# Patient Record
Sex: FEMALE | Employment: OTHER | URBAN - METROPOLITAN AREA
[De-identification: names, ages, dates, MRNs, and addresses within clinical notes are randomized per-mention and may not be internally consistent; named-entity substitution may affect disease eponyms.]

---

## 2017-01-03 ENCOUNTER — TELEPHONE (OUTPATIENT)
Dept: FAMILY MEDICINE CLINIC | Facility: CLINIC | Age: 42
End: 2017-01-03

## 2017-01-03 RX ORDER — VARENICLINE TARTRATE 1 MG/1
1 TABLET, FILM COATED ORAL 2 TIMES DAILY
Qty: 120 TAB | Refills: 0 | Status: SHIPPED | OUTPATIENT
Start: 2017-01-03 | End: 2017-01-17

## 2017-01-17 ENCOUNTER — OFFICE VISIT (OUTPATIENT)
Dept: FAMILY MEDICINE CLINIC | Facility: CLINIC | Age: 42
End: 2017-01-17

## 2017-01-17 VITALS
TEMPERATURE: 97.7 F | OXYGEN SATURATION: 97 % | RESPIRATION RATE: 16 BRPM | WEIGHT: 279.8 LBS | HEIGHT: 68 IN | BODY MASS INDEX: 42.41 KG/M2 | DIASTOLIC BLOOD PRESSURE: 95 MMHG | SYSTOLIC BLOOD PRESSURE: 160 MMHG | HEART RATE: 79 BPM

## 2017-01-17 DIAGNOSIS — F17.211 CIGARETTE NICOTINE DEPENDENCE IN REMISSION: ICD-10-CM

## 2017-01-17 DIAGNOSIS — I10 ESSENTIAL HYPERTENSION: ICD-10-CM

## 2017-01-17 DIAGNOSIS — M79.7 FIBROMYALGIA: Primary | ICD-10-CM

## 2017-01-17 RX ORDER — GABAPENTIN 300 MG/1
CAPSULE ORAL
Refills: 0 | COMMUNITY
Start: 2017-01-10 | End: 2017-01-17

## 2017-01-17 RX ORDER — VARENICLINE TARTRATE 0.5 (11)-1
KIT ORAL
Refills: 0 | COMMUNITY
Start: 2016-11-30 | End: 2017-01-17

## 2017-01-17 RX ORDER — VARENICLINE TARTRATE 1 MG/1
1 TABLET, FILM COATED ORAL 2 TIMES DAILY
Qty: 120 TAB | Refills: 0 | Status: SHIPPED | OUTPATIENT
Start: 2017-01-17 | End: 2017-02-27 | Stop reason: SDUPTHER

## 2017-01-17 RX ORDER — PREGABALIN 75 MG/1
75 CAPSULE ORAL 2 TIMES DAILY
Qty: 180 CAP | Refills: 3 | Status: SHIPPED | OUTPATIENT
Start: 2017-01-17 | End: 2017-02-27

## 2017-01-17 NOTE — PROGRESS NOTES
Jayce Joel is a 35 y.o. female presents today for follow up on her hypertension, cholesterol, and left shoulder pain. Patient reports no change in her shoulder pain. Patient reports that the lidocaine patches for the fibromyalgia is not working for the pain. Patient is not fasting. Pt is in Room # 3        1. Have you been to the ER, urgent care clinic since your last visit? Hospitalized since your last visit? No    2. Have you seen or consulted any other health care providers outside of the [facility] since your last visit? Include any pap smears or colon screening.  No

## 2017-01-17 NOTE — MR AVS SNAPSHOT
Visit Information Date & Time Provider Department Dept. Phone Encounter #  
 1/17/2017 10:30 AM Zeb Humphries MD [facility] [phone] [id number] Upcoming Health Maintenance Date Due  
 PAP AKA CERVICAL CYTOLOGY 1/28/2019 DTaP/Tdap/Td series (2 - Td) 5/31/2026 Allergies as of 1/17/2017  Review Complete On: 1/17/2017 By: Zeb Humphries MD  
 No Known Allergies Current Immunizations  Never Reviewed Name Date Influenza Vaccine (Quad) PF 11/1/2016, 9/1/2015  9:40 AM  
 Pneumococcal Polysaccharide (PPSV-23) 9/1/2015  9:41 AM  
 Td, Adsorbed PF 1/28/2016  8:00 AM  
  
 Not reviewed this visit You Were Diagnosed With   
  
 Codes Comments Fibromyalgia    -  Primary ICD-10-CM: M79.7 ICD-9-CM: 729.1 Cigarette nicotine dependence in remission     ICD-10-CM: F17.211 ICD-9-CM: V15.82 Essential hypertension     ICD-10-CM: I10 
ICD-9-CM: 401.9 Vitals BP Pulse Temp Resp Height(growth percentile) Weight(growth percentile) (!) 160/95 (BP 1 Location: Right arm, BP Patient Position: Sitting) 79 97.7 °F (36.5 °C) (Oral) 16 5' 8\" (1.727 m) 279 lb 12.8 oz (126.9 kg) LMP SpO2 BMI OB Status Smoking Status 12/23/2016 (Exact Date) 97% 42.54 kg/m2 Having regular periods Former Smoker BMI and BSA Data Body Mass Index Body Surface Area 42.54 kg/m 2 2.47 m 2 Preferred Pharmacy Pharmacy Name Phone [address] [phone] Your Updated Medication List  
  
   
This list is accurate as of: 1/17/17 10:54 AM.  Always use your most recent med list.  
  
  
  
  
 buPROPion  mg SR tablet Commonly known as:  WELLBUTRIN, ZYBAN  
  
 busPIRone 10 mg tablet Commonly known as:  BUSPAR  
  
 calcium-cholecalciferol (D3) tablet Commonly known as:  Calcium 600 + D Take 1 Tab by mouth two (2) times a day. DULoxetine 60 mg capsule Commonly known as:  CYMBALTA Take 1 Cap by mouth daily. lamoTRIgine 200 mg tablet Commonly known as: LaMICtal  
  
 losartan 25 mg tablet Commonly known as:  COZAAR Take 1 Tab by mouth daily. MOTRIN  mg tablet Generic drug:  ibuprofen Take  by mouth.  
  
 polyethylene glycol 17 gram/dose powder Commonly known as:  Piper Mt Take 17 g by mouth daily. pregabalin 75 mg capsule Commonly known as:  Carlin Raquel Take 1 Cap by mouth two (2) times a day. Max Daily Amount: 150 mg.  
  
 simethicone 80 mg chewable tablet Commonly known as:  Anabel Lente Take 1 Tab by mouth every six (6) hours as needed for Flatulence. terbinafine HCl 250 mg tablet Commonly known as:  LAMISIL Take 1 Tab by mouth daily. traZODone 150 mg tablet Commonly known as:  DESYREL  
  
 varenicline 1 mg tablet Commonly known as:  Marrianne Prost Take 1 Tab by mouth two (2) times a day. Prescriptions Printed Refills  
 pregabalin (LYRICA) 75 mg capsule 3 Sig: Take 1 Cap by mouth two (2) times a day. Max Daily Amount: 150 mg.  
 Class: Print Route: Oral  
  
Prescriptions Sent to Pharmacy Refills  
 varenicline (CHANTIX) 1 mg tablet 0 Sig: Take 1 Tab by mouth two (2) times a day. Class: Normal  
 Pharmacy: [facility] [address] [place] [address] [address]  #: [phone] Route: Oral  
  
We Performed the Following REFERRAL TO RHEUMATOLOGY [DBC55 Custom] Comments:  
 Please evaluate patient for fibromyalgia. Referral Information Referral ID Referred By Referred To  
  
 [id number] MD Clarissa [address] [id number] [facility] [facility], [address] [place] [address] Phone: [phone] Fax: [phone] Visits Status Start Date End Date 1 New Request 1/17/17 1/17/18 If your referral has a status of pending review or denied, additional information will be sent to support the outcome of this decision. Patient Instructions Check bp at home. Call the office if greater than 140/90 Introducing [facility]! Dear Alex Kulkarni: 
Thank you for requesting a Beauty Booked account. Our records indicate that you already have an active Beauty Booked account. You can access your account anytime at [email or web address] Did you know that you can access your hospital and ER discharge instructions at any time in Beauty Booked? You can also review all of your test results from your hospital stay or ER visit. Additional Information If you have questions, please visit the Frequently Asked Questions section of the Beauty Booked website at [email or web address] Remember, Beauty Booked is NOT to be used for urgent needs. For medical emergencies, dial 911. Now available from your iPhone and Android! Please provide this summary of care documentation to your next provider. Your primary care clinician is listed as Giselle Mitchell. If you have any questions after today's visit, please call [phone].

## 2017-01-17 NOTE — PROGRESS NOTES
Internal Medicine Progress Note    Today's Date:  2017   Patient: Johnson Ribeiro  Patient :  1975    Subjective:     Chief Complaint   Patient presents with    Cholesterol Problem    Hypertension    Shoulder Pain      Shoulder pain  This is a chronic problem. This is not controlled. This started in . Pt takes neurontin and cymbalta. Pain is bilateral.  Pt was referred to ortho. She received a steroid injection but the pain relief only lasted a few days. Pt was prescribed celebrex and referred to physical therapy. Insurance would not cover either. Pt declines a prescription for narcotics. Pt reports fibromyalgia and also has pain in her back, neck, thighs. Hypertension   This is a chronic problem. BP is not at goal. Pt takes losartan. Pt reports compliance with this medication. Pt declines increasing the dose of losartan because she states that her bp elevation is from the pain she has. Obesity Class III  This is a chronic problem. This is not at goal. Pt does not exercise regularly. Pt tries to eat a healthy diet. Anxiety/Depression/Insomnia/Bipolar disorder  This is a chronic problem. This is controlled. Pt takes wellbutrin, trazodone, lamictal and cymbalta.  Pt sees a mental health professional, Dr Kriss James.       Past Medical History   Diagnosis Date    Acute pain of left shoulder 2016    Bipolar 1 disorder ([address]     Bipolar affective disorder, currently manic, moderate ([address] 2016    Chronic midline low back pain with sciatica 2016    Cigarette nicotine dependence in remission 2016    Cigarette nicotine dependence without complication     Depression     Fibromyalgia 2015    HLD (hyperlipidemia) 2016    Hypertension     Impaired fasting glucose 2016    Irritable bowel syndrome without diarrhea 2016    Moderate episode of recurrent major depressive disorder ([address] 2016    Obesity, Class III, BMI 40-49.9 (morbid obesity) ([facility] 9/1/2015    Onychomycosis 4/27/2016    Pain of left thumb 11/1/2016    Prediabetes 7/5/2016    PTSD (post-traumatic stress disorder)     Smoker 9/1/2015    Vitamin D deficiency 5/31/2016     Past Surgical History   Procedure Laterality Date    Hx gyn Bilateral      tubal    Hx orthopaedic        reports that she quit smoking about 3 weeks ago. She has a 8.25 pack-year smoking history. She has never used smokeless tobacco. She reports that she drinks about 5.4 oz of alcohol per week  She reports that she does not use illicit drugs. Family History   Problem Relation Age of Onset    Hypertension Mother     Thyroid Disease Mother     No Known Problems Father     Psychiatric Disorder Sister     Psychiatric Disorder Brother     Psychiatric Disorder Sister     Diabetes Sister     Psychiatric Disorder Brother     Psychiatric Disorder Brother      No Known Allergies  Review of Systems   Positives in bold  CV:      chest pain, palpitations  PULM:  SOB, wheezing, cough, sputum production    Current Outpatient Meds and Allergies     Current Outpatient Prescriptions on File Prior to Visit   Medication Sig Dispense Refill    losartan (COZAAR) 25 mg tablet Take 1 Tab by mouth daily. 90 Tab 3    buPROPion SR (WELLBUTRIN, ZYBAN) 200 mg SR tablet   1    calcium-cholecalciferol, D3, (CALCIUM 600 + D) tablet Take 1 Tab by mouth two (2) times a day. 180 Tab 3    polyethylene glycol (MIRALAX) 17 gram/dose powder Take 17 g by mouth daily. 1530 g 3    busPIRone (BUSPAR) 10 mg tablet   2    lamoTRIgine (LAMICTAL) 200 mg tablet   1    simethicone (MYLICON) 80 mg chewable tablet Take 1 Tab by mouth every six (6) hours as needed for Flatulence. 90 Tab 3    traZODone (DESYREL) 150 mg tablet   1    DULoxetine (CYMBALTA) 60 mg capsule Take 1 Cap by mouth daily. (Patient taking differently: Take 60 mg by mouth two (2) times a day.) 90 Cap 3    ibuprofen (MOTRIN IB) 200 mg tablet Take  by mouth.       terbinafine HCl (LAMISIL) 250 mg tablet Take 1 Tab by mouth daily. 30 Tab 1     No current facility-administered medications on file prior to visit. No Known Allergies  Objective:     VS:    Visit Vitals    BP (!) 160/95 (BP 1 Location: Right arm, BP Patient Position: Sitting)    Pulse 79    Temp 97.7 °F (36.5 °C) (Oral)    Resp 16    Ht 5' 8\" (1.727 m)    Wt 279 lb 12.8 oz (126.9 kg)    LMP 12/23/2016 (Exact Date)    SpO2 97%    BMI 42.54 kg/m2     General:   Well-nourished, well-groomed, pleasant, alert, in no acute distress  Head:  Normocephalic, atraumatic  Ears:  External ears WNL  Nose:  External nares WNL  Psych:  No pressured speech, no abnormal thought content    No visits with results within 3 Month(s) from this visit. Latest known visit with results is:    Hospital Outpatient Visit on 07/05/2016   Component Date Value Ref Range Status    WBC 07/05/2016 8.4  4.6 - 13.2 K/uL Final    RBC 07/05/2016 3.98* 4.20 - 5.30 M/uL Final    HGB 07/05/2016 12.7  12.0 - 16.0 g/dL Final    HCT 07/05/2016 39.4  35.0 - 45.0 % Final    MCV 07/05/2016 99.0* 74.0 - 97.0 FL Final    MCH 07/05/2016 31.9  24.0 - 34.0 PG Final    MCHC 07/05/2016 32.2  31.0 - 37.0 g/dL Final    RDW 07/05/2016 13.5  11.6 - 14.5 % Final    PLATELET 61/32/5194 940  135 - 420 K/uL Final    MPV 07/05/2016 10.5  9.2 - 11.8 FL Final    NEUTROPHILS 07/05/2016 68  40 - 73 % Final    LYMPHOCYTES 07/05/2016 22  21 - 52 % Final    MONOCYTES 07/05/2016 8  3 - 10 % Final    EOSINOPHILS 07/05/2016 2  0 - 5 % Final    BASOPHILS 07/05/2016 0  0 - 2 % Final    ABS. NEUTROPHILS 07/05/2016 5.7  1.8 - 8.0 K/UL Final    ABS. LYMPHOCYTES 07/05/2016 1.9  0.9 - 3.6 K/UL Final    ABS. MONOCYTES 07/05/2016 0.7  0.05 - 1.2 K/UL Final    ABS. EOSINOPHILS 07/05/2016 0.1  0.0 - 0.4 K/UL Final    ABS.  BASOPHILS 07/05/2016 0.0  0.0 - 0.06 K/UL Final    DF 07/05/2016 AUTOMATED    Final    Hemoglobin A1c 07/05/2016 5.7* 4.2 - 5.6 % Final Comment: ** NEW REFERENCE RANGE ESTABLISHED FOR THIS METHOD **  (NOTE)  HbA1C Interpretive Ranges  <5.7              Normal  5.7 - 6.4         Consider Prediabetes  >6.5              Consider Diabetes      Est. average glucose 07/05/2016 117  mg/dL Final    Comment: (NOTE)  The eAG should be interpreted with patient characteristics in mind   since ethnicity, interindividual differences, red cell lifespan,   variation in rates of glycation, etc. may affect the validity of the   calculation.  LIPID PROFILE 07/05/2016        Final    Cholesterol, total 07/05/2016 194  <200 MG/DL Final    Triglyceride 07/05/2016 170* <150 MG/DL Final    HDL Cholesterol 07/05/2016 43  40 - 60 MG/DL Final    LDL, calculated 07/05/2016 117* 0 - 100 MG/DL Final    VLDL, calculated 07/05/2016 34  MG/DL Final    CHOL/HDL Ratio 07/05/2016 4.5  0 - 5.0   Final    Sodium 07/05/2016 140  136 - 145 mmol/L Final    Potassium 07/05/2016 4.1  3.5 - 5.5 mmol/L Final    Chloride 07/05/2016 104  100 - 108 mmol/L Final    CO2 07/05/2016 26  21 - 32 mmol/L Final    Anion gap 07/05/2016 10  3.0 - 18 mmol/L Final    Glucose 07/05/2016 86  74 - 99 mg/dL Final    BUN 07/05/2016 12  7.0 - 18 MG/DL Final    Creatinine 07/05/2016 0.72  0.6 - 1.3 MG/DL Final    BUN/Creatinine ratio 07/05/2016 17  12 - 20   Final    GFR est AA 07/05/2016 >60  >60 ml/min/1.73m2 Final    GFR est non-AA 07/05/2016 >60  >60 ml/min/1.73m2 Final    Comment: (NOTE)  Estimated GFR is calculated using the Modification of Diet in Renal   Disease (MDRD) Study equation, reported for both  Americans   (GFRAA) and non- Americans (GFRNA), and normalized to 1.73m2   body surface area. The physician must decide which value applies to   the patient. The MDRD study equation should only be used in   individuals age 25 or older.  It has not been validated for the   following: pregnant women, patients with serious comorbid conditions,   or on certain medications, or persons with extremes of body size,   muscle mass, or nutritional status.  Calcium 07/05/2016 8.9  8.5 - 10.1 MG/DL Final    Bilirubin, total 07/05/2016 0.3  0.2 - 1.0 MG/DL Final    ALT 07/05/2016 25  13 - 56 U/L Final    AST 07/05/2016 15  15 - 37 U/L Final    Alk. phosphatase 07/05/2016 86  45 - 117 U/L Final    Protein, total 07/05/2016 7.0  6.4 - 8.2 g/dL Final    Albumin 07/05/2016 3.7  3.4 - 5.0 g/dL Final    Globulin 07/05/2016 3.3  2.0 - 4.0 g/dL Final    A-G Ratio 07/05/2016 1.1  0.8 - 1.7   Final     Assessment/Plan & Orders:         ICD-10-CM ICD-9-CM    1. Fibromyalgia M79.7 729.1 REFERRAL TO RHEUMATOLOGY      pregabalin (LYRICA) 75 mg capsule   2. Cigarette nicotine dependence in remission F17.211 V15.82 varenicline (CHANTIX) 1 mg tablet   3. Essential hypertension I10 401.9       Healthy lifestyle has been encouraged including avoidance of tobacco, limiting or avoiding alcohol intake, heart healthy diet which is low in cholesterol and saturated fat and contains fresh fruits, vegetables and whole grains and fiber, regular exercise with goals of 20-30 minutes 3-5 days weekly and maintaining an optimal BMI. Check bp at home and call the office if >140//90    Follow-up Disposition:  Return in about 1 week (around 1/24/2017) for Blood pressure check, Follow up hypertension. *Patient verbalized understanding and agreement with the plan. Patient was given an after-visit summary. Treva Ferro.  [address], MD - Internal Medicine  1/17/2017, 8:52 AM  [address]  [address] [place], [address]  Phone [phone]  Fax [phone]

## 2017-01-24 ENCOUNTER — OFFICE VISIT (OUTPATIENT)
Dept: FAMILY MEDICINE CLINIC | Facility: CLINIC | Age: 42
End: 2017-01-24

## 2017-01-24 VITALS
RESPIRATION RATE: 16 BRPM | HEIGHT: 68 IN | OXYGEN SATURATION: 98 % | BODY MASS INDEX: 42.37 KG/M2 | SYSTOLIC BLOOD PRESSURE: 130 MMHG | TEMPERATURE: 97.3 F | DIASTOLIC BLOOD PRESSURE: 80 MMHG | WEIGHT: 279.6 LBS | HEART RATE: 79 BPM

## 2017-01-24 DIAGNOSIS — M79.7 FIBROMYALGIA: ICD-10-CM

## 2017-01-24 DIAGNOSIS — F17.200 CONTINUOUS NICOTINE DEPENDENCE: ICD-10-CM

## 2017-01-24 DIAGNOSIS — E66.01 OBESITY, CLASS III, BMI 40-49.9 (MORBID OBESITY) (HCC): ICD-10-CM

## 2017-01-24 DIAGNOSIS — I10 ESSENTIAL HYPERTENSION: Primary | ICD-10-CM

## 2017-01-24 RX ORDER — LIDOCAINE 50 MG/G
PATCH TOPICAL
Refills: 3 | COMMUNITY
Start: 2016-11-29 | End: 2018-05-15

## 2017-01-24 NOTE — MR AVS SNAPSHOT
Visit Information Date & Time Provider Department Dept. Phone Encounter #  
 1/24/2017 10:00 AM Seth Montoya MD [facility] [phone] [id number] Follow-up Instructions Return in about 3 months (around 4/24/2017) for Follow up. Upcoming Health Maintenance Date Due  
 PAP AKA CERVICAL CYTOLOGY 1/28/2019 DTaP/Tdap/Td series (2 - Td) 5/31/2026 Allergies as of 1/24/2017  Review Complete On: 1/24/2017 By: Seth Montoya MD  
 No Known Allergies Current Immunizations  Never Reviewed Name Date Influenza Vaccine (Quad) PF 11/1/2016, 9/1/2015  9:40 AM  
 Pneumococcal Polysaccharide (PPSV-23) 9/1/2015  9:41 AM  
 Td, Adsorbed PF 1/28/2016  8:00 AM  
  
 Not reviewed this visit You Were Diagnosed With   
  
 Codes Comments Continuous nicotine dependence    -  Primary ICD-10-CM: Y40.279 ICD-9-CM: 305.1 Essential hypertension     ICD-10-CM: I10 
ICD-9-CM: 401.9 Fibromyalgia     ICD-10-CM: M79.7 ICD-9-CM: 729.1 Vitals BP Pulse Temp Resp Height(growth percentile) Weight(growth percentile) 130/80 79 97.3 °F (36.3 °C) (Oral) 16 5' 8\" (1.727 m) 279 lb 9.6 oz (126.8 kg) LMP SpO2 BMI OB Status Smoking Status 12/23/2016 (Exact Date) 98% 42.51 kg/m2 Having regular periods Former Smoker Vitals History BMI and BSA Data Body Mass Index Body Surface Area 42.51 kg/m 2 2.47 m 2 Preferred Pharmacy Pharmacy Name Phone [address] [phone] Your Updated Medication List  
  
   
This list is accurate as of: 1/24/17 11:11 AM.  Always use your most recent med list.  
  
  
  
  
 buPROPion  mg SR tablet Commonly known as:  WELLBUTRIN, ZYBAN  
  
 busPIRone 10 mg tablet Commonly known as:  BUSPAR  
  
 calcium-cholecalciferol (D3) tablet Commonly known as:  Calcium 600 + D  
 Take 1 Tab by mouth two (2) times a day. DULoxetine 60 mg capsule Commonly known as:  CYMBALTA Take 1 Cap by mouth daily. lamoTRIgine 200 mg tablet Commonly known as: LaMICtal  
  
 lidocaine 5 % Commonly known as:  LIDODERM  
CHRISTI 1 PA AA QD. LEAVE ON FOR 12 H THEN REMOVE FOR 12 H  
  
 losartan 25 mg tablet Commonly known as:  COZAAR Take 1 Tab by mouth daily. MOTRIN  mg tablet Generic drug:  ibuprofen Take  by mouth.  
  
 polyethylene glycol 17 gram/dose powder Commonly known as:  Reda Emerson Take 17 g by mouth daily. pregabalin 75 mg capsule Commonly known as:  Becky Hope Take 1 Cap by mouth two (2) times a day. Max Daily Amount: 150 mg.  
  
 simethicone 80 mg chewable tablet Commonly known as:  Brynda Emperor Take 1 Tab by mouth every six (6) hours as needed for Flatulence. traZODone 150 mg tablet Commonly known as:  DESYREL  
  
 varenicline 1 mg tablet Commonly known as:  Veronica Ort Take 1 Tab by mouth two (2) times a day. Follow-up Instructions Return in about 3 months (around 4/24/2017) for Follow up. Patient Instructions Fibromyalgia: Care Instructions Your Care Instructions Fibromyalgia is a painful condition that is not completely understood by medical experts. The cause of fibromyalgia is not known. It can make you feel tired and ache all over. It causes tender spots at specific points of the body that hurt only when you press on them. You may have trouble sleeping, as well as other symptoms. These problems can upset your work and home life. Symptoms tend to come and go, although they may never go away completely. Fibromyalgia does not harm your muscles, joints, or organs. Follow-up care is a key part of your treatment and safety. Be sure to make and go to all appointments, and call your doctor if you are having problems. It's also a good idea to know your test results and keep a list of the medicines you take. How can you care for yourself at home? · Exercise often. Walk, swim, or bike to help with pain and sleep problems and to make you feel better. · Try to get a good night's sleep. Go to bed and get up at the same time each day, whether you feel rested or not. Make sure you have a good mattress and pillow. · Reduce stress. Avoid things that cause you stress, if you can. If not, work at making them less stressful. Learn to use biofeedback, guided imagery, meditation, or other methods to relax. · Make healthy changes. Eat a balanced diet, quit smoking, and limit alcohol and caffeine. · Use a heating pad set on low or take warm baths or showers for pain. Using cold packs for up to 20 minutes at a time can also relieve pain. Put a thin cloth between the cold pack and your skin. A gentle massage might help too. · Be safe with medicines. Take your medicines exactly as prescribed. Call your doctor if you think you are having a problem with your medicine. Your doctor may talk to you about taking antidepressant medicines. These medicines may improve sleep, relieve pain, and in some cases treat depression. · Learn about fibromyalgia. This makes coping easier. Then, take an active role in your treatment. · Think about joining a support group with others who have fibromyalgia to learn more and get support. When should you call for help? Watch closely for changes in your health, and be sure to contact your doctor if: 
· You feel sad, helpless, or hopeless; lose interest in things you used to enjoy; or have other symptoms of depression. · Your fibromyalgia symptoms get worse. Where can you learn more? Go to http://jocelyn-amrik.info/. Enter V003 in the search box to learn more about \"Fibromyalgia: Care Instructions. \" Current as of: April 18, 2016 Content Version: 11.1 © 5082-7164 [employer].  Care instructions adapted under license by [address] (which disclaims liability or warranty for this information). If you have questions about a medical condition or this instruction, always ask your healthcare professional. [address] any warranty or liability for your use of this information. Introducing [facility]! Dear Francisco Congress: 
Thank you for requesting a DotProduct account. Our records indicate that you already have an active DotProduct account. You can access your account anytime at [email or web address] Did you know that you can access your hospital and ER discharge instructions at any time in DotProduct? You can also review all of your test results from your hospital stay or ER visit. Additional Information If you have questions, please visit the Frequently Asked Questions section of the DotProduct website at [email or web address] Remember, DotProduct is NOT to be used for urgent needs. For medical emergencies, dial 911. Now available from your iPhone and Android! Please provide this summary of care documentation to your next provider. Your primary care clinician is listed as Caro Amado. If you have any questions after today's visit, please call [phone].

## 2017-01-24 NOTE — PROGRESS NOTES
Internal Medicine Progress Note    Today's Date:  2017   Patient: Max Greer  Patient :  [id number]    Subjective:     Chief Complaint   Patient presents with    Hypertension    Cholesterol Problem    Shoulder Pain     left      Left shoulder pain  This is a chronic problem. This is not controlled. This started in . Pt takes neurontin and cymbalta. Pt was referred to ortho. She received a steroid injection but the pain relief only lasted a few days. Pt was prescribed celebrex and referred to physical therapy. Insurance would not cover either. Pt declines a prescription for narcotics. Fibromyalgia  This is a chronic problem. This is not at goal.  Pain is located in her back, neck, thighs. Pt has been referred to rheumatology. Pt was switched from neurontin to lyrica. This is effective. Hypertension   This is a chronic problem. BP is at goal. Pt takes losartan. Pt reports compliance with this medication. Obesity Class III  This is a chronic problem. This is not at goal. Pt exercises regularly by walking. Pt tries to eat a healthy diet. Anxiety/Depression/Insomnia/Bipolar disorder  This is a chronic problem. This is controlled. Pt takes wellbutrin, trazodone, lamictal and cymbalta.  Pt sees a mental health professional, Dr Marv Jones.       Past Medical History   Diagnosis Date    Acute pain of left shoulder 2016    Bipolar 1 disorder ([address]     Bipolar affective disorder, currently manic, moderate ([address] 2016    Chronic midline low back pain with sciatica 2016    Cigarette nicotine dependence in remission 2016    Cigarette nicotine dependence without complication     Continuous nicotine dependence 2017    Depression     Fibromyalgia 2015    HLD (hyperlipidemia) 2016    Hypertension     Impaired fasting glucose 2016    Irritable bowel syndrome without diarrhea 2016    Moderate episode of recurrent major depressive disorder ([facility] 4/27/2016    Obesity, Class III, BMI 40-49.9 (morbid obesity) ([facility] 9/1/2015    Onychomycosis 4/27/2016    Pain of left thumb 11/1/2016    Prediabetes 7/5/2016    PTSD (post-traumatic stress disorder)     Smoker 9/1/2015    Vitamin D deficiency 5/31/2016     Past Surgical History   Procedure Laterality Date    Hx gyn Bilateral      tubal    Hx orthopaedic        reports that she quit smoking about 4 weeks ago. She has a 8.25 pack-year smoking history. She has never used smokeless tobacco. She reports that she drinks about 5.4 oz of alcohol per week  She reports that she does not use illicit drugs. Family History   Problem Relation Age of Onset    Hypertension Mother     Thyroid Disease Mother     No Known Problems Father     Psychiatric Disorder Sister     Psychiatric Disorder Brother     Psychiatric Disorder Sister     Diabetes Sister     Psychiatric Disorder Brother     Psychiatric Disorder Brother      No Known Allergies  Review of Systems   Positives in bold  CV:      chest pain, palpitations  PULM:  SOB, wheezing, cough, sputum production    Current Outpatient Meds and Allergies     Current Outpatient Prescriptions on File Prior to Visit   Medication Sig Dispense Refill    pregabalin (LYRICA) 75 mg capsule Take 1 Cap by mouth two (2) times a day. Max Daily Amount: 150 mg. 180 Cap 3    varenicline (CHANTIX) 1 mg tablet Take 1 Tab by mouth two (2) times a day. 120 Tab 0    losartan (COZAAR) 25 mg tablet Take 1 Tab by mouth daily. 90 Tab 3    buPROPion SR (WELLBUTRIN, ZYBAN) 200 mg SR tablet   1    calcium-cholecalciferol, D3, (CALCIUM 600 + D) tablet Take 1 Tab by mouth two (2) times a day. 180 Tab 3    polyethylene glycol (MIRALAX) 17 gram/dose powder Take 17 g by mouth daily.  1530 g 3    busPIRone (BUSPAR) 10 mg tablet   2    lamoTRIgine (LAMICTAL) 200 mg tablet   1    simethicone (MYLICON) 80 mg chewable tablet Take 1 Tab by mouth every six (6) hours as needed for Flatulence. 90 Tab 3    traZODone (DESYREL) 150 mg tablet   1    DULoxetine (CYMBALTA) 60 mg capsule Take 1 Cap by mouth daily. (Patient taking differently: Take 60 mg by mouth two (2) times a day.) 90 Cap 3    ibuprofen (MOTRIN IB) 200 mg tablet Take  by mouth. No current facility-administered medications on file prior to visit. No Known Allergies  Objective:     VS:    Visit Vitals    /80  Comment: right arm manual    Pulse 79    Temp 97.3 °F (36.3 °C) (Oral)    Resp 16    Ht 5' 8\" (1.727 m)    Wt 279 lb 9.6 oz (126.8 kg)    LMP 12/23/2016 (Exact Date)    SpO2 98%    BMI 42.51 kg/m2     General:   Well-nourished, well-groomed, pleasant, alert, in no acute distress  Head:  Normocephalic, atraumatic  Ears:  External ears WNL  Nose:  External nares WNL  Psych:  No pressured speech, no abnormal thought content    No visits with results within 3 Month(s) from this visit. Latest known visit with results is:    Hospital Outpatient Visit on 07/05/2016   Component Date Value Ref Range Status    WBC 07/05/2016 8.4  4.6 - 13.2 K/uL Final    RBC 07/05/2016 3.98* 4.20 - 5.30 M/uL Final    HGB 07/05/2016 12.7  12.0 - 16.0 g/dL Final    HCT 07/05/2016 39.4  35.0 - 45.0 % Final    MCV 07/05/2016 99.0* 74.0 - 97.0 FL Final    MCH 07/05/2016 31.9  24.0 - 34.0 PG Final    MCHC 07/05/2016 32.2  31.0 - 37.0 g/dL Final    RDW 07/05/2016 13.5  11.6 - 14.5 % Final    PLATELET 84/25/5664 589  135 - 420 K/uL Final    MPV 07/05/2016 10.5  9.2 - 11.8 FL Final    NEUTROPHILS 07/05/2016 68  40 - 73 % Final    LYMPHOCYTES 07/05/2016 22  21 - 52 % Final    MONOCYTES 07/05/2016 8  3 - 10 % Final    EOSINOPHILS 07/05/2016 2  0 - 5 % Final    BASOPHILS 07/05/2016 0  0 - 2 % Final    ABS. NEUTROPHILS 07/05/2016 5.7  1.8 - 8.0 K/UL Final    ABS. LYMPHOCYTES 07/05/2016 1.9  0.9 - 3.6 K/UL Final    ABS. MONOCYTES 07/05/2016 0.7  0.05 - 1.2 K/UL Final    ABS.  EOSINOPHILS 07/05/2016 0.1  0.0 - 0.4 K/UL Final    ABS. BASOPHILS 07/05/2016 0.0  0.0 - 0.06 K/UL Final    DF 07/05/2016 AUTOMATED    Final    Hemoglobin A1c 07/05/2016 5.7* 4.2 - 5.6 % Final    Comment: ** NEW REFERENCE RANGE ESTABLISHED FOR THIS METHOD **  (NOTE)  HbA1C Interpretive Ranges  <5.7              Normal  5.7 - 6.4         Consider Prediabetes  >6.5              Consider Diabetes      Est. average glucose 07/05/2016 117  mg/dL Final    Comment: (NOTE)  The eAG should be interpreted with patient characteristics in mind   since ethnicity, interindividual differences, red cell lifespan,   variation in rates of glycation, etc. may affect the validity of the   calculation.  LIPID PROFILE 07/05/2016        Final    Cholesterol, total 07/05/2016 194  <200 MG/DL Final    Triglyceride 07/05/2016 170* <150 MG/DL Final    HDL Cholesterol 07/05/2016 43  40 - 60 MG/DL Final    LDL, calculated 07/05/2016 117* 0 - 100 MG/DL Final    VLDL, calculated 07/05/2016 34  MG/DL Final    CHOL/HDL Ratio 07/05/2016 4.5  0 - 5.0   Final    Sodium 07/05/2016 140  136 - 145 mmol/L Final    Potassium 07/05/2016 4.1  3.5 - 5.5 mmol/L Final    Chloride 07/05/2016 104  100 - 108 mmol/L Final    CO2 07/05/2016 26  21 - 32 mmol/L Final    Anion gap 07/05/2016 10  3.0 - 18 mmol/L Final    Glucose 07/05/2016 86  74 - 99 mg/dL Final    BUN 07/05/2016 12  7.0 - 18 MG/DL Final    Creatinine 07/05/2016 0.72  0.6 - 1.3 MG/DL Final    BUN/Creatinine ratio 07/05/2016 17  12 - 20   Final    GFR est AA 07/05/2016 >60  >60 ml/min/1.73m2 Final    GFR est non-AA 07/05/2016 >60  >60 ml/min/1.73m2 Final    Comment: (NOTE)  Estimated GFR is calculated using the Modification of Diet in Renal   Disease (MDRD) Study equation, reported for both  Americans   (GFRAA) and non- Americans (GFRNA), and normalized to 1.73m2   body surface area. The physician must decide which value applies to   the patient.  The MDRD study equation should only be used in   individuals age 25 or older. It has not been validated for the   following: pregnant women, patients with serious comorbid conditions,   or on certain medications, or persons with extremes of body size,   muscle mass, or nutritional status.  Calcium 07/05/2016 8.9  8.5 - 10.1 MG/DL Final    Bilirubin, total 07/05/2016 0.3  0.2 - 1.0 MG/DL Final    ALT 07/05/2016 25  13 - 56 U/L Final    AST 07/05/2016 15  15 - 37 U/L Final    Alk. phosphatase 07/05/2016 86  45 - 117 U/L Final    Protein, total 07/05/2016 7.0  6.4 - 8.2 g/dL Final    Albumin 07/05/2016 3.7  3.4 - 5.0 g/dL Final    Globulin 07/05/2016 3.3  2.0 - 4.0 g/dL Final    A-G Ratio 07/05/2016 1.1  0.8 - 1.7   Final     Assessment/Plan & Orders:         ICD-10-CM ICD-9-CM    1. Essential hypertension I10 401.9    2. Fibromyalgia M79.7 729.1    3. Continuous nicotine dependence F17.200 305.1    4. Obesity, Class III, BMI 40-49.9 (morbid obesity) ([facility]) E66.01 278.01       Healthy lifestyle has been encouraged including avoidance of tobacco, limiting or avoiding alcohol intake, heart healthy diet which is low in cholesterol and saturated fat and contains fresh fruits, vegetables and whole grains and fiber, regular exercise with goals of 20-30 minutes 3-5 days weekly and maintaining an optimal BMI. Pt would like to wait to order mammogram for next visi  Pt to schedule a follow up with Dr. Sendy Taylor for shoulder pain    Follow-up Disposition:  Return in about 3 months (around 4/24/2017) for Follow up. *Patient verbalized understanding and agreement with the plan. Patient was given an after-visit summary. Aime Burns.  [address], MD - Internal Medicine  1/24/2017, 8:52 AM  [facility]  [address] [place], [address]  Phone [phone]  Fax [phone]

## 2017-01-24 NOTE — PATIENT INSTRUCTIONS
Fibromyalgia: Care Instructions  Your Care Instructions  Fibromyalgia is a painful condition that is not completely understood by medical experts. The cause of fibromyalgia is not known. It can make you feel tired and ache all over. It causes tender spots at specific points of the body that hurt only when you press on them. You may have trouble sleeping, as well as other symptoms. These problems can upset your work and home life. Symptoms tend to come and go, although they may never go away completely. Fibromyalgia does not harm your muscles, joints, or organs. Follow-up care is a key part of your treatment and safety. Be sure to make and go to all appointments, and call your doctor if you are having problems. It's also a good idea to know your test results and keep a list of the medicines you take. How can you care for yourself at home? · Exercise often. Walk, swim, or bike to help with pain and sleep problems and to make you feel better. · Try to get a good night's sleep. Go to bed and get up at the same time each day, whether you feel rested or not. Make sure you have a good mattress and pillow. · Reduce stress. Avoid things that cause you stress, if you can. If not, work at making them less stressful. Learn to use biofeedback, guided imagery, meditation, or other methods to relax. · Make healthy changes. Eat a balanced diet, quit smoking, and limit alcohol and caffeine. · Use a heating pad set on low or take warm baths or showers for pain. Using cold packs for up to 20 minutes at a time can also relieve pain. Put a thin cloth between the cold pack and your skin. A gentle massage might help too. · Be safe with medicines. Take your medicines exactly as prescribed. Call your doctor if you think you are having a problem with your medicine. Your doctor may talk to you about taking antidepressant medicines. These medicines may improve sleep, relieve pain, and in some cases treat depression.   · Learn about fibromyalgia. This makes coping easier. Then, take an active role in your treatment. · Think about joining a support group with others who have fibromyalgia to learn more and get support. When should you call for help? Watch closely for changes in your health, and be sure to contact your doctor if:  · You feel sad, helpless, or hopeless; lose interest in things you used to enjoy; or have other symptoms of depression. · Your fibromyalgia symptoms get worse. Where can you learn more? Go to http://jocelyn-amrik.info/. Enter V003 in the search box to learn more about \"Fibromyalgia: Care Instructions. \"  Current as of: April 18, 2016  Content Version: 11.1  © 0422-4671 [employer]. Care instructions adapted under license by [employer] (which disclaims liability or warranty for this information). If you have questions about a medical condition or this instruction, always ask your healthcare professional. [address] any warranty or liability for your use of this information.

## 2017-01-24 NOTE — PROGRESS NOTES
Rhea Carroll is a 35 y.o. female presents today for follow up on her hypertension and cholesterol. Patient also reports that there is no change in her shoulder. Patient reports shoulder pain increases with certain movement. Patient is not fasting. Pt is in Room # 3      1. Have you been to the ER, urgent care clinic since your last visit? Hospitalized since your last visit? No    2. Have you seen or consulted any other health care providers outside of the [facility] since your last visit? Include any pap smears or colon screening.  No

## 2017-02-27 ENCOUNTER — OFFICE VISIT (OUTPATIENT)
Dept: FAMILY MEDICINE CLINIC | Facility: CLINIC | Age: 42
End: 2017-02-27

## 2017-02-27 VITALS
BODY MASS INDEX: 43.26 KG/M2 | DIASTOLIC BLOOD PRESSURE: 82 MMHG | HEIGHT: 68 IN | HEART RATE: 87 BPM | OXYGEN SATURATION: 98 % | RESPIRATION RATE: 16 BRPM | SYSTOLIC BLOOD PRESSURE: 142 MMHG | TEMPERATURE: 97.9 F | WEIGHT: 285.4 LBS

## 2017-02-27 DIAGNOSIS — Z13.31 SCREENING FOR DEPRESSION: ICD-10-CM

## 2017-02-27 DIAGNOSIS — I10 ESSENTIAL HYPERTENSION: Primary | ICD-10-CM

## 2017-02-27 DIAGNOSIS — M79.7 FIBROMYALGIA: ICD-10-CM

## 2017-02-27 DIAGNOSIS — F17.211 CIGARETTE NICOTINE DEPENDENCE IN REMISSION: ICD-10-CM

## 2017-02-27 DIAGNOSIS — R73.03 PREDIABETES: ICD-10-CM

## 2017-02-27 DIAGNOSIS — E78.2 MIXED HYPERLIPIDEMIA: ICD-10-CM

## 2017-02-27 LAB — HBA1C MFR BLD HPLC: 5.1 %

## 2017-02-27 RX ORDER — VARENICLINE TARTRATE 1 MG/1
1 TABLET, FILM COATED ORAL 2 TIMES DAILY
Qty: 90 TAB | Refills: 0 | Status: SHIPPED | OUTPATIENT
Start: 2017-02-27 | End: 2017-04-26

## 2017-02-27 RX ORDER — PREGABALIN 150 MG/1
150 CAPSULE ORAL 2 TIMES DAILY
Qty: 180 CAP | Refills: 3 | Status: SHIPPED | OUTPATIENT
Start: 2017-02-27 | End: 2017-04-26

## 2017-02-27 NOTE — MR AVS SNAPSHOT
Visit Information Date & Time Provider Department Dept. Phone Encounter #  
 2/27/2017  9:45 AM Giselle Mitchell MD [facility] [phone] [id number] Follow-up Instructions Return if symptoms worsen or fail to improve. Your Appointments 3/2/2017  9:40 AM  
Follow Up with Miriam Bal DO  
[facility]) Appt Note: BACK & SHOULDER FU  
 [address], [address]  
  
   
 [address], [address]  
  
    
 4/25/2017 10:00 AM  
Follow Up with Giselle Mitchell MD  
[place] [email or web address]) Appt Note: [address] [id number]  
[address] [address] Upcoming Health Maintenance Date Due  
 BREAST CANCER SCRN MAMMOGRAM 4/24/2017* PAP AKA CERVICAL CYTOLOGY 1/28/2019 DTaP/Tdap/Td series (2 - Td) 5/31/2026 *Topic was postponed. The date shown is not the original due date. Allergies as of 2/27/2017  Review Complete On: 2/27/2017 By: Misa Brizuela LPN No Known Allergies Current Immunizations  Never Reviewed Name Date Influenza Vaccine (Quad) PF 11/1/2016, 9/1/2015  9:40 AM  
 Pneumococcal Polysaccharide (PPSV-23) 9/1/2015  9:41 AM  
 Td, Adsorbed PF 1/28/2016  8:00 AM  
  
 Not reviewed this visit You Were Diagnosed With   
  
 Codes Comments Essential hypertension    -  Primary ICD-10-CM: I10 
ICD-9-CM: 401.9 Prediabetes     ICD-10-CM: R73.03 
ICD-9-CM: 790.29 Fibromyalgia     ICD-10-CM: M79.7 ICD-9-CM: 729.1 Mixed hyperlipidemia     ICD-10-CM: E78.2 ICD-9-CM: 272.2 Cigarette nicotine dependence in remission     ICD-10-CM: F17.211 ICD-9-CM: V15.82 Screening for depression     ICD-10-CM: Z13.89 ICD-9-CM: V79.0 Vitals  BP  
  
  
  
  
  
 142/82 (BP 1 Location: Right arm, BP Patient Position: Sitting) BMI and BSA Data Body Mass Index Body Surface Area  
 43.39 kg/m 2 2.49 m 2 Preferred Pharmacy Pharmacy Name Phone [facility] [address] [place], [place] [address]Lien Peralta [phone] Your Updated Medication List  
  
   
This list is accurate as of: 2/27/17  9:51 AM.  Always use your most recent med list.  
  
  
  
  
 buPROPion  mg SR tablet Commonly known as:  WELLBUTRIN ZYBAN  
  
 busPIRone 10 mg tablet Commonly known as:  BUSPAR  
  
 calcium-cholecalciferol (D3) tablet Commonly known as:  Calcium 600 + D Take 1 Tab by mouth two (2) times a day. DULoxetine 60 mg capsule Commonly known as:  CYMBALTA Take 1 Cap by mouth daily. lamoTRIgine 200 mg tablet Commonly known as: LaMICtal  
  
 lidocaine 5 % Commonly known as:  LIDODERM  
CHRSITI 1 PA AA QD. LEAVE ON FOR 12 H THEN REMOVE FOR 12 H  
  
 losartan 25 mg tablet Commonly known as:  COZAAR Take 1 Tab by mouth daily. MOTRIN  mg tablet Generic drug:  ibuprofen Take  by mouth.  
  
 polyethylene glycol 17 gram/dose powder Commonly known as:  Sheria Bud Take 17 g by mouth daily. pregabalin 150 mg capsule Commonly known as:  Carolyn Anson Take 1 Cap by mouth two (2) times a day. Max Daily Amount: 300 mg.  
  
 simethicone 80 mg chewable tablet Commonly known as:  Ismael Nim Take 1 Tab by mouth every six (6) hours as needed for Flatulence. traZODone 150 mg tablet Commonly known as:  DESYREL  
  
 varenicline 1 mg tablet Commonly known as:  Cyndy Amanda Take 1 Tab by mouth two (2) times a day. Prescriptions Printed Refills  
 pregabalin (LYRICA) 150 mg capsule 3 Sig: Take 1 Cap by mouth two (2) times a day. Max Daily Amount: 300 mg. Class: Print Route: Oral  
  
Prescriptions Sent to Pharmacy Refills varenicline (CHANTIX) 1 mg tablet 0 Sig: Take 1 Tab by mouth two (2) times a day. Class: Normal  
 Pharmacy: [facility] [address] [place] [address] Angelique Vazquez 136  #: [phone] Route: Oral  
  
We Performed the Following AMB POC HEMOGLOBIN A1C [30267 CPT(R)] [address] [facility] [ [facility]] Follow-up Instructions Return if symptoms worsen or fail to improve. To-Do List   
 02/27/2017 Lab:  LIPID PANEL Patient Instructions Headache: Care Instructions Your Care Instructions Headaches have many possible causes. Most headaches aren't a sign of a more serious problem, and they will get better on their own. Home treatment may help you feel better faster. The doctor has checked you carefully, but problems can develop later. If you notice any problems or new symptoms, get medical treatment right away. Follow-up care is a key part of your treatment and safety. Be sure to make and go to all appointments, and call your doctor if you are having problems. It's also a good idea to know your test results and keep a list of the medicines you take. How can you care for yourself at home? · Do not drive if you have taken a prescription pain medicine. · Rest in a quiet, dark room until your headache is gone. Close your eyes and try to relax or go to sleep. Don't watch TV or read. · Put a cold, moist cloth or cold pack on the painful area for 10 to 20 minutes at a time. Put a thin cloth between the cold pack and your skin. · Use a warm, moist towel or a heating pad set on low to relax tight shoulder and neck muscles. · Have someone gently massage your neck and shoulders. · Take pain medicines exactly as directed. ¨ If the doctor gave you a prescription medicine for pain, take it as prescribed. ¨ If you are not taking a prescription pain medicine, ask your doctor if you can take an over-the-counter medicine. · Be careful not to take pain medicine more often than the instructions allow, because you may get worse or more frequent headaches when the medicine wears off. · Do not ignore new symptoms that occur with a headache, such as a fever, weakness or numbness, vision changes, or confusion. These may be signs of a more serious problem. To prevent headaches · Keep a headache diary so you can figure out what triggers your headaches. Avoiding triggers may help you prevent headaches. Record when each headache began, how long it lasted, and what the pain was like (throbbing, aching, stabbing, or dull). Write down any other symptoms you had with the headache, such as nausea, flashing lights or dark spots, or sensitivity to bright light or loud noise. Note if the headache occurred near your period. List anything that might have triggered the headache, such as certain foods (chocolate, cheese, wine) or odors, smoke, bright light, stress, or lack of sleep. · Find healthy ways to deal with stress. Headaches are most common during or right after stressful times. Take time to relax before and after you do something that has caused a headache in the past. 
· Try to keep your muscles relaxed by keeping good posture. Check your jaw, face, neck, and shoulder muscles for tension, and try relaxing them. When sitting at a desk, change positions often, and stretch for 30 seconds each hour. · Get plenty of sleep and exercise. · Eat regularly and well. Long periods without food can trigger a headache. · Treat yourself to a massage. Some people find that regular massages are very helpful in relieving tension. · Limit caffeine by not drinking too much coffee, tea, or soda. But don't quit caffeine suddenly, because that can also give you headaches. · Reduce eyestrain from computers by blinking frequently and looking away from the computer screen every so often.  Make sure you have proper eyewear and that your monitor is set up properly, about an arm's length away. · Seek help if you have depression or anxiety. Your headaches may be linked to these conditions. Treatment can both prevent headaches and help with symptoms of anxiety or depression. When should you call for help? Call 911 anytime you think you may need emergency care. For example, call if: 
· You have signs of a stroke. These may include: 
¨ Sudden numbness, paralysis, or weakness in your face, arm, or leg, especially on only one side of your body. ¨ Sudden vision changes. ¨ Sudden trouble speaking. ¨ Sudden confusion or trouble understanding simple statements. ¨ Sudden problems with walking or balance. ¨ A sudden, severe headache that is different from past headaches. Call your doctor now or seek immediate medical care if: 
· You have a new or worse headache. · Your headache gets much worse. Where can you learn more? Go to http://jocelyn-amrik.info/. Enter M271 in the search box to learn more about \"Headache: Care Instructions. \" Current as of: February 19, 2016 Content Version: 11.1 © 8204-3613 [employer]. Care instructions adapted under license by [employer] (which disclaims liability or warranty for this information). If you have questions about a medical condition or this instruction, always ask your healthcare professional. [address] any warranty or liability for your use of this information. Introducing [facility]! Dear Randy Vargas: 
Thank you for requesting a Snipd account. Our records indicate that you already have an active Snipd account. You can access your account anytime at [email or web address] Did you know that you can access your hospital and ER discharge instructions at any time in Snipd? You can also review all of your test results from your hospital stay or ER visit. Additional Information If you have questions, please visit the Frequently Asked Questions section of the [employer] website at [email or web address] Remember, [employer] is NOT to be used for urgent needs. For medical emergencies, dial 911. Now available from your iPhone and Android! Please provide this summary of care documentation to your next provider. Your primary care clinician is listed as Roberto Horan. If you have any questions after today's visit, please call [phone].

## 2017-02-27 NOTE — PROGRESS NOTES
Internal Medicine Progress Note    Today's Date:  2017   Patient: Caio Arevalo  Patient :  1975    Subjective:     Chief Complaint   Patient presents with    Fibromyalgia    Hypertension    Cholesterol Problem      Left shoulder pain  This is a chronic problem. This is not controlled. This started in . Pt takes neurontin and cymbalta. Pt was referred to ortho. She received a steroid injection but the pain relief only lasted a few days. Pt was prescribed celebrex and referred to physical therapy. Insurance would not cover either. Pt declines a prescription for narcotics. Fibromyalgia  This is a chronic problem. This is not at goal.  Pain is located in her back, neck, thighs. Pt has been referred to rheumatology. Pt was switched from neurontin to lyrica. This is effective. Hypertension   This is a chronic problem. BP is not at goal. Pt takes losartan. Pt reports compliance with this medication. Obesity Class III  This is a chronic problem. This is not at goal. Pt exercises regularly by walking. Pt tries to eat a healthy diet. Anxiety/Depression/Insomnia/Bipolar disorder  This is a chronic problem. This is controlled. Pt takes wellbutrin, trazodone, lamictal and cymbalta.  Pt sees a mental health professional, Dr Syed Garcia.       Past Medical History:   Diagnosis Date    Acute pain of left shoulder 2016    Bipolar 1 disorder ([address]     Bipolar affective disorder, currently manic, moderate ([address] 2016    Chronic midline low back pain with sciatica 2016    Cigarette nicotine dependence in remission 2016    Cigarette nicotine dependence without complication     Continuous nicotine dependence 2017    Depression     Fibromyalgia 2015    HLD (hyperlipidemia) 2016    Hypertension     Impaired fasting glucose 2016    Irritable bowel syndrome without diarrhea 2016    Moderate episode of recurrent major depressive disorder ([facility] 4/27/2016    Obesity, Class III, BMI 40-49.9 (morbid obesity) ([facility] 9/1/2015    Onychomycosis 4/27/2016    Pain of left thumb 11/1/2016    Prediabetes 7/5/2016    PTSD (post-traumatic stress disorder)     Smoker 9/1/2015    Vitamin D deficiency 5/31/2016     Past Surgical History:   Procedure Laterality Date    HX GYN Bilateral     tubal    HX ORTHOPAEDIC        reports that she quit smoking about 2 months ago. She has a 8.25 pack-year smoking history. She has never used smokeless tobacco. She reports that she drinks about 5.4 oz of alcohol per week  She reports that she does not use illicit drugs. Family History   Problem Relation Age of Onset    Hypertension Mother     Thyroid Disease Mother     No Known Problems Father     Psychiatric Disorder Sister     Psychiatric Disorder Brother     Psychiatric Disorder Sister     Diabetes Sister     Psychiatric Disorder Brother     Psychiatric Disorder Brother      No Known Allergies  Review of Systems   Positives in bold  CV:      chest pain, palpitations  PULM:  SOB, wheezing, cough, sputum production    Current Outpatient Meds and Allergies     Current Outpatient Prescriptions on File Prior to Visit   Medication Sig Dispense Refill    lidocaine (LIDODERM) 5 % CHRISTI 1 PA AA QD. LEAVE ON FOR 12 H THEN REMOVE FOR 12 H  3    losartan (COZAAR) 25 mg tablet Take 1 Tab by mouth daily. 90 Tab 3    buPROPion SR (WELLBUTRIN, ZYBAN) 200 mg SR tablet   1    calcium-cholecalciferol, D3, (CALCIUM 600 + D) tablet Take 1 Tab by mouth two (2) times a day. 180 Tab 3    polyethylene glycol (MIRALAX) 17 gram/dose powder Take 17 g by mouth daily. 1530 g 3    busPIRone (BUSPAR) 10 mg tablet   2    lamoTRIgine (LAMICTAL) 200 mg tablet   1    simethicone (MYLICON) 80 mg chewable tablet Take 1 Tab by mouth every six (6) hours as needed for Flatulence.  90 Tab 3    traZODone (DESYREL) 150 mg tablet   1    DULoxetine (CYMBALTA) 60 mg capsule Take 1 Cap by mouth daily. (Patient taking differently: Take 60 mg by mouth two (2) times a day.) 90 Cap 3    ibuprofen (MOTRIN IB) 200 mg tablet Take  by mouth. No current facility-administered medications on file prior to visit. No Known Allergies  Objective:     VS:    Visit Vitals    /82 (BP 1 Location: Right arm, BP Patient Position: Sitting)  Comment: manual    Pulse 87    Temp 97.9 °F (36.6 °C) (Oral)    Resp 16    Ht 5' 8\" (1.727 m)    Wt 285 lb 6.4 oz (129.5 kg)    LMP 01/25/2017 (Exact Date)    SpO2 98%    BMI 43.39 kg/m2     General:   Well-nourished, well-groomed, pleasant, alert, in no acute distress  Head:  Normocephalic, atraumatic  Ears:  External ears WNL  Nose:  External nares WNL  Psych:  No pressured speech, no abnormal thought content    No visits with results within 3 Month(s) from this visit. Latest known visit with results is:    Hospital Outpatient Visit on 07/05/2016   Component Date Value Ref Range Status    WBC 07/05/2016 8.4  4.6 - 13.2 K/uL Final    RBC 07/05/2016 3.98* 4.20 - 5.30 M/uL Final    HGB 07/05/2016 12.7  12.0 - 16.0 g/dL Final    HCT 07/05/2016 39.4  35.0 - 45.0 % Final    MCV 07/05/2016 99.0* 74.0 - 97.0 FL Final    MCH 07/05/2016 31.9  24.0 - 34.0 PG Final    MCHC 07/05/2016 32.2  31.0 - 37.0 g/dL Final    RDW 07/05/2016 13.5  11.6 - 14.5 % Final    PLATELET 23/71/6030 495  135 - 420 K/uL Final    MPV 07/05/2016 10.5  9.2 - 11.8 FL Final    NEUTROPHILS 07/05/2016 68  40 - 73 % Final    LYMPHOCYTES 07/05/2016 22  21 - 52 % Final    MONOCYTES 07/05/2016 8  3 - 10 % Final    EOSINOPHILS 07/05/2016 2  0 - 5 % Final    BASOPHILS 07/05/2016 0  0 - 2 % Final    ABS. NEUTROPHILS 07/05/2016 5.7  1.8 - 8.0 K/UL Final    ABS. LYMPHOCYTES 07/05/2016 1.9  0.9 - 3.6 K/UL Final    ABS. MONOCYTES 07/05/2016 0.7  0.05 - 1.2 K/UL Final    ABS. EOSINOPHILS 07/05/2016 0.1  0.0 - 0.4 K/UL Final    ABS.  BASOPHILS 07/05/2016 0.0  0.0 - 0.06 K/UL Final    DF 07/05/2016 AUTOMATED    Final    Hemoglobin A1c 07/05/2016 5.7* 4.2 - 5.6 % Final    Comment: ** NEW REFERENCE RANGE ESTABLISHED FOR THIS METHOD **  (NOTE)  HbA1C Interpretive Ranges  <5.7              Normal  5.7 - 6.4         Consider Prediabetes  >6.5              Consider Diabetes      Est. average glucose 07/05/2016 117  mg/dL Final    Comment: (NOTE)  The eAG should be interpreted with patient characteristics in mind   since ethnicity, interindividual differences, red cell lifespan,   variation in rates of glycation, etc. may affect the validity of the   calculation.  LIPID PROFILE 07/05/2016        Final    Cholesterol, total 07/05/2016 194  <200 MG/DL Final    Triglyceride 07/05/2016 170* <150 MG/DL Final    HDL Cholesterol 07/05/2016 43  40 - 60 MG/DL Final    LDL, calculated 07/05/2016 117* 0 - 100 MG/DL Final    VLDL, calculated 07/05/2016 34  MG/DL Final    CHOL/HDL Ratio 07/05/2016 4.5  0 - 5.0   Final    Sodium 07/05/2016 140  136 - 145 mmol/L Final    Potassium 07/05/2016 4.1  3.5 - 5.5 mmol/L Final    Chloride 07/05/2016 104  100 - 108 mmol/L Final    CO2 07/05/2016 26  21 - 32 mmol/L Final    Anion gap 07/05/2016 10  3.0 - 18 mmol/L Final    Glucose 07/05/2016 86  74 - 99 mg/dL Final    BUN 07/05/2016 12  7.0 - 18 MG/DL Final    Creatinine 07/05/2016 0.72  0.6 - 1.3 MG/DL Final    BUN/Creatinine ratio 07/05/2016 17  12 - 20   Final    GFR est AA 07/05/2016 >60  >60 ml/min/1.73m2 Final    GFR est non-AA 07/05/2016 >60  >60 ml/min/1.73m2 Final    Comment: (NOTE)  Estimated GFR is calculated using the Modification of Diet in Renal   Disease (MDRD) Study equation, reported for both  Americans   (GFRAA) and non- Americans (GFRNA), and normalized to 1.73m2   body surface area. The physician must decide which value applies to   the patient. The MDRD study equation should only be used in   individuals age 25 or older.  It has not been validated for the   following: pregnant women, patients with serious comorbid conditions,   or on certain medications, or persons with extremes of body size,   muscle mass, or nutritional status.  Calcium 07/05/2016 8.9  8.5 - 10.1 MG/DL Final    Bilirubin, total 07/05/2016 0.3  0.2 - 1.0 MG/DL Final    ALT (SGPT) 07/05/2016 25  13 - 56 U/L Final    AST (SGOT) 07/05/2016 15  15 - 37 U/L Final    Alk. phosphatase 07/05/2016 86  45 - 117 U/L Final    Protein, total 07/05/2016 7.0  6.4 - 8.2 g/dL Final    Albumin 07/05/2016 3.7  3.4 - 5.0 g/dL Final    Globulin 07/05/2016 3.3  2.0 - 4.0 g/dL Final    A-G Ratio 07/05/2016 1.1  0.8 - 1.7   Final     Assessment/Plan & Orders:         ICD-10-CM ICD-9-CM    1. Essential hypertension I10 401.9    2. Prediabetes R73.03 790.29 AMB POC HEMOGLOBIN A1C   3. Fibromyalgia M79.7 729.1 pregabalin (LYRICA) 150 mg capsule   4. Mixed hyperlipidemia E78.2 272.2 LIPID PANEL   5. Cigarette nicotine dependence in remission F17.211 V15.82 varenicline (CHANTIX) 1 mg tablet   6. Screening for depression Z13.89 V79.0 MI DEPRESSION SCREEN ANNUAL      Healthy lifestyle has been encouraged including avoidance of tobacco, limiting or avoiding alcohol intake, heart healthy diet which is low in cholesterol and saturated fat and contains fresh fruits, vegetables and whole grains and fiber, regular exercise with goals of 20-30 minutes 3-5 days weekly and maintaining an optimal BMI. Pt to stop lyrica for a week to see if headache improves    Follow-up Disposition:  Return if symptoms worsen or fail to improve. *Patient verbalized understanding and agreement with the plan. Patient was given an after-visit summary. Edgard Steele.  [address], MD - Internal Medicine  2/27/2017, 8:52 AM  [facility]  [address] [place], [address]  Phone [phone]  Fax [phone]

## 2017-02-27 NOTE — PATIENT INSTRUCTIONS

## 2017-02-27 NOTE — PROGRESS NOTES
Eric Lindsey is a 35 y.o. female presents today for follow up on her fibromyalgia. Patient reports that her fibromyalgia pain was worse on Friday. Patient reports of her head hurting really back in the back that travelled down her neck. She would also like to discuss her hypertension. Patient is not fasting. Patient is requesting that her Lyrica dose be increased. Pt is in Room # 3        1. Have you been to the ER, urgent care clinic since your last visit? Hospitalized since your last visit? No    2. Have you seen or consulted any other health care providers outside of the [address] [place] [address] since your last visit? Include any pap smears or colon screening.  No       VORB:amb poc A1C/Sondra Cabral MD/Evonne Workman LPN

## 2017-03-15 ENCOUNTER — TELEPHONE (OUTPATIENT)
Dept: FAMILY MEDICINE CLINIC | Facility: CLINIC | Age: 42
End: 2017-03-15

## 2017-04-26 ENCOUNTER — OFFICE VISIT (OUTPATIENT)
Dept: FAMILY MEDICINE CLINIC | Facility: CLINIC | Age: 42
End: 2017-04-26

## 2017-04-26 VITALS
WEIGHT: 286 LBS | RESPIRATION RATE: 18 BRPM | DIASTOLIC BLOOD PRESSURE: 90 MMHG | SYSTOLIC BLOOD PRESSURE: 140 MMHG | HEIGHT: 68 IN | HEART RATE: 82 BPM | BODY MASS INDEX: 43.35 KG/M2 | TEMPERATURE: 97.8 F | OXYGEN SATURATION: 98 %

## 2017-04-26 DIAGNOSIS — I10 ESSENTIAL HYPERTENSION: Primary | ICD-10-CM

## 2017-04-26 DIAGNOSIS — R53.82 CHRONIC FATIGUE: ICD-10-CM

## 2017-04-26 DIAGNOSIS — E78.00 ELEVATED CHOLESTEROL: ICD-10-CM

## 2017-04-26 DIAGNOSIS — Z12.39 BREAST CANCER SCREENING: ICD-10-CM

## 2017-04-26 DIAGNOSIS — M79.7 FIBROMYALGIA: ICD-10-CM

## 2017-04-26 DIAGNOSIS — E55.9 VITAMIN D DEFICIENCY: ICD-10-CM

## 2017-04-26 DIAGNOSIS — E78.2 MIXED HYPERLIPIDEMIA: ICD-10-CM

## 2017-04-26 DIAGNOSIS — F31.12 BIPOLAR AFFECTIVE DISORDER, CURRENTLY MANIC, MODERATE (HCC): ICD-10-CM

## 2017-04-26 DIAGNOSIS — M54.42 ACUTE LEFT-SIDED LOW BACK PAIN WITH LEFT-SIDED SCIATICA: ICD-10-CM

## 2017-04-26 DIAGNOSIS — R73.09 ELEVATED HEMOGLOBIN A1C: ICD-10-CM

## 2017-04-26 RX ORDER — LAMOTRIGINE 100 MG/1
100 TABLET ORAL DAILY
COMMUNITY

## 2017-04-26 NOTE — PROGRESS NOTES
History and Physical    Patient: Neela Grey MRN: [id number]  SSN: [id number]    YOB: 1975  Age: 38 y.o. Sex: female      Subjective: Neela Grey is a 38 y.o. female who presents today for follow up HTN, fibromyalgia etc.    In terms of her HTN, she was on Cozaar, however stopped this a number of months ago, BP elevated. She states that she stopped her medications when she felt sick a number of months ago but was unsure if it was safe to restart at same dose, so has been off. BP elevated today. In terms of her fibromyalgia, she was taking Lyrica but stopped this as well for above mentioned reason. She is still taking cymbalta. She continues to have pains in her shoulders/hips. Patient has a new patient appointment next month with rheumatology. When patient was on Lyrica, it helped but caused headaches. Patient has a h/o bipolar, sees psych, saw them yesterday. She is currently on Wellbutrin, Cymbalta and Lamictal.  Patient had to start at lower dose Lamictal as she went off of this medication as well. Patient has 2 week h/o acute on chronic low back pain with left sided sciatica and paresthesias. She states her left leg will be weak when she has the numbness, denies changes to bowels/bladder or saddle anesthesias.   Patient states PT will not be covered by her insurance.      :   Last Mammogram: done 10 years go- ordered today  Last PAP:  1/2016- negative      PMH:  Past Medical History:   Diagnosis Date    Acute pain of left shoulder 11/1/2016    Bipolar 1 disorder ([address]     Bipolar affective disorder, currently manic, moderate ([address] 4/27/2016    Chronic midline low back pain with sciatica 11/1/2016    Cigarette nicotine dependence in remission 8/2/2016    Cigarette nicotine dependence without complication 05/27/4005    Continuous nicotine dependence 1/24/2017    Depression     Fibromyalgia 9/1/2015    HLD (hyperlipidemia) 2/4/2016    Hypertension     Impaired fasting glucose 5/31/2016    Irritable bowel syndrome without diarrhea 4/27/2016    Moderate episode of recurrent major depressive disorder ([facility] 4/27/2016    Obesity, Class III, BMI 40-49.9 (morbid obesity) ([facility] 9/1/2015    Onychomycosis 4/27/2016    Pain of left thumb 11/1/2016    Prediabetes 7/5/2016    PTSD (post-traumatic stress disorder)     Smoker 9/1/2015    Vitamin D deficiency 5/31/2016     Past Surgical History:   Procedure Laterality Date    HX GYN Bilateral     tubal    HX ORTHOPAEDIC          FamHx:  Family History   Problem Relation Age of Onset    Hypertension Mother     Thyroid Disease Mother     No Known Problems Father     Psychiatric Disorder Sister     Psychiatric Disorder Brother     Psychiatric Disorder Sister     Diabetes Sister     Psychiatric Disorder Brother     Psychiatric Disorder Brother        SocialHx:  Social History   Substance Use Topics    Smoking status: Former Smoker     Packs/day: 0.33     Years: 25.00     Quit date: 12/23/2016    Smokeless tobacco: Never Used      Comment: 1 pack a week    Alcohol use 5.4 oz/week     9 Shots of liquor per week      Comment: once every 6 months, 2-3 drinks        Meds:  Prior to Admission medications    Medication Sig Start Date End Date Taking? Authorizing Provider   lamoTRIgine (LAMICTAL) 25 mg tablet Take  by mouth daily. Yes Historical Provider   lidocaine (LIDODERM) 5 % CHRISTI 1 PA AA QD. LEAVE ON FOR 12 H THEN REMOVE FOR 12 H 11/29/16  Yes Historical Provider   buPROPion SR (WELLBUTRIN, ZYBAN) 200 mg SR tablet  7/18/16  Yes Historical Provider   polyethylene glycol (MIRALAX) 17 gram/dose powder Take 17 g by mouth daily. 7/5/16  Yes Libra Dior MD   traZODone (DESYREL) 150 mg tablet  10/28/15  Yes Historical Provider   DULoxetine (CYMBALTA) 60 mg capsule Take 1 Cap by mouth daily. Patient taking differently: Take 60 mg by mouth two (2) times a day.  1/28/16  Yes Libra Dior MD   ibuprofen (MOTRIN IB) 200 mg tablet Take  by mouth. Yes Historical Provider   losartan (COZAAR) 25 mg tablet Take 1 Tab by mouth daily. 11/29/16   Navid Saucedo MD   calcium-cholecalciferol, D3, (CALCIUM 600 + D) tablet Take 1 Tab by mouth two (2) times a day. 7/5/16   Navid Saucedo MD        Allergies:  No Known Allergies    Review of Systems:  Items in [address] are positive  Constitutional: negative for fevers, chills and malaise  Eyes: negative for visual disturbance  Ears, Nose, Mouth, Throat, and Face: negative for nasal congestion  Respiratory: negative for cough or SOB  Cardiovascular: negative for chest pain, chest pressure/discomfort  Gastrointestinal: negative for nausea, vomiting, melena, diarrhea, constipation and abdominal pain  Genitourinary:negative for frequency, dysuria or hematuria  Musculoskeletal: acute on chronic low back pain with left sided radiation, aches/pains in hips, shoulders  Neurological: negative for headaches, dizziness and paresthesia    Objective:     Visit Vitals    /90 (BP 1 Location: Right arm, BP Patient Position: Sitting)    Pulse 82    Temp 97.8 °F (36.6 °C) (Oral)    Resp 18    Ht 5' 8\" (1.727 m)    Wt 286 lb (129.7 kg)    LMP 04/11/2017 (Approximate)    SpO2 98%    BMI 43.49 kg/m2       Physical Exam:  GENERAL: alert, cooperative, no distress, appears stated age  HEENT: EYE: conjunctivae/corneas clear. PERRL, EOM's intact. EAR: TM's pearly gray bilaterally NOSE: Nasal mucosa pink and moist bilaterally, THROAT: no erythema or edema  THYROID: no thyromegaly  NECK: no adenopathy  LUNG: clear to auscultation bilaterally  HEART: regular rate and rhythm, S1, S2 normal, no murmur, click, rub or gallop  ABDOMEN: soft, non-tender. Bowel sounds normal. No masses  BACK: no spinal or paraspinal ttp , (+) SLR on left  EXTREMITIES:  extremities normal, atraumatic, no cyanosis or edema  NEUROLOGIC: AOx3. Gait normal. Patellar reflexes 2+         Assessment and Plan:       ICD-10-CM ICD-9-CM    1.  Essential hypertension V33 694.4 METABOLIC PANEL, COMPREHENSIVE   2. Mixed hyperlipidemia E78.2 272.2    3. Bipolar affective disorder, currently manic, moderate ([address] F31.12 296.42    4. Fibromyalgia M79.7 729.1    5. Acute left-sided low back pain with left-sided sciatica M54.42 724.2 CT SPINE LUMB W CONT     724.3    6. Vitamin D deficiency E55.9 268.9 VITAMIN D, 25 HYDROXY   7. Elevated cholesterol E78.00 272.0 LIPID PANEL   8. Elevated hemoglobin A1c R73.09 790.29 HEMOGLOBIN A1C WITH EAG   9. Chronic fatigue R53.82 780.79 T4, FREE      TSH 3RD GENERATION   10. Breast cancer screening Z12.39 V76.10 [facility] MAMMO BI SCREENING INCL CAD         Medical Decision Making:  HTN-re-start cozaar- borderline control today    HLD- labs needs follow up    Bipolar- follow up with psych    Fibromyalgia- patient to follow up with rheumatology    Back pain with left sided sciatica- CT back- patient states insurance will not pay for PT    *patient to return for fasting labs    Follow-up Disposition:  Return in about 3 months (around 7/26/2017) for routine care with me. Patient acknowledges understanding of instructions and acknowledges understanding to call back if current symptoms worsen or new symptoms arise. Patient acknowledges and agrees with plan.         Signed By: KELLE Dumont     April 26, 2017

## 2017-04-26 NOTE — MR AVS SNAPSHOT
Visit Information Date & Time Provider Department Dept. Phone Encounter #  
 4/26/2017  9:00 AM Angelique Ralph 47 [phone] [id number] Follow-up Instructions Return in about 3 months (around 7/26/2017) for routine care with me. Upcoming Health Maintenance Date Due  
 BREAST CANCER SCRN MAMMOGRAM 3/20/1991 PAP AKA CERVICAL CYTOLOGY 1/28/2019 DTaP/Tdap/Td series (2 - Td) 5/31/2026 Allergies as of 4/26/2017  Review Complete On: 2/27/2017 By: Boubacar Johnston MD  
 No Known Allergies Current Immunizations  Never Reviewed Name Date Influenza Vaccine (Quad) PF 11/1/2016, 9/1/2015  9:40 AM  
 Pneumococcal Polysaccharide (PPSV-23) 9/1/2015  9:41 AM  
 Td, Adsorbed PF 1/28/2016  8:00 AM  
  
 Not reviewed this visit You Were Diagnosed With   
  
 Codes Comments Essential hypertension    -  Primary ICD-10-CM: I10 
ICD-9-CM: 401.9 Mixed hyperlipidemia     ICD-10-CM: E78.2 ICD-9-CM: 272.2 Bipolar affective disorder, currently manic, moderate (HCC)     ICD-10-CM: F31.12 
ICD-9-CM: 296.42 Fibromyalgia     ICD-10-CM: M79.7 ICD-9-CM: 729.1 Breast cancer screening     ICD-10-CM: Z12.39 
ICD-9-CM: V76.10 Vitamin D deficiency     ICD-10-CM: E55.9 ICD-9-CM: 268.9 Elevated cholesterol     ICD-10-CM: E78.00 ICD-9-CM: 272.0 Elevated hemoglobin A1c     ICD-10-CM: R73.09 
ICD-9-CM: 790.29 Chronic fatigue     ICD-10-CM: R53.82 
ICD-9-CM: 780.79 Acute left-sided low back pain with left-sided sciatica     ICD-10-CM: M54.42 
ICD-9-CM: 724.2, 724.3 Vitals BP Pulse Temp Resp Height(growth percentile) Weight(growth percentile) (!) 168/100 (BP 1 Location: Left arm, BP Patient Position: Sitting) 82 97.8 °F (36.6 °C) (Oral) 18 5' 8\" (1.727 m) 286 lb (129.7 kg) LMP SpO2 BMI OB Status Smoking Status 04/11/2017 (Approximate) 98% 43.49 kg/m2 Having regular periods Former Smoker BMI and BSA Data Body Mass Index Body Surface Area  
 43.49 kg/m 2 2.49 m 2 Preferred Pharmacy Pharmacy Name Phone [facility] [address] [place], [place] [address]Lien Vazquez [address] [phone] Your Updated Medication List  
  
   
This list is accurate as of: 4/26/17  9:19 AM.  Always use your most recent med list.  
  
  
  
  
 buPROPion  mg SR tablet Commonly known as:  Everett Leo  
  
 calcium-cholecalciferol (D3) tablet Commonly known as:  Calcium 600 + D Take 1 Tab by mouth two (2) times a day. DULoxetine 60 mg capsule Commonly known as:  CYMBALTA Take 1 Cap by mouth daily. LaMICtal 25 mg tablet Generic drug:  lamoTRIgine Take  by mouth daily. lidocaine 5 % Commonly known as:  LIDODERM  
CHRISTI 1 PA AA QD. LEAVE ON FOR 12 H THEN REMOVE FOR 12 H  
  
 losartan 25 mg tablet Commonly known as:  COZAAR Take 1 Tab by mouth daily. MOTRIN  mg tablet Generic drug:  ibuprofen Take  by mouth.  
  
 polyethylene glycol 17 gram/dose powder Commonly known as:  Chaneta [place] Take 17 g by mouth daily. pregabalin 150 mg capsule Commonly known as:  Kassy  Take 1 Cap by mouth two (2) times a day. Max Daily Amount: 300 mg.  
  
 simethicone 80 mg chewable tablet Commonly known as:  David Slates Take 1 Tab by mouth every six (6) hours as needed for Flatulence. traZODone 150 mg tablet Commonly known as:  DESYREL  
  
 varenicline 1 mg tablet Commonly known as:  Domi Banister Take 1 Tab by mouth two (2) times a day. Follow-up Instructions Return in about 3 months (around 7/26/2017) for routine care with me. To-Do List   
 04/26/2017 Imaging:  JEANMARIE MAMMO BI SCREENING INCL CAD   
  
 05/10/2017 Imaging:  CT SPINE LUMB W CONT   
  
 05/10/2017 Lab:  HEMOGLOBIN A1C WITH EAG   
  
 05/10/2017 Lab:  LIPID PANEL   
  
 05/10/2017 Lab: METABOLIC PANEL, COMPREHENSIVE   
  
 05/10/2017 Lab:  T4, FREE   
  
 05/10/2017 Lab:  TSH 3RD GENERATION   
  
 05/10/2017 Lab:  VITAMIN D, 25 HYDROXY Referral Information Referral ID Referred By Referred To  
  
 [id number] Rob Junior Not Available Visits Status Start Date End Date 1 New Request 4/26/17 4/26/18 If your referral has a status of pending review or denied, additional information will be sent to support the outcome of this decision. Introducing [facility]! Dear Juan Sanders: 
Thank you for requesting a [employer] account. Our records indicate that you already have an active [employer] account. You can access your account anytime at [email or web address] Did you know that you can access your hospital and ER discharge instructions at any time in [employer]? You can also review all of your test results from your hospital stay or ER visit. Additional Information If you have questions, please visit the Frequently Asked Questions section of the [employer] website at [email or web address] Remember, [employer] is NOT to be used for urgent needs. For medical emergencies, dial 911. Now available from your iPhone and Android! Please provide this summary of care documentation to your next provider. Your primary care clinician is listed as Mindy Perez. If you have any questions after today's visit, please call [phone].

## 2017-04-26 NOTE — PROGRESS NOTES
Samantha Ornelas is a 38 y.o. female presents today for follow-up. 1. Have you been to the ER, urgent care clinic since your last visit? Hospitalized since your last visit? No    2. Have you seen or consulted any other health care providers outside of the [address] [place] [address] since your last visit? Include any pap smears or colon screening. Yes psychiatrist     Health Maintenance reviewed.

## 2017-07-26 ENCOUNTER — HOSPITAL ENCOUNTER (OUTPATIENT)
Dept: LAB | Age: 42
Discharge: HOME | End: 2017-07-26
Payer: MEDICAID

## 2017-07-26 ENCOUNTER — OFFICE VISIT (OUTPATIENT)
Dept: FAMILY MEDICINE CLINIC | Facility: CLINIC | Age: 42
End: 2017-07-26

## 2017-07-26 VITALS
RESPIRATION RATE: 15 BRPM | WEIGHT: 293 LBS | HEIGHT: 68 IN | HEART RATE: 86 BPM | DIASTOLIC BLOOD PRESSURE: 90 MMHG | TEMPERATURE: 97.8 F | BODY MASS INDEX: 44.41 KG/M2 | SYSTOLIC BLOOD PRESSURE: 162 MMHG | OXYGEN SATURATION: 98 %

## 2017-07-26 DIAGNOSIS — R73.09 ELEVATED HEMOGLOBIN A1C: ICD-10-CM

## 2017-07-26 DIAGNOSIS — E78.00 ELEVATED CHOLESTEROL: ICD-10-CM

## 2017-07-26 DIAGNOSIS — E78.2 MIXED HYPERLIPIDEMIA: ICD-10-CM

## 2017-07-26 DIAGNOSIS — F31.12 BIPOLAR AFFECTIVE DISORDER, CURRENTLY MANIC, MODERATE (HCC): ICD-10-CM

## 2017-07-26 DIAGNOSIS — R53.82 CHRONIC FATIGUE: ICD-10-CM

## 2017-07-26 DIAGNOSIS — I10 ESSENTIAL HYPERTENSION: Primary | ICD-10-CM

## 2017-07-26 DIAGNOSIS — I10 ESSENTIAL HYPERTENSION: ICD-10-CM

## 2017-07-26 DIAGNOSIS — F17.200 CONTINUOUS NICOTINE DEPENDENCE: ICD-10-CM

## 2017-07-26 DIAGNOSIS — M79.7 FIBROMYALGIA: ICD-10-CM

## 2017-07-26 DIAGNOSIS — J40 BRONCHITIS: ICD-10-CM

## 2017-07-26 LAB
25(OH)D3 SERPL-MCNC: 24.8 NG/ML (ref 30–100)
ALBUMIN SERPL BCP-MCNC: 3.6 G/DL (ref 3.4–5)
ALBUMIN/GLOB SERPL: 1 {RATIO} (ref 0.8–1.7)
ALP SERPL-CCNC: 108 U/L (ref 45–117)
ALT SERPL-CCNC: 26 U/L (ref 13–56)
ANION GAP BLD CALC-SCNC: 9 MMOL/L (ref 3–18)
AST SERPL W P-5'-P-CCNC: 20 U/L (ref 15–37)
BILIRUB SERPL-MCNC: 0.4 MG/DL (ref 0.2–1)
BUN SERPL-MCNC: 9 MG/DL (ref 7–18)
BUN/CREAT SERPL: 13 (ref 12–20)
CALCIUM SERPL-MCNC: 8.5 MG/DL (ref 8.5–10.1)
CHLORIDE SERPL-SCNC: 106 MMOL/L (ref 100–108)
CHOLEST SERPL-MCNC: 162 MG/DL
CO2 SERPL-SCNC: 25 MMOL/L (ref 21–32)
CREAT SERPL-MCNC: 0.67 MG/DL (ref 0.6–1.3)
EST. AVERAGE GLUCOSE BLD GHB EST-MCNC: 108 MG/DL
GLOBULIN SER CALC-MCNC: 3.6 G/DL (ref 2–4)
GLUCOSE SERPL-MCNC: 96 MG/DL (ref 74–99)
HBA1C MFR BLD: 5.4 % (ref 4.2–5.6)
HDLC SERPL-MCNC: 53 MG/DL (ref 40–60)
HDLC SERPL: 3.1 {RATIO} (ref 0–5)
LDLC SERPL CALC-MCNC: 47.2 MG/DL (ref 0–100)
LIPID PROFILE,FLP: ABNORMAL
POTASSIUM SERPL-SCNC: 4 MMOL/L (ref 3.5–5.5)
PROT SERPL-MCNC: 7.2 G/DL (ref 6.4–8.2)
SODIUM SERPL-SCNC: 140 MMOL/L (ref 136–145)
T4 FREE SERPL-MCNC: 1.1 NG/DL (ref 0.7–1.5)
TRIGL SERPL-MCNC: 309 MG/DL (ref ?–150)
TSH SERPL DL<=0.05 MIU/L-ACNC: 0.53 UIU/ML (ref 0.36–3.74)
VLDLC SERPL CALC-MCNC: 61.8 MG/DL

## 2017-07-26 PROCEDURE — 84439 ASSAY OF FREE THYROXINE: CPT | Performed by: PHYSICIAN ASSISTANT

## 2017-07-26 PROCEDURE — 82306 VITAMIN D 25 HYDROXY: CPT | Performed by: PHYSICIAN ASSISTANT

## 2017-07-26 PROCEDURE — 83036 HEMOGLOBIN GLYCOSYLATED A1C: CPT | Performed by: PHYSICIAN ASSISTANT

## 2017-07-26 PROCEDURE — 80053 COMPREHEN METABOLIC PANEL: CPT | Performed by: PHYSICIAN ASSISTANT

## 2017-07-26 PROCEDURE — 36415 COLL VENOUS BLD VENIPUNCTURE: CPT | Performed by: PHYSICIAN ASSISTANT

## 2017-07-26 PROCEDURE — 80061 LIPID PANEL: CPT | Performed by: PHYSICIAN ASSISTANT

## 2017-07-26 PROCEDURE — 84443 ASSAY THYROID STIM HORMONE: CPT | Performed by: PHYSICIAN ASSISTANT

## 2017-07-26 RX ORDER — MELOXICAM 15 MG/1
15 TABLET ORAL DAILY
COMMUNITY
End: 2017-08-10 | Stop reason: SDUPTHER

## 2017-07-26 RX ORDER — HYDROCHLOROTHIAZIDE 25 MG/1
25 TABLET ORAL DAILY
Qty: 30 TAB | Refills: 1 | Status: SHIPPED | OUTPATIENT
Start: 2017-07-26 | End: 2017-09-18 | Stop reason: SDUPTHER

## 2017-07-26 RX ORDER — GABAPENTIN 300 MG/1
300 CAPSULE ORAL 3 TIMES DAILY
COMMUNITY
End: 2019-01-18 | Stop reason: SDUPTHER

## 2017-07-26 NOTE — PATIENT INSTRUCTIONS

## 2017-07-26 NOTE — PROGRESS NOTES
Shabbir Fleming is a 38 y.o.  female presents today for office visit for follow up. Pt is in Room # 9      1. Have you been to the ER, urgent care clinic since your last visit? Hospitalized since your last visit? No    2. Have you seen or consulted any other health care providers outside of the [address] [place] [address] since your last visit? Include any pap smears or colon screening. No     No Patient Care Coordination Note on file.

## 2017-07-26 NOTE — PROGRESS NOTES
History and Physical    Patient: Doni Dinh MRN: [id number]  SSN: [id number]    YOB: 1975  Age: 38 y.o. Sex: female      Subjective: Doni Dinh is a 38 y.o. female who presents today for follow up HTN, fibromyalgia, bipolar etc.    In terms of her HTN, she is on Cozaar,  BP elevated today. She did not take her medication today as she thought fasting meant to not take it. She states she use to be on HCTZ and that helped with her lower leg swelling, she is requesting to be on this instead as she continues to have lower leg swelling. In terms of her fibromyalgia, she is seeing rheumatology. She is taking gabapentin, cymbalta and mobic. Has chronic bilateral hip pain, states that they are ordering xrays. Patient has a h/o bipolar, sees psych. She is currently on Wellbutrin, Cymbalta and Lamictal.     She has a week long h/o productive cough and rhinorrhea, she has not taken anything for her symptoms. She denies fevers, SOB, sore throat or ear pain. She is a smoker, wants to discuss starting Chantix with Dr. Pérez Crowell once her cold gets better.       Last Mammogram: done 10 years go- ordered-not done- phone number to central scheduling provided to patient to call and self schedule  Last PAP:  1/2016- negative      PMH:  Past Medical History:   Diagnosis Date    Acute pain of left shoulder 11/1/2016    Bipolar 1 [facility] [address]     Bipolar affective disorder, currently manic, moderate ([address] 4/27/2016    Chronic midline low back pain with sciatica 11/1/2016    Cigarette nicotine dependence in remission 8/2/2016    Cigarette nicotine dependence without complication 93/96/9990    Continuous nicotine dependence 1/24/2017    Depression     Fibromyalgia 9/1/2015    HLD (hyperlipidemia) 2/4/2016    Hypertension     Impaired fasting glucose 5/31/2016    Irritable bowel syndrome without diarrhea 4/27/2016    Moderate episode of recurrent major depressive disorder ([address] 4/27/2016    Obesity, Class III, BMI 40-49.9 (morbid obesity) ([facility] [address] 9/1/2015    Onychomycosis 4/27/2016    Pain of left thumb 11/1/2016    Prediabetes 7/5/2016    PTSD (post-traumatic stress disorder)     Smoker 9/1/2015    Vitamin D deficiency 5/31/2016     Past Surgical History:   Procedure Laterality Date    HX GYN Bilateral     tubal    HX ORTHOPAEDIC          FamHx:  Family History   Problem Relation Age of Onset    Hypertension Mother     Thyroid Disease Mother     No Known Problems Father     Psychiatric Disorder Sister     Psychiatric Disorder Brother     Psychiatric Disorder Sister     Diabetes Sister     Psychiatric Disorder Brother     Psychiatric Disorder Brother        SocialHx:  Social History   Substance Use Topics    Smoking status: Current Every Day Smoker     Packs/day: 0.33     Years: 25.00     Last attempt to quit: 12/23/2016    Smokeless tobacco: Never Used      Comment: 1 pack a week    Alcohol use 5.4 oz/week     9 Shots of liquor per week      Comment: once every 6 months, 2-3 drinks        Meds:  Prior to Admission medications    Medication Sig Start Date End Date Taking? Authorizing Provider   gabapentin (NEURONTIN) 300 mg capsule Take 300 mg by mouth three (3) times daily. Yes Historical Provider   meloxicam (MOBIC) 15 mg tablet Take 15 mg by mouth daily. Yes Historical Provider   hydroCHLOROthiazide (HYDRODIURIL) 25 mg tablet Take 1 Tab by mouth daily. 7/26/17  Yes Jessica Pumphrey V, PA   guaiFENesin-dextromethorphan SR (MUCINEX DM) 600-30 mg per tablet Take 1 Tab by mouth two (2) times a day. 7/26/17  Yes KELLE Street   lamoTRIgine (LAMICTAL) 25 mg tablet Take  by mouth daily.    Yes Historical Provider   lidocaine (LIDODERM) 5 % CHRISTI 1 PA AA QD. LEAVE ON FOR 12 H THEN REMOVE FOR 12 H 11/29/16  Yes Historical Provider   buPROPion SR (WELLBUTRIN, ZYBAN) 200 mg SR tablet  7/18/16  Yes Historical Provider   calcium-cholecalciferol, D3, (CALCIUM 600 + D) tablet Take 1 Tab by mouth two (2) times a day. 7/5/16  Yes Israel Muniz MD   polyethylene glycol (MIRALAX) 17 gram/dose powder Take 17 g by mouth daily. 7/5/16  Yes Israel Muniz MD   traZODone (DESYREL) 150 mg tablet  10/28/15  Yes Historical Provider   DULoxetine (CYMBALTA) 60 mg capsule Take 1 Cap by mouth daily. Patient taking differently: Take 60 mg by mouth two (2) times a day. 1/28/16  Yes Israel Muniz MD   ibuprofen (MOTRIN IB) 200 mg tablet Take  by mouth. Yes Historical Provider        Allergies:  No Known Allergies    Review of Systems:  Items in Bold are positive  Constitutional: negative for fevers, chills and malaise  Eyes: negative for visual disturbance  Ears, Nose, Mouth, Throat, and Face: rhinorrhea negative for nasal congestion  Respiratory: productive cough, negative for SOB  Cardiovascular: negative for chest pain, chest pressure/discomfort  Gastrointestinal: negative for nausea, vomiting, melena, diarrhea, constipation and abdominal pain  Genitourinary:negative for frequency, dysuria or hematuria  Musculoskeletal: chronic bilateral hip pain  Neurological: negative for headaches, dizziness and paresthesia    Objective:     Visit Vitals    /90 (BP 1 Location: Right arm, BP Patient Position: Sitting)    Pulse 86    Temp 97.8 °F (36.6 °C) (Oral)    Resp 15    Ht 5' 8\" (1.727 m)    Wt 298 lb (135.2 kg)    LMP 07/19/2017    SpO2 98%    BMI 45.31 kg/m2       Physical Exam:  GENERAL: alert, cooperative, no distress, appears stated age  HEENT: EYE: conjunctivae/corneas clear. EOM's intact. EAR: TM's pearly gray bilaterally NOSE: Nasal mucosa pink and moist bilaterally, THROAT: no erythema or edema  THYROID: no thyromegaly  NECK: no adenopathy  LUNG: clear to auscultation bilaterally  HEART: regular rate and rhythm, S1, S2 normal, no murmur, click, rub or gallop  ABDOMEN: soft, non-tender.  Bowel sounds normal. No masses  EXTREMITIES:  trace right pretibial edema, extremities normal, atraumatic, no cyanosis   NEUROLOGIC: AOx3. Gait normal.         Assessment and Plan:       ICD-10-CM ICD-9-CM    1. Essential hypertension I10 401.9 hydroCHLOROthiazide (HYDRODIURIL) 25 mg tablet   2. Mixed hyperlipidemia E78.2 272.2    3. Bipolar affective disorder, currently manic, moderate ([facility] [address] F31.12 296.42    4. Fibromyalgia M79.7 729.1    5. Bronchitis J40 490 guaiFENesin-dextromethorphan SR (MUCINEX DM) 600-30 mg per tablet   6. Continuous nicotine dependence F17.200 305.1          Medical Decision Making:  HTN- stop cozaar, start HCTZ- patient to return in 2 weeks for BP recheck    HLD- labs-drawn today    Bipolar- follow up with psych    Fibromyalgia- patient to follow up with rheumatology    Bronchitis- patient advised to try mucinex DM for cough, call office if symptoms worsen    Tobacco abuse- patient will discuss starting chantix upon RTC    Follow-up Disposition:  Return in about 2 weeks (around 8/9/2017) for routine care with Dr. Kami Espino, for bp check. Patient acknowledges understanding of instructions and acknowledges understanding to call back if current symptoms worsen or new symptoms arise. Patient acknowledges and agrees with plan.         Signed By: KELLE Willis     July 26, 2017

## 2017-07-26 NOTE — MR AVS SNAPSHOT
Visit Information Date & Time Provider Department Dept. Phone Encounter #  
 7/26/2017  9:00 AM Dianelys Perez, [facility] [phone] [id number] Follow-up Instructions Return in about 2 weeks (around 8/9/2017) for routine care with Dr. Ijeoma Caceres, for bp check. Your Appointments 7/26/2017  9:00 AM  
Follow Up with [facility] ([address]) Appt Note: f/u from 4/26/17  
 [address] [place] [address] [phone]  
[address] [facility] [address] Upcoming Health Maintenance Date Due  
 BREAST CANCER SCRN MAMMOGRAM 3/20/1991 INFLUENZA AGE 9 TO ADULT 8/1/2017 PAP AKA CERVICAL CYTOLOGY 1/28/2019 DTaP/Tdap/Td series (2 - Td) 5/31/2026 Allergies as of 7/26/2017  Review Complete On: 7/26/2017 By: Jc Zhou LPN No Known Allergies Current Immunizations  Never Reviewed Name Date Influenza Vaccine (Quad) PF 11/1/2016, 9/1/2015  9:40 AM  
 Pneumococcal Polysaccharide (PPSV-23) 9/1/2015  9:41 AM  
 Td, Adsorbed PF 1/28/2016  8:00 AM  
  
 Not reviewed this visit You Were Diagnosed With   
  
 Codes Comments Essential hypertension    -  Primary ICD-10-CM: I10 
ICD-9-CM: 401.9 Mixed hyperlipidemia     ICD-10-CM: E78.2 ICD-9-CM: 272.2 Bipolar affective disorder, currently manic, moderate (HCC)     ICD-10-CM: F31.12 
ICD-9-CM: 296.42 Fibromyalgia     ICD-10-CM: M79.7 ICD-9-CM: 729.1 Bronchitis     ICD-10-CM: J40 ICD-9-CM: 772 Vitals BP Pulse Temp Resp Height(growth percentile) Weight(growth percentile) (!) 160/100 (BP 1 Location: Left arm, BP Patient Position: Sitting) 86 97.8 °F (36.6 °C) (Oral) 15 5' 8\" (1.727 m) 298 lb (135.2 kg) LMP SpO2 BMI OB Status Smoking Status 07/19/2017 98% 45.31 kg/m2 Having regular periods Former Smoker BMI and BSA Data Body Mass Index Body Surface Area 45.31 kg/m 2 2.55 m 2 Preferred Pharmacy Pharmacy Name Phone [facility] [address] [place] [address] [phone] Your Updated Medication List  
  
   
This list is accurate as of: 7/26/17  8:51 AM.  Always use your most recent med list.  
  
  
  
  
 buPROPion  mg SR tablet Commonly known as:  Dyane Deed  
  
 calcium-cholecalciferol (D3) tablet Commonly known as:  Calcium 600 + D Take 1 Tab by mouth two (2) times a day. DULoxetine 60 mg capsule Commonly known as:  CYMBALTA Take 1 Cap by mouth daily. gabapentin 300 mg capsule Commonly known as:  NEURONTIN Take 300 mg by mouth three (3) times daily. guaiFENesin-dextromethorphan -30 mg per tablet Commonly known as:  [address] DM Take 1 Tab by mouth two (2) times a day. hydroCHLOROthiazide 25 mg tablet Commonly known as:  HYDRODIURIL Take 1 Tab by mouth daily. LaMICtal 25 mg tablet Generic drug:  lamoTRIgine Take  by mouth daily. lidocaine 5 % Commonly known as:  LIDODERM  
CHRISTI 1 PA AA QD. LEAVE ON FOR 12 H THEN REMOVE FOR 12 H  
  
 losartan 25 mg tablet Commonly known as:  COZAAR Take 1 Tab by mouth daily. MOBIC 15 mg tablet Generic drug:  meloxicam  
Take 15 mg by mouth daily. MOTRIN  mg tablet Generic drug:  ibuprofen Take  by mouth.  
  
 polyethylene glycol 17 gram/dose powder Commonly known as:  Ailyn Cancel Take 17 g by mouth daily. traZODone 150 mg tablet Commonly known as:  Marilyn Noriega Prescriptions Sent to Pharmacy Refills  
 hydroCHLOROthiazide (HYDRODIURIL) 25 mg tablet 1 Sig: Take 1 Tab by mouth daily. Class: Normal  
 Pharmacy: [facility], [address] [place] [address]. [place] [address]  #: [phone]  Route: Oral  
 guaiFENesin-dextromethorphan SR (MUCINEX DM) 600-30 mg per tablet 0  
 Sig: Take 1 Tab by mouth two (2) times a day. Class: Normal  
 Pharmacy: [facility] [address] [place], [place] [address]L[address]  #: [phone] Route: Oral  
  
Follow-up Instructions Return in about 2 weeks (around 8/9/2017) for routine care with Dr. Iraida Austin, for bp check. Patient Instructions High Blood Pressure: Care Instructions Your Care Instructions If your blood pressure is usually above 140/90, you have high blood pressure, or hypertension. That means the top number is 140 or higher or the bottom number is 90 or higher, or both. Despite what a lot of people think, high blood pressure usually doesn't cause headaches or make you feel dizzy or lightheaded. It usually has no symptoms. But it does increase your risk for heart attack, stroke, and kidney or eye damage. The higher your blood pressure, the more your risk increases. Your doctor will give you a goal for your blood pressure. Your goal will be based on your health and your age. An example of a goal is to keep your blood pressure below 140/90. Lifestyle changes, such as eating healthy and being active, are always important to help lower blood pressure. You might also take medicine to reach your blood pressure goal. 
Follow-up care is a key part of your treatment and safety. Be sure to make and go to all appointments, and call your doctor if you are having problems. It's also a good idea to know your test results and keep a list of the medicines you take. How can you care for yourself at home? Medical treatment · If you stop taking your medicine, your blood pressure will go back up. You may take one or more types of medicine to lower your blood pressure. Be safe with medicines. Take your medicine exactly as prescribed. Call your doctor if you think you are having a problem with your medicine. · Talk to your doctor before you start taking aspirin every day.  Aspirin can help certain people lower their risk of a heart attack or stroke. But taking aspirin isn't right for everyone, because it can cause serious bleeding. · See your doctor regularly. You may need to see the doctor more often at first or until your blood pressure comes down. · If you are taking blood pressure medicine, talk to your doctor before you take decongestants or anti-inflammatory medicine, such as ibuprofen. Some of these medicines can raise blood pressure. · Learn how to check your blood pressure at home. Lifestyle changes · Stay at a healthy weight. This is especially important if you put on weight around the waist. Losing even 10 pounds can help you lower your blood pressure. · If your doctor recommends it, get more exercise. Walking is a good choice. Bit by bit, increase the amount you walk every day. Try for at least 30 minutes on most days of the week. You also may want to swim, bike, or do other activities. · Avoid or limit alcohol. Talk to your doctor about whether you can drink any alcohol. · Try to limit how much sodium you eat to less than 2,300 milligrams (mg) a day. Your doctor may ask you to try to eat less than 1,500 mg a day. · Eat plenty of fruits (such as bananas and oranges), vegetables, legumes, whole grains, and low-fat dairy products. · Lower the amount of saturated fat in your diet. Saturated fat is found in animal products such as milk, cheese, and meat. Limiting these foods may help you lose weight and also lower your risk for heart disease. · Do not smoke. Smoking increases your risk for heart attack and stroke. If you need help quitting, talk to your doctor about stop-smoking programs and medicines. These can increase your chances of quitting for good. When should you call for help? Call 911 anytime you think you may need emergency care.  This may mean having symptoms that suggest that your blood pressure is causing a serious heart or blood vessel problem. Your blood pressure may be over 180/110. For example, call 911 if: 
· You have symptoms of a heart attack. These may include: ¨ Chest pain or pressure, or a strange feeling in the chest. 
¨ Sweating. ¨ Shortness of breath. ¨ Nausea or vomiting. ¨ Pain, pressure, or a strange feeling in the back, neck, jaw, or upper belly or in one or both shoulders or arms. ¨ Lightheadedness or sudden weakness. ¨ A fast or irregular heartbeat. · You have symptoms of a stroke. These may include: 
¨ Sudden numbness, tingling, weakness, or loss of movement in your face, arm, or leg, especially on only one side of your body. ¨ Sudden vision changes. ¨ Sudden trouble speaking. ¨ Sudden confusion or trouble understanding simple statements. ¨ Sudden problems with walking or balance. ¨ A sudden, severe headache that is different from past headaches. · You have severe back or belly pain. Do not wait until your blood pressure comes down on its own. Get help right away. Call your doctor now or seek immediate care if: 
· Your blood pressure is much higher than normal (such as 180/110 or higher), but you don't have symptoms. · You think high blood pressure is causing symptoms, such as: ¨ Severe headache. ¨ Blurry vision. Watch closely for changes in your health, and be sure to contact your doctor if: 
· Your blood pressure measures 140/90 or higher at least 2 times. That means the top number is 140 or higher or the bottom number is 90 or higher, or both. · You think you may be having side effects from your blood pressure medicine. · Your blood pressure is usually normal, but it goes above normal at least 2 times. Where can you learn more? Go to http://jocelyn-amrik.info/. Enter O176 in the search box to learn more about \"High Blood Pressure: Care Instructions. \" Current as of: August 8, 2016 Content Version: 11.3 © 9640-1458 Healthwise, [employer]orporated. Care instructions adapted under license by [employer] (which disclaims liability or warranty for this information). If you have questions about a medical condition or this instruction, always ask your healthcare professional. [address] any warranty or liability for your use of this information. Introducing [facility]! Dear Misty Wilson: 
Thank you for requesting a [employer] account. Our records indicate that you already have an active Net[employer] account. You can access your account anytime at [email or web address] Did you know that you can access your hospital and ER discharge instructions at any time in Net[employer]? You can also review all of your test results from your hospital stay or ER visit. Additional Information If you have questions, please visit the Frequently Asked Questions section of the [employer] website at [email or web address] Remember, Net[employer] is NOT to be used for urgent needs. For medical emergencies, dial 911. Now available from your iPhone and Android! Please provide this summary of care documentation to your next provider. Your primary care clinician is listed as Ahsan Cheema. If you have any questions after today's visit, please call [phone].

## 2017-07-27 NOTE — PROGRESS NOTES
Please advise her triglycerides have worsened, she should take 4000 mg of fish oil OTC daily, her vitamin D is low, in addition to the calcium/vitamin D she takes, she should take another 2000 intl units of vitamin D seperately

## 2017-07-27 NOTE — TELEPHONE ENCOUNTER
Please advise her triglycerides have worsened, she should take 4000 mg of fish oil OTC daily, her vitamin D is low, in addition to the calcium/vitamin D she takes, she should take another 2000 intl units of vitamin D seperately     Spoke with pt in regards to results. Pt acknowledges understanding and voices no concerns at this time.      Would like a prescription for Vit D.

## 2017-07-28 RX ORDER — ACETAMINOPHEN 500 MG
2000 TABLET ORAL DAILY
Qty: 30 CAP | Refills: 3 | Status: SHIPPED | OUTPATIENT
Start: 2017-07-28 | End: 2017-08-10

## 2017-08-10 ENCOUNTER — OFFICE VISIT (OUTPATIENT)
Dept: FAMILY MEDICINE CLINIC | Facility: CLINIC | Age: 42
End: 2017-08-10

## 2017-08-10 VITALS
OXYGEN SATURATION: 98 % | WEIGHT: 293 LBS | RESPIRATION RATE: 16 BRPM | SYSTOLIC BLOOD PRESSURE: 145 MMHG | HEART RATE: 94 BPM | BODY MASS INDEX: 44.41 KG/M2 | TEMPERATURE: 97.4 F | DIASTOLIC BLOOD PRESSURE: 80 MMHG | HEIGHT: 68 IN

## 2017-08-10 DIAGNOSIS — I10 ESSENTIAL HYPERTENSION: Primary | ICD-10-CM

## 2017-08-10 DIAGNOSIS — E55.9 VITAMIN D DEFICIENCY: ICD-10-CM

## 2017-08-10 DIAGNOSIS — M79.7 FIBROMYALGIA: ICD-10-CM

## 2017-08-10 DIAGNOSIS — F17.200 CONTINUOUS NICOTINE DEPENDENCE: ICD-10-CM

## 2017-08-10 RX ORDER — VARENICLINE TARTRATE 25 MG
KIT ORAL
Qty: 1 DOSE PACK | Refills: 0 | Status: SHIPPED | OUTPATIENT
Start: 2017-08-10 | End: 2017-09-20

## 2017-08-10 RX ORDER — ERGOCALCIFEROL 1.25 MG/1
50000 CAPSULE ORAL
Qty: 12 CAP | Refills: 3 | Status: SHIPPED | OUTPATIENT
Start: 2017-08-10 | End: 2017-08-10 | Stop reason: SDUPTHER

## 2017-08-10 RX ORDER — MELOXICAM 15 MG/1
15 TABLET ORAL DAILY
Qty: 90 TAB | Refills: 3 | Status: SHIPPED | OUTPATIENT
Start: 2017-08-10 | End: 2019-02-08

## 2017-08-10 RX ORDER — LOSARTAN POTASSIUM 50 MG/1
50 TABLET ORAL DAILY
Qty: 90 TAB | Refills: 3 | Status: SHIPPED | OUTPATIENT
Start: 2017-08-10 | End: 2017-10-16 | Stop reason: DRUGHIGH

## 2017-08-10 NOTE — PROGRESS NOTES
Juliette Epley is a 38 y.o. female presents in office to be seen for htn, hld. Health Maintenance Due   Topic Date Due    BREAST CANCER SCRN MAMMOGRAM  03/20/1991    INFLUENZA AGE 9 TO ADULT  08/01/2017     Pt understands her HM due, she said her insurance won't cover it. 1. Have you been to the ER, urgent care clinic since your last visit? Hospitalized since your last visit?no    2. Have you seen or consulted any other health care providers outside of the [address] [place] [address] since your last visit? Include any pap smears or colon screening.  No

## 2017-08-10 NOTE — PATIENT INSTRUCTIONS
Fibromyalgia: Care Instructions  Your Care Instructions  Fibromyalgia is a painful condition that is not completely understood by medical experts. The cause of fibromyalgia is not known. It can make you feel tired and ache all over. It causes tender spots at specific points of the body that hurt only when you press on them. You may have trouble sleeping, as well as other symptoms. These problems can upset your work and home life. Symptoms tend to come and go, although they may never go away completely. Fibromyalgia does not harm your muscles, joints, or organs. Follow-up care is a key part of your treatment and safety. Be sure to make and go to all appointments, and call your doctor if you are having problems. It's also a good idea to know your test results and keep a list of the medicines you take. How can you care for yourself at home? · Exercise often. Walk, swim, or bike to help with pain and sleep problems and to make you feel better. · Try to get a good night's sleep. Go to bed and get up at the same time each day, whether you feel rested or not. Make sure you have a good mattress and pillow. · Reduce stress. Avoid things that cause you stress, if you can. If not, work at making them less stressful. Learn to use biofeedback, guided imagery, meditation, or other methods to relax. · Make healthy changes. Eat a balanced diet, quit smoking, and limit alcohol and caffeine. · Use a heating pad set on low or take warm baths or showers for pain. Using cold packs for up to 20 minutes at a time can also relieve pain. Put a thin cloth between the cold pack and your skin. A gentle massage might help too. · Be safe with medicines. Take your medicines exactly as prescribed. Call your doctor if you think you are having a problem with your medicine. Your doctor may talk to you about taking antidepressant medicines. These medicines may improve sleep, relieve pain, and in some cases treat depression.   · Learn about fibromyalgia. This makes coping easier. Then, take an active role in your treatment. · Think about joining a support group with others who have fibromyalgia to learn more and get support. When should you call for help? Watch closely for changes in your health, and be sure to contact your doctor if:  · You feel sad, helpless, or hopeless; lose interest in things you used to enjoy; or have other symptoms of depression. · Your fibromyalgia symptoms get worse. Where can you learn more? Go to http://jocelyn-amrik.info/. Enter V003 in the search box to learn more about \"Fibromyalgia: Care Instructions. \"  Current as of: October 14, 2016  Content Version: 11.3  © 8665-6502 [employer]. Care instructions adapted under license by [employer] (which disclaims liability or warranty for this information). If you have questions about a medical condition or this instruction, always ask your healthcare professional. [address] any warranty or liability for your use of this information.

## 2017-08-10 NOTE — MR AVS SNAPSHOT
Visit Information Date & Time Provider Department Dept. Phone Encounter #  
 8/10/2017  2:30 PM Eliana Jimenez MD [facility] [phone] [id number] Follow-up Instructions Return in about 4 weeks (around 9/7/2017) for Follow up hypertension, Follow up hyperlipidemia. Upcoming Health Maintenance Date Due INFLUENZA AGE 9 TO ADULT 8/1/2017 BREAST CANCER SCRN MAMMOGRAM 8/10/2018 PAP AKA CERVICAL CYTOLOGY 1/28/2019 DTaP/Tdap/Td series (2 - Td) 5/31/2026 Allergies as of 8/10/2017  Review Complete On: 8/10/2017 By: Eliana Jimenez MD  
 No Known Allergies Current Immunizations  Never Reviewed Name Date Influenza Vaccine (Quad) PF 11/1/2016, 9/1/2015  9:40 AM  
 Pneumococcal Polysaccharide (PPSV-23) 9/1/2015  9:41 AM  
 Td, Adsorbed PF 1/28/2016  8:00 AM  
  
 Not reviewed this visit You Were Diagnosed With   
  
 Codes Comments Essential hypertension    -  Primary ICD-10-CM: I10 
ICD-9-CM: 401.9 Vitamin D deficiency     ICD-10-CM: E55.9 ICD-9-CM: 268.9 Fibromyalgia     ICD-10-CM: M79.7 ICD-9-CM: 729.1 Continuous nicotine dependence     ICD-10-CM: F17.200 ICD-9-CM: 305.1 Vitals BP Pulse Temp Resp Height(growth percentile) Weight(growth percentile) 145/80 94 97.4 °F (36.3 °C) (Oral) 16 5' 8\" (1.727 m) 295 lb (133.8 kg) LMP SpO2 BMI OB Status Smoking Status 07/19/2017 98% 44.85 kg/m2 Having regular periods Current Every Day Smoker Vitals History BMI and BSA Data Body Mass Index Body Surface Area 44.85 kg/m 2 2.53 m 2 Preferred Pharmacy Pharmacy Name Phone [facility] [phone] Your Updated Medication List  
  
   
This list is accurate as of: 8/10/17  3:17 PM.  Always use your most recent med list.  
  
  
  
  
 buPROPion  mg SR tablet Commonly known as:  Lila Sam  
 DULoxetine 60 mg capsule Commonly known as:  CYMBALTA Take 1 Cap by mouth daily. ergocalciferol 50,000 unit capsule Commonly known as:  ERGOCALCIFEROL Take 1 Cap by mouth every seven (7) days. gabapentin 300 mg capsule Commonly known as:  NEURONTIN Take 300 mg by mouth three (3) times daily. hydroCHLOROthiazide 25 mg tablet Commonly known as:  HYDRODIURIL Take 1 Tab by mouth daily. LaMICtal 25 mg tablet Generic drug:  lamoTRIgine Take  by mouth daily. lidocaine 5 % Commonly known as:  LIDODERM  
CHRISTI 1 PA AA QD. LEAVE ON FOR 12 H THEN REMOVE FOR 12 H  
  
 losartan 50 mg tablet Commonly known as:  COZAAR Take 1 Tab by mouth daily. meloxicam 15 mg tablet Commonly known as:  MOBIC Take 1 Tab by mouth daily. MOTRIN  mg tablet Generic drug:  ibuprofen Take  by mouth.  
  
 polyethylene glycol 17 gram/dose powder Commonly known as:  Ranjithsusan Judd Take 17 g by mouth daily. traZODone 150 mg tablet Commonly known as:  DESYREL  
  
 varenicline 0.5 mg (11)- 1 mg (42) Dspk Commonly known as:  CHANTIX STARTER YUNIOR Use as directed Prescriptions Printed Refills  
 meloxicam (MOBIC) 15 mg tablet 3 Sig: Take 1 Tab by mouth daily. Class: Print Route: Oral  
  
Prescriptions Sent to Pharmacy Refills  
 ergocalciferol (ERGOCALCIFEROL) 50,000 unit capsule 3 Sig: Take 1 Cap by mouth every seven (7) days. Class: Normal  
 Pharmacy: [facility] [address] Ph #: [phone] Route: Oral  
 varenicline (CHANTIX STARTER YUNIOR) 0.5 mg (11)- 1 mg (42) DsPk 0 Sig: Use as directed Class: Normal  
 Pharmacy: [facility], [address] [facility] [address] Ph #: [phone]  
 losartan (COZAAR) 50 mg tablet 3 Sig: Take 1 Tab by mouth daily.   
 Class: Normal  
 Pharmacy: [facility] [address] [place] [address] [address]  #: [phone] Route: Oral  
  
Follow-up Instructions Return in about 4 weeks (around 9/7/2017) for Follow up hypertension, Follow up hyperlipidemia. Introducing hospitals & HEALTH SERVICES! Dear Jose Hinojosa: 
Thank you for requesting a [employer] account. Our records indicate that you already have an active [employer] account. You can access your account anytime at [email or web address] Did you know that you can access your hospital and ER discharge instructions at any time in [employer] You can also review all of your test results from your hospital stay or ER visit. Additional Information If you have questions, please visit the Frequently Asked Questions section of the [employer] website at [email or web address] Remember, [facility] is NOT to be used for urgent needs. For medical emergencies, dial 911. Now available from your iPhone and Android! Please provide this summary of care documentation to your next provider. Your primary care clinician is listed as Earline Toribio. If you have any questions after today's visit, please call [phone].

## 2017-08-10 NOTE — PROGRESS NOTES
Internal Medicine Progress Note    Today's Date:  8/10/2017   Patient: Jahaira Lyon  Patient :  1975    Subjective:     Chief Complaint   Patient presents with    Medication Refill    Fibromyalgia    Hypertension      Fibromyalgia  This is a chronic problem. This is at goal.  Pain is located in her back, neck, thighs. Pt sees rheumatology. Pt takes neurontin and mobic. Lyrica caused weight gain. Hypertension   This is a chronic problem. BP is not at goal. Pt takes losartan and HCTZ. Pt reports compliance with these medications. Smoker  This is a chronic problem. This is not at goal. She smokes one pack per week. She has smoked for 25 yrs. Pt is ready to quit. Anxiety/Depression/Insomnia/Bipolar disorder  This is a chronic problem. This is controlled. Pt takes wellbutrin, trazodone, lamictal and cymbalta.  Pt sees a mental health professional, Dr Loyd Bailey.       Past Medical History:   Diagnosis Date    Acute pain of left shoulder 2016    Bipolar 1 disorder ([address]     Bipolar affective disorder, currently manic, moderate ([address] 2016    Chronic midline low back pain with sciatica 2016    Cigarette nicotine dependence in remission 2016    Cigarette nicotine dependence without complication     Continuous nicotine dependence 2017    Depression     Fibromyalgia 2015    HLD (hyperlipidemia) 2016    Hypertension     Impaired fasting glucose 2016    Irritable bowel syndrome without diarrhea 2016    Moderate episode of recurrent major depressive disorder ([address] 2016    Obesity, Class III, BMI 40-49.9 (morbid obesity) ([address] 2015    Onychomycosis 2016    Pain of left thumb 2016    Prediabetes 2016    PTSD (post-traumatic stress disorder)     Smoker 2015    Vitamin D deficiency 2016     Past Surgical History:   Procedure Laterality Date    HX GYN Bilateral     tubal    HX ORTHOPAEDIC        reports that she has been smoking. She has a 8.25 pack-year smoking history. She has never used smokeless tobacco. She reports that she drinks about 5.4 oz of alcohol per week  She reports that she does not use illicit drugs. Family History   Problem Relation Age of Onset    Hypertension Mother     Thyroid Disease Mother     No Known Problems Father     Psychiatric Disorder Sister     Psychiatric Disorder Brother     Psychiatric Disorder Sister     Diabetes Sister     Psychiatric Disorder Brother     Psychiatric Disorder Brother      No Known Allergies  Review of Systems   Positives in bold  CV:      chest pain, palpitations  PULM:  SOB, wheezing, cough, sputum production    Current Outpatient Meds and Allergies     Current Outpatient Prescriptions on File Prior to Visit   Medication Sig Dispense Refill    gabapentin (NEURONTIN) 300 mg capsule Take 300 mg by mouth three (3) times daily.  hydroCHLOROthiazide (HYDRODIURIL) 25 mg tablet Take 1 Tab by mouth daily. 30 Tab 1    lamoTRIgine (LAMICTAL) 25 mg tablet Take  by mouth daily.  lidocaine (LIDODERM) 5 % CHRISTI 1 PA AA QD. LEAVE ON FOR 12 H THEN REMOVE FOR 12 H  3    buPROPion SR (WELLBUTRIN, ZYBAN) 200 mg SR tablet   1    polyethylene glycol (MIRALAX) 17 gram/dose powder Take 17 g by mouth daily. 1530 g 3    traZODone (DESYREL) 150 mg tablet   1    DULoxetine (CYMBALTA) 60 mg capsule Take 1 Cap by mouth daily. (Patient taking differently: Take 60 mg by mouth two (2) times a day.) 90 Cap 3    ibuprofen (MOTRIN IB) 200 mg tablet Take  by mouth. No current facility-administered medications on file prior to visit.       No Known Allergies  Objective:     VS:    Visit Vitals    /80    Pulse 94    Temp 97.4 °F (36.3 °C) (Oral)    Resp 16    Ht 5' 8\" (1.727 m)    Wt 295 lb (133.8 kg)    LMP 07/19/2017    SpO2 98%    BMI 44.85 kg/m2     General:   Well-nourished, well-groomed, pleasant, alert, in no acute distress  Head:  Normocephalic, atraumatic  Ears:  External ears WNL  Nose:  External nares WNL  Psych:  No pressured speech, no abnormal thought content    Hospital Outpatient Visit on 07/26/2017   Component Date Value Ref Range Status    LIPID PROFILE 07/26/2017        Final    Cholesterol, total 07/26/2017 162  <200 MG/DL Final    Triglyceride 07/26/2017 309* <150 MG/DL Final    Comment: The drugs N-acetylcysteine (NAC) and  Metamiszole have been found to cause falsely  low results in this chemical assay. Please  be sure to submit blood samples obtained  BEFORE administration of either of these  drugs to assure correct results.  HDL Cholesterol 07/26/2017 53  40 - 60 MG/DL Final    LDL, calculated 07/26/2017 47.2  0 - 100 MG/DL Final    VLDL, calculated 07/26/2017 61.8  MG/DL Final    CHOL/HDL Ratio 07/26/2017 3.1  0 - 5.0   Final    Hemoglobin A1c 07/26/2017 5.4  4.2 - 5.6 % Final    Comment: (NOTE)  HbA1C Interpretive Ranges  <5.7              Normal  5.7 - 6.4         Consider Prediabetes  >6.5              Consider Diabetes      Est. average glucose 07/26/2017 108  mg/dL Final    Comment: (NOTE)  The eAG should be interpreted with patient characteristics in mind   since ethnicity, interindividual differences, red cell lifespan,   variation in rates of glycation, etc. may affect the validity of the   calculation.       Sodium 07/26/2017 140  136 - 145 mmol/L Final    Potassium 07/26/2017 4.0  3.5 - 5.5 mmol/L Final    Chloride 07/26/2017 106  100 - 108 mmol/L Final    CO2 07/26/2017 25  21 - 32 mmol/L Final    Anion gap 07/26/2017 9  3.0 - 18 mmol/L Final    Glucose 07/26/2017 96  74 - 99 mg/dL Final    BUN 07/26/2017 9  7.0 - 18 MG/DL Final    Creatinine 07/26/2017 0.67  0.6 - 1.3 MG/DL Final    BUN/Creatinine ratio 07/26/2017 13  12 - 20   Final    GFR est AA 07/26/2017 >60  >60 ml/min/1.73m2 Final    GFR est non-AA 07/26/2017 >60  >60 ml/min/1.73m2 Final    Comment: (NOTE)  Estimated GFR is calculated using the Modification of Diet in Renal   Disease (MDRD) Study equation, reported for both  Americans   (GFRAA) and non- Americans (GFRNA), and normalized to 1.73m2   body surface area. The physician must decide which value applies to   the patient. The MDRD study equation should only be used in   individuals age 25 or older. It has not been validated for the   following: pregnant women, patients with serious comorbid conditions,   or on certain medications, or persons with extremes of body size,   muscle mass, or nutritional status.  Calcium 07/26/2017 8.5  8.5 - 10.1 MG/DL Final    Bilirubin, total 07/26/2017 0.4  0.2 - 1.0 MG/DL Final    ALT (SGPT) 07/26/2017 26  13 - 56 U/L Final    AST (SGOT) 07/26/2017 20  15 - 37 U/L Final    Alk. phosphatase 07/26/2017 108  45 - 117 U/L Final    Protein, total 07/26/2017 7.2  6.4 - 8.2 g/dL Final    Albumin 07/26/2017 3.6  3.4 - 5.0 g/dL Final    Globulin 07/26/2017 3.6  2.0 - 4.0 g/dL Final    A-G Ratio 07/26/2017 1.0  0.8 - 1.7   Final    T4, Free 07/26/2017 1.1  0.7 - 1.5 NG/DL Final    TSH 07/26/2017 0.53  0.36 - 3.74 uIU/mL Final    Vitamin D 25-Hydroxy 07/26/2017 24.8* 30 - 100 ng/mL Final    Comment: (NOTE)  Deficiency               <20 ng/mL  Insufficiency          20-30 ng/mL  Sufficient             ng/mL  Possible toxicity       >100 ng/mL    The Method used is Siemens [employer] Centaur currently standardized to a   Center of Disease Control and Prevention (CDC) certified reference   [address]. Samples containing fluorescein dye can produce falsely   elevated values when tested with the ADVIA Centaur Vitamin D Assay. It is recommended that results in the toxic range, >100 ng/mL, be   retested 72 hours post fluorescein exposure. Assessment/Plan & Orders:         ICD-10-CM ICD-9-CM    1. Essential hypertension I10 401.9 losartan (COZAAR) 50 mg tablet   2.  Vitamin D deficiency E55.9 268.9 ergocalciferol (ERGOCALCIFEROL) 50,000 unit capsule 3. Fibromyalgia M79.7 729.1 meloxicam (MOBIC) 15 mg tablet   4. Continuous nicotine dependence F17.200 305.1 varenicline (CHANTIX STARTER YUNIOR) 0.5 mg (11)- 1 mg (42) DsPk      Healthy lifestyle has been encouraged including avoidance of tobacco, limiting or avoiding alcohol intake, heart healthy diet which is low in cholesterol and saturated fat and contains fresh fruits, vegetables and whole grains and fiber, regular exercise with goals of 20-30 minutes 3-5 days weekly and maintaining an optimal BMI. Advised pt not to take motrin and mobic at the same time     Follow-up Disposition:  Return in about 4 weeks (around 9/7/2017) for Follow up hypertension, Follow up hyperlipidemia. *Patient verbalized understanding and agreement with the plan. Patient was given an after-visit summary. Ronna Ferrer.  Salma Philippe MD - Internal Medicine  8/10/2017, 8:52 AM  [facility]  [address] [place], [address]  Phone [phone]  Fax [phone]

## 2017-08-17 RX ORDER — ERGOCALCIFEROL 1.25 MG/1
50000 CAPSULE ORAL
Qty: 12 CAP | Refills: 3 | Status: SHIPPED | OUTPATIENT
Start: 2017-08-17 | End: 2019-02-05

## 2017-09-17 DIAGNOSIS — F17.200 CONTINUOUS NICOTINE DEPENDENCE: ICD-10-CM

## 2017-09-18 RX ORDER — VARENICLINE TARTRATE 0.5 (11)-1
KIT ORAL
Refills: 0 | OUTPATIENT
Start: 2017-09-18

## 2017-09-18 RX ORDER — VARENICLINE TARTRATE 1 MG/1
1 TABLET, FILM COATED ORAL 2 TIMES DAILY
Qty: 180 TAB | Refills: 0 | Status: SHIPPED | OUTPATIENT
Start: 2017-09-18 | End: 2018-02-06 | Stop reason: SDUPTHER

## 2017-09-20 ENCOUNTER — OFFICE VISIT (OUTPATIENT)
Dept: FAMILY MEDICINE CLINIC | Facility: CLINIC | Age: 42
End: 2017-09-20

## 2017-09-20 VITALS
SYSTOLIC BLOOD PRESSURE: 132 MMHG | DIASTOLIC BLOOD PRESSURE: 80 MMHG | OXYGEN SATURATION: 97 % | HEIGHT: 68 IN | TEMPERATURE: 97.4 F | WEIGHT: 292.8 LBS | RESPIRATION RATE: 12 BRPM | HEART RATE: 78 BPM | BODY MASS INDEX: 44.38 KG/M2

## 2017-09-20 DIAGNOSIS — E66.01 OBESITY, CLASS III, BMI 40-49.9 (MORBID OBESITY) (HCC): ICD-10-CM

## 2017-09-20 DIAGNOSIS — F17.200 CONTINUOUS NICOTINE DEPENDENCE: ICD-10-CM

## 2017-09-20 DIAGNOSIS — I10 ESSENTIAL HYPERTENSION: Primary | ICD-10-CM

## 2017-09-20 DIAGNOSIS — M79.7 FIBROMYALGIA: ICD-10-CM

## 2017-09-20 NOTE — PROGRESS NOTES
Internal Medicine Progress Note    Today's Date:  2017   Patient: Dex Rodriguez  Patient :  1975    Subjective:     Chief Complaint   Patient presents with    Hypertension    Cholesterol Problem    Nicotine Dependence      Fibromyalgia  This is a chronic problem. This is at goal.  Pain is located in her back, neck, thighs. Pt sees rheumatology. Pt takes neurontin, cymbalta, lidoderm and mobic. Lyrica caused weight gain. Hypertension   This is a chronic problem. BP is not at goal. Pt takes losartan and HCTZ. Pt reports compliance with these medications. Smoker  This is a chronic problem. This is not at goal. She smokes two puffs per day. She has smoked for 25 yrs. Pt is ready to quit. Pt is on chantix. Anxiety/Depression/Insomnia/Bipolar disorder  This is a chronic problem. This is controlled. Pt takes wellbutrin, trazodone, lamictal and cymbalta.  Pt sees a mental health professional, Dr Melissa Watts.       Past Medical History:   Diagnosis Date    Acute pain of left shoulder 2016    Bipolar 1 disorder ([address]     Bipolar affective disorder, currently manic, moderate ([address] 2016    Chronic midline low back pain with sciatica 2016    Cigarette nicotine dependence in remission 2016    Cigarette nicotine dependence without complication     Continuous nicotine dependence 2017    Depression     Fibromyalgia 2015    HLD (hyperlipidemia) 2016    Hypertension     Impaired fasting glucose 2016    Irritable bowel syndrome without diarrhea 2016    Moderate episode of recurrent major depressive disorder ([address] 2016    Obesity, Class III, BMI 40-49.9 (morbid obesity) ([address] 2015    Onychomycosis 2016    Pain of left thumb 2016    Prediabetes 2016    PTSD (post-traumatic stress disorder)     Smoker 2015    Vitamin D deficiency 2016     Past Surgical History:   Procedure Laterality Date    HX GYN Bilateral     tubal    HX ORTHOPAEDIC        reports that she has been smoking. She has a 8.25 pack-year smoking history. She has never used smokeless tobacco. She reports that she drinks about 5.4 oz of alcohol per week  She reports that she does not use illicit drugs. Family History   Problem Relation Age of Onset    Hypertension Mother     Thyroid Disease Mother     No Known Problems Father     Psychiatric Disorder Sister     Psychiatric Disorder Brother     Psychiatric Disorder Sister     Diabetes Sister     Psychiatric Disorder Brother     Psychiatric Disorder Brother      No Known Allergies  Review of Systems   Positives in bold  CV:      chest pain, palpitations  PULM:  SOB, wheezing, cough, sputum production    Current Outpatient Meds and Allergies     Current Outpatient Prescriptions on File Prior to Visit   Medication Sig Dispense Refill    polyethylene glycol (MIRALAX) 17 gram/dose powder MIX AND DRINK 17 GRAMS BY MOUTH DAILY 1530 g 3    hydroCHLOROthiazide (HYDRODIURIL) 25 mg tablet Take 1 Tab by mouth daily. 90 Tab 3    varenicline (CHANTIX) 1 mg tablet Take 1 Tab by mouth two (2) times a day. 180 Tab 0    ergocalciferol (ERGOCALCIFEROL) 50,000 unit capsule Take 1 Cap by mouth every seven (7) days. 12 Cap 3    meloxicam (MOBIC) 15 mg tablet Take 1 Tab by mouth daily. 90 Tab 3    losartan (COZAAR) 50 mg tablet Take 1 Tab by mouth daily. 90 Tab 3    gabapentin (NEURONTIN) 300 mg capsule Take 300 mg by mouth three (3) times daily.  lamoTRIgine (LAMICTAL) 25 mg tablet Take  by mouth daily.  lidocaine (LIDODERM) 5 % CHRISTI 1 PA AA QD. LEAVE ON FOR 12 H THEN REMOVE FOR 12 H  3    buPROPion SR (WELLBUTRIN, ZYBAN) 200 mg SR tablet   1    traZODone (DESYREL) 150 mg tablet   1    DULoxetine (CYMBALTA) 60 mg capsule Take 1 Cap by mouth daily. (Patient taking differently: Take 60 mg by mouth two (2) times a day.) 90 Cap 3    ibuprofen (MOTRIN IB) 200 mg tablet Take  by mouth.       varenicline (CHANTIX STARTER YUNIOR) 0.5 mg (11)- 1 mg (42) DsPk Use as directed 1 Dose Pack 0     No current facility-administered medications on file prior to visit. No Known Allergies  Objective:     VS:    Visit Vitals    /80 (BP 1 Location: Right arm, BP Patient Position: Sitting)    Pulse 78    Temp 97.4 °F (36.3 °C)    Resp 12    Ht 5' 8\" (1.727 m)    Wt 292 lb 12.8 oz (132.8 kg)    LMP 09/17/2017    SpO2 97%    BMI 44.52 kg/m2     General:   Well-nourished, well-groomed, pleasant, alert, in no acute distress  Head:  Normocephalic, atraumatic  Ears:  External ears WNL  Nose:  External nares WNL  Psych:  No pressured speech, no abnormal thought content    Hospital Outpatient Visit on 07/26/2017   Component Date Value Ref Range Status    LIPID PROFILE 07/26/2017        Final    Cholesterol, total 07/26/2017 162  <200 MG/DL Final    Triglyceride 07/26/2017 309* <150 MG/DL Final    Comment: The drugs N-acetylcysteine (NAC) and  Metamiszole have been found to cause falsely  low results in this chemical assay. Please  be sure to submit blood samples obtained  BEFORE administration of either of these  drugs to assure correct results.  HDL Cholesterol 07/26/2017 53  40 - 60 MG/DL Final    LDL, calculated 07/26/2017 47.2  0 - 100 MG/DL Final    VLDL, calculated 07/26/2017 61.8  MG/DL Final    CHOL/HDL Ratio 07/26/2017 3.1  0 - 5.0   Final    Hemoglobin A1c 07/26/2017 5.4  4.2 - 5.6 % Final    Comment: (NOTE)  HbA1C Interpretive Ranges  <5.7              Normal  5.7 - 6.4         Consider Prediabetes  >6.5              Consider Diabetes      Est. average glucose 07/26/2017 108  mg/dL Final    Comment: (NOTE)  The eAG should be interpreted with patient characteristics in mind   since ethnicity, interindividual differences, red cell lifespan,   variation in rates of glycation, etc. may affect the validity of the   calculation.       Sodium 07/26/2017 140  136 - 145 mmol/L Final    Potassium 07/26/2017 4.0  3.5 - 5.5 mmol/L Final    Chloride 07/26/2017 106  100 - 108 mmol/L Final    CO2 07/26/2017 25  21 - 32 mmol/L Final    Anion gap 07/26/2017 9  3.0 - 18 mmol/L Final    Glucose 07/26/2017 96  74 - 99 mg/dL Final    BUN 07/26/2017 9  7.0 - 18 MG/DL Final    Creatinine 07/26/2017 0.67  0.6 - 1.3 MG/DL Final    BUN/Creatinine ratio 07/26/2017 13  12 - 20   Final    GFR est AA 07/26/2017 >60  >60 ml/min/1.73m2 Final    GFR est non-AA 07/26/2017 >60  >60 ml/min/1.73m2 Final    Comment: (NOTE)  Estimated GFR is calculated using the Modification of Diet in Renal   Disease (MDRD) Study equation, reported for both  Americans   (GFRAA) and non- Americans (GFRNA), and normalized to 1.73m2   body surface area. The physician must decide which value applies to   the patient. The MDRD study equation should only be used in   individuals age 25 or older. It has not been validated for the   following: pregnant women, patients with serious comorbid conditions,   or on certain medications, or persons with extremes of body size,   muscle mass, or nutritional status.  Calcium 07/26/2017 8.5  8.5 - 10.1 MG/DL Final    Bilirubin, total 07/26/2017 0.4  0.2 - 1.0 MG/DL Final    ALT (SGPT) 07/26/2017 26  13 - 56 U/L Final    AST (SGOT) 07/26/2017 20  15 - 37 U/L Final    Alk.  phosphatase 07/26/2017 108  45 - 117 U/L Final    Protein, total 07/26/2017 7.2  6.4 - 8.2 g/dL Final    Albumin 07/26/2017 3.6  3.4 - 5.0 g/dL Final    Globulin 07/26/2017 3.6  2.0 - 4.0 g/dL Final    A-G Ratio 07/26/2017 1.0  0.8 - 1.7   Final    T4, Free 07/26/2017 1.1  0.7 - 1.5 NG/DL Final    TSH 07/26/2017 0.53  0.36 - 3.74 uIU/mL Final    Vitamin D 25-Hydroxy 07/26/2017 24.8* 30 - 100 ng/mL Final    Comment: (NOTE)  Deficiency               <20 ng/mL  Insufficiency          20-30 ng/mL  Sufficient             ng/mL  Possible toxicity       >100 ng/mL    The Method used is [facility] currently standardized to a   Center of Disease Control and Prevention (CDC) certified reference   [address]. Samples containing fluorescein dye can produce falsely   elevated values when tested with the ADVIA Centaur Vitamin D Assay. It is recommended that results in the toxic range, >100 ng/mL, be   retested 72 hours post fluorescein exposure. Assessment/Plan & Orders:         ICD-10-CM ICD-9-CM    1. Essential hypertension I10 401.9    2. Continuous nicotine dependence F17.200 305.1    3. Fibromyalgia M79.7 729.1    4. Obesity, Class III, BMI 40-49.9 (morbid obesity) ([facility]) E66.01 278.01       Healthy lifestyle has been encouraged including avoidance of tobacco, limiting or avoiding alcohol intake, heart healthy diet which is low in cholesterol and saturated fat and contains fresh fruits, vegetables and whole grains and fiber, regular exercise with goals of 20-30 minutes 3-5 days weekly and maintaining an optimal BMI. Pt advised to increase neurontin and lidoderm can be taken up to 3 patches at a time  Pt to follow up with rheumatology  Continue smoking cessation efforts    Follow-up Disposition:  Return in about 4 weeks (around 10/18/2017) for Follow up. *Patient verbalized understanding and agreement with the plan. Patient was given an after-visit summary. Conor Browning.  [address], MD - Internal Medicine  9/20/2017, 8:52 AM  [facility]  [address] [place] [address] [place], [facility] [address]  Phone [phone]  Fax [phone]

## 2017-09-20 NOTE — PROGRESS NOTES
Chief Complaint   Patient presents with    Hypertension    Cholesterol Problem    Nicotine Dependence     Visit Vitals    /80 (BP 1 Location: Right arm, BP Patient Position: Sitting)    Pulse 78    Temp 97.4 °F (36.3 °C)    Resp 12    Ht 5' 8\" (1.727 m)    Wt 292 lb 12.8 oz (132.8 kg)    SpO2 97%    BMI 44.52 kg/m2        Patient in room # 3. Patient would like to discuss cough. Patient is not fasting. 1. Have you been to the ER, urgent care clinic since your last visit? Hospitalized since your last visit? No    2. Have you seen or consulted any other health care providers outside of the [address] [place] [address] since your last visit? Include any pap smears or colon screening. No     Reviewed. Flu on hold, per patient. Lizzette Robbins

## 2017-09-20 NOTE — MR AVS SNAPSHOT
Visit Information Date & Time Provider Department Dept. Phone Encounter #  
 9/20/2017 10:15 AM Efren Justice MD [facility] [phone] [id number] Follow-up Instructions Return in about 4 weeks (around 10/18/2017) for Follow up. Upcoming Health Maintenance Date Due  
 BREAST CANCER SCRN MAMMOGRAM 8/10/2018 PAP AKA CERVICAL CYTOLOGY 1/28/2019 DTaP/Tdap/Td series (2 - Td) 5/31/2026 Allergies as of 9/20/2017  Review Complete On: 9/20/2017 By: Efren Justice MD  
 No Known Allergies Current Immunizations  Never Reviewed Name Date Influenza Vaccine (Quad) PF 11/1/2016, 9/1/2015  9:40 AM  
 Pneumococcal Polysaccharide (PPSV-23) 9/1/2015  9:41 AM  
 Td, Adsorbed PF 1/28/2016  8:00 AM  
  
 Not reviewed this visit You Were Diagnosed With   
  
 Codes Comments Essential hypertension    -  Primary ICD-10-CM: I10 
ICD-9-CM: 401.9 Continuous nicotine dependence     ICD-10-CM: F17.200 ICD-9-CM: 305.1 Fibromyalgia     ICD-10-CM: M79.7 ICD-9-CM: 729.1 Obesity, Class III, BMI 40-49.9 (morbid obesity) (HCC)     ICD-10-CM: E66.01 
ICD-9-CM: 278.01 Vitals BP Pulse Temp Resp Height(growth percentile) Weight(growth percentile) 132/80 (BP 1 Location: Right arm, BP Patient Position: Sitting) 78 97.4 °F (36.3 °C) 12 5' 8\" (1.727 m) 292 lb 12.8 oz (132.8 kg) LMP SpO2 BMI OB Status Smoking Status 09/17/2017 97% 44.52 kg/m2 Having regular periods Current Every Day Smoker Vitals History BMI and BSA Data Body Mass Index Body Surface Area 44.52 kg/m 2 2.52 m 2 Preferred Pharmacy Pharmacy Name Phone [facility] [address] [place] [address] Angelique [address] [phone] Your Updated Medication List  
  
   
This list is accurate as of: 9/20/17 10:59 AM.  Always use your most recent med list.  
  
  
  
  
 buPROPion  mg SR tablet Commonly known as:  WELLBUTRIN, ZYBAN  
  
 DULoxetine 60 mg capsule Commonly known as:  CYMBALTA Take 1 Cap by mouth daily. ergocalciferol 50,000 unit capsule Commonly known as:  ERGOCALCIFEROL Take 1 Cap by mouth every seven (7) days. gabapentin 300 mg capsule Commonly known as:  NEURONTIN Take 300 mg by mouth three (3) times daily. hydroCHLOROthiazide 25 mg tablet Commonly known as:  HYDRODIURIL Take 1 Tab by mouth daily. LaMICtal 25 mg tablet Generic drug:  lamoTRIgine Take  by mouth daily. lidocaine 5 % Commonly known as:  LIDODERM  
CHRISTI 1 PA AA QD. LEAVE ON FOR 12 H THEN REMOVE FOR 12 H  
  
 losartan 50 mg tablet Commonly known as:  COZAAR Take 1 Tab by mouth daily. meloxicam 15 mg tablet Commonly known as:  MOBIC Take 1 Tab by mouth daily. MOTRIN  mg tablet Generic drug:  ibuprofen Take  by mouth.  
  
 polyethylene glycol 17 gram/dose powder Commonly known as:  Lugene Minder MIX AND DRINK 17 GRAMS BY MOUTH DAILY  
  
 traZODone 150 mg tablet Commonly known as:  Sheila Oliva * varenicline 0.5 mg (11)- 1 mg (42) Dspk Commonly known as:  CHANTIX STARTER YUNIOR Use as directed * varenicline 1 mg tablet Commonly known as:  Sravani Boop Take 1 Tab by mouth two (2) times a day. * Notice: This list has 2 medication(s) that are the same as other medications prescribed for you. Read the directions carefully, and ask your doctor or other care provider to review them with you. Follow-up Instructions Return in about 4 weeks (around 10/18/2017) for Follow up. Patient Instructions Learning About How to Have a Healthy Back What causes back pain? Back pain is often caused by overuse, strain, or injury. For example, people often hurt their backs playing sports or working in the yard, being jolted in a car accident, or lifting something too heavy. Aging plays a part too. Your bones and muscles tend to lose strength as you age, which makes injury more likely. The spongy discs between the bones of the spine (vertebrae) may suffer from wear and tear and no longer provide enough cushion between the bones. A disc that bulges or breaks open (herniated disc) can press on nerves, causing back pain. In some people, back pain is the result of arthritis, broken vertebrae caused by bone loss (osteoporosis), illness, or a spine problem. Although most people have back pain at one time or another, there are steps you can take to make it less likely. How can you have a healthy back? Reduce stress on your back through good posture Slumping or slouching alone may not cause low back pain. But after the back has been strained or injured, bad posture can make pain worse. · Sleep in a position that maintains your back's normal curves and on a mattress that feels comfortable. Sleep on your side with a pillow between your knees, or sleep on your back with a pillow under your knees. These positions can reduce strain on your back. · Stand and sit up straight. \"Good posture\" generally means your ears, shoulders, and hips are in a straight line. · If you must stand for a long time, put one foot on a stool, ledge, or box. Switch feet every now and then. · Sit in a chair that is low enough to let you place both feet flat on the floor with both knees nearly level with your hips. If your chair or desk is too high, use a footrest to raise your knees. Place a small pillow, a rolled-up towel, or a lumbar roll in the curve of your back if you need extra support. · Try a kneeling chair, which helps tilt your hips forward. This takes pressure off your lower back. · Try sitting on an exercise ball. It can rock from side to side, which helps keep your back loose. · When driving, keep your knees nearly level with your hips.  Sit straight, and drive with both hands on the steering wheel. Your arms should be in a slightly bent position. Reduce stress on your back through careful lifting · Squat down, bending at the hips and knees only. If you need to, put one knee to the floor and extend your other knee in front of you, bent at a right angle (half kneeling). · Press your chest straight forward. This helps keep your upper back straight while keeping a slight arch in your low back. · Hold the load as close to your body as possible, at the level of your belly button (navel). · Use your feet to change direction, taking small steps. · Lead with your hips as you change direction. Keep your shoulders in line with your hips as you move. · Set down your load carefully, squatting with your knees and hips only. Exercise and stretch your back · Do some exercise on most days of the week, if your doctor says it is okay. You can walk, run, swim, or cycle. · Stretch your back muscles. Here are a few exercises to try: ¨ Lie on your back, and gently pull one bent knee to your chest. Put that foot back on the floor, and then pull the other knee to your chest. 
¨ Do pelvic tilts. Lie on your back with your knees bent. Tighten your stomach muscles. Pull your belly button (navel) in and up toward your ribs. You should feel like your back is pressing to the floor and your hips and pelvis are slightly lifting off the floor. Hold for 6 seconds while breathing smoothly. ¨ Sit with your back flat against a wall. · Keep your core muscles strong. The muscles of your back, belly (abdomen), and buttocks support your spine. ¨ Pull in your belly and imagine pulling your navel toward your spine. Hold this for 6 seconds, then relax. Remember to keep breathing normally as you tense your muscles. ¨ Do curl-ups. Always do them with your knees bent.  Keep your low back on the floor, and curl your shoulders toward your knees using a smooth, slow motion. Keep your arms folded across your chest. If this bothers your neck, try putting your hands behind your neck (not your head), with your elbows spread apart. ¨ Lie on your back with your knees bent and your feet flat on the floor. Tighten your belly muscles, and then push with your feet and raise your buttocks up a few inches. Hold this position 6 seconds as you continue to breathe normally, then lower yourself slowly to the floor. Repeat 8 to 12 times. ¨ If you like group exercise, try Pilates or yoga. These classes have poses that strengthen the core muscles. Lead a healthy lifestyle · Stay at a healthy weight to avoid strain on your back. · Do not smoke. Smoking increases the risk of osteoporosis, which weakens the spine. If you need help quitting, talk to your doctor about stop-smoking programs and medicines. These can increase your chances of quitting for good. Where can you learn more? Go to http://jocelyn-amrik.info/. Enter L315 in the search box to learn more about \"Learning About How to Have a Healthy Back. \" Current as of: March 21, 2017 Content Version: 11.3 © 5602-9009 [employer]). Care instructions adapted under license by [employer] (which disclaims liability or warranty for this information). If you have questions about a medical condition or this instruction, always ask your healthcare professional. [address] any warranty or liability for your use of this information. Introducing hospitals & HEALTH SERVICES! [facility]: 
Thank you for requesting a [employer] account. Our records indicate that you already have an active [employer] account. You can access your account anytime at [email or web address] Did you know that you can access your hospital and ER discharge instructions at any time in [employer]? You can also review all of your test results from your hospital stay or ER visit. Additional Information If you have questions, please visit the Frequently Asked Questions section of the [employer] website at [email or web address] Remember, [employer] is NOT to be used for urgent needs. For medical emergencies, dial 911. Now available from your iPhone and Android! Please provide this summary of care documentation to your next provider. Your primary care clinician is listed as Earline Toribio. If you have any questions after today's visit, please call [phone].

## 2017-09-20 NOTE — PATIENT INSTRUCTIONS
Learning About How to Have a Healthy Back  What causes back pain? Back pain is often caused by overuse, strain, or injury. For example, people often hurt their backs playing sports or working in the yard, being jolted in a car accident, or lifting something too heavy. Aging plays a part too. Your bones and muscles tend to lose strength as you age, which makes injury more likely. The spongy discs between the bones of the spine (vertebrae) may suffer from wear and tear and no longer provide enough cushion between the bones. A disc that bulges or breaks open (herniated disc) can press on nerves, causing back pain. In some people, back pain is the result of arthritis, broken vertebrae caused by bone loss (osteoporosis), illness, or a spine problem. Although most people have back pain at one time or another, there are steps you can take to make it less likely. How can you have a healthy back? Reduce stress on your back through good posture  Slumping or slouching alone may not cause low back pain. But after the back has been strained or injured, bad posture can make pain worse. · Sleep in a position that maintains your back's normal curves and on a mattress that feels comfortable. Sleep on your side with a pillow between your knees, or sleep on your back with a pillow under your knees. These positions can reduce strain on your back. · Stand and sit up straight. \"Good posture\" generally means your ears, shoulders, and hips are in a straight line. · If you must stand for a long time, put one foot on a stool, ledge, or box. Switch feet every now and then. · Sit in a chair that is low enough to let you place both feet flat on the floor with both knees nearly level with your hips. If your chair or desk is too high, use a footrest to raise your knees. Place a small pillow, a rolled-up towel, or a lumbar roll in the curve of your back if you need extra support.   · Try a kneeling chair, which helps tilt your hips forward. This takes pressure off your lower back. · Try sitting on an exercise ball. It can rock from side to side, which helps keep your back loose. · When driving, keep your knees nearly level with your hips. Sit straight, and drive with both hands on the steering wheel. Your arms should be in a slightly bent position. Reduce stress on your back through careful lifting  · Squat down, bending at the hips and knees only. If you need to, put one knee to the floor and extend your other knee in front of you, bent at a right angle (half kneeling). · Press your chest straight forward. This helps keep your upper back straight while keeping a slight arch in your low back. · Hold the load as close to your body as possible, at the level of your belly button (navel). · Use your feet to change direction, taking small steps. · Lead with your hips as you change direction. Keep your shoulders in line with your hips as you move. · Set down your load carefully, squatting with your knees and hips only. Exercise and stretch your back  · Do some exercise on most days of the week, if your doctor says it is okay. You can walk, run, swim, or cycle. · Stretch your back muscles. Here are a few exercises to try:  Deloras  on your back, and gently pull one bent knee to your chest. Put that foot back on the floor, and then pull the other knee to your chest.  ¨ Do pelvic tilts. Lie on your back with your knees bent. Tighten your stomach muscles. Pull your belly button (navel) in and up toward your ribs. You should feel like your back is pressing to the floor and your hips and pelvis are slightly lifting off the floor. Hold for 6 seconds while breathing smoothly. ¨ Sit with your back flat against a wall. · Keep your core muscles strong. The muscles of your back, belly (abdomen), and buttocks support your spine. ¨ Pull in your belly and imagine pulling your navel toward your spine. Hold this for 6 seconds, then relax.  Remember to keep breathing normally as you tense your muscles. ¨ Do curl-ups. Always do them with your knees bent. Keep your low back on the floor, and curl your shoulders toward your knees using a smooth, slow motion. Keep your arms folded across your chest. If this bothers your neck, try putting your hands behind your neck (not your head), with your elbows spread apart. ¨ Lie on your back with your knees bent and your feet flat on the floor. Tighten your belly muscles, and then push with your feet and raise your buttocks up a few inches. Hold this position 6 seconds as you continue to breathe normally, then lower yourself slowly to the floor. Repeat 8 to 12 times. ¨ If you like group exercise, try Pilates or yoga. These classes have poses that strengthen the core muscles. Lead a healthy lifestyle  · Stay at a healthy weight to avoid strain on your back. · Do not smoke. Smoking increases the risk of osteoporosis, which weakens the spine. If you need help quitting, talk to your doctor about stop-smoking programs and medicines. These can increase your chances of quitting for good. Where can you learn more? Go to http://jocelyn-amrik.info/. Enter L315 in the search box to learn more about \"Learning About How to Have a Healthy Back. \"  Current as of: March 21, 2017  Content Version: 11.3  © 5936-5869 [employer]. Care instructions adapted under license by [employer] (which disclaims liability or warranty for this information). If you have questions about a medical condition or this instruction, always ask your healthcare professional. [place] [address] any warranty or liability for your use of this information.

## 2017-10-16 ENCOUNTER — OFFICE VISIT (OUTPATIENT)
Dept: FAMILY MEDICINE CLINIC | Facility: CLINIC | Age: 42
End: 2017-10-16

## 2017-10-16 VITALS
RESPIRATION RATE: 12 BRPM | HEART RATE: 83 BPM | SYSTOLIC BLOOD PRESSURE: 146 MMHG | OXYGEN SATURATION: 94 % | BODY MASS INDEX: 43.65 KG/M2 | WEIGHT: 288 LBS | DIASTOLIC BLOOD PRESSURE: 98 MMHG | HEIGHT: 68 IN | TEMPERATURE: 98 F

## 2017-10-16 DIAGNOSIS — E66.01 OBESITY, CLASS III, BMI 40-49.9 (MORBID OBESITY) (HCC): ICD-10-CM

## 2017-10-16 DIAGNOSIS — F17.200 CONTINUOUS NICOTINE DEPENDENCE: ICD-10-CM

## 2017-10-16 DIAGNOSIS — J30.2 ACUTE SEASONAL ALLERGIC RHINITIS, UNSPECIFIED TRIGGER: ICD-10-CM

## 2017-10-16 DIAGNOSIS — I10 ESSENTIAL HYPERTENSION: Primary | ICD-10-CM

## 2017-10-16 RX ORDER — LOSARTAN POTASSIUM 100 MG/1
100 TABLET ORAL DAILY
Qty: 90 TAB | Refills: 3 | Status: SHIPPED | OUTPATIENT
Start: 2017-10-16 | End: 2018-08-17 | Stop reason: SDUPTHER

## 2017-10-16 RX ORDER — FLUTICASONE PROPIONATE 50 MCG
2 SPRAY, SUSPENSION (ML) NASAL DAILY
Qty: 1 BOTTLE | Refills: 3 | Status: SHIPPED | OUTPATIENT
Start: 2017-10-16 | End: 2018-05-15

## 2017-10-16 NOTE — MR AVS SNAPSHOT
Visit Information Date & Time Provider Department Dept. Phone Encounter #  
 10/16/2017  9:30 AM Tavia Scott MD [facility] [phone] [id number] Follow-up Instructions Return in about 3 months (around 1/16/2018) for Follow up hypertension. Upcoming Health Maintenance Date Due  
 BREAST CANCER SCRN MAMMOGRAM 8/10/2018 PAP AKA CERVICAL CYTOLOGY 1/28/2019 DTaP/Tdap/Td series (2 - Td) 5/31/2026 Allergies as of 10/16/2017  Review Complete On: 10/16/2017 By: Tavia Scott MD  
 No Known Allergies Current Immunizations  Never Reviewed Name Date Influenza Vaccine (Quad) PF 11/1/2016, 9/1/2015  9:40 AM  
 Pneumococcal Polysaccharide (PPSV-23) 9/1/2015  9:41 AM  
 Td, Adsorbed PF 1/28/2016  8:00 AM  
  
 Not reviewed this visit You Were Diagnosed With   
  
 Codes Comments Essential hypertension    -  Primary ICD-10-CM: I10 
ICD-9-CM: 401.9 Acute seasonal allergic rhinitis, unspecified trigger     ICD-10-CM: J30.2 ICD-9-CM: 477.8 Continuous nicotine dependence     ICD-10-CM: F17.200 ICD-9-CM: 305.1 Obesity, Class III, BMI 40-49.9 (morbid obesity) (HCC)     ICD-10-CM: E66.01 
ICD-9-CM: 278.01 Vitals BP Pulse Temp Resp Height(growth percentile) Weight(growth percentile) (!) 146/98 (BP 1 Location: Left arm, BP Patient Position: Sitting) 83 98 °F (36.7 °C) (Oral) 12 5' 8\" (1.727 m) 288 lb (130.6 kg) LMP SpO2 BMI OB Status Smoking Status 10/12/2017 94% 43.79 kg/m2 Having regular periods Light Tobacco Smoker Vitals History BMI and BSA Data Body Mass Index Body Surface Area 43.79 kg/m 2 2.5 m 2 Preferred Pharmacy Pharmacy Name Phone [facility]  [address] [phone] Your Updated Medication List  
  
   
This list is accurate as of: 10/16/17 10:07 AM.  Always use your most recent med list.  
  
  
  
  
 buPROPion  mg SR tablet Commonly known as:  BRAULIO GALINDO  
  
 DULoxetine 60 mg capsule Commonly known as:  CYMBALTA Take 1 Cap by mouth daily. ergocalciferol 50,000 unit capsule Commonly known as:  ERGOCALCIFEROL Take 1 Cap by mouth every seven (7) days. fluticasone 50 mcg/actuation nasal spray Commonly known as:  Cathlyn Collin 2 Sprays by Both Nostrils route daily. gabapentin 300 mg capsule Commonly known as:  NEURONTIN Take 300 mg by mouth three (3) times daily. hydroCHLOROthiazide 25 mg tablet Commonly known as:  HYDRODIURIL Take 1 Tab by mouth daily. LaMICtal 25 mg tablet Generic drug:  lamoTRIgine Take  by mouth daily. lidocaine 5 % Commonly known as:  LIDODERM  
CHRISTI 1 PA AA QD. LEAVE ON FOR 12 H THEN REMOVE FOR 12 H  
  
 losartan 100 mg tablet Commonly known as:  COZAAR Take 1 Tab by mouth daily. meloxicam 15 mg tablet Commonly known as:  MOBIC Take 1 Tab by mouth daily. polyethylene glycol 17 gram/dose powder Commonly known as:  Vivi Clause MIX AND DRINK 17 GRAMS BY MOUTH DAILY  
  
 traZODone 150 mg tablet Commonly known as:  DESYREL  
  
 varenicline 1 mg tablet Commonly known as:  Ernul Consuelo Take 1 Tab by mouth two (2) times a day. Prescriptions Printed Refills  
 losartan (COZAAR) 100 mg tablet 3 Sig: Take 1 Tab by mouth daily. Class: Print Route: Oral  
  
Prescriptions Sent to Pharmacy Refills  
 fluticasone (FLONASE) 50 mcg/actuation nasal spray 3 Si Sprays by Both Nostrils route daily. Class: Normal  
 Pharmacy: [facility] [address] [place] [address] [address]  #: [phone] Route: Both Nostrils Follow-up Instructions Return in about 3 months (around 2018) for Follow up hypertension. Patient Instructions Allergies: Care Instructions Your Care Instructions Allergies occur when your body's defense system (immune system) overreacts to certain substances. The immune system treats a harmless substance as if it were a harmful germ or virus. Many things can cause this overreaction, including pollens, medicine, food, dust, animal dander, and mold. Allergies can be mild or severe. Mild allergies can be managed with home treatment. But medicine may be needed to prevent problems. Managing your allergies is an important part of staying healthy. Your doctor may suggest that you have allergy testing to help find out what is causing your allergies. When you know what things trigger your symptoms, you can avoid them. This can prevent allergy symptoms and other health problems. For severe allergies that cause reactions that affect your whole body (anaphylactic reactions), your doctor may prescribe a shot of epinephrine to carry with you in case you have a severe reaction. Learn how to give yourself the shot and keep it with you at all times. Make sure it is not . Follow-up care is a key part of your treatment and safety. Be sure to make and go to all appointments, and call your doctor if you are having problems. It's also a good idea to know your test results and keep a list of the medicines you take. How can you care for yourself at home? · If you have been told by your doctor that dust or dust mites are causing your allergy, decrease the dust around your bed: 
[facility] sheets, pillowcases, and other bedding in hot water every week. ¨ Use dust-proof covers for pillows, duvets, and mattresses. Avoid plastic covers because they tear easily and do not \"breathe. \" Wash as instructed on the label. ¨ Do not use any blankets and pillows that you do not need. ¨ Use blankets that you can wash in your washing machine. ¨ Consider removing drapes and carpets, which attract and hold dust, from your bedroom.  
· If you are allergic to house dust and mites, do not use home humidifiers. Your doctor can suggest ways you can control dust and mites. · Look for signs of cockroaches. Cockroaches cause allergic reactions. Use cockroach baits to get rid of them. Then, clean your home well. Cockroaches like areas where grocery bags, newspapers, empty bottles, or cardboard boxes are stored. Do not keep these inside your home, and keep trash and food containers sealed. Seal off any spots where cockroaches might enter your home. · If you are allergic to mold, get rid of furniture, rugs, and drapes that smell musty. Check for mold in the bathroom. · If you are allergic to outdoor pollen or mold spores, use air-conditioning. Change or clean all filters every month. Keep windows closed. · If you are allergic to pollen, stay inside when pollen counts are high. Use a vacuum  with a HEPA filter or a double-thickness filter at least two times each week. · Stay inside when air pollution is bad. Avoid paint fumes, perfumes, and other strong odors. · Avoid conditions that make your allergies worse. Stay away from smoke. Do not smoke or let anyone else smoke in your house. Do not use fireplaces or wood-burning stoves. · If you are allergic to your pets, change the air filter in your furnace every month. Use high-efficiency filters. · If you are allergic to pet dander, keep pets outside or out of your bedroom. Old carpet and cloth furniture can hold a lot of animal dander. You may need to replace them. When should you call for help? Give an epinephrine shot if: 
· You think you are having a severe allergic reaction. · You have symptoms in more than one body area, such as mild nausea and an itchy mouth. After giving an epinephrine shot call 911, even if you feel better. Call 911 if: 
· You have symptoms of a severe allergic reaction. These may include: 
¨ Sudden raised, red areas (hives) all over your body. ¨ Swelling of the throat, mouth, lips, or tongue. ¨ Trouble breathing. ¨ Passing out (losing consciousness). Or you may feel very lightheaded or suddenly feel weak, confused, or restless. · You have been given an epinephrine shot, even if you feel better. Call your doctor now or seek immediate medical care if: 
· You have symptoms of an allergic reaction, such as: ¨ A rash or hives (raised, red areas on the skin). ¨ Itching. ¨ Swelling. ¨ Belly pain, nausea, or vomiting. Watch closely for changes in your health, and be sure to contact your doctor if: 
· You do not get better as expected. Where can you learn more? Go to http://jocelyn-amrik.info/. Enter E288 in the search box to learn more about \"Allergies: Care Instructions. \" Current as of: April 3, 2017 Content Version: 11.3 © 5026-6649 [employer]. Care instructions adapted under license by [employer] (which disclaims liability or warranty for this information). If you have questions about a medical condition or this instruction, always ask your healthcare professional. [place] [address] any warranty or liability for your use of this information. Introducing [facility]! Dear Jillian Rich: 
Thank you for requesting a [employer] account. Our records indicate that you already have an active [employer] account. You can access your account anytime at [email or web address] [email or web address] Did you know that you can access your hospital and ER discharge instructions at any time in [employer]? You can also review all of your test results from your hospital stay or ER visit. Additional Information If you have questions, please visit the Frequently Asked Questions section of the [employer] website at [email or web address] [email or web address] Remember, [employer] is NOT to be used for urgent needs. For medical emergencies, dial 911. Now available from your iPhone and Android! Please provide this summary of care documentation to your next provider. Your primary care clinician is listed as Omar Mejia. If you have any questions after today's visit, please call [phone].

## 2017-10-16 NOTE — PATIENT INSTRUCTIONS

## 2017-10-16 NOTE — PROGRESS NOTES
Chief Complaint   Patient presents with    Hypertension    Nicotine Dependence    Weight Management     Visit Vitals    BP (!) 146/98 (BP 1 Location: Left arm, BP Patient Position: Sitting)    Pulse 83    Temp 98 °F (36.7 °C) (Oral)    Resp 12    Ht 5' 8\" (1.727 m)    Wt 288 lb (130.6 kg)    SpO2 94%    BMI 43.79 kg/m2       Patient in room # 2. Patient is not fasting. 1. Have you been to the ER, urgent care clinic since your last visit? Hospitalized since your last visit? No    2. Have you seen or consulted any other health care providers outside of the [address] [place] [address] since your last visit? Include any pap smears or colon screening. No     Reviewed.

## 2017-10-16 NOTE — PROGRESS NOTES
Internal Medicine Progress Note    Today's Date:  10/16/2017   Patient: Dima Seen  Patient :  1975    Subjective:     Chief Complaint   Patient presents with    Hypertension    Nicotine Dependence    Weight Management    Cold Symptoms      Cough   This is an acute problem. This is not at goal. Symptoms started a week ago. Pt has been taking cough drops and allegra. Cough is worse at night. It also occurs throughout the day. Pt coughs up mucus sometimes. Color is yellowish. No sick contacts. +SOB and wheezing when she coughs. Fibromyalgia  This is a chronic problem. This is at goal.  Pain is located in her back, neck, thighs. Pt sees rheumatology. Pt takes neurontin, cymbalta, lidoderm and mobic. Lyrica caused weight gain. Hypertension   This is a chronic problem. BP is not at goal. Pt takes losartan and HCTZ. Pt reports compliance with these medications. Smoker  This is a chronic problem. This is not at goal. She smokes two puffs per day. She has smoked for 25 yrs. Pt is ready to quit. Pt is on chantix. Anxiety/Depression/Insomnia/Bipolar disorder  This is a chronic problem. This is controlled. Pt takes wellbutrin, trazodone, lamictal and cymbalta.  Pt sees a mental health professional, Dr Renu Keenan.       Past Medical History:   Diagnosis Date    Acute pain of left shoulder 2016    Bipolar 1 disorder ([address]     Bipolar affective disorder, currently manic, moderate ([address] 2016    Chronic midline low back pain with sciatica 2016    Cigarette nicotine dependence in remission 2016    Cigarette nicotine dependence without complication     Continuous nicotine dependence 2017    Depression     Fibromyalgia 2015    HLD (hyperlipidemia) 2016    Hypertension     Impaired fasting glucose 2016    Irritable bowel syndrome without diarrhea 2016    Moderate episode of recurrent major depressive disorder ([address] 2016    Obesity, Class III, BMI 40-49.9 (morbid obesity) ([facility] 9/1/2015    Onychomycosis 4/27/2016    Pain of left thumb 11/1/2016    Prediabetes 7/5/2016    PTSD (post-traumatic stress disorder)     Smoker 9/1/2015    Vitamin D deficiency 5/31/2016     Past Surgical History:   Procedure Laterality Date    HX GYN Bilateral     tubal    HX ORTHOPAEDIC        reports that she has been smoking. She has a 8.25 pack-year smoking history. She has never used smokeless tobacco. She reports that she drinks about 5.4 oz of alcohol per week  She reports that she does not use illicit drugs. Family History   Problem Relation Age of Onset    Hypertension Mother     Thyroid Disease Mother     No Known Problems Father     Psychiatric Disorder Sister     Psychiatric Disorder Brother     Psychiatric Disorder Sister     Diabetes Sister     Psychiatric Disorder Brother     Psychiatric Disorder Brother      No Known Allergies  Review of Systems   Positives in bold  CV:      chest pain, palpitations  PULM:  SOB, wheezing, cough, sputum production    Current Outpatient Meds and Allergies     Current Outpatient Prescriptions on File Prior to Visit   Medication Sig Dispense Refill    polyethylene glycol (MIRALAX) 17 gram/dose powder MIX AND DRINK 17 GRAMS BY MOUTH DAILY 1530 g 3    hydroCHLOROthiazide (HYDRODIURIL) 25 mg tablet Take 1 Tab by mouth daily. 90 Tab 3    varenicline (CHANTIX) 1 mg tablet Take 1 Tab by mouth two (2) times a day. 180 Tab 0    ergocalciferol (ERGOCALCIFEROL) 50,000 unit capsule Take 1 Cap by mouth every seven (7) days. 12 Cap 3    meloxicam (MOBIC) 15 mg tablet Take 1 Tab by mouth daily. 90 Tab 3    gabapentin (NEURONTIN) 300 mg capsule Take 300 mg by mouth three (3) times daily.  lamoTRIgine (LAMICTAL) 25 mg tablet Take  by mouth daily.       lidocaine (LIDODERM) 5 % CHRISTI 1 PA AA QD. LEAVE ON FOR 12 H THEN REMOVE FOR 12 H  3    buPROPion SR (WELLBUTRIN, ZYBAN) 200 mg SR tablet   1    traZODone (DESYREL) 150 mg tablet   1    DULoxetine (CYMBALTA) 60 mg capsule Take 1 Cap by mouth daily. (Patient taking differently: Take 60 mg by mouth two (2) times a day.) 90 Cap 3     No current facility-administered medications on file prior to visit. No Known Allergies  Objective:     VS:    Visit Vitals    BP (!) 146/98 (BP 1 Location: Left arm, BP Patient Position: Sitting)    Pulse 83    Temp 98 °F (36.7 °C) (Oral)    Resp 12    Ht 5' 8\" (1.727 m)    Wt 288 lb (130.6 kg)    LMP 10/12/2017    SpO2 94%    BMI 43.79 kg/m2     General:   Well-nourished, well-groomed, pleasant, alert, in no acute distress  Head:  Normocephalic, atraumatic  Ears:  External ears WNL  Nose:  External nares WNL  Psych:  No pressured speech, no abnormal thought content    Hospital Outpatient Visit on 07/26/2017   Component Date Value Ref Range Status    LIPID PROFILE 07/26/2017        Final    Cholesterol, total 07/26/2017 162  <200 MG/DL Final    Triglyceride 07/26/2017 309* <150 MG/DL Final    Comment: The drugs N-acetylcysteine (NAC) and  Metamiszole have been found to cause falsely  low results in this chemical assay. Please  be sure to submit blood samples obtained  BEFORE administration of either of these  drugs to assure correct results.       HDL Cholesterol 07/26/2017 53  40 - 60 MG/DL Final    LDL, calculated 07/26/2017 47.2  0 - 100 MG/DL Final    VLDL, calculated 07/26/2017 61.8  MG/DL Final    CHOL/HDL Ratio 07/26/2017 3.1  0 - 5.0   Final    Hemoglobin A1c 07/26/2017 5.4  4.2 - 5.6 % Final    Comment: (NOTE)  HbA1C Interpretive Ranges  <5.7              Normal  5.7 - 6.4         Consider Prediabetes  >6.5              Consider Diabetes      Est. average glucose 07/26/2017 108  mg/dL Final    Comment: (NOTE)  The eAG should be interpreted with patient characteristics in mind   since ethnicity, interindividual differences, red cell lifespan,   variation in rates of glycation, etc. may affect the validity of the   calculation.  Sodium 07/26/2017 140  136 - 145 mmol/L Final    Potassium 07/26/2017 4.0  3.5 - 5.5 mmol/L Final    Chloride 07/26/2017 106  100 - 108 mmol/L Final    CO2 07/26/2017 25  21 - 32 mmol/L Final    Anion gap 07/26/2017 9  3.0 - 18 mmol/L Final    Glucose 07/26/2017 96  74 - 99 mg/dL Final    BUN 07/26/2017 9  7.0 - 18 MG/DL Final    Creatinine 07/26/2017 0.67  0.6 - 1.3 MG/DL Final    BUN/Creatinine ratio 07/26/2017 13  12 - 20   Final    GFR est AA 07/26/2017 >60  >60 ml/min/1.73m2 Final    GFR est non-AA 07/26/2017 >60  >60 ml/min/1.73m2 Final    Comment: (NOTE)  Estimated GFR is calculated using the Modification of Diet in Renal   Disease (MDRD) Study equation, reported for both  Americans   (GFRAA) and non- Americans (GFRNA), and normalized to 1.73m2   body surface area. The physician must decide which value applies to   the patient. The MDRD study equation should only be used in   individuals age 25 or older. It has not been validated for the   following: pregnant women, patients with serious comorbid conditions,   or on certain medications, or persons with extremes of body size,   muscle mass, or nutritional status.  Calcium 07/26/2017 8.5  8.5 - 10.1 MG/DL Final    Bilirubin, total 07/26/2017 0.4  0.2 - 1.0 MG/DL Final    ALT (SGPT) 07/26/2017 26  13 - 56 U/L Final    AST (SGOT) 07/26/2017 20  15 - 37 U/L Final    Alk.  phosphatase 07/26/2017 108  45 - 117 U/L Final    Protein, total 07/26/2017 7.2  6.4 - 8.2 g/dL Final    Albumin 07/26/2017 3.6  3.4 - 5.0 g/dL Final    Globulin 07/26/2017 3.6  2.0 - 4.0 g/dL Final    A-G Ratio 07/26/2017 1.0  0.8 - 1.7   Final    T4, Free 07/26/2017 1.1  0.7 - 1.5 NG/DL Final    TSH 07/26/2017 0.53  0.36 - 3.74 uIU/mL Final    Vitamin D 25-Hydroxy 07/26/2017 24.8* 30 - 100 ng/mL Final    Comment: (NOTE)  Deficiency               <20 ng/mL  Insufficiency          20-30 ng/mL  Sufficient             ng/mL  Possible toxicity       >100 ng/mL    The Method used is Siemens Advia Centaur currently standardized to a   Center of Disease Control and Prevention (CDC) certified reference   [facility]. Samples containing fluorescein dye can produce falsely   elevated values when tested with the ADVIA Centaur Vitamin D Assay. It is recommended that results in the toxic range, >100 ng/mL, be   retested 72 hours post fluorescein exposure. Assessment/Plan & Orders:         ICD-10-CM ICD-9-CM    1. Essential hypertension I10 401.9 losartan (COZAAR) 100 mg tablet   2. Acute seasonal allergic rhinitis, unspecified trigger J30.2 477.8 fluticasone (FLONASE) 50 mcg/actuation nasal spray   3. Continuous nicotine dependence F17.200 305.1    4. Obesity, Class III, BMI 40-49.9 (morbid obesity) ([facility]) E66.01 278.01       Healthy lifestyle has been encouraged including avoidance of tobacco, limiting or avoiding alcohol intake, heart healthy diet which is low in cholesterol and saturated fat and contains fresh fruits, vegetables and whole grains and fiber, regular exercise with goals of 20-30 minutes 3-5 days weekly and maintaining an optimal BMI. Pt to follow up with rheumatology  Continue smoking cessation efforts  Pt to check bp at home and all us if >140/90    Follow-up Disposition:  Return in about 3 months (around 1/16/2018) for Follow up hypertension. *Patient verbalized understanding and agreement with the plan. Patient was given an after-visit summary. Doris Mata.  [address], MD - Internal Medicine  10/16/2017, 8:52 AM  [address]  [address], [address]  Phone [phone]  Fax [phone]

## 2018-01-15 ENCOUNTER — OFFICE VISIT (OUTPATIENT)
Dept: FAMILY MEDICINE CLINIC | Facility: CLINIC | Age: 43
End: 2018-01-15

## 2018-01-15 VITALS
BODY MASS INDEX: 43.35 KG/M2 | DIASTOLIC BLOOD PRESSURE: 85 MMHG | OXYGEN SATURATION: 96 % | SYSTOLIC BLOOD PRESSURE: 145 MMHG | HEIGHT: 68 IN | TEMPERATURE: 97.7 F | HEART RATE: 80 BPM | WEIGHT: 286 LBS | RESPIRATION RATE: 12 BRPM

## 2018-01-15 DIAGNOSIS — E66.01 OBESITY, CLASS III, BMI 40-49.9 (MORBID OBESITY) (HCC): ICD-10-CM

## 2018-01-15 DIAGNOSIS — F31.12 BIPOLAR AFFECTIVE DISORDER, CURRENTLY MANIC, MODERATE (HCC): ICD-10-CM

## 2018-01-15 DIAGNOSIS — M79.7 FIBROMYALGIA: ICD-10-CM

## 2018-01-15 DIAGNOSIS — F17.200 CONTINUOUS NICOTINE DEPENDENCE: ICD-10-CM

## 2018-01-15 DIAGNOSIS — I10 ESSENTIAL HYPERTENSION: Primary | ICD-10-CM

## 2018-01-15 NOTE — PATIENT INSTRUCTIONS
Fibromyalgia: Care Instructions  Your Care Instructions    Fibromyalgia is a painful condition that is not completely understood by medical experts. The cause of fibromyalgia is not known. It can make you feel tired and ache all over. It causes tender spots at specific points of the body that hurt only when you press on them. You may have trouble sleeping, as well as other symptoms. These problems can upset your work and home life. Symptoms tend to come and go, although they may never go away completely. Fibromyalgia does not harm your muscles, joints, or organs. Follow-up care is a key part of your treatment and safety. Be sure to make and go to all appointments, and call your doctor if you are having problems. It's also a good idea to know your test results and keep a list of the medicines you take. How can you care for yourself at home? · Exercise often. Walk, swim, or bike to help with pain and sleep problems and to make you feel better. · Try to get a good night's sleep. Go to bed and get up at the same time each day, whether you feel rested or not. Make sure you have a good mattress and pillow. · Reduce stress. Avoid things that cause you stress, if you can. If not, work at making them less stressful. Learn to use biofeedback, guided imagery, meditation, or other methods to relax. · Make healthy changes. Eat a balanced diet, quit smoking, and limit alcohol and caffeine. · Use a heating pad set on low or take warm baths or showers for pain. Using cold packs for up to 20 minutes at a time can also relieve pain. Put a thin cloth between the cold pack and your skin. A gentle massage might help too. · Be safe with medicines. Take your medicines exactly as prescribed. Call your doctor if you think you are having a problem with your medicine. Your doctor may talk to you about taking antidepressant medicines. These medicines may improve sleep, relieve pain, and in some cases treat depression.   · Learn about fibromyalgia. This makes coping easier. Then, take an active role in your treatment. · Think about joining a support group with others who have fibromyalgia to learn more and get support. When should you call for help? Watch closely for changes in your health, and be sure to contact your doctor if:  ? · You feel sad, helpless, or hopeless; lose interest in things you used to enjoy; or have other symptoms of depression. ? · Your fibromyalgia symptoms get worse. Where can you learn more? Go to http://jocelyn-amrik.info/. Enter V003 in the search box to learn more about \"Fibromyalgia: Care Instructions. \"  Current as of: October 14, 2016  Content Version: 11.4  © 7427-8298 [employer], [employer]. Care instructions adapted under license by [employer] (which disclaims liability or warranty for this information). If you have questions about a medical condition or this instruction, always ask your healthcare professional. [address] any warranty or liability for your use of this information.

## 2018-01-15 NOTE — ASSESSMENT & PLAN NOTE
This condition is managed by Specialist.  Key Psychotherapeutic Meds             buPROPion SR (WELLBUTRIN, ZYBAN) 200 mg SR tablet  (Taking)     traZODone (DESYREL) 150 mg tablet  (Taking)     DULoxetine (CYMBALTA) 60 mg capsule  (Taking) Take 1 Cap by mouth daily. Other [facility]             gabapentin (NEURONTIN) 300 mg capsule  (Taking) Take 300 mg by mouth three (3) times daily. lamoTRIgine (LAMICTAL) 25 mg tablet  (Taking) Take  by mouth daily.         Lab Results   Component Value Date/Time    Sodium 140 07/26/2017 08:18 AM    Creatinine 0.67 07/26/2017 08:18 AM    TSH 0.53 07/26/2017 08:18 AM    ALT (SGPT) 26 07/26/2017 08:18 AM    AST (SGOT) 20 07/26/2017 08:18 AM

## 2018-01-15 NOTE — PROGRESS NOTES
Chief Complaint   Patient presents with    Hypertension    Nicotine Dependence     Vitals:    01/15/18 0935 01/15/18 0945   BP: (!) 162/91 (!) 168/100  Comment: manual   BP 1 Location: Left arm Right arm   BP Patient Position: Sitting Sitting   Pulse: 80    Resp: 12    Temp: 97.7 °F (36.5 °C)    TempSrc: Oral    SpO2: 96%    Weight: 286 lb (129.7 kg)    Height: 5' 8\" (1.727 m)      Patient is not fasting. Patient in room # 3.     1. Have you been to the ER, urgent care clinic since your last visit? Hospitalized since your last visit? No    2. Have you seen or consulted any other health care providers outside of the [address] [place] [address] since your last visit? Include any pap smears or colon screening. Yes When: 12/21/2017 Where: Psychiatrist Reason for visit: Therapy and Medication Mgmt. HM Reviewed.

## 2018-01-15 NOTE — PROGRESS NOTES
Internal Medicine Progress Note    Today's Date:  1/15/2018   Patient: Maliha Higgins  Patient :  1975    Subjective:     Chief Complaint   Patient presents with    Hypertension    Nicotine Dependence      Fibromyalgia  This is a chronic problem. This is at goal.  Pain is located in her back, neck, thighs. Pt was seeing rheumatology. Pt takes neurontin, cymbalta, lidoderm and mobic. Lyrica caused weight gain. Hypertension   This is a chronic problem. BP is not at goal. Pt takes losartan and HCTZ. Pt reports compliance with these medications but pt takes them at night. Smoker  This is a chronic problem. This is not at goal. She smokes two cigarettes per day. Pt is ready to quit. Pt is on chantix. Anxiety/Depression/Insomnia/Bipolar disorder  This is a chronic problem. This is controlled. Pt takes wellbutrin, trazodone, lamictal and cymbalta.  Pt sees a mental health professional, Dr Liane Posadas.       Past Medical History:   Diagnosis Date    Acute pain of left shoulder 2016    Bipolar 1 disorder ([address]     Bipolar affective disorder, currently manic, moderate ([address] 2016    Chronic midline low back pain with sciatica 2016    Cigarette nicotine dependence in remission 2016    Cigarette nicotine dependence without complication     Continuous nicotine dependence 2017    Depression     Fibromyalgia 2015    HLD (hyperlipidemia) 2016    Hypertension     Impaired fasting glucose 2016    Irritable bowel syndrome without diarrhea 2016    Moderate episode of recurrent major depressive disorder ([address] 2016    Obesity, Class III, BMI 40-49.9 (morbid obesity) ([address] 2015    Onychomycosis 2016    Pain of left thumb 2016    Prediabetes 2016    PTSD (post-traumatic stress disorder)     Smoker 2015    Vitamin D deficiency 2016     Past Surgical History:   Procedure Laterality Date    HX GYN Bilateral     tubal  HX ORTHOPAEDIC        reports that she has been smoking. She has a 8.25 pack-year smoking history. She has never used smokeless tobacco. She reports that she drinks about 5.4 oz of alcohol per week  She reports that she does not use illicit drugs. Family History   Problem Relation Age of Onset    Hypertension Mother     Thyroid Disease Mother     No Known Problems Father     Psychiatric Disorder Sister     Psychiatric Disorder Brother     Psychiatric Disorder Sister     Diabetes Sister     Psychiatric Disorder Brother     Psychiatric Disorder Brother      No Known Allergies  Review of Systems   Positives in bold  CV:      chest pain, palpitations  PULM:  SOB, wheezing, cough, sputum production    Current Outpatient Meds and Allergies     Current Outpatient Prescriptions on File Prior to Visit   Medication Sig Dispense Refill    losartan (COZAAR) 100 mg tablet Take 1 Tab by mouth daily. 90 Tab 3    fluticasone (FLONASE) 50 mcg/actuation nasal spray 2 Sprays by Both Nostrils route daily. 1 Bottle 3    polyethylene glycol (MIRALAX) 17 gram/dose powder MIX AND DRINK 17 GRAMS BY MOUTH DAILY 1530 g 3    hydroCHLOROthiazide (HYDRODIURIL) 25 mg tablet Take 1 Tab by mouth daily. 90 Tab 3    varenicline (CHANTIX) 1 mg tablet Take 1 Tab by mouth two (2) times a day. 180 Tab 0    ergocalciferol (ERGOCALCIFEROL) 50,000 unit capsule Take 1 Cap by mouth every seven (7) days. 12 Cap 3    meloxicam (MOBIC) 15 mg tablet Take 1 Tab by mouth daily. 90 Tab 3    gabapentin (NEURONTIN) 300 mg capsule Take 300 mg by mouth three (3) times daily.  lamoTRIgine (LAMICTAL) 25 mg tablet Take  by mouth daily.  lidocaine (LIDODERM) 5 % CHRISTI 1 PA AA QD. LEAVE ON FOR 12 H THEN REMOVE FOR 12 H  3    buPROPion SR (WELLBUTRIN, ZYBAN) 200 mg SR tablet   1    traZODone (DESYREL) 150 mg tablet   1    DULoxetine (CYMBALTA) 60 mg capsule Take 1 Cap by mouth daily.  (Patient taking differently: Take 60 mg by mouth two (2) times a day.) 90 Cap 3     No current facility-administered medications on file prior to visit. No Known Allergies  Objective:     VS:    Visit Vitals    /85 (BP 1 Location: Right arm, BP Patient Position: Sitting)  Comment: manual    Pulse 80    Temp 97.7 °F (36.5 °C) (Oral)    Resp 12    Ht 5' 8\" (1.727 m)    Wt 286 lb (129.7 kg)    LMP 12/24/2017    SpO2 96%    BMI 43.49 kg/m2     General:   Well-nourished, well-groomed, pleasant, alert, in no acute distress  Head:  Normocephalic, atraumatic  Ears:  External ears WNL  Nose:  External nares WNL  Psych:  No pressured speech, no abnormal thought content    PHQ over the last two weeks 2/27/2017   Little interest or pleasure in doing things More than half the days   Feeling down, depressed or hopeless Several days   Total Score PHQ 2 3   Trouble falling or staying asleep, or sleeping too much Not at all   Feeling tired or having little energy Nearly every day   Poor appetite or overeating Several days   Feeling bad about yourself - or that you are a failure or have let yourself or your family down Not at all   Trouble concentrating on things such as school, work, reading or watching TV Nearly every day   Moving or speaking so slowly that other people could have noticed; or the opposite being so fidgety that others notice Nearly every day   Thoughts of being better off dead, or hurting yourself in some way Not at all   PHQ 9 Score 13   How difficult have these problems made it for you to do your work, take care of your home and get along with others Very difficult     No visits with results within 3 Month(s) from this visit.   Latest known visit with results is:    Hospital Outpatient Visit on 07/26/2017   Component Date Value Ref Range Status    LIPID PROFILE 07/26/2017        Final    Cholesterol, total 07/26/2017 162  <200 MG/DL Final    Triglyceride 07/26/2017 309* <150 MG/DL Final    Comment: The drugs N-acetylcysteine (NAC) and  Metamiszole have been found to cause falsely  low results in this chemical assay. Please  be sure to submit blood samples obtained  BEFORE administration of either of these  drugs to assure correct results.  HDL Cholesterol 07/26/2017 53  40 - 60 MG/DL Final    LDL, calculated 07/26/2017 47.2  0 - 100 MG/DL Final    VLDL, calculated 07/26/2017 61.8  MG/DL Final    CHOL/HDL Ratio 07/26/2017 3.1  0 - 5.0   Final    Hemoglobin A1c 07/26/2017 5.4  4.2 - 5.6 % Final    Comment: (NOTE)  HbA1C Interpretive Ranges  <5.7              Normal  5.7 - 6.4         Consider Prediabetes  >6.5              Consider Diabetes      Est. average glucose 07/26/2017 108  mg/dL Final    Comment: (NOTE)  The eAG should be interpreted with patient characteristics in mind   since ethnicity, interindividual differences, red cell lifespan,   variation in rates of glycation, etc. may affect the validity of the   calculation.  Sodium 07/26/2017 140  136 - 145 mmol/L Final    Potassium 07/26/2017 4.0  3.5 - 5.5 mmol/L Final    Chloride 07/26/2017 106  100 - 108 mmol/L Final    CO2 07/26/2017 25  21 - 32 mmol/L Final    Anion gap 07/26/2017 9  3.0 - 18 mmol/L Final    Glucose 07/26/2017 96  74 - 99 mg/dL Final    BUN 07/26/2017 9  7.0 - 18 MG/DL Final    Creatinine 07/26/2017 0.67  0.6 - 1.3 MG/DL Final    BUN/Creatinine ratio 07/26/2017 13  12 - 20   Final    GFR est AA 07/26/2017 >60  >60 ml/min/1.73m2 Final    GFR est non-AA 07/26/2017 >60  >60 ml/min/1.73m2 Final    Comment: (NOTE)  Estimated GFR is calculated using the Modification of Diet in Renal   Disease (MDRD) Study equation, reported for both  Americans   (GFRAA) and non- Americans (GFRNA), and normalized to 1.73m2   body surface area. The physician must decide which value applies to   the patient. The MDRD study equation should only be used in   individuals age 25 or older.  It has not been validated for the   following: pregnant women, patients with serious comorbid conditions,   or on certain medications, or persons with extremes of body size,   muscle mass, or nutritional status.  Calcium 07/26/2017 8.5  8.5 - 10.1 MG/DL Final    Bilirubin, total 07/26/2017 0.4  0.2 - 1.0 MG/DL Final    ALT (SGPT) 07/26/2017 26  13 - 56 U/L Final    AST (SGOT) 07/26/2017 20  15 - 37 U/L Final    Alk. phosphatase 07/26/2017 108  45 - 117 U/L Final    Protein, total 07/26/2017 7.2  6.4 - 8.2 g/dL Final    Albumin 07/26/2017 3.6  3.4 - 5.0 g/dL Final    Globulin 07/26/2017 3.6  2.0 - 4.0 g/dL Final    A-G Ratio 07/26/2017 1.0  0.8 - 1.7   Final    T4, Free 07/26/2017 1.1  0.7 - 1.5 NG/DL Final    TSH 07/26/2017 0.53  0.36 - 3.74 uIU/mL Final    Vitamin D 25-Hydroxy 07/26/2017 24.8* 30 - 100 ng/mL Final    Comment: (NOTE)  Deficiency               <20 ng/mL  Insufficiency          20-30 ng/mL  Sufficient             ng/mL  Possible toxicity       >100 ng/mL    The Method used is Siemens Advia Centaur currently standardized to a   Center of Disease Control and Prevention (CDC) certified reference   [facility]. Samples containing fluorescein dye can produce falsely   elevated values when tested with the ADVIA Centaur Vitamin D Assay. It is recommended that results in the toxic range, >100 ng/mL, be   retested 72 hours post fluorescein exposure. Assessment/Plan & Orders:         ICD-10-CM ICD-9-CM    1. Essential hypertension I10 401.9    2. Continuous nicotine dependence F17.200 305.1    3. Fibromyalgia M79.7 729.1    4. Obesity, Class III, BMI 40-49.9 (morbid obesity) (HCC) E66.01 278.01    5.  Bipolar affective disorder, currently manic, moderate ([facility] F31.12 296.42       Healthy lifestyle has been encouraged including avoidance of tobacco, limiting or avoiding alcohol intake, heart healthy diet which is low in cholesterol and saturated fat and contains fresh fruits, vegetables and whole grains and fiber, regular exercise with goals of 20-30 minutes 3-5 days weekly and maintaining an optimal BMI. Continue smoking cessation efforts  Pt to check bp at home and call us with bp log or bring it into the office  Will write for compound cream  Depression screenin17    Follow-up Disposition:  Return in about 1 month (around 2/15/2018) for Follow up hypertension. *Patient verbalized understanding and agreement with the plan. Patient was given an after-visit summary. Alexis Walter Ayala MD - Internal Medicine  1/15/2018, 8:52 AM  [facility]  [address], [address]  Phone [phone]  Fax [phone]    Diagnoses and all orders for this visit:    1. Essential hypertension    2. Continuous nicotine dependence    3. Fibromyalgia    4. Obesity, Class III, BMI 40-49.9 (morbid obesity) (HCC)  Assessment & Plan:    Key Obesity Meds             hydroCHLOROthiazide (HYDRODIURIL) 25 mg tablet  (Taking) Take 1 Tab by mouth daily. Lab Results   Component Value Date/Time    Hemoglobin A1c 5.4 2017 08:18 AM    Glucose 96 2017 08:18 AM    Cholesterol, total 162 2017 08:18 AM    HDL Cholesterol 53 2017 08:18 AM    LDL, calculated 47.2 2017 08:18 AM    Triglyceride 309 2017 08:18 AM    TSH 0.53 2017 08:18 AM    Sodium 140 2017 08:18 AM    Potassium 4.0 2017 08:18 AM    ALT (SGPT) 26 2017 08:18 AM    AST (SGOT) 20 2017 08:18 AM    Vitamin D 25-Hydroxy 24.8 2017 08:18 AM             5. Bipolar affective disorder, currently manic, moderate ([facility]  Assessment & Plan: This condition is managed by Specialist.  Key Psychotherapeutic Meds             buPROPion SR (WELLBUTRIN, ZYBAN) 200 mg SR tablet  (Taking)     traZODone (DESYREL) 150 mg tablet  (Taking)     DULoxetine (CYMBALTA) 60 mg capsule  (Taking) Take 1 Cap by mouth daily.         Other [address] Meds             gabapentin (NEURONTIN) 300 mg capsule  (Taking) Take 300 mg by mouth three (3) times daily.    lamoTRIgine (LAMICTAL) 25 mg tablet  (Taking) Take  by mouth daily.         Lab Results   Component Value Date/Time    Sodium 140 07/26/2017 08:18 AM    Creatinine 0.67 07/26/2017 08:18 AM    TSH 0.53 07/26/2017 08:18 AM    ALT (SGPT) 26 07/26/2017 08:18 AM    AST (SGOT) 20 07/26/2017 08:18 AM

## 2018-01-15 NOTE — ASSESSMENT & PLAN NOTE
Key Obesity Meds             hydroCHLOROthiazide (HYDRODIURIL) 25 mg tablet  (Taking) Take 1 Tab by mouth daily.         Lab Results   Component Value Date/Time    Hemoglobin A1c 5.4 07/26/2017 08:18 AM    Glucose 96 07/26/2017 08:18 AM    Cholesterol, total 162 07/26/2017 08:18 AM    HDL Cholesterol 53 07/26/2017 08:18 AM    LDL, calculated 47.2 07/26/2017 08:18 AM    Triglyceride 309 07/26/2017 08:18 AM    TSH 0.53 07/26/2017 08:18 AM    Sodium 140 07/26/2017 08:18 AM    Potassium 4.0 07/26/2017 08:18 AM    ALT (SGPT) 26 07/26/2017 08:18 AM    AST (SGOT) 20 07/26/2017 08:18 AM    Vitamin D 25-Hydroxy 24.8 07/26/2017 08:18 AM

## 2018-01-15 NOTE — MR AVS SNAPSHOT
Visit Information Date & Time Provider Department Dept. Phone Encounter #  
 1/15/2018  9:30 AM Milana Ortiz MD [facility] [phone] [id number] Follow-up Instructions Return in about 1 month (around 2/15/2018) for Follow up hypertension. Upcoming Health Maintenance Date Due  
 BREAST CANCER SCRN MAMMOGRAM 8/10/2018 PAP AKA CERVICAL CYTOLOGY 1/28/2019 DTaP/Tdap/Td series (2 - Td) 5/31/2026 Allergies as of 1/15/2018  Review Complete On: 1/15/2018 By: Milana Ortiz MD  
 No Known Allergies Current Immunizations  Never Reviewed Name Date Influenza Vaccine (Quad) PF 11/1/2016, 9/1/2015  9:40 AM  
 Pneumococcal Polysaccharide (PPSV-23) 9/1/2015  9:41 AM  
 Td, Adsorbed PF 1/28/2016  8:00 AM  
  
 Not reviewed this visit You Were Diagnosed With   
  
 Codes Comments Essential hypertension    -  Primary ICD-10-CM: I10 
ICD-9-CM: 401.9 Continuous nicotine dependence     ICD-10-CM: F17.200 ICD-9-CM: 305.1 Fibromyalgia     ICD-10-CM: M79.7 ICD-9-CM: 729.1 Obesity, Class III, BMI 40-49.9 (morbid obesity) (HCC)     ICD-10-CM: E66.01 
ICD-9-CM: 278.01 Vitals BP Pulse Temp Resp Height(growth percentile) Weight(growth percentile) 145/85 (BP 1 Location: Right arm, BP Patient Position: Sitting) 80 97.7 °F (36.5 °C) (Oral) 12 5' 8\" (1.727 m) 286 lb (129.7 kg) LMP SpO2 BMI OB Status Smoking Status 12/24/2017 96% 43.49 kg/m2 Having regular periods Light Tobacco Smoker Vitals History BMI and BSA Data Body Mass Index Body Surface Area  
 43.49 kg/m 2 2.49 m 2 Preferred Pharmacy Pharmacy Name Phone [facility] [address] [place] [address]. [phone] Your Updated Medication List  
  
   
This list is accurate as of: 1/15/18 10:04 AM.  Always use your most recent med list.  
  
  
  
  
 buPROPion  mg SR tablet Commonly known as:  WELLBUTRIN, ZYBAN  
  
 DULoxetine 60 mg capsule Commonly known as:  CYMBALTA Take 1 Cap by mouth daily. ergocalciferol 50,000 unit capsule Commonly known as:  ERGOCALCIFEROL Take 1 Cap by mouth every seven (7) days. fluticasone 50 mcg/actuation nasal spray Commonly known as:  Yael Golds 2 Sprays by Both Nostrils route daily. gabapentin 300 mg capsule Commonly known as:  NEURONTIN Take 300 mg by mouth three (3) times daily. hydroCHLOROthiazide 25 mg tablet Commonly known as:  HYDRODIURIL Take 1 Tab by mouth daily. LaMICtal 25 mg tablet Generic drug:  lamoTRIgine Take  by mouth daily. lidocaine 5 % Commonly known as:  LIDODERM  
CHRISTI 1 PA AA QD. LEAVE ON FOR 12 H THEN REMOVE FOR 12 H  
  
 losartan 100 mg tablet Commonly known as:  COZAAR Take 1 Tab by mouth daily. meloxicam 15 mg tablet Commonly known as:  MOBIC Take 1 Tab by mouth daily. polyethylene glycol 17 gram/dose powder Commonly known as:  [place] [facility] MIX AND DRINK 17 GRAMS BY MOUTH DAILY  
  
 traZODone 150 mg tablet Commonly known as:  DESYREL  
  
 varenicline 1 mg tablet Commonly known as:  [place] Take 1 Tab by mouth two (2) times a day. Follow-up Instructions Return in about 1 month (around 2/15/2018) for Follow up hypertension. Patient Instructions Fibromyalgia: Care Instructions Your Care Instructions Fibromyalgia is a painful condition that is not completely understood by medical experts. The cause of fibromyalgia is not known. It can make you feel tired and ache all over. It causes tender spots at specific points of the body that hurt only when you press on them. You may have trouble sleeping, as well as other symptoms. These problems can upset your work and home life. Symptoms tend to come and go, although they may never go away completely. Fibromyalgia does not harm your muscles, joints, or organs. Follow-up care is a key part of your treatment and safety. Be sure to make and go to all appointments, and call your doctor if you are having problems. It's also a good idea to know your test results and keep a list of the medicines you take. How can you care for yourself at home? · Exercise often. Walk, swim, or bike to help with pain and sleep problems and to make you feel better. · Try to get a good night's sleep. Go to bed and get up at the same time each day, whether you feel rested or not. Make sure you have a good mattress and pillow. · Reduce stress. Avoid things that cause you stress, if you can. If not, work at making them less stressful. Learn to use biofeedback, guided imagery, meditation, or other methods to relax. · Make healthy changes. Eat a balanced diet, quit smoking, and limit alcohol and caffeine. · Use a heating pad set on low or take warm baths or showers for pain. Using cold packs for up to 20 minutes at a time can also relieve pain. Put a thin cloth between the cold pack and your skin. A gentle massage might help too. · Be safe with medicines. Take your medicines exactly as prescribed. Call your doctor if you think you are having a problem with your medicine. Your doctor may talk to you about taking antidepressant medicines. These medicines may improve sleep, relieve pain, and in some cases treat depression. · Learn about fibromyalgia. This makes coping easier. Then, take an active role in your treatment. · Think about joining a support group with others who have fibromyalgia to learn more and get support. When should you call for help? Watch closely for changes in your health, and be sure to contact your doctor if: 
? · You feel sad, helpless, or hopeless; lose interest in things you used to enjoy; or have other symptoms of depression. ? · Your fibromyalgia symptoms get worse. Where can you learn more? Go to http://jocelyn-amrik.info/. Enter V003 in the search box to learn more about \"Fibromyalgia: Care Instructions. \" Current as of: October 14, 2016 Content Version: 11.4 © 4864-6182 Healthwise, [employer]. Care instructions adapted under license by [employer] (which disclaims liability or warranty for this information). If you have questions about a medical condition or this instruction, always ask your healthcare professional. [address] any warranty or liability for your use of this information. Introducing [facility] [place] [facility]! Dear Sheryl Gutiérrez: 
Thank you for requesting a Penana account. Our records indicate that you already have an active Penana account. You can access your account anytime at [email or web address] Did you know that you can access your hospital and ER discharge instructions at any time in Penana? You can also review all of your test results from your hospital stay or ER visit. Additional Information If you have questions, please visit the Frequently Asked Questions section of the Penana website at [email or web address] Remember, Penana is NOT to be used for urgent needs. For medical emergencies, dial 911. Now available from your iPhone and Android! Please provide this summary of care documentation to your next provider. Your primary care clinician is listed as Mukesh Eubanks. If you have any questions after today's visit, please call [phone].

## 2018-02-06 RX ORDER — VARENICLINE TARTRATE 1 MG/1
TABLET, FILM COATED ORAL
Qty: 180 TAB | Refills: 0 | Status: SHIPPED | OUTPATIENT
Start: 2018-02-06 | End: 2018-05-15

## 2018-05-15 ENCOUNTER — DOCUMENTATION ONLY (OUTPATIENT)
Dept: FAMILY MEDICINE CLINIC | Facility: CLINIC | Age: 43
End: 2018-05-15

## 2018-05-15 ENCOUNTER — OFFICE VISIT (OUTPATIENT)
Dept: FAMILY MEDICINE CLINIC | Facility: CLINIC | Age: 43
End: 2018-05-15

## 2018-05-15 VITALS
OXYGEN SATURATION: 97 % | HEIGHT: 68 IN | BODY MASS INDEX: 43.65 KG/M2 | WEIGHT: 288 LBS | RESPIRATION RATE: 18 BRPM | SYSTOLIC BLOOD PRESSURE: 134 MMHG | TEMPERATURE: 97.4 F | DIASTOLIC BLOOD PRESSURE: 84 MMHG | HEART RATE: 85 BPM

## 2018-05-15 DIAGNOSIS — F31.12 BIPOLAR AFFECTIVE DISORDER, CURRENTLY MANIC, MODERATE (HCC): ICD-10-CM

## 2018-05-15 DIAGNOSIS — M79.7 FIBROMYALGIA: ICD-10-CM

## 2018-05-15 DIAGNOSIS — F17.200 CONTINUOUS NICOTINE DEPENDENCE: ICD-10-CM

## 2018-05-15 DIAGNOSIS — Z13.31 SCREENING FOR DEPRESSION: ICD-10-CM

## 2018-05-15 DIAGNOSIS — I10 ESSENTIAL HYPERTENSION: ICD-10-CM

## 2018-05-15 DIAGNOSIS — E66.01 OBESITY, CLASS III, BMI 40-49.9 (MORBID OBESITY) (HCC): ICD-10-CM

## 2018-05-15 DIAGNOSIS — Z00.00 ROUTINE GENERAL MEDICAL EXAMINATION AT A HEALTH CARE FACILITY: Primary | ICD-10-CM

## 2018-05-15 DIAGNOSIS — F33.1 MODERATE EPISODE OF RECURRENT MAJOR DEPRESSIVE DISORDER (HCC): ICD-10-CM

## 2018-05-15 DIAGNOSIS — E78.1 HYPERTRIGLYCERIDEMIA: ICD-10-CM

## 2018-05-15 RX ORDER — GABAPENTIN 300 MG/1
300 CAPSULE ORAL 3 TIMES DAILY
Qty: 90 CAP | Refills: 3 | Status: CANCELLED | OUTPATIENT
Start: 2018-05-15

## 2018-05-15 NOTE — MR AVS SNAPSHOT
[address] [address] 
[phone] Patient: Addi Krueger MRN: [id number] TMX:[id number] Visit Information Date & Time Provider Department Dept. Phone Encounter #  
 5/15/2018  9:30 AM Orlando Kruger MD [facility] [phone] [id number] Follow-up Instructions Return in about 4 weeks (around 6/12/2018). Upcoming Health Maintenance Date Due  
 BREAST CANCER SCRN MAMMOGRAM 8/10/2018 Influenza Age 5 to Adult 8/1/2018 PAP AKA CERVICAL CYTOLOGY 1/28/2019 DTaP/Tdap/Td series (2 - Td) 5/31/2026 Allergies as of 5/15/2018  Review Complete On: 5/15/2018 By: Orlando Kruger MD  
 No Known Allergies Current Immunizations  Never Reviewed Name Date Influenza Vaccine (Quad) PF 11/1/2016, 9/1/2015  9:40 AM  
 Pneumococcal Polysaccharide (PPSV-23) 9/1/2015  9:41 AM  
 Td, Adsorbed PF 1/28/2016  8:00 AM  
  
 Not reviewed this visit You Were Diagnosed With   
  
 Codes Comments Routine general medical examination at a health care facility    -  Primary ICD-10-CM: Z00.00 ICD-9-CM: V70.0 Screening for depression     ICD-10-CM: Z13.89 ICD-9-CM: V79.0 Vitals BP Pulse Temp Resp Height(growth percentile) Weight(growth percentile) 134/84 (BP 1 Location: Left arm, BP Patient Position: Sitting) 85 97.4 °F (36.3 °C) (Oral) 18 5' 8\" (1.727 m) 288 lb (130.6 kg) LMP SpO2 BMI OB Status Smoking Status 05/01/2018 97% 43.79 kg/m2 Having regular periods Light Tobacco Smoker Vitals History BMI and BSA Data Body Mass Index Body Surface Area 43.79 kg/m 2 2.5 m 2 Preferred Pharmacy Pharmacy Name Phone [facility] [address] [place] [address]. [address] [phone] Your Updated Medication List  
  
   
This list is accurate as of 5/15/18 10:12 AM.  Always use your most recent med list.  
  
  
  
  
 buPROPion  mg SR tablet Commonly known as:  WELLBUTRIN ZYBHOMERO  
  
 DULoxetine 60 mg capsule Commonly known as:  CYMBALTA Take 1 Cap by mouth daily. ergocalciferol 50,000 unit capsule Commonly known as:  ERGOCALCIFEROL Take 1 Cap by mouth every seven (7) days. gabapentin 300 mg capsule Commonly known as:  NEURONTIN Take 300 mg by mouth three (3) times daily. hydroCHLOROthiazide 25 mg tablet Commonly known as:  HYDRODIURIL Take 1 Tab by mouth daily. LaMICtal 25 mg tablet Generic drug:  lamoTRIgine Take  by mouth daily. lidocaine 5 % Commonly known as:  LIDODERM  
APPLY 1 PATCH TO THE AFFECTED AREA EVERY DAY. LEAVE ON FOR 12 HOURS THEN REMOVE FOR 12 HOURS  
  
 losartan 100 mg tablet Commonly known as:  COZAAR Take 1 Tab by mouth daily. meloxicam 15 mg tablet Commonly known as:  MOBIC Take 1 Tab by mouth daily. polyethylene glycol 17 gram/dose powder Commonly known as:  Magdaleno Baumgarten MIX AND DRINK 17 GRAMS BY MOUTH DAILY  
  
 traZODone 150 mg tablet Commonly known as:  Missy Espinal We Performed the Following [id number] [place] [address] ObserveIT [ hospitals] Follow-up Instructions Return in about 4 weeks (around 6/12/2018). Introducing [facility]! Dear John Cota: 
Thank you for requesting a [employer] account. Our records indicate that you already have an active [employer] account. You can access your account anytime at [email or web address]. [email or web address] Did you know that you can access your hospital and ER discharge instructions at any time in [employer]? You can also review all of your test results from your hospital stay or ER visit. Additional Information If you have questions, please visit the Frequently Asked Questions section of the [employer] website at [email or web address] Remember, [employer] is NOT to be used for urgent needs. For medical emergencies, dial 911. Now available from your iPhone and Android! Please provide this summary of care documentation to your next provider. Your primary care clinician is listed as Ross Mercer. If you have any questions after today's visit, please call [phone].

## 2018-05-15 NOTE — PROGRESS NOTES
Claribel Beck is 37 y.o. female presents today for office visit for follow up for hypertension. Pt is not fasting. Pt is in Room# 2.     1. Have you been to the ER, urgent care clinic since your last visit? Hospitalized since your last visit? No    2. Have you seen or consulted any other health care providers outside of the [address] [place] [address] since your last visit? Include any pap smears or colon screening. Yes. Psychiatry 4/2018    Health Maintenance reviewed. Upcoming Appts  Psychiatry- 5/22/18    Requested Prescriptions     Pending Prescriptions Disp Refills    gabapentin (NEURONTIN) 300 mg capsule 90 Cap 3     Sig: Take 1 Cap by mouth three (3) times daily.        Visit Vitals    /84 (BP 1 Location: Left arm, BP Patient Position: Sitting)  Comment (BP 1 Location): manual    Pulse 85    Temp 97.4 °F (36.3 °C) (Oral)    Resp 18    Ht 5' 8\" (1.727 m)    Wt 288 lb (130.6 kg)    SpO2 97%    BMI 43.79 kg/m2

## 2018-05-15 NOTE — PROGRESS NOTES
History and Physical    Today's Date:  5/15/2018   Patient's Name: Angelika Snow   Patient's :  1975     History:     Chief Complaint   Patient presents with    Hypertension    Nicotine Dependence    Annual Exam    Depression     Fibromyalgia  This is a chronic problem. This is not at goal. Pain is locted ankles, feet, legs, knees, thighs, back and neck. Pt takes cymbalta, mobic and uses a lidocaine patch. Pt was on gabapentin. Pt was seeing a rheumatologist.     Obesity Class III  This is a chronic problem. This is not at goal. Pt does not exercise regularly. Pt does not eat a healthy diet. Anxiety/Depression/Insomnia  This is a chronic problem. This is not controlled. Pt takes wellbutrin, lamictal and trazodone. Pt has a psychiatrist.    Past Medical History:   Diagnosis Date    Acute pain of left shoulder 2016    Bipolar 1 disorder ([address]     Bipolar affective disorder, currently manic, moderate ([address] 2016    Chronic midline low back pain with sciatica 2016    Cigarette nicotine dependence in remission 2016    Cigarette nicotine dependence without complication     Continuous nicotine dependence 2017    Depression     Fibromyalgia 2015    HLD (hyperlipidemia) 2016    Hypertension     Hypertriglyceridemia 5/15/2018    Impaired fasting glucose 2016    Irritable bowel syndrome without diarrhea 2016    Moderate episode of recurrent major depressive disorder ([address] 2016    Obesity, Class III, BMI 40-49.9 (morbid obesity) ([address] 2015    Onychomycosis 2016    Pain of left thumb 2016    Prediabetes 2016    PTSD (post-traumatic stress disorder)     Smoker 2015    Vitamin D deficiency 2016     Past Surgical History:   Procedure Laterality Date    HX GYN Bilateral     tubal    HX ORTHOPAEDIC        reports that she has been smoking. She has a 8.25 pack-year smoking history.  She has never used smokeless tobacco. She reports that she drinks about 5.4 oz of alcohol per week  She reports that she does not use illicit drugs. Family History   Problem Relation Age of Onset    Hypertension Mother     Thyroid Disease Mother     No Known Problems Father     Psychiatric Disorder Sister     Psychiatric Disorder Brother     Psychiatric Disorder Sister     Diabetes Sister     Psychiatric Disorder Brother     Psychiatric Disorder Brother      No Known Allergies  Problem List:      Patient Active Problem List   Diagnosis Code    Obesity, Class III, BMI 40-49.9 (morbid obesity) ([facility] E66.01    Fibromyalgia M79.7    HTN (hypertension) I10    Moderate episode of recurrent major depressive disorder ([facility] F33.1    Bipolar affective disorder, currently manic, moderate (HCC) F31.12    Onychomycosis B35.1    Irritable bowel syndrome without diarrhea K58.9    Prediabetes R73.03    Chronic midline low back pain with sciatica M54.40, G89.29    Continuous nicotine dependence F17.200    Hypertriglyceridemia E78.1     Medications:     Current Outpatient Prescriptions   Medication Sig    lidocaine (LIDODERM) 5 % APPLY 1 PATCH TO THE AFFECTED AREA EVERY DAY. LEAVE ON FOR 12 HOURS THEN REMOVE FOR 12 HOURS    losartan (COZAAR) 100 mg tablet Take 1 Tab by mouth daily.  polyethylene glycol (MIRALAX) 17 gram/dose powder MIX AND DRINK 17 GRAMS BY MOUTH DAILY    hydroCHLOROthiazide (HYDRODIURIL) 25 mg tablet Take 1 Tab by mouth daily.  ergocalciferol (ERGOCALCIFEROL) 50,000 unit capsule Take 1 Cap by mouth every seven (7) days.  lamoTRIgine (LAMICTAL) 25 mg tablet Take  by mouth daily.  buPROPion SR (WELLBUTRIN, ZYBAN) 200 mg SR tablet     traZODone (DESYREL) 150 mg tablet     DULoxetine (CYMBALTA) 60 mg capsule Take 1 Cap by mouth daily. (Patient taking differently: Take 60 mg by mouth two (2) times a day.)    meloxicam (MOBIC) 15 mg tablet Take 1 Tab by mouth daily.     gabapentin (NEURONTIN) 300 mg capsule Take 300 mg by mouth three (3) times daily. No current facility-administered medications for this visit.       Review of Systems:   (Positives in bold)   General:   fevers, chills, generalized weakness, fatigue, weight change, night sweats, appetite change   Neurologic: dizziness, lightheadedness, headaches, loss of consciousness, numbness (left leg), tingling, focal weakness  Eyes:  vision changes, double vision, photophobia  Ears:  change in hearing, ear pain, ear discharge, ear ringing  Nose:  sneezing, runny nose, nasal congestion  Mouth/Throat: sore throat, voice change, dry mouth, difficulty swallowing  Neck:  pain, stiffness, swelling  Respiratory: dyspnea at rest, dyspnea on exertion, wheezing, cough, sputum production  Cardiovascular:   chest pain, palpitations, pedal edema, leg cramps  Breasts: lumps, discharge, pain, rash, skin changes, changes on self-exam  Gastrointestinal:  nausea, vomiting, abdominal pain, constipation, diarrhea, heart burn, bloody stools, tarry black stools, rectal pain, hemorrhoids  Urinary: dysuria, urinary frequency, nocturia, malodorous urine  Genital (F): vaginal discharge, ulcerations, rashes, change in menses, pelvic pain  Musculoskeletal:  joint pain (hips, knees), joint stiffness, joint swelling, back pain, focal muscle pain, diffuse myalgias  Psychiatric: insomnia, anxiety, depression, hallucinations, suicidal ideation, homicidal ideation  Endocrine: polydipsia, polyuria, polyphagia, cold intolerance, heat intolerance  Hematologic: easy bruising, easy bleeding  Dermatologic: Itching, rash    Physical Assessment:   VS:    Visit Vitals    /84 (BP 1 Location: Left arm, BP Patient Position: Sitting)  Comment (BP 1 Location): manual    Pulse 85    Temp 97.4 °F (36.3 °C) (Oral)    Resp 18    Ht 5' 8\" (1.727 m)    Wt 288 lb (130.6 kg)    LMP 05/01/2018    SpO2 97%    BMI 43.79 kg/m2     General:   Well-groomed, well-nourished, in no distress, pleasant, alert, appropriate and conversant. Eyes:    PERRL, EOMI  Ears:  TMs normal, no ear wax  Mouth:  MMM, good dentition, oropharynx WNL without membranes, exudates, petechiae or ulcers  Neck:   Neck supple, no swelling, mass or tenderness  Cardiovascular:   No JVD. RRR, no MRG. Pulmonary:   Lungs clear bilaterally. Normal respiratory effort. Abdomen:   Abdomen soft, NT, ND, NAB  Extremities:   No edema, LEs warm and well-perfused. Neuro:   Alert and oriented, no focal deficits. No facial asymmetry noted. Skin:    No rash or jaundice  MSK:   Normal ROM, 5/5 muscle strength, no joint swelling  Psych:  No pressured speech or abnormal thought content    PHQ over the last two weeks 5/15/2018   Little interest or pleasure in doing things Nearly every day   Feeling down, depressed or hopeless Nearly every day   Total Score PHQ 2 6   Trouble falling or staying asleep, or sleeping too much -   Feeling tired or having little energy -   Poor appetite or overeating -   Feeling bad about yourself - or that you are a failure or have let yourself or your family down -   Trouble concentrating on things such as school, work, reading or watching TV -   Moving or speaking so slowly that other people could have noticed; or the opposite being so fidgety that others notice -   Thoughts of being better off dead, or hurting yourself in some way -   PHQ 9 Score -   How difficult have these problems made it for you to do your work, take care of your home and get along with others -     No visits with results within 3 Month(s) from this visit. Latest known visit with results is:    Hospital Outpatient Visit on 07/26/2017   Component Date Value Ref Range Status    LIPID PROFILE 07/26/2017        Final    Cholesterol, total 07/26/2017 162  <200 MG/DL Final    Triglyceride 07/26/2017 309* <150 MG/DL Final    Comment: The drugs N-acetylcysteine (NAC) and  Metamiszole have been found to cause falsely  low results in this chemical assay. Please  be sure to submit blood samples obtained  BEFORE administration of either of these  drugs to assure correct results.  HDL Cholesterol 07/26/2017 53  40 - 60 MG/DL Final    LDL, calculated 07/26/2017 47.2  0 - 100 MG/DL Final    VLDL, calculated 07/26/2017 61.8  MG/DL Final    CHOL/HDL Ratio 07/26/2017 3.1  0 - 5.0   Final    Hemoglobin A1c 07/26/2017 5.4  4.2 - 5.6 % Final    Comment: (NOTE)  HbA1C Interpretive Ranges  <5.7              Normal  5.7 - 6.4         Consider Prediabetes  >6.5              Consider Diabetes      Est. average glucose 07/26/2017 108  mg/dL Final    Comment: (NOTE)  The eAG should be interpreted with patient characteristics in mind   since ethnicity, interindividual differences, red cell lifespan,   variation in rates of glycation, etc. may affect the validity of the   calculation.  Sodium 07/26/2017 140  136 - 145 mmol/L Final    Potassium 07/26/2017 4.0  3.5 - 5.5 mmol/L Final    Chloride 07/26/2017 106  100 - 108 mmol/L Final    CO2 07/26/2017 25  21 - 32 mmol/L Final    Anion gap 07/26/2017 9  3.0 - 18 mmol/L Final    Glucose 07/26/2017 96  74 - 99 mg/dL Final    BUN 07/26/2017 9  7.0 - 18 MG/DL Final    Creatinine 07/26/2017 0.67  0.6 - 1.3 MG/DL Final    BUN/Creatinine ratio 07/26/2017 13  12 - 20   Final    GFR est AA 07/26/2017 >60  >60 ml/min/1.73m2 Final    GFR est non-AA 07/26/2017 >60  >60 ml/min/1.73m2 Final    Comment: (NOTE)  Estimated GFR is calculated using the Modification of Diet in Renal   Disease (MDRD) Study equation, reported for both  Americans   (GFRAA) and non- Americans (GFRNA), and normalized to 1.73m2   body surface area. The physician must decide which value applies to   the patient. The MDRD study equation should only be used in   individuals age 25 or older.  It has not been validated for the   following: pregnant women, patients with serious comorbid conditions,   or on certain medications, or persons with extremes of body size,   muscle mass, or nutritional status.  Calcium 07/26/2017 8.5  8.5 - 10.1 MG/DL Final    Bilirubin, total 07/26/2017 0.4  0.2 - 1.0 MG/DL Final    ALT (SGPT) 07/26/2017 26  13 - 56 U/L Final    AST (SGOT) 07/26/2017 20  15 - 37 U/L Final    Alk. phosphatase 07/26/2017 108  45 - 117 U/L Final    Protein, total 07/26/2017 7.2  6.4 - 8.2 g/dL Final    Albumin 07/26/2017 3.6  3.4 - 5.0 g/dL Final    Globulin 07/26/2017 3.6  2.0 - 4.0 g/dL Final    A-G Ratio 07/26/2017 1.0  0.8 - 1.7   Final    T4, Free 07/26/2017 1.1  0.7 - 1.5 NG/DL Final    TSH 07/26/2017 0.53  0.36 - 3.74 uIU/mL Final    Vitamin D 25-Hydroxy 07/26/2017 24.8* 30 - 100 ng/mL Final    Comment: (NOTE)  Deficiency               <20 ng/mL  Insufficiency          20-30 ng/mL  Sufficient             ng/mL  Possible toxicity       >100 ng/mL    The Method used is Siemens Advia Centaur currently standardized to a   Center of Disease Control and Prevention (CDC) certified reference   [address]. Samples containing fluorescein dye can produce falsely   elevated values when tested with the ADVIA Centaur Vitamin D Assay. It is recommended that results in the toxic range, >100 ng/mL, be   retested 72 hours post fluorescein exposure. Assessment/Plan & Orders:         ICD-10-CM ICD-9-CM    1. Routine general medical examination at a health care facility Z00.00 V70.0    2. Essential hypertension I10 401.9    3. Obesity, Class III, BMI 40-49.9 (morbid obesity) (HCC) E66.01 278.01    4. Fibromyalgia M79.7 729.1    5. Continuous nicotine dependence F17.200 305.1    6. Hypertriglyceridemia E78.1 272.1    7. Moderate episode of recurrent major depressive disorder (HCC) F33.1 296.32    8. Bipolar affective disorder, currently manic, moderate (HCC) F31.12 296.42    9.  Screening for depression Z13.89 V79.0 ND DEPRESSION SCREEN ANNUAL     HM  Colon cancer: colonoscopy due at age 46  Diabetes mellitus: will check FBG  Influenza vaccine: due in the fall  Pneumococcal vaccine: pneumovax done in   Herpes Zoster vaccine: due at age 59  Hep B vaccine: not indicated (liver dz, DM 19-59)  Weight:  Body mass index is 43.79 kg/(m^2). Discussed the patient's BMI with her. The BMI follow up plan is as follows: Improve diet and 30 min of moderate activity at least 5 times a week  Cervical cancer:  pap smear up to date  Breast Cancer: mammogram not covered by insurance  Osteoporosis: will check Vit D. No indication for DEXA scan    Healthy lifestyle has been encouraged including avoidance of tobacco, limiting or avoiding alcohol intake, heart healthy diet which is low in cholesterol and saturated fat and contains fresh fruits, vegetables and whole grains and fiber, regular exercise with goals of 20-30 minutes 3-5 days weekly and maintaining an optimal BMI. Information given on ketogenic/intermittent fasting diet  Follow up with rheumatology and psychiatry  Depression screenin/15/18    Follow-up Disposition:  Return in about 4 weeks (around 2018) for Follow up hypertension, Follow up weight management, Follow up hyperlipidemia, Follow up depression,. *Patient verbalized understanding and agreement with the plan. Patient was given an after-visit summary. Cuca Chapa.  Frances Wang MD - Internal Medicine  5/15/2018, 8:41 AM  [facility]  [address], [address]  Phone [phone]  Fax [phone]

## 2018-05-22 NOTE — PROGRESS NOTES
Corrected claim for DOS, code  is only billable to Medicare and Medicare products. Changed  to [id number] for payer for review.

## 2018-08-14 DIAGNOSIS — I10 ESSENTIAL HYPERTENSION: ICD-10-CM

## 2018-08-14 NOTE — LETTER
8/28/2018 11:36 AM 
 
Ms. Ch Seen [facility] [address] 
[facility] Dear Ms. Hernan Zapien missed you! Please call our office at [phone] and schedule a follow up appointment for your continued care. Sincerely, Flor Velásquez MD

## 2018-08-17 RX ORDER — LOSARTAN POTASSIUM 50 MG/1
TABLET ORAL
Qty: 90 TAB | Refills: 0 | Status: ON HOLD | OUTPATIENT
Start: 2018-08-17 | End: 2019-02-07 | Stop reason: DRUGHIGH

## 2018-09-09 DIAGNOSIS — E55.9 VITAMIN D DEFICIENCY: ICD-10-CM

## 2018-09-09 NOTE — LETTER
9/13/2018 9:38 AM 
 
Ms. Ila [address]ivas [address] 
[address] [address] Dear Ms. Fredi Galindo missed you! Please call our office at [phone] and schedule a follow up appointment for your continued care. Sincerely, Paul Burton MD

## 2018-09-11 RX ORDER — ERGOCALCIFEROL 1.25 MG/1
CAPSULE ORAL
Qty: 12 CAP | Refills: 0 | OUTPATIENT
Start: 2018-09-11

## 2018-12-04 ENCOUNTER — HOSPITAL ENCOUNTER (EMERGENCY)
Age: 43
Discharge: HOME | End: 2018-12-04
Attending: EMERGENCY MEDICINE
Payer: MEDICAID

## 2018-12-04 VITALS
DIASTOLIC BLOOD PRESSURE: 105 MMHG | HEART RATE: 99 BPM | TEMPERATURE: 98.1 F | SYSTOLIC BLOOD PRESSURE: 186 MMHG | BODY MASS INDEX: 44.1 KG/M2 | HEIGHT: 67 IN | WEIGHT: 281 LBS | RESPIRATION RATE: 16 BRPM | OXYGEN SATURATION: 99 %

## 2018-12-04 DIAGNOSIS — M54.50 ACUTE EXACERBATION OF CHRONIC LOW BACK PAIN: Primary | ICD-10-CM

## 2018-12-04 DIAGNOSIS — M54.42 CHRONIC MIDLINE LOW BACK PAIN WITH LEFT-SIDED SCIATICA: ICD-10-CM

## 2018-12-04 DIAGNOSIS — G89.29 CHRONIC BILATERAL LOW BACK PAIN WITH BILATERAL SCIATICA: ICD-10-CM

## 2018-12-04 DIAGNOSIS — M54.41 CHRONIC BILATERAL LOW BACK PAIN WITH BILATERAL SCIATICA: ICD-10-CM

## 2018-12-04 DIAGNOSIS — G89.29 CHRONIC MIDLINE LOW BACK PAIN WITH LEFT-SIDED SCIATICA: ICD-10-CM

## 2018-12-04 DIAGNOSIS — G89.29 ACUTE EXACERBATION OF CHRONIC LOW BACK PAIN: Primary | ICD-10-CM

## 2018-12-04 DIAGNOSIS — M54.42 CHRONIC BILATERAL LOW BACK PAIN WITH BILATERAL SCIATICA: ICD-10-CM

## 2018-12-04 PROCEDURE — 74011636637 HC RX REV CODE- 636/637: Performed by: EMERGENCY MEDICINE

## 2018-12-04 PROCEDURE — 99283 EMERGENCY DEPT VISIT LOW MDM: CPT

## 2018-12-04 PROCEDURE — 74011250637 HC RX REV CODE- 250/637: Performed by: EMERGENCY MEDICINE

## 2018-12-04 RX ORDER — HYDROCODONE BITARTRATE AND ACETAMINOPHEN 5; 325 MG/1; MG/1
1 TABLET ORAL
Qty: 12 TAB | Refills: 0 | Status: SHIPPED | OUTPATIENT
Start: 2018-12-04 | End: 2018-12-17 | Stop reason: SDUPTHER

## 2018-12-04 RX ORDER — LIDOCAINE 50 MG/G
PATCH TOPICAL
Qty: 90 PATCH | Refills: 3 | Status: SHIPPED | OUTPATIENT
Start: 2018-12-04 | End: 2022-06-21

## 2018-12-04 RX ORDER — HYDROCODONE BITARTRATE AND ACETAMINOPHEN 7.5; 325 MG/1; MG/1
1 TABLET ORAL
Status: COMPLETED | OUTPATIENT
Start: 2018-12-04 | End: 2018-12-04

## 2018-12-04 RX ORDER — CYCLOBENZAPRINE HCL 5 MG
5 TABLET ORAL
Qty: 21 TAB | Refills: 0 | Status: SHIPPED | OUTPATIENT
Start: 2018-12-04 | End: 2018-12-17 | Stop reason: SDUPTHER

## 2018-12-04 RX ORDER — PREDNISONE 50 MG/1
50 TABLET ORAL DAILY
Qty: 4 TAB | Refills: 0 | Status: SHIPPED | OUTPATIENT
Start: 2018-12-05 | End: 2018-12-09

## 2018-12-04 RX ORDER — PREDNISONE 20 MG/1
60 TABLET ORAL
Status: COMPLETED | OUTPATIENT
Start: 2018-12-04 | End: 2018-12-04

## 2018-12-04 RX ORDER — DIAZEPAM 5 MG/1
5 TABLET ORAL
Status: COMPLETED | OUTPATIENT
Start: 2018-12-04 | End: 2018-12-04

## 2018-12-04 RX ADMIN — DIAZEPAM 5 MG: 5 TABLET ORAL at 18:56

## 2018-12-04 RX ADMIN — PREDNISONE 60 MG: 20 TABLET ORAL at 18:56

## 2018-12-04 RX ADMIN — HYDROCODONE BITARTRATE AND ACETAMINOPHEN 1 TABLET: 7.5; 325 TABLET ORAL at 18:56

## 2018-12-04 NOTE — DISCHARGE INSTRUCTIONS
Learning About Relief for Back Pain  What is back tension and strain? Back strain happens when you overstretch, or pull, a muscle in your back. You may hurt your back in an accident or when you exercise or lift something. Most back pain will get better with rest and time. You can take care of yourself at home to help your back heal.  What can you do first to relieve back pain? When you first feel back pain, try these steps:  · Walk. Take a short walk (10 to 20 minutes) on a level surface (no slopes, hills, or stairs) every 2 to 3 hours. Walk only distances you can manage without pain, especially leg pain. · Relax. Find a comfortable position for rest. Some people are comfortable on the floor or a medium-firm bed with a small pillow under their head and another under their knees. Some people prefer to lie on their side with a pillow between their knees. Don't stay in one position for too long. · Try heat or ice. Try using a heating pad on a low or medium setting, or take a warm shower, for 15 to 20 minutes every 2 to 3 hours. Or you can buy single-use heat wraps that last up to 8 hours. You can also try an ice pack for 10 to 15 minutes every 2 to 3 hours. You can use an ice pack or a bag of frozen vegetables wrapped in a thin towel. There is not strong evidence that either heat or ice will help, but you can try them to see if they help. You may also want to try switching between heat and cold. · Take pain medicine exactly as directed. ? If the doctor gave you a prescription medicine for pain, take it as prescribed. ? If you are not taking a prescription pain medicine, ask your doctor if you can take an over-the-counter medicine. What else can you do? · Stretch and exercise. Exercises that increase flexibility may relieve your pain and make it easier for your muscles to keep your spine in a good, neutral position. And don't forget to keep walking. · Do self-massage.  You can use self-massage to unwind after work or school or to energize yourself in the morning. You can easily massage your feet, hands, or neck. Self-massage works best if you are in comfortable clothes and are sitting or lying in a comfortable position. Use oil or lotion to massage bare skin. · Reduce stress. Back pain can lead to a vicious [place]: Distress about the pain tenses the muscles in your back, which in turn causes more pain. Learn how to relax your mind and your muscles to lower your stress. Where can you learn more? Go to http://jocelyn-amrik.info/. Enter S568 in the search box to learn more about \"Learning About Relief for Back Pain. \"  Current as of: November 29, 2017  Content Version: 11.8  © 4786-6423 [employer]. Care instructions adapted under license by [employer] (which disclaims liability or warranty for this information). If you have questions about a medical condition or this instruction, always ask your healthcare professional. [place] [address] any warranty or liability for your use of this information. Back Care and Preventing Injuries: Care Instructions  Your Care Instructions    You can hurt your back doing many everyday activities: lifting a heavy box, bending down to garden, exercising at the gym, and even getting out of bed. But you can keep your back strong and healthy by doing some exercises. You also can follow a few tips for sitting, sleeping, and lifting to avoid hurting your back again. Talk to your doctor before you start an exercise program. Ask for help if you want to learn more about keeping your back healthy. Follow-up care is a key part of your treatment and safety. Be sure to make and go to all appointments, and call your doctor if you are having problems. It's also a good idea to know your test results and keep a list of the medicines you take. How can you care for yourself at home?   · Stay at a healthy weight to avoid strain on your lower back. · Do not smoke. Smoking increases the risk of osteoporosis, which weakens the spine. If you need help quitting, talk to your doctor about stop-smoking programs and medicines. These can increase your chances of quitting for good. · Make sure you sleep in a position that maintains your back's normal curves and on a mattress that feels comfortable. Sleep on your side with a pillow between your knees, or sleep on your back with a pillow under your knees. These positions can reduce strain on your back. · When you get out of bed, lie on your side and bend both knees. Drop your feet over the edge of the bed as you push up with both arms. Scoot to the edge of the bed. Make sure your feet are in line with your rear end (buttocks), and then stand up. · If you must stand for a long time, put one foot on a stool, ledge, or box. Exercise to strengthen your back and other muscles  · Get at least 30 minutes of exercise on most days of the week. Walking is a good choice. You also may want to do other activities, such as running, swimming, cycling, or playing tennis or team sports. · Stretch your back muscles. Here are few exercises to try:  ? Lie on your back with your knees bent and your feet flat on the floor. Gently pull one bent knee to your chest. Put that foot back on the floor, and then pull the other knee to your chest. Hold for 15 to 30 seconds. Repeat 2 to 4 times. ? Do pelvic tilts. Lie on your back with your knees bent. Tighten your stomach muscles. Pull your belly button (navel) in and up toward your ribs. You should feel like your back is pressing to the floor and your hips and pelvis are slightly lifting off the floor. Hold for 6 seconds while breathing smoothly. · Keep your core muscles strong. The muscles of your back, belly (abdomen), and buttocks support your spine. ? Pull in your belly, and imagine pulling your navel toward your spine. Hold this for 6 seconds, then relax.  Remember to keep breathing normally as you tense your muscles. ? Do curl-ups. Always do them with your knees bent. Keep your low back on the floor, and curl your shoulders toward your knees using a smooth, slow motion. Keep your arms folded across your chest. If this bothers your neck, try putting your hands behind your neck (not your head), with your elbows spread apart. ? Lie on your back with your knees bent and your feet flat on the floor. Tighten your belly muscles, and then push with your feet and raise your buttocks up a few inches. Hold this position 6 seconds as you continue to breathe normally, then lower yourself slowly to the floor. Repeat 8 to 12 times. ? If you like group exercise, try Pilates or yoga. These classes have poses that strengthen the core muscles. Protect your back when you sit  · Place a small pillow, a rolled-up towel, or a lumbar roll in the curve of your back if you need extra support. · Sit in a chair that is low enough to let you place both feet flat on the floor with both knees nearly level with your hips. If your chair or desk is too high, use a foot rest to raise your knees. · When driving, keep your knees nearly level with your hips. Sit straight, and drive with both hands on the steering wheel. Your arms should be in a slightly bent position. · Try a kneeling chair, which helps tilt your hips forward. This takes pressure off your lower back. · Try sitting on an exercise ball. It can rock from side to side, which helps keep your back loose. Lift properly  · Squat down, bending at the hips and knees only. If you need to, put one knee to the floor and extend your other knee in front of you, bent at a right angle (half kneeling). · Press your chest straight forward. This helps keep your upper back straight while keeping a slight arch in your low back. · Hold the load as close to your body as possible, at the level of your navel.   · Use your feet to change direction, taking small steps.  · Lead with your hips as you change direction. Keep your shoulders in line with your hips as you move. Do not twist your body. · Set down your load carefully, squatting with your knees and hips only. When should you call for help? Watch closely for changes in your health, and be sure to contact your doctor if you have any problems. Where can you learn more? Go to http://jocelyn-amrik.info/. Enter S810 in the search box to learn more about \"Back Care and Preventing Injuries: Care Instructions. \"  Current as of: November 29, 2017  Content Version: 11.8  © 6635-7298 [employer]. Care instructions adapted under license by [employer] (which disclaims liability or warranty for this information). If you have questions about a medical condition or this instruction, always ask your healthcare professional. [address] any warranty or liability for your use of this information.

## 2018-12-04 NOTE — ED PROVIDER NOTES
Rhiannon Sanders is a 37 y.o. Female with h/o chronic low back pain, radiculopathy with c/o worsening low back pain for last few days with increased tingling in lower legs. No new trauma, saddle anesthesia, b/b dysfunction, fcs, nvd. Also will get tingling in arms as well with certain neck movements. Taking meloxcam and neurontin without relief. Remote h/o lumbar back surgery. No weight loss, sweats, abd pain, dysuria, freq. The history is provided by the patient. Past Medical History:  
Diagnosis Date  Acute pain of left shoulder 11/1/2016  Bipolar 1 disorder ([address]  Bipolar affective disorder, currently manic, moderate ([address] 4/27/2016  Chronic midline low back pain with sciatica 11/1/2016  Cigarette nicotine dependence in remission 8/2/2016  Cigarette nicotine dependence without complication 02/41/9090  Continuous nicotine dependence 1/24/2017  Depression  Fibromyalgia 9/1/2015  HLD (hyperlipidemia) 2/4/2016  Hypertension  Hypertriglyceridemia 5/15/2018  Impaired fasting glucose 5/31/2016  Irritable bowel syndrome without diarrhea 4/27/2016  Moderate episode of recurrent major depressive disorder ([address] 4/27/2016  Obesity, Class III, BMI 40-49.9 (morbid obesity) ([address] 9/1/2015  Onychomycosis 4/27/2016  Pain of left thumb 11/1/2016  Prediabetes 7/5/2016  PTSD (post-traumatic stress disorder)  Smoker 9/1/2015  Vitamin D deficiency 5/31/2016 Past Surgical History:  
Procedure Laterality Date  HX GYN Bilateral   
 tubal  
 HX ORTHOPAEDIC Family History:  
Problem Relation Age of Onset  Hypertension Mother  Thyroid Disease Mother  No Known Problems Father  Psychiatric Disorder Sister  Psychiatric Disorder Brother  Psychiatric Disorder Sister  Diabetes Sister  Psychiatric Disorder Brother  Psychiatric Disorder Brother Social History Socioeconomic History  Marital status: LEGALLY  Spouse name: Not on file  Number of children: Not on file  Years of education: Not on file  Highest education level: Not on file Social Needs  Financial resource strain: Not on file  Food insecurity - worry: Not on file  Food insecurity - inability: Not on file  Transportation needs - medical: Not on file  Transportation needs - non-medical: Not on file Occupational History  Not on file Tobacco Use  Smoking status: Light Tobacco Smoker Packs/day: 0.33 Years: 25.00 Pack years: 8.25 Last attempt to quit: 2016 Years since quittin.9  Smokeless tobacco: Never Used  Tobacco comment: 1/2 cigarette a day Substance and Sexual Activity  Alcohol use: Yes Alcohol/week: 5.4 oz Types: 9 Shots of liquor per week Comment: once every 6 months, 2-3 drinks  Drug use: No  
 Sexual activity: Yes  
  Partners: Male Birth control/protection: None Other Topics Concern  Not on file Social History Narrative  Not on file ALLERGIES: Patient has no known allergies. Review of Systems Constitutional: Negative for fever. HENT: Negative for sore throat. Eyes: Negative for visual disturbance. Respiratory: Negative for shortness of breath. Cardiovascular: Negative for chest pain. Endocrine: Negative for polyuria. Genitourinary: Negative for difficulty urinating. Musculoskeletal: Positive for back pain. Skin: Negative for rash. Allergic/Immunologic: Negative for immunocompromised state. Neurological: Positive for numbness. Negative for syncope. Psychiatric/Behavioral: Positive for sleep disturbance. Vitals:  
 18 1738 BP: (!) 186/105 Pulse: 99 Resp: 16 Temp: 98.1 °F (36.7 °C) SpO2: 99% Weight: 127.5 kg (281 lb) Height: 5' 7\" (1.702 m) Physical Exam  
Constitutional: She is oriented to person, place, and time.  She appears well-developed and well-nourished. No distress. HENT:  
Head: Normocephalic and atraumatic. Right Ear: External ear normal.  
Left Ear: External ear normal.  
Nose: Nose normal.  
Mouth/Throat: Uvula is midline, oropharynx is clear and moist and mucous membranes are normal.  
Eyes: Conjunctivae are normal. No scleral icterus. Neck: Neck supple. Cardiovascular: Normal rate, regular rhythm, normal heart sounds and intact distal pulses. Pulmonary/Chest: Effort normal and breath sounds normal.  
Abdominal: Soft. There is no tenderness. Musculoskeletal: She exhibits no edema or tenderness. Neurological: She is alert and oriented to person, place, and time. Gait normal.  
subj tingling in le with no foot drop. Nl strength b/l le. Nl gait Skin: Skin is warm and dry. She is not diaphoretic. Psychiatric: Her behavior is normal.  
Nursing note and vitals reviewed. MDM Procedures Vitals: 
Patient Vitals for the past 12 hrs: 
 Temp Pulse Resp BP SpO2  
12/04/18 1738 98.1 °F (36.7 °C) 99 16 (!) 186/105 99 % Medications ordered:  
Medications HYDROcodone-acetaminophen (NORCO) 7.5-325 mg per tablet 1 Tab (not administered) predniSONE (DELTASONE) tablet 60 mg (not administered) diazePAM (VALIUM) tablet 5 mg (not administered) Lab findings: 
No results found for this or any previous visit (from the past 12 hour(s)). EKG interpretation by ED Physician: X-Ray, CT or other radiology findings or impressions: No orders to display Progress notes, Consult notes or additional Procedure notes:  
Doubt need for any imaging, other work up. Will place on short course of pain meds, steroids, rec f/u I have discussed with patient and/or family/sig other the results, interpretation of any imaging if performed, suspected diagnosis and treatment plan to include instructions regarding the diagnoses listed to which understanding was expressed with all questions answered Reevaluation of patient:  
Kaleb Lindsay Disposition: 
Diagnosis: 1. Acute exacerbation of chronic low back pain 2. Chronic bilateral low back pain with bilateral sciatica 3. Chronic midline low back pain with left-sided sciatica Disposition: home Follow-up Information Follow up With Specialties Details Why Contact Info Genny Melissa MD Family Practice Schedule an appointment as soon as possible for a visit  [address] [phone] Alysa Perez MD Orthopedic Surgery Schedule an appointment as soon as possible for a visit  [place] [address] [address] [facility] [address] 
[phone] Cristobal Christian MD Neurosurgery Schedule an appointment as soon as possible for a visit  [address] [facility] [address] [facility] [phone] 
[phone] [facility] EMERGENCY DEPT Emergency Medicine  If symptoms worsen [address] 
[phone] Medication List  
  
START taking these medications   
cyclobenzaprine 5 mg tablet Commonly known as:  FLEXERIL Take 1 Tab by mouth three (3) times daily as needed for Muscle Spasm(s). HYDROcodone-acetaminophen 5-325 mg per tablet Commonly known as:  Veatrice Raker Take 1 Tab by mouth every six (6) hours as needed for Pain. Max Daily Amount: 4 Tabs. predniSONE 50 mg tablet Commonly known as:  Lonne Abraham Take 1 Tab by mouth daily for 4 days. Start taking on:  12/5/2018 CONTINUE taking these medications   
gabapentin 300 mg capsule Commonly known as:  NEURONTIN 
  
lidocaine 5 % Commonly known as:  LIDODERM 
APPLY 1 PATCH TO THE AFFECTED AREA EVERY DAY. LEAVE ON FOR 12 HOURS THEN REMOVE FOR 12 HOURS 
  
meloxicam 15 mg tablet Commonly known as:  MOBIC Take 1 Tab by mouth daily. ASK your doctor about these medications buPROPion  mg SR tablet Commonly known as:  WELLBUTRINEUNICEYBHOMERO 
  
DULoxetine 60 mg capsule Commonly known as:  CYMBALTA Take 1 Cap by mouth daily. ergocalciferol 50,000 unit capsule Commonly known as:  ERGOCALCIFEROL Take 1 Cap by mouth every seven (7) days. hydroCHLOROthiazide 25 mg tablet Commonly known as:  HYDRODIURIL Take 1 Tab by mouth daily. LaMICtal 25 mg tablet Generic drug:  lamoTRIgine 
  
losartan 50 mg tablet Commonly known as:  COZAAR 
TAKE 1 TABLET BY MOUTH DAILY polyethylene glycol 17 gram/dose powder Commonly known as:  Jimmyfivesquids.[email or web address] Lock MIX AND DRINK 17 GRAMS BY MOUTH DAILY 
  
traZODone 150 mg tablet Commonly known as:  [employer] Console Where to Get Your Medications Information about where to get these medications is not yet available Ask your nurse or doctor about these medications · cyclobenzaprine 5 mg tablet · HYDROcodone-acetaminophen 5-325 mg per tablet · lidocaine 5 % · predniSONE 50 mg tablet

## 2018-12-05 NOTE — ED NOTES
I have reviewed discharge instructions with the patient. The patient verbalized understanding. Patient armband removed and shredded. 4 prescriptions given.   All questions answered  Pt d/c'd to home awake, alert and in NAD

## 2018-12-17 ENCOUNTER — OFFICE VISIT (OUTPATIENT)
Dept: ORTHOPEDIC SURGERY | Age: 43
End: 2018-12-17

## 2018-12-17 VITALS
WEIGHT: 290.2 LBS | TEMPERATURE: 98.2 F | SYSTOLIC BLOOD PRESSURE: 139 MMHG | BODY MASS INDEX: 45.55 KG/M2 | RESPIRATION RATE: 16 BRPM | DIASTOLIC BLOOD PRESSURE: 91 MMHG | HEIGHT: 67 IN | HEART RATE: 84 BPM

## 2018-12-17 DIAGNOSIS — M54.50 ACUTE EXACERBATION OF CHRONIC LOW BACK PAIN: ICD-10-CM

## 2018-12-17 DIAGNOSIS — M51.36 LUMBAR DEGENERATIVE DISC DISEASE: ICD-10-CM

## 2018-12-17 DIAGNOSIS — R29.898 LEFT LEG WEAKNESS: Primary | ICD-10-CM

## 2018-12-17 DIAGNOSIS — G89.29 ACUTE EXACERBATION OF CHRONIC LOW BACK PAIN: ICD-10-CM

## 2018-12-17 DIAGNOSIS — R20.0 ARM NUMBNESS: ICD-10-CM

## 2018-12-17 DIAGNOSIS — M50.30 DEGENERATION OF CERVICAL INTERVERTEBRAL DISC: ICD-10-CM

## 2018-12-17 RX ORDER — CYCLOBENZAPRINE HCL 5 MG
5 TABLET ORAL
Qty: 90 TAB | Refills: 0 | Status: SHIPPED | OUTPATIENT
Start: 2018-12-17 | End: 2019-02-05

## 2018-12-17 RX ORDER — HYDROCODONE BITARTRATE AND ACETAMINOPHEN 5; 325 MG/1; MG/1
1 TABLET ORAL
Qty: 21 TAB | Refills: 0 | Status: SHIPPED | OUTPATIENT
Start: 2018-12-17 | End: 2019-02-05

## 2018-12-17 RX ORDER — HYDROCODONE BITARTRATE AND ACETAMINOPHEN 5; 325 MG/1; MG/1
1 TABLET ORAL
Qty: 12 TAB | Refills: 0 | Status: SHIPPED | OUTPATIENT
Start: 2018-12-17 | End: 2018-12-17 | Stop reason: SDUPTHER
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
## 2018-12-17 NOTE — PROGRESS NOTES
Pt c/o L leg & B arm numbness  Pt walks with significant limp  Also c/o R hip pain, associated with arthritis & L knee pain/instability   Pt states Norco & Flexeril were helpful

## 2019-01-03 ENCOUNTER — HOSPITAL ENCOUNTER (OUTPATIENT)
Dept: GENERAL RADIOLOGY | Age: 44
Discharge: HOME | End: 2019-01-03
Attending: FAMILY MEDICINE
Payer: MEDICAID

## 2019-01-03 ENCOUNTER — HOSPITAL ENCOUNTER (OUTPATIENT)
Dept: MRI IMAGING | Age: 44
Discharge: HOME | End: 2019-01-03
Attending: FAMILY MEDICINE
Payer: MEDICAID

## 2019-01-03 DIAGNOSIS — G89.29 ACUTE EXACERBATION OF CHRONIC LOW BACK PAIN: ICD-10-CM

## 2019-01-03 DIAGNOSIS — M50.30 DEGENERATION OF CERVICAL INTERVERTEBRAL DISC: ICD-10-CM

## 2019-01-03 DIAGNOSIS — R29.898 LEFT LEG WEAKNESS: ICD-10-CM

## 2019-01-03 DIAGNOSIS — M51.36 LUMBAR DEGENERATIVE DISC DISEASE: ICD-10-CM

## 2019-01-03 DIAGNOSIS — R20.0 ARM NUMBNESS: ICD-10-CM

## 2019-01-03 DIAGNOSIS — M54.50 ACUTE EXACERBATION OF CHRONIC LOW BACK PAIN: ICD-10-CM

## 2019-01-03 PROCEDURE — 72148 MRI LUMBAR SPINE W/O DYE: CPT

## 2019-01-03 PROCEDURE — 72040 X-RAY EXAM NECK SPINE 2-3 VW: CPT

## 2019-01-03 PROCEDURE — 72110 X-RAY EXAM L-2 SPINE 4/>VWS: CPT

## 2019-01-03 PROCEDURE — 72141 MRI NECK SPINE W/O DYE: CPT

## 2019-01-14 ENCOUNTER — OFFICE VISIT (OUTPATIENT)
Dept: ORTHOPEDIC SURGERY | Age: 44
End: 2019-01-14

## 2019-01-14 VITALS
BODY MASS INDEX: 45.99 KG/M2 | DIASTOLIC BLOOD PRESSURE: 81 MMHG | RESPIRATION RATE: 17 BRPM | HEART RATE: 105 BPM | TEMPERATURE: 98 F | WEIGHT: 293 LBS | HEIGHT: 67 IN | SYSTOLIC BLOOD PRESSURE: 148 MMHG | OXYGEN SATURATION: 98 %

## 2019-01-14 DIAGNOSIS — G99.2 MYELOPATHY CONCURRENT WITH AND DUE TO SPINAL STENOSIS OF CERVICAL REGION (HCC): ICD-10-CM

## 2019-01-14 DIAGNOSIS — M48.02 MYELOPATHY CONCURRENT WITH AND DUE TO SPINAL STENOSIS OF CERVICAL REGION (HCC): ICD-10-CM

## 2019-01-14 DIAGNOSIS — G95.9 CERVICAL MYELOPATHY (HCC): Primary | ICD-10-CM

## 2019-01-14 DIAGNOSIS — M54.16 LUMBAR RADICULOPATHY: ICD-10-CM

## 2019-01-14 RX ORDER — GABAPENTIN 300 MG/1
600 CAPSULE ORAL 3 TIMES DAILY
Qty: 180 CAP | Refills: 2 | Status: SHIPPED | OUTPATIENT
Start: 2019-01-14 | End: 2019-07-20 | Stop reason: SDUPTHER

## 2019-01-14 RX ORDER — PREDNISONE 10 MG/1
TABLET ORAL
Qty: 21 TAB | Refills: 0 | Status: SHIPPED | OUTPATIENT
Start: 2019-01-14 | End: 2019-02-01 | Stop reason: ALTCHOICE

## 2019-01-14 NOTE — PROGRESS NOTES
[address]  [address], [address]  [place]  Phone: [phone]  Fax: [phone]        Darius Hdez  : 1975  PCP: Buckner Olszewski, MD  2019    NEW PATIENT      ASSESSMENT AND PLAN     Lenny Doherty comes in to the office today c/o back pain with numbness radiating into her BLE (L>R) x 3 months. She also c/o BUE paraesthesia from her elbows to her hands in a C7 distribution. She denies any weakness. In regards to her neck, she reports that \"it's like lightning if I look up or down. \" She reports an \"itchiness\" from her breast-line down. She found some relief from a prednisone dose pack that she was prescribed in the ER. She is s/p a discectomy that improved her symptoms for about 4 years. She has had decreased balance causing falls, with her most recent fall last night. She takes Gabapentin 300mg TID for fibromyalgia. She rates her pain as an 8/10 today. Her lumbar MRI revealed high grade central stenosis at L4-5 and potential right-sided nerve root impingements at L5-S1. There is moderate lower lumbar facet arthrosis, greatest on the left at L5-S1 and right at L4-5. Her cervical MRI revealed high-grade central stenosis at C5-6-7 with myelopathic signal abnormality. There is also lower grade, but still significant central stenosis at C3-4-5 without myelopathic signal abnormality. There is evidence of high-grade bilateral foraminal stenosis at C6-7. On examination, she had weakness in her RUE with elbow extension. She also had weakness with finger extension and abduction bilaterally. She had a mildly positive Dela Cruz's sign on the R. She has hyperreflexia at the R Achilles and decreased L Achilles reflex. Decreased sensation from breast-line down. Her symptoms are likely associated with a cervical myelopathy and a L S1 radiculopathy. I prescribed a 10mg Prednisone dose pack. I increased her Gabapentin to 600mg TID.  I referred to  Anne Ocampo for a surgical consult. I also advised on general health and weight loss. Pt will f/u with Dr. Anne Ocampo. Diagnoses and all orders for this visit:    1. Cervical myelopathy (HCC)  -     predniSONE (STERAPRED DS) 10 mg dose pack; See administration instruction per 10mg dose pack  -     gabapentin (NEURONTIN) 300 mg capsule; Take 2 Caps by mouth three (3) times daily. 2. Myelopathy concurrent with and due to spinal stenosis of cervical region (HCC)  -     predniSONE (STERAPRED DS) 10 mg dose pack; See administration instruction per 10mg dose pack  -     gabapentin (NEURONTIN) 300 mg capsule; Take 2 Caps by mouth three (3) times daily. 3. Lumbar radiculopathy  -     predniSONE (STERAPRED DS) 10 mg dose pack; See administration instruction per 10mg dose pack  -     gabapentin (NEURONTIN) 300 mg capsule; Take 2 Caps by mouth three (3) times daily. Follow-up Disposition: Not on File    CHIEF COMPLAINT  Sim Whittaker is seen today in consultation at the request of Madelyn Farris MD for complaints of BUE and low back pain with BLE paraesthesia. HISTORY OF PRESENT ILLNESS  Sim Whittaker is a 37 y.o. female c/o  back pain with numbness radiating into her BLE (L>R) x 3 months. She also c/o BUE paraesthesia from her elbows to her hands in a C7 distribution. She denies any weakness. In regards to her neck, she reports that \"it's like lightning if I look up or down. \" She went to the ER for these complaints, and she was prescribed a prednisone dose pack, which provided some relief. She c/o an \"itchiness\" from her breast line down. She takes Gabapentin 300mg TID for fibromyalgia. She has had spine injections ~10-11 years ago without benefit. Pt denies any fevers, chills, nausea, vomiting. Pt denies any chest pain and shortness of breath. Pt denies any ear, nose, and throat problems. Pt denies any fecal or urinary incontinence.      PAST MEDICAL HISTORY   Past Medical History:   Diagnosis Date    Acute pain of left shoulder 11/1/2016    Bipolar 1 disorder (HCC)     Bipolar affective disorder, currently manic, moderate ([address] 4/27/2016    Chronic midline low back pain with sciatica 11/1/2016    Cigarette nicotine dependence in remission 8/2/2016    Cigarette nicotine dependence without complication 37/88/1349    Continuous nicotine dependence 1/24/2017    Depression     Fibromyalgia 9/1/2015    HLD (hyperlipidemia) 2/4/2016    Hypertension     Hypertriglyceridemia 5/15/2018    Impaired fasting glucose 5/31/2016    Irritable bowel syndrome without diarrhea 4/27/2016    Moderate episode of recurrent major depressive disorder ([address] 4/27/2016    Obesity, Class III, BMI 40-49.9 (morbid obesity) ([address] 9/1/2015    Onychomycosis 4/27/2016    Pain of left thumb 11/1/2016    Prediabetes 7/5/2016    PTSD (post-traumatic stress disorder)     Smoker 9/1/2015    Vitamin D deficiency 5/31/2016       Past Surgical History:   Procedure Laterality Date    HX GYN Bilateral     tubal    HX ORTHOPAEDIC         MEDICATIONS    Current Outpatient Medications   Medication Sig Dispense Refill    cyclobenzaprine (FLEXERIL) 5 mg tablet Take 1 Tab by mouth three (3) times daily as needed for Muscle Spasm(s). 90 Tab 0    HYDROcodone-acetaminophen (NORCO) 5-325 mg per tablet Take 1 Tab by mouth every eight (8) hours as needed for Pain. Max Daily Amount: 3 Tabs. 21 Tab 0    lidocaine (LIDODERM) 5 % APPLY 1 PATCH TO THE AFFECTED AREA EVERY DAY. LEAVE ON FOR 12 HOURS THEN REMOVE FOR 12 HOURS 90 Patch 3    losartan (COZAAR) 50 mg tablet TAKE 1 TABLET BY MOUTH DAILY 90 Tab 0    polyethylene glycol (MIRALAX) 17 gram/dose powder MIX AND DRINK 17 GRAMS BY MOUTH DAILY 1530 g 3    hydroCHLOROthiazide (HYDRODIURIL) 25 mg tablet Take 1 Tab by mouth daily. 90 Tab 3    ergocalciferol (ERGOCALCIFEROL) 50,000 unit capsule Take 1 Cap by mouth every seven (7) days. 12 Cap 3    meloxicam (MOBIC) 15 mg tablet Take 1 Tab by mouth daily. 90 Tab 3    gabapentin (NEURONTIN) 300 mg capsule Take 300 mg by mouth three (3) times daily.  lamoTRIgine (LAMICTAL) 25 mg tablet Take  by mouth daily.  buPROPion SR (WELLBUTRIN, ZYBAN) 200 mg SR tablet   1    traZODone (DESYREL) 150 mg tablet   1    DULoxetine (CYMBALTA) 60 mg capsule Take 1 Cap by mouth daily. (Patient taking differently: Take 60 mg by mouth two (2) times a day.) 90 Cap 3       ALLERGIES  No Known Allergies       SOCIAL HISTORY    Social History     Socioeconomic History    Marital status: UNKNOWN     Spouse name: Not on file    Number of children: Not on file    Years of education: Not on file    Highest education level: Not on file   Tobacco Use    Smoking status: Light Tobacco Smoker     Packs/day: 0.33     Years: 25.00     Pack years: 8.25     Last attempt to quit: 2016     Years since quittin.0    Smokeless tobacco: Never Used    Tobacco comment: 1/2 cigarette a day   Substance and Sexual Activity    Alcohol use: Yes     Alcohol/week: 5.4 oz     Types: 9 Shots of liquor per week     Comment: once every 6 months, 2-3 drinks    Drug use: No    Sexual activity: Yes     Partners: Male     Birth control/protection: None       FAMILY HISTORY  Family History   Problem Relation Age of Onset    Hypertension Mother     Thyroid Disease Mother     No Known Problems Father     Psychiatric Disorder Sister     Psychiatric Disorder Brother     Psychiatric Disorder Sister     Diabetes Sister     Psychiatric Disorder Brother     Psychiatric Disorder Brother          REVIEW OF SYSTEMS  Review of Systems   Musculoskeletal: Positive for back pain.         BUE paraesthesia  BLE numbness  Decreased balance         PHYSICAL EXAMINATION  Visit Vitals  /81   Pulse (!) 105   Temp 98 °F (36.7 °C) (Oral)   Resp 17   Ht 5' 7\" (1.702 m)   Wt 296 lb 9.6 oz (134.5 kg)   SpO2 98%   BMI 46.45 kg/m²     Pain Assessment  2019   Location of Pain Back;Neck   Location Modifiers - Severity of Pain 8   Quality of Pain Aching   Duration of Pain -   Frequency of Pain Constant   Aggravating Factors Walking   Limiting Behavior -   Relieving Factors (No Data)   Relieving Factors Comment Norco    Result of Injury -         Constitutional:  Well developed, well nourished, in no acute distress. Psychiatric: Affect and mood are appropriate. HEENT: Normocephalic, atraumatic. Extraocular movements intact. Integumentary: No rashes or abrasions noted on exposed areas. Cardiovascular: Regular rate and rhythm. Pulmonary: Clear to auscultation bilaterally. SPINE/MUSCULOSKELETAL EXAM    Cervical spine:  Neck is midline. Normal muscle tone. No focal atrophy is noted. ROM pain free. Shoulder ROM intact. No tenderness to palpation. Negative Spurling's sign. Negative Tinel's sign. Negative Dela Cruz's sign. Sensation in the bilateral arms grossly intact to light touch. Lumbar spine:  No rash, ecchymosis, or gross obliquity. No fasciculations. No focal atrophy is noted. No pain with hip ROM. Full range of motion. No tenderness to palpation. No tenderness to palpation at the sciatic notch. SI joints non-tender. Trochanters non tender. Decreased sensation from breast-line down. MOTOR:      Biceps  Triceps Deltoids Wrist Ext Wrist Flex Hand Intrin   Right 5/5 4+/5 5/5 5/5 5/5 4+/5   Left 5/5 5/5 5/5 5/5 5/5 4+/5             Hip Flex  Quads Hamstrings Ankle DF EHL Ankle PF   Right 5/5 5/5 5/5 5/5 5/5 5/5   Left 5/5 5/5 5/5 5/5 5/5 5/5     DTRs are 2+ biceps, triceps, brachioradialis, patella, and 3+ R Achilles and +1 L Achilles    Negative Straight Leg raise. Squat not tested. No difficulty with tandem gait. Ambulation without assistive device. FWB. RADIOGRAPHS  Lumbar MRI images taken on 1/3/19 personally reviewed with patient:  FINDINGS:        IMAGE QUALITY: Overall diagnostic.  Axial images are mildly degraded by motion  artifact.    SEGMENTAL DESIGNATION:  The numbering scheme used in this dictation is based  upon the assumption of 5 lumbar segments.        INTRADURAL:  The conus terminates at L1-L2 and is normal. The nerve roots  have a normal distribution in the thecal sac without evidence of arachnoiditis.        SPINAL CURVATURE (SUPINE):  Unremarkable.        SPINAL COLUMN AND MUSCULATURE:  The background bone marrow signal is normal.  There are no acute (T2 STIR positive) or chronic compression fractures. The  posterior paraspinous musculature has no significant fatty infiltration.           L5-S1:            Alignment (supine): Unremarkable. Intervertebral disc: Moderate to marked height loss with small to  moderate circumferential disc osteophyte complex and low-grade discogenic  degenerative endplate changes, predominantly fibrofatty. Far lateral zones:                         Right:  Right L5 nerve root contact. Left:  Unremarkable. Epidural lipomatosis:  None significant. Ligamentum flavum thickening/buckling:  None significant. Central spinal canal stenosis:  None significant. Lateral recess stenosis:                         Right:  Moderate. Potential right S1 nerve root impingement. Left:  None significant. Facet joint arthrosis:                         Right:  Low-grade. Left:  Moderate. Neural foraminal stenosis:                   Right:  None significant. Left:  None significant. Baastrup-type interspinous process arthrosis:  None significant.           L4-L5:            Alignment (supine): Unremarkable. Intervertebral disc:  Small circumferential bulge with nonsegmented  dorsal annular fissure. Epidural lipomatosis:  Moderate. Ligamentum flavum thickening/buckling: Moderate. Central spinal canal stenosis: Marked. There is no conspicuous CSF  about the cauda equina nerve roots. Lateral recess stenosis:                         Right:  High-grade. Left:  High-grade. Facet joint arthrosis:                         Right:  Moderate. Left: Low-grade. Neural foraminal stenosis:                   Right:  Moderate. Left:  Mild to moderate. Baastrup-type interspinous process arthrosis:  Low-grade.           L3-L4:            Alignment (supine): Unremarkable. Intervertebral disc:  Mild height loss with minimal circumferential  bulging. Epidural lipomatosis:  Moderate. Ligamentum flavum thickening/buckling:  Low-grade. Central spinal canal stenosis:  Low-grade. Lateral recess stenosis:                         Right:  Low-grade. Left:  Low-grade. Facet joint arthrosis:                         Right:  Low-grade. Left:  Low-grade. Neural foraminal stenosis:                   Right:  None significant. Left:  None significant. Baastrup-type interspinous process arthrosis:  None significant.           L2-L3:            Alignment (supine): Unremarkable. Intervertebral disc:  Unremarkable. Epidural lipomatosis:  None significant. Ligamentum flavum thickening/buckling:  None significant. Central spinal canal stenosis:  None significant. Lateral recess stenosis:                         Right:  None significant. Left:  None significant. Facet joint arthrosis:                         Right:  None significant apparent on MRI.                           Left:  None significant apparent on MRI. Neural foraminal stenosis:                   Right:  None significant. Left:  None significant. Baastrup-type interspinous process arthrosis:  None significant.           L1-L2:            Alignment (supine): Unremarkable. Intervertebral disc:  Unremarkable. Epidural lipomatosis:  None significant. Ligamentum flavum thickening/buckling:  None significant. Central spinal canal stenosis:  None significant. Lateral recess stenosis:                         Right:  None significant. Left:  None significant. Facet joint arthrosis:                         Right:  None significant apparent on MRI. Left:  None significant apparent on MRI. Neural foraminal stenosis:                   Right:  None significant. Left:  None significant. Baastrup-type interspinous process arthrosis:  None significant.           T12-L1:            Alignment (supine): Unremarkable. Intervertebral disc:  Unremarkable. Central spinal canal stenosis:  None significant. Neural foraminal stenosis:                   Right:  None significant. Left:  None significant.      _______________     IMPRESSION  IMPRESSION:     1.  High-grade central stenosis at L4-L5.     2.  Marked disc degeneration at L5-S1 without central stenosis.     3. Potential selective right-sided nerve root impingements at L5-S1.     4. Moderate lower lumbar facet arthrosis --greatest at left L5-S1 and right  L4-L5.     5. Low-grade central stenosis at L3-L4.     Cervical MRI images taken on 1/3/19 personally reviewed with patient:  FINDINGS:        IMAGE QUALITY: The exam is overall moderately degraded by motion artifact,  which decreases the specificity of the exam for spinal cord signal abnormality  and foraminal stenotic disease (spinal cord signal abnormalities can be  artifactually suppressed, and foraminal stenosis can be artifactually  exaggerated).           SPINAL CORD: Short-segment central spinal cord moderate myelopathic signal  abnormality secondary to central stenosis. No syrinx.        SPINAL COLUMN:  The background bone marrow signal is normal. There are no  acute (T2 STIR positive) or chronic compression fractures. There is no T2 STIR  evidence of acute ligamentous edema/injury.             CONGENITAL SPINAL CANAL STENOSIS:  AP spinal diameters on the order of  11-12 mm.            CRANIOCERVICAL JUNCTION:  Low grade Atlantodental joint arthrosis.           C2-C3: Congenital spinal canal stenosis. Otherwise unremarkable.           C3-C4:  Small diffuse disc bulge resulting in mild-to-moderate central  stenosis with mild broad ventral indentation of the spinal cord. Low-grade left  lateral foraminal stenosis. Low-grade left facet arthrosis.           C4-C5:  Small to moderate diffuse disc bulge resulting in moderate central  stenosis with mild to moderate broad ventral indentation on the ventral surface  of the spinal cord. Potential foraminal stenoses are not well characterized due  to artifact. Moderate facet arthrosis.           C5-C6:  Moderate broad central disc protrusion that is T2 hyperintense,  potentially reflective of a relatively recent injury. This results in  moderate-to-marked central stenosis with moderate broad ventral indentation of  the spinal cord asymmetrically greater on the right. There is low-grade left  foraminal stenosis. Otherwise unremarkable.           C6-C7:  Mild disc height loss with moderate chronic appearing disc  osteophyte complex resulting in marked central stenosis with moderate  indentation of the ventral spinal cord and no conspicuous CSF about the spinal  cord. Bilateral foraminal stenosis appears to be high-grade. Unremarkable facet  joints.           C7-T1:  Unremarkable.     _______________     IMPRESSION  IMPRESSION:     Motion-degraded exam.     1. High-grade central C5-C6-C7 stenosis with myelopathic signal abnormality. --Secondary to disc bulging superimposed upon congenital spinal canal  stenosis, resulting in significant spinal cord mass effect. --The C5-6 disc bulge has internal edema and may reflect a relatively  recent injury. --The C6-C7 disc osteophyte complex has a more chronic appearance and is  the level at which the myelopathic signal abnormality is centered.     2. Lower grade, but still significant, central stenosis at C3-C4-C5.           --Small disc bulges superimposed upon congenital spinal canal stenosis. --Low-grade spinal cord mass effect without myelopathic signal  abnormality.     3.  High-grade bilateral foraminal stenosis C6-C7. Cervical Spine XR images taken on 1/3/19:  Findings:     6 views of the cervical spine demonstrate no fracture or subluxation. Straightening of the normal cervical lordosis is present. No subluxation is  seen. Disc height loss of the endplate spurring, particularly anteriorly, is  seen at the C6-7 level. The prevertebral soft tissues are within normal limits. The C1/2 articulation is within normal limits. Visualized lung apices are  clear.     IMPRESSION  IMPRESSION:     No cervical spine fracture or subluxation. C6-7 endplate degenerative changes. Lumbar Spine XR images taken on 1/3/19:  FINDINGS: AP, lateral and spot lateral views of the lumbar spine demonstrate  normal alignment. The vertebral body heights are well-preserved. Mild/moderate  multilevel degenerative degenerative disc changes most pronounced at L5-S1 with  mild-to-moderate loss of joint space height and disc osteophyte formation.  There  is no fracture, subluxation or other abnormality.      IMPRESSION  IMPRESSION: Chronic/degenerative changes most pronounced at L5-S1. No  malalignment or acute finding.  reviewed    MsLien Carter has a reminder for a \"due or due soon\" health maintenance. I have asked that she contact her primary care provider for follow-up on this health maintenance. 25 minutes of face-to-face contact were spent with the patient during today's visit extensively discussing symptoms and treatment plan. All questions were answered. More than half of this visit today was spent on counseling. Written by Daniele Solis, as dictated by Dr. Raphael Silver. I, Dr. Raphael Silver, confirm that all documentation is accurate.

## 2019-01-18 ENCOUNTER — OFFICE VISIT (OUTPATIENT)
Dept: ORTHOPEDIC SURGERY | Age: 44
End: 2019-01-18

## 2019-01-18 VITALS
WEIGHT: 293 LBS | TEMPERATURE: 98.2 F | HEART RATE: 91 BPM | DIASTOLIC BLOOD PRESSURE: 82 MMHG | RESPIRATION RATE: 18 BRPM | OXYGEN SATURATION: 98 % | BODY MASS INDEX: 46.08 KG/M2 | SYSTOLIC BLOOD PRESSURE: 167 MMHG

## 2019-01-18 DIAGNOSIS — M48.02 CERVICAL SPINAL STENOSIS: ICD-10-CM

## 2019-01-18 DIAGNOSIS — M54.6 THORACIC SPINE PAIN: ICD-10-CM

## 2019-01-18 DIAGNOSIS — M48.061 LUMBAR STENOSIS WITHOUT NEUROGENIC CLAUDICATION: Primary | ICD-10-CM

## 2019-01-18 NOTE — PATIENT INSTRUCTIONS
Cervical Spinal Stenosis: Care Instructions  Your Care Instructions    Spinal stenosis is a narrowing of the canal that surrounds the spinal cord and nerve roots. Sometimes bone and other tissue grow into this canal and press on the nerves that branch out from the spinal cord. This can happen as a part of aging. When the narrowing happens in your neck, it's called cervical spinal stenosis. It often causes stiffness, pain, numbness, and weakness in the neck, shoulders, arms, hands, or legs. It can even cause problems with your balance, coordination, and bowel or bladder control. But some people have no symptoms. You may be able to get relief from the symptoms of spinal stenosis by taking pain medicine. Your doctor may suggest physical therapy and exercises to keep your spine strong and flexible. Some people try steroid shots to reduce swelling. If pain and numbness in your neck, arms, or legs are still so bad that you cannot do your normal activities, you may need surgery. Follow-up care is a key part of your treatment and safety. Be sure to make and go to all appointments, and call your doctor if you are having problems. It's also a good idea to know your test results and keep a list of the medicines you take. How can you care for yourself at home? · Ask your doctor if you can take an over-the-counter pain medicine, such as acetaminophen (Tylenol), ibuprofen (Advil, Motrin), or naproxen (Aleve). Be safe with medicines. Read and follow all instructions on the label. · Do not take two or more pain medicines at the same time unless the doctor told you to. Many pain medicines have acetaminophen, which is Tylenol. Too much acetaminophen (Tylenol) can be harmful. · Change positions often when you are standing or sitting. This may reduce pressure on the spinal cord and its nerves. · When you rest, use pillows or towel rolls to support your neck and head in a comfortable position.   · Follow your doctor's instructions about activity. He or she may tell you not to do sports or activities that could injure your neck. · Stretch your neck and shoulders as your doctor or physical therapist recommends. If your doctor says it is okay to do them, these exercises may help:  ? Neck stretches to the side. Keep your shoulders relaxed and slowly tilt your head straight over toward one shoulder. Hold for 15 seconds. Let the weight of your head stretch your muscles. Then do the same toward the other shoulder. ? Neck rotations. Keep your chin level and slowly turn your head to one side. Hold for 15 seconds. Then do the same to the other side. ? Shoulder rolls. Roll your shoulders up, then back, and then down in a smooth, circular motion. Repeat several times. When should you call for help? Call 911 anytime you think you may need emergency care. For example, call if:    · You are unable to move an arm or a leg at all.   [facility] your doctor now or seek immediate medical care if:    · You have new or worse symptoms in your arms, legs, belly, or buttocks. Symptoms may include:  ? Numbness or tingling. ? Weakness. ? Pain.     · You lose bladder or bowel control.    Watch closely for changes in your health, and be sure to contact your doctor if:    · You do not get better as expected. Where can you learn more? Go to http://jocelyn-amrik.info/. Enter  in the search box to learn more about \"Cervical Spinal Stenosis: Care Instructions. \"  Current as of: November 29, 2017  Content Version: 11.8  © 9150-9348 [email or web address]. Care instructions adapted under license by [employer] (which disclaims liability or warranty for this information). If you have questions about a medical condition or this instruction, always ask your healthcare professional. [place] [address] any warranty or liability for your use of this information.          Lumbar Spinal Stenosis: Care Instructions  Your Care Instructions    Stenosis in the spine is a narrowing of the canal that is around the spinal cord and nerve roots in your back. It can happen as part of aging. Sometimes bone and other tissue grow into this canal and press on the nerves that branch out from the spinal cord. This can cause pain, numbness, and weakness. When it happens in the lower part of your back, it is called lumbar spinal stenosis. It can cause problems in the legs, feet, and rear end (buttocks). You may be able to get relief from the symptoms of spinal stenosis by taking pain medicine. Your doctor may suggest physical therapy and exercises to keep your spine strong and flexible. Some people try steroid shots to reduce swelling. If pain and numbness in your legs are still so bad that you cannot do your normal activities, you may need surgery. Follow-up care is a key part of your treatment and safety. Be sure to make and go to all appointments, and call your doctor if you are having problems. It's also a good idea to know your test results and keep a list of the medicines you take. How can you care for yourself at home? · Take an over-the-counter pain medicine. Nonsteroidal anti-inflammatory drugs (NSAIDs) such as ibuprofen or naproxen seem to work best. But if you can't take NSAIDs, you can try acetaminophen. Be safe with medicines. Read and follow all instructions on the label. · Do not take two or more pain medicines at the same time unless the doctor told you to. Many pain medicines have acetaminophen, which is Tylenol. Too much acetaminophen (Tylenol) can be harmful. · Stay at a healthy weight. Being overweight puts extra strain on your spine. · Change positions often when you sit or stand. This can ease pain. It may also reduce pressure on the spinal cord and its nerves. · Avoid doing things that make your symptoms worse. Walking downhill and standing for a long time may cause pain.   · Stretch and strengthen your back muscles as your doctor or physical therapist recommends. If your doctor says it is okay to do them, these exercises may help. ? Lie on your back with your knees bent. Gently pull one bent knee to your chest. Put that foot back on the floor, and then pull the other knee to your chest.  ? Do pelvic tilts. Lie on your back with your knees bent. Tighten your stomach muscles. Pull your belly button (navel) in and up toward your ribs. You should feel like your back is pressing to the floor and your hips and pelvis are slightly lifting off the floor. Hold for 6 seconds while breathing smoothly. ? Stand with your back flat against a wall. Slowly slide down until your knees are slightly bent. Hold for 10 seconds, then slide back up the wall. · Remove or change anything in your house that may cause you to fall. Keep walkways clear of clutter, electrical cords, and throw rugs. When should you call for help? Call 911 anytime you think you may need emergency care. For example, call if:    · You are unable to move a leg at all.   [facility] your doctor now or seek immediate medical care if:    · You have new or worse symptoms in your legs, belly, or buttocks. Symptoms may include:  ? Numbness or tingling. ? Weakness. ? Pain.     · You lose bladder or bowel control.    Watch closely for changes in your health, and be sure to contact your doctor if:    · You have a fever, lose weight, or don't feel well.     · You are not getting better as expected. Where can you learn more? Go to http://jocelyn-amrik.info/. [place] in the search box to learn more about \"Lumbar Spinal Stenosis: Care Instructions. \"  Current as of: November 29, 2017  Content Version: 11.8  © 1996-6600 [employer]. Care instructions adapted under license by [employer] (which disclaims liability or warranty for this information).  If you have questions about a medical condition or this instruction, always ask your healthcare professional. [place] [address] any warranty or liability for your use of this information.

## 2019-01-18 NOTE — PROGRESS NOTES
[address]  [address], [address]  [place], [address] [place] [address] Street  Phone: [phone]  Fax: [phone]  INITIAL CONSULTATION  Patient: Claribel Azevedo                MRN: [id number]       SSN: [id number]  YOB: 1975        AGE: 37 y.o. SEX: female  Body mass index is 46.08 kg/m². PCP: Meng Figueroa MD  01/18/19    Chief Complaint   Patient presents with    Back Pain     SC         HISTORY OF PRESENT ILLNESS, RADIOGRAPHS, and PLAN:         HISTORY OF PRESENT ILLNESS:  Surgical consultation. Ms. Rajiv Zambrano is seen today at the request of Dr. Tina Pepper and Dr. Calvin Vora. The patient is a 27-year-old female generally healthy with history of polyarticular arthritis, who developed symptoms about 3 months ago. She describes numbness from the chest down. She actually has a sensory level I would say at about T5 or 6. She also describes myelopathic symptoms with wandering gait and hyperreflexive nature. She has chronic low back pain, some neck pain with low cervical radiculopathy. She denies bowel or bladder dysfunction, fever, chills, night sweats, weight loss or weight gain. PHYSICAL EXAMINATION:  Her exam demonstrates hyperreflexia at the Achilles and patella and poor tandem gait. RADIOGRAPHS:  Her MRIs obtained demonstrate cervical stenosis at C5-6, C6-7, maybe a touch of gliosis. MRI of the lumbar spine demonstrates spinal stenosis L4-5, L5-S1 due to degenerative changes. ASSESSMENT/PLAN:  I am concerned about her cervical stenosis and her lumbar stenosis, but her exam makes me concerned that there is some intervening stenosis in her mid thoracic region. I would like to obtain an MRI of her thoracic spine before commencing treatment. If there is no thoracic issue, I would likely wish to address her cervical pathology with a 2 level cervical decompression and fusion at C5-6, C6-7.   I am going to obtain an MRI of her thoracic spine and see her back. cc:  Dr. Aleja Gottlieb            Past Medical History:   Diagnosis Date    Acute pain of left shoulder 11/1/2016    Bipolar 1 disorder ([facility] [address]     Bipolar affective disorder, currently manic, moderate ([facility] [address] 4/27/2016    Chronic midline low back pain with sciatica 11/1/2016    Cigarette nicotine dependence in remission 8/2/2016    Cigarette nicotine dependence without complication 41/81/0196    Continuous nicotine dependence 1/24/2017    Depression     Fibromyalgia 9/1/2015    HLD (hyperlipidemia) 2/4/2016    Hypertension     Hypertriglyceridemia 5/15/2018    Impaired fasting glucose 5/31/2016    Irritable bowel syndrome without diarrhea 4/27/2016    Moderate episode of recurrent major depressive disorder ([address] 4/27/2016    Obesity, Class III, BMI 40-49.9 (morbid obesity) ([facility] [address] 9/1/2015    Onychomycosis 4/27/2016    Pain of left thumb 11/1/2016    Prediabetes 7/5/2016    PTSD (post-traumatic stress disorder)     Smoker 9/1/2015    Vitamin D deficiency 5/31/2016       Family History   Problem Relation Age of Onset    Hypertension Mother     Thyroid Disease Mother     No Known Problems Father     Psychiatric Disorder Sister     Psychiatric Disorder Brother     Psychiatric Disorder Sister     Diabetes Sister     Psychiatric Disorder Brother     Psychiatric Disorder Brother        Current Outpatient Medications   Medication Sig Dispense Refill    predniSONE (STERAPRED DS) 10 mg dose pack See administration instruction per 10mg dose pack 21 Tab 0    gabapentin (NEURONTIN) 300 mg capsule Take 2 Caps by mouth three (3) times daily. 180 Cap 2    cyclobenzaprine (FLEXERIL) 5 mg tablet Take 1 Tab by mouth three (3) times daily as needed for Muscle Spasm(s). 90 Tab 0    lidocaine (LIDODERM) 5 % APPLY 1 PATCH TO THE AFFECTED AREA EVERY DAY.  LEAVE ON FOR 12 HOURS THEN REMOVE FOR 12 HOURS 90 Patch 3    losartan (COZAAR) 50 mg tablet TAKE 1 TABLET BY MOUTH DAILY 90 Tab 0    polyethylene glycol (MIRALAX) 17 gram/dose powder MIX AND DRINK 17 GRAMS BY MOUTH DAILY 1530 g 3    hydroCHLOROthiazide (HYDRODIURIL) 25 mg tablet Take 1 Tab by mouth daily. 90 Tab 3    meloxicam (MOBIC) 15 mg tablet Take 1 Tab by mouth daily. 90 Tab 3    lamoTRIgine (LAMICTAL) 25 mg tablet Take  by mouth daily.  buPROPion SR (WELLBUTRIN, ZYBAN) 200 mg SR tablet   1    traZODone (DESYREL) 150 mg tablet   1    DULoxetine (CYMBALTA) 60 mg capsule Take 1 Cap by mouth daily. (Patient taking differently: Take 60 mg by mouth two (2) times a day.) 90 Cap 3    HYDROcodone-acetaminophen (NORCO) 5-325 mg per tablet Take 1 Tab by mouth every eight (8) hours as needed for Pain. Max Daily Amount: 3 Tabs. 21 Tab 0    ergocalciferol (ERGOCALCIFEROL) 50,000 unit capsule Take 1 Cap by mouth every seven (7) days.  12 Cap 3       No Known Allergies    Past Surgical History:   Procedure Laterality Date    HX GYN Bilateral     tubal    HX ORTHOPAEDIC         Past Medical History:   Diagnosis Date    Acute pain of left shoulder 11/1/2016    Bipolar 1 disorder ([address]     Bipolar affective disorder, currently manic, moderate ([address] 4/27/2016    Chronic midline low back pain with sciatica 11/1/2016    Cigarette nicotine dependence in remission 8/2/2016    Cigarette nicotine dependence without complication 69/75/7995    Continuous nicotine dependence 1/24/2017    Depression     Fibromyalgia 9/1/2015    HLD (hyperlipidemia) 2/4/2016    Hypertension     Hypertriglyceridemia 5/15/2018    Impaired fasting glucose 5/31/2016    Irritable bowel syndrome without diarrhea 4/27/2016    Moderate episode of recurrent major depressive disorder ([address] 4/27/2016    Obesity, Class III, BMI 40-49.9 (morbid obesity) ([address] 9/1/2015    Onychomycosis 4/27/2016    Pain of left thumb 11/1/2016    Prediabetes 7/5/2016    PTSD (post-traumatic stress disorder)     Smoker 9/1/2015    Vitamin D deficiency 5/31/2016       Social History Socioeconomic History    Marital status: UNKNOWN     Spouse name: Not on file    Number of children: Not on file    Years of education: Not on file    Highest education level: Not on file   Social Needs    Financial resource strain: Not on file    Food insecurity - worry: Not on file    Food insecurity - inability: Not on file    Transportation needs - medical: Not on file   [employer] needs - non-medical: Not on file   Occupational History    Not on file   Tobacco Use    Smoking status: Light Tobacco Smoker     Packs/day: 0.33     Years: 25.00     Pack years: 8.25     Last attempt to quit: 2016     Years since quittin.0    Smokeless tobacco: Never Used    Tobacco comment: 1/2 cigarette a day   Substance and Sexual Activity    Alcohol use: Yes     Alcohol/week: 5.4 oz     Types: 9 Shots of liquor per week     Comment: once every 6 months, 2-3 drinks    Drug use: No    Sexual activity: Yes     Partners: Male     Birth control/protection: None   Other Topics Concern    Not on file   Social History Narrative    Not on file           REVIEW OF SYSTEMS:   CONSTITUTIONAL SYMPTOMS:  Negative. EYES:  Negative. EARS, NOSE, THROAT AND MOUTH:  Negative. CARDIOVASCULAR:  Negative. RESPIRATORY:  Negative. GENITOURINARY: Per HPI. GASTROINTESTINAL:  Per HPI. INTEGUMENTARY (SKIN AND/OR BREAST):  Negative. MUSCULOSKELETAL: Per HPI.   ENDOCRINE/RHEUMATOLOGIC:  Negative. NEUROLOGICAL:  Per HPI. HEMATOLOGIC/LYMPHATIC:  Negative. ALLERGIC/IMMUNOLOGIC:  Negative. PSYCHIATRIC:  Negative. PHYSICAL EXAMINATION:   Visit Vitals  /82   Pulse 91   Temp 98.2 °F (36.8 °C)   Resp 18   Wt 294 lb 3.2 oz (133.4 kg)   SpO2 98%   BMI 46.08 kg/m²    PAIN SCALE: 7/10    CONSTITUTIONAL: The patient is in no apparent distress and is alert and oriented x 3. HEENT: Normocephalic. Hearing grossly intact. NECK: Supple and symmetric. no tenderness, or masses were felt.   RESPIRATORY: No labored breathing. CARDIOVASCULAR: The carotid pulses were normal. Peripheral pulses were 2+. CHEST: Normal AP diameter and normal contour without any kyphoscoliosis. LYMPHATIC: No lymphadenopathy was appreciated in the neck, axillae or groin. SKIN:  Negative for scars, rashes, lesions, or ulcers on the right upper, right lower, left upper, left lower and trunk. NEUROLOGICAL: Alert and oriented x 3. Ambulation without assistive device. FWB. Imbalance. EXTREMITIES:  See musculoskeletal.  MUSCULOSKELETAL:   Head and Neck:  Negative for misalignment, asymmetry, crepitation, defects, tenderness masses or effusions.  Left Upper Extremity: Numbness in forearms. Inspection, percussion and palpation performed. Dela Cruzs sign is negative.  Right Upper Extremity: Numbness in forearms. Inspection, percussion and palpation performed. Dela Cruzs sign is negative.  Spine, Ribs and Pelvis: Numbness in entire body below breast. Inspection, percussion and palpation performed. Negative for misalignment, asymmetry, crepitation, defects, tenderness masses or effusions.  Left Lower Extremity: Weakness. Inspection, percussion and palpation performed. Negative straight leg raise.  Right Lower Extremity: Hyperreflexic. Weakness. Inspection, percussion and palpation performed. Negative straight leg raise. SPINE EXAM:     Cervical spine: Neck is midline. Normal muscle tone. No focal atrophy is noted. Lumbar spine: No rash, ecchymosis, or gross obliquity. No focal atrophy is noted. ASSESSMENT    ICD-10-CM ICD-9-CM    1. Lumbar stenosis without neurogenic claudication M48.061 724.02    2. Cervical spinal stenosis M48.02 723.0    3. Thoracic spine pain M54.6 724.1  [facility] CONT       Written by Tabby Rondon, as dictated by Ishaan Acuña MD.    I, Dr. Ishaan Acuña MD, confirm that all documentation is accurate.

## 2019-01-31 ENCOUNTER — HOSPITAL ENCOUNTER (OUTPATIENT)
Dept: MRI IMAGING | Age: 44
Discharge: HOME | End: 2019-01-31
Payer: MEDICAID

## 2019-01-31 DIAGNOSIS — M54.6 THORACIC SPINE PAIN: ICD-10-CM

## 2019-01-31 PROCEDURE — 72146 MRI CHEST SPINE W/O DYE: CPT

## 2019-02-01 ENCOUNTER — OFFICE VISIT (OUTPATIENT)
Dept: ORTHOPEDIC SURGERY | Age: 44
End: 2019-02-01

## 2019-02-01 VITALS
WEIGHT: 293 LBS | DIASTOLIC BLOOD PRESSURE: 110 MMHG | HEART RATE: 91 BPM | BODY MASS INDEX: 45.99 KG/M2 | HEIGHT: 67 IN | SYSTOLIC BLOOD PRESSURE: 162 MMHG

## 2019-02-01 DIAGNOSIS — M48.02 CERVICAL SPINAL STENOSIS: ICD-10-CM

## 2019-02-01 DIAGNOSIS — M48.061 LUMBAR STENOSIS WITHOUT NEUROGENIC CLAUDICATION: ICD-10-CM

## 2019-02-01 DIAGNOSIS — G95.9 CERVICAL MYELOPATHY (HCC): Primary | ICD-10-CM

## 2019-02-01 NOTE — PROGRESS NOTES
[address] [facility]   Pre-Surgical Worksheet    Patient: Cristofer Hugo                         MRN: [id number]     Age:  37 y.o.,      Sex: female    YOB: 1975           DAVON: February 1, 2019  PCP: Greg Green MD    No Known Allergies      ICD-10-CM ICD-9-CM    1. Cervical myelopathy (HCC) G95.9 721.1    2. Cervical spinal stenosis M48.02 723.0    3. Lumbar stenosis without neurogenic claudication M48.061 724.02        Surgery: ACDF C5/6, C6/7. Pain Assessment   Pain Assessment  2/1/2019   Location of Pain Back;Neck;Arm;Leg   Pain Location Comment -   Location Modifiers Left;Right   Severity of Pain 7   Quality of Pain Aching   Quality of Pain Comment numbness   Duration of Pain Persistent   Frequency of Pain Constant   Aggravating Factors -   Limiting Behavior Some   Relieving Factors Nothing   Relieving Factors Comment -   Result of Injury No       Visit Vitals  BP (!) 162/110   Pulse 91   Ht 5' 7\" (1.702 m)   Wt 295 lb (133.8 kg)   BMI 46.20 kg/m²       ADL Limits: Canepatient has to stand and walk with walker. Pain is constant. Spine Surgery?: Yes When ? [facility] Where? .    Spinal Injections?: No:   When ? [facility] Where? .    Physical Therapy?: No:   When ? [facility] Where? .    NSAID's?: Yes    Pain Medications?: No:   Type: ? [facility] In Pain Management: NO, Where: ?    Current Outpatient Medications   Medication Sig    gabapentin (NEURONTIN) 300 mg capsule Take 2 Caps by mouth three (3) times daily.  cyclobenzaprine (FLEXERIL) 5 mg tablet Take 1 Tab by mouth three (3) times daily as needed for Muscle Spasm(s).  lidocaine (LIDODERM) 5 % APPLY 1 PATCH TO THE AFFECTED AREA EVERY DAY. LEAVE ON FOR 12 HOURS THEN REMOVE FOR 12 HOURS    losartan (COZAAR) 50 mg tablet TAKE 1 TABLET BY MOUTH DAILY    polyethylene glycol (MIRALAX) 17 gram/dose powder MIX AND DRINK 17 GRAMS BY MOUTH DAILY    hydroCHLOROthiazide (HYDRODIURIL) 25 mg tablet Take 1 Tab by mouth daily.     ergocalciferol (ERGOCALCIFEROL) 50,000 unit capsule Take 1 Cap by mouth every seven (7) days.  meloxicam (MOBIC) 15 mg tablet Take 1 Tab by mouth daily.  lamoTRIgine (LAMICTAL) 25 mg tablet Take  by mouth daily.  buPROPion SR (WELLBUTRIN, ZYBAN) 200 mg SR tablet     traZODone (DESYREL) 150 mg tablet     DULoxetine (CYMBALTA) 60 mg capsule Take 1 Cap by mouth daily. (Patient taking differently: Take 60 mg by mouth two (2) times a day.)    HYDROcodone-acetaminophen (NORCO) 5-325 mg per tablet Take 1 Tab by mouth every eight (8) hours as needed for Pain. Max Daily Amount: 3 Tabs. (Patient not taking: Reported on 2/1/2019)     No current facility-administered medications for this visit.         Past Medical History:   Diagnosis Date    Acute pain of left shoulder 11/1/2016    B[facility] 1 disorder ([facility] [address]     Bipolar affective disorder, currently manic, moderate (HCC) 4/27/2016    Chronic midline low back pain with sciatica 11/1/2016    Cigarette nicotine dependence in remission 8/2/2016    Cigarette nicotine dependence without complication 53/90/1476    Continuous nicotine dependence 1/24/2017    Depression     Fibromyalgia 9/1/2015    HLD (hyperlipidemia) 2/4/2016    Hypertension     Hypertriglyceridemia 5/15/2018    Impaired fasting glucose 5/31/2016    Irritable bowel syndrome without diarrhea 4/27/2016    Moderate episode of recurrent major depressive disorder ([facility] [address] 4/27/2016    Obesity, Class III, BMI 40-49.9 (morbid obesity) ([facility] [address] 9/1/2015    Onychomycosis 4/27/2016    Pain of left thumb 11/1/2016    Prediabetes 7/5/2016    PTSD (post-traumatic stress disorder)     Smoker 9/1/2015    Vitamin D deficiency 5/31/2016       Past Surgical History:   Procedure Laterality Date    HX GYN Bilateral     tubal    HX ORTHOPAEDIC         Social History     Socioeconomic History    Marital status: UNKNOWN     Spouse name: Not on file    Number of children: Not on file    Years of education: Not on file   [employer] education level: Not on file   Tobacco Use    Smoking status: Light Tobacco Smoker     Packs/day: 0.33     Years: 25.00     Pack years: 8.25     Last attempt to quit: 2016     Years since quittin.1    Smokeless tobacco: Never Used    Tobacco comment: 1/2 cigarette a day   Substance and Sexual Activity    Alcohol use:  Yes     Alcohol/week: 5.4 oz     Types: 9 Shots of liquor per week     Comment: once every 6 months, 2-3 drinks    Drug use: No    Sexual activity: Yes     Partners: Male     Birth control/protection: None

## 2019-02-01 NOTE — PROGRESS NOTES
[facility]  [address]  [address]  Phone: [phone]  Fax: [phone]  PROGRESS NOTE  Patient: Katelyn Staffrod                MRN: [id number]       SSN: [id number]  YOB: 1975        AGE: 37 y.o. SEX: female  Body mass index is 46.2 kg/m². PCP: Lorraine Rubi MD  02/01/19    Chief Complaint   Patient presents with    Follow-up     thoracic MRI results       HISTORY OF PRESENT ILLNESS, RADIOGRAPHS, and PLAN:     HISTORY OF PRESENT ILLNESS:  Ms. Jose Godoy returns today. She has continuing to have her neck pain and her low back pain. Her gait is abnormal.   Examination demonstrates her hyperreflexia and her sensory deficit in her lower extremities. I reviewed the MRI of her thoracic spine and it is benign. She has known cervical stenosis at 5-6 and 6-7 with a hint of gliosis. She has some lumbar spinal stenosis at 4-5, 5-1 with low back pain and bilateral lumbar radiculopathy. ASSESSMENT/PLAN:  We discussed the matter at length. At this point, given her neurologic findings, I would recommend a cervical decompression and fusion at 5-6, 6-7. We discussed risks, benefits, complications and alternatives to surgery and she wishes to proceed. We will proceed with cervical decompression and fusion once appropriate approvals and clearances take place.       cc:  Dr. Barbara Sinclair           Past Medical History:   Diagnosis Date    Acute pain of left shoulder 11/1/2016    Bipolar 1 disorder ([facility]     Bipolar affective disorder, currently manic, moderate ([address] 4/27/2016    Chronic midline low back pain with sciatica 11/1/2016    Cigarette nicotine dependence in remission 8/2/2016    Cigarette nicotine dependence without complication 00/45/8002    Continuous nicotine dependence 1/24/2017    Depression     Fibromyalgia 9/1/2015    HLD (hyperlipidemia) 2/4/2016    Hypertension     Hypertriglyceridemia 5/15/2018    Impaired fasting glucose 5/31/2016    Irritable bowel syndrome without diarrhea 4/27/2016    Moderate episode of recurrent major depressive disorder ([facility] [address] 4/27/2016    Obesity, Class III, BMI 40-49.9 (morbid obesity) ([facility] [address] 9/1/2015    Onychomycosis 4/27/2016    Pain of left thumb 11/1/2016    Prediabetes 7/5/2016    PTSD (post-traumatic stress disorder)     Smoker 9/1/2015    Vitamin D deficiency 5/31/2016       Family History   Problem Relation Age of Onset    Hypertension Mother     Thyroid Disease Mother     No Known Problems Father     Psychiatric Disorder Sister     Psychiatric Disorder Brother     Psychiatric Disorder Sister     Diabetes Sister     Psychiatric Disorder Brother     Psychiatric Disorder Brother        Current Outpatient Medications   Medication Sig Dispense Refill    gabapentin (NEURONTIN) 300 mg capsule Take 2 Caps by mouth three (3) times daily. 180 Cap 2    cyclobenzaprine (FLEXERIL) 5 mg tablet Take 1 Tab by mouth three (3) times daily as needed for Muscle Spasm(s). 90 Tab 0    lidocaine (LIDODERM) 5 % APPLY 1 PATCH TO THE AFFECTED AREA EVERY DAY. LEAVE ON FOR 12 HOURS THEN REMOVE FOR 12 HOURS 90 Patch 3    losartan (COZAAR) 50 mg tablet TAKE 1 TABLET BY MOUTH DAILY 90 Tab 0    polyethylene glycol (MIRALAX) 17 gram/dose powder MIX AND DRINK 17 GRAMS BY MOUTH DAILY 1530 g 3    hydroCHLOROthiazide (HYDRODIURIL) 25 mg tablet Take 1 Tab by mouth daily. 90 Tab 3    ergocalciferol (ERGOCALCIFEROL) 50,000 unit capsule Take 1 Cap by mouth every seven (7) days. 12 Cap 3    meloxicam (MOBIC) 15 mg tablet Take 1 Tab by mouth daily. 90 Tab 3    lamoTRIgine (LAMICTAL) 25 mg tablet Take  by mouth daily.  buPROPion SR (WELLBUTRIN, ZYBAN) 200 mg SR tablet   1    traZODone (DESYREL) 150 mg tablet   1    DULoxetine (CYMBALTA) 60 mg capsule Take 1 Cap by mouth daily.  (Patient taking differently: Take 60 mg by mouth two (2) times a day.) 90 Cap 3    HYDROcodone-acetaminophen (NORCO) 5-325 mg per tablet Take 1 Tab by mouth every eight (8) hours as needed for Pain. Max Daily Amount: 3 Tabs.  (Patient not taking: Reported on 2019) 21 Tab 0       No Known Allergies    Past Surgical History:   Procedure Laterality Date    HX GYN Bilateral     tubal    HX ORTHOPAEDIC         Past Medical History:   Diagnosis Date    Acute pain of left shoulder 2016    Bipolar 1 disorder ([address]     Bipolar affective disorder, currently manic, moderate ([address] 2016    Chronic midline low back pain with sciatica 2016    Cigarette nicotine dependence in remission 2016    Cigarette nicotine dependence without complication     Continuous nicotine dependence 2017    Depression     Fibromyalgia 2015    HLD (hyperlipidemia) 2016    Hypertension     Hypertriglyceridemia 5/15/2018    Impaired fasting glucose 2016    Irritable bowel syndrome without diarrhea 2016    Moderate episode of recurrent major depressive disorder ([facility] [address] 2016    Obesity, Class III, BMI 40-49.9 (morbid obesity) ([facility] [address] 2015    Onychomycosis 2016    Pain of left thumb 2016    Prediabetes 2016    PTSD (post-traumatic stress disorder)     Smoker 2015    Vitamin D deficiency 2016       Social History     Socioeconomic History    Marital status: UNKNOWN     Spouse name: Not on file    Number of children: Not on file    Years of education: Not on file    Highest education level: Not on file   Social Needs    Financial resource strain: Not on file    Food insecurity - worry: Not on file    Food insecurity - inability: Not on file   [employer] needs - medical: Not on file   [employer] needs - non-medical: Not on file   Occupational History    Not on file   Tobacco Use    Smoking status: Light Tobacco Smoker     Packs/day: 0.33     Years: 25.00     Pack years: 8.25     Last attempt to quit: 2016     Years since quittin.1    Smokeless tobacco: Never Used    Tobacco comment: 1/2 cigarette a day   Substance and Sexual Activity    Alcohol use: Yes     Alcohol/week: 5.4 oz     Types: 9 Shots of liquor per week     Comment: once every 6 months, 2-3 drinks    Drug use: No    Sexual activity: Yes     Partners: Male     Birth control/protection: None   Other Topics Concern    Not on file   Social History Narrative    Not on file         REVIEW OF SYSTEMS:   CONSTITUTIONAL SYMPTOMS:  Negative. EYES:  Negative. EARS, NOSE, THROAT AND MOUTH:  Negative. CARDIOVASCULAR:  Negative. RESPIRATORY:  Negative. GENITOURINARY: Per HPI. GASTROINTESTINAL:  Per HPI. INTEGUMENTARY (SKIN AND/OR BREAST):  Negative. MUSCULOSKELETAL: Per HPI.   ENDOCRINE/RHEUMATOLOGIC:  Negative. NEUROLOGICAL:  Per HPI. HEMATOLOGIC/LYMPHATIC:  Negative. ALLERGIC/IMMUNOLOGIC:  Negative. PSYCHIATRIC:  Negative. PHYSICAL EXAMINATION:   Visit Vitals  BP (!) 162/110   Pulse 91   Ht 5' 7\" (1.702 m)   Wt 295 lb (133.8 kg)   BMI 46.20 kg/m²    PAIN SCALE: 7/10    CONSTITUTIONAL: The patient is in no apparent distress and is alert and oriented x 3. HEENT: Normocephalic. Hearing grossly intact. NECK: Supple and symmetric. no tenderness, or masses were felt. RESPIRATORY: No labored breathing. CARDIOVASCULAR: The carotid pulses were normal. Peripheral pulses were 2+. CHEST: Normal AP diameter and normal contour without any kyphoscoliosis. LYMPHATIC: No lymphadenopathy was appreciated in the neck, axillae or groin. SKIN:   Negative for scars, rashes, lesions, or ulcers on the right upper, right lower, left upper, left lower and trunk. NEUROLOGICAL: Alert and oriented x 3. Ambulation with walker. FWB. Poor gait. EXTREMITIES: See musculoskeletal.  MUSCULOSKELETAL:   Head and Neck: Neck pain with radiating arm pain. Negative for misalignment, asymmetry, crepitation, defects, tenderness masses or effusions.  Left Upper Extremity: Pain.  Inspection, percussion and palpation performed. Dela Cruzs sign is negative.  Right Upper Extremity: Pain. Inspection, percussion and palpation performed. Dela Cruzs sign is negative.  Spine, Ribs and Pelvis: Back pain. Inspection, percussion and palpation performed. Negative for misalignment, asymmetry, crepitation, defects, tenderness masses or effusions.  Left Lower Extremity: Hyperreflexia. Pain. Inspection, percussion and palpation performed. Negative straight leg raise.  Right Lower Extremity:Hyperreflexia. Pain, R>L. Inspection, percussion and palpation performed. Negative straight leg raise. SPINE EXAM:     Cervical spine: Neck is midline. Normal muscle tone. No focal atrophy is noted. Lumbar spine: No rash, ecchymosis, or gross obliquity. No focal atrophy is noted. ASSESSMENT    ICD-10-CM ICD-9-CM    1. Cervical myelopathy (HCC) G95.9 721.1    2. Cervical spinal stenosis M48.02 723.0    3. Lumbar stenosis without neurogenic claudication M48.061 724.02        Written by Raisa Pinzon, as dictated by Neela Robertson MD.    I, Dr. Neela Robertson MD, confirm that all documentation is accurate.

## 2019-02-04 ENCOUNTER — HOSPITAL ENCOUNTER (OUTPATIENT)
Dept: PREADMISSION TESTING | Age: 44
Discharge: HOME | End: 2019-02-04
Payer: MEDICAID

## 2019-02-04 DIAGNOSIS — M48.02 CERVICAL SPINAL STENOSIS: ICD-10-CM

## 2019-02-04 LAB
ALBUMIN SERPL-MCNC: 3.8 G/DL (ref 3.4–5)
ALBUMIN/GLOB SERPL: 1 {RATIO} (ref 0.8–1.7)
ALP SERPL-CCNC: 118 U/L (ref 45–117)
ALT SERPL-CCNC: 22 U/L (ref 13–56)
ANION GAP SERPL CALC-SCNC: 5 MMOL/L (ref 3–18)
AST SERPL-CCNC: 16 U/L (ref 15–37)
ATRIAL RATE: 82 BPM
BASOPHILS # BLD: 0 K/UL (ref 0–0.1)
BASOPHILS NFR BLD: 0 % (ref 0–2)
BILIRUB SERPL-MCNC: 0.3 MG/DL (ref 0.2–1)
BUN SERPL-MCNC: 16 MG/DL (ref 7–18)
BUN/CREAT SERPL: 25 (ref 12–20)
CALCIUM SERPL-MCNC: 9 MG/DL (ref 8.5–10.1)
CALCULATED P AXIS, ECG09: 42 DEGREES
CALCULATED R AXIS, ECG10: 58 DEGREES
CALCULATED T AXIS, ECG11: 66 DEGREES
CHLORIDE SERPL-SCNC: 101 MMOL/L (ref 100–108)
CO2 SERPL-SCNC: 29 MMOL/L (ref 21–32)
CREAT SERPL-MCNC: 0.65 MG/DL (ref 0.6–1.3)
DIAGNOSIS, 93000: NORMAL
DIFFERENTIAL METHOD BLD: NORMAL
EOSINOPHIL # BLD: 0.3 K/UL (ref 0–0.4)
EOSINOPHIL NFR BLD: 3 % (ref 0–5)
ERYTHROCYTE [DISTWIDTH] IN BLOOD BY AUTOMATED COUNT: 13.9 % (ref 11.6–14.5)
GLOBULIN SER CALC-MCNC: 3.9 G/DL (ref 2–4)
GLUCOSE SERPL-MCNC: 116 MG/DL (ref 74–99)
HCT VFR BLD AUTO: 41.7 % (ref 35–45)
HGB BLD-MCNC: 13.7 G/DL (ref 12–16)
LYMPHOCYTES # BLD: 1.8 K/UL (ref 0.9–3.6)
LYMPHOCYTES NFR BLD: 24 % (ref 21–52)
MCH RBC QN AUTO: 31.4 PG (ref 24–34)
MCHC RBC AUTO-ENTMCNC: 32.9 G/DL (ref 31–37)
MCV RBC AUTO: 95.6 FL (ref 74–97)
MONOCYTES # BLD: 0.5 K/UL (ref 0.05–1.2)
MONOCYTES NFR BLD: 7 % (ref 3–10)
NEUTS SEG # BLD: 4.9 K/UL (ref 1.8–8)
NEUTS SEG NFR BLD: 66 % (ref 40–73)
P-R INTERVAL, ECG05: 150 MS
PLATELET # BLD AUTO: 255 K/UL (ref 135–420)
PMV BLD AUTO: 10.7 FL (ref 9.2–11.8)
POTASSIUM SERPL-SCNC: 4.3 MMOL/L (ref 3.5–5.5)
PROT SERPL-MCNC: 7.7 G/DL (ref 6.4–8.2)
Q-T INTERVAL, ECG07: 386 MS
QRS DURATION, ECG06: 84 MS
QTC CALCULATION (BEZET), ECG08: 450 MS
RBC # BLD AUTO: 4.36 M/UL (ref 4.2–5.3)
SODIUM SERPL-SCNC: 135 MMOL/L (ref 136–145)
VENTRICULAR RATE, ECG03: 82 BPM
WBC # BLD AUTO: 7.5 K/UL (ref 4.6–13.2)

## 2019-02-04 PROCEDURE — 80053 COMPREHEN METABOLIC PANEL: CPT

## 2019-02-04 PROCEDURE — 93005 ELECTROCARDIOGRAM TRACING: CPT

## 2019-02-04 PROCEDURE — 85025 COMPLETE CBC W/AUTO DIFF WBC: CPT

## 2019-02-04 PROCEDURE — 36415 COLL VENOUS BLD VENIPUNCTURE: CPT

## 2019-02-05 NOTE — H&P
Pre-Admission History and Physical    Patient: Kacey Nam   MRN: [id number]   SSN: [id number]   YOB: 1975   Age: 37 y.o. Sex: female     Patient scheduled for: ACDF C5/6, 6/7. Date of surgery: 2/7/19  [facility] Surgeon: Delbert Laurent MD    HPI:  Kacey Nam is a 37 y.o. female with neck pain with low cervical radiculopathy. She also describes myelopathic symptoms with wandering gait and hyperreflexive nature. She reports a pain level of 7/10. MRI of her thoracic spine is benign. She has known cervical stenosis at 5-6 and 6-7 with a hint of gliosis. This patient has failed the presurgical conservative treatments medications. Pain has impacted the patient's functional ability to ambulate, she hast to use a walker and she is being admitted for surgical intervention.          Past Medical History:   Diagnosis Date    Acute pain of left shoulder 11/1/2016    Bipolar 1 disorder ([address]     Bipolar affective disorder, currently manic, moderate ([address] 4/27/2016    Chronic midline low back pain with sciatica 11/1/2016    Cigarette nicotine dependence in remission 8/2/2016    Cigarette nicotine dependence without complication 99/10/7233    Continuous nicotine dependence 1/24/2017    Depression     Fibromyalgia 9/1/2015    HLD (hyperlipidemia) 2/4/2016    Hypertension     Hypertriglyceridemia 5/15/2018    Impaired fasting glucose 5/31/2016    Irritable bowel syndrome without diarrhea 4/27/2016    Moderate episode of recurrent major depressive disorder ([address] 4/27/2016    Obesity, Class III, BMI 40-49.9 (morbid obesity) ([address] 9/1/2015    Onychomycosis 4/27/2016    Pain of left thumb 11/1/2016    Prediabetes 7/5/2016    PTSD (post-traumatic stress disorder)     Smoker 9/1/2015    Vitamin D deficiency 5/31/2016     Social History     Socioeconomic History    Marital status: LEGALLY      Spouse name: Not on file    Number of children: Not on file  Years of education: Not on file    Highest education level: Not on file   Tobacco Use    Smoking status: Current Every Day Smoker     Packs/day: 1.00     Years: 25.00     Pack years: 25.00    Smokeless tobacco: Never Used   Substance and Sexual Activity    Alcohol use: Yes     Alcohol/week: 5.4 oz     Types: 9 Shots of liquor per week     Comment: socially    Drug use: No    Sexual activity: Yes     Partners: Male     Birth control/protection: None     Past Surgical History:   Procedure Laterality Date    HX GYN Bilateral     tubal    HX ORTHOPAEDIC       Family History   Problem Relation Age of Onset    Hypertension Mother     Thyroid Disease Mother     No Known Problems Father     Psychiatric Disorder Sister     Psychiatric Disorder Brother     Psychiatric Disorder Sister     Diabetes Sister     Psychiatric Disorder Brother     Psychiatric Disorder Brother      No Known Allergies  Current Outpatient Medications   Medication Sig Dispense Refill    gabapentin (NEURONTIN) 300 mg capsule Take 2 Caps by mouth three (3) times daily. 180 Cap 2    lidocaine (LIDODERM) 5 % APPLY 1 PATCH TO THE AFFECTED AREA EVERY DAY. LEAVE ON FOR 12 HOURS THEN REMOVE FOR 12 HOURS 90 Patch 3    losartan (COZAAR) 50 mg tablet TAKE 1 TABLET BY MOUTH DAILY (Patient taking differently: TAKE 1 & 1/2 TABLETs BY MOUTH DAILY) 90 Tab 0    polyethylene glycol (MIRALAX) 17 gram/dose powder MIX AND DRINK 17 GRAMS BY MOUTH DAILY 1530 g 3    hydroCHLOROthiazide (HYDRODIURIL) 25 mg tablet Take 1 Tab by mouth daily. 90 Tab 3    meloxicam (MOBIC) 15 mg tablet Take 1 Tab by mouth daily. 90 Tab 3    lamoTRIgine (LAMICTAL) 25 mg tablet Take  by mouth daily.  buPROPion SR (WELLBUTRIN, ZYBAN) 200 mg SR tablet   1    traZODone (DESYREL) 150 mg tablet   1    DULoxetine (CYMBALTA) 60 mg capsule Take 1 Cap by mouth daily.  (Patient taking differently: Take 60 mg by mouth two (2) times a day.) 90 Cap 3       ROS:  Denies chills, fever,night sweats,  bowel or bladder dysfunction, unexplained weight loss/weight gain, chest pain, sob or anxiety. Physical Examination    Gen: Well developed, well nourished 37 y.o. female Visit Vitals  BP (!) 162/110   Pulse 91   Ht 5' 7\" (1.702 m)   Wt 295 lb (133.8 kg)   BMI 46.20 kg/m²    PAIN SCALE: 7/10     CONSTITUTIONAL: The patient is in no apparent distress and is alert and oriented x 3. HEENT: Normocephalic. Hearing grossly intact. NECK: Supple and symmetric. no tenderness, or masses were felt. RESPIRATORY: No labored breathing. CARDIOVASCULAR: The carotid pulses were normal. Peripheral pulses were 2+. CHEST: Normal AP diameter and normal contour without any kyphoscoliosis. LYMPHATIC: No lymphadenopathy was appreciated in the neck, axillae or groin. SKIN:   Negative for scars, rashes, lesions, or ulcers on the right upper, right lower, left upper, left lower and trunk. NEUROLOGICAL: Alert and oriented x 3. Ambulation with walker. FWB. Poor gait. EXTREMITIES: See musculoskeletal.  MUSCULOSKELETAL:  · Head and Neck: Neck pain with radiating arm pain. Negative for misalignment, asymmetry, crepitation, defects, tenderness masses or effusions. · Left Upper Extremity: Pain. Inspection, percussion and palpation performed. Dela Cruzs sign is negative. · Right Upper Extremity: Pain. Inspection, percussion and palpation performed. Dela Cruzs sign is negative. · Spine, Ribs and Pelvis: Back pain. Inspection, percussion and palpation performed. Negative for misalignment, asymmetry, crepitation, defects, tenderness masses or effusions. · Left Lower Extremity: Hyperreflexia. Pain. Inspection, percussion and palpation performed. Negative straight leg raise. · Right Lower Extremity:Hyperreflexia. Pain, R>L. Inspection, percussion and palpation performed. Negative straight leg raise.           SPINE EXAM:      Cervical spine: Neck is midline. Normal muscle tone.  No focal atrophy is noted.     Lumbar spine: No rash, ecchymosis, or gross obliquity. No focal atrophy is noted. Assessment and Plan    Due to the pt's persistent symptoms unrelieved by conservative measure Kristian Chang is being admitted to [facility] to undergo surgical intervention. The post-operative plan of care consists of physical therapy, home health and a 2 week f/u office visit. We are pending medical clearance by Dr. Serenity Kerr. The risks, benefits, complications and alternatives to surgery have been discussed in detail with the patient. The patient understands and agrees to proceed.      Cricket Denver NP-BC dictating for John Caldera MD

## 2019-02-06 ENCOUNTER — ANESTHESIA EVENT (OUTPATIENT)
Dept: SURGERY | Age: 44
End: 2019-02-06
Payer: MEDICAID

## 2019-02-07 ENCOUNTER — APPOINTMENT (OUTPATIENT)
Dept: GENERAL RADIOLOGY | Age: 44
End: 2019-02-07
Attending: ORTHOPAEDIC SURGERY
Payer: MEDICAID

## 2019-02-07 ENCOUNTER — HOSPITAL ENCOUNTER (OUTPATIENT)
Age: 44
Setting detail: OBSERVATION
Discharge: HOME | End: 2019-02-08
Attending: ORTHOPAEDIC SURGERY | Admitting: ORTHOPAEDIC SURGERY
Payer: MEDICAID

## 2019-02-07 ENCOUNTER — ANESTHESIA (OUTPATIENT)
Dept: SURGERY | Age: 44
End: 2019-02-07
Payer: MEDICAID

## 2019-02-07 DIAGNOSIS — Z98.1 S/P FUSION OF THORACIC SPINE: Primary | ICD-10-CM

## 2019-02-07 PROBLEM — G95.9 CERVICAL MYELOPATHY (HCC): Status: ACTIVE | Noted: 2019-02-07

## 2019-02-07 LAB — HCG UR QL: NEGATIVE

## 2019-02-07 PROCEDURE — 74011250636 HC RX REV CODE- 250/636: Performed by: ORTHOPAEDIC SURGERY

## 2019-02-07 PROCEDURE — 74011000250 HC RX REV CODE- 250

## 2019-02-07 PROCEDURE — 77030019605: Performed by: ORTHOPAEDIC SURGERY

## 2019-02-07 PROCEDURE — 74011250636 HC RX REV CODE- 250/636: Performed by: NURSE ANESTHETIST, CERTIFIED REGISTERED

## 2019-02-07 PROCEDURE — 74011000250 HC RX REV CODE- 250: Performed by: ORTHOPAEDIC SURGERY

## 2019-02-07 PROCEDURE — 77030012893: Performed by: ORTHOPAEDIC SURGERY

## 2019-02-07 PROCEDURE — 99218 HC RM OBSERVATION: CPT

## 2019-02-07 PROCEDURE — 97161 PT EVAL LOW COMPLEX 20 MIN: CPT

## 2019-02-07 PROCEDURE — 77030011267 HC ELECTRD BLD COVD -A: Performed by: ORTHOPAEDIC SURGERY

## 2019-02-07 PROCEDURE — 77030027138 HC INCENT SPIROMETER -A

## 2019-02-07 PROCEDURE — 97116 GAIT TRAINING THERAPY: CPT

## 2019-02-07 PROCEDURE — 77030018836 HC SOL IRR NACL ICUM -A: Performed by: ORTHOPAEDIC SURGERY

## 2019-02-07 PROCEDURE — 77030029099 HC BN WAX SSPC -A: Performed by: ORTHOPAEDIC SURGERY

## 2019-02-07 PROCEDURE — C1713 ANCHOR/SCREW BN/BN,TIS/BN: HCPCS | Performed by: ORTHOPAEDIC SURGERY

## 2019-02-07 PROCEDURE — 74011250637 HC RX REV CODE- 250/637: Performed by: NURSE ANESTHETIST, CERTIFIED REGISTERED

## 2019-02-07 PROCEDURE — 74011250637 HC RX REV CODE- 250/637: Performed by: ORTHOPAEDIC SURGERY

## 2019-02-07 PROCEDURE — 76210000016 HC OR PH I REC 1 TO 1.5 HR: Performed by: ORTHOPAEDIC SURGERY

## 2019-02-07 PROCEDURE — 77030019908 HC STETH ESOPH SIMS -A: Performed by: ANESTHESIOLOGY

## 2019-02-07 PROCEDURE — 77030032490 HC SLV COMPR SCD KNE COVD -B: Performed by: ORTHOPAEDIC SURGERY

## 2019-02-07 PROCEDURE — 77030013079 HC BLNKT BAIR HGGR 3M -A: Performed by: ANESTHESIOLOGY

## 2019-02-07 PROCEDURE — 77030039266 HC ADH SKN EXOFIN S2SG -A: Performed by: ORTHOPAEDIC SURGERY

## 2019-02-07 PROCEDURE — 74011250636 HC RX REV CODE- 250/636: Performed by: NURSE PRACTITIONER

## 2019-02-07 PROCEDURE — 77030010514 HC APPL CLP LIG COVD -B: Performed by: ORTHOPAEDIC SURGERY

## 2019-02-07 PROCEDURE — 74011000272 HC RX REV CODE- 272: Performed by: ORTHOPAEDIC SURGERY

## 2019-02-07 PROCEDURE — 77030004402 HC BUR NEUR STRY -C: Performed by: ORTHOPAEDIC SURGERY

## 2019-02-07 PROCEDURE — 77030012406 HC DRN WND PENRS BARD -A: Performed by: ORTHOPAEDIC SURGERY

## 2019-02-07 PROCEDURE — 74011250636 HC RX REV CODE- 250/636

## 2019-02-07 PROCEDURE — 77030031139 HC SUT VCRL2 J&J -A: Performed by: ORTHOPAEDIC SURGERY

## 2019-02-07 PROCEDURE — 76010000132 HC OR TIME 2.5 TO 3 HR: Performed by: ORTHOPAEDIC SURGERY

## 2019-02-07 PROCEDURE — 77030031879 HC SPCR SPN LORDTC CRNT NUVA -H1: Performed by: ORTHOPAEDIC SURGERY

## 2019-02-07 PROCEDURE — 74011000258 HC RX REV CODE- 258

## 2019-02-07 PROCEDURE — 77030020782 HC GWN BAIR PAWS FLX 3M -B: Performed by: ORTHOPAEDIC SURGERY

## 2019-02-07 PROCEDURE — 76060000036 HC ANESTHESIA 2.5 TO 3 HR: Performed by: ORTHOPAEDIC SURGERY

## 2019-02-07 PROCEDURE — 77030013567 HC DRN WND RESERV BARD -A: Performed by: ORTHOPAEDIC SURGERY

## 2019-02-07 PROCEDURE — L0120 CERV FLEX N/ADJ FOAM PRE OTS: HCPCS | Performed by: ORTHOPAEDIC SURGERY

## 2019-02-07 PROCEDURE — 81025 URINE PREGNANCY TEST: CPT

## 2019-02-07 PROCEDURE — 77030008683 HC TU ET CUF COVD -A: Performed by: ANESTHESIOLOGY

## 2019-02-07 DEVICE — SCREW SPNL L12MM DIA4MM ANTR CERV ST FOR SM INTLOK SYS: Type: IMPLANTABLE DEVICE | Site: SPINE CERVICAL | Status: FUNCTIONAL

## 2019-02-07 DEVICE — IMPLANTABLE DEVICE: Type: IMPLANTABLE DEVICE | Site: SPINE CERVICAL | Status: FUNCTIONAL

## 2019-02-07 DEVICE — GRAFT BNE SUB SM CANC FRZN MORSELIZED W/ VIABLE CELL: Type: IMPLANTABLE DEVICE | Site: SPINE CERVICAL | Status: FUNCTIONAL

## 2019-02-07 DEVICE — CAGE SPNL SM W14XH7XL17MM 7DEG ANTR CERV INTBDY FUS LORDTC: Type: IMPLANTABLE DEVICE | Site: SPINE CERVICAL | Status: FUNCTIONAL

## 2019-02-07 RX ORDER — DEXAMETHASONE SODIUM PHOSPHATE 4 MG/ML
INJECTION, SOLUTION INTRA-ARTICULAR; INTRALESIONAL; INTRAMUSCULAR; INTRAVENOUS; SOFT TISSUE AS NEEDED
Status: DISCONTINUED | OUTPATIENT
Start: 2019-02-07 | End: 2019-02-07 | Stop reason: HOSPADM

## 2019-02-07 RX ORDER — SODIUM CHLORIDE 0.9 % (FLUSH) 0.9 %
5-40 SYRINGE (ML) INJECTION EVERY 8 HOURS
Status: DISCONTINUED | OUTPATIENT
Start: 2019-02-07 | End: 2019-02-07 | Stop reason: HOSPADM

## 2019-02-07 RX ORDER — INSULIN LISPRO 100 [IU]/ML
INJECTION, SOLUTION INTRAVENOUS; SUBCUTANEOUS ONCE
Status: DISCONTINUED | OUTPATIENT
Start: 2019-02-07 | End: 2019-02-07 | Stop reason: HOSPADM

## 2019-02-07 RX ORDER — GLYCOPYRROLATE 0.2 MG/ML
INJECTION INTRAMUSCULAR; INTRAVENOUS AS NEEDED
Status: DISCONTINUED | OUTPATIENT
Start: 2019-02-07 | End: 2019-02-07 | Stop reason: HOSPADM

## 2019-02-07 RX ORDER — DIPHENHYDRAMINE HYDROCHLORIDE 50 MG/ML
12.5 INJECTION, SOLUTION INTRAMUSCULAR; INTRAVENOUS
Status: DISCONTINUED | OUTPATIENT
Start: 2019-02-07 | End: 2019-02-08 | Stop reason: HOSPADM

## 2019-02-07 RX ORDER — ROCURONIUM BROMIDE 10 MG/ML
INJECTION, SOLUTION INTRAVENOUS AS NEEDED
Status: DISCONTINUED | OUTPATIENT
Start: 2019-02-07 | End: 2019-02-07 | Stop reason: HOSPADM

## 2019-02-07 RX ORDER — ONDANSETRON 2 MG/ML
4 INJECTION INTRAMUSCULAR; INTRAVENOUS
Status: DISCONTINUED | OUTPATIENT
Start: 2019-02-07 | End: 2019-02-08 | Stop reason: HOSPADM

## 2019-02-07 RX ORDER — DULOXETIN HYDROCHLORIDE 60 MG/1
60 CAPSULE, DELAYED RELEASE ORAL 2 TIMES DAILY
Status: DISCONTINUED | OUTPATIENT
Start: 2019-02-08 | End: 2019-02-08 | Stop reason: HOSPADM

## 2019-02-07 RX ORDER — DIPHENHYDRAMINE HCL 25 MG
25 CAPSULE ORAL
Status: DISCONTINUED | OUTPATIENT
Start: 2019-02-07 | End: 2019-02-08 | Stop reason: HOSPADM

## 2019-02-07 RX ORDER — LIDOCAINE HYDROCHLORIDE 20 MG/ML
INJECTION, SOLUTION EPIDURAL; INFILTRATION; INTRACAUDAL; PERINEURAL AS NEEDED
Status: DISCONTINUED | OUTPATIENT
Start: 2019-02-07 | End: 2019-02-07 | Stop reason: HOSPADM

## 2019-02-07 RX ORDER — LORAZEPAM 2 MG/ML
1 INJECTION INTRAMUSCULAR AS NEEDED
Status: DISCONTINUED | OUTPATIENT
Start: 2019-02-07 | End: 2019-02-07 | Stop reason: HOSPADM

## 2019-02-07 RX ORDER — LORAZEPAM 2 MG/ML
1 INJECTION INTRAMUSCULAR
Status: DISCONTINUED | OUTPATIENT
Start: 2019-02-07 | End: 2019-02-08 | Stop reason: HOSPADM

## 2019-02-07 RX ORDER — FENTANYL CITRATE 50 UG/ML
INJECTION, SOLUTION INTRAMUSCULAR; INTRAVENOUS AS NEEDED
Status: DISCONTINUED | OUTPATIENT
Start: 2019-02-07 | End: 2019-02-07 | Stop reason: HOSPADM

## 2019-02-07 RX ORDER — HYDROMORPHONE HYDROCHLORIDE 1 MG/ML
1 INJECTION, SOLUTION INTRAMUSCULAR; INTRAVENOUS; SUBCUTANEOUS
Status: DISCONTINUED | OUTPATIENT
Start: 2019-02-07 | End: 2019-02-08 | Stop reason: HOSPADM

## 2019-02-07 RX ORDER — SUCCINYLCHOLINE CHLORIDE 20 MG/ML
INJECTION INTRAMUSCULAR; INTRAVENOUS AS NEEDED
Status: DISCONTINUED | OUTPATIENT
Start: 2019-02-07 | End: 2019-02-07 | Stop reason: HOSPADM

## 2019-02-07 RX ORDER — PROMETHAZINE HYDROCHLORIDE 25 MG/ML
12.5 INJECTION, SOLUTION INTRAMUSCULAR; INTRAVENOUS AS NEEDED
Status: DISCONTINUED | OUTPATIENT
Start: 2019-02-07 | End: 2019-02-07 | Stop reason: HOSPADM

## 2019-02-07 RX ORDER — SODIUM CHLORIDE, SODIUM LACTATE, POTASSIUM CHLORIDE, CALCIUM CHLORIDE 600; 310; 30; 20 MG/100ML; MG/100ML; MG/100ML; MG/100ML
75 INJECTION, SOLUTION INTRAVENOUS CONTINUOUS
Status: DISCONTINUED | OUTPATIENT
Start: 2019-02-07 | End: 2019-02-07 | Stop reason: HOSPADM

## 2019-02-07 RX ORDER — GABAPENTIN 300 MG/1
600 CAPSULE ORAL 3 TIMES DAILY
Status: DISCONTINUED | OUTPATIENT
Start: 2019-02-07 | End: 2019-02-08 | Stop reason: HOSPADM

## 2019-02-07 RX ORDER — SODIUM CHLORIDE, SODIUM LACTATE, POTASSIUM CHLORIDE, CALCIUM CHLORIDE 600; 310; 30; 20 MG/100ML; MG/100ML; MG/100ML; MG/100ML
50 INJECTION, SOLUTION INTRAVENOUS CONTINUOUS
Status: DISCONTINUED | OUTPATIENT
Start: 2019-02-07 | End: 2019-02-07 | Stop reason: HOSPADM

## 2019-02-07 RX ORDER — METOPROLOL TARTRATE 5 MG/5ML
INJECTION INTRAVENOUS AS NEEDED
Status: DISCONTINUED | OUTPATIENT
Start: 2019-02-07 | End: 2019-02-07 | Stop reason: HOSPADM

## 2019-02-07 RX ORDER — MAGNESIUM SULFATE 100 %
4 CRYSTALS MISCELLANEOUS AS NEEDED
Status: DISCONTINUED | OUTPATIENT
Start: 2019-02-07 | End: 2019-02-07 | Stop reason: HOSPADM

## 2019-02-07 RX ORDER — FAMOTIDINE 20 MG/1
20 TABLET, FILM COATED ORAL ONCE
Status: COMPLETED | OUTPATIENT
Start: 2019-02-07 | End: 2019-02-07

## 2019-02-07 RX ORDER — POLYETHYLENE GLYCOL 3350 17 G/17G
17 POWDER, FOR SOLUTION ORAL DAILY
Status: DISCONTINUED | OUTPATIENT
Start: 2019-02-08 | End: 2019-02-07

## 2019-02-07 RX ORDER — NALOXONE HYDROCHLORIDE 0.4 MG/ML
0.4 INJECTION, SOLUTION INTRAMUSCULAR; INTRAVENOUS; SUBCUTANEOUS AS NEEDED
Status: DISCONTINUED | OUTPATIENT
Start: 2019-02-07 | End: 2019-02-08 | Stop reason: HOSPADM

## 2019-02-07 RX ORDER — ONDANSETRON 2 MG/ML
4 INJECTION INTRAMUSCULAR; INTRAVENOUS ONCE
Status: DISCONTINUED | OUTPATIENT
Start: 2019-02-07 | End: 2019-02-07 | Stop reason: HOSPADM

## 2019-02-07 RX ORDER — LOSARTAN POTASSIUM 50 MG/1
75 TABLET ORAL DAILY
Status: DISCONTINUED | OUTPATIENT
Start: 2019-02-08 | End: 2019-02-08 | Stop reason: HOSPADM

## 2019-02-07 RX ORDER — SODIUM CHLORIDE 0.9 % (FLUSH) 0.9 %
5-40 SYRINGE (ML) INJECTION AS NEEDED
Status: DISCONTINUED | OUTPATIENT
Start: 2019-02-07 | End: 2019-02-08 | Stop reason: HOSPADM

## 2019-02-07 RX ORDER — ALBUTEROL SULFATE 0.83 MG/ML
2.5 SOLUTION RESPIRATORY (INHALATION) AS NEEDED
Status: DISCONTINUED | OUTPATIENT
Start: 2019-02-07 | End: 2019-02-07 | Stop reason: HOSPADM

## 2019-02-07 RX ORDER — POLYETHYLENE GLYCOL 3350 17 G/17G
17 POWDER, FOR SOLUTION ORAL
Status: DISCONTINUED | OUTPATIENT
Start: 2019-02-07 | End: 2019-02-08 | Stop reason: HOSPADM

## 2019-02-07 RX ORDER — MIDAZOLAM HYDROCHLORIDE 1 MG/ML
INJECTION, SOLUTION INTRAMUSCULAR; INTRAVENOUS AS NEEDED
Status: DISCONTINUED | OUTPATIENT
Start: 2019-02-07 | End: 2019-02-07 | Stop reason: HOSPADM

## 2019-02-07 RX ORDER — ONDANSETRON 2 MG/ML
INJECTION INTRAMUSCULAR; INTRAVENOUS AS NEEDED
Status: DISCONTINUED | OUTPATIENT
Start: 2019-02-07 | End: 2019-02-07 | Stop reason: HOSPADM

## 2019-02-07 RX ORDER — LAMOTRIGINE 200 MG/1
200 TABLET ORAL EVERY EVENING
COMMUNITY

## 2019-02-07 RX ORDER — HYDROCHLOROTHIAZIDE 25 MG/1
25 TABLET ORAL DAILY
Status: DISCONTINUED | OUTPATIENT
Start: 2019-02-08 | End: 2019-02-08 | Stop reason: HOSPADM

## 2019-02-07 RX ORDER — LOSARTAN POTASSIUM 50 MG/1
75 TABLET ORAL DAILY
COMMUNITY
End: 2022-05-10

## 2019-02-07 RX ORDER — KETOROLAC TROMETHAMINE 30 MG/ML
INJECTION, SOLUTION INTRAMUSCULAR; INTRAVENOUS AS NEEDED
Status: DISCONTINUED | OUTPATIENT
Start: 2019-02-07 | End: 2019-02-07 | Stop reason: HOSPADM

## 2019-02-07 RX ORDER — DIPHENHYDRAMINE HYDROCHLORIDE 50 MG/ML
12.5 INJECTION, SOLUTION INTRAMUSCULAR; INTRAVENOUS
Status: DISCONTINUED | OUTPATIENT
Start: 2019-02-07 | End: 2019-02-07 | Stop reason: HOSPADM

## 2019-02-07 RX ORDER — ACETAMINOPHEN 500 MG
1000 TABLET ORAL EVERY 6 HOURS
Status: DISCONTINUED | OUTPATIENT
Start: 2019-02-07 | End: 2019-02-08 | Stop reason: HOSPADM

## 2019-02-07 RX ORDER — DEXTROSE 50 % IN WATER (D50W) INTRAVENOUS SYRINGE
25-50 AS NEEDED
Status: DISCONTINUED | OUTPATIENT
Start: 2019-02-07 | End: 2019-02-07 | Stop reason: HOSPADM

## 2019-02-07 RX ORDER — NALOXONE HYDROCHLORIDE 0.4 MG/ML
0.04 INJECTION, SOLUTION INTRAMUSCULAR; INTRAVENOUS; SUBCUTANEOUS AS NEEDED
Status: DISCONTINUED | OUTPATIENT
Start: 2019-02-07 | End: 2019-02-07 | Stop reason: HOSPADM

## 2019-02-07 RX ORDER — OXYCODONE HYDROCHLORIDE 5 MG/1
5-10 TABLET ORAL
Status: DISCONTINUED | OUTPATIENT
Start: 2019-02-07 | End: 2019-02-08 | Stop reason: HOSPADM

## 2019-02-07 RX ORDER — NEOSTIGMINE METHYLSULFATE 1 MG/ML
INJECTION INTRAVENOUS AS NEEDED
Status: DISCONTINUED | OUTPATIENT
Start: 2019-02-07 | End: 2019-02-07 | Stop reason: HOSPADM

## 2019-02-07 RX ORDER — DEXTROSE, SODIUM CHLORIDE, AND POTASSIUM CHLORIDE 5; .45; .15 G/100ML; G/100ML; G/100ML
25 INJECTION INTRAVENOUS CONTINUOUS
Status: DISCONTINUED | OUTPATIENT
Start: 2019-02-07 | End: 2019-02-08 | Stop reason: HOSPADM

## 2019-02-07 RX ORDER — DULOXETIN HYDROCHLORIDE 60 MG/1
60 CAPSULE, DELAYED RELEASE ORAL 2 TIMES DAILY
COMMUNITY

## 2019-02-07 RX ORDER — VANCOMYCIN HYDROCHLORIDE 1 G/20ML
INJECTION, POWDER, LYOPHILIZED, FOR SOLUTION INTRAVENOUS AS NEEDED
Status: DISCONTINUED | OUTPATIENT
Start: 2019-02-07 | End: 2019-02-07 | Stop reason: HOSPADM

## 2019-02-07 RX ORDER — SODIUM CHLORIDE 0.9 % (FLUSH) 0.9 %
5-40 SYRINGE (ML) INJECTION AS NEEDED
Status: DISCONTINUED | OUTPATIENT
Start: 2019-02-07 | End: 2019-02-07 | Stop reason: HOSPADM

## 2019-02-07 RX ORDER — LAMOTRIGINE 100 MG/1
200 TABLET ORAL EVERY EVENING
Status: DISCONTINUED | OUTPATIENT
Start: 2019-02-08 | End: 2019-02-08 | Stop reason: HOSPADM

## 2019-02-07 RX ORDER — LAMOTRIGINE 100 MG/1
100 TABLET ORAL DAILY
Status: DISCONTINUED | OUTPATIENT
Start: 2019-02-08 | End: 2019-02-08 | Stop reason: HOSPADM

## 2019-02-07 RX ORDER — METHYLENE BLUE 10 MG/ML
INJECTION INTRAVENOUS AS NEEDED
Status: DISCONTINUED | OUTPATIENT
Start: 2019-02-07 | End: 2019-02-07 | Stop reason: HOSPADM

## 2019-02-07 RX ORDER — DIAZEPAM 5 MG/1
5 TABLET ORAL
Status: DISCONTINUED | OUTPATIENT
Start: 2019-02-07 | End: 2019-02-08 | Stop reason: HOSPADM

## 2019-02-07 RX ORDER — LIDOCAINE HYDROCHLORIDE 10 MG/ML
0.1 INJECTION, SOLUTION EPIDURAL; INFILTRATION; INTRACAUDAL; PERINEURAL AS NEEDED
Status: DISCONTINUED | OUTPATIENT
Start: 2019-02-07 | End: 2019-02-07 | Stop reason: HOSPADM

## 2019-02-07 RX ORDER — SODIUM CHLORIDE 0.9 % (FLUSH) 0.9 %
5-40 SYRINGE (ML) INJECTION EVERY 8 HOURS
Status: DISCONTINUED | OUTPATIENT
Start: 2019-02-07 | End: 2019-02-08 | Stop reason: HOSPADM

## 2019-02-07 RX ORDER — HYDROMORPHONE HYDROCHLORIDE 2 MG/ML
INJECTION, SOLUTION INTRAMUSCULAR; INTRAVENOUS; SUBCUTANEOUS AS NEEDED
Status: DISCONTINUED | OUTPATIENT
Start: 2019-02-07 | End: 2019-02-07 | Stop reason: HOSPADM

## 2019-02-07 RX ORDER — LAMOTRIGINE 25 MG/1
25 TABLET ORAL DAILY
Status: DISCONTINUED | OUTPATIENT
Start: 2019-02-08 | End: 2019-02-07 | Stop reason: DRUGHIGH

## 2019-02-07 RX ORDER — LABETALOL HYDROCHLORIDE 5 MG/ML
INJECTION, SOLUTION INTRAVENOUS AS NEEDED
Status: DISCONTINUED | OUTPATIENT
Start: 2019-02-07 | End: 2019-02-07 | Stop reason: HOSPADM

## 2019-02-07 RX ORDER — HYDROMORPHONE HYDROCHLORIDE 2 MG/ML
0.5 INJECTION, SOLUTION INTRAMUSCULAR; INTRAVENOUS; SUBCUTANEOUS
Status: DISCONTINUED | OUTPATIENT
Start: 2019-02-07 | End: 2019-02-07 | Stop reason: HOSPADM

## 2019-02-07 RX ORDER — SODIUM CHLORIDE 9 MG/ML
INJECTION, SOLUTION INTRAVENOUS
Status: DISCONTINUED | OUTPATIENT
Start: 2019-02-07 | End: 2019-02-07 | Stop reason: HOSPADM

## 2019-02-07 RX ORDER — PROPOFOL 10 MG/ML
INJECTION, EMULSION INTRAVENOUS AS NEEDED
Status: DISCONTINUED | OUTPATIENT
Start: 2019-02-07 | End: 2019-02-07 | Stop reason: HOSPADM

## 2019-02-07 RX ORDER — ESMOLOL HYDROCHLORIDE 10 MG/ML
INJECTION INTRAVENOUS AS NEEDED
Status: DISCONTINUED | OUTPATIENT
Start: 2019-02-07 | End: 2019-02-07 | Stop reason: HOSPADM

## 2019-02-07 RX ORDER — CEFAZOLIN SODIUM 2 G/50ML
2 SOLUTION INTRAVENOUS ONCE
Status: DISCONTINUED | OUTPATIENT
Start: 2019-02-07 | End: 2019-02-07 | Stop reason: DRUGHIGH

## 2019-02-07 RX ORDER — BUPROPION HYDROCHLORIDE 100 MG/1
200 TABLET, EXTENDED RELEASE ORAL 2 TIMES DAILY
Status: DISCONTINUED | OUTPATIENT
Start: 2019-02-07 | End: 2019-02-08 | Stop reason: HOSPADM

## 2019-02-07 RX ADMIN — GABAPENTIN 600 MG: 300 CAPSULE ORAL at 21:57

## 2019-02-07 RX ADMIN — LIDOCAINE HYDROCHLORIDE 80 MG: 20 INJECTION, SOLUTION EPIDURAL; INFILTRATION; INTRACAUDAL; PERINEURAL at 08:29

## 2019-02-07 RX ADMIN — SODIUM CHLORIDE, SODIUM LACTATE, POTASSIUM CHLORIDE, AND CALCIUM CHLORIDE: 600; 310; 30; 20 INJECTION, SOLUTION INTRAVENOUS at 09:53

## 2019-02-07 RX ADMIN — OXYCODONE HYDROCHLORIDE 10 MG: 5 TABLET ORAL at 13:36

## 2019-02-07 RX ADMIN — LORAZEPAM 0.5 MG: 2 INJECTION INTRAMUSCULAR; INTRAVENOUS at 11:51

## 2019-02-07 RX ADMIN — HYDROMORPHONE HYDROCHLORIDE 1 MG: 2 INJECTION, SOLUTION INTRAMUSCULAR; INTRAVENOUS; SUBCUTANEOUS at 11:03

## 2019-02-07 RX ADMIN — SUCCINYLCHOLINE CHLORIDE 160 MG: 20 INJECTION INTRAMUSCULAR; INTRAVENOUS at 08:29

## 2019-02-07 RX ADMIN — NEOSTIGMINE METHYLSULFATE 5 MG: 1 INJECTION INTRAVENOUS at 10:59

## 2019-02-07 RX ADMIN — SODIUM CHLORIDE, SODIUM LACTATE, POTASSIUM CHLORIDE, AND CALCIUM CHLORIDE 75 ML/HR: 600; 310; 30; 20 INJECTION, SOLUTION INTRAVENOUS at 07:30

## 2019-02-07 RX ADMIN — FENTANYL CITRATE 100 MCG: 50 INJECTION, SOLUTION INTRAMUSCULAR; INTRAVENOUS at 08:29

## 2019-02-07 RX ADMIN — METOPROLOL TARTRATE 2 MG: 5 INJECTION INTRAVENOUS at 08:57

## 2019-02-07 RX ADMIN — ACETAMINOPHEN 1000 MG: 500 TABLET ORAL at 13:36

## 2019-02-07 RX ADMIN — ACETAMINOPHEN 1000 MG: 500 TABLET ORAL at 18:17

## 2019-02-07 RX ADMIN — CEFAZOLIN SODIUM IN SODIUM CHLORIDE 0.9% IV SOLN 3 GM/100ML 3 G: 3-0.9/1 SOLUTION at 08:23

## 2019-02-07 RX ADMIN — Medication 10 ML: at 21:59

## 2019-02-07 RX ADMIN — OXYCODONE HYDROCHLORIDE 10 MG: 5 TABLET ORAL at 18:16

## 2019-02-07 RX ADMIN — ROCURONIUM BROMIDE 45 MG: 10 INJECTION, SOLUTION INTRAVENOUS at 08:37

## 2019-02-07 RX ADMIN — GLYCOPYRROLATE 0.2 MG: 0.2 INJECTION INTRAMUSCULAR; INTRAVENOUS at 08:23

## 2019-02-07 RX ADMIN — SODIUM CHLORIDE, SODIUM LACTATE, POTASSIUM CHLORIDE, AND CALCIUM CHLORIDE: 600; 310; 30; 20 INJECTION, SOLUTION INTRAVENOUS at 08:23

## 2019-02-07 RX ADMIN — MIDAZOLAM HYDROCHLORIDE 2 MG: 1 INJECTION, SOLUTION INTRAMUSCULAR; INTRAVENOUS at 08:23

## 2019-02-07 RX ADMIN — LABETALOL HYDROCHLORIDE 10 MG: 5 INJECTION, SOLUTION INTRAVENOUS at 10:45

## 2019-02-07 RX ADMIN — FENTANYL CITRATE 50 MCG: 50 INJECTION, SOLUTION INTRAMUSCULAR; INTRAVENOUS at 08:37

## 2019-02-07 RX ADMIN — OXYCODONE HYDROCHLORIDE 10 MG: 5 TABLET ORAL at 21:58

## 2019-02-07 RX ADMIN — FENTANYL CITRATE 50 MCG: 50 INJECTION, SOLUTION INTRAMUSCULAR; INTRAVENOUS at 10:47

## 2019-02-07 RX ADMIN — FENTANYL CITRATE 50 MCG: 50 INJECTION, SOLUTION INTRAMUSCULAR; INTRAVENOUS at 09:01

## 2019-02-07 RX ADMIN — ONDANSETRON 4 MG: 2 INJECTION INTRAMUSCULAR; INTRAVENOUS at 10:59

## 2019-02-07 RX ADMIN — GABAPENTIN 600 MG: 300 CAPSULE ORAL at 16:06

## 2019-02-07 RX ADMIN — ROCURONIUM BROMIDE 10 MG: 10 INJECTION, SOLUTION INTRAVENOUS at 10:04

## 2019-02-07 RX ADMIN — BUPROPION HYDROCHLORIDE 200 MG: 100 TABLET, FILM COATED, EXTENDED RELEASE ORAL at 18:17

## 2019-02-07 RX ADMIN — PROPOFOL 160 MG: 10 INJECTION, EMULSION INTRAVENOUS at 08:29

## 2019-02-07 RX ADMIN — DEXTROSE MONOHYDRATE, SODIUM CHLORIDE, AND POTASSIUM CHLORIDE 25 ML/HR: 50; 4.5; 1.49 INJECTION, SOLUTION INTRAVENOUS at 16:06

## 2019-02-07 RX ADMIN — SODIUM CHLORIDE: 9 INJECTION, SOLUTION INTRAVENOUS at 08:26

## 2019-02-07 RX ADMIN — ROCURONIUM BROMIDE 5 MG: 10 INJECTION, SOLUTION INTRAVENOUS at 08:29

## 2019-02-07 RX ADMIN — CEFAZOLIN 3 G: 1 INJECTION, POWDER, FOR SOLUTION INTRAMUSCULAR; INTRAVENOUS; PARENTERAL at 23:36

## 2019-02-07 RX ADMIN — ONDANSETRON 4 MG: 2 INJECTION INTRAMUSCULAR; INTRAVENOUS at 08:23

## 2019-02-07 RX ADMIN — GLYCOPYRROLATE 0.6 MG: 0.2 INJECTION INTRAMUSCULAR; INTRAVENOUS at 10:59

## 2019-02-07 RX ADMIN — ESMOLOL HYDROCHLORIDE 20 MG: 10 INJECTION INTRAVENOUS at 08:55

## 2019-02-07 RX ADMIN — TRAZODONE HYDROCHLORIDE 150 MG: 100 TABLET ORAL at 21:58

## 2019-02-07 RX ADMIN — FAMOTIDINE 20 MG: 20 TABLET ORAL at 07:30

## 2019-02-07 RX ADMIN — KETOROLAC TROMETHAMINE 30 MG: 30 INJECTION, SOLUTION INTRAMUSCULAR; INTRAVENOUS at 10:59

## 2019-02-07 RX ADMIN — FENTANYL CITRATE 100 MCG: 50 INJECTION, SOLUTION INTRAMUSCULAR; INTRAVENOUS at 10:09

## 2019-02-07 RX ADMIN — LABETALOL HYDROCHLORIDE 10 MG: 5 INJECTION, SOLUTION INTRAVENOUS at 10:55

## 2019-02-07 RX ADMIN — ACETAMINOPHEN 1000 MG: 500 TABLET ORAL at 23:36

## 2019-02-07 RX ADMIN — CEFAZOLIN 3 G: 1 INJECTION, POWDER, FOR SOLUTION INTRAMUSCULAR; INTRAVENOUS; PARENTERAL at 16:12

## 2019-02-07 RX ADMIN — FENTANYL CITRATE 50 MCG: 50 INJECTION, SOLUTION INTRAMUSCULAR; INTRAVENOUS at 10:30

## 2019-02-07 RX ADMIN — SODIUM CHLORIDE, SODIUM LACTATE, POTASSIUM CHLORIDE, AND CALCIUM CHLORIDE 50 ML/HR: 600; 310; 30; 20 INJECTION, SOLUTION INTRAVENOUS at 12:08

## 2019-02-07 RX ADMIN — DEXAMETHASONE SODIUM PHOSPHATE 10 MG: 4 INJECTION, SOLUTION INTRA-ARTICULAR; INTRALESIONAL; INTRAMUSCULAR; INTRAVENOUS; SOFT TISSUE at 08:29

## 2019-02-07 RX ADMIN — FENTANYL CITRATE 50 MCG: 50 INJECTION, SOLUTION INTRAMUSCULAR; INTRAVENOUS at 11:03

## 2019-02-07 NOTE — ANESTHESIA POSTPROCEDURE EVALUATION
HPI:    Hi Kinney is a 21 year old male  who presents to clinic today for UTI concern.    States he is concerned about an UTI.  Symptoms started yesterday including intermittent burning with urination and frequency.  Pain only with urge to urinate, no pain during urination or afterwards.  No blood noted with urination.  No pain, pressure or swelling in penis or scrotum.  States both testes are same size and not painful.  No penile discharge.  No pain with sitting.  No new sexual partners.  No STD concerns.  No change in bowel habits, no diarrhea or constipation, no blood in stools.  No abdominal pain or cramping.  No nausea or vomiting.  No change in appetite.  Avoids sugared drinks.  Drinking plenty of fluids.  No fevers or chills.  No back pain.  Hx of recent kidney stones.      Took Aleve last night.        History reviewed. No pertinent past medical history.  Past Surgical History:   Procedure Laterality Date     OTHER SURGICAL HISTORY      [id number],SKIN GRAFT,Right,calf     TONSILLECTOMY      No Comments Provided     Social History     Tobacco Use     Smoking status: Passive Smoke Exposure - Never Smoker     Smokeless tobacco: Never Used     Tobacco comment: Quit smoking: family members smoke   Substance Use Topics     Alcohol use: Yes     Comment: rare     Current Outpatient Medications   Medication Sig Dispense Refill     ibuprofen (ADVIL/MOTRIN) 800 MG tablet Take 1 tablet (800 mg) by mouth every 8 hours as needed for moderate pain (Patient not taking: Reported on 1/2/2019) 30 tablet 0     triamcinolone (KENALOG) 0.1 % ointment Apply sparingly to affected area three times daily for 14 days. Mix with nystatin cream, enough to cover (Patient not taking: Reported on 1/2/2019) 30 g 0     Allergies   Allergen Reactions     Amoxicillin-Pot Clavulanate      Other reaction(s): *Unknown - Childhood Rxn     Cefprozil      Other reaction(s): *Unknown - Childhood Rxn         Past medical history, past surgical  Procedure(s): ANTERIOR CERVICAL DISCECTOMY WITH FUSION C5/6 C6/7 C-ARM/NUVASIVE. Anesthesia Post Evaluation Multimodal analgesia: multimodal analgesia used between 6 hours prior to anesthesia start to PACU discharge Patient location during evaluation: bedside Patient participation: complete - patient participated Level of consciousness: awake Pain management: adequate Airway patency: patent Anesthetic complications: no 
Cardiovascular status: stable Respiratory status: acceptable Hydration status: acceptable Post anesthesia nausea and vomiting:  controlled Visit Vitals /68 Pulse 98 Temp 37.1 °C (98.8 °F) Resp 20 Ht 5' 9\" (1.753 m) Wt 133.4 kg (294 lb) SpO2 95% BMI 43.42 kg/m² history, current medications and allergies reviewed and accurate to the best of my knowledge.        ROS:  Refer to HPI    /80   Pulse 85   Temp 97.4  F (36.3  C) (Tympanic)   Wt 101.2 kg (223 lb 3.2 oz)   SpO2 100%   BMI 33.44 kg/m      EXAM:  General Appearance: Well appearing adult male, appropriate appearance for age. No acute distress  Respiratory: normal chest wall and respirations.  Normal effort.  Clear to auscultation bilaterally, no wheezing, crackles or rhonchi.  No increased work of breathing.  No cough appreciated, oxygen saturation 100%  Cardiac: RRR with no murmurs  Abdomen: soft, nontender, no masses or organomegally, no rebound tenderness or guarding, normal bowel sounds present  :  No suprapubic tenderness to palpation.  No CVA tenderness to palpation.     male:  Normal appearing circumcised penis without swelling, erythema or irritation.  No penile discharge.  Scrotum without swelling or asymmetry.    Musculoskeletal:  Normal gait.  Equal movement of bilateral upper extremities.  Equal movement of bilateral lower extremities.    Dermatological: no rashes noted of exposed skin  Psychological: normal affect, alert and pleasant      Labs:  Results for orders placed or performed in visit on 01/27/19   *UA reflex to Microscopic   Result Value Ref Range    Color Urine Yellow     Appearance Urine Clear     Glucose Urine Negative NEG^Negative mg/dL    Bilirubin Urine Negative NEG^Negative    Ketones Urine Negative NEG^Negative mg/dL    Specific Gravity Urine 1.010 1.000 - 1.030    Blood Urine Small (A) NEG^Negative    pH Urine 6.5 5.0 - 9.0 pH    Protein Albumin Urine Negative NEG^Negative mg/dL    Urobilinogen Urine 0.2 0.2 - 1.0 EU/dL    Nitrite Urine Negative NEG^Negative    Leukocyte Esterase Urine Negative NEG^Negative    Source Midstream Urine    Urine Microscopic   Result Value Ref Range    WBC Urine 0 - 5 OTO5^0 - 5 /HPF    RBC Urine 2-5 (A) OTO2^O - 2 /HPF   GC/Chlamydia by PCR    Result Value Ref Range    Specimen Source Midstream Urine     Neisseria gonorrhoreae PCR Not Detected NDET^Not Detected    Chlamydia Trachomatis PCR Not Detected NDET^Not Detected             ASSESSMENT/PLAN:  1. Urinary problem    - *UA reflex to Microscopic  - Urine Microscopic    Urinalysis - small blood  Urine microscopic - trace RBCs, no WBCs, no bacteria    Discussed with patient he likely has a small stone or had a small stone that caused irritation.  No clinical indication of infection.    Push fluids    Follow up if worsening or concerns.      2. Screen for STD (sexually transmitted disease)    - GC/Chlamydia by PCR    Negative Gonorrhea test    Negative Chlamydia test     3. Microscopic hematuria    Urinalysis - small blood  Urine microscopic - trace RBCs, no WBCs, no bacteria    Discussed with patient he likely has a small stone or had a small stone that caused irritation.  No clinical indication of infection.    Push fluids    Follow up if worsening or concerns.      Jill Guidry NP on 1/27/2019 at 7:52 PM

## 2019-02-07 NOTE — PROGRESS NOTES
conducted a pre-surgery visit with Ander Anguiano, who is a 37 y.o.,female. The  provided the following Interventions:  Initiated a relationship of care and support. Plan:  Chaplains will continue to follow and will provide pastoral care on an as needed/requested basis.  recommends bedside caregivers page  on duty if patient shows signs of acute spiritual or emotional distress.     [address] [facility] Certified [address] [place] [address]   [phone]

## 2019-02-07 NOTE — BRIEF OP NOTE
BRIEF OPERATIVE NOTE    Date of Procedure: 2/7/2019   Preoperative Diagnosis: Cervical spondylosis with myelopathy [M47.12]  Postoperative Diagnosis: Cervical spondylosis with myelopathy [M47.12]    Procedure(s):  ANTERIOR CERVICAL DISCECTOMY WITH FUSION C5/6 C6/7 C-ARM/NUVASIVE  Surgeon(s) and Role:     Yesi Olivares MD - Primary         Surgical Assistant: 0    Surgical Staff:  Circ-1: Graciela Springer RN  Radiology Technician: RT Jonathan  Scrub Tech-1: Tawnya Avendano  Surg Asst-1: Victor Hugo Campos  Event Time In Time Out   Incision Start 7274    Incision Close       Anesthesia: General   Estimated Blood Loss: 10  Specimens: * No specimens in log *   Findings: severe stenosis   Complications: 0  Implants:   Implant Name Type Inv.  Item Serial No.  Lot No. LRB No. Used Action   GRAFT BNE ELITE RICARDO  --  - [id number]  GRAFT BNE EL[employer]ARDO  --  [id number] MUSCULOSKELETAL TRANS 1610 N/A 1 Implanted   GRAFT BNE EL[employer]ARD[employer]  --  - [id number]  GRAFT BNE EL[employer]ARD[employer]  --  [id number] MUSCULOSKELETAL TRANS 1610 N/A 1 Implanted   SPACER LORDTC SI 17X14-8MM -- COROENT - [id number]  SPACER LORDTC SI 17X14-8MM -- COROENT  NUVASIVE N/A N/A 1 Implanted   SCR BNE SPNE 4.0X12MM --  - [id number]  SCR BNE SPNE 4.0X12MM --   NUVASIVE N/A N/A 6 Implanted   SPACER LORDTC SI 17X14-7MM -- COROENT - [id number]  SPACER LORDTC SI 17X14-7MM -- [employer]OENT  NUVASIVE N/A N/A 1 Implanted

## 2019-02-07 NOTE — PROGRESS NOTES
Date of Surgery: OR today  Surgery:  ACDF C5/6 6/7  VSS  Wound: Dressing clean, dry and intact  ADAN: to suction, about 10 mL in ADAN at this time. : due to void  PT: 20 ft w RW  Swallowing ok. Neuro intact. Moving all extremities. 4/5 strength to BUE. Pre op pain: neck pain, low cervical radiculopathy and a wandering gait  Post op pain: patient states she can tell the pain is a different pain.      Sunday Tobin, NP

## 2019-02-07 NOTE — ANESTHESIA PREPROCEDURE EVALUATION
Anesthetic History No history of anesthetic complications Review of Systems / Medical History Patient summary reviewed and pertinent labs reviewed Pulmonary Smoker Neuro/Psych Within defined limits Cardiovascular Hypertension GI/Hepatic/Renal 
  
 
 
 
 
 
 Endo/Other Morbid obesity Other Findings Physical Exam 
 
Airway Mallampati: III 
TM Distance: 4 - 6 cm Neck ROM: normal range of motion Mouth opening: Diminished (comment) Cardiovascular Rhythm: regular Rate: normal 
 
 
 
 Dental 
 
Dentition: Poor dentition and Lower partial plate Pulmonary Breath sounds clear to auscultation Abdominal 
GI exam deferred Other Findings Anesthetic Plan ASA: 3 Anesthesia type: general 
 
 
 
 
Induction: Intravenous Anesthetic plan and risks discussed with: Patient

## 2019-02-07 NOTE — PROGRESS NOTES
Problem: Mobility Impaired (Adult and Pediatric)  Goal: *Acute Goals and Plan of Care (Insert Text)  Physical Therapy Goals  Initiated 2/7/2019 and to be accomplished within 7 day(s)  1. Patient will move from supine to sit and sit to supine  in bed with independence. 2.  Patient will transfer from bed to chair and chair to bed with modified independence using the least restrictive device. 3.  Patient will perform sit to stand with modified independence. 4.  Patient will ambulate with modified independence for 300  feet with the least restrictive device. 5.  Patient will ascend/descend 4 stairs with no handrail(s) and appropriate AD prn with supervision/set-up. Outcome: Progressing Towards Goal  physical Therapy EVALUATION    Patient: Griselda Javier (37 y.o. female)  Date: 2/7/2019  Primary Diagnosis: Cervical spondylosis with myelopathy [M47.12]  Cervical myelopathy (HCC) [G95.9]  Cervical myelopathy (HCC) [G95.9]  Procedure(s) (LRB):  ANTERIOR CERVICAL DISCECTOMY WITH FUSION C5/6 C6/7 C-ARM/NUVASIVE (N/A) Day of Surgery   Precautions:   Fall, Spinal    OBJECTIVE/ASSESSMENT :  Based on the objective data described below, the patient presents with impaired functional mobility including bed mobility, transfers, ambulation, and general activity tolerance following admission for anterior cervical discectomy with fusion C5/6, C6/7. Pt cleared for therapy by nursing. Patient presented today semi-reclined in bed, agreeable to physical therapy evaluation. Patient transferred from semi-reclined position to sitting with min A. Pt displayed intact sitting balance at EOB. Pt performed sit-stand transfers with CGA and ambulated to bathroom x 10 feet with RW, then to recliner x 10 feet with RW CGA. Pt was able to transfer on/off toilet with SBA. She displayed good static and fair dynamic standing balance.   At conclusion of session, patient left sitting up in recliner with call bell in reach, needs met, and nurse notified. Education:   [x]         Bed mobility  [x]         Transfers  [x]         Ambulation  [x]         Assistive device management  []         Stairs  [x]         Body mechanics  [x]         Position change  []         Activity pacing/energy conservation  [x]         Other: post-op cervical spine precautions    Patient will benefit from skilled intervention to address the above impairments. Patients rehabilitation potential is considered to be Good  Factors which may influence rehabilitation potential include:   [x]         None noted  []         Mental ability/status  []         Medical condition  []         Home/family situation and support systems  []         Safety awareness  []         Pain tolerance/management  []         Other:      PLAN :  Recommendations and Planned Interventions:  [x]           Bed Mobility Training             [x]    Neuromuscular Re-Education  [x]           Transfer Training                   []    Orthotic/Prosthetic Training  [x]           Gait Training                          []    Modalities  [x]           Therapeutic Exercises          []    Edema Management/Control  [x]           Therapeutic Activities            [x]    Patient and Family Training/Education  []           Other (comment):    Frequency/Duration: Patient will be followed by physical therapy 1-2 times per day, 4-7 days per week to address goals. Discharge Recommendations: Home Health  Further Equipment Recommendations for Discharge: rolling walker     SUBJECTIVE:   Patient stated I am in so much pain right now.     OBJECTIVE DATA SUMMARY:     Past Medical History:   Diagnosis Date    Acute pain of left shoulder 11/1/2016    Bipolar 1 disorder ([facility]     Bipolar affective disorder, currently manic, moderate ([facility] 4/27/2016    Chronic midline low back pain with sciatica 11/1/2016    Cigarette nicotine dependence in remission 8/2/2016    Cigarette nicotine dependence without complication 49/42/6770    Continuous nicotine dependence 1/24/2017    Depression     Fibromyalgia 9/1/2015    HLD (hyperlipidemia) 2/4/2016    Hypertension     Hypertriglyceridemia 5/15/2018    Impaired fasting glucose 5/31/2016    Irritable bowel syndrome without diarrhea 4/27/2016    Moderate episode of recurrent major depressive disorder ([facility] 4/27/2016    Obesity, Class III, BMI 40-49.9 (morbid obesity) ([facility] 9/1/2015    Onychomycosis 4/27/2016    Pain of left thumb 11/1/2016    Prediabetes 7/5/2016    PTSD (post-traumatic stress disorder)     Smoker 9/1/2015    Vitamin D deficiency 5/31/2016     Past Surgical History:   Procedure Laterality Date    HX GYN Bilateral     tubal    HX ORTHOPAEDIC       Barriers to Learning/Limitations: None  Compensate with: N/A  Prior Level of Function/Home Situation: Pt lives with  in a 1 story home with 4 steps to enter, no railings. Pt ambulated independently in her home and community with a RW prior to this procedure. Home Situation  Home Environment: Private residence  # Steps to Enter: 4  Rails to Enter: No  One/Two Story Residence: One story  # of Interior Steps: (pt does not go to the 2nd floor)  Living Alone: No  Support Systems: Family member(s)  Patient Expects to be Discharged to[de-identified] Private residence  Current DME Used/Available at Home: zack Rosario  Critical Behavior:  Neurologic State: Alert  Psychosocial  Patient Behaviors: Calm; Cooperative  Strength:    Strength: Within functional limits(B LEs)  Tone & Sensation:    Tingling in bilateral hands    Range Of Motion:  AROM: Within functional limits(B LEs)  Functional Mobility:  Bed Mobility:  Supine to Sit: Contact guard assistance/ min A     Transfers:  Sit to Stand: Contact guard assistance  Stand to Sit: Contact guard assistance  Balance:   Sitting: Intact  Standing: Impaired; Without support  Standing - Static: Good  Standing - Dynamic : Fair  Ambulation/Gait Training:  Distance (ft): 20 Feet (ft)  Assistive Device: Walker, rolling  Ambulation - Level of Assistance: Stand-by assistance       Pain:  Pre session: 8 (cervical spine)  Post session: 8 (cerivical spine)  Activity Tolerance:   Fair  Please refer to the flowsheet for vital signs taken during this treatment. After treatment:   [x] Patient left in no apparent distress sitting up in chair  [] Patient left sitting on EOB  [] Patient left in no apparent distress in bed  [] Patient declined to be OOB at this time due to   [x] Call bell left within reach  [x] Nursing notified(Rain)  [x] Caregiver present  [] Bed alarm activated  [x] SCDs in place    COMMUNICATION/EDUCATION:   [x]         Fall prevention education was provided and the patient/caregiver indicated understanding. [x]         Patient/family have participated as able in goal setting and plan of care. [x]         Patient/family agree to work toward stated goals and plan of care. []         Patient understands intent and goals of therapy, but is neutral about his/her participation. []         Patient is unable to participate in goal setting and plan of care.     Thank you for this referral.  Ravi Tran, PT   Time Calculation: 27 mins      Eval Complexity: History: LOW Complexity : Zero comorbidities / personal factors that will impact the outcome / POCExam:LOW Complexity : 1-2 Standardized tests and measures addressing body structure, function, activity limitation and / or participation in recreation  Presentation: LOW Complexity : Stable, uncomplicated  Clinical Decision Making:Low Complexity   Overall Complexity:LOW

## 2019-02-07 NOTE — ROUTINE PROCESS
Patient admitted to floor from PACU. She is awake and alert. No distress noted. Patient reports numbness of both forearms prior to surg and remains now and c/o decreased sensation to Lt. Hand reported after surg.  are equal and moving all extremities without difficulty. Patient oriented to room and how to use call bell.

## 2019-02-07 NOTE — INTERVAL H&P NOTE
H&P Update: Claire Quispe was seen and examined. History and physical has been reviewed. The patient has been examined.  There have been no significant clinical changes since the completion of the originally dated History and Physical.    Signed By: Pawan Gar MD     February 7, 2019 6:28 AM

## 2019-02-07 NOTE — PERIOP NOTES
TRANSFER - OUT REPORT:    Verbal report given to Scott Blackman RN(name) on Earnest Esters  being transferred to [address] [place] [address](unit) for routine post - op       Report consisted of patients Situation, Background, Assessment and   Recommendations(SBAR). Information from the following report(s) SBAR, Kardex, OR Summary, Procedure Summary, Intake/Output, MAR, Recent Results and Med Rec Status was reviewed with the receiving nurse. Lines:   Peripheral IV 02/07/19 Left;Posterior Hand (Active)   Site Assessment Clean, dry, & intact 2/7/2019 11:10 AM   Phlebitis Assessment 0 2/7/2019 11:10 AM   Infiltration Assessment 0 2/7/2019 11:10 AM   Dressing Status Clean, dry, & intact 2/7/2019 11:10 AM   Dressing Type Tape;Transparent 2/7/2019 11:10 AM   Hub Color/Line Status Pink;Capped 2/7/2019 11:10 AM       Peripheral IV 02/07/19 Posterior;Right Hand (Active)   Site Assessment Clean, dry, & intact 2/7/2019 11:10 AM   Phlebitis Assessment 0 2/7/2019 11:10 AM   Infiltration Assessment 0 2/7/2019 11:10 AM   Dressing Status Clean, dry, & intact 2/7/2019 11:10 AM   Dressing Type Tape;Transparent 2/7/2019 11:10 AM   Hub Color/Line Status Pink; Infusing 2/7/2019 11:10 AM        Opportunity for questions and clarification was provided.       Patient transported with:   O2 @ 2 liters  Tech

## 2019-02-08 ENCOUNTER — HOME HEALTH ADMISSION (OUTPATIENT)
Dept: HOME HEALTH SERVICES | Facility: HOME HEALTH | Age: 44
End: 2019-02-08
Payer: MEDICAID

## 2019-02-08 VITALS
DIASTOLIC BLOOD PRESSURE: 95 MMHG | BODY MASS INDEX: 43.4 KG/M2 | HEART RATE: 97 BPM | OXYGEN SATURATION: 95 % | SYSTOLIC BLOOD PRESSURE: 163 MMHG | TEMPERATURE: 99.3 F | WEIGHT: 293 LBS | RESPIRATION RATE: 19 BRPM | HEIGHT: 69 IN

## 2019-02-08 PROCEDURE — 97530 THERAPEUTIC ACTIVITIES: CPT

## 2019-02-08 PROCEDURE — 97165 OT EVAL LOW COMPLEX 30 MIN: CPT

## 2019-02-08 PROCEDURE — 99218 HC RM OBSERVATION: CPT

## 2019-02-08 PROCEDURE — 97535 SELF CARE MNGMENT TRAINING: CPT

## 2019-02-08 PROCEDURE — 74011250636 HC RX REV CODE- 250/636: Performed by: ORTHOPAEDIC SURGERY

## 2019-02-08 PROCEDURE — 97116 GAIT TRAINING THERAPY: CPT

## 2019-02-08 PROCEDURE — 74011250637 HC RX REV CODE- 250/637: Performed by: ORTHOPAEDIC SURGERY

## 2019-02-08 RX ORDER — OXYCODONE AND ACETAMINOPHEN 5; 325 MG/1; MG/1
1 TABLET ORAL
Qty: 20 TAB | Refills: 0 | Status: SHIPPED | OUTPATIENT
Start: 2019-02-08 | End: 2019-02-14 | Stop reason: SDUPTHER

## 2019-02-08 RX ADMIN — OXYCODONE HYDROCHLORIDE 10 MG: 5 TABLET ORAL at 02:29

## 2019-02-08 RX ADMIN — GABAPENTIN 600 MG: 300 CAPSULE ORAL at 08:15

## 2019-02-08 RX ADMIN — CEFAZOLIN 3 G: 1 INJECTION, POWDER, FOR SOLUTION INTRAMUSCULAR; INTRAVENOUS; PARENTERAL at 08:16

## 2019-02-08 RX ADMIN — LAMOTRIGINE 100 MG: 100 TABLET ORAL at 08:15

## 2019-02-08 RX ADMIN — Medication 10 ML: at 05:54

## 2019-02-08 RX ADMIN — BUPROPION HYDROCHLORIDE 200 MG: 100 TABLET, FILM COATED, EXTENDED RELEASE ORAL at 08:15

## 2019-02-08 RX ADMIN — OXYCODONE HYDROCHLORIDE 10 MG: 5 TABLET ORAL at 14:14

## 2019-02-08 RX ADMIN — LOSARTAN POTASSIUM 75 MG: 50 TABLET ORAL at 08:14

## 2019-02-08 RX ADMIN — ACETAMINOPHEN 1000 MG: 500 TABLET ORAL at 05:51

## 2019-02-08 RX ADMIN — HYDROCHLOROTHIAZIDE 25 MG: 25 TABLET ORAL at 08:14

## 2019-02-08 RX ADMIN — DULOXETINE 60 MG: 60 CAPSULE, DELAYED RELEASE ORAL at 08:15

## 2019-02-08 NOTE — ROUTINE PROCESS
Bedside and Verbal shift change report given to Yoanna Lemos (oncoming nurse) by Alyssia Sharma RN (offgoing nurse). Report included the following information SBAR, Kardex, ED Summary, Procedure Summary, Intake/Output, MAR and Recent Results.

## 2019-02-08 NOTE — HOME CARE
Discharge noted for today , [facility] will follow for PT / Brayden  protocol ,pt has received RW, [facility] will follow. BRANNON GUILLERMO.

## 2019-02-08 NOTE — OP NOTES
[address] [place] [address]   OPERATIVE REPORT    Name:  Moi Robbins  MR#:   [id number]  :  1975  ACCOUNT #:  [de-identified]  DATE OF SERVICE:  2019    PREOPERATIVE DIAGNOSIS:  Cervical spondylotic  myeloradiculopathy. POSTOPERATIVE DIAGNOSIS:  Cervical spondylotic  myeloradiculopathy. PROCEDURE PERFORMED:  Anterior cervical decompression and  fusion, C5-C6; anterior cervical decompression and fusion,  C6-C7; invasive anterior cervical PEEK cage with  demineralized bone matrix and fixation screws x2, C5-C6 and  C6-C7. SURGEON:  Pasquale Gutierrez MD    ASSISTANT:  _____. ANESTHESIA:  General endotracheal.    COMPLICATIONS:  None. SPECIMENS REMOVED:  None. IMPLANTS:  Two NuVasive PEEK anterior cervical low-profile  cages with Arthrex demineralized bone matrix and anterior  fixation screws. ESTIMATED BLOOD LOSS:  Less than 25 mL. FINDINGS:  Surgery was extremely difficult due to the  patient's morbid obesity with a BMI of 43.4. We had to  localize level at C4 and countdown. Maximum working  distance of the instrumentation. Severe stenosis was  evident at C6-C7 with a central disk herniation moderate at  C5-C6. DESCRIPTION OF PROCEDURE:  Following induction of  endotracheal anesthesia, the patient was placed with the  head in neutral position in Sanchez head zamora. The  patient was prepped and draped in the usual fashion. Right  side approach was utilized. Surgical anatomy was difficult  because of the patient's morbid obesity. Sternocleidomastoid and great vessels were localized and  mobilized laterally. The esophagus and trachea were  mobilized medially with blunt finger dissection. The  prevertebral fascia was entered. C-arm imaging could not  visualize below the C4. I was able to count down the C4-C5  disk, and the C5-C6 and C6-C7 disks were localized. We  first incised C6-C7 with the maximum working depths of all  of our instrumentation.   Minimal use of a bur could be  utilized out of concern for safety at this working depths. [place] pins were placed in the bodies of C6 and C7. Cloward  self-retaining retractor was placed into the cover of longus  colli musculature bilaterally. Double pin technique was  utilized to stabilize retraction. With the retractors  safely placed and these were protected, an annulotomy was  done at C6-C7. A radical diskectomy done back to the  posterior margin. The distraction was afforded, and the  posterior marginal discal mass and ligamentous mass was  debrided with graded curettes and rongeurs. The vertebral  segments were undermined with 1-mm Kerrison, and the  posterior longitudinal ligament defined and resected. Minimal use of bur was utilized because of the depths and my  fear of accurate bur control given that we were actually I  felt beyond the safe working depths of the high-speed bur  given the patient's obesity. The posterior longitudinal  ligament was taken. The epidural space was decompressed  under direct visualization with the large extruded discal  mass resected. Endplates repaired. Consideration of a bone  graft with anterior plate versus 0 profile self-fixation  cage was considered. Given the difficulty of access or lack  of radiographic control, I felt the low-profile  self-fixation cage would be the safest and best fixation to  afford. This was filled with Arthrex demineralized bone  matrix, tamped firmly into place with excellent bony  apposition with screws affixing it to C6 and C7 with the  locking device engaged. Attention then was turned to C5-C6. The [place] pins were re-cited, and the self-retaining  retractor blades re-cited. Procedure was repeated. C5-C6  was a bit easier to access, not as deep. Again, a radical  diskectomy was done with posterior extruded discal material  removed, the posterior longitudinal ligament resected, the  epidural space decompressed under direct visualization.    Endplates repaired and marginal spurring resected. A #7 0  profile device was again filled with Arthrex demineralized  bone matrix and tamped firmly into place with excellent bony  apposition in C6 segment above and below with screw  fixation, locked tightly into the device utilizing the  torque device. The wound was copiously irrigated. There  was no evidence of bleeding. Fluoroscopic imaging was of no  utilization given the patient's habitus. The locking screws  were well recessed and was in the locking cage of the  device. Spine appeared to be quite stable. Bone wax was  utilized to fill the bleeding areas within the [place] pin  holes. There was no other evidence of any visceral bleeding  or injury or other pathology. After irrigation, vancomycin  powder was placed for infection prophylaxis. A deep drain  was utilized. The neck was then closed in layers and the  skin closed with a subcuticular suture and Dermabond. A  sterile occlusive dressing was placed upon the wound. All  counts were correct.       MD ANASTASIA [email or web address]  D:  02/07/2019 11:05  T:  02/07/2019 22:12  JOB #:  [id number]

## 2019-02-08 NOTE — PROGRESS NOTES
Discharge Planning    Discharge order noted for today. Pt has been accepted to [facility]. Met with pt and agreeable to the transition plan today. Transport has been arranged with spouse. P'ts discharge [facility] orders have been forwarded to cc/Link. CM will continue to monitor for transitional needs for a safe discharge.      CINTIA Whittaker, RN  Pager # [phone]  Care Manager

## 2019-02-08 NOTE — ROUTINE PROCESS
Patient ambulated to  [facility] voided 750 cc of yellow urine. Patient medicated for pain with roxicodone 10 m.  VSS

## 2019-02-08 NOTE — PROGRESS NOTES
Date of Surgery: Po day #1  Surgery: ACDF C5/6 6/7  VSS  Wound: Dressing clean, dry and intact  ADAN: 5 mL out since 10:50, TRAVIS ARELLANO  Swallowing ok. Neuro intact. Moving all extremities. 4/5 strength to BUE. Pre op pain:neck pain, low cervical radiculopathy and a wandering gait  Post op pain: patient is complaining of left mechanical shoulder pain. She has pain with rotation of left shoulder. Neck pain is controlled. Plan: DC home.      Jeramie Sorto, ISSAC

## 2019-02-08 NOTE — PROGRESS NOTES
Mobility Intervention:       [x] Pt dangled at edge of bed    [] Pt assisted OOB to bedside commode    [] Pt assisted OOB to chair    [x] Pt ambulated to bathroom    [x] Patient was ambulated in room/hallway    Assistive Device Utilized:       [] Rolling walker   [] Crutches   [] Straight Cane   [] Knee immobilizer   [x] IV pole    After Mobilization:     [] Patient left in no apparent distress sitting up in chair  [x] Patient left in no apparent distress in bed  [x] Call bell left within reach  [x] SCDs on & machine turned on  [] Ice applied  [x] RN notified  [] Caregiver present  [] Bed alarm activated    Reason patient not mobilized:      [] Patient refused   [] Nausea/vomiting   [] Low blood pressure   [] Drowsy/lethargic    Pain Rating:     [x] 0  [] 1  Assistive Device:        [] 2  [] 3  [] 4  [] 5  [] 6  Assistive Device:        [] 7  [] 8  [] 9  [] 10    Comments:

## 2019-02-08 NOTE — ROUTINE PROCESS
Pain score: 5/10    Name and time of last pain med given:  Roxicodone 10 mg 1816    Neuro status: Patient is AAOX4    Dressing/incision status:  Anterior neck OpSite dressimng dry and clean    ADAN/Hemovace output: 5cc emptied. ADAN in place draining serosanguinous liquid. Voiding status: Patient voiding with out difficulty. Mobility status: Patient sitting in the recliner    Other:Patient stated numbness in the left fore arm and persist., Patient move all extremities.

## 2019-02-08 NOTE — PROGRESS NOTES
vss afeb  Neuro improved  Doing well  Drain decreasing  Plan  Observe drain  Likely dc drain and home in afternoon

## 2019-02-08 NOTE — ROUTINE PROCESS
ADAN drain d/c'd and 4x4 with tegaderm cover applied. No drainage noted. No new neurovascular deficits noted. Patient continues to c/o Lt. Arm pain; new and numbness; not new. Butttery,NP in to see patient assessed patients complaints.

## 2019-02-08 NOTE — DISCHARGE SUMMARY
Discharge  Summary     Patient: Radha Garcia MRN: [id number]  SSN: [id number]    YOB: 1975  Age: 37 y.o.   Sex: female       Admit Date: 2/7/2019    Discharge Date: 2/8/2019      Admission Diagnoses: Cervical spondylosis with myelopathy [M47.12]  Cervical myelopathy ([facility] [G95.9]  Cervical myelopathy ([facility] [G95.9]    Discharge Diagnoses:   Problem List as of 2/8/2019 Date Reviewed: 2/7/2019          Codes Class Noted - Resolved    Cervical myelopathy ([facility] ICD-10-CM: G95.9  ICD-9-CM: 721.1  2/7/2019 - Present        Hypertriglyceridemia ICD-10-CM: E78.1  ICD-9-CM: 272.1  5/15/2018 - Present        Continuous nicotine dependence ICD-10-CM: F17.200  ICD-9-CM: 305.1  1/24/2017 - Present        Chronic midline low back pain with sciatica ICD-10-CM: M54.40, G89.29  ICD-9-CM: 724.2, 724.3, 338.29  11/1/2016 - Present        Prediabetes ICD-10-CM: R73.03  ICD-9-CM: 790.29  7/5/2016 - Present        Moderate episode of recurrent major depressive disorder ([facility] ICD-10-CM: F33.1  ICD-9-CM: 296.32  4/27/2016 - Present        Bipolar affective disorder, currently manic, moderate ([facility] ICD-10-CM: F31.12  ICD-9-CM: 296.42  4/27/2016 - Present        Onychomycosis ICD-10-CM: B35.1  ICD-9-CM: 110.1  4/27/2016 - Present        Irritable bowel syndrome without diarrhea ICD-10-CM: K58.9  ICD-9-CM: 564.1  4/27/2016 - Present        HTN (hypertension) ICD-10-CM: I10  ICD-9-CM: 401.9  12/29/2015 - Present        Obesity, Class III, BMI 40-49.9 (morbid obesity) ([facility] ICD-10-CM: E66.01  ICD-9-CM: 278.01  9/1/2015 - Present        Fibromyalgia ICD-10-CM: M79.7  ICD-9-CM: 729.1  9/1/2015 - Present    Overview Addendum 1/17/2017 10:53 AM by Darryl Carrizales MD     B/l shoulders and arms, b/l thighs, back, neck             RESOLVED: Pain of left thumb ICD-10-CM: M79.645  ICD-9-CM: 729.5  11/1/2016 - 1/15/2018               Discharge Condition: Good    Procedure: ACDF C5/6 6/7    Hospital Course: Normal hospital course for this procedure. Tolerated surgical intervention well. VSS Throughout. Neuro intact. Incision dry and intact, tolerating PO intake, voiding adequately. Ambulatory. Disposition: home    Discharge Medications:   Current Discharge Medication List      START taking these medications    Details   oxyCODONE-acetaminophen (PERCOCET) 5-325 mg per tablet Take 1 Tab by mouth every six (6) hours as needed for Pain. Max Daily Amount: 4 Tabs. Qty: 20 Tab, Refills: 0    Associated Diagnoses: S/P fusion of thoracic spine         CONTINUE these medications which have NOT CHANGED    Details   !! lamoTRIgine (LAMICTAL) 200 mg tablet Take 200 mg by mouth every evening. losartan (COZAAR) 50 mg tablet Take 75 mg by mouth daily. DULoxetine (CYMBALTA) 60 mg capsule Take 60 mg by mouth two (2) times a day.      gabapentin (NEURONTIN) 300 mg capsule Take 2 Caps by mouth three (3) times daily. Qty: 180 Cap, Refills: 2    Associated Diagnoses: Cervical myelopathy ([facility] Myelopathy concurrent with and due to spinal stenosis of cervical region [facility]); Lumbar radiculopathy      lidocaine (LIDODERM) 5 % APPLY 1 PATCH TO THE AFFECTED AREA EVERY DAY. LEAVE ON FOR 12 HOURS THEN REMOVE FOR 12 HOURS  Qty: 90 Patch, Refills: 3    Associated Diagnoses: Chronic midline low back pain with left-sided sciatica      polyethylene glycol (MIRALAX) 17 gram/dose powder MIX AND DRINK 17 GRAMS BY MOUTH DAILY  Qty: 1530 g, Refills: 3    Associated Diagnoses: Irritable bowel syndrome without diarrhea      hydroCHLOROthiazide (HYDRODIURIL) 25 mg tablet Take 1 Tab by mouth daily. Qty: 90 Tab, Refills: 3    Associated Diagnoses: Essential hypertension      !! lamoTRIgine (LAMICTAL) 100 mg tablet Take 100 mg by mouth daily. buPROPion SR (WELLBUTRIN, ZYBAN) 200 mg SR tablet 200 mg two (2) times a day. Refills: 1      traZODone (DESYREL) 100 mg tablet 150 mg every evening. Refills: 1       !! - Potential duplicate medications found.  Please discuss with provider. STOP taking these medications       meloxicam (MOBIC) 15 mg tablet Comments:   Reason for Stopping: Follow-up Appointments   Procedures    FOLLOW UP VISIT Appointment in: Two Weeks     Standing Status:   Standing     Number of Occurrences:   1     Order Specific Question:   Appointment in     Answer:    Two Weeks       Signed By: Dana Miranda NP     February 8, 2019

## 2019-02-08 NOTE — PROGRESS NOTES
Spoke with Nursing. ADAN has put out 5 mL since 10:50, serous. Will DC ADAN and DC home. Patient is swallowing with no difficulty per nursing.

## 2019-02-08 NOTE — DISCHARGE INSTRUCTIONS
DISCHARGE SUMMARY from Nurse    PATIENT INSTRUCTIONS:    After general anesthesia or intravenous sedation, for 24 hours or while taking prescription Narcotics:  · Limit your activities  · Do not drive and operate hazardous machinery  · Do not make important personal or business decisions  · Do  not drink alcoholic beverages  · If you have not urinated within 8 hours after discharge, please contact your surgeon on call. Report the following to your surgeon:  · Excessive pain, swelling, redness or odor of or around the surgical area  · Temperature over 100.5  · Nausea and vomiting lasting longer than 4 hours or if unable to take medications  · Any signs of decreased circulation or nerve impairment to extremity: change in color, persistent  numbness, tingling, coldness or increase pain  · Any questions    What to do at Home:  Recommended activity: Activity as tolerated, follow instructions given by physical & occupational therapist.    If you experience any of the following symptoms ,sse spine surgery discharge instructions provided, please follow up with Yaima Johnson. *  Please give a list of your current medications to your Primary Care Provider. *  Please update this list whenever your medications are discontinued, doses are      changed, or new medications (including over-the-counter products) are added. *  Please carry medication information at all times in case of emergency situations. These are general instructions for a healthy lifestyle:    No smoking/ No tobacco products/ Avoid exposure to second hand smoke  Surgeon General's Warning:  Quitting smoking now greatly reduces serious risk to your health.     Obesity, smoking, and sedentary lifestyle greatly increases your risk for illness    A healthy diet, regular physical exercise & weight monitoring are important for maintaining a healthy lifestyle    You may be retaining fluid if you have a history of heart failure or if you experience any of the following symptoms:  Weight gain of 3 pounds or more overnight or 5 pounds in a week, increased swelling in our hands or feet or shortness of breath while lying flat in bed. Please call your doctor as soon as you notice any of these symptoms; do not wait until your next office visit. Recognize signs and symptoms of STROKE:    F-face looks uneven    A-arms unable to move or move unevenly    S-speech slurred or non-existent    T-time-call 911 as soon as signs and symptoms begin-DO NOT go       Back to bed or wait to see if you get better-TIME IS BRAIN. Warning Signs of HEART ATTACK     Call 911 if you have these symptoms:   Chest discomfort. Most heart attacks involve discomfort in the center of the chest that lasts more than a few minutes, or that goes away and comes back. It can feel like uncomfortable pressure, squeezing, fullness, or pain.  Discomfort in other areas of the upper body. Symptoms can include pain or discomfort in one or both arms, the back, neck, jaw, or stomach.  Shortness of breath with or without chest discomfort.  Other signs may include breaking out in a cold sweat, nausea, or lightheadedness. Don't wait more than five minutes to call 911 - MINUTES MATTER! Fast action can save your life. Calling 911 is almost always the fastest way to get lifesaving treatment. Emergency Medical Services staff can begin treatment when they arrive -- up to an hour sooner than if someone gets to the hospital by car. Patient armband removed and shredded. The discharge information has been reviewed with the patient. The patient verbalized understanding. Discharge medications reviewed with the patient and appropriate educational materials and side effects teaching were provided. ___Eyevensys Activation    Thank you for requesting access to [employer]. Please follow the instructions below to securely access and download your online medical record.  Eyevensys allows you to send messages to your doctor, view your test results, renew your prescriptions, schedule appointments, and more. How Do I Sign Up? 1. In your internet browser, go to [email or web address]. 2. Click on the First Time User? Click Here link in the Sign In box. You will see the New Member Sign Up page. 3. Enter your Skytree Access Code exactly as it appears below. You will not need to use this code after youve completed the sign-up process. If you do not sign up before the expiration date, you must request a new code. MyChart Access Code: Activation code not generated  Current Skytree Status: Active (This is the date your Skytree access code will )    4. Enter the last four digits of your Social Security Number (xxxx) and Date of Birth (mm/dd/yyyy) as indicated and click Submit. You will be taken to the next sign-up page. 5. Create a Skytree ID. This will be your Skytree login ID and cannot be changed, so think of one that is secure and easy to remember. 6. Create a Skytree password. You can change your password at any time. 7. Enter your Password Reset Question and Answer. This can be used at a later time if you forget your password. 8. Enter your e-mail address. You will receive e-mail notification when new information is available in [address] 9. Click Sign Up. You can now view and download portions of your medical record. 10. Click the Download Summary menu link to download a portable copy of your medical information. Additional Information    If you have questions, please visit the Frequently Asked Questions section of the Skytree website at [email or web address] Remember, Skytree is NOT to be used for urgent needs.  For medical emergencies, dial 911.    ________________________________________________________________________________________________________________________________

## 2019-02-08 NOTE — PROGRESS NOTES
Reason for Admission:  Cervical spondylosis with myelopathy [M47.12]  Cervical myelopathy ([facility]) [G95.9]  Cervical myelopathy ([address] [G95.9]                 RRAT Score:    7            Plan for utilizing home health:    Yes, ordered. Likelihood of Readmission:   LOW                         Transition of Care Plan:          Initial assessment completed with patient. Cognitive status of patient: oriented to time, place, person and situation. Face sheet information confirmed:  yes, telephone number for her ex- was corrected on file. The patient designates her ex- to participate in his/her discharge plan and to receive any needed information. This patient lives in a two level single family home with her mother, sister, brother, granddaughter, and daughter. Her ex- plans to also stay with them to help her as she recovers. Patient was able to navigate steps as needed. Prior to hospitalization, patient was considered to be independent with ADLs/IADLS : yes. The patient states that she can obtain her medications from the pharmacy, and take her medications as directed. Patient has a current ACP document on file: no  The patient's ex- will be available to transport patient home upon discharge. The patient already has no DME at home. She needs a RW ordered. Patient is not currently active with home health. Patient has not stayed in a skilled nursing facility or rehab. List of available Home Health agencies were provided and reviewed with the patient prior to discharge. Freedom of choice signed: yes, for [address]. Currently, the discharge plan is Home with [address] [place] [address]. She has no concerns for discharge. Mckay Maldonado,  notified. Patient's current insurance is Medicaid. Care Management Interventions  PCP Verified by CM:  Yes  Last Visit to PCP: 02/04/19  Palliative Care Criteria Met (RRAT>21 & CHF Dx)?: No  Mode of Transport at Discharge: Self  Transition of Care Consult (CM Consult): [address]: Yes  Discharge Durable Medical Equipment: No(She needs a RW ordered. She has no DME at home.)  Physical Therapy Consult: Yes  Occupational Therapy Consult: Yes  Speech Therapy Consult: No  Current Support Network: Other(She lives with her mother, sister, daughter, granddaughter, and brother.  Her ex- plans to also stay with them to help her as she recovers. )  Confirm Follow Up Transport: Friends(Ex-)  Plan discussed with Pt/Family/Caregiver: Yes  Freedom of Choice Offered: Yes  Discharge Location  Discharge Placement: Home with home health      [address] [place] [address] MSN, RN, [address]

## 2019-02-08 NOTE — PROGRESS NOTES
Problem: Self Care Deficits Care Plan (Adult)  Goal: *Acute Goals and Plan of Care (Insert Text)  Outcome: Resolved/Met Date Met: 02/08/19  Occupational Therapy EVALUATION/discharge    Patient: Sim Whittaker (37 y.o. female)  Date: 2/8/2019  Primary Diagnosis: Cervical spondylosis with myelopathy [M47.12]  Cervical myelopathy (HCC) [G95.9]  Cervical myelopathy (HCC) [G95.9]  Procedure(s) (LRB):  ANTERIOR CERVICAL DISCECTOMY WITH FUSION C5/6 C6/7 C-ARM/NUVASIVE (N/A) 1 Day Post-Op   Precautions:   Spinal(cervical)    ASSESSMENT AND RECOMMENDATIONS:  Based on the objective data described below, the patient is able to perform basic self care tasks without assistance while seated and in standing. Functional transfers completed with supervision. Cervical spine precautions reviewed and patient/wife verbalized understanding. Patient has a supportive family at home to assist her prn. Skilled occupational therapy is not indicated at this time. Discharge Recommendations: None  Further Equipment Recommendations for Discharge: N/A      Barriers to Learning/Limitations: None  Compensate with: visual, verbal, tactile, kinesthetic cues/model     COMPLEXITY     Eval Complexity: History: LOW Complexity : Brief history review ; Examination: LOW Complexity : 1-3 performance deficits relating to physical, cognitive , or psychosocial skils that result in activity limitations and / or participation restrictions ; Decision Making:LOW Complexity : No comorbidities that affect functional and no verbal or physical assistance needed to complete eval tasks  Assessment: Low Complexity     SUBJECTIVE:   Patient stated My ex- will be staying with me until I want him to leave.     OBJECTIVE DATA SUMMARY:     Past Medical History:   Diagnosis Date    Acute pain of left shoulder 11/1/2016    Bipolar 1 disorder [facility]     Bipolar affective disorder, currently manic, moderate ([facility] 4/27/2016    Chronic midline low back pain with sciatica 11/1/2016    Cigarette nicotine dependence in remission 8/2/2016    Cigarette nicotine dependence without complication 16/65/6951    Continuous nicotine dependence 1/24/2017    Depression     Fibromyalgia 9/1/2015    HLD (hyperlipidemia) 2/4/2016    Hypertension     Hypertriglyceridemia 5/15/2018    Impaired fasting glucose 5/31/2016    Irritable bowel syndrome without diarrhea 4/27/2016    Moderate episode of recurrent major depressive disorder ([facility] 4/27/2016    Obesity, Class III, BMI 40-49.9 (morbid obesity) ([facility] 9/1/2015    Onychomycosis 4/27/2016    Pain of left thumb 11/1/2016    Prediabetes 7/5/2016    PTSD (post-traumatic stress disorder)     Smoker 9/1/2015    Vitamin D deficiency 5/31/2016     Past Surgical History:   Procedure Laterality Date    HX GYN Bilateral     tubal    HX ORTHOPAEDIC       Prior Level of Function/Home Situation: Pt was independent with basic self care tasks and functional mobility PTA. Home Situation  Home Environment: Private residence  # Steps to Enter: 4  Rails to Enter: No  One/Two Story Residence: Two story  # of Interior Steps: 13  Height of Each Step (in): 8 inches  [employer]: Right  Living Alone: No  Support Systems: Family member(s), Friends \ neighbors  Patient Expects to be Discharged to[de-identified] Private residence  Current DME Used/Available at Home: Sarai  or Shower Type: Tub/Shower combination  [x]     Right hand dominant   []     Left hand dominant  Cognitive/Behavioral Status:  Neurologic State: Alert  Orientation Level: Oriented X4  Cognition: Appropriate decision making; Follows commands  Safety/Judgement: Awareness of environment; Fall prevention    Skin: Intact on UEs    Edema: None noted in UEs    Vision/Perceptual:    Acuity: Within Defined Limits      Coordination:  Fine Motor Skills-Upper: Left Intact; Right Intact    Gross Motor Skills-Upper: Left Intact; Right Intact    Balance:  Sitting: Intact  Standing: Impaired; Without support  Standing - Static: Good  Standing - Dynamic : Fair(+)    Strength:  Strength: Within functional limits(UEs; within spinal precautions)    Tone & Sensation:  Tone: Normal(UEs)  Sensation: Impaired(UEs; numbness)    Range of Motion:  AROM: Within functional limits(UEs; within spinal precautions)    Functional Mobility and Transfers for ADLs:  Bed Mobility:  Rolling: Supervision  Supine to Sit: Supervision  Transfers:  Sit to Stand: Supervision   Toilet Transfer : Supervision    ADL Assessment:  Feeding: Independent    Oral Facial Hygiene/Grooming: Modified Independent    Bathing: Modified independent    Upper Body Dressing: Modified independent    Lower Body Dressing: Modified independent    Toileting: Modified independent    ADL Intervention:  Patient practiced UB/LB dressing while seated and in standing. No assist given after initial VCs for safety/ease of performance. Supervision given for standing balance. No LOB noted. Cognitive Retraining  Safety/Judgement: Awareness of environment; Fall prevention    Pain:  Pt reports 6/10 pain or discomfort prior to treatment, in neck. Pain meds given prior to session.    Pt reports 6/10 pain or discomfort post treatment, in neck. Patient resting in chair at end of session. Activity Tolerance:   Good    Please refer to the flowsheet for vital signs taken during this treatment. After treatment:   [x]  Patient left in no apparent distress sitting up in chair  []  Patient left in no apparent distress in bed  [x]  Call bell left within reach  [x]  Nursing notified  []  Caregiver present  []  Bed alarm activated    COMMUNICATION/EDUCATION:   Communication/Collaboration:  [x]      Home safety education was provided and the patient/caregiver indicated understanding. [x]      Patient/family have participated as able and agree with findings and recommendations. []      Patient is unable to participate in plan of care at this time.     Ash Russell MS OTR/L  Time Calculation: 23 mins

## 2019-02-08 NOTE — PROGRESS NOTES
Problem: Mobility Impaired (Adult and Pediatric)  Goal: *Acute Goals and Plan of Care (Insert Text)  Physical Therapy Goals  Initiated 2/7/2019 and to be accomplished within 7 day(s)  1. Patient will move from supine to sit and sit to supine  in bed with independence. 2.  Patient will transfer from bed to chair and chair to bed with modified independence using the least restrictive device. 3.  Patient will perform sit to stand with modified independence. 4.  Patient will ambulate with modified independence for 300  feet with the least restrictive device. 5.  Patient will ascend/descend 4 stairs with no handrail(s) and appropriate AD prn with supervision/set-up. Outcome: Progressing Towards Goal  physical Therapy TREATMENT    Patient: Angelika Snow (37 y.o. female)  Date: 2/8/2019  Diagnosis: Cervical spondylosis with myelopathy [M47.12]  Cervical myelopathy (HCC) [G95.9]  Cervical myelopathy (HCC) [G95.9] <principal problem not specified>  Procedure(s) (LRB):  ANTERIOR CERVICAL DISCECTOMY WITH FUSION C5/6 C6/7 C-ARM/NUVASIVE (N/A) 1 Day Post-Op  Precautions: Fall, Spinal   Chart, physical therapy assessment, plan of care and goals were reviewed. OBJECTIVE/ ASSESSMENT:  Patient found supine HOB elevated willing to work with PT. Pt voiced slight discomfort in neck, however alleviated w/ collar adjustment. Pt able to perform all mobility tasks this visit w/ little to no assistance w/ only complaint being slight dizziness w/ mobility. Pt able to perform all bed mobility tasks at a supervision level req cueing for log roll technique for spinal precaution maintenance. Pt amb w/o AD for approx 150' w/ minimal increase in lateral sway all while maintaining precautions. 2/2 to dizziness and safety w/ increased distance, pt performed the remainder of gait training w/ assistance of RW, which improved stability and confidence.  Pt safely able to perform 4 steps this visit req assistance of [facility] for stability as pt has no accessibility of hand rails to enter home. Pt safely perform steps req cueing only for sequencing of cane and returned to room to recliner. Pt voiced fair recall of precautions, however did not req cueing t/o tx to maintain. Pt provided further education and left in room w/ all needs in reach. From a PT standpoint, pt is safe to return home and would benefit from home therapy. Education:  [x]         Bed mobility  [x]         Transfers  [x]         Ambulation / gait  [x]         Assistive device management  [x]         Stairs  [x]         Body mechanics  [x]         Position change   [x]         Therapeutic exercise  []         Activity pacing / energy conservation  []         Other:    Progression toward goals:  [x]      Improving appropriately and progressing toward goals  []      Improving slowly and progressing toward goals  []      Not making progress toward goals and plan of care will be adjusted     PLAN:  Patient continues to benefit from skilled intervention to address the above impairments. Continue treatment per established plan of care. Discharge Recommendations:  Home Health  Further Equipment Recommendations for Discharge:  rolling walker and SPC for steps     SUBJECTIVE:   Patient stated I just feel a little woozy.     OBJECTIVE DATA SUMMARY:   Critical Behavior:  Neurologic State: Alert  Orientation Level: Oriented X4  Cognition: Appropriate decision making  Functional Mobility Training:  Bed Mobility:  Rolling: Supervision  Supine to Sit: Supervision  Transfers:  Sit to Stand: Supervision  Stand to Sit: Supervision  Balance:  Sitting: Intact  Standing: Impaired; Without support  Standing - Static: Good  Standing - Dynamic : Fair(+)  Ambulation/Gait Training:  Distance (ft): 300 Feet (ft)  Assistive Device: Walker, rolling;Cane, straight(none )  Ambulation - Level of Assistance: Contact guard assistance;Stand-by assistance  Gait Abnormalities: Decreased step clearance;Trunk sway increased  Base of Support: Center of gravity altered; Widened  Speed/[place]: Pace decreased (<100 feet/min)  Step Length: Right shortened;Left shortened  Stairs:  Number of Stairs Trained: 4  Stairs - Level of Assistance: Contact guard assistance  Rail Use: (SPC on right )  Pain: did not voice number, voiced increased discomfort initially, which was improved w/ collar adjustment   Activity Tolerance:   Good   Please refer to the flowsheet for vital signs taken during this treatment.   After treatment:   [x] Patient left in no apparent distress sitting up in chair  [] Patient left in no apparent distress in bed  [x] Call bell left within reach  [x] Nursing notified  [] Caregiver present  [] Bed alarm activated  [] SCDs applied  [] Ice applied      Mike Halo, PTA   Time Calculation: 26 mins

## 2019-02-08 NOTE — HOME CARE
Received HH referral for PT/ Jareth Mera protocol, DME: RW ordered from DME rep (Cristian) from [facility] and she states RW has been delivered to patient today, pt states she will purchase a straight cane herself; [address] will follow. BRANNON GUILLERMO.

## 2019-02-08 NOTE — PROGRESS NOTES
Problem: Falls - Risk of  Goal: *Absence of Falls  Document Handy Fall Risk and appropriate interventions in the flowsheet.   Outcome: Progressing Towards Goal  Fall Risk Interventions:  Mobility Interventions: Patient to call before getting OOB         Medication Interventions: Patient to call before getting OOB    Elimination Interventions: Call light in reach

## 2019-02-09 ENCOUNTER — HOME CARE VISIT (OUTPATIENT)
Dept: SCHEDULING | Facility: HOME HEALTH | Age: 44
End: 2019-02-09
Payer: MEDICAID

## 2019-02-09 VITALS
DIASTOLIC BLOOD PRESSURE: 80 MMHG | RESPIRATION RATE: 17 BRPM | HEART RATE: 76 BPM | SYSTOLIC BLOOD PRESSURE: 120 MMHG | TEMPERATURE: 97.6 F

## 2019-02-09 PROCEDURE — G0151 HHCP-SERV OF PT,EA 15 MIN: HCPCS

## 2019-02-09 PROCEDURE — 400013 HH SOC

## 2019-02-11 ENCOUNTER — HOME CARE VISIT (OUTPATIENT)
Dept: SCHEDULING | Facility: HOME HEALTH | Age: 44
End: 2019-02-11
Payer: MEDICAID

## 2019-02-11 ENCOUNTER — HOME CARE VISIT (OUTPATIENT)
Dept: HOME HEALTH SERVICES | Facility: HOME HEALTH | Age: 44
End: 2019-02-11
Payer: MEDICAID

## 2019-02-11 PROCEDURE — G0157 HHC PT ASSISTANT EA 15: HCPCS

## 2019-02-12 ENCOUNTER — TELEPHONE (OUTPATIENT)
Dept: ORTHOPEDIC SURGERY | Age: 44
End: 2019-02-12

## 2019-02-12 ENCOUNTER — HOME CARE VISIT (OUTPATIENT)
Dept: SCHEDULING | Facility: HOME HEALTH | Age: 44
End: 2019-02-12
Payer: MEDICAID

## 2019-02-12 PROCEDURE — G0157 HHC PT ASSISTANT EA 15: HCPCS

## 2019-02-12 RX ORDER — CELECOXIB 200 MG/1
200 CAPSULE ORAL DAILY
Qty: 30 CAP | Refills: 0 | Status: SHIPPED | OUTPATIENT
Start: 2019-02-12 | End: 2019-03-21

## 2019-02-12 NOTE — TELEPHONE ENCOUNTER
She should not take mobic or other anti-inflammatories. It will delay the healing in her neck fusion.   If she has to have something for her joint pain then she can take celebrex 200 mg daily #30

## 2019-02-12 NOTE — TELEPHONE ENCOUNTER
Home Health calling - states pt started back on Meloxicam today due to her right hip pain. Pt states she was told by Dr. Charito Velasquez not to take it but pt is in so much pain, she had to. Any questions, Mary [address]) can be reached at [phone].

## 2019-02-12 NOTE — TELEPHONE ENCOUNTER
Spoke with patient, informed of NP Casey message below, patient stated understanding. That she would try the Celebrex and not take the Mobic. No further action needed at this time. Verbal order entered per NP Vane as documented in message below: Celebrex 200mg take 1 tab po daily.  Disp 30 no refills

## 2019-02-13 ENCOUNTER — TELEPHONE (OUTPATIENT)
Dept: INTERNAL MEDICINE CLINIC | Age: 44
End: 2019-02-13

## 2019-02-13 ENCOUNTER — HOME CARE VISIT (OUTPATIENT)
Dept: SCHEDULING | Facility: HOME HEALTH | Age: 44
End: 2019-02-13
Payer: MEDICAID

## 2019-02-13 VITALS
TEMPERATURE: 96.4 F | SYSTOLIC BLOOD PRESSURE: 102 MMHG | OXYGEN SATURATION: 96 % | TEMPERATURE: 97 F | HEART RATE: 75 BPM | SYSTOLIC BLOOD PRESSURE: 126 MMHG | HEART RATE: 63 BPM | SYSTOLIC BLOOD PRESSURE: 128 MMHG | TEMPERATURE: 95.9 F | HEART RATE: 77 BPM | OXYGEN SATURATION: 93 % | DIASTOLIC BLOOD PRESSURE: 84 MMHG | OXYGEN SATURATION: 98 % | DIASTOLIC BLOOD PRESSURE: 62 MMHG | DIASTOLIC BLOOD PRESSURE: 68 MMHG

## 2019-02-13 PROCEDURE — G0157 HHC PT ASSISTANT EA 15: HCPCS

## 2019-02-14 ENCOUNTER — HOME CARE VISIT (OUTPATIENT)
Dept: SCHEDULING | Facility: HOME HEALTH | Age: 44
End: 2019-02-14
Payer: MEDICAID

## 2019-02-14 ENCOUNTER — TELEPHONE (OUTPATIENT)
Dept: ORTHOPEDIC SURGERY | Age: 44
End: 2019-02-14

## 2019-02-14 DIAGNOSIS — Z98.1 S/P FUSION OF THORACIC SPINE: ICD-10-CM

## 2019-02-14 PROCEDURE — G0157 HHC PT ASSISTANT EA 15: HCPCS

## 2019-02-14 RX ORDER — OXYCODONE AND ACETAMINOPHEN 5; 325 MG/1; MG/1
1 TABLET ORAL
Qty: 20 TAB | Refills: 0 | Status: SHIPPED | OUTPATIENT
Start: 2019-02-14 | End: 2019-03-01

## 2019-02-14 NOTE — TELEPHONE ENCOUNTER
Blanca Larry from [facility] called stating the patient is pout of her prescription oxyCODONE-acetaminophen (PERCOCET) 5-325 mg per tablet and she is requesting a refill. Also, she is still unsteady while walking especially on uneven surfaces, and she occasionally drags her right leg. Her insurance has not yet approved the prescription celecoxib (CELEBREX) 200 mg capsule so she hasn't received that yet. One of the therapists are coming to her house tomorrow and the plan was to discharge her, but there's a possibility they might need an extension. Please advise Blanca Larry back if needed at [phone].

## 2019-02-14 NOTE — TELEPHONE ENCOUNTER
I have signed 20 tabs percocet, tapering to 1 tab Q 8 hours. This rx should last until her post op apt. Please continue to taper down. rx printed   Call pt for       She had a wandering gait pre-op. hopefully this will continue to improve. Ok for home PT extension if needed.

## 2019-02-14 NOTE — TELEPHONE ENCOUNTER
Spoke with patient, informed of NP King message below. Patient stated understanding, but now wants to know how long does she have to sleep sitting up? She states the home care nurse could not provide an answer. Please advise.

## 2019-02-15 ENCOUNTER — HOME CARE VISIT (OUTPATIENT)
Dept: SCHEDULING | Facility: HOME HEALTH | Age: 44
End: 2019-02-15
Payer: MEDICAID

## 2019-02-15 VITALS
TEMPERATURE: 96.8 F | SYSTOLIC BLOOD PRESSURE: 132 MMHG | HEART RATE: 95 BPM | DIASTOLIC BLOOD PRESSURE: 78 MMHG | OXYGEN SATURATION: 98 %

## 2019-02-15 PROCEDURE — G0151 HHCP-SERV OF PT,EA 15 MIN: HCPCS

## 2019-02-15 NOTE — TELEPHONE ENCOUNTER
Zulema Humphreys with [facility] care [phone] states today is her last visit with the patient. She is having numbness in both arms and in a lot of pain.   Her insurance still has not approved the Celebrex

## 2019-02-15 NOTE — TELEPHONE ENCOUNTER
Spoke with Momo Song. Patient is doing ok. She is being discharged from PT. She has the same numbness as pre-op. Celebrex PA was signed today. Hopefully this will be approved soon. Momo Song states, overall, patient is doing well. She walked 200 ft yesterday.

## 2019-02-18 VITALS — SYSTOLIC BLOOD PRESSURE: 152 MMHG | TEMPERATURE: 98.1 F | HEART RATE: 88 BPM | DIASTOLIC BLOOD PRESSURE: 92 MMHG

## 2019-02-20 ENCOUNTER — OFFICE VISIT (OUTPATIENT)
Dept: ORTHOPEDIC SURGERY | Age: 44
End: 2019-02-20

## 2019-02-20 VITALS
DIASTOLIC BLOOD PRESSURE: 63 MMHG | HEART RATE: 104 BPM | TEMPERATURE: 97.8 F | RESPIRATION RATE: 17 BRPM | BODY MASS INDEX: 45.99 KG/M2 | HEIGHT: 67 IN | SYSTOLIC BLOOD PRESSURE: 116 MMHG | WEIGHT: 293 LBS

## 2019-02-20 DIAGNOSIS — Z98.1 S/P CERVICAL SPINAL FUSION: Primary | ICD-10-CM

## 2019-02-20 DIAGNOSIS — M48.061 SPINAL STENOSIS OF LUMBAR REGION WITHOUT NEUROGENIC CLAUDICATION: ICD-10-CM

## 2019-02-20 RX ORDER — CEPHALEXIN 500 MG/1
500 CAPSULE ORAL 3 TIMES DAILY
Qty: 21 CAP | Refills: 0 | Status: SHIPPED | OUTPATIENT
Start: 2019-02-20 | End: 2019-03-14

## 2019-02-20 NOTE — PROGRESS NOTES
[facility]  [address], [address]  [facility], [address]  Phone: [phone]  Fax: [phone]    Spine Post-Op Office Visit Note    Patient: Claribel Beck   MRN: [id number]     Age:  37 y.o.,      Sex: female    YOB: 1975     PCP: Lisa Melissa MD    HISTORY OF PRESENT ILLNESS:  Chief Complaint   Patient presents with    Neck Pain     post op     Claribel Beck is a 37 y.o.  female with history of cervical pain. Patient had ACDF C5-7 surgery 2 weeks ago. Prior to surgery, she was having myelopathic symptoms with wandering gait and hyperreflexive nature. She was to stop her Mobic due to the fusion. cElebrex was prescribed for her hip pain. Per her pre-op note,Dr. Marta Crespo reviewed the MRI of her thoracic spine and it is benign. She has known cervical stenosis at 5-6 and 6-7 with a hint of gliosis. She has some lumbar spinal stenosis at 4-5, 5-1 with low back pain and bilateral lumbar radiculopathy. Today, she states her neck is doing well. Her arms are feeling better. She has some shoulder pain post op. She has known lumbar stenosis. She continues to have the severe back pain and leg numbness. She states her legs went numb yesterday for the first time real bad.  She has had 3 falls using her RW yesterday. She feels it is from her legs giving out.  Her knees will buckle. Patient denies any bladder/bowel dysfunction, new onset weakness, or other neurological deficits. CMRI 1/2019, Pre op  IMPRESSION:     Motion-degraded exam.     1. High-grade central C5-C6-C7 stenosis with myelopathic signal abnormality. --Secondary to disc bulging superimposed upon congenital spinal canal  stenosis, resulting in significant spinal cord mass effect. --The C5-6 disc bulge has internal edema and may reflect a relatively  recent injury.           --The C6-C7 disc osteophyte complex has a more chronic appearance and is  the level at which the myelopathic signal abnormality is centered.     2. Lower grade, but still significant, central stenosis at C3-C4-C5.           --Small disc bulges superimposed upon congenital spinal canal stenosis. --Low-grade spinal cord mass effect without myelopathic signal  abnormality.     3.  High-grade bilateral foraminal stenosis C6-C7. LMRI 1/2019  IMPRESSION:     1.  High-grade central stenosis at L4-L5.     2.  Marked disc degeneration at L5-S1 without central stenosis.     3. Potential selective right-sided nerve root impingements at L5-S1.     4. Moderate lower lumbar facet arthrosis --greatest at left L5-S1 and right  L4-L5.     5. Low-grade central stenosis at L3-L4. TMRI 1/2019  IMPRESSION:     1. Very mild disc bulge T7/8, slight cord flattening without significant  stenosis.      2. Additional mild multilevel degenerative spondylosis with no other evidence of  stenosis or impingement.      3. No acute osseous finding, no intrinsic cord lesion.   ASSESSMENT   Diagnoses and all orders for this visit:    1. S/P cervical spinal fusion  -     cephALEXin (KEFLEX) 500 mg capsule; Take 1 Cap by mouth three (3) times daily. 2. Spinal stenosis of lumbar region without neurogenic claudication            IMPRESSION AND PLAN   1) Pt was given information on s/p ACDF. 2) Cont tapering Percocet, she is picking up a rx today. This should be her last post op rx. She has Celebrex for her arthritis. Cont gabapentin. 3) Wound care and activity level reviewed. 4) Ms. Tiesha Fairchild has a reminder for a \"due or due soon\" health maintenance. I have asked that she contact her primary care provider, Sergio Velasco MD, for follow-up on this health maintenance. 5) We have informed the patient to notify us for immediate appointment if he has any worsening neurogical symptoms or if an emergency situation presents, then call 911  6)  demonstrated consistency with prescribing. 7) Pt will follow-up as scheduled and via phone.  I am consulting with Dr. Mason Randall about her falls and back pain. She has known lumbar stenosis. She is asking about lumbar surgery, ASAP. spoke with Dr. Mason Randall. We will set her up for a a lumbar decompression and fusion, L4/5/S1 laminectomy fusion. 8) one week of Prophylactic Keflex for incision. SUBJECTIVE    Pain Scale: 10 - Worst pain ever/10    Pain Assessment  2/20/2019   Location of Pain Neck;Leg   Pain Location Comment -   Location Modifiers Left;Right   Severity of Pain 10   Quality of Pain Aching   Quality of Pain Comment numbness, tingling   Duration of Pain -   Frequency of Pain Constant   Aggravating Factors Standing;Walking   Limiting Behavior -   Relieving Factors (No Data)   Relieving Factors Comment pain med help   Result of Injury -       Review of systems  Constitutional: Negative for fever, chills, or weight change. Respiratory: Negative for cough or shortness of breath. Cardiovascular: Negative for chest pain or palpitations. Gastrointestinal: Negative for acid reflux, change in bowel habits, or constipation. Genitourinary: Negative for dysuria and flank pain. Musculoskeletal: Positive for lumabar pain. Skin: Negative for rash. Neurological: Negative for headaches, dizziness, or numbness. Endo/Heme/Allergies: Negative for increased bruising. Psychiatric/Behavioral: Negative for difficulty with sleep.            Past Medical History:   Diagnosis Date    Acute pain of left shoulder 11/1/2016    Bipolar 1 disorder ([address]     Bipolar affective disorder, currently manic, moderate ([address] 4/27/2016    Chronic midline low back pain with sciatica 11/1/2016    Cigarette nicotine dependence in remission 8/2/2016    Cigarette nicotine dependence without complication 93/00/5771    Continuous nicotine dependence 1/24/2017    Depression     Fibromyalgia 9/1/2015    HLD (hyperlipidemia) 2/4/2016    Hypertension     Hypertriglyceridemia 5/15/2018    Impaired fasting glucose 5/31/2016    Irritable bowel syndrome without diarrhea 4/27/2016    Moderate episode of recurrent major depressive disorder ([facility] 4/27/2016    Obesity, Class III, BMI 40-49.9 (morbid obesity) ([facility] 9/1/2015    Onychomycosis 4/27/2016    Pain of left thumb 11/1/2016    Prediabetes 7/5/2016    PTSD (post-traumatic stress disorder)     Smoker 9/1/2015    Vitamin D deficiency 5/31/2016       Past Surgical History:   Procedure Laterality Date    HX CERVICAL DISKECTOMY  02/07/2019    with fusion C5/6 C6/7    HX GYN Bilateral     tubal    HX ORTHOPAEDIC         Current Outpatient Medications   Medication Sig Dispense Refill    cephALEXin (KEFLEX) 500 mg capsule Take 1 Cap by mouth three (3) times daily. 21 Cap 0    celecoxib (CELEBREX) 200 mg capsule Take 1 Cap by mouth daily. 30 Cap 0    hydrOXYzine HCl (ATARAX) 25 mg tablet Take 25 mg by mouth three (3) times daily as needed for Anxiety.  lamoTRIgine (LAMICTAL) 200 mg tablet Take 200 mg by mouth every evening.  losartan (COZAAR) 50 mg tablet Take 75 mg by mouth daily.  DULoxetine (CYMBALTA) 60 mg capsule Take 60 mg by mouth two (2) times a day.  gabapentin (NEURONTIN) 300 mg capsule Take 2 Caps by mouth three (3) times daily. 180 Cap 2    lidocaine (LIDODERM) 5 % APPLY 1 PATCH TO THE AFFECTED AREA EVERY DAY. LEAVE ON FOR 12 HOURS THEN REMOVE FOR 12 HOURS 90 Patch 3    polyethylene glycol (MIRALAX) 17 gram/dose powder MIX AND DRINK 17 GRAMS BY MOUTH DAILY 1530 g 3    hydroCHLOROthiazide (HYDRODIURIL) 25 mg tablet Take 1 Tab by mouth daily. 90 Tab 3    buPROPion SR (WELLBUTRIN, ZYBAN) 200 mg SR tablet 200 mg two (2) times a day. 1    traZODone (DESYREL) 100 mg tablet 150 mg every evening. 1    buPROPion SR (WELLBUTRIN, ZYBAN) 200 mg SR tablet Take 200 mg by mouth two (2) times a day.  traZODone (DESYREL) 150 mg tablet Take 150 mg by mouth nightly.       oxyCODONE-acetaminophen (PERCOCET) 5-325 mg per tablet Take 1 Tab by mouth every eight (8) hours as needed for Pain. Max Daily Amount: 3 Tabs. 20 Tab 0    lamoTRIgine (LAMICTAL) 100 mg tablet Take 100 mg by mouth daily. No Known Allergies         OBJECTIVE    Vitals:    02/20/19 1058   BP: 116/63   Pulse: (!) 104   Resp: 17   Temp: 97.8 °F (36.6 °C)   TempSrc: Oral   Weight: 295 lb (133.8 kg)   Height: 5' 7\" (1.702 m)   PainSc:  10 - Worst pain ever   PainLoc: Neck   LMP: 01/22/2019       Physical Exam:  General: alert, cooperative, no distress, appears stated age  Constitutional:  Well developed, well nourished, in no acute distress. Psychiatric: Affect and mood are appropriate. Integumentary: No rashes or abrasions noted on exposed areas. Wound: Incision healing well, has well approximated edges,  There is one small area of yellow, granulation tissue to the right lateral aspect. No drainage. no erythema, warmth, drainage or signs of infection. Cardiovascular/Peripheral Vascular: No peripheral edema is noted. Lymphatic:  No evidence of lymphedema. No cervical lymphadenopathy. MOTOR    Biceps  Triceps Deltoids Wrist Ext Wrist Flex Hand Intrin   Right +4/5 +4/5 +4/5 +4/5 +4/5 +4/5   Left +4/5 +4/5 +4/5 +4/5 +4/5 +4/5      Hip Flex  Quads Hamstrings Ankle DF EHL Ankle PF   Right +4/5 +4/5 +4/5 -4/5 -4/5 -4/5   Left +4/5 +4/5 +4/5 -4/5 -4/5 -4/5       Straight Leg raise - bilaterally. normal gait and station      Ambulation: in wheelchair     Accompanied by spouse.       Dewey Day NP  February 20, 2019  10:48 AM

## 2019-02-20 NOTE — PATIENT INSTRUCTIONS
Cervical Spinal Fusion: What to Expect at Home  Your Recovery    After surgery, you can expect your neck to feel stiff and sore. This should improve in the weeks after surgery. You may have trouble sitting or standing in one position for very long and may need pain medicine in the weeks after your surgery. You may need to wear a neck brace for a while. It may take 4 to 6 weeks to get back to your usual activities, but it may depend on what kind of surgery you had. Your doctor may advise you to work with a physical therapist to strengthen the muscles around your neck and back. This care sheet gives you a general idea about how long it will take for you to recover. But each person recovers at a different pace. Follow the steps below to get better as quickly as possible. How can you care for yourself at home? Activity    · Rest when you feel tired. Getting enough sleep will help you recover.     · Try to walk each day. Start by walking a little more than you did the day before. Bit by bit, increase the amount you walk. Walking boosts blood flow and helps prevent pneumonia and constipation. Walking may also decrease your muscle soreness after surgery.     · Follow your doctor's directions about not lifting anything that would strain your neck and back. This may include a child, heavy grocery bags and milk containers, a heavy briefcase or backpack, cat litter or dog food bags, or a vacuum .     · Avoid strenuous activities, such as bicycle riding, jogging, weightlifting, or aerobic exercise, until your doctor says it is okay.     · Do not drive for 2 to 4 weeks after your surgery or until your doctor says it is okay.     · Avoid taking long car trips for 2 to 4 weeks after surgery. Your neck may become tired and painful from sitting too long in one position.     · You will probably need to take 4 to 6 weeks off from work.  It depends on the type of work you do and how you feel.     · You may have sex as soon as you feel able, but avoid positions that put stress on your neck or cause pain. Diet    · You can eat your normal diet. If your stomach is upset, try bland, low-fat foods like plain rice, broiled chicken, toast, and yogurt.     · Drink plenty of fluids. If you have kidney, heart, or liver disease and have to limit fluids, talk with your doctor before you increase the amount of fluids you drink.     · You may notice that your bowel movements are not regular right after your surgery. This is common. Try to avoid constipation and straining with bowel movements. You may want to take a fiber supplement every day. If you have not had a bowel movement after a couple of days, ask your doctor about taking a mild laxative. Medicines    · Your doctor will tell you if and when you can restart your medicines. He or she will also give you instructions about taking any new medicines.     · If you take blood thinners, such as warfarin (Coumadin), clopidogrel (Plavix), or aspirin, be sure to talk to your doctor. He or she will tell you if and when to start taking those medicines again. Make sure that you understand exactly what your doctor wants you to do.     · Be safe with medicines. Take pain medicines exactly as directed. ? If the doctor gave you a prescription medicine for pain, take it as prescribed. ? If you are not taking a prescription pain medicine, ask your doctor if you can take an over-the-counter medicine.     · If your doctor prescribed antibiotics, take them as directed. Do not stop taking them just because you feel better. You need to take the full course of antibiotics.     · If you think your pain pill is making you sick to your stomach:  ? Take your pills after meals (unless your doctor has told you not to). ? Ask your doctor for a different pain pill. Incision care    · You will be given specific instructions about how to care for the cut (incision) the doctor made.  The instructions will depend on the type of materials used to close the cut. Exercise    · Do exercises as instructed by your doctor or physical therapist to improve your strength and flexibility. Other instructions    · To reduce stiffness and help sore muscles, use a warm water bottle, a heating pad set on low, or a warm cloth on your neck. Do not put heat right over the incision. Do not go to sleep with a heating pad on your skin. Follow-up care is a key part of your treatment and safety. Be sure to make and go to all appointments, and call your doctor if you are having problems. It's also a good idea to know your test results and keep a list of the medicines you take. When should you call for help? Call 911 anytime you think you may need emergency care. For example, call if:    · You passed out (lost consciousness).     · You have chest pain, are short of breath, or cough up blood.     · You are unable to move an arm or a leg at all.   [facility] your doctor now or seek immediate medical care if:    · You have pain that does not get better after you take pain medicine.     · You have loose stitches, or your incision comes open.     · Bright red blood has soaked through the bandage over your incision.     · You have signs of a blood clot in your leg (called a deep vein thrombosis), such as:  ? Pain in your calf, back of the knee, thigh, or groin. ? Redness or swelling in your leg.     · You have signs of infection, such as:  ? Increased pain, swelling, warmth, or redness. ? Red streaks leading from the incision. ? Pus draining from the incision. ? A fever.     · You have new or worse symptoms in your arms, legs, chest, belly, or buttocks. Symptoms may include:  ? Numbness or tingling. ? Weakness. ? Pain.     · You lose bladder or bowel control.    Watch closely for any changes in your health, and be sure to contact your doctor if:    · You do not get better as expected. Where can you learn more?   Go to http://jocelyn-amrik.info/. Enter E919 in the search box to learn more about \"Cervical Spinal Fusion: What to Expect at Home. \"  Current as of: September 20, 2018  Content Version: 11.9  © 8729-4178 [employer]. Care instructions adapted under license by [employer] (which disclaims liability or warranty for this information). If you have questions about a medical condition or this instruction, always ask your healthcare professional. [address] any warranty or liability for your use of this information.

## 2019-02-26 ENCOUNTER — OFFICE VISIT (OUTPATIENT)
Dept: ORTHOPEDIC SURGERY | Age: 44
End: 2019-02-26

## 2019-02-26 VITALS — TEMPERATURE: 97.8 F | DIASTOLIC BLOOD PRESSURE: 78 MMHG | HEART RATE: 89 BPM | SYSTOLIC BLOOD PRESSURE: 124 MMHG

## 2019-02-26 DIAGNOSIS — G95.9 CERVICAL MYELOPATHY (HCC): ICD-10-CM

## 2019-02-26 DIAGNOSIS — Z98.1 S/P CERVICAL SPINAL FUSION: ICD-10-CM

## 2019-02-26 DIAGNOSIS — M54.6 THORACIC SPINE PAIN: ICD-10-CM

## 2019-02-26 DIAGNOSIS — M48.062 LUMBAR STENOSIS WITH NEUROGENIC CLAUDICATION: Primary | ICD-10-CM

## 2019-03-01 ENCOUNTER — APPOINTMENT (OUTPATIENT)
Dept: GENERAL RADIOLOGY | Age: 44
End: 2019-03-01
Attending: PHYSICIAN ASSISTANT
Payer: MEDICAID

## 2019-03-01 ENCOUNTER — HOSPITAL ENCOUNTER (EMERGENCY)
Age: 44
Discharge: HOME | End: 2019-03-01
Attending: EMERGENCY MEDICINE
Payer: MEDICAID

## 2019-03-01 VITALS
HEART RATE: 90 BPM | HEIGHT: 69 IN | OXYGEN SATURATION: 100 % | BODY MASS INDEX: 43.4 KG/M2 | RESPIRATION RATE: 16 BRPM | TEMPERATURE: 97.9 F | WEIGHT: 293 LBS | SYSTOLIC BLOOD PRESSURE: 128 MMHG | DIASTOLIC BLOOD PRESSURE: 82 MMHG

## 2019-03-01 DIAGNOSIS — S50.311A ABRASION OF RIGHT ELBOW, INITIAL ENCOUNTER: ICD-10-CM

## 2019-03-01 DIAGNOSIS — M54.42 CHRONIC MIDLINE LOW BACK PAIN WITH LEFT-SIDED SCIATICA: ICD-10-CM

## 2019-03-01 DIAGNOSIS — Z72.0 TOBACCO USE: ICD-10-CM

## 2019-03-01 DIAGNOSIS — Z71.6 TOBACCO ABUSE COUNSELING: ICD-10-CM

## 2019-03-01 DIAGNOSIS — S93.402A MODERATE LEFT ANKLE SPRAIN, INITIAL ENCOUNTER: Primary | ICD-10-CM

## 2019-03-01 DIAGNOSIS — G89.29 CHRONIC MIDLINE LOW BACK PAIN WITH LEFT-SIDED SCIATICA: ICD-10-CM

## 2019-03-01 PROCEDURE — 73610 X-RAY EXAM OF ANKLE: CPT

## 2019-03-01 PROCEDURE — 74011250637 HC RX REV CODE- 250/637: Performed by: EMERGENCY MEDICINE

## 2019-03-01 PROCEDURE — 99283 EMERGENCY DEPT VISIT LOW MDM: CPT

## 2019-03-01 RX ORDER — OXYCODONE AND ACETAMINOPHEN 5; 325 MG/1; MG/1
1 TABLET ORAL
Status: COMPLETED | OUTPATIENT
Start: 2019-03-01 | End: 2019-03-01

## 2019-03-01 RX ORDER — HYDROCODONE BITARTRATE AND ACETAMINOPHEN 5; 325 MG/1; MG/1
1 TABLET ORAL
Qty: 12 TAB | Refills: 0 | Status: SHIPPED | OUTPATIENT
Start: 2019-03-01 | End: 2019-03-08

## 2019-03-01 RX ADMIN — OXYCODONE AND ACETAMINOPHEN 1 TABLET: 5; 325 TABLET ORAL at 20:29

## 2019-03-01 NOTE — ED TRIAGE NOTES
Had cervical surgery 2/7. Since then numbness continues. Seen by surgeon and was told needs MRI. Has MRI schedueled Friday. Wants one sooner. Thinks in one of her falls sprained left ankle

## 2019-03-02 NOTE — DISCHARGE INSTRUCTIONS
Patient Education        Ankle Sprain: Care Instructions  Your Care Instructions    An ankle sprain can happen when you twist your ankle. The ligaments that support the ankle can get stretched and torn. Often the ankle is swollen and painful. Ankle sprains may take from several weeks to several months to heal. Usually, the more pain and swelling you have, the more severe your ankle sprain is and the longer it will take to heal. You can heal faster and regain strength in your ankle with good home treatment. It is very important to give your ankle time to heal completely, so that you do not easily hurt your ankle again. Follow-up care is a key part of your treatment and safety. Be sure to make and go to all appointments, and call your doctor if you are having problems. It's also a good idea to know your test results and keep a list of the medicines you take. How can you care for yourself at home? · Prop up your foot on pillows as much as possible for the next 3 days. Try to keep your ankle above the level of your heart. This will help reduce the swelling. · Follow your doctor's directions for wearing a splint or elastic bandage. Wrapping the ankle may help reduce or prevent swelling. · Your doctor may give you a splint, a brace, an air stirrup, or another form of ankle support to protect your ankle until it is healed. Wear it as directed while your ankle is healing. Do not remove it unless your doctor tells you to. After your ankle has healed, ask your doctor whether you should wear the brace when you exercise. · Put ice or cold packs on your injured ankle for 10 to 20 minutes at a time. Try to do this every 1 to 2 hours for the next 3 days (when you are awake) or until the swelling goes down. Put a thin cloth between the ice and your skin. · You may need to use crutches until you can walk without pain. If you do use crutches, try to bear some weight on your injured ankle if you can do so without pain.  This helps the ankle heal.  · Take pain medicines exactly as directed. ? If the doctor gave you a prescription medicine for pain, take it as prescribed. ? If you are not taking a prescription pain medicine, ask your doctor if you can take an over-the-counter medicine. · If you have been given ankle exercises to do at home, do them exactly as instructed. These can promote healing and help prevent lasting weakness. When should you call for help? Call your doctor now or seek immediate medical care if:    · Your pain is getting worse.     · Your swelling is getting worse.     · Your splint feels too tight or you are unable to loosen it.    Watch closely for changes in your health, and be sure to contact your doctor if:    · You are not getting better after 1 week. Where can you learn more? Go to http://jocelyn-amrik.info/. Enter D260 in the search box to learn more about \"Ankle Sprain: Care Instructions. \"  Current as of: September 20, 2018  Content Version: 11.9  © 5278-3838 [employer]. Care instructions adapted under license by [employer] (which disclaims liability or warranty for this information). If you have questions about a medical condition or this instruction, always ask your healthcare professional. [place] [id number] any warranty or liability for your use of this information. Patient Education        Ankle Sprain: Rehab Exercises  Your Care Instructions  Here are some examples of typical rehabilitation exercises for your condition. Start each exercise slowly. Ease off the exercise if you start to have pain. Your doctor or physical therapist will tell you when you can start these exercises and which ones will work best for you. How to do the exercises  \"Alphabet\" exercise    1. Trace the alphabet with your toe. This helps your ankle move in all directions. Side-to-side knee swing exercise    1.  Sit in a chair with your foot flat on the floor.  2. Slowly move your knee from side to side. Keep your foot pressed flat. 3. Continue this exercise for 2 to 3 minutes. Towel curl    1. While sitting, place your foot on a towel on the floor. Scrunch the towel toward you with your toes. 2. Then use your toes to push the towel away from you. 3. To make this exercise more challenging you can put something on the other end of the towel. A can of soup is about the right weight for this. Towel stretch    1. Sit with your legs extended and knees straight. 2. Place a towel around your foot just under the toes. 3. Hold each end of the towel in each hand, with your hands above your knees. 4. Pull back with the towel so that your foot stretches toward you. 5. Hold the position for at least 15 to 30 seconds. 6. Repeat 2 to 4 times a session. Do up to 5 sessions a day. Ankle eversion exercise    1. Start by sitting with your foot flat on the floor. Push your foot outward against a wall or a piece of furniture that doesn't move. Hold for about 6 seconds, and relax. Repeat 8 to 12 times. 2. After you feel comfortable with this, try using rubber tubing looped around the outside of your feet for resistance. Push your foot out to the side against the tubing, and then count to 10 as you slowly bring your foot back to the middle. Repeat 8 to 12 times. Isometric opposition exercises    1. While sitting, put your feet together flat on the floor. 2. Press your injured foot inward against your other foot. Hold for about 6 seconds, and relax. Repeat 8 to 12 times. 3. Then place the heel of your other foot on top of the injured one. Push down with the top heel while trying to push up with your injured foot. Hold for about 6 seconds, and relax. Repeat 8 to 12 times. Resisted ankle inversion    1. Sit on the floor with your good leg crossed over your other leg. 2. Hold both ends of an exercise band and loop the band around the inside of your affected foot. Then press your other foot against the band. 3. Keeping your legs crossed, slowly push your affected foot against the band so that foot moves away from your other foot. Then slowly relax. 4. Repeat 8 to 12 times. Resisted ankle eversion    1. Sit on the floor with your legs straight. 2. Hold both ends of an exercise band and loop the band around the outside of your affected foot. Then press your other foot against the band. 3. Keeping your leg straight, slowly push your affected foot outward against the band and away from your other foot without letting your leg rotate. Then slowly relax. 4. Repeat 8 to 12 times. Resisted ankle dorsiflexion    1. Tie the ends of an exercise band together to form a loop. Attach one end of the loop to a secure object or shut a door on it to hold it in place. (Or you can have someone hold one end of the loop to provide resistance.)  2. While sitting on the floor or in a chair, loop the other end of the band over the top of your affected foot. 3. Keeping your knee and leg straight, slowly flex your foot to pull back on the exercise band, and then slowly relax. 4. Repeat 8 to 12 times. Single-leg balance    1. Stand on a flat surface with your arms stretched out to your sides like you are making the letter \"T. \" Then lift your good leg off the floor, bending it at the knee. If you are not steady on your feet, use one hand to hold on to a chair, counter, or wall. 2. Standing on the leg with your affected ankle, keep that knee straight. Try to balance on that leg for up to 30 seconds. Then rest for up to 10 seconds. 3. Repeat 6 to 8 times. 4. When you can balance on your affected leg for 30 seconds with your eyes open, try to balance on it with your eyes closed. 5. When you can do this exercise with your eyes closed for 30 seconds and with ease and no pain, try standing on a pillow or piece of foam, and repeat steps 1 through 4.     Follow-up care is a key part of your treatment and safety. Be sure to make and go to all appointments, and call your doctor if you are having problems. It's also a good idea to know your test results and keep a list of the medicines you take. Where can you learn more? Go to http://jocelyn-amrik.info/. [employer] in the search box to learn more about \"Ankle Sprain: Rehab Exercises. \"  Current as of: September 20, 2018  Content Version: 11.9  © 1748-6932 [employer]. Care instructions adapted under license by [employer] (which disclaims liability or warranty for this information). If you have questions about a medical condition or this instruction, always ask your healthcare professional. [place] [id number] any warranty or liability for your use of this information. Patient Education        Learning About How to Have a Healthy Back  What causes back pain? Back pain is often caused by overuse, strain, or injury. For example, people often hurt their backs playing sports or working in the yard, being jolted in a car accident, or lifting something too heavy. Aging plays a part too. Your bones and muscles tend to lose strength as you age, which makes injury more likely. The spongy discs between the bones of the spine (vertebrae) may suffer from wear and tear and no longer provide enough cushion between the bones. A disc that bulges or breaks open (herniated disc) can press on nerves, causing back pain. In some people, back pain is the result of arthritis, broken vertebrae caused by bone loss (osteoporosis), illness, or a spine problem. Although most people have back pain at one time or another, there are steps you can take to make it less likely. How can you have a healthy back? Reduce stress on your back through good posture  Slumping or slouching alone may not cause low back pain. But after the back has been strained or injured, bad posture can make pain worse.   · Sleep in a position that maintains your back's normal curves and on a mattress that feels comfortable. Sleep on your side with a pillow between your knees, or sleep on your back with a pillow under your knees. These positions can reduce strain on your back. · Stand and sit up straight. \"Good posture\" generally means your ears, shoulders, and hips are in a straight line. · If you must stand for a long time, put one foot on a stool, ledge, or box. Switch feet every now and then. · Sit in a chair that is low enough to let you place both feet flat on the floor with both knees nearly level with your hips. If your chair or desk is too high, use a footrest to raise your knees. Place a small pillow, a rolled-up towel, or a lumbar roll in the curve of your back if you need extra support. · Try a kneeling chair, which helps tilt your hips forward. This takes pressure off your lower back. · Try sitting on an exercise ball. It can rock from side to side, which helps keep your back loose. · When driving, keep your knees nearly level with your hips. Sit straight, and drive with both hands on the steering wheel. Your arms should be in a slightly bent position. Reduce stress on your back through careful lifting  · Squat down, bending at the hips and knees only. If you need to, put one knee to the floor and extend your other knee in front of you, bent at a right angle (half kneeling). · Press your chest straight forward. This helps keep your upper back straight while keeping a slight arch in your low back. · Hold the load as close to your body as possible, at the level of your belly button (navel). · Use your feet to change direction, taking small steps. · Lead with your hips as you change direction. Keep your shoulders in line with your hips as you move. · Set down your load carefully, squatting with your knees and hips only.   Exercise and stretch your back  · Do some exercise on most days of the week, if your doctor says it is okay. You can walk, run, swim, or cycle. · Stretch your back muscles. Here are a few exercises to try:  ? Lie on your back, and gently pull one bent knee to your chest. Put that foot back on the floor, and then pull the other knee to your chest.  ? Do pelvic tilts. Lie on your back with your knees bent. Tighten your stomach muscles. Pull your belly button (navel) in and up toward your ribs. You should feel like your back is pressing to the floor and your hips and pelvis are slightly lifting off the floor. Hold for 6 seconds while breathing smoothly. ? Sit with your back flat against a wall. · Keep your core muscles strong. The muscles of your back, belly (abdomen), and buttocks support your spine. ? Pull in your belly and imagine pulling your navel toward your spine. Hold this for 6 seconds, then relax. Remember to keep breathing normally as you tense your muscles. ? Do curl-ups. Always do them with your knees bent. Keep your low back on the floor, and curl your shoulders toward your knees using a smooth, slow motion. Keep your arms folded across your chest. If this bothers your neck, try putting your hands behind your neck (not your head), with your elbows spread apart. ? Lie on your back with your knees bent and your feet flat on the floor. Tighten your belly muscles, and then push with your feet and raise your buttocks up a few inches. Hold this position 6 seconds as you continue to breathe normally, then lower yourself slowly to the floor. Repeat 8 to 12 times. ? If you like group exercise, try Pilates or yoga. These classes have poses that strengthen the core muscles. Lead a healthy lifestyle  · Stay at a healthy weight to avoid strain on your back. · Do not smoke. Smoking increases the risk of osteoporosis, which weakens the spine. If you need help quitting, talk to your doctor about stop-smoking programs and medicines. These can increase your chances of quitting for good.   Where can you learn more?  Go to http://jocelyn-amrik.info/. Enter L315 in the search box to learn more about \"Learning About How to Have a Healthy Back. \"  Current as of: September 20, 2018  Content Version: 11.9  © 6656-5328 [employer]. Care instructions adapted under license by [employer] (which disclaims liability or warranty for this information). If you have questions about a medical condition or this instruction, always ask your healthcare professional. [address] any warranty or liability for your use of this information. Patient Education        Learning About Low Back Pain  What is low back pain? Low back pain is pain that can occur anywhere below the ribs and above the legs. It is very common. Almost everyone has it at one time or another. Low back pain can be:  Acute. This is new pain that can last a few days to a few weeks--at the most a few months. Chronic. This pain can last for more than a few months. Sometimes it can last for years. What are some myths about low back pain? Here are some common myths about low back pain--and the facts:  Myth: \"I need to rest my back when I have back pain. \"  Fact: Staying active won't hurt you. It may help you get better faster. Myth: \"I need prescription pain medicine. \"  Fact: It's best to try to let time and being active heal your back. Opioid pain medicines--such as hydrocodone or oxycodone--usually don't work any better than over-the-counter medicines like ibuprofen or naproxen. And opioids can cause serious problems like addiction or overdose. Myth: \"I need a test like an X-ray or an MRI to diagnose my low back pain. \"  Fact: Getting a test right away won't help you get better faster. And it could lead you down a treatment path you may not need, since most people get better on their own. What causes low back pain? In most cases, there isn't a clear cause.  This can be frustrating, because your back hurts and there's no obvious reason. Your back pain can be caused by:  Overuse or muscle strain. This can happen from playing sports, lifting heavy things, or not being physically fit. A herniated disc. This is a problem with the cushion between the bones in your back. Arthritis. With age, you may have changes in your bones that can narrow the space around your nerves. Other causes. In rare cases, the cause is a serious illness like an infection or cancer. But there are usually other symptoms too. What are the symptoms? Your symptoms depend on your body and the cause of your back pain. You may feel:  · Pain that's sharp or dull. It may be in one small area or over a broad area. But even bad pain doesn't mean that it's caused by something serious. · Leg pain, numbness, or tingling. When a nerve gets squeezed--such as from a disc problem or arthritis--you may have symptoms in your leg or foot. You can even have leg symptoms from a back problem without having any pain in your back. How is low back pain diagnosed? A physical exam is the main way to diagnose low back pain. Your doctor may examine your back, check your nerves by testing your reflexes, and make sure that your muscles are strong. He or she also will ask questions about your back and overall health. Most people don't need any tests right away. Tests often don't show the reason for your pain. If your pain lasts more than 6 weeks or you have symptoms that your doctor is more concerned about, he or she may order tests. These may include an X-ray, a CT scan, or an MRI. In some cases, tests can help your doctor find a cause for your pain, especially for pain in one or both legs. How is low back pain treated? Most acute low back pain gets better on its own within a few weeks, no matter what the cause. Time and doing usual activities are all that most people need to feel better.   Using heat or ice and taking over-the-counter pain medicine also can help while your body heals. If you aren't getting better on your own or your pain is very bad, your doctor may recommend:  · Physical therapy. · Spinal manipulation, such as by a chiropractor. · Acupuncture. · Massage. · Injections of steroid medicine in your back (especially for pain that involves your legs). If you have chronic low back pain, treatment will help you understand and manage your pain. Treatment may include:  · Staying active. This may include walking or doing back exercises. · Physical therapy. · Medicines. Some of these medicines are also used for other problems, like depression. · Pain management. Your doctor may have you see a pain specialist.  · Counseling. Having chronic pain can be hard. It may help to talk to someone who can help you cope with your pain. Surgery isn't needed for most people. But it may help some types of low back pain. Follow-up care is a key part of your treatment and safety. Be sure to make and go to all appointments, and call your doctor if you are having problems. It's also a good idea to know your test results and keep a list of the medicines you take. When should you call for help? Call 911 anytime you think you may need emergency care. For example, call if:  · You can't move a leg at all. Call your doctor now or seek immediate medical care if:  · You have new or worse symptoms in your legs, belly, or buttocks. Symptoms may include:  ? Numbness or tingling. ? Weakness. ? Pain. · You lose bladder or bowel control. Watch closely for changes in your health, and be sure to contact your doctor if:  · Along with the back pain, you have a fever, lose weight, or don't feel well. · You do not get better as expected. Where can you learn more? Go to http://jocelyn-amrik.info/. Enter A007 in the search box to learn more about \"Learning About Low Back Pain. \"  Current as of: September 20, 2018  Content Version: 11.9  © 2347-2905 [employer]. Care instructions adapted under license by [employer] (which disclaims liability or warranty for this information). If you have questions about a medical condition or this instruction, always ask your healthcare professional. [address] any warranty or liability for your use of this information. Patient Education        Learning About Opioids  Introduction    Opioids are medicines used to relieve moderate to severe pain. They may be used for a short time for pain, such as after surgery. Or in some cases a doctor might prescribe them for long-term pain. They don't cure a health problem. But they help you manage the pain. Opioids relieve pain by changing the way your body feels pain and the way you feel about pain. Sometimes opioids are used for people who can't take other pain medicines. They may be prescribed if you have heart, kidney, or liver problems. For instance, you may take an opioid instead of nonsteroidal anti-inflammatory drugs (NSAIDs). NSAIDs include ibuprofen (Advil, Motrin) and naproxen (Aleve). Opioids are strong medicines. They can help you manage pain when you use them the right way. But if you misuse them, they can cause serious harm and even death. If you decide to take opioids, here are some things to remember. · Keep your doctor informed. You can get addicted to opioids. The risk is higher if you have a history of substance use. Your doctor will monitor you closely for signs of misuse and addiction and to figure out when you no longer need to take opioids. · Make a treatment plan. The goal of your plan is to be able to function and do the things you need to do, even if you still have some pain. You might be able to manage your pain with other non-opioid options like physical therapy, relaxation, or over-the-counter pain medicines. · Be aware of the side effects.  Opioids can cause serious side effects, such as constipation, dry mouth, and nausea. And over time, you may need a higher dose to get pain relief. This is called tolerance. Your body also gets used to opioids. This is called physical dependence. If you suddenly stop taking them, you may have withdrawal symptoms. Examples  Opioids or other medicines that contain them include:  · Codeine (Tylenol 3). · Hydrocodone (Norco). · Oxycodone (OxyContin, Percocet). Safety tips  If you need to take opioids to manage your pain, remember these safety tips. · Follow directions carefully. It's easy to misuse opioids if you take a dose other than what's prescribed by your doctor. This can lead to overdose and even death. Even sharing them with someone they weren't meant for is misuse. · Be cautious. Opioids may affect your judgment and decision making. Do not drive or operate machinery until you can think clearly. Talk with your doctor about when it is safe to drive. · Reduce the risk of drug interactions. Opioids can be dangerous if you take them with alcohol or with certain drugs like sleeping pills and muscle relaxers. Make sure your doctor knows about all the other medicines you take, including over-the-counter medicines. Don't start any new medicines before you talk to your doctor or pharmacist.  · Safely store and dispose of opioids. Store opioids in a safe and secure place. Make sure that pets, children, friends, and family can't get to them. When you're done using opioids, make sure to dispose of them safely and as quickly as possible. The [place]. Food and Drug Administration (FDA) recommends these disposal options. ? The best option is to take your medicine to a drop-off box or take-back program that is authorized by the [address] ([facility]).   ? If these programs aren't available in your area and your medicine doesn't have specific disposal instructions (such as flushing), you can throw them into your household trash if you follow the FDA's instructions. Visit fda.gov and search for \"unused medicine disposal.\"  ? If you have opioid patches (used or unused), your options are to take them to a [facility]-authorized site or flush them down the toilet. Do not throw them in the trash. ? Only flush your medicine down the toilet if you can't get to a [facility]-approved site or your medicine instructions state clearly to flush them. · Reduce the risk of overdose. Misuse of opioids can be very dangerous. Protect yourself by asking your doctor about a naloxone rescue kit. It can help you--and even save your life--if you take too much of an opioid. Side effects  Common side effects include:  · Constipation. · Feeling dizzy or lightheaded. You may feel like you might faint. · Feeling sleepy. · Nausea or vomiting. You may have other side effects or reactions. Check the information that comes with your medicine. When should you call for help? Call 911 anytime you think you may need emergency care. For example, call if:  · You have symptoms of a severe allergic reaction. These may include:  ? Sudden raised, red areas (hives) all over your body. ? Swelling of the throat, mouth, lips, or tongue. ? Trouble breathing. ? Passing out (losing consciousness). Or you may feel very lightheaded or suddenly feel weak, confused, or restless. · You have signs of an overdose. These include:  ? Cold, clammy skin. ? Confusion. ? Severe nervousness or restlessness. ? Severe dizziness, drowsiness, or weakness. ? Slow breathing. ? Seizures. Call your doctor now or seek immediate medical care if:  · You have symptoms of an allergic reaction, such as:  ? A rash or hives (raised, red areas on the skin). ? Itching. ? Swelling. ? Belly pain, nausea, or vomiting. Watch closely for changes in your health, and be sure to contact your doctor if:  · Your medicine is not helping with the pain. · You are having side effects, such as constipation. Where can you learn more?   Go to http://jocelyn-amrik.info/. Enter L092 in the search box to learn more about \"Learning About Opioids. \"  Current as of: Arin 3, 2018  Content Version: 11.9  © 7788-5316 [employer]. Care instructions adapted under license by [employer] (which disclaims liability or warranty for this information). If you have questions about a medical condition or this instruction, always ask your healthcare professional. [place] [address] any warranty or liability for your use of this information.

## 2019-03-02 NOTE — ED PROVIDER NOTES
EMERGENCY DEPARTMENT HISTORY AND PHYSICAL EXAM 
 
Date: 3/1/2019 Patient Name: Joy Kay History of Presenting Illness Chief Complaint Patient presents with  Fall  Ankle Pain  Numbness History Provided By: Patient Chief Complaint: left ankle pain w/ mild swelling Duration:  Hours Timing:  Constant Location: left ankle Quality: Aching Severity: Moderate Modifying Factors: when bearing weight her pain is exacerbated Associated Symptoms: healing abrasion to right elbow Additional History (Context): Joy Kay is a 37 y.o. female with PMHX of HTN bipolar disorder, and chronic midline back pain w/ sciatica  who presents to the emergency department C/O left ankle pain w/ mild swelling after mechanical trip and fall leaving her sister house around 0200 AM. Associated sxs include healing abrasion to the pt right elbow. Her pain is exacerbated when bearing weight; says she hasn't been standing to much today due to the pain. She has not taken anything at home for pain. Reports recently had back surgery done by Dr. Any Fuentes due to her chronic sciatica and says she still having bilateral numbing sensation which is chronic as well. Scheduled to see Dr. Meryle Harries in approximately 2 weeks. H/o HTN which she takes losartan for; says she has been taking as directed. Current smoker. Pt denies CP, SOB, fever, chills, N/V, head injury, LOC, urinary sx, weakness, and any other sxs or complaints. PCP: Kevin High MD 
 
Current Outpatient Medications Medication Sig Dispense Refill  
 HYDROcodone-acetaminophen (NORCO) 5-325 mg per tablet Take 1 Tab by mouth every four (4) hours as needed for Pain for up to 12 doses. Max Daily Amount: 6 Tabs. 12 Tab 0  cephALEXin (KEFLEX) 500 mg capsule Take 1 Cap by mouth three (3) times daily. 21 Cap 0  
 buPROPion SR (WELLBUTRIN, ZYBAN) 200 mg SR tablet Take 200 mg by mouth two (2) times a day.  traZODone (DESYREL) 150 mg tablet Take 150 mg by mouth nightly.  celecoxib (CELEBREX) 200 mg capsule Take 1 Cap by mouth daily. 30 Cap 0  
 hydrOXYzine HCl (ATARAX) 25 mg tablet Take 25 mg by mouth three (3) times daily as needed for Anxiety.  lamoTRIgine (LAMICTAL) 200 mg tablet Take 200 mg by mouth every evening.  losartan (COZAAR) 50 mg tablet Take 75 mg by mouth daily.  DULoxetine (CYMBALTA) 60 mg capsule Take 60 mg by mouth two (2) times a day.  gabapentin (NEURONTIN) 300 mg capsule Take 2 Caps by mouth three (3) times daily. 180 Cap 2  
 lidocaine (LIDODERM) 5 % APPLY 1 PATCH TO THE AFFECTED AREA EVERY DAY. LEAVE ON FOR 12 HOURS THEN REMOVE FOR 12 HOURS 90 Patch 3  
 polyethylene glycol (MIRALAX) 17 gram/dose powder MIX AND DRINK 17 GRAMS BY MOUTH DAILY 1530 g 3  
 hydroCHLOROthiazide (HYDRODIURIL) 25 mg tablet Take 1 Tab by mouth daily. 90 Tab 3  
 lamoTRIgine (LAMICTAL) 100 mg tablet Take 100 mg by mouth daily.  buPROPion SR (WELLBUTRIN, ZYBAN) 200 mg SR tablet 200 mg two (2) times a day. 1  
 traZODone (DESYREL) 100 mg tablet 150 mg every evening. 1 Past History Past Medical History: 
Past Medical History:  
Diagnosis Date  Acute pain of left shoulder 11/1/2016  Bipolar 1 disorder ([facility]  Bipolar affective disorder, currently manic, moderate ([facility] 4/27/2016  Chronic midline low back pain with sciatica 11/1/2016  Cigarette nicotine dependence in remission 8/2/2016  Cigarette nicotine dependence without complication 34/99/3651  Continuous nicotine dependence 1/24/2017  Depression  Fibromyalgia 9/1/2015  HLD (hyperlipidemia) 2/4/2016  Hypertension  Hypertriglyceridemia 5/15/2018  Impaired fasting glucose 5/31/2016  Irritable bowel syndrome without diarrhea 4/27/2016  Moderate episode of recurrent major depressive disorder ([address] 4/27/2016  Obesity, Class III, BMI 40-49.9 (morbid obesity) ([facility] [facility] 9/1/2015  Onychomycosis 4/27/2016  Pain of left thumb 11/1/2016  Prediabetes 7/5/2016  PTSD (post-traumatic stress disorder)  Smoker 9/1/2015  Vitamin D deficiency 5/31/2016 Past Surgical History: 
Past Surgical History:  
Procedure Laterality Date  HX CERVICAL DISKECTOMY  02/07/2019  
 with fusion C5/6 C6/7  
 HX GYN Bilateral   
 tubal  
 HX ORTHOPAEDIC Family History: 
Family History Problem Relation Age of Onset  Hypertension Mother  Thyroid Disease Mother  No Known Problems Father  Psychiatric Disorder Sister  Psychiatric Disorder Brother  Psychiatric Disorder Sister  Diabetes Sister  Psychiatric Disorder Brother  Psychiatric Disorder Brother Social History: 
Social History Tobacco Use  Smoking status: Current Every Day Smoker Packs/day: 1.00 Years: 25.00 Pack years: 25.00  Smokeless tobacco: Never Used Substance Use Topics  Alcohol use: Yes Alcohol/week: 5.4 oz Types: 9 Shots of liquor per week Comment: socially  Drug use: No  
 
 
Allergies: 
No Known Allergies Review of Systems Review of Systems Constitutional: Negative for chills and fever. Respiratory: Negative for shortness of breath. Cardiovascular: Negative for chest pain. Gastrointestinal: Negative for abdominal pain, nausea and vomiting. Genitourinary: Negative. Musculoskeletal: Positive for arthralgias (left ankle), back pain (chronic) and joint swelling (left ankle). Negative for neck pain. Skin: Positive for wound (healing abrasion). Negative for color change. Neurological: Positive for numbness (chronic; bilateral). Negative for weakness. All other systems reviewed and are negative. Physical Exam  
 
Vitals:  
 03/01/19 1859 BP: 139/84 Pulse: (!) 101 Resp: 16 Temp: 97.9 °F (36.6 °C) SpO2: 99% Weight: 134.3 kg (296 lb) Height: 5' 9\" (1.753 m) Physical Exam  
 Constitutional: She is oriented to person, place, and time. She appears well-developed and well-nourished. HENT:  
Head: Normocephalic and atraumatic. Mouth/Throat: Oropharynx is clear and moist.  
Eyes: Conjunctivae are normal. Pupils are equal, round, and reactive to light. No scleral icterus. Neck: Normal range of motion. Neck supple. No JVD present. No tracheal deviation present. Cardiovascular: Normal rate, regular rhythm and normal heart sounds. Pulmonary/Chest: Effort normal and breath sounds normal. No respiratory distress. She has no wheezes. Abdominal: Soft. Bowel sounds are normal.  
Musculoskeletal: Normal range of motion. Swelling edema to the marivel fibular ligament. Neurological: She is alert and oriented to person, place, and time. She has normal strength. Gait normal. GCS eye subscore is 4. GCS verbal subscore is 5. GCS motor subscore is 6. Skin: Skin is warm and dry. Psychiatric: She has a normal mood and affect. Nursing note and vitals reviewed. Diagnostic Study Results Labs - No results found for this or any previous visit (from the past 12 hour(s)). Radiologic Studies -  
XR Ankle preliminary reading done by Dr. Alonso Flowers MD; pt xray shows No fractures. Will preserved joint space. Mild tissue swelling. XR ANKLE LT MIN 3 V    (Results Pending) CT Results  (Last 48 hours) None CXR Results  (Last 48 hours) None Medications given in the ED- Medications  
oxyCODONE-acetaminophen (PERCOCET) 5-325 mg per tablet 1 Tab (1 Tab Oral Given 3/1/19 2029) Medical Decision Making I am the first provider for this patient. I reviewed the vital signs, available nursing notes, past medical history, past surgical history, family history and social history. Vital Signs-Reviewed the patient's vital signs. Records Reviewed: Nursing Notes and Old Medical Records Provider Notes (Medical Decision Making):  
 Exam consistent w/ sprain, fracture possible but not seen on plain films neurovascular flow is excellent. Procedures: 
Procedures ED Course:  
8:37 PM  Initial assessment performed. The patients presenting problems have been discussed, and they are in agreement with the care plan formulated and outlined with them. I have encouraged them to ask questions as they arise throughout their visit. Diagnosis and Disposition DISCHARGE NOTE: 
8:37 PM  
Ashley Cruz's  results have been reviewed with her. She has been counseled regarding her diagnosis, treatment, and plan. She verbally conveys understanding and agreement of the signs, symptoms, diagnosis, treatment and prognosis and additionally agrees to follow up as discussed. She also agrees with the care-plan and conveys that all of her questions have been answered. I have also provided discharge instructions for her that include: educational information regarding their diagnosis and treatment, and list of reasons why they would want to return to the ED prior to their follow-up appointment, should her condition change. She has been provided with education for proper emergency department utilization. CLINICAL IMPRESSION: 
 
1. Moderate left ankle sprain, initial encounter 2. Chronic midline low back pain with left-sided sciatica 3. Tobacco use 4. Tobacco abuse counseling 5. Abrasion of right elbow, initial encounter PLAN: 
1. D/C Home 2. Current Discharge Medication List  
  
START taking these medications Details HYDROcodone-acetaminophen (NORCO) 5-325 mg per tablet Take 1 Tab by mouth every four (4) hours as needed for Pain for up to 12 doses. Max Daily Amount: 6 Tabs. Qty: 12 Tab, Refills: 0 Associated Diagnoses: Moderate left ankle sprain, initial encounter CONTINUE these medications which have NOT CHANGED Details  
cephALEXin (KEFLEX) 500 mg capsule Take 1 Cap by mouth three (3) times daily. Qty: 21 Cap, Refills: 0 Associated Diagnoses: S/P cervical spinal fusion  
  
!! buPROPion SR (WELLBUTRIN, ZYBAN) 200 mg SR tablet Take 200 mg by mouth two (2) times a day. !! traZODone (DESYREL) 150 mg tablet Take 150 mg by mouth nightly. celecoxib (CELEBREX) 200 mg capsule Take 1 Cap by mouth daily. Qty: 30 Cap, Refills: 0  
  
hydrOXYzine HCl (ATARAX) 25 mg tablet Take 25 mg by mouth three (3) times daily as needed for Anxiety. !! lamoTRIgine (LAMICTAL) 200 mg tablet Take 200 mg by mouth every evening. losartan (COZAAR) 50 mg tablet Take 75 mg by mouth daily. DULoxetine (CYMBALTA) 60 mg capsule Take 60 mg by mouth two (2) times a day.  
  
gabapentin (NEURONTIN) 300 mg capsule Take 2 Caps by mouth three (3) times daily. Qty: 180 Cap, Refills: 2 Associated Diagnoses: Cervical myelopathy ([facility] [address] Myelopathy concurrent with and due to spinal stenosis of cervical region [facility]); Lumbar radiculopathy  
  
polyethylene glycol (MIRALAX) 17 gram/dose powder MIX AND DRINK 17 GRAMS BY MOUTH DAILY Qty: 1530 g, Refills: 3 Associated Diagnoses: Irritable bowel syndrome without diarrhea  
  
hydroCHLOROthiazide (HYDRODIURIL) 25 mg tablet Take 1 Tab by mouth daily. Qty: 90 Tab, Refills: 3 Associated Diagnoses: Essential hypertension  
  
!! lamoTRIgine (LAMICTAL) 100 mg tablet Take 100 mg by mouth daily. !! buPROPion SR (WELLBUTRIN, ZYBAN) 200 mg SR tablet 200 mg two (2) times a day. Refills: 1  
  
!! traZODone (DESYREL) 100 mg tablet 150 mg every evening. Refills: 1  
  
 !! - Potential duplicate medications found. Please discuss with provider. STOP taking these medications  
  
 oxyCODONE-acetaminophen (PERCOCET) 5-325 mg per tablet Comments:  
Reason for Stopping: 3.  
Follow-up Information Follow up With Specialties Details Why Contact Info Myla Larkin MD Orthopedic Surgery   . [place] [address] [address] [place] [id number] [phone] _______________________________ Attestations: This note is prepared by Joy Brady, acting as Scribe for Irina Noriega MD. 
 
8:01 PM:  The scribe's documentation has been prepared under my direction and personally reviewed by me in its entirety. I confirm that the note above accurately reflects all work, treatment, procedures, and medical decision making performed by me. 
_______________________________

## 2019-03-06 ENCOUNTER — TELEPHONE (OUTPATIENT)
Dept: ORTHOPEDIC SURGERY | Age: 44
End: 2019-03-06

## 2019-03-06 NOTE — TELEPHONE ENCOUNTER
Spoke with patient, informed of NP [place] message below. Patient stated she can't even walk right now, and is getting more MRIs for Dr. Jeremy Hendricks to decide if further surgeries are needed. She has been taking Tylenol and that is not cutting it. Attempted to apologize to the patient, but she stated this is ridiculous and hung up. Please advise.

## 2019-03-06 NOTE — TELEPHONE ENCOUNTER
She is almost a month out from her neck surgery. She also got norco from the ER recently. She can transition to OTC tylenol now up to 3000 mg per day.

## 2019-03-06 NOTE — TELEPHONE ENCOUNTER
Patient called in states that she called a few days ago to the refill line for a refill on her Percocet. She states that she would like to pick it up from the [address] [place] [address]. Please contact patient at [phone].

## 2019-03-07 DIAGNOSIS — G89.29 CHRONIC BILATERAL LOW BACK PAIN WITH SCIATICA, SCIATICA LATERALITY UNSPECIFIED: Primary | ICD-10-CM

## 2019-03-07 DIAGNOSIS — M54.40 CHRONIC BILATERAL LOW BACK PAIN WITH SCIATICA, SCIATICA LATERALITY UNSPECIFIED: Primary | ICD-10-CM

## 2019-03-07 RX ORDER — OXYCODONE AND ACETAMINOPHEN 5; 325 MG/1; MG/1
1 TABLET ORAL
Qty: 16 TAB | Refills: 0 | Status: SHIPPED | OUTPATIENT
Start: 2019-03-07 | End: 2019-03-12 | Stop reason: SDUPTHER

## 2019-03-07 NOTE — TELEPHONE ENCOUNTER
I called the patient and asked her where her pain was and Dr Alex Day note does not mention pain as much as he is concerned with myelopathic symptoms. She states it is her low back and legs that hurt the most and that is why she originally came to see him, but he wanted to do the neck surgery first.  She could not take all the norco given at the ER because it caused her to have migraines. She is on Neurontin and cymbalta for neuropathic pain ,but they are not helping her back pain. I will give her percocet 5/325 q 8 hours, #16 which is enough to get her to her appt with Dr Ant Newberry on Tuesday. I told her that we cannot do long term medication management and she verbalized understanding. Heidi White will coming to  her rx.

## 2019-03-07 NOTE — TELEPHONE ENCOUNTER
Post op Patient called and said she needs a Prior Auth for the Oxycodone medication that was prescribed today by ISSAC Fernando on [employer]. [phone]. Patient tel. [phone].

## 2019-03-08 ENCOUNTER — HOSPITAL ENCOUNTER (OUTPATIENT)
Dept: MRI IMAGING | Age: 44
Discharge: HOME | End: 2019-03-08
Payer: MEDICAID

## 2019-03-08 DIAGNOSIS — G95.9 CERVICAL MYELOPATHY (HCC): ICD-10-CM

## 2019-03-08 DIAGNOSIS — M48.062 LUMBAR STENOSIS WITH NEUROGENIC CLAUDICATION: ICD-10-CM

## 2019-03-08 DIAGNOSIS — Z98.1 S/P CERVICAL SPINAL FUSION: ICD-10-CM

## 2019-03-08 DIAGNOSIS — M54.6 THORACIC SPINE PAIN: ICD-10-CM

## 2019-03-08 PROCEDURE — 72148 MRI LUMBAR SPINE W/O DYE: CPT

## 2019-03-08 PROCEDURE — 72146 MRI CHEST SPINE W/O DYE: CPT

## 2019-03-08 PROCEDURE — 72141 MRI NECK SPINE W/O DYE: CPT

## 2019-03-08 NOTE — TELEPHONE ENCOUNTER
PA faxed to [place] for approval. May take up to 5 business days for decision. Patient has been informed.

## 2019-03-11 NOTE — TELEPHONE ENCOUNTER
Spoke with patient, informed tht PA had been approved. However, patient had already paid for medication out of pocket. No further action needed at this time.

## 2019-03-12 ENCOUNTER — OFFICE VISIT (OUTPATIENT)
Dept: ORTHOPEDIC SURGERY | Age: 44
End: 2019-03-12

## 2019-03-12 VITALS
RESPIRATION RATE: 18 BRPM | HEART RATE: 93 BPM | BODY MASS INDEX: 43.71 KG/M2 | OXYGEN SATURATION: 97 % | DIASTOLIC BLOOD PRESSURE: 74 MMHG | SYSTOLIC BLOOD PRESSURE: 117 MMHG | HEIGHT: 69 IN | TEMPERATURE: 98.4 F

## 2019-03-12 DIAGNOSIS — G89.29 CHRONIC BILATERAL LOW BACK PAIN WITH SCIATICA, SCIATICA LATERALITY UNSPECIFIED: ICD-10-CM

## 2019-03-12 DIAGNOSIS — Z98.1 S/P CERVICAL SPINAL FUSION: ICD-10-CM

## 2019-03-12 DIAGNOSIS — M54.40 CHRONIC BILATERAL LOW BACK PAIN WITH SCIATICA, SCIATICA LATERALITY UNSPECIFIED: ICD-10-CM

## 2019-03-12 DIAGNOSIS — M48.02 CERVICAL STENOSIS OF SPINE: Primary | ICD-10-CM

## 2019-03-12 DIAGNOSIS — G95.9 CERVICAL MYELOPATHY (HCC): ICD-10-CM

## 2019-03-12 RX ORDER — OXYCODONE AND ACETAMINOPHEN 5; 325 MG/1; MG/1
1 TABLET ORAL
Qty: 21 TAB | Refills: 0 | Status: ON HOLD | OUTPATIENT
Start: 2019-03-13 | End: 2019-03-21

## 2019-03-12 NOTE — PROGRESS NOTES
[address] [facility]ine Specialist   Pre-Surgical Worksheet    Patient: Kristian Chang                         MRN: [id number]     Age:  37 y.o.,      Sex: female    YOB: 1975           DAVON: March 12, 2019  PCP: Garo Wellington MD    No Known Allergies      ICD-10-CM ICD-9-CM    1. Cervical stenosis of spine M48.02 723.0    2. S/P cervical spinal fusion Z98.1 V45.4 CT SPINE CERV WO CONT   3. Cervical myelopathy ([facility]) G95.9 721.1 CT SPINE CERV WO CONT   4. Chronic bilateral low back pain with sciatica, sciatica laterality unspecified M54.40 724.2 oxyCODONE-acetaminophen (PERCOCET) 5-325 mg per tablet    G89.29 724.3      338.29        Surgery: C6/7 Lami Fusion, Possible Revision Anterior Fusion. Pain Assessment   Pain Assessment  3/12/2019   Location of Pain Neck;Back; Hip   Pain Location Comment -   Location Modifiers Right   Severity of Pain 5   Quality of Pain Aching;Dull; [place]; Throbbing   Quality of Pain Comment stabbing, numbness   Duration of Pain -   Frequency of Pain Constant   Aggravating Factors Standing;Walking   Limiting Behavior -   Relieving Factors (No Data)   Relieving Factors Comment pain meds help   Result of Injury -       Visit Vitals  /74   Pulse 93   Temp 98.4 °F (36.9 °C)   Resp 18   Ht 5' 9\" (1.753 m)   SpO2 97%   BMI 43.71 kg/m²       ADL Limits: Wheelchair Patient unable to walk or stand with the pain. Spine Surgery?: Yes When 2/7/19. Where [facility]. Spinal Injections?: No:   When ? [facility] Where? .    Physical Therapy?: Yes  When ? [facility] Where? .    NSAID's?: Yes    Pain Medications?: Yes  Type: Percocet. In Pain Management: NO, Where: ?    Current Outpatient Medications   Medication Sig    [START ON 3/13/2019] oxyCODONE-acetaminophen (PERCOCET) 5-325 mg per tablet Take 1 Tab by mouth every eight (8) hours as needed for Pain for up to 7 days. Max Daily Amount: 3 Tabs.  traZODone (DESYREL) 150 mg tablet Take 150 mg by mouth nightly.     celecoxib (CELEBREX) 200 mg capsule Take 1 Cap by mouth daily.  hydrOXYzine HCl (ATARAX) 25 mg tablet Take 25 mg by mouth three (3) times daily as needed for Anxiety.  lamoTRIgine (LAMICTAL) 200 mg tablet Take 200 mg by mouth every evening.  losartan (COZAAR) 50 mg tablet Take 75 mg by mouth daily.  DULoxetine (CYMBALTA) 60 mg capsule Take 60 mg by mouth two (2) times a day.  gabapentin (NEURONTIN) 300 mg capsule Take 2 Caps by mouth three (3) times daily.  lidocaine (LIDODERM) 5 % APPLY 1 PATCH TO THE AFFECTED AREA EVERY DAY. LEAVE ON FOR 12 HOURS THEN REMOVE FOR 12 HOURS    polyethylene glycol (MIRALAX) 17 gram/dose powder MIX AND DRINK 17 GRAMS BY MOUTH DAILY    hydroCHLOROthiazide (HYDRODIURIL) 25 mg tablet Take 1 Tab by mouth daily.  lamoTRIgine (LAMICTAL) 100 mg tablet Take 100 mg by mouth daily.  buPROPion SR (WELLBUTRIN, ZYBAN) 200 mg SR tablet 200 mg two (2) times a day.  traZODone (DESYREL) 100 mg tablet 150 mg every evening.  cephALEXin (KEFLEX) 500 mg capsule Take 1 Cap by mouth three (3) times daily.  buPROPion SR (WELLBUTRIN, ZYBAN) 200 mg SR tablet Take 200 mg by mouth two (2) times a day. No current facility-administered medications for this visit.         Past Medical History:   Diagnosis Date    Acute pain of left shoulder 11/1/2016    B[facility] ([facility]     Bipolar affective disorder, currently manic, moderate ([facility] [facility] 4/27/2016    Chronic midline low back pain with sciatica 11/1/2016    Cigarette nicotine dependence in remission 8/2/2016    Cigarette nicotine dependence without complication 38/70/8502    Continuous nicotine dependence 1/24/2017    Depression     Fibromyalgia 9/1/2015    HLD (hyperlipidemia) 2/4/2016    Hypertension     Hypertriglyceridemia 5/15/2018    Impaired fasting glucose 5/31/2016    Irritable bowel syndrome without diarrhea 4/27/2016    Moderate episode of recurrent major depressive disorder ([address] 4/27/2016    Obesity, Class III, BMI 40-49.9 (morbid obesity) ([facility] 9/1/2015    Onychomycosis 4/27/2016    Pain of left thumb 11/1/2016    Prediabetes 7/5/2016    PTSD (post-traumatic stress disorder)     Smoker 9/1/2015    Vitamin D deficiency 5/31/2016       Past Surgical History:   Procedure Laterality Date    HX CERVICAL DISKECTOMY  02/07/2019    with fusion C5/6 C6/7    HX GYN Bilateral     tubal    HX ORTHOPAEDIC         Social History     Socioeconomic History    Marital status: UNKNOWN     Spouse name: Not on file    Number of children: Not on file    Years of education: Not on file    Highest education level: Not on file   Tobacco Use    Smoking status: Current Every Day Smoker     Packs/day: 1.00     Years: 25.00     Pack years: 25.00    Smokeless tobacco: Never Used   Substance and Sexual Activity    Alcohol use:  Yes     Alcohol/week: 5.4 oz     Types: 9 Shots of liquor per week     Comment: socially    Drug use: No    Sexual activity: Yes     Partners: Male     Birth control/protection: None   Other Topics Concern

## 2019-03-12 NOTE — PROGRESS NOTES
[facility]  [address]  [facility], [address]  Phone: [phone]  Fax: [phone]  PROGRESS NOTE  Patient: Kj Mendez                MRN: [id number]       SSN: [id number]  YOB: 1975        AGE: 37 y.o. SEX: female  Body mass index is 43.71 kg/m². PCP: Tripp Ramesh MD  03/12/19    Chief Complaint   Patient presents with    Neck Pain    Back Pain     Lumbar     Hip Pain     Right     Leg Pain     Bilateral     Follow-up     MRI f/u        HISTORY OF PRESENT ILLNESS, RADIOGRAPHS, and PLAN:     HISTORY OF PRESENT ILLNESS:  Progress note. Ms. Job Bustillo returns today. She is profoundly weak in her lower extremities. She is clearly myelopathic. She has numbness at about a T12 level. Her arms are normal.  She has minimal to no neck pain. Small residual numbness in her fingertips. Her neck is much better since surgery. She says she has had repeated falls from her leg weakness. I got MRIs of her thoracic and lumbar spine that are benign other than degenerative disease at L4-5, L5-S1. She has proximal leg weakness, iliopsoas, and quad weakness. I got an MRI of her cervical spine that demonstrates improved stenosis at C6-7, but an area of gliosis that still demonstrates clear tightness. She says that every time she falls, her legs go numb. My concern is that she still has innate instability despite her decompression at 5-6, 6-7 and that this ligamentous instability that she has in her neck causing core trauma and this lower extremity weakness despite her decompression. I need to do a posterior decompression and fusion on her. I see no other cord pathology. I cannot explain why her level seems to be so much lower and why she has limited to no neck pain. It is going to be a difficult decompression in her neck given her morbid obesity. I am not certain our ability to radiographically visualize pedicles at T1 or C7.   We may need to use a lateral mass screw there as best we can depending upon my ability to radiographically see things given her size. ASSESSMENT/PLAN:  I am going to obtain a preoperative CAT scan just to get a better look at her anterior devices and as much as one cannot visualize them on plain radiographs. I discussed the risks, benefits, complications, and alternatives to surgery with her. I feel obligated given the progression of her neurology to intervene given her evident stenosis and cord edema.        cc:  Dr. Hanna Jerome          Past Medical History:   Diagnosis Date    Acute pain of left shoulder 11/1/2016    Bipolar 1 disorder ([address]     Bipolar affective disorder, currently manic, moderate ([address] 4/27/2016    Chronic midline low back pain with sciatica 11/1/2016    Cigarette nicotine dependence in remission 8/2/2016    Cigarette nicotine dependence without complication 82/08/7428    Continuous nicotine dependence 1/24/2017    Depression     Fibromyalgia 9/1/2015    HLD (hyperlipidemia) 2/4/2016    Hypertension     Hypertriglyceridemia 5/15/2018    Impaired fasting glucose 5/31/2016    Irritable bowel syndrome without diarrhea 4/27/2016    Moderate episode of recurrent major depressive disorder ([address] 4/27/2016    Obesity, Class III, BMI 40-49.9 (morbid obesity) ([address] 9/1/2015    Onychomycosis 4/27/2016    Pain of left thumb 11/1/2016    Prediabetes 7/5/2016    PTSD (post-traumatic stress disorder)     Smoker 9/1/2015    Vitamin D deficiency 5/31/2016       Family History   Problem Relation Age of Onset    Hypertension Mother     Thyroid Disease Mother     No Known Problems Father     Psychiatric Disorder Sister     Psychiatric Disorder Brother     Psychiatric Disorder Sister     Diabetes Sister     Psychiatric Disorder Brother     Psychiatric Disorder Brother        Current Outpatient Medications   Medication Sig Dispense Refill    oxyCODONE-acetaminophen (PERCOCET) 5-325 mg per tablet Take 1 Tab by mouth every eight (8) hours as needed for Pain for up to 16 doses. Max Daily Amount: 3 Tabs. 16 Tab 0    traZODone (DESYREL) 150 mg tablet Take 150 mg by mouth nightly.  celecoxib (CELEBREX) 200 mg capsule Take 1 Cap by mouth daily. 30 Cap 0    hydrOXYzine HCl (ATARAX) 25 mg tablet Take 25 mg by mouth three (3) times daily as needed for Anxiety.  lamoTRIgine (LAMICTAL) 200 mg tablet Take 200 mg by mouth every evening.  losartan (COZAAR) 50 mg tablet Take 75 mg by mouth daily.  DULoxetine (CYMBALTA) 60 mg capsule Take 60 mg by mouth two (2) times a day.  gabapentin (NEURONTIN) 300 mg capsule Take 2 Caps by mouth three (3) times daily. 180 Cap 2    lidocaine (LIDODERM) 5 % APPLY 1 PATCH TO THE AFFECTED AREA EVERY DAY. LEAVE ON FOR 12 HOURS THEN REMOVE FOR 12 HOURS 90 Patch 3    polyethylene glycol (MIRALAX) 17 gram/dose powder MIX AND DRINK 17 GRAMS BY MOUTH DAILY 1530 g 3    hydroCHLOROthiazide (HYDRODIURIL) 25 mg tablet Take 1 Tab by mouth daily. 90 Tab 3    lamoTRIgine (LAMICTAL) 100 mg tablet Take 100 mg by mouth daily.  buPROPion SR (WELLBUTRIN, ZYBAN) 200 mg SR tablet 200 mg two (2) times a day. 1    traZODone (DESYREL) 100 mg tablet 150 mg every evening. 1    cephALEXin (KEFLEX) 500 mg capsule Take 1 Cap by mouth three (3) times daily. 21 Cap 0    buPROPion SR (WELLBUTRIN, ZYBAN) 200 mg SR tablet Take 200 mg by mouth two (2) times a day.          No Known Allergies    Past Surgical History:   Procedure Laterality Date    HX CERVICAL DISKECTOMY  02/07/2019    with fusion C5/6 C6/7    HX GYN Bilateral     tubal    HX ORTHOPAEDIC         Past Medical History:   Diagnosis Date    Acute pain of left shoulder 11/1/2016    Bipolar 1 disorder ([address]     Bipolar affective disorder, currently manic, moderate ([address] 4/27/2016    Chronic midline low back pain with sciatica 11/1/2016    Cigarette nicotine dependence in remission 8/2/2016    Cigarette nicotine dependence without complication 17/33/0598    Continuous nicotine dependence 1/24/2017    Depression     Fibromyalgia 9/1/2015    HLD (hyperlipidemia) 2/4/2016    Hypertension     Hypertriglyceridemia 5/15/2018    Impaired fasting glucose 5/31/2016    Irritable bowel syndrome without diarrhea 4/27/2016    Moderate episode of recurrent major depressive disorder [facility] 4/27/2016    Obesity, Class III, BMI 40-49.9 (morbid obesity) ([facility] 9/1/2015    Onychomycosis 4/27/2016    Pain of left thumb 11/1/2016    Prediabetes 7/5/2016    PTSD (post-traumatic stress disorder)     Smoker 9/1/2015    Vitamin D deficiency 5/31/2016       Social History     Socioeconomic History    Marital status: UNKNOWN     Spouse name: Not on file    Number of children: Not on file    Years of education: Not on file    Highest education level: Not on file   Social Needs    Financial resource strain: Not on file    Food insecurity - worry: Not on file    Food insecurity - inability: Not on file   Personal Capital needs - medical: Not on file   Personal Capital needs - non-medical: Not on file   Occupational History    Not on file   Tobacco Use    Smoking status: Current Every Day Smoker     Packs/day: 1.00     Years: 25.00     Pack years: 25.00    Smokeless tobacco: Never Used   Substance and Sexual Activity    Alcohol use:  Yes     Alcohol/week: 5.4 oz     Types: 9 Shots of liquor per week     Comment: socially    Drug use: No    Sexual activity: Yes     Partners: Male     Birth control/protection: None   Other Topics Concern     Service Not Asked    Blood Transfusions Not Asked    Caffeine Concern Not Asked    Occupational Exposure Not Asked    Hobby Hazards Not Asked    Sleep Concern Not Asked    Stress Concern Not Asked    Weight Concern Not Asked    Special Diet Not Asked    Back Care Not Asked    Exercise Not Asked    Bike Helmet Not Asked   [address] Not Asked    Self-Exams Not Asked   Social History Narrative    Not on file         REVIEW OF SYSTEMS:   CONSTITUTIONAL SYMPTOMS:  Negative. EYES:  Negative. EARS, NOSE, THROAT AND MOUTH:  Negative. CARDIOVASCULAR:  Negative. RESPIRATORY:  Negative. GENITOURINARY: Per HPI. GASTROINTESTINAL:  Per HPI. INTEGUMENTARY (SKIN AND/OR BREAST):  Negative. MUSCULOSKELETAL: Per HPI.   ENDOCRINE/RHEUMATOLOGIC:  Negative. NEUROLOGICAL:  Per HPI. HEMATOLOGIC/LYMPHATIC:  Negative. ALLERGIC/IMMUNOLOGIC:  Negative. PSYCHIATRIC:  Negative. PHYSICAL EXAMINATION:   Visit Vitals  /74   Pulse 93   Temp 98.4 °F (36.9 °C)   Resp 18   Ht 5' 9\" (1.753 m)   SpO2 97%   BMI 43.71 kg/m²    PAIN SCALE: 6/10    CONSTITUTIONAL: The patient is in no apparent distress and is alert and oriented x 3. HEENT: Normocephalic. Hearing grossly intact. NECK: Supple and symmetric. no tenderness, or masses were felt. RESPIRATORY: No labored breathing. CARDIOVASCULAR: The carotid pulses were normal. Peripheral pulses were 2+. CHEST: Normal AP diameter and normal contour without any kyphoscoliosis. LYMPHATIC: No lymphadenopathy was appreciated in the neck, axillae or groin. SKIN:  Incision healing well, no drainage, no erythema, no hernia, no seroma, no swelling, no dehiscence, incision well approximated. Negative for scars, rashes, lesions, or ulcers on the right upper, right lower, left upper, left lower and trunk. NEUROLOGICAL: Alert and oriented x 3. Presents in wheelchair. EXTREMITIES: See musculoskeletal.  MUSCULOSKELETAL:   Head and Neck: Neck pain. Negative for misalignment, asymmetry, crepitation, defects, tenderness masses or effusions.  Left Upper Extremity: Inspection, percussion and palpation performed. Dela Cruzs sign is negative.  Right Upper Extremity: Inspection, percussion and palpation performed. Dela Cruzs sign is negative.  Spine, Ribs and Pelvis: Back pain. Inspection, percussion and palpation performed. Negative for misalignment, asymmetry, crepitation, defects, tenderness masses or effusions.  Left Lower Extremity: Weakness and numbness. Inspection, percussion and palpation performed. Negative straight leg raise.  Right Lower Extremity: Weakness and numbness. Inspection, percussion and palpation performed. Negative straight leg raise. SPINE EXAM:     Cervical spine: Neck is midline. Normal muscle tone. No focal atrophy is noted. Lumbar spine: No rash, ecchymosis, or gross obliquity. No focal atrophy is noted. ASSESSMENT    ICD-10-CM ICD-9-CM    1. Cervical stenosis of spine M48.02 723.0    2. S/P cervical spinal fusion Z98.1 V45.4 CT SPINE CERV WO CONT   3. Cervical myelopathy ([address] G95.9 721.1 CT SPINE CERV WO CONT       Written by Niki Jimenez, as dictated by Lana Dow MD.    I, Dr. Lana Dow MD, confirm that all documentation is accurate.

## 2019-03-13 ENCOUNTER — DOCUMENTATION ONLY (OUTPATIENT)
Dept: ORTHOPEDIC SURGERY | Age: 44
End: 2019-03-13

## 2019-03-13 ENCOUNTER — HOSPITAL ENCOUNTER (OUTPATIENT)
Dept: CT IMAGING | Age: 44
Discharge: HOME | End: 2019-03-13
Payer: MEDICAID

## 2019-03-13 DIAGNOSIS — Z98.1 S/P CERVICAL SPINAL FUSION: ICD-10-CM

## 2019-03-13 DIAGNOSIS — G95.9 CERVICAL MYELOPATHY (HCC): ICD-10-CM

## 2019-03-13 PROCEDURE — 72125 CT NECK SPINE W/O DYE: CPT

## 2019-03-14 NOTE — H&P
Pre-Admission History and Physical    Patient: Karen Boykin   MRN: [id number]   SSN: [id number]   YOB: 1975   Age: 37 y.o. Sex: female     Patient scheduled for: C5-6-7 lami fusion, possible revision anterior fusion. Date of surgery: 3/19/19. Location of surgery:  [facility]. Surgeon: Nelson Vu MD    HPI:  Karen Boykin is a 37 y.o. female. She is profoundly weak in her lower extremities. She is clearly myelopathic. She has numbness at about a T12 level. Her arms are normal.  She has minimal to no neck pain. Small residual numbness in her fingertips. Her neck is much better since surgery. She says she has had repeated falls from her leg weakness. I got MRIs of her thoracic and lumbar spine that are benign other than degenerative disease at L4-5, L5-S1. She has proximal leg weakness, iliopsoas, and quad weakness. I got an MRI of her cervical spine that demonstrates improved stenosis at C6-7, but an area of gliosis that still demonstrates clear tightness. She says that every time she falls, her legs go numb. My concern is that she still has innate instability despite her decompression at 5-6, 6-7 and that this ligamentous instability that she has in her neck causing core trauma and this lower extremity weakness despite her decompression. I need to do a posterior decompression and fusion on her. I see no other cord pathology. I cannot explain why her level seems to be so much lower and why she has limited to no neck pain. It is going to be a difficult decompression in her neck given her morbid obesity. I am not certain our ability to radiographically visualize pedicles at T1 or C7. We may need to use a lateral mass screw there as best we can depending upon my ability to radiographically see things given her size. I am going to obtain a preoperative CAT scan just to get a better look at her anterior devices and as much as one cannot visualize them on plain radiographs. She reports a pain level of 6/10. This patient has failed the presurgical conservative treatments  including medications. Pain has impacted the patient's functional ability to perform daily activity. She is being admitted for surgical intervention. Past Medical History:   Diagnosis Date    Acute pain of left shoulder 11/1/2016    B[facility]     Bipolar affective disorder, currently manic, moderate ([facility] 4/27/2016    Chronic midline low back pain with sciatica 11/1/2016    Cigarette nicotine dependence in remission 8/2/2016    Cigarette nicotine dependence without complication 60/77/7575    Continuous nicotine dependence 1/24/2017    Depression     Fibromyalgia 9/1/2015    HLD (hyperlipidemia) 2/4/2016    Hypertension     Hypertriglyceridemia 5/15/2018    Impaired fasting glucose 5/31/2016    Irritable bowel syndrome without diarrhea 4/27/2016    Moderate episode of recurrent major depressive disorder ([facility] 4/27/2016    Obesity, Class III, BMI 40-49.9 (morbid obesity) ([facility] 9/1/2015    Onychomycosis 4/27/2016    Pain of left thumb 11/1/2016    Prediabetes 7/5/2016    PTSD (post-traumatic stress disorder)     Smoker 9/1/2015    Vitamin D deficiency 5/31/2016     Social History     Socioeconomic History    Marital status: LEGALLY      Spouse name: Not on file    Number of children: Not on file    Years of education: Not on file    Highest education level: Not on file   Tobacco Use    Smoking status: Current Every Day Smoker     Packs/day: 1.00     Years: 25.00     Pack years: 25.00    Smokeless tobacco: Never Used   Substance and Sexual Activity    Alcohol use:  Yes     Alcohol/week: 5.4 oz     Types: 9 Shots of liquor per week     Comment: socially    Drug use: No    Sexual activity: Yes     Partners: Male     Birth control/protection: None   Other Topics Concern     Past Surgical History:   Procedure Laterality Date    HX ACL RECONSTRUCTION      HX APPENDECTOMY      HX CERVICAL DISKECTOMY  02/07/2019    with fusion C5/6 C6/7    HX GYN Bilateral     tubal    HX ORTHOPAEDIC       Family History   Problem Relation Age of Onset    Hypertension Mother     Thyroid Disease Mother     No Known Problems Father     Psychiatric Disorder Sister     Psychiatric Disorder Brother     Psychiatric Disorder Sister     Diabetes Sister     Psychiatric Disorder Brother     Psychiatric Disorder Brother      No Known Allergies  Current Outpatient Medications   Medication Sig Dispense Refill    oxyCODONE-acetaminophen (PERCOCET) 5-325 mg per tablet Take 1 Tab by mouth every eight (8) hours as needed for Pain for up to 7 days. Max Daily Amount: 3 Tabs. 21 Tab 0    buPROPion SR (WELLBUTRIN, ZYBAN) 200 mg SR tablet Take 200 mg by mouth two (2) times a day.  traZODone (DESYREL) 150 mg tablet Take 150 mg by mouth nightly.  celecoxib (CELEBREX) 200 mg capsule Take 1 Cap by mouth daily. 30 Cap 0    hydrOXYzine HCl (ATARAX) 25 mg tablet Take 25 mg by mouth three (3) times daily as needed for Anxiety.  lamoTRIgine (LAMICTAL) 200 mg tablet Take 200 mg by mouth every evening.  losartan (COZAAR) 50 mg tablet Take 75 mg by mouth daily.  DULoxetine (CYMBALTA) 60 mg capsule Take 60 mg by mouth two (2) times a day.  gabapentin (NEURONTIN) 300 mg capsule Take 2 Caps by mouth three (3) times daily. 180 Cap 2    lidocaine (LIDODERM) 5 % APPLY 1 PATCH TO THE AFFECTED AREA EVERY DAY. LEAVE ON FOR 12 HOURS THEN REMOVE FOR 12 HOURS 90 Patch 3    polyethylene glycol (MIRALAX) 17 gram/dose powder MIX AND DRINK 17 GRAMS BY MOUTH DAILY 1530 g 3    hydroCHLOROthiazide (HYDRODIURIL) 25 mg tablet Take 1 Tab by mouth daily. 90 Tab 3    lamoTRIgine (LAMICTAL) 100 mg tablet Take 100 mg by mouth daily.  buPROPion SR (WELLBUTRIN, ZYBAN) 200 mg SR tablet 200 mg two (2) times a day. 1    traZODone (DESYREL) 100 mg tablet 150 mg every evening.   1       ROS:  Denies chills, fever,night sweats,  bowel or bladder dysfunction, unexplained weight loss/weight gain, chest pain, sob or anxiety. Physical Examination    Gen: Well developed, well nourished 37 y.o. female Visit Vitals  /74   Pulse 93   Temp 98.4 °F (36.9 °C)   Resp 18   Ht 5' 9\" (1.753 m)   SpO2 97%   BMI 43.71 kg/m²    PAIN SCALE: 6/10     CONSTITUTIONAL: The patient is in no apparent distress and is alert and oriented x 3. HEENT: Normocephalic. Hearing grossly intact. NECK: Supple and symmetric. no tenderness, or masses were felt. RESPIRATORY: No labored breathing. CARDIOVASCULAR: The carotid pulses were normal. Peripheral pulses were 2+. CHEST: Normal AP diameter and normal contour without any kyphoscoliosis. LYMPHATIC: No lymphadenopathy was appreciated in the neck, axillae or groin. SKIN:  Incision healing well, no drainage, no erythema, no hernia, no seroma, no swelling, no dehiscence, incision well approximated. Negative for scars, rashes, lesions, or ulcers on the right upper, right lower, left upper, left lower and trunk. NEUROLOGICAL: Alert and oriented x 3. Presents in wheelchair. EXTREMITIES: See musculoskeletal.  MUSCULOSKELETAL:  · Head and Neck: Neck pain. Negative for misalignment, asymmetry, crepitation, defects, tenderness masses or effusions. · Left Upper Extremity: Inspection, percussion and palpation performed. Dela Cruzs sign is negative. · Right Upper Extremity: Inspection, percussion and palpation performed. Dela Cruzs sign is negative. · Spine, Ribs and Pelvis: Back pain. Inspection, percussion and palpation performed. Negative for misalignment, asymmetry, crepitation, defects, tenderness masses or effusions. · Left Lower Extremity: Weakness and numbness. Inspection, percussion and palpation performed. Negative straight leg raise. · Right Lower Extremity: Weakness and numbness. Inspection, percussion and palpation performed.    Negative straight leg raise.           SPINE EXAM:      Cervical spine: Neck is midline. Normal muscle tone. No focal atrophy is noted.     Lumbar spine: No rash, ecchymosis, or gross obliquity. No focal atrophy is noted. Assessment and Plan    Due to the pt's persistent symptoms unrelieved by conservative measure Shabbir Fleming is being admitted to [facility] to undergo surgical intervention. The post-operative plan of care consists of physical therapy, home health and a 2 week f/u office visit. The risks, benefits, complications and alternatives to surgery have been discussed in detail with the patient. The patient understands and agrees to proceed. Yahaira Abbott NP-C for Daniel Whittaker MD      I discussed at length my concerns about her cervical spine. Gage Wade Her CT scan looks good with appropriate alignment and fixation. Her MRi demonstrates interval decompression of her large HNp and improvement of the cord edema. I cannot explain the episodic lower extremity weakness she has experienced following falls . She has no neck pain or upper extremity findings, her neuro level would appear to be low thoracic, but the only finding is residual , but improved stenosis , at c5/6/7. I see no instability, The only thig I can imagine is that given her size she contuses her cord on falling at c6/7 and she has very atypical symptoms. I have offered her 2 options. Bracing and observation and therapy , vs further posterior decompress/ stabilization/observation and therapy. Surgery will be technically difficult because of her morbid obesity. She wishes to have her low back degenerative stenosis addressed and I have told her that it has to wait until the cervical issues have clarified themselves. RBCA discussed and patient reconfirms the plan for posterior decompression and fusion.

## 2019-03-18 ENCOUNTER — ANESTHESIA EVENT (OUTPATIENT)
Dept: SURGERY | Age: 44
DRG: 321 | End: 2019-03-18
Payer: MEDICAID

## 2019-03-19 ENCOUNTER — ANESTHESIA (OUTPATIENT)
Dept: SURGERY | Age: 44
DRG: 321 | End: 2019-03-19
Payer: MEDICAID

## 2019-03-19 ENCOUNTER — HOSPITAL ENCOUNTER (INPATIENT)
Age: 44
LOS: 2 days | Discharge: HOME HEALTH | DRG: 321 | End: 2019-03-21
Attending: ORTHOPAEDIC SURGERY | Admitting: ORTHOPAEDIC SURGERY
Payer: MEDICAID

## 2019-03-19 ENCOUNTER — APPOINTMENT (OUTPATIENT)
Dept: GENERAL RADIOLOGY | Age: 44
DRG: 321 | End: 2019-03-19
Attending: ORTHOPAEDIC SURGERY
Payer: MEDICAID

## 2019-03-19 DIAGNOSIS — G89.29 CHRONIC BILATERAL LOW BACK PAIN WITH SCIATICA, SCIATICA LATERALITY UNSPECIFIED: ICD-10-CM

## 2019-03-19 DIAGNOSIS — M54.40 CHRONIC BILATERAL LOW BACK PAIN WITH SCIATICA, SCIATICA LATERALITY UNSPECIFIED: ICD-10-CM

## 2019-03-19 PROBLEM — M48.02 CERVICAL STENOSIS OF SPINE: Status: ACTIVE | Noted: 2019-03-19

## 2019-03-19 LAB
GLUCOSE BLD STRIP.AUTO-MCNC: 136 MG/DL (ref 70–110)
HCG UR QL: NEGATIVE

## 2019-03-19 PROCEDURE — 74011000250 HC RX REV CODE- 250

## 2019-03-19 PROCEDURE — 77030004402 HC BUR NEUR STRY -C: Performed by: ORTHOPAEDIC SURGERY

## 2019-03-19 PROCEDURE — 77030033263 HC DRSG MEPILEX 16-48IN BORD MOLN -B: Performed by: ORTHOPAEDIC SURGERY

## 2019-03-19 PROCEDURE — 77030020782 HC GWN BAIR PAWS FLX 3M -B: Performed by: ORTHOPAEDIC SURGERY

## 2019-03-19 PROCEDURE — 77030012406 HC DRN WND PENRS BARD -A: Performed by: ORTHOPAEDIC SURGERY

## 2019-03-19 PROCEDURE — C1713 ANCHOR/SCREW BN/BN,TIS/BN: HCPCS | Performed by: ORTHOPAEDIC SURGERY

## 2019-03-19 PROCEDURE — 81025 URINE PREGNANCY TEST: CPT

## 2019-03-19 PROCEDURE — 77030018836 HC SOL IRR NACL ICUM -A: Performed by: ORTHOPAEDIC SURGERY

## 2019-03-19 PROCEDURE — 77030012890: Performed by: ORTHOPAEDIC SURGERY

## 2019-03-19 PROCEDURE — 77030010514 HC APPL CLP LIG COVD -B: Performed by: ORTHOPAEDIC SURGERY

## 2019-03-19 PROCEDURE — 00NW0ZZ RELEASE CERVICAL SPINAL CORD, OPEN APPROACH: ICD-10-PCS | Performed by: ORTHOPAEDIC SURGERY

## 2019-03-19 PROCEDURE — 76010000132 HC OR TIME 2.5 TO 3 HR: Performed by: ORTHOPAEDIC SURGERY

## 2019-03-19 PROCEDURE — 77030035236 HC SUT PDS STRATFX BARB J&J -B: Performed by: ORTHOPAEDIC SURGERY

## 2019-03-19 PROCEDURE — 74011250636 HC RX REV CODE- 250/636

## 2019-03-19 PROCEDURE — 74011250637 HC RX REV CODE- 250/637: Performed by: ORTHOPAEDIC SURGERY

## 2019-03-19 PROCEDURE — 77030030105 HC BIT DRL VUEPNT NUVA -B: Performed by: ORTHOPAEDIC SURGERY

## 2019-03-19 PROCEDURE — 74011250636 HC RX REV CODE- 250/636: Performed by: ORTHOPAEDIC SURGERY

## 2019-03-19 PROCEDURE — 77030011265 HC ELECTRD BLD HEX COVD -A: Performed by: ORTHOPAEDIC SURGERY

## 2019-03-19 PROCEDURE — 74011250637 HC RX REV CODE- 250/637: Performed by: NURSE ANESTHETIST, CERTIFIED REGISTERED

## 2019-03-19 PROCEDURE — 77030019938 HC TBNG IV PCA ICUM -A: Performed by: ORTHOPAEDIC SURGERY

## 2019-03-19 PROCEDURE — 77030002933 HC SUT MCRYL J&J -A: Performed by: ORTHOPAEDIC SURGERY

## 2019-03-19 PROCEDURE — 77030030163 HC BN WAX J&J -A: Performed by: ORTHOPAEDIC SURGERY

## 2019-03-19 PROCEDURE — 77030013567 HC DRN WND RESERV BARD -A: Performed by: ORTHOPAEDIC SURGERY

## 2019-03-19 PROCEDURE — 77030032490 HC SLV COMPR SCD KNE COVD -B: Performed by: ORTHOPAEDIC SURGERY

## 2019-03-19 PROCEDURE — 74011250636 HC RX REV CODE- 250/636: Performed by: NURSE ANESTHETIST, CERTIFIED REGISTERED

## 2019-03-19 PROCEDURE — 74011250636 HC RX REV CODE- 250/636: Performed by: NURSE PRACTITIONER

## 2019-03-19 PROCEDURE — 97161 PT EVAL LOW COMPLEX 20 MIN: CPT

## 2019-03-19 PROCEDURE — C1763 CONN TISS, NON-HUMAN: HCPCS | Performed by: ORTHOPAEDIC SURGERY

## 2019-03-19 PROCEDURE — 65270000029 HC RM PRIVATE

## 2019-03-19 PROCEDURE — L0120 CERV FLEX N/ADJ FOAM PRE OTS: HCPCS | Performed by: ORTHOPAEDIC SURGERY

## 2019-03-19 PROCEDURE — 76060000036 HC ANESTHESIA 2.5 TO 3 HR: Performed by: ORTHOPAEDIC SURGERY

## 2019-03-19 PROCEDURE — 77030039267 HC ADH SKN EXOFIN S2SG -B: Performed by: ORTHOPAEDIC SURGERY

## 2019-03-19 PROCEDURE — 76210000016 HC OR PH I REC 1 TO 1.5 HR: Performed by: ORTHOPAEDIC SURGERY

## 2019-03-19 PROCEDURE — 77030039266 HC ADH SKN EXOFIN S2SG -A: Performed by: ORTHOPAEDIC SURGERY

## 2019-03-19 PROCEDURE — 77030011267 HC ELECTRD BLD COVD -A: Performed by: ORTHOPAEDIC SURGERY

## 2019-03-19 PROCEDURE — 74011000250 HC RX REV CODE- 250: Performed by: ORTHOPAEDIC SURGERY

## 2019-03-19 PROCEDURE — 82962 GLUCOSE BLOOD TEST: CPT

## 2019-03-19 PROCEDURE — 97530 THERAPEUTIC ACTIVITIES: CPT

## 2019-03-19 PROCEDURE — 0RG2071 FUSION OF 2 OR MORE CERVICAL VERTEBRAL JOINTS WITH AUTOLOGOUS TISSUE SUBSTITUTE, POSTERIOR APPROACH, POSTERIOR COLUMN, OPEN APPROACH: ICD-10-PCS | Performed by: ORTHOPAEDIC SURGERY

## 2019-03-19 DEVICE — IMPLANTABLE DEVICE: Type: IMPLANTABLE DEVICE | Site: SPINE CERVICAL | Status: FUNCTIONAL

## 2019-03-19 DEVICE — ROD SPNL L60MM DIA3.5MM POST OCCIPITOCERVICOTHORACIC: Type: IMPLANTABLE DEVICE | Site: SPINE CERVICAL | Status: FUNCTIONAL

## 2019-03-19 DEVICE — SCREW SPNL L L12MM DIA3.5MM POST OCCIPITOCERVICOTHORACIC: Type: IMPLANTABLE DEVICE | Site: SPINE CERVICAL | Status: FUNCTIONAL

## 2019-03-19 DEVICE — GRAFT BONE SUB 5ML PUTTY CA PHOS SYNTH GRAN BIOABSRB ATTRAX: Type: IMPLANTABLE DEVICE | Site: SPINE CERVICAL | Status: FUNCTIONAL

## 2019-03-19 RX ORDER — ONDANSETRON 2 MG/ML
4 INJECTION INTRAMUSCULAR; INTRAVENOUS AS NEEDED
Status: DISCONTINUED | OUTPATIENT
Start: 2019-03-19 | End: 2019-03-21 | Stop reason: HOSPADM

## 2019-03-19 RX ORDER — ONDANSETRON 2 MG/ML
4 INJECTION INTRAMUSCULAR; INTRAVENOUS
Status: DISCONTINUED | OUTPATIENT
Start: 2019-03-19 | End: 2019-03-21 | Stop reason: HOSPADM

## 2019-03-19 RX ORDER — ONDANSETRON 2 MG/ML
4 INJECTION INTRAMUSCULAR; INTRAVENOUS ONCE
Status: DISCONTINUED | OUTPATIENT
Start: 2019-03-19 | End: 2019-03-19 | Stop reason: HOSPADM

## 2019-03-19 RX ORDER — GLYCOPYRROLATE 0.2 MG/ML
INJECTION INTRAMUSCULAR; INTRAVENOUS AS NEEDED
Status: DISCONTINUED | OUTPATIENT
Start: 2019-03-19 | End: 2019-03-19 | Stop reason: HOSPADM

## 2019-03-19 RX ORDER — DIAZEPAM 5 MG/1
5 TABLET ORAL
Status: DISCONTINUED | OUTPATIENT
Start: 2019-03-19 | End: 2019-03-21 | Stop reason: HOSPADM

## 2019-03-19 RX ORDER — DIPHENHYDRAMINE HYDROCHLORIDE 50 MG/ML
12.5 INJECTION, SOLUTION INTRAMUSCULAR; INTRAVENOUS
Status: DISCONTINUED | OUTPATIENT
Start: 2019-03-19 | End: 2019-03-21 | Stop reason: HOSPADM

## 2019-03-19 RX ORDER — LORAZEPAM 2 MG/ML
1 INJECTION INTRAMUSCULAR
Status: DISCONTINUED | OUTPATIENT
Start: 2019-03-19 | End: 2019-03-21 | Stop reason: HOSPADM

## 2019-03-19 RX ORDER — LABETALOL HYDROCHLORIDE 5 MG/ML
INJECTION, SOLUTION INTRAVENOUS AS NEEDED
Status: DISCONTINUED | OUTPATIENT
Start: 2019-03-19 | End: 2019-03-19 | Stop reason: HOSPADM

## 2019-03-19 RX ORDER — SODIUM CHLORIDE, SODIUM LACTATE, POTASSIUM CHLORIDE, CALCIUM CHLORIDE 600; 310; 30; 20 MG/100ML; MG/100ML; MG/100ML; MG/100ML
50 INJECTION, SOLUTION INTRAVENOUS CONTINUOUS
Status: DISCONTINUED | OUTPATIENT
Start: 2019-03-19 | End: 2019-03-19 | Stop reason: HOSPADM

## 2019-03-19 RX ORDER — DIPHENHYDRAMINE HYDROCHLORIDE 50 MG/ML
12.5 INJECTION, SOLUTION INTRAMUSCULAR; INTRAVENOUS
Status: DISCONTINUED | OUTPATIENT
Start: 2019-03-19 | End: 2019-03-19 | Stop reason: HOSPADM

## 2019-03-19 RX ORDER — SODIUM CHLORIDE 9 MG/ML
100 INJECTION, SOLUTION INTRAVENOUS CONTINUOUS
Status: DISCONTINUED | OUTPATIENT
Start: 2019-03-19 | End: 2019-03-20

## 2019-03-19 RX ORDER — DULOXETIN HYDROCHLORIDE 60 MG/1
60 CAPSULE, DELAYED RELEASE ORAL 2 TIMES DAILY
Status: DISCONTINUED | OUTPATIENT
Start: 2019-03-19 | End: 2019-03-21 | Stop reason: HOSPADM

## 2019-03-19 RX ORDER — ALBUTEROL SULFATE 0.83 MG/ML
2.5 SOLUTION RESPIRATORY (INHALATION) AS NEEDED
Status: DISCONTINUED | OUTPATIENT
Start: 2019-03-19 | End: 2019-03-19 | Stop reason: HOSPADM

## 2019-03-19 RX ORDER — HYDROMORPHONE HYDROCHLORIDE 2 MG/ML
INJECTION, SOLUTION INTRAMUSCULAR; INTRAVENOUS; SUBCUTANEOUS
Status: COMPLETED
Start: 2019-03-19 | End: 2019-03-19

## 2019-03-19 RX ORDER — MIDAZOLAM HYDROCHLORIDE 1 MG/ML
INJECTION, SOLUTION INTRAMUSCULAR; INTRAVENOUS AS NEEDED
Status: DISCONTINUED | OUTPATIENT
Start: 2019-03-19 | End: 2019-03-19 | Stop reason: HOSPADM

## 2019-03-19 RX ORDER — HYDROMORPHONE HYDROCHLORIDE 2 MG/ML
INJECTION, SOLUTION INTRAMUSCULAR; INTRAVENOUS; SUBCUTANEOUS AS NEEDED
Status: DISCONTINUED | OUTPATIENT
Start: 2019-03-19 | End: 2019-03-19 | Stop reason: HOSPADM

## 2019-03-19 RX ORDER — GABAPENTIN 300 MG/1
600 CAPSULE ORAL 3 TIMES DAILY
Status: DISCONTINUED | OUTPATIENT
Start: 2019-03-19 | End: 2019-03-21 | Stop reason: HOSPADM

## 2019-03-19 RX ORDER — INSULIN LISPRO 100 [IU]/ML
INJECTION, SOLUTION INTRAVENOUS; SUBCUTANEOUS ONCE
Status: DISCONTINUED | OUTPATIENT
Start: 2019-03-19 | End: 2019-03-19 | Stop reason: HOSPADM

## 2019-03-19 RX ORDER — NEOSTIGMINE METHYLSULFATE 1 MG/ML
INJECTION INTRAVENOUS AS NEEDED
Status: DISCONTINUED | OUTPATIENT
Start: 2019-03-19 | End: 2019-03-19 | Stop reason: HOSPADM

## 2019-03-19 RX ORDER — SODIUM CHLORIDE 9 MG/ML
INJECTION, SOLUTION INTRAVENOUS
Status: DISCONTINUED | OUTPATIENT
Start: 2019-03-19 | End: 2019-03-19 | Stop reason: HOSPADM

## 2019-03-19 RX ORDER — SUCCINYLCHOLINE CHLORIDE 20 MG/ML
INJECTION INTRAMUSCULAR; INTRAVENOUS AS NEEDED
Status: DISCONTINUED | OUTPATIENT
Start: 2019-03-19 | End: 2019-03-19 | Stop reason: HOSPADM

## 2019-03-19 RX ORDER — NALOXONE HYDROCHLORIDE 0.4 MG/ML
0.04 INJECTION, SOLUTION INTRAMUSCULAR; INTRAVENOUS; SUBCUTANEOUS AS NEEDED
Status: DISCONTINUED | OUTPATIENT
Start: 2019-03-19 | End: 2019-03-19 | Stop reason: HOSPADM

## 2019-03-19 RX ORDER — OXYCODONE HYDROCHLORIDE 5 MG/1
5-10 TABLET ORAL
Status: DISCONTINUED | OUTPATIENT
Start: 2019-03-19 | End: 2019-03-21 | Stop reason: HOSPADM

## 2019-03-19 RX ORDER — DIPHENHYDRAMINE HCL 25 MG
25 CAPSULE ORAL
Status: DISCONTINUED | OUTPATIENT
Start: 2019-03-19 | End: 2019-03-21 | Stop reason: HOSPADM

## 2019-03-19 RX ORDER — POVIDONE-IODINE 10 %
SOLUTION, NON-ORAL TOPICAL AS NEEDED
Status: DISCONTINUED | OUTPATIENT
Start: 2019-03-19 | End: 2019-03-19 | Stop reason: HOSPADM

## 2019-03-19 RX ORDER — SODIUM CHLORIDE 0.9 % (FLUSH) 0.9 %
5-40 SYRINGE (ML) INJECTION AS NEEDED
Status: DISCONTINUED | OUTPATIENT
Start: 2019-03-19 | End: 2019-03-21 | Stop reason: HOSPADM

## 2019-03-19 RX ORDER — DEXTROSE 50 % IN WATER (D50W) INTRAVENOUS SYRINGE
25-50 AS NEEDED
Status: DISCONTINUED | OUTPATIENT
Start: 2019-03-19 | End: 2019-03-19 | Stop reason: HOSPADM

## 2019-03-19 RX ORDER — NALOXONE HYDROCHLORIDE 0.4 MG/ML
0.1 INJECTION, SOLUTION INTRAMUSCULAR; INTRAVENOUS; SUBCUTANEOUS AS NEEDED
Status: DISCONTINUED | OUTPATIENT
Start: 2019-03-19 | End: 2019-03-21 | Stop reason: HOSPADM

## 2019-03-19 RX ORDER — BISACODYL 5 MG
10 TABLET, DELAYED RELEASE (ENTERIC COATED) ORAL DAILY
Status: DISCONTINUED | OUTPATIENT
Start: 2019-03-20 | End: 2019-03-21 | Stop reason: HOSPADM

## 2019-03-19 RX ORDER — ROCURONIUM BROMIDE 10 MG/ML
INJECTION, SOLUTION INTRAVENOUS AS NEEDED
Status: DISCONTINUED | OUTPATIENT
Start: 2019-03-19 | End: 2019-03-19 | Stop reason: HOSPADM

## 2019-03-19 RX ORDER — SODIUM CHLORIDE 0.9 % (FLUSH) 0.9 %
5-40 SYRINGE (ML) INJECTION AS NEEDED
Status: DISCONTINUED | OUTPATIENT
Start: 2019-03-19 | End: 2019-03-19 | Stop reason: HOSPADM

## 2019-03-19 RX ORDER — BUPROPION HYDROCHLORIDE 100 MG/1
200 TABLET, EXTENDED RELEASE ORAL 2 TIMES DAILY
Status: DISCONTINUED | OUTPATIENT
Start: 2019-03-19 | End: 2019-03-21 | Stop reason: HOSPADM

## 2019-03-19 RX ORDER — MAGNESIUM SULFATE 100 %
4 CRYSTALS MISCELLANEOUS AS NEEDED
Status: DISCONTINUED | OUTPATIENT
Start: 2019-03-19 | End: 2019-03-19 | Stop reason: HOSPADM

## 2019-03-19 RX ORDER — ACETAMINOPHEN 500 MG
1000 TABLET ORAL EVERY 6 HOURS
Status: DISCONTINUED | OUTPATIENT
Start: 2019-03-19 | End: 2019-03-21 | Stop reason: HOSPADM

## 2019-03-19 RX ORDER — FAMOTIDINE 20 MG/1
20 TABLET, FILM COATED ORAL ONCE
Status: COMPLETED | OUTPATIENT
Start: 2019-03-19 | End: 2019-03-19

## 2019-03-19 RX ORDER — HYDROMORPHONE HYDROCHLORIDE 2 MG/ML
0.5 INJECTION, SOLUTION INTRAMUSCULAR; INTRAVENOUS; SUBCUTANEOUS
Status: COMPLETED | OUTPATIENT
Start: 2019-03-19 | End: 2019-03-19

## 2019-03-19 RX ORDER — LIDOCAINE HYDROCHLORIDE 20 MG/ML
INJECTION, SOLUTION EPIDURAL; INFILTRATION; INTRACAUDAL; PERINEURAL AS NEEDED
Status: DISCONTINUED | OUTPATIENT
Start: 2019-03-19 | End: 2019-03-19 | Stop reason: HOSPADM

## 2019-03-19 RX ORDER — SODIUM CHLORIDE, SODIUM LACTATE, POTASSIUM CHLORIDE, CALCIUM CHLORIDE 600; 310; 30; 20 MG/100ML; MG/100ML; MG/100ML; MG/100ML
75 INJECTION, SOLUTION INTRAVENOUS CONTINUOUS
Status: DISCONTINUED | OUTPATIENT
Start: 2019-03-19 | End: 2019-03-19 | Stop reason: HOSPADM

## 2019-03-19 RX ORDER — MORPHINE SULFATE 5 MG/ML
INJECTION, SOLUTION INTRAVENOUS
Status: DISCONTINUED | OUTPATIENT
Start: 2019-03-19 | End: 2019-03-20

## 2019-03-19 RX ORDER — FENTANYL CITRATE 50 UG/ML
INJECTION, SOLUTION INTRAMUSCULAR; INTRAVENOUS AS NEEDED
Status: DISCONTINUED | OUTPATIENT
Start: 2019-03-19 | End: 2019-03-19 | Stop reason: HOSPADM

## 2019-03-19 RX ORDER — LAMOTRIGINE 100 MG/1
200 TABLET ORAL EVERY EVENING
Status: DISCONTINUED | OUTPATIENT
Start: 2019-03-19 | End: 2019-03-21 | Stop reason: HOSPADM

## 2019-03-19 RX ORDER — HYDROMORPHONE HYDROCHLORIDE 1 MG/ML
1 INJECTION, SOLUTION INTRAMUSCULAR; INTRAVENOUS; SUBCUTANEOUS
Status: DISCONTINUED | OUTPATIENT
Start: 2019-03-19 | End: 2019-03-21 | Stop reason: HOSPADM

## 2019-03-19 RX ORDER — VANCOMYCIN HYDROCHLORIDE 1 G/20ML
INJECTION, POWDER, LYOPHILIZED, FOR SOLUTION INTRAVENOUS AS NEEDED
Status: DISCONTINUED | OUTPATIENT
Start: 2019-03-19 | End: 2019-03-19 | Stop reason: HOSPADM

## 2019-03-19 RX ORDER — SODIUM CHLORIDE 0.9 % (FLUSH) 0.9 %
5-40 SYRINGE (ML) INJECTION EVERY 8 HOURS
Status: DISCONTINUED | OUTPATIENT
Start: 2019-03-19 | End: 2019-03-19 | Stop reason: HOSPADM

## 2019-03-19 RX ORDER — SODIUM CHLORIDE 0.9 % (FLUSH) 0.9 %
5-40 SYRINGE (ML) INJECTION EVERY 8 HOURS
Status: DISCONTINUED | OUTPATIENT
Start: 2019-03-19 | End: 2019-03-21 | Stop reason: HOSPADM

## 2019-03-19 RX ORDER — KETOROLAC TROMETHAMINE 30 MG/ML
INJECTION, SOLUTION INTRAMUSCULAR; INTRAVENOUS AS NEEDED
Status: DISCONTINUED | OUTPATIENT
Start: 2019-03-19 | End: 2019-03-19 | Stop reason: HOSPADM

## 2019-03-19 RX ORDER — HYDROCHLOROTHIAZIDE 25 MG/1
25 TABLET ORAL DAILY
Status: DISCONTINUED | OUTPATIENT
Start: 2019-03-20 | End: 2019-03-21 | Stop reason: HOSPADM

## 2019-03-19 RX ORDER — LAMOTRIGINE 100 MG/1
100 TABLET ORAL DAILY
Status: DISCONTINUED | OUTPATIENT
Start: 2019-03-20 | End: 2019-03-21 | Stop reason: HOSPADM

## 2019-03-19 RX ORDER — DIPHENHYDRAMINE HYDROCHLORIDE 50 MG/ML
12.5 INJECTION, SOLUTION INTRAMUSCULAR; INTRAVENOUS
Status: DISCONTINUED | OUTPATIENT
Start: 2019-03-19 | End: 2019-03-19 | Stop reason: SDUPTHER

## 2019-03-19 RX ORDER — PROPOFOL 10 MG/ML
INJECTION, EMULSION INTRAVENOUS AS NEEDED
Status: DISCONTINUED | OUTPATIENT
Start: 2019-03-19 | End: 2019-03-19 | Stop reason: HOSPADM

## 2019-03-19 RX ORDER — DEXAMETHASONE SODIUM PHOSPHATE 4 MG/ML
INJECTION, SOLUTION INTRA-ARTICULAR; INTRALESIONAL; INTRAMUSCULAR; INTRAVENOUS; SOFT TISSUE AS NEEDED
Status: DISCONTINUED | OUTPATIENT
Start: 2019-03-19 | End: 2019-03-19 | Stop reason: HOSPADM

## 2019-03-19 RX ORDER — ONDANSETRON 2 MG/ML
INJECTION INTRAMUSCULAR; INTRAVENOUS AS NEEDED
Status: DISCONTINUED | OUTPATIENT
Start: 2019-03-19 | End: 2019-03-19 | Stop reason: HOSPADM

## 2019-03-19 RX ORDER — DEXTROSE, SODIUM CHLORIDE, AND POTASSIUM CHLORIDE 5; .45; .15 G/100ML; G/100ML; G/100ML
25 INJECTION INTRAVENOUS CONTINUOUS
Status: DISCONTINUED | OUTPATIENT
Start: 2019-03-19 | End: 2019-03-19

## 2019-03-19 RX ORDER — HYDROXYZINE 25 MG/1
25 TABLET, FILM COATED ORAL
Status: DISCONTINUED | OUTPATIENT
Start: 2019-03-19 | End: 2019-03-21 | Stop reason: HOSPADM

## 2019-03-19 RX ORDER — NALOXONE HYDROCHLORIDE 0.4 MG/ML
0.4 INJECTION, SOLUTION INTRAMUSCULAR; INTRAVENOUS; SUBCUTANEOUS AS NEEDED
Status: DISCONTINUED | OUTPATIENT
Start: 2019-03-19 | End: 2019-03-21 | Stop reason: HOSPADM

## 2019-03-19 RX ADMIN — ROCURONIUM BROMIDE 45 MG: 10 INJECTION, SOLUTION INTRAVENOUS at 07:43

## 2019-03-19 RX ADMIN — HYDROMORPHONE HYDROCHLORIDE 0.5 MG: 2 INJECTION, SOLUTION INTRAMUSCULAR; INTRAVENOUS; SUBCUTANEOUS at 11:00

## 2019-03-19 RX ADMIN — LIDOCAINE HYDROCHLORIDE 100 MG: 20 INJECTION, SOLUTION EPIDURAL; INFILTRATION; INTRACAUDAL; PERINEURAL at 07:36

## 2019-03-19 RX ADMIN — HYDROMORPHONE HYDROCHLORIDE 0.5 MG: 2 INJECTION, SOLUTION INTRAMUSCULAR; INTRAVENOUS; SUBCUTANEOUS at 11:15

## 2019-03-19 RX ADMIN — FENTANYL CITRATE 100 MCG: 50 INJECTION, SOLUTION INTRAMUSCULAR; INTRAVENOUS at 07:36

## 2019-03-19 RX ADMIN — HYDROMORPHONE HYDROCHLORIDE 1 MG: 2 INJECTION, SOLUTION INTRAMUSCULAR; INTRAVENOUS; SUBCUTANEOUS at 10:09

## 2019-03-19 RX ADMIN — SODIUM CHLORIDE: 9 INJECTION, SOLUTION INTRAVENOUS at 07:40

## 2019-03-19 RX ADMIN — CEFAZOLIN SODIUM IN SODIUM CHLORIDE 0.9% IV SOLN 3 GM/100ML 3 G: 3-0.9/1 SOLUTION at 07:27

## 2019-03-19 RX ADMIN — Medication 10 ML: at 22:44

## 2019-03-19 RX ADMIN — BUPROPION HYDROCHLORIDE 200 MG: 100 TABLET, FILM COATED, EXTENDED RELEASE ORAL at 17:19

## 2019-03-19 RX ADMIN — KETOROLAC TROMETHAMINE 30 MG: 30 INJECTION, SOLUTION INTRAMUSCULAR; INTRAVENOUS at 10:02

## 2019-03-19 RX ADMIN — HYDROMORPHONE HYDROCHLORIDE 0.5 MG: 2 INJECTION, SOLUTION INTRAMUSCULAR; INTRAVENOUS; SUBCUTANEOUS at 09:48

## 2019-03-19 RX ADMIN — SODIUM CHLORIDE, SODIUM LACTATE, POTASSIUM CHLORIDE, AND CALCIUM CHLORIDE: 600; 310; 30; 20 INJECTION, SOLUTION INTRAVENOUS at 09:00

## 2019-03-19 RX ADMIN — GLYCOPYRROLATE 0.6 MG: 0.2 INJECTION INTRAMUSCULAR; INTRAVENOUS at 10:02

## 2019-03-19 RX ADMIN — LAMOTRIGINE 200 MG: 100 TABLET ORAL at 18:12

## 2019-03-19 RX ADMIN — SODIUM CHLORIDE, SODIUM LACTATE, POTASSIUM CHLORIDE, AND CALCIUM CHLORIDE 75 ML/HR: 600; 310; 30; 20 INJECTION, SOLUTION INTRAVENOUS at 06:27

## 2019-03-19 RX ADMIN — SODIUM CHLORIDE 100 ML/HR: 900 INJECTION, SOLUTION INTRAVENOUS at 10:44

## 2019-03-19 RX ADMIN — SODIUM CHLORIDE, SODIUM LACTATE, POTASSIUM CHLORIDE, AND CALCIUM CHLORIDE: 600; 310; 30; 20 INJECTION, SOLUTION INTRAVENOUS at 07:27

## 2019-03-19 RX ADMIN — Medication 10 ML: at 16:18

## 2019-03-19 RX ADMIN — MORPHINE SULFATE: 10 INJECTION, SOLUTION INTRAMUSCULAR; INTRAVENOUS at 11:25

## 2019-03-19 RX ADMIN — FENTANYL CITRATE 50 MCG: 50 INJECTION, SOLUTION INTRAMUSCULAR; INTRAVENOUS at 09:21

## 2019-03-19 RX ADMIN — GABAPENTIN 600 MG: 300 CAPSULE ORAL at 22:43

## 2019-03-19 RX ADMIN — HYDROMORPHONE HYDROCHLORIDE 0.5 MG: 2 INJECTION, SOLUTION INTRAMUSCULAR; INTRAVENOUS; SUBCUTANEOUS at 10:45

## 2019-03-19 RX ADMIN — ONDANSETRON 4 MG: 2 INJECTION INTRAMUSCULAR; INTRAVENOUS at 07:27

## 2019-03-19 RX ADMIN — CEFAZOLIN SODIUM IN SODIUM CHLORIDE 0.9% IV SOLN 3 GM/100ML 3 G: 3-0.9/1 SOLUTION at 22:44

## 2019-03-19 RX ADMIN — SODIUM CHLORIDE 100 ML/HR: 900 INJECTION, SOLUTION INTRAVENOUS at 21:42

## 2019-03-19 RX ADMIN — ROCURONIUM BROMIDE 10 MG: 10 INJECTION, SOLUTION INTRAVENOUS at 08:33

## 2019-03-19 RX ADMIN — FENTANYL CITRATE 50 MCG: 50 INJECTION, SOLUTION INTRAMUSCULAR; INTRAVENOUS at 09:00

## 2019-03-19 RX ADMIN — DEXAMETHASONE SODIUM PHOSPHATE 10 MG: 4 INJECTION, SOLUTION INTRA-ARTICULAR; INTRALESIONAL; INTRAMUSCULAR; INTRAVENOUS; SOFT TISSUE at 07:36

## 2019-03-19 RX ADMIN — ACETAMINOPHEN 1000 MG: 500 TABLET ORAL at 16:16

## 2019-03-19 RX ADMIN — GABAPENTIN 600 MG: 300 CAPSULE ORAL at 16:15

## 2019-03-19 RX ADMIN — ACETAMINOPHEN 1000 MG: 500 TABLET ORAL at 22:43

## 2019-03-19 RX ADMIN — CEFAZOLIN SODIUM IN SODIUM CHLORIDE 0.9% IV SOLN 3 GM/100ML 3 G: 3-0.9/1 SOLUTION at 17:45

## 2019-03-19 RX ADMIN — MIDAZOLAM HYDROCHLORIDE 2 MG: 1 INJECTION, SOLUTION INTRAMUSCULAR; INTRAVENOUS at 07:27

## 2019-03-19 RX ADMIN — FAMOTIDINE 20 MG: 20 TABLET ORAL at 06:50

## 2019-03-19 RX ADMIN — ROCURONIUM BROMIDE 5 MG: 10 INJECTION, SOLUTION INTRAVENOUS at 07:36

## 2019-03-19 RX ADMIN — DULOXETINE HYDROCHLORIDE 60 MG: 60 CAPSULE, DELAYED RELEASE ORAL at 17:18

## 2019-03-19 RX ADMIN — HYDROMORPHONE HYDROCHLORIDE 0.5 MG: 2 INJECTION, SOLUTION INTRAMUSCULAR; INTRAVENOUS; SUBCUTANEOUS at 10:30

## 2019-03-19 RX ADMIN — HYDROMORPHONE HYDROCHLORIDE 0.5 MG: 2 INJECTION, SOLUTION INTRAMUSCULAR; INTRAVENOUS; SUBCUTANEOUS at 09:37

## 2019-03-19 RX ADMIN — SUCCINYLCHOLINE CHLORIDE 180 MG: 20 INJECTION INTRAMUSCULAR; INTRAVENOUS at 07:36

## 2019-03-19 RX ADMIN — PROPOFOL 180 MG: 10 INJECTION, EMULSION INTRAVENOUS at 07:36

## 2019-03-19 RX ADMIN — LABETALOL HYDROCHLORIDE 10 MG: 5 INJECTION, SOLUTION INTRAVENOUS at 09:54

## 2019-03-19 RX ADMIN — FENTANYL CITRATE 50 MCG: 50 INJECTION, SOLUTION INTRAMUSCULAR; INTRAVENOUS at 07:59

## 2019-03-19 RX ADMIN — GLYCOPYRROLATE 0.2 MG: 0.2 INJECTION INTRAMUSCULAR; INTRAVENOUS at 07:27

## 2019-03-19 RX ADMIN — NEOSTIGMINE METHYLSULFATE 5 MG: 1 INJECTION INTRAVENOUS at 10:02

## 2019-03-19 NOTE — PROGRESS NOTES
Problem: Mobility Impaired (Adult and Pediatric) Goal: *Acute Goals and Plan of Care (Insert Text) Physical Therapy Goals Initiated 3/19/2019 and to be accomplished within 7 day(s) 1. Patient will move from supine to sit and sit to supine, scoot up and down and roll side to side in bed with modified independence. 2.  Patient will transfer from bed to chair and chair to bed with modified independence using the least restrictive device. 3.  Patient will perform sit to stand with modified independence. 4.  Patient will ambulate with modified independence for 25-50 feet with the least restrictive device. 5.  Patient will ascend/descend 4 stairs with 1 handrail(s) with minimal assistance/contact guard assist. 
physical Therapy EVALUATION Patient: Cristofer Hugo (37 y.o. female) Date: 3/19/2019 Primary Diagnosis: Cervical stenosis of spine [M48.02] Cervical stenosis of spine [M48.02] Procedure(s) (LRB): 
C5-6-7 LAMINECTOMY FUSION/C-ARM/NUVASIVE (N/A) Day of Surgery Precautions: Fall, Cervical    
  
ASSESSMENT : 
Patient is 36 yo F admitted to hospital for C5-7 laminectomy with fusion and presents today asleep but wakes to voice and touch. Patient agreeable to therapy and was educated on cervical precautions and demonstrated good compliance throughout session. Noted that PCA pump is paused due to change in respiratory status and patient's NC O2 donned and SpO2 is 98% semi-reclined in bed; notified RN. Patient transferred to sitting EOB and performed objective assessment with RLE weakness and decreased LT sensation. Patient was given demo with instruction on sit <> stand transfer and transferred to standing with  and ambulated 5ft to locked recliner. At conclusion of session patient transferred to sitting in recliner and was left resting with call bell by the side and SCDs donned.  Patient instructed to call for assistance if they needed to get up for any reason and denied need for further assistance. Patient demonstrates decreased strength, mobility, and endurance and will benefit from skilled intervention to address the above impairments. Patients rehabilitation potential is considered to be Good Factors which may influence rehabilitation potential include:  
[]         None noted 
[]         Mental ability/status [x]         Medical condition 
[x]         Home/family situation and support systems 
[x]         Safety awareness [x]         Pain tolerance/management 
[]         Other: PLAN : 
Recommendations and Planned Interventions: 
[x]           Bed Mobility Training             [x]    Neuromuscular Re-Education 
[x]           Transfer Training                   []    Orthotic/Prosthetic Training 
[x]           Gait Training                          []    Modalities [x]           Therapeutic Exercises          []    Edema Management/Control 
[x]           Therapeutic Activities            [x]    Patient and Family Training/Education 
[]           Other (comment): Frequency/Duration: Patient will be followed by physical therapy 1-2 times per day/4-7 days per week to address goals. Discharge Recommendations: Home Health Further Equipment Recommendations for Discharge: rolling walker G-CODES Eval Complexity: History: MEDIUM  Complexity : 1-2 comorbidities / personal factors will impact the outcome/ POC Exam:LOW Complexity : 1-2 Standardized tests and measures addressing body structure, function, activity limitation and / or participation in recreation  Presentation: LOW Complexity : Stable, uncomplicated  Clinical Decision Making:Low Complexity   Overall Complexity:LOW SUBJECTIVE:  
Patient stated I'll cry more if you do that.  as therapist telling patient she is doing well for the first day. OBJECTIVE DATA SUMMARY:  
 
Past Medical History:  
Diagnosis Date  Acute pain of left shoulder 11/1/2016  Bipolar 1 disorder ([facility] [address]  Bipolar affective disorder, currently manic, moderate ([facility] [address] 4/27/2016  Chronic midline low back pain with sciatica 11/1/2016  Cigarette nicotine dependence in remission 8/2/2016  Cigarette nicotine dependence without complication 67/94/4847  Continuous nicotine dependence 1/24/2017  Depression  Fibromyalgia 9/1/2015  HLD (hyperlipidemia) 2/4/2016  Hypertension  Hypertriglyceridemia 5/15/2018  Impaired fasting glucose 5/31/2016  Irritable bowel syndrome without diarrhea 4/27/2016  Moderate episode of recurrent major depressive disorder ([facility] [address] 4/27/2016  Obesity, Class III, BMI 40-49.9 (morbid obesity) ([facility] [address] 9/1/2015  Onychomycosis 4/27/2016  Pain of left thumb 11/1/2016  Prediabetes 7/5/2016  PTSD (post-traumatic stress disorder)  Smoker 9/1/2015  Vitamin D deficiency 5/31/2016 Past Surgical History:  
Procedure Laterality Date  HX ACL RECONSTRUCTION    
 HX APPENDECTOMY  HX CERVICAL DISKECTOMY  02/07/2019  
 with fusion C5/6 C6/7  
 HX GYN Bilateral   
 tubal  
 HX ORTHOPAEDIC Barriers to Learning/Limitations: None Compensate with: N/A Prior Level of Function/Home Situation: Patient lives in 1 story home with 4STE with no HR and lives with her daughter, granddaughter, and mom and reports her ex- can assist sometimes as well. Patient reports that since about February 12th, she's been using WC for mobility and performing stand pivot as she continues to fall when using RW at home. Patient reports that she hasn't walked for about the past month. Critical Behavior: A&Ox4 Strength:   
Strength: Generally decreased, functional(BLE) Tone & Sensation:  
Tone: Normal(BLE) Sensation: Intact(BLE) Range Of Motion: 
AROM: Within functional limits(BLE) Functional Mobility: 
Bed Mobility: 
 Supine to Sit: Contact guard assistance Scooting: Contact guard assistance Transfers: 
Sit to Stand: Contact guard assistance Stand to Sit: Contact guard assistance Bed to Chair: Contact guard assistance;Minimum assistance(for walker management) Balance:  
Sitting: Intact Standing: Impaired; With support Standing - Static: Fair Standing - Dynamic : FairAmbulation/Gait Training: 
Distance (ft): 5 Feet (ft) Assistive Device: Walker, rolling Ambulation - Level of Assistance: Contact guard assistance Speed/[place]: Slow Interventions: Verbal cues; Visual/Demos Pain: 
Pt reports 8/10 pain or discomfort prior to treatment.   
Pt reports 8/10 pain or discomfort post treatment. Activity Tolerance:  
Patient tolerated activity fair; in tears due to pain and nursing notified that PCA alarming that it is paused. Please refer to the flowsheet for vital signs taken during this treatment. After treatment:  
[]         Patient left in no apparent distress sitting up in chair 
[x]         Patient left in no apparent distress in bed 
[x]         Call bell left within reach [x]         Nursing notified Jagdish Campos) []         Caregiver present 
[]         Bed alarm activated 
[]         SCDs in place to B LE 
  
COMMUNICATION/EDUCATION:  
[x]         Fall prevention education was provided and the patient/caregiver indicated understanding. [x]         Patient/family have participated as able in goal setting and plan of care. [x]         Patient/family agree to work toward stated goals and plan of care. []         Patient understands intent and goals of therapy, but is neutral about his/her participation. []         Patient is unable to participate in goal setting and plan of care. Thank you for this referral. 
Skip Kelly, PT Time Calculation: 23 mins

## 2019-03-19 NOTE — ROUTINE PROCESS
1900 Bedside and Verbal shift change report given to Sun Stewart RN (oncoming nurse) by Sadiq Ozuna RN (offgoing nurse). Report included the following information SBAR, Kardex, Intake/Output, MAR, Accordion and Recent Results.

## 2019-03-19 NOTE — ANESTHESIA POSTPROCEDURE EVALUATION
Procedure(s): 
C5-6-7 LAMINECTOMY FUSION/C-ARM/NUVASIVE. Anesthesia Post Evaluation Multimodal analgesia: multimodal analgesia used between 6 hours prior to anesthesia start to PACU discharge Patient location during evaluation: bedside Patient participation: complete - patient participated Level of consciousness: awake Pain score: 5 Pain management: adequate Airway patency: patent Anesthetic complications: no 
Cardiovascular status: stable Respiratory status: acceptable Hydration status: acceptable Post anesthesia nausea and vomiting:  controlled Visit Vitals /65 Pulse 86 Temp 37.1 °C (98.7 °F) Resp 15 Ht 5' 9\" (1.753 m) Wt 128.8 kg (284 lb) SpO2 96% BMI 41.94 kg/m²

## 2019-03-19 NOTE — PROGRESS NOTES
conducted a pre-surgery visit with Ghazal Jimenez, who is a 37 y.o.,female. The  provided the following Interventions:  Initiated a relationship of care and support. Plan:  Chaplains will continue to follow and will provide pastoral care on an as needed/requested basis.  recommends bedside caregivers page  on duty if patient shows signs of acute spiritual or emotional distress.     [address] [facility]   Board Certified [address] [facility]   [phone]

## 2019-03-19 NOTE — PERIOP NOTES
TRANSFER - OUT REPORT: 
 
Verbal report given to Krys Montemayor RN(name) on [facility]  being transferred to [address] [place] [address](unit) for routine post - op Report consisted of patients Situation, Background, Assessment and  
Recommendations(SBAR). Information from the following report(s) SBAR, Kardex, OR Summary, Procedure Summary, Intake/Output, MAR, Recent Results and Med Rec Status was reviewed with the receiving nurse. Lines:  
Peripheral IV 03/19/19 Left Hand (Active) Site Assessment Clean, dry, & intact 3/19/2019 10:17 AM  
Phlebitis Assessment 0 3/19/2019 10:17 AM  
Infiltration Assessment 0 3/19/2019 10:17 AM  
Dressing Status Clean, dry, & intact 3/19/2019 10:17 AM  
Dressing Type Transparent;Tape 3/19/2019 10:17 AM  
Hub Color/Line Status Pink;Capped 3/19/2019 10:17 AM  
   
Peripheral IV 03/19/19 Right Hand (Active) Site Assessment Clean, dry, & intact 3/19/2019 10:17 AM  
Phlebitis Assessment 0 3/19/2019 10:17 AM  
Infiltration Assessment 0 3/19/2019 10:17 AM  
Dressing Status Clean, dry, & intact 3/19/2019 10:17 AM  
Dressing Type Transparent;Tape 3/19/2019 10:17 AM  
Hub Color/Line Status Pink; Infusing 3/19/2019 10:17 AM  
  
 
Opportunity for questions and clarification was provided. Patient transported with: 
 O2 @ 3 liters Tech

## 2019-03-19 NOTE — PROGRESS NOTES
Date of Surgery: OR today Surgery:  C5-6-7 lami fusion VSS Wound: Dressing clean, dry and intact ADAN: to suction, 70 mL 
PCA: Morphine : due to void PT: to see patient Swallowing ok. Neuro intact. Moving all extremities. +4/5 strength to BUE and +3/5 to BLE. Pre op pain:  profoundly weak in her lower extremities, myelopathic. Numbness at T12 level. Post op pain: she is complaining of posterior neck and trapezius pain. PCA was paused when I entered the room. Nursing in to adjust. She states the numbness pre op is more of an itching sensation now.   
 
Jose Ochoa, NP

## 2019-03-19 NOTE — INTERVAL H&P NOTE
H&P Update: Marie Mendez was seen and examined. History and physical has been reviewed. The patient has been examined.  There have been no significant clinical changes since the completion of the originally dated History and Physical.    Signed By: Alivia Noel MD     March 19, 2019 6:27 AM

## 2019-03-19 NOTE — PROGRESS NOTES
Problem: Falls - Risk of 
Goal: *Absence of Falls Document Sergio Rothman Fall Risk and appropriate interventions in the flowsheet. Outcome: Progressing Towards Goal 
Fall Risk Interventions: 
Mobility Interventions: Communicate number of staff needed for ambulation/transfer, Patient to call before getting OOB, PT Consult for mobility concerns, PT Consult for assist device competence, Utilize walker, cane, or other assistive device Medication Interventions: Teach patient to arise slowly, Patient to call before getting OOB Elimination Interventions: Call light in reach, Patient to call for help with toileting needs, Toileting schedule/hourly rounds History of Falls Interventions: Door open when patient unattended, Investigate reason for fall, Room close to nurse's station

## 2019-03-19 NOTE — ANESTHESIA PREPROCEDURE EVALUATION
Anesthetic History   No history of anesthetic complications            Review of Systems / Medical History  Patient summary reviewed and pertinent labs reviewed    Pulmonary          Smoker         Neuro/Psych         Psychiatric history (Depression)     Cardiovascular    Hypertension: well controlled              Exercise tolerance: >4 METS     GI/Hepatic/Renal                Endo/Other        Morbid obesity     Other Findings   Comments: Fibromyalgia           Physical Exam    Airway  Mallampati: II  TM Distance: 4 - 6 cm  Neck ROM: decreased range of motion   Mouth opening: Normal     Cardiovascular  Regular rate and rhythm,  S1 and S2 normal,  no murmur, click, rub, or gallop             Dental  No notable dental hx       Pulmonary  Breath sounds clear to auscultation               Abdominal  GI exam deferred       Other Findings            Anesthetic Plan    ASA: 3  Anesthesia type: general          Induction: Intravenous  Anesthetic plan and risks discussed with: Patient

## 2019-03-19 NOTE — BRIEF OP NOTE
BRIEF OPERATIVE NOTE Date of Procedure: 3/19/2019 Preoperative Diagnosis: M48.02 CERVICAL STENOSIS Postoperative Diagnosis: M48.02 CERVICAL STENOSIS Procedure(s): 
C5-6-7 LAMINECTOMY FUSION/C-ARM/NUVASIVE Surgeon(s) and Role: Yesi Olivares MD - Primary Surgical Assistant: 0 Surgical Staff: 
Circ-1: Odin Quigley RN Radiology Technician: Benji Andrea,  Scrub Tech-1: Chiara Herndon Surg Asst-1: Regino Bradley Event Time In Time Out Incision Start 8847 Incision Close Anesthesia: General  
Estimated Blood Loss: 150 Specimens: * No specimens in log * Findings: stable, stenosis Complications: 0 Implants:  
Implant Name Type Inv. Item Serial No.  Lot No. LRB No. Used Action SCR SET SPNE HOOK AND TULIP -- VUEPOINT II - [id number]  SCR SET SPNE HOOK AND TULIP -- VUEPOINT II  NUVASIVE 1111 N/A 6 Implanted SCR SPNE BNE ANA-ANGL 3.5X12MM -- VUEPOINT II - [id number]  SCR SPNE BNE ANA-ANGL 3.5X12MM -- VUEPOINT II  NUVASIVE 1111 N/A 6 Implanted MARY SPNE PRE-BENT 3.5X60MM -- VUEPOINT II - [id number]  MARY SPNE PRE-BENT 3.5X60MM -- VUEPOINT II  NUVASIVE 1111 N/A 2 Implanted GRAFT PTTY BNE SYNTH 5CC -- ATTRAX - [id number]  GRAFT PTTY BNE SYNTH 5CC -- ATTRAX  NUVASIVE [id number] N/A 1 Implanted

## 2019-03-20 PROCEDURE — 97530 THERAPEUTIC ACTIVITIES: CPT

## 2019-03-20 PROCEDURE — 74011250636 HC RX REV CODE- 250/636: Performed by: ORTHOPAEDIC SURGERY

## 2019-03-20 PROCEDURE — 74011250637 HC RX REV CODE- 250/637: Performed by: ORTHOPAEDIC SURGERY

## 2019-03-20 PROCEDURE — 65270000029 HC RM PRIVATE

## 2019-03-20 PROCEDURE — 97166 OT EVAL MOD COMPLEX 45 MIN: CPT

## 2019-03-20 PROCEDURE — 97116 GAIT TRAINING THERAPY: CPT

## 2019-03-20 PROCEDURE — 74011250636 HC RX REV CODE- 250/636

## 2019-03-20 RX ADMIN — BUPROPION HYDROCHLORIDE 200 MG: 100 TABLET, FILM COATED, EXTENDED RELEASE ORAL at 08:59

## 2019-03-20 RX ADMIN — GABAPENTIN 600 MG: 300 CAPSULE ORAL at 08:59

## 2019-03-20 RX ADMIN — DULOXETINE HYDROCHLORIDE 60 MG: 60 CAPSULE, DELAYED RELEASE ORAL at 08:59

## 2019-03-20 RX ADMIN — CEFAZOLIN SODIUM IN SODIUM CHLORIDE 0.9% IV SOLN 3 GM/100ML 3 G: 3-0.9/1 SOLUTION at 15:00

## 2019-03-20 RX ADMIN — ACETAMINOPHEN 1000 MG: 500 TABLET ORAL at 09:08

## 2019-03-20 RX ADMIN — GABAPENTIN 600 MG: 300 CAPSULE ORAL at 21:32

## 2019-03-20 RX ADMIN — DULOXETINE HYDROCHLORIDE 60 MG: 60 CAPSULE, DELAYED RELEASE ORAL at 17:20

## 2019-03-20 RX ADMIN — Medication 10 ML: at 15:33

## 2019-03-20 RX ADMIN — LAMOTRIGINE 100 MG: 100 TABLET ORAL at 08:58

## 2019-03-20 RX ADMIN — HYDROMORPHONE HYDROCHLORIDE 1 MG: 1 INJECTION, SOLUTION INTRAMUSCULAR; INTRAVENOUS; SUBCUTANEOUS at 16:07

## 2019-03-20 RX ADMIN — OXYCODONE HYDROCHLORIDE 10 MG: 5 TABLET ORAL at 11:00

## 2019-03-20 RX ADMIN — BISACODYL 10 MG: 5 TABLET, COATED ORAL at 08:59

## 2019-03-20 RX ADMIN — ACETAMINOPHEN 1000 MG: 500 TABLET ORAL at 21:33

## 2019-03-20 RX ADMIN — OXYCODONE HYDROCHLORIDE 10 MG: 5 TABLET ORAL at 19:06

## 2019-03-20 RX ADMIN — LOSARTAN POTASSIUM 75 MG: 25 TABLET ORAL at 08:59

## 2019-03-20 RX ADMIN — BUPROPION HYDROCHLORIDE 200 MG: 100 TABLET, FILM COATED, EXTENDED RELEASE ORAL at 17:20

## 2019-03-20 RX ADMIN — CEFAZOLIN SODIUM IN SODIUM CHLORIDE 0.9% IV SOLN 3 GM/100ML 3 G: 3-0.9/1 SOLUTION at 06:13

## 2019-03-20 RX ADMIN — ACETAMINOPHEN 1000 MG: 500 TABLET ORAL at 06:01

## 2019-03-20 RX ADMIN — HYDROCHLOROTHIAZIDE 25 MG: 25 TABLET ORAL at 08:58

## 2019-03-20 RX ADMIN — Medication 10 ML: at 06:01

## 2019-03-20 RX ADMIN — LAMOTRIGINE 200 MG: 100 TABLET ORAL at 17:20

## 2019-03-20 RX ADMIN — GABAPENTIN 600 MG: 300 CAPSULE ORAL at 15:32

## 2019-03-20 NOTE — PROGRESS NOTES
vss afeb Neuro improved IP/ q leg strength improved 3-4/5 Drain wet Plan Hard colar - needs xtra large- consult orthotist 
Will see ot pt eval - may do well in aru for a while Observe drain

## 2019-03-20 NOTE — PROGRESS NOTES
Dex Rodriguez  Rounded ont post cervical surgery. Educated patient/family: Activity:  
Walk every hours and eat all meals in a chair. walking every hour to prevent clots. Turn head slowly from side to side for ROM. Sleep with HOB elevated for the next 3 nights to prevent swelling in neck. Follow neck precautions (no raising hands above head, no lifting, no bending & log roll in/out of bed) VTE prophylaxis: SCD on both legs when not walking. Ankle pumps 10 x per hour in hospital & at home. Pain Control: 
Pain medications side effects discussed. Wean off narcotics ASAP. Use Tylenol ( 3000 mg/24 hours) , distraction, & change position to help with pain. Swallow every hour to help sore throat pain. Don't get nauseated. Eat a snack before taking pain medication Do not get constipated: take stool softener/mild laxative daily while on narcotics. Wear soft collar for comfort & reminder not to move neck up/down. Incentive Spirometry:  
Use of incentive spirometer 10 x/hr. Demonstration  1000 ml x 3 Wound Care:  
Dressing covered with hard neck collar. Leave dressing alone unless comes loose at home. Take off old dressing & put clean guaze over wound. No shower for 72 hours. Keep wound dry. Bathe daily with dial soap & wear clean clothes/clean towel. No lotions, powders, creams to surgical leg. Annamary Ripa Patient Safety:  
Call light & belongings in reach. Call for help when want to walk or get OOB. Diet:  
Eat for healing. Soft foods with protein for healing. Eat small bites Drink 8 glasses of water a day. Drink lots of fluids through a straw. Report to staff or surgeon any trouble swallowing. Educational material given. Patient verbalized understand. Given the opportunity for asking questions. .  
Mobility Intervention:  
 
  [] Pt dangled at edge of bed 
  [] Pt assisted OOB to bedside commode 
  [] Pt assisted OOB to chair 
  [] Pt ambulated to bathroom [] Patient was ambulated in room/hallway Assistive Device Utilized:  
  
 [] Rolling walker 
 [] Crutches 
 [] Straight Cane 
 [] Knee immobilizer [] IV pole After Rounding and Checking on Patient [x] Patient left in no apparent distress sitting up in chair 
[] Patient left in no apparent distress in bed 
[x] Call bell left within reach [x] SCDs on both legs & machine turned on 
[] Ice applied 
[x] RN notified 
[] Caregiver present 
[] Bed alarm activated Reason patient not mobilized:  
 
 [] Patient refused 
 [] Nausea/vomiting 
 [] Low blood pressure 
 [] Drowsy/lethargic Pain Rating:  
 
 
Left patient with call light, cell phone and personal belongings in reach for safety.

## 2019-03-20 NOTE — PROGRESS NOTES
Reason for Admission:  Cervical stenosis of spine [M48.02] Cervical stenosis of spine [M48.02] RRAT Score:   11 Plan for utilizing home health: [address] Likelihood of Readmission:   LOW Transition of Care Plan:         
 
 
Initial assessment completed with patient. Cognitive status of patient: oriented to time, place, person and situation. Face sheet information confirmed:  yes. The patient designates Rafael Kam, ex- ([phone]) to participate in her discharge plan and to receive any needed information. This patient lives in a single family home with patient, mother, sister, daughter, grandchild, and ex-. Patient is not able to navigate steps as needed. Family assist with steps via wheelchair. Prior to hospitalization, patient was considered to be independent with ADLs/IADLS : yes . If not independent,  patient needs assist with :  
 
Patient has a current ACP document on file: no The patient's ex- or daughter will be available to transport patient home upon discharge. The patient already has Paralee Hirschfeld, W/C, and shower seat medical equipment available in the home. Patient is not currently active with home health. Patient has not stayed in a skilled nursing facility or rehab. This patient is on dialysis :no 
 
List of available Home Health agencies were provided and reviewed with the patient prior to discharge. Freedom of choice signed: yes, for [facility] home health and referral sent. Currently, the discharge plan is Home with [address] [place] [address] Zeferino Teran. The patient states that she can obtain her medications from the pharmacy, and take her medications as directed. [facility] Care Management Interventions PCP Verified by CM: Yes(per pt, saw pcp in January 2019.) Mode of Transport at Discharge: Self Transition of Care Consult (CM Consult): Discharge Planning MyChart Signup: No 
Discharge Durable Medical Equipment: No 
Physical Therapy Consult: Yes Occupational Therapy Consult: Yes Speech Therapy Consult: No 
Current Support Network: Relative's Home Confirm Follow Up Transport: Family Plan discussed with Pt/Family/Caregiver: Yes Freedom of Choice Offered: Yes Discharge Location Discharge Placement: Home with home health CINTIA Welch, RN Pager # [phone] Care Manager

## 2019-03-20 NOTE — PROGRESS NOTES
Bedside and Verbal shift change report given to Kimberly Bejarano RN (oncoming nurse) by Jarvis Michelle RN (offgoing nurse). Report included the following information SBAR, Kardex, OR Summary, Procedure Summary, Intake/Output and MAR.

## 2019-03-20 NOTE — PROGRESS NOTES
Mobility Intervention:  
 
  [x] Pt dangled at edge of bed 
  [] Pt assisted OOB to bedside commode 
  [x] Pt assisted OOB to chair 
  [] Pt ambulated to bathroom 
  [] Patient was ambulated in room/hallway Assistive Device Utilized:  
  
 [x] Rolling walker 
 [] Crutches 
 [] Straight Cane 
 [] Knee immobilizer [] IV pole After Mobilization:  
 
[] Patient left in no apparent distress sitting up in chair 
[x] Patient left in no apparent distress in bed 
[x] Call bell left within reach [x] SCDs on & machine turned on 
[x] Ice applied 
[x] RN notified 
[] Caregiver present 
[] Bed alarm activated Reason patient not mobilized:  
 
 [] Patient refused 
 [] Nausea/vomiting 
 [] Low blood pressure 
 [] Drowsy/lethargic Pain Rating:  
 
[] 0 [x] 1 Assistive Device:       
[] 2 [] 3 [] 4 
[] 5 
[] 6 Assistive Device:       
[] 7 
[] 8 
[] 9 [] 10 Comments:

## 2019-03-20 NOTE — PROGRESS NOTES
Mobility Intervention:  
 
  [] Pt dangled at edge of bed 
  [] Pt assisted OOB to bedside commode 
  [] Pt assisted OOB to chair 
  [x] Pt ambulated to bathroom 
  [] Patient was ambulated in room/hallway Assistive Device Utilized:  
  
 [x] Rolling walker 
 [] Crutches 
 [] Straight Cane 
 [] Knee immobilizer [] IV pole After Mobilization:  
 
[x] Patient left in no apparent distress sitting up in chair 
[] Patient left in no apparent distress in bed 
[] Call bell left within reach 
[] SCDs on & machine turned on 
[] Ice applied 
[] RN notified 
[] Caregiver present 
[] Bed alarm activated Reason patient not mobilized:  
 
 [] Patient refused 
 [] Nausea/vomiting 
 [] Low blood pressure 
 [] Drowsy/lethargic Pain Rating:  
 
[x] 0 [] 1 Assistive Device:       
[] 2 [] 3 [] 4 
[] 5 
[] 6 Assistive Device:       
[] 7 
[] 8 
[] 9 [] 10 Comments:

## 2019-03-20 NOTE — PROGRESS NOTES
Paged NP. Notify about ADAN output 90cc, patient denies SOB, able to swallow liquid. Per Madeline NP, leave the suction on.

## 2019-03-20 NOTE — OP NOTES
[address] [place] [address]   OPERATIVE REPORT    Name:  Brodie Sherman  MR#:   [id number]  :  1975  ACCOUNT #:  [de-identified]  DATE OF SERVICE:  2019    PREOPERATIVE DIAGNOSIS:  Cervical spondylotic myelopathy. POSTOPERATIVE DIAGNOSIS:  Cervical spondylotic myelopathy. PROCEDURE PERFORMED:  C6-C7 laminectomy C5, C6, C7 posterior cervical fusion; segmental spinal instrumentation NuVasuve type C5, C6, C7.    SURGEON:  Alivia Noel MD    ASSISTANT:  0    ANESTHESIA:  General endotracheal.    COMPLICATIONS:  None. SPECIMENS REMOVED:  None. IMPLANTS:  NuVasuve lateral mass instrumentation. ESTIMATED BLOOD LOSS:  150 mL. FINDINGS:  The patient did not appear to have noted instability across the segments instrumented. There was stenosis as anticipated. Bone quality was fair to good. DESCRIPTION OF PROCEDURE:  Following induction of endotracheal anesthesia, the patient was turned carefully into prone position on a spinal frame. The patient was prepped and draped in the usual fashion. A midline incision was made. Surgery was made difficult by patient's morbid obesity. Large posterior fat pad was incised. Incision had to be made larger because of the patient's obesity. Midline incision was made. Subperiosteal dissection was made C5, C6, and C7 to superior aspect of T1.  C-arm image in the AP mode was able to confirm our surgical level. No lateral x-ray below C2 or C3 was visualizable. Slow dissection visualized C7, C6 and C5. This was confirmed both with radiographic image and anatomic landmark. The elements were visualized, and the posterior elements cleaned of soft tissues. I tested the stability and they appeared to be relatively stiff as if they were being fused. I could not find any notable instability present.   Lateral mass fixation was set up in the lateral masses of C7, C6, and C5, the starting point marked with a high speed bur with a drill, then 12 mm channels were marked going superolaterally. It was palpated with a ball tip probe. The channels were intact. Appropriate 12-mm screws were placed bilaterally with good fixation. Cuts were then made in the lamina facet junction with a high-speed bur to the thin cortical shell, and an en bloc completion of the laminectomy was done from the inferior aspect of C7 to the superior aspect of C6 with 1-mm Kerrison. On review of the patient's MRIs and CAT scans, would decompress the concerning area of stenosis present, yet leave attachments for continuing ligamentous stability and minimize chance of junctional stenosis down the road. Interspinous ligament was incised at C5-C6 and C7-T1 and disc laminas of C7 and C6 removed. One could see the lateral aspects of cord, and decompression appeared to be complete. Gelfoam and thrombin were placed on each side of the cord. This had to be decorticated and grafted with combination of autograft and demineralized bone matrix. Fluoroscopic imaging in the AP mode confirmed the placement of the instrumentation across C5, C6 and C7 were now rigidly fixed posteriorly through wide decompression. Wound was again irrigated. Vancomycin powder instilled for infection prophylaxis. A deep drain utilized. The cervicodorsal fascia closed with #1 Vicryl, subcutaneous tissues closed with 2-0 Vicryl, skin closed with 4-0 Monocryl subcuticular suture and Dermabond. Sterile dressing placed upon the wound. All counts were correct.       MD ANASTASIA Portillo/[id number]  D:  03/19/2019 10:00  T:  03/19/2019 12:12  JOB #:  [id number]

## 2019-03-20 NOTE — PROGRESS NOTES
AOx4, NAD, numbness to BUE and BLE but not new per patient report Denies SOB 
ADAN draining and intact Denies difficulty swallowing, tolerating fluids Soft cervical collar intact

## 2019-03-20 NOTE — PROGRESS NOTES
Date of Surgery: Post op Day #1 Surgery:  C5-6-7 lami fusion VSS Wound: Dressing clean, dry and intact ADAN: to suction, wet. 20 mL + about 20 mL in ADAN. PT: 20 ft w/ RW. Recommending HH with RW. Swallowing ok. Neuro intact. Moving all extremities. +4/5 strength to BUE, 3-4/5 to BLE. Pre op pain:profoundly weak in her lower extremities, myelopathic. Numbness at T12 level. Post op pain: she is feeling better this afternoon. She is thrilled her legs are feeling stronger. Optimistic. Has some trapezius pain. Plan:  
Maintain hard collar. Observe drain. Being evaluated for ARU vs Home. Per CM, Currently, the discharge plan is Home with Home Health. Patient states she would like to go home.   
 
Isela Higgins NP

## 2019-03-20 NOTE — PROGRESS NOTES
Bedside and Verbal shift change report given to Andria Whatley RN (oncoming nurse) by CHRIS MONSALVE (offgoing nurse). Report included the following information SBAR and Kardex.

## 2019-03-20 NOTE — PROGRESS NOTES
Problem: Mobility Impaired (Adult and Pediatric) Goal: *Acute Goals and Plan of Care (Insert Text) Description Physical Therapy Goals Initiated 3/19/2019 and to be accomplished within 7 day(s) 1. Patient will move from supine to sit and sit to supine, scoot up and down and roll side to side in bed with modified independence. 2.  Patient will transfer from bed to chair and chair to bed with modified independence using the least restrictive device. 3.  Patient will perform sit to stand with modified independence. 4.  Patient will ambulate with modified independence for 25-50 feet with the least restrictive device. 5.  Patient will ascend/descend 4 stairs with 1 handrail(s) with minimal assistance/contact guard assist.  
Outcome: Progressing Towards Goal 
 PHYSICAL THERAPY TREATMENT Patient: Charlee Garcia (53 y.o. female) Date: 3/20/2019 Diagnosis: Cervical stenosis of spine [M48.02] Cervical stenosis of spine [M48.02] <principal problem not specified> Procedure(s) (LRB): 
C5-6-7 LAMINECTOMY FUSION/C-ARM/NUVASIVE (N/A) 1 Day Post-Op Precautions:    
Chart, physical therapy assessment, plan of care and goals were reviewed. OBJECTIVE/ ASSESSMENT: 
Patient found supine HOB elevated w/ soft collar donned willing to work with PT. Pt voiced increased pain/discomfort in C/S, however able to mobilize well this visit and improve overall. Pt cont to req assist for all tasks, however able to display improvement in functional improvement of strength, especially in LE. Pt req min A for sup to sit voicing increased pain, however improved w/ time. Soft collar attempted to adjust, however collar too large to provide improved support. Pt may benefit from correct sized collar. Pt performed sit <> stand from mult surfaces improving from CGA to SBA from mult surfaces.  Pt amb w/ RW displaying fair stability, w/ intermittent knee buckling, however never LOB or req additional assistance for support. Pt able to safely perform 4 steps this visit using SPC on right and HHA on left side ascending and descending, providing sequencing education for both pt and . Pt returned to room to seated position amb for a total of 20' this visit, provided all needs, further education, and notified nursing. As pt is at/improved functional level prior to hospitalization displaying safe practices transferring, amb, and stair training w/ assistance, pt would be safe to return home w/ assistance. Pt states she utilizes WC for longer distances, however w/ assistance, pt can safely amb short distances. Pt would greatly benefit from home health therapy and possibly out patient therapy post home health if needed. Will cont to follow up as remains in hospital care. Education: 
?         Bed mobility ? Transfers ? Ambulation / gait ? Assistive device management ? Stairs ? Body mechanics ? Position change ? Therapeutic exercise ? Activity pacing / energy conservation ? Other: 
 
Progression toward goals: 
?      Improving appropriately and progressing toward goals ? Improving slowly and progressing toward goals ? Not making progress toward goals and plan of care will be adjusted PLAN: 
Patient continues to benefit from skilled intervention to address the above impairments. Continue treatment per established plan of care. Discharge Recommendations:  Home Health Further Equipment Recommendations for Discharge:  rolling walker and SPC SUBJECTIVE:  
Patient stated ? Before this surgery, I couldn't do this. ? OBJECTIVE DATA SUMMARY:  
Functional Mobility Training: 
Bed Mobility: 
Rolling: Stand-by assistance Supine to Sit: Contact guard assistance;Minimum assistance Transfers: 
Sit to Stand: Contact guard assistance Stand to Sit: Contact guard assistance Balance: 
Sitting: Intact Standing: Impaired; With support Standing - Static: Fair(+) Standing - Dynamic : Fair(+) Ambulation/Gait Training: 
Distance (ft): 20 Feet (ft) Assistive Device: Walker, rolling Ambulation - Level of Assistance: Contact guard assistance Gait Abnormalities: Decreased step clearance Base of Support: Center of gravity altered Speed/[place]: Slow Step Length: Left shortened;Right shortened Interventions: Safety awareness training;Verbal cues; Visual/Demos;Manual cues Stairs: 
Number of Stairs Trained: 4 Stairs - Level of Assistance: Minimum assistance; Moderate assistance Rail Use: None(SPC on right HHA on left ) Pain: did not voice number Activity Tolerance:  
Good Please refer to the flowsheet for vital signs taken during this treatment. After treatment:  
? Patient left in no apparent distress sitting up in chair ? Patient left in no apparent distress in bed 
? Call bell left within reach ? Nursing notified ? Caregiver present ? Bed alarm activated ? SCDs applied ? Ice applied Dima Chaney PTA Time Calculation: 42 mins

## 2019-03-20 NOTE — PROGRESS NOTES
Problem: Self Care Deficits Care Plan (Adult) Goal: *Acute Goals and Plan of Care (Insert Text) Description Occupational Therapy Goals Initiated 3/20/2019 within 7 day(s). 1.  Patient will perform lower body dressing with supervision/set-up, AE prn.  
2.  Patient will perform upper body dressing with supervision/set-up. 3.  Patient will perform toilet transfers with supervision/set-up. Outcome: Progressing Towards Goal 
 OCCUPATIONAL THERAPY EVALUATION Patient: Claribel Beck (53 y.o. female) Date: 3/20/2019 Primary Diagnosis: Cervical stenosis of spine [M48.02] Cervical stenosis of spine [M48.02] Procedure(s) (LRB): 
C5-6-7 LAMINECTOMY FUSION/C-ARM/NUVASIVE (N/A) 1 Day Post-Op Precautions:   Fall, Spinal(cervical; collar) ASSESSMENT : 
Based on the objective data described below, the patient presents with decreased ADLs, decreased functional mobility and muscle weakness after cervical spine surgery. She is unable to reach her RLE for LB self care and will benefit from AE to increase her functional independence. CGA given for functional standing and transfers. Patient educated on cervical spine precautions and verbalized understanding. Recommend home health safety evaluation when medically appropriate for discharge home. Patient will benefit from skilled intervention to address the above impairments. Patient?s rehabilitation potential is considered to be Good Factors which may influence rehabilitation potential include: ? None noted ? Mental ability/status ? Medical condition ? Home/family situation and support systems ? Safety awareness ? Pain tolerance/management ? Other: PLAN : 
Recommendations and Planned Interventions:  
?               Self Care Training                  ? Therapeutic Activities ? Functional Mobility Training    ? Cognitive Retraining ?               Therapeutic Exercises           ? Endurance Activities ? Balance Training                   ? Neuromuscular Re-Education ? Visual/Perceptual Training     ? Home Safety Training 
? Patient Education                 ? Family Training/Education ? Other (comment): Frequency/Duration: Patient will be followed by occupational therapy 1-2 times per day/4-7 days per week to address goals. Discharge Recommendations: Home Health Further Equipment Recommendations for Discharge: N/A Barriers to Learning/Limitations: None Compensate with: visual, verbal, tactile, kinesthetic cues/model PATIENT COMPLEXITY Eval Complexity: History: MEDIUM Complexity : Expanded review of history including physical, cognitive and psychosocial  history ; Examination: MEDIUM Complexity : 3-5 performance deficits relating to physical, cognitive , or psychosocial skils that result in activity limitations and / or participation restrictions; Decision Making:MEDIUM Complexity : Patient may present with comorbidities that affect occupational performnce. Miniml to moderate modification of tasks or assistance (eg, physical or verbal ) with assesment(s) is necessary to enable patient to complete evaluation  Assessment: Moderate Complexity SUBJECTIVE:  
Patient stated ? I have been hurting since they stopped the pump. ? OBJECTIVE DATA SUMMARY:  
 
Past Medical History:  
Diagnosis Date Acute pain of left shoulder 11/1/2016 Bipolar 1 disorder ([address] Bipolar affective disorder, currently manic, moderate ([address] 4/27/2016 Chronic midline low back pain with sciatica 11/1/2016 Cigarette nicotine dependence in remission 8/2/2016 Cigarette nicotine dependence without complication 12/32/0067 Continuous nicotine dependence 1/24/2017 Depression Fibromyalgia 9/1/2015 HLD (hyperlipidemia) 2/4/2016 Hypertension Hypertriglyceridemia 5/15/2018 Impaired fasting glucose 5/31/2016 Irritable bowel syndrome without diarrhea 4/27/2016 Moderate episode of recurrent major depressive disorder ([facility] 4/27/2016 Obesity, Class III, BMI 40-49.9 (morbid obesity) ([facility] 9/1/2015 Onychomycosis 4/27/2016 Pain of left thumb 11/1/2016 Prediabetes 7/5/2016 PTSD (post-traumatic stress disorder) Smoker 9/1/2015 Vitamin D deficiency 5/31/2016 Past Surgical History:  
Procedure Laterality Date HX ACL RECONSTRUCTION    
 HX APPENDECTOMY HX CERVICAL DISKECTOMY  02/07/2019  
 with fusion C5/6 C6/7 HX GYN Bilateral   
 tubal  
 HX ORTHOPAEDIC Prior Level of Function/Home Situation: Pt was modified independent with basic self care tasks and used a w/c or RW for functional mobility PTA. Home Situation Home Environment: Private residence # Steps to Enter: 4 One/Two Story Residence: One story Living Alone: No 
Support Systems: Family member(s), Friends \ neighbors Patient Expects to be Discharged to[de-identified] Private residence Current DME Used/Available at Home: Shower chair, Wheelchair, Crissy Gu Tub or Shower Type: Tub/Shower combination(with seat) ? Right hand dominant   ? Left hand dominant Cognitive/Behavioral Status: 
Neurologic State: Alert Orientation Level: Oriented X4 Cognition: Appropriate decision making; Follows commands Safety/Judgement: Awareness of environment; Fall prevention Skin: Intact on UEs Edema: Intact on UEs Vision/Perceptual:   
Acuity: Within Defined Limits Coordination: 
Fine Motor Skills-Upper: Left Intact; Right Intact Gross Motor Skills-Upper: Left Intact; Right Intact Balance: 
Sitting: Intact Standing: Impaired; With support Standing - Static: Fair(+) Standing - Dynamic : Fair(+) Strength: 
Strength: Generally decreased, functional(UEs; 4/5 in elbows/hands, shoulders NT) Tone & Sensation: 
Tone: Normal(UEs) Sensation: Impaired(UEs; numbness on medial aspect of B forearms into palms) Range of Motion: 
AROM: Generally decreased, functional(UEs; B shoulders limited to 90 deg. flex/abd 2/2 pain) Functional Mobility and Transfers for ADLs: 
Bed Mobility: 
Rolling: Stand-by assistance Supine to Sit: Contact guard assistance;Minimum assistance Transfers: 
Sit to Stand: Contact guard assistance Toilet Transfer : Contact guard assistance ADL Assessment: 
Feeding: Independent Oral Facial Hygiene/Grooming: Minimum assistance Bathing: Moderate assistance Upper Body Dressing: Minimum assistance Lower Body Dressing: Moderate assistance(for RLE) Toileting: Contact guard assistance Pain: 
Pt reports 10/10 pain or discomfort prior to treatment, in neck, back and LEs. Nursing notified. Pt reports 10/10 pain or discomfort post treatment, in neck, back and LEs. Patient repositioned in bed and resting at end of session. Activity Tolerance:  
Good Please refer to the flowsheet for vital signs taken during this treatment. After treatment:  
? Patient left in no apparent distress sitting up in chair ? Patient left in no apparent distress in bed 
? Call bell left within reach ? Nursing notified ? Caregiver (ex-) present ? Bed alarm activated COMMUNICATION/EDUCATION:  
? Home safety education was provided and the patient/caregiver indicated understanding. ? Patient/family have participated as able in goal setting and plan of care. ? Patient/family agree to work toward stated goals and plan of care. ? Patient understands intent and goals of therapy, but is neutral about his/her participation. ? Patient is unable to participate in goal setting and plan of care. Thank you for this referral. 
Jj Islas MS OTR/L Time Calculation: 26 mins

## 2019-03-20 NOTE — PROGRESS NOTES
Mobility Intervention:  
 
  [] Pt dangled at edge of bed 
  [x] Pt assisted OOB to bedside commode 
  [] Pt assisted OOB to chair 
  [] Pt ambulated to bathroom 
  [] Patient was ambulated in room/hallway Assistive Device Utilized:  
  
 [x] Rolling walker 
 [] Crutches 
 [] Straight Cane 
 [] Knee immobilizer [] IV pole After Mobilization:  
 
[] Patient left in no apparent distress sitting up in chair 
[x] Patient left in no apparent distress in bed 
[x] Call bell left within reach [x] SCDs on & machine turned on 
[x] encourage to use PCA pump 
[] RN notified 
[] Caregiver present 
[] Bed alarm activated Reason patient not mobilized:  
 
 [] Patient refused 
 [] Nausea/vomiting 
 [] Low blood pressure 
 [] Drowsy/lethargic Pain Rating:  
 
[] 0 [] 1 Assistive Device:       
[] 2 [] 3 [] 4 
[] 5 
[] 6 Assistive Device:       
[] 7 
[] 8 
[] 9 [x] 10 Comments:

## 2019-03-21 ENCOUNTER — HOME HEALTH ADMISSION (OUTPATIENT)
Dept: HOME HEALTH SERVICES | Facility: HOME HEALTH | Age: 44
End: 2019-03-21
Payer: MEDICAID

## 2019-03-21 VITALS
HEIGHT: 69 IN | SYSTOLIC BLOOD PRESSURE: 136 MMHG | HEART RATE: 85 BPM | TEMPERATURE: 97.5 F | BODY MASS INDEX: 42.06 KG/M2 | DIASTOLIC BLOOD PRESSURE: 82 MMHG | WEIGHT: 284 LBS | RESPIRATION RATE: 16 BRPM | OXYGEN SATURATION: 97 %

## 2019-03-21 PROCEDURE — 97535 SELF CARE MNGMENT TRAINING: CPT

## 2019-03-21 PROCEDURE — 97530 THERAPEUTIC ACTIVITIES: CPT

## 2019-03-21 PROCEDURE — 97116 GAIT TRAINING THERAPY: CPT

## 2019-03-21 PROCEDURE — 74011250636 HC RX REV CODE- 250/636: Performed by: ORTHOPAEDIC SURGERY

## 2019-03-21 PROCEDURE — 74011250637 HC RX REV CODE- 250/637: Performed by: ORTHOPAEDIC SURGERY

## 2019-03-21 RX ORDER — OXYCODONE AND ACETAMINOPHEN 5; 325 MG/1; MG/1
1 TABLET ORAL
Qty: 21 TAB | Refills: 0 | Status: SHIPPED | OUTPATIENT
Start: 2019-03-21 | End: 2019-03-26 | Stop reason: SDUPTHER

## 2019-03-21 RX ADMIN — LAMOTRIGINE 100 MG: 100 TABLET ORAL at 08:57

## 2019-03-21 RX ADMIN — DULOXETINE HYDROCHLORIDE 60 MG: 60 CAPSULE, DELAYED RELEASE ORAL at 08:56

## 2019-03-21 RX ADMIN — HYDROMORPHONE HYDROCHLORIDE 1 MG: 1 INJECTION, SOLUTION INTRAMUSCULAR; INTRAVENOUS; SUBCUTANEOUS at 05:09

## 2019-03-21 RX ADMIN — ACETAMINOPHEN 1000 MG: 500 TABLET ORAL at 09:00

## 2019-03-21 RX ADMIN — OXYCODONE HYDROCHLORIDE 10 MG: 5 TABLET ORAL at 08:56

## 2019-03-21 RX ADMIN — LOSARTAN POTASSIUM 75 MG: 25 TABLET ORAL at 08:56

## 2019-03-21 RX ADMIN — GABAPENTIN 600 MG: 300 CAPSULE ORAL at 08:56

## 2019-03-21 RX ADMIN — BISACODYL 10 MG: 5 TABLET, COATED ORAL at 08:56

## 2019-03-21 RX ADMIN — Medication 10 ML: at 03:32

## 2019-03-21 RX ADMIN — HYDROCHLOROTHIAZIDE 25 MG: 25 TABLET ORAL at 08:57

## 2019-03-21 RX ADMIN — ACETAMINOPHEN 1000 MG: 500 TABLET ORAL at 03:29

## 2019-03-21 RX ADMIN — OXYCODONE HYDROCHLORIDE 10 MG: 5 TABLET ORAL at 00:56

## 2019-03-21 RX ADMIN — CEFAZOLIN SODIUM IN SODIUM CHLORIDE 0.9% IV SOLN 3 GM/100ML 3 G: 3-0.9/1 SOLUTION at 03:36

## 2019-03-21 RX ADMIN — BUPROPION HYDROCHLORIDE 200 MG: 100 TABLET, FILM COATED, EXTENDED RELEASE ORAL at 08:56

## 2019-03-21 NOTE — HOME CARE
Spoke to patient. Verified address and telephone numbers. Explained services. Home care orders noted and arranged. Barbara Cagle RN, BSN  
[employer]

## 2019-03-21 NOTE — PROGRESS NOTES
Problem: Self Care Deficits Care Plan (Adult) Goal: *Acute Goals and Plan of Care (Insert Text) Description Occupational Therapy Goals Initiated 3/20/2019 within 7 day(s). 1.  Patient will perform lower body dressing with supervision/set-up, AE prn.  
2.  Patient will perform upper body dressing with supervision/set-up. 3.  Patient will perform toilet transfers with supervision/set-up. Outcome: Progressing Towards Goal 
 OCCUPATIONAL THERAPY TREATMENT Patient: Kacey Nam (53 y.o. female) Date: 3/21/2019 Diagnosis: Cervical stenosis of spine [M48.02] Cervical stenosis of spine [M48.02] Cervical stenosis of spine Procedure(s) (LRB): 
C5-6-7 LAMINECTOMY FUSION/C-ARM/NUVASIVE (N/A) 2 Days Post-Op Precautions: Fall, Spinal(cervical; collar) Chart, occupational therapy assessment, plan of care, and goals were reviewed. ASSESSMENT: 
Pt presented seated EOB agreeable to skilled OT services this date. Pt provided sock aid, reacher, and long-handled sponge. Educated pt on use of each item required during LB dressing/bathing. Pt return demonstrated proper use of each piece of equipment. Educated pt and caregiver of safety in the home after discharge, including removing clutter that could be trip hazards and keeping animals in separate area until pt is in the home and seated. Pt left seated EOB with significant other present. Nursing notified of treatment session. Progression toward goals: 
?          Improving appropriately and progressing toward goals ? Improving slowly and progressing toward goals ? Not making progress toward goals and plan of care will be adjusted PLAN: 
Patient continues to benefit from skilled intervention to address the above impairments. Continue treatment per established plan of care. Discharge Recommendations:  Home Health Further Equipment Recommendations for Discharge:  N/A; pt states they have tub bench SUBJECTIVE:  
 Patient stated ? I'm ready to go home. ? OBJECTIVE DATA SUMMARY:  
Cognitive/Behavioral Status: 
Neurologic State: Alert Orientation Level: Oriented X4 Cognition: Appropriate for age attention/concentration Safety/Judgement: Awareness of environment, Fall prevention Functional Mobility and Transfers for ADLs: 
Bed Mobility: 
  
Supine to Sit: Supervision Transfers: 
Sit to Stand: Contact guard assistance Balance: 
Sitting: Intact Standing: Impaired; With support Standing - Static: Fair(+) Standing - Dynamic : Fair(+) Pain: Pt stated no pain or discomfort prior to treatment. Pt stated no pain or discomfort post treatment. Activity Tolerance:   
Fair Please refer to the flowsheet for vital signs taken during this treatment. After treatment:  
?  Patient left in no apparent distress sitting up in chair ? Patient left in no apparent distress in bed 
? Call bell left within reach ? Nursing notified ? Caregiver present ? Bed alarm activated COMMUNICATION/EDUCATION:  
? Home safety education was provided and the patient/caregiver indicated understanding. ? Patient/family have participated as able in goal setting and plan of care. ? Patient/family agree to work toward stated goals and plan of care. ? Patient understands intent and goals of therapy, but is neutral about his/her participation. ? Patient is unable to participate in goal setting and plan of care. SOPHIA Ross Time Calculation: 16 mins

## 2019-03-21 NOTE — ROUTINE PROCESS
Bedside and Verbal shift change report given by Shiela Gates RN (offgoing nurse) to Mark Wilburn RN (oncoming nurse).  Report included the following information SBAR, Kardex and [facility]

## 2019-03-21 NOTE — PROGRESS NOTES
Pt is walking a safe distance with assistance and able to perform stairs with assistance. She is cleared for d/c home with family assistance for mobility but continues to benefit from skilled PT while in the hospital. Full note to follow Thank you, Jody El, PT, DPT

## 2019-03-21 NOTE — DISCHARGE SUMMARY
Face to Face Encounter    Patients Name: Fatuma Jackson  YOB: 1975    Primary Diagnosis: s/p cervical fusion    Date of Face to Face:   3/21/2019                                   Face to Face Encounter findings are related to primary reason for home care:   yes    1. I certify that the patient needs intermittent skilled nursing care, physical therapy and/or speech therapy. I will not be following this patient in the Community and Dr. Charles Ortega will be responsible for signing the [address] 2. Initial Orders for Care: Must be completed only if Face to Face MD will not be signing the [address] Physical Therapy    3. I certify that this patient is homebound for the following reason(s): Requires considerable and taxing effort to leave the home     4. I certify that this patient is under my care and that I, or a nurse practitioner or  [id number] working with me, had a Face-to-Face Encounter that meets the physician Face-to-Face Encounter requirements. Document the physical findings from the Face-to-Face Encounter that support the need for skilled services:  Has wound that requires skilled nursing assessment and treatment  and Has new diagnosis that requires skilled nursing teaching and intervention     Merlin Coil, NP  3/21/2019

## 2019-03-21 NOTE — PROGRESS NOTES
Bedside and Verbal shift change report given to [address]) by Marni Glover RN.  Report included the following information SBAR and Kardex.

## 2019-03-21 NOTE — PROGRESS NOTES
*Acute Goals and Plan of Care (Insert Text) Progressing Towards Goal 
Physical Therapy Goals Initiated 3/19/2019 and to be accomplished within 7 day(s) 1. Patient will move from supine to sit and sit to supine, scoot up and down and roll side to side in bed with modified independence. 2. Patient will transfer from bed to chair and chair to bed with modified independence using the least restrictive device. 3. Patient will perform sit to stand with modified independence. 4. Patient will ambulate with modified independence for 25-50 feet with the least restrictive device. 5. Patient will ascend/descend 4 stairs with 1 handrail(s) with minimal assistance/contact guard assist.  
 
PHYSICAL THERAPY TREATMENT Patient: Kj Mendez (53 y.o. female) Date: 3/21/2019 Diagnosis: Cervical stenosis of spine [M48.02] Cervical stenosis of spine [M48.02] Cervical stenosis of spine Procedure(s) (LRB): 
C5-6-7 LAMINECTOMY FUSION/C-ARM/NUVASIVE (N/A) 2 Days Post-Op Precautions: Fall, Spinal(cervical; collar) Chart, physical therapy assessment, plan of care and goals were reviewed. ASSESSMENT: 
Patient is cleared by nursing for PT, and patient consents to therapy. Reviewed cervical precautions. Supine to sit with [facility] raised supervision. Sit to stands contact guard assistance. Gait contact guard assistance 30 feet with RW with L knee buckle at times but no LOB. Stair training 4 steps with 1 HRA and PT assistance. Pt requires minimal assistance/moderate assistance with stairs and does better with descending stairs except needs cues for foot placement due to decreased sensation and proprioception. Pt is safe for d/c home with family assistance (nursing Poly informed) but continues to benefit from skilled inpatient PT. Pt ended therapy sitting in chair with all needs met. Progression toward goals: 
?      Improving appropriately and progressing toward goals ? Improving slowly and progressing toward goals ?      Not making progress toward goals and plan of care will be adjusted PLAN: 
Patient continues to benefit from skilled intervention to address the above impairments to increase functional independence. Continue treatment per established plan of care. Discharge Recommendations:  Home Health Further Equipment Recommendations for Discharge:  pt has RW but needs a cane for safety with stairs SUBJECTIVE:  
Patient stated I have a walker.  \"We need to get railings\" OBJECTIVE DATA SUMMARY:  
Critical Behavior: 
Neurologic State: Alert Orientation Level: Oriented X4 Cognition: Appropriate for age attention/concentration Safety/Judgement: Awareness of environment, Fall prevention Functional Mobility Training: 
Bed Mobility: 
Supine to Sit: Supervision Transfers: 
Sit to Stand: Contact guard assistance Stand to Sit: Setup Balance: 
Sitting: Intact Standing: Impaired; With support Standing - Static: Fair(+) Standing - Dynamic : Fair(+) Ambulation/Gait Training: 
Distance (ft): 30 Feet (ft) Assistive Device: Walker, rolling Ambulation - Level of Assistance: Contact guard assistance Gait Abnormalities: Decreased step clearance Base of Support: Center of gravity altered Speed/[place]: Slow Step Length: Right shortened;Left shortened Stairs: 
Number of Stairs Trained: 4 Stairs - Level of Assistance: Minimum assistance; Moderate assistance Rail Use: ([address] [facility], [phone] and therapist assist) Therapeutic Exercises:  
Reviewed and performed ankle pumps. Pain: 
Pre: 11/10 B shoulders and thighs Post: same Activity Tolerance:  
fair Please refer to the flowsheet for vital signs taken during this treatment. After treatment:  
? Patient left in no apparent distress sitting up in chair ? Patient left in no apparent distress in bed 
? Call bell left within reach ? Nursing notified, Mulugeta Asher ? Caregiver present ? Bed alarm activated ? Personal items in reach Delores Colunga, PT, DPT 
 Time Calculation: 29 mins

## 2019-03-21 NOTE — ROUTINE PROCESS
I have reviewed discharge instructions with the patient and spouse. The patient and spouse verbalized understanding. Discharge medications reviewed with patient and spouse and appropriate educational materials and side effects teaching were provided. Patient armband removed and shredded Patient vitals are stable and patient is clear for discharge.

## 2019-03-21 NOTE — DISCHARGE SUMMARY
Discharge  Summary     Patient: Claire Quispe MRN: [id number]  SSN: [id number]    YOB: 1975  Age: 53 y.o.   Sex: female       Admit Date: 3/19/2019    Discharge Date: 3/21/2019      Admission Diagnoses: Cervical stenosis of spine [M48.02]  Cervical stenosis of spine [M48.02]    Discharge Diagnoses:   Problem List as of 3/21/2019 Date Reviewed: 3/12/2019          Codes Class Noted - Resolved    * (Principal) Cervical stenosis of spine ICD-10-CM: M48.02  ICD-9-CM: 723.0  3/19/2019 - Present        Cervical myelopathy ([facility] ICD-10-CM: G95.9  ICD-9-CM: 721.1  2/7/2019 - Present        Hypertriglyceridemia ICD-10-CM: E78.1  ICD-9-CM: 272.1  5/15/2018 - Present        Continuous nicotine dependence ICD-10-CM: F17.200  ICD-9-CM: 305.1  1/24/2017 - Present        Chronic midline low back pain with sciatica ICD-10-CM: M54.40, G89.29  ICD-9-CM: 724.2, 724.3, 338.29  11/1/2016 - Present        Prediabetes ICD-10-CM: R73.03  ICD-9-CM: 790.29  7/5/2016 - Present        Moderate episode of recurrent major depressive disorder ([facility] ICD-10-CM: F33.1  ICD-9-CM: 296.32  4/27/2016 - Present        Bipolar affective disorder, currently manic, moderate ([facility] ICD-10-CM: F31.12  ICD-9-CM: 296.42  4/27/2016 - Present        Onychomycosis ICD-10-CM: B35.1  ICD-9-CM: 110.1  4/27/2016 - Present        Irritable bowel syndrome without diarrhea ICD-10-CM: K58.9  ICD-9-CM: 564.1  4/27/2016 - Present        HTN (hypertension) ICD-10-CM: I10  ICD-9-CM: 401.9  12/29/2015 - Present        Obesity, Class III, BMI 40-49.9 (morbid obesity) ([facility] ICD-10-CM: E66.01  ICD-9-CM: 278.01  9/1/2015 - Present        Fibromyalgia ICD-10-CM: M79.7  ICD-9-CM: 729.1  9/1/2015 - Present    Overview Addendum 1/17/2017 10:53 AM by Caleb Leblanc MD     B/l shoulders and arms, b/l thighs, back, neck             RESOLVED: Pain of left thumb ICD-10-CM: M79.645  ICD-9-CM: 729.5  11/1/2016 - 1/15/2018               Discharge Condition: Good    Procedure:  H3-2-1 lami fusion      Hospital Course: Normal hospital course for this procedure. Tolerated surgical intervention well. VSS Throughout. Neuro intact. Incision dry and intact, tolerating PO intake, voiding adequately. Ambulatory with RW      Disposition: home        Discharge Medications:   Current Discharge Medication List      CONTINUE these medications which have CHANGED    Details   oxyCODONE-acetaminophen (PERCOCET) 5-325 mg per tablet Take 1 Tab by mouth every six (6) hours as needed for Pain for up to 7 days. Max Daily Amount: 4 Tabs. Indications: Pain  Qty: 21 Tab, Refills: 0    Associated Diagnoses: Chronic bilateral low back pain with sciatica, sciatica laterality unspecified         CONTINUE these medications which have NOT CHANGED    Details   !! buPROPion SR (WELLBUTRIN, ZYBAN) 200 mg SR tablet Take 200 mg by mouth two (2) times a day. !! traZODone (DESYREL) 150 mg tablet Take 150 mg by mouth nightly. hydrOXYzine HCl (ATARAX) 25 mg tablet Take 25 mg by mouth three (3) times daily as needed for Anxiety. !! lamoTRIgine (LAMICTAL) 200 mg tablet Take 200 mg by mouth every evening. losartan (COZAAR) 50 mg tablet Take 75 mg by mouth daily. DULoxetine (CYMBALTA) 60 mg capsule Take 60 mg by mouth two (2) times a day.      gabapentin (NEURONTIN) 300 mg capsule Take 2 Caps by mouth three (3) times daily. Qty: 180 Cap, Refills: 2    Associated Diagnoses: Cervical myelopathy ([facility] Myelopathy concurrent with and due to spinal stenosis of cervical region [facility]); Lumbar radiculopathy      lidocaine (LIDODERM) 5 % APPLY 1 PATCH TO THE AFFECTED AREA EVERY DAY.  LEAVE ON FOR 12 HOURS THEN REMOVE FOR 12 HOURS  Qty: 90 Patch, Refills: 3    Associated Diagnoses: Chronic midline low back pain with left-sided sciatica      polyethylene glycol (MIRALAX) 17 gram/dose powder MIX AND DRINK 17 GRAMS BY MOUTH DAILY  Qty: 1530 g, Refills: 3    Associated Diagnoses: Irritable bowel syndrome without diarrhea      hydroCHLOROthiazide (HYDRODIURIL) 25 mg tablet Take 1 Tab by mouth daily. Qty: 90 Tab, Refills: 3    Associated Diagnoses: Essential hypertension      !! lamoTRIgine (LAMICTAL) 100 mg tablet Take 100 mg by mouth daily. !! buPROPion SR (WELLBUTRIN, ZYBAN) 200 mg SR tablet 200 mg two (2) times a day. Refills: 1      !! traZODone (DESYREL) 100 mg tablet 150 mg every evening. Refills: 1       !! - Potential duplicate medications found. Please discuss with provider. STOP taking these medications       celecoxib (CELEBREX) 200 mg capsule Comments:   Reason for Stopping: Follow-up Appointments   Procedures    FOLLOW UP VISIT Appointment in: Two Weeks     Standing Status:   Standing     Number of Occurrences:   1     Order Specific Question:   Appointment in     Answer:    Two Weeks       Signed By: Robbie Chowdhury NP     March 21, 2019

## 2019-03-21 NOTE — PROGRESS NOTES
Discharge planning Discharge order noted for today. Pt has been accepted to [facility] agency. Met with patient and  agreeable to the transition plan today. Transport has been arranged with family. Patient's discharge summary and home health  orders have been forwarded to [facility]  agency via cc/Link. Updated bedside RN, Kiya Garcia,  to the transition plan. Discharge information has been documented on the AVS. CINTIA Dye, RN Pager # [phone] Care Manager

## 2019-03-22 ENCOUNTER — TELEPHONE (OUTPATIENT)
Dept: ORTHOPEDIC SURGERY | Age: 44
End: 2019-03-22

## 2019-03-22 ENCOUNTER — HOME CARE VISIT (OUTPATIENT)
Dept: SCHEDULING | Facility: HOME HEALTH | Age: 44
End: 2019-03-22
Payer: MEDICAID

## 2019-03-22 PROCEDURE — G0151 HHCP-SERV OF PT,EA 15 MIN: HCPCS

## 2019-03-22 PROCEDURE — 400013 HH SOC

## 2019-03-22 NOTE — TELEPHONE ENCOUNTER
Patient was given percocet 5/325. If she needs to take 2 tabs at the bedtime dose then she can do that.

## 2019-03-24 VITALS
OXYGEN SATURATION: 95 % | HEART RATE: 96 BPM | DIASTOLIC BLOOD PRESSURE: 88 MMHG | SYSTOLIC BLOOD PRESSURE: 144 MMHG | TEMPERATURE: 97.5 F

## 2019-03-25 ENCOUNTER — HOME CARE VISIT (OUTPATIENT)
Dept: HOME HEALTH SERVICES | Facility: HOME HEALTH | Age: 44
End: 2019-03-25
Payer: MEDICAID

## 2019-03-25 PROCEDURE — G0157 HHC PT ASSISTANT EA 15: HCPCS

## 2019-03-26 ENCOUNTER — HOME CARE VISIT (OUTPATIENT)
Dept: HOME HEALTH SERVICES | Facility: HOME HEALTH | Age: 44
End: 2019-03-26
Payer: MEDICAID

## 2019-03-26 ENCOUNTER — TELEPHONE (OUTPATIENT)
Dept: ORTHOPEDIC SURGERY | Age: 44
End: 2019-03-26

## 2019-03-26 VITALS
TEMPERATURE: 98 F | TEMPERATURE: 97.3 F | SYSTOLIC BLOOD PRESSURE: 124 MMHG | DIASTOLIC BLOOD PRESSURE: 64 MMHG | SYSTOLIC BLOOD PRESSURE: 122 MMHG | DIASTOLIC BLOOD PRESSURE: 70 MMHG | HEART RATE: 89 BPM | OXYGEN SATURATION: 94 % | OXYGEN SATURATION: 97 % | HEART RATE: 85 BPM

## 2019-03-26 DIAGNOSIS — G89.29 CHRONIC BILATERAL LOW BACK PAIN WITH SCIATICA, SCIATICA LATERALITY UNSPECIFIED: ICD-10-CM

## 2019-03-26 DIAGNOSIS — M54.40 CHRONIC BILATERAL LOW BACK PAIN WITH SCIATICA, SCIATICA LATERALITY UNSPECIFIED: ICD-10-CM

## 2019-03-26 PROCEDURE — G0157 HHC PT ASSISTANT EA 15: HCPCS

## 2019-03-26 RX ORDER — OXYCODONE AND ACETAMINOPHEN 5; 325 MG/1; MG/1
1 TABLET ORAL
Qty: 15 TAB | Refills: 0 | Status: SHIPPED | OUTPATIENT
Start: 2019-03-26 | End: 2019-03-26 | Stop reason: SDUPTHER

## 2019-03-26 RX ORDER — OXYCODONE AND ACETAMINOPHEN 5; 325 MG/1; MG/1
1 TABLET ORAL
Qty: 15 TAB | Refills: 0 | Status: SHIPPED | OUTPATIENT
Start: 2019-03-26 | End: 2019-04-02 | Stop reason: SDUPTHER

## 2019-03-26 NOTE — TELEPHONE ENCOUNTER
Called patient and verified . Informed patient of message below from Mitchell Vargas NP. Patient verbalized understanding and no further questions or concerns at this time.

## 2019-03-26 NOTE — TELEPHONE ENCOUNTER
-called patient and voicemail did not identify patient. A message was left for patient to call [phone] at [address] [place] [address] in regards to the patient's message.

## 2019-03-26 NOTE — TELEPHONE ENCOUNTER
Percocet approved, 1 tab every 8 hours prn pain. She should start to taper off. This should last until her FU visit. [id number] [address] Herring for trazodone if she has this prescribed from some one else. We avoid NSAIDS for 3 months, so she can use Tylenol PRN in place of Celebrex.

## 2019-03-26 NOTE — TELEPHONE ENCOUNTER
Momo Dove from [address] [place] [address] called asking if the patient can take Trazodone not sure if I spell the med correctly anyway, pt want to know if she could take it with percocet med together because she cannot sleep. Also if she have a prescription order for Celebrex?       Patient is also requesting refill for her percocet medLK, [address] [facility]  AT . [facility]

## 2019-03-28 ENCOUNTER — HOME CARE VISIT (OUTPATIENT)
Dept: HOME HEALTH SERVICES | Facility: HOME HEALTH | Age: 44
End: 2019-03-28
Payer: MEDICAID

## 2019-03-28 PROCEDURE — G0157 HHC PT ASSISTANT EA 15: HCPCS

## 2019-04-01 ENCOUNTER — HOME CARE VISIT (OUTPATIENT)
Dept: SCHEDULING | Facility: HOME HEALTH | Age: 44
End: 2019-04-01
Payer: MEDICAID

## 2019-04-01 VITALS
HEART RATE: 70 BPM | TEMPERATURE: 99 F | SYSTOLIC BLOOD PRESSURE: 126 MMHG | OXYGEN SATURATION: 98 % | DIASTOLIC BLOOD PRESSURE: 70 MMHG

## 2019-04-01 PROCEDURE — G0157 HHC PT ASSISTANT EA 15: HCPCS

## 2019-04-02 ENCOUNTER — OFFICE VISIT (OUTPATIENT)
Dept: ORTHOPEDIC SURGERY | Age: 44
End: 2019-04-02

## 2019-04-02 VITALS
RESPIRATION RATE: 16 BRPM | HEART RATE: 83 BPM | HEIGHT: 69 IN | SYSTOLIC BLOOD PRESSURE: 132 MMHG | BODY MASS INDEX: 43.4 KG/M2 | TEMPERATURE: 98 F | DIASTOLIC BLOOD PRESSURE: 84 MMHG | WEIGHT: 293 LBS | OXYGEN SATURATION: 96 %

## 2019-04-02 DIAGNOSIS — G89.29 CHRONIC BILATERAL LOW BACK PAIN WITH SCIATICA, SCIATICA LATERALITY UNSPECIFIED: ICD-10-CM

## 2019-04-02 DIAGNOSIS — Z98.1 S/P CERVICAL SPINAL FUSION: Primary | ICD-10-CM

## 2019-04-02 DIAGNOSIS — M54.40 CHRONIC BILATERAL LOW BACK PAIN WITH SCIATICA, SCIATICA LATERALITY UNSPECIFIED: ICD-10-CM

## 2019-04-02 RX ORDER — OXYCODONE AND ACETAMINOPHEN 5; 325 MG/1; MG/1
1 TABLET ORAL DAILY
Qty: 7 TAB | Refills: 0 | Status: SHIPPED | OUTPATIENT
Start: 2019-04-04 | End: 2019-04-11

## 2019-04-02 NOTE — PROGRESS NOTES
Chief complaint/History of Present Illness:  Chief Complaint   Patient presents with    Neck Pain    Shoulder Pain     Bilateral     Arm Pain     Bilateral     Back Pain     Lumbar     Leg Pain     Bilateral     Surgical Follow-up     2 wks      HPI  Fany Amanda is a  53 y.o.  female      HISTORY OF PRESENT ILLNESS:  The patient comes in today 2 weeks status post her C6-7 laminectomy with C5-6-7 posterior cervical fusion. Prior to surgery, she could not walk at all. She was in a wheelchair. She states now she does feel stronger. She is able to get up and walk with a walker with the therapist for a short amount of time. She has not fallen since she has had this surgery. She states the numbness in her lower abdomen, her bilateral heels, and her arms is much better. She still has some numbness in her toes and bilateral thighs. She feels like she is a little bit stronger. She denies fever, bowel or bladder dysfunction. No drainage from her incision. She is taking Tylenol 1000 mg b.i.d. with a Percocet 5/325 mg in the evening, so she has decreased her Percocet use to once a day. She is on Social Security Disability. She smokes 1 pack of cigarettes per day. PHYSICAL EXAMINATION:  Ms. Sandra Boewrs is a 49-year-old female. She is alert and oriented. Normal mood and affect. She is in a wheelchair today. She is able to get from sitting to standing and able to take steps with a walker. It is a little bit antalgic, but it is full weightbearing. She has 5/5 strength, bilateral upper extremities. Mild bilateral positive Nico's. She has 5/5 strength of her bilateral quadriceps, 4/5 of her hamstrings, 4/5 of her dorsiflexors. The right side is slightly weaker than the left. Her posterior cervicothoracic fusion is healing nicely. The edges are well-approximated. There is no erythema, drainage, or signs of infection.     ASSESSMENT/PLAN:  This is a patient 2 weeks out from her C6-7 laminectomy with C5-6-7 posterior cervical fusion. She has made some improvements. She is very happy with that. We went over wound care, activity level. She will probably transition to outpatient physical therapy when her home health period is over. We will see the patient back in 4 weeks with Dr. Enzo Becerra, at which time we will get an x-ray.          Review of systems:    Past Medical History:   Diagnosis Date    Acute pain of left shoulder 11/1/2016    Bipolar 1 disorder ([address]     Bipolar affective disorder, currently manic, moderate ([address] 4/27/2016    Chronic midline low back pain with sciatica 11/1/2016    Cigarette nicotine dependence in remission 8/2/2016    Cigarette nicotine dependence without complication 06/96/2824    Continuous nicotine dependence 1/24/2017    Depression     Fibromyalgia 9/1/2015    HLD (hyperlipidemia) 2/4/2016    Hypertension     Hypertriglyceridemia 5/15/2018    Impaired fasting glucose 5/31/2016    Irritable bowel syndrome without diarrhea 4/27/2016    Moderate episode of recurrent major depressive disorder ([address] 4/27/2016    Obesity, Class III, BMI 40-49.9 (morbid obesity) ([address] 9/1/2015    Onychomycosis 4/27/2016    Pain of left thumb 11/1/2016    Prediabetes 7/5/2016    PTSD (post-traumatic stress disorder)     Smoker 9/1/2015    Vitamin D deficiency 5/31/2016     Past Surgical History:   Procedure Laterality Date    HX ACL RECONSTRUCTION      HX APPENDECTOMY      HX CERVICAL DISKECTOMY  02/07/2019    with fusion C5/6 C6/7    HX CERVICAL LAMINECTOMY  03/19/2019    with fusion    HX GYN Bilateral     tubal    HX ORTHOPAEDIC       Social History     Socioeconomic History    Marital status: UNKNOWN     Spouse name: Not on file    Number of children: Not on file    Years of education: Not on file    Highest education level: Not on file   Occupational History    Not on file   Social Needs    Financial resource strain: Not on file    Food insecurity:     Worry: Not on file Inability: Not on file    Transportation needs:     Medical: Not on file     Non-medical: Not on file   Tobacco Use    Smoking status: Current Every Day Smoker     Packs/day: 1.00     Years: 25.00     Pack years: 25.00    Smokeless tobacco: Never Used   Substance and Sexual Activity    Alcohol use:  Yes     Alcohol/week: 5.4 oz     Types: 9 Shots of liquor per week     Comment: socially    Drug use: No    Sexual activity: Yes     Partners: Male     Birth control/protection: None   Lifestyle    Physical activity:     Days per week: Not on file     Minutes per session: Not on file    Stress: Not on file   Relationships    Social connections:     Talks on phone: Not on file     Gets together: Not on file     Attends [employer] service: Not on file     Active member of club or organization: Not on file     Attends meetings of clubs or organizations: Not on file     Relationship status: Not on file    Intimate partner violence:     Fear of current or ex partner: Not on file     Emotionally abused: Not on file     Physically abused: Not on file     Forced sexual activity: Not on file   Other Topics Concern     Service Not Asked    Blood Transfusions Not Asked    Caffeine Concern Not Asked    Occupational Exposure Not Asked   [employer] Not Asked    Sleep Concern Not Asked    Stress Concern Not Asked    Weight Concern Not Asked    Special Diet Not Asked    Back Care Not Asked    Exercise Not Asked    Bike Helmet Not Asked   [address] Not Asked    Self-Exams Not Asked   Social History Narrative    Not on file     Family History   Problem Relation Age of Onset    Hypertension Mother     Thyroid Disease Mother     No Known Problems Father     Psychiatric Disorder Sister     Psychiatric Disorder Brother     Psychiatric Disorder Sister     Diabetes Sister     Psychiatric Disorder Brother     Psychiatric Disorder Brother        Physical Exam:  Visit Vitals  /84   Pulse 83 Temp 98 °F (36.7 °C)   Resp 16   Ht 5' 9\" (1.753 m)   Wt 294 lb (133.4 kg)   SpO2 96%   BMI 43.42 kg/m²     Pain Scale: 3/10       has been . reviewed and is appropriate          Diagnoses and all orders for this visit:    1. S/P cervical spinal fusion  -     oxyCODONE-acetaminophen (PERCOCET) 5-325 mg per tablet; Take 1 Tab by mouth daily for 7 days. Max Daily Amount: 1 Tab. Indications: Pain, post op pain    2. Chronic bilateral low back pain with sciatica, sciatica laterality unspecified            Follow-up and Dispositions    · Return in about 1 month (around 4/30/2019) for with Dr. Ta Barboza.              We have informed Max Greer to notify us for immediate appointment if she has any worsening neurogical symptoms or if an emergency situation presents, then call 911

## 2019-04-03 ENCOUNTER — HOME CARE VISIT (OUTPATIENT)
Dept: SCHEDULING | Facility: HOME HEALTH | Age: 44
End: 2019-04-03
Payer: MEDICAID

## 2019-04-03 PROCEDURE — G0157 HHC PT ASSISTANT EA 15: HCPCS

## 2019-04-04 VITALS
OXYGEN SATURATION: 98 % | HEART RATE: 69 BPM | SYSTOLIC BLOOD PRESSURE: 128 MMHG | SYSTOLIC BLOOD PRESSURE: 130 MMHG | TEMPERATURE: 96.6 F | HEART RATE: 87 BPM | TEMPERATURE: 97.3 F | OXYGEN SATURATION: 96 % | DIASTOLIC BLOOD PRESSURE: 78 MMHG | DIASTOLIC BLOOD PRESSURE: 88 MMHG

## 2019-04-05 ENCOUNTER — HOME CARE VISIT (OUTPATIENT)
Dept: SCHEDULING | Facility: HOME HEALTH | Age: 44
End: 2019-04-05
Payer: MEDICAID

## 2019-04-05 PROCEDURE — G0151 HHCP-SERV OF PT,EA 15 MIN: HCPCS

## 2019-04-07 VITALS
TEMPERATURE: 97.7 F | DIASTOLIC BLOOD PRESSURE: 92 MMHG | SYSTOLIC BLOOD PRESSURE: 139 MMHG | HEART RATE: 81 BPM | OXYGEN SATURATION: 98 %

## 2019-04-10 ENCOUNTER — HOME CARE VISIT (OUTPATIENT)
Dept: SCHEDULING | Facility: HOME HEALTH | Age: 44
End: 2019-04-10
Payer: MEDICAID

## 2019-04-10 ENCOUNTER — TELEPHONE (OUTPATIENT)
Dept: ORTHOPEDIC SURGERY | Age: 44
End: 2019-04-10

## 2019-04-10 PROCEDURE — G0151 HHCP-SERV OF PT,EA 15 MIN: HCPCS

## 2019-04-10 NOTE — TELEPHONE ENCOUNTER
Vanessa Cormier from [address] [place] [address] called in wanted to update the NP on how patient was doing. She states that the patient was discharged today 04/10/19 as she reached her max potential with home care. Patient still has numbness and weakness and is transitioning to OT. Vanessa Cormier can be reached at [phone].

## 2019-04-12 VITALS
HEART RATE: 84 BPM | OXYGEN SATURATION: 93 % | DIASTOLIC BLOOD PRESSURE: 68 MMHG | TEMPERATURE: 97.9 F | SYSTOLIC BLOOD PRESSURE: 108 MMHG

## 2019-04-30 ENCOUNTER — OFFICE VISIT (OUTPATIENT)
Dept: ORTHOPEDIC SURGERY | Age: 44
End: 2019-04-30

## 2019-04-30 DIAGNOSIS — M48.02 CERVICAL STENOSIS OF SPINE: ICD-10-CM

## 2019-04-30 DIAGNOSIS — M48.062 LUMBAR STENOSIS WITH NEUROGENIC CLAUDICATION: Primary | ICD-10-CM

## 2019-04-30 DIAGNOSIS — Z98.1 S/P CERVICAL SPINAL FUSION: ICD-10-CM

## 2019-04-30 NOTE — PROGRESS NOTES
[facility]  [address], [address]  [place] [address]  Phone: [phone]  Fax: [phone]  PROGRESS NOTE  Patient: Dale Caban                MRN: [id number]       SSN: [id number]  YOB: 1975        AGE: 53 y.o. SEX: female  There is no height or weight on file to calculate BMI. PCP: Carl Jennings MD  04/30/19    No chief complaint on file. HISTORY OF PRESENT ILLNESS, RADIOGRAPHS, and PLAN:     HISTORY OF PRESENT ILLNESS:   Ms. Hussein Willard returns today six weeks out from her posterior cervical decompression and fusion. The numbness and tingling has resolved in her upper extremities. She still is weak with spasticity in her lower extremities. She is more or less wheelchair bound. She can walk short distances but then the spasticity takes hold and she cannot mobilize longer than that, made more difficult by her having severe low back pain. She has severe degenerative collapse at 5-1 with spinal stenosis at 4-5 which has given her this terrible back pain. Then on top of her spasticity and leg pain and numbness in combination it just makes ambulation difficult. She also is morbidly obese with a weight of 295 lb. She has no bowel or bladder dysfunction. ASSESSMENT/PLAN:   I discussed the matter at length with her. She is asking if we could proceed with our discussed lumbar decompression and fusion for her stenosis to try to help with her back pain. At this point initially I had hoped to stage it further apart to have her recover more from her neck surgery but she would like to condense the two so she would be recovering from both simultaneously. She finds her back pain limits her ability to rehab her lower extremity strength and her ambulation and I understand that.   We discussed the risks, benefits, complications and alternatives to surgery, the difficulty of doing surgery on her because of her obesity but given the complexity of her issues, her cervical myelopathy, her cord injury, stenosis and her back pain, I can understand her desire to address both issues simultaneously. We will proceed with surgery on her lumbar spine once the appropriate approvals and clearances take place. I think it would be best if possible if we get her into some inpatient rehab. I think she would benefit from aggressive inpatient rehab given her size and her need for aggressive ambulation training. She was denied it the last time. cc:  Dr. Massimo Cordoba          Past Medical History:   Diagnosis Date    Acute pain of left shoulder 11/1/2016    Bipolar 1 disorder ([address]     Bipolar affective disorder, currently manic, moderate ([address] 4/27/2016    Chronic midline low back pain with sciatica 11/1/2016    Cigarette nicotine dependence in remission 8/2/2016    Cigarette nicotine dependence without complication 36/71/4149    Continuous nicotine dependence 1/24/2017    Depression     Fibromyalgia 9/1/2015    HLD (hyperlipidemia) 2/4/2016    Hypertension     Hypertriglyceridemia 5/15/2018    Impaired fasting glucose 5/31/2016    Irritable bowel syndrome without diarrhea 4/27/2016    Moderate episode of recurrent major depressive disorder ([address] 4/27/2016    Obesity, Class III, BMI 40-49.9 (morbid obesity) ([address] 9/1/2015    Onychomycosis 4/27/2016    Pain of left thumb 11/1/2016    Prediabetes 7/5/2016    PTSD (post-traumatic stress disorder)     Smoker 9/1/2015    Vitamin D deficiency 5/31/2016       Family History   Problem Relation Age of Onset    Hypertension Mother     Thyroid Disease Mother     No Known Problems Father     Psychiatric Disorder Sister     Psychiatric Disorder Brother     Psychiatric Disorder Sister     Diabetes Sister     Psychiatric Disorder Brother     Psychiatric Disorder Brother        Current Outpatient Medications   Medication Sig Dispense Refill    docusate sodium (STOOL SOFTENER PO) Take 100 mg by mouth daily.  buPROPion SR (WELLBUTRIN, ZYBAN) 200 mg SR tablet Take 200 mg by mouth two (2) times a day.  traZODone (DESYREL) 150 mg tablet Take 150 mg by mouth nightly.  hydrOXYzine HCl (ATARAX) 25 mg tablet Take 25 mg by mouth three (3) times daily as needed for Anxiety.  lamoTRIgine (LAMICTAL) 200 mg tablet Take 200 mg by mouth every evening.  losartan (COZAAR) 50 mg tablet Take 75 mg by mouth daily.  DULoxetine (CYMBALTA) 60 mg capsule Take 60 mg by mouth two (2) times a day.  gabapentin (NEURONTIN) 300 mg capsule Take 2 Caps by mouth three (3) times daily. 180 Cap 2    lidocaine (LIDODERM) 5 % APPLY 1 PATCH TO THE AFFECTED AREA EVERY DAY. LEAVE ON FOR 12 HOURS THEN REMOVE FOR 12 HOURS 90 Patch 3    polyethylene glycol (MIRALAX) 17 gram/dose powder MIX AND DRINK 17 GRAMS BY MOUTH DAILY 1530 g 3    hydroCHLOROthiazide (HYDRODIURIL) 25 mg tablet Take 1 Tab by mouth daily. 90 Tab 3    lamoTRIgine (LAMICTAL) 100 mg tablet Take 100 mg by mouth daily.  buPROPion SR (WELLBUTRIN, ZYBAN) 200 mg SR tablet 200 mg two (2) times a day. 1    traZODone (DESYREL) 100 mg tablet 150 mg every evening.   1       No Known Allergies    Past Surgical History:   Procedure Laterality Date    HX ACL RECONSTRUCTION      HX APPENDECTOMY      HX CERVICAL DISKECTOMY  02/07/2019    with fusion C5/6 C6/7    HX CERVICAL LAMINECTOMY  03/19/2019    with fusion    HX GYN Bilateral     tubal    HX ORTHOPAEDIC         Past Medical History:   Diagnosis Date    Acute pain of left shoulder 11/1/2016    Bipolar 1 disorder ([address]     Bipolar affective disorder, currently manic, moderate ([address] 4/27/2016    Chronic midline low back pain with sciatica 11/1/2016    Cigarette nicotine dependence in remission 8/2/2016    Cigarette nicotine dependence without complication 65/59/3674    Continuous nicotine dependence 1/24/2017    Depression     Fibromyalgia 9/1/2015    HLD (hyperlipidemia) 2/4/2016    Hypertension  Hypertriglyceridemia 5/15/2018    Impaired fasting glucose 5/31/2016    Irritable bowel syndrome without diarrhea 4/27/2016    Moderate episode of recurrent major depressive disorder ([facility] 4/27/2016    Obesity, Class III, BMI 40-49.9 (morbid obesity) ([facility] 9/1/2015    Onychomycosis 4/27/2016    Pain of left thumb 11/1/2016    Prediabetes 7/5/2016    PTSD (post-traumatic stress disorder)     Smoker 9/1/2015    Vitamin D deficiency 5/31/2016       Social History     Socioeconomic History    Marital status: UNKNOWN     Spouse name: Not on file    Number of children: Not on file    Years of education: Not on file    Highest education level: Not on file   Occupational History    Not on file   Social Needs    Financial resource strain: Not on file    Food insecurity:     Worry: Not on file     Inability: Not on file    Transportation needs:     Medical: Not on file     Non-medical: Not on file   Tobacco Use    Smoking status: Current Every Day Smoker     Packs/day: 1.00     Years: 25.00     Pack years: 25.00    Smokeless tobacco: Never Used   Substance and Sexual Activity    Alcohol use:  Yes     Alcohol/week: 5.4 oz     Types: 9 Shots of liquor per week     Comment: socially    Drug use: No    Sexual activity: Yes     Partners: Male     Birth control/protection: None   Lifestyle    Physical activity:     Days per week: Not on file     Minutes per session: Not on file    Stress: Not on file   Relationships    Social connections:     Talks on phone: Not on file     Gets together: Not on file     Attends [employer] service: Not on file     Active member of club or organization: Not on file     Attends meetings of clubs or organizations: Not on file     Relationship status: Not on file    Intimate partner violence:     Fear of current or ex partner: Not on file     Emotionally abused: Not on file     Physically abused: Not on file     Forced sexual activity: Not on file   Other Topics Concern     Service Not Asked    Blood Transfusions Not Asked    Caffeine Concern Not Asked    Occupational Exposure Not Asked    Hobby Hazards Not Asked    Sleep Concern Not Asked    Stress Concern Not Asked    Weight Concern Not Asked    Special Diet Not Asked    Back Care Not Asked    Exercise Not Asked    Bike Helmet Not Asked   [address] Not Asked    Self-Exams Not Asked   Social History Narrative    Not on file         REVIEW OF SYSTEMS:   CONSTITUTIONAL SYMPTOMS:  Negative. EYES:  Negative. EARS, NOSE, THROAT AND MOUTH:  Negative. CARDIOVASCULAR:  Negative. RESPIRATORY:  Negative. GENITOURINARY: Per HPI. GASTROINTESTINAL:  Per HPI. INTEGUMENTARY (SKIN AND/OR BREAST):  Negative. MUSCULOSKELETAL: Per HPI.   ENDOCRINE/RHEUMATOLOGIC:  Negative. NEUROLOGICAL:  Per HPI. HEMATOLOGIC/LYMPHATIC:  Negative. ALLERGIC/IMMUNOLOGIC:  Negative. PSYCHIATRIC:  Negative. PHYSICAL EXAMINATION:   There were no vitals taken for this visit. PAIN SCALE: /10    CONSTITUTIONAL: The patient is in no apparent distress and is alert and oriented x 3. HEENT: Normocephalic. Hearing grossly intact. NECK: Supple and symmetric. no tenderness, or masses were felt. RESPIRATORY: No labored breathing. CARDIOVASCULAR: The carotid pulses were normal. Peripheral pulses were 2+. CHEST: Normal AP diameter and normal contour without any kyphoscoliosis. LYMPHATIC: No lymphadenopathy was appreciated in the neck, axillae or groin. SKIN:  Incision healing well, no drainage, no erythema, no hernia, no seroma, no swelling, no dehiscence, incision well approximated. Negative for scars, rashes, lesions, or ulcers on the right upper, right lower, left upper, left lower and trunk. NEUROLOGICAL: Alert and oriented x 3. Presents in wheelchair.    EXTREMITIES: See musculoskeletal.  MUSCULOSKELETAL:   Head and Neck:  Negative for misalignment, asymmetry, crepitation, defects, tenderness masses or effusions.  Left Upper Extremity: Inspection, percussion and palpation performed. Dela Cruzs sign is negative.  Right Upper Extremity: Inspection, percussion and palpation performed. Dela Cruzs sign is negative.  Spine, Ribs and Pelvis: Low back pain. Inspection, percussion and palpation performed. Negative for misalignment, asymmetry, crepitation, defects, tenderness masses or effusions.  Left Lower Extremity: Hyperreflexic. 3+/5 strength. Numbness. Inspection, percussion and palpation performed. Negative straight leg raise.  Right Lower Extremity: Hyperreflexic. 3+/5 strength. Numbness. Inspection, percussion and palpation performed. Negative straight leg raise. SPINE EXAM:     Cervical spine: Neck is midline. Normal muscle tone. No focal atrophy is noted. Lumbar spine: No rash, ecchymosis, or gross obliquity. No focal atrophy is noted. ASSESSMENT    ICD-10-CM ICD-9-CM    1. Lumbar stenosis with neurogenic claudication M48.062 724.03    2. S/P cervical spinal fusion Z98.1 V45.4    3. Cervical stenosis of spine M48.02 723.0        Written by Daphne Tripp, as dictated by Jen Sampson MD.    I, Dr. Jen Sampson MD, confirm that all documentation is accurate.

## 2019-05-01 ENCOUNTER — TELEPHONE (OUTPATIENT)
Dept: ORTHOPEDIC SURGERY | Age: 44
End: 2019-05-01

## 2019-05-01 VITALS
BODY MASS INDEX: 43.42 KG/M2 | RESPIRATION RATE: 18 BRPM | HEIGHT: 69 IN | TEMPERATURE: 98.2 F | HEART RATE: 86 BPM | DIASTOLIC BLOOD PRESSURE: 72 MMHG | SYSTOLIC BLOOD PRESSURE: 108 MMHG

## 2019-05-01 DIAGNOSIS — Z01.818 PRE-OPERATIVE CLEARANCE: Primary | ICD-10-CM

## 2019-05-02 ENCOUNTER — HOSPITAL ENCOUNTER (OUTPATIENT)
Dept: PREADMISSION TESTING | Age: 44
Discharge: HOME | End: 2019-05-02
Payer: MEDICAID

## 2019-05-02 DIAGNOSIS — Z01.818 PRE-OPERATIVE CLEARANCE: ICD-10-CM

## 2019-05-02 LAB
ABO + RH BLD: NORMAL
ALBUMIN SERPL-MCNC: 3.9 G/DL (ref 3.4–5)
ALBUMIN/GLOB SERPL: 1.1 {RATIO} (ref 0.8–1.7)
ALP SERPL-CCNC: 110 U/L (ref 45–117)
ALT SERPL-CCNC: 19 U/L (ref 13–56)
ANION GAP SERPL CALC-SCNC: 5 MMOL/L (ref 3–18)
AST SERPL-CCNC: 13 U/L (ref 15–37)
BILIRUB SERPL-MCNC: 0.2 MG/DL (ref 0.2–1)
BLOOD GROUP ANTIBODIES SERPL: NORMAL
BUN SERPL-MCNC: 8 MG/DL (ref 7–18)
BUN/CREAT SERPL: 13 (ref 12–20)
CALCIUM SERPL-MCNC: 9.5 MG/DL (ref 8.5–10.1)
CHLORIDE SERPL-SCNC: 102 MMOL/L (ref 100–108)
CO2 SERPL-SCNC: 31 MMOL/L (ref 21–32)
CREAT SERPL-MCNC: 0.63 MG/DL (ref 0.6–1.3)
ERYTHROCYTE [DISTWIDTH] IN BLOOD BY AUTOMATED COUNT: 13.4 % (ref 11.6–14.5)
GLOBULIN SER CALC-MCNC: 3.6 G/DL (ref 2–4)
GLUCOSE SERPL-MCNC: 87 MG/DL (ref 74–99)
HCT VFR BLD AUTO: 39.4 % (ref 35–45)
HGB BLD-MCNC: 13.1 G/DL (ref 12–16)
MCH RBC QN AUTO: 30.8 PG (ref 24–34)
MCHC RBC AUTO-ENTMCNC: 33.2 G/DL (ref 31–37)
MCV RBC AUTO: 92.7 FL (ref 74–97)
PLATELET # BLD AUTO: 317 K/UL (ref 135–420)
PMV BLD AUTO: 10.4 FL (ref 9.2–11.8)
POTASSIUM SERPL-SCNC: 4 MMOL/L (ref 3.5–5.5)
PROT SERPL-MCNC: 7.5 G/DL (ref 6.4–8.2)
RBC # BLD AUTO: 4.25 M/UL (ref 4.2–5.3)
SODIUM SERPL-SCNC: 138 MMOL/L (ref 136–145)
SPECIMEN EXP DATE BLD: NORMAL
WBC # BLD AUTO: 7.8 K/UL (ref 4.6–13.2)

## 2019-05-02 PROCEDURE — 86900 BLOOD TYPING SEROLOGIC ABO: CPT

## 2019-05-02 PROCEDURE — 36415 COLL VENOUS BLD VENIPUNCTURE: CPT

## 2019-05-02 PROCEDURE — 80053 COMPREHEN METABOLIC PANEL: CPT

## 2019-05-02 PROCEDURE — 85027 COMPLETE CBC AUTOMATED: CPT

## 2019-05-02 RX ORDER — VARENICLINE TARTRATE 1 MG/1
1 TABLET, FILM COATED ORAL 2 TIMES DAILY
COMMUNITY
End: 2022-05-10

## 2019-05-03 NOTE — PERIOP NOTES
Ms Donavon Jones  was here today for her PAT appointment. Health assessment was completed and instructions given regarding NPO status, medications, Hibiclens washes, and removal of all jewelry and/or body piercing. Instructed not to remove the red armband placed by phlebotomist. Haylie Rowan was given to ask questions and all questions were answered. Understanding of instructions was verbalized.

## 2019-05-08 NOTE — H&P
Pre-Admission History and Physical    Patient: Fany Amanda   MRN: [id number]   SSN: [id number]   YOB: 1975   Age: 53 y.o. Sex: female     Patient scheduled for: L4/5/S1 lami fusion. Date of surgery: 5/13/19. Location of surgery:  [facility]. Surgeon: Kofi Acevedo MD    HPI:  Fany Amanda is a 53 y.o. female with spasticity in her lower extremities. She is more or less wheelchair bound. She can walk short distances but then the spasticity takes hold and she cannot mobilize longer than that, made more difficult by her having severe low back pain. She has severe degenerative collapse at 5-1 with spinal stenosis at 4-5 which has given her this terrible back pain. Then on top of her spasticity and leg pain and numbness in combination it just makes ambulation difficult. [facility] MRI demonstratessevere degenerative collapse at 5-1 with spinal stenosis at 4-5. This patient has failed the presurgical conservative treatments  including physical therapy and medications. Pain has impacted the patient's functional ability to ambulate independently. She is being admitted for surgical intervention.          Past Medical History:   Diagnosis Date    Acute pain of left shoulder 11/1/2016    Bipolar 1 disorder ([address]     Bipolar affective disorder, currently manic, moderate ([address] 4/27/2016    Chronic midline low back pain with sciatica 11/1/2016    Cigarette nicotine dependence in remission 8/2/2016    Cigarette nicotine dependence without complication 27/71/3395    Continuous nicotine dependence 1/24/2017    Depression     Fibromyalgia 9/1/2015    HLD (hyperlipidemia) 2/4/2016    Hypertension     Hypertriglyceridemia 5/15/2018    Impaired fasting glucose 5/31/2016    Irritable bowel syndrome without diarrhea 4/27/2016    Moderate episode of recurrent major depressive disorder ([address] 4/27/2016    Obesity, Class III, BMI 40-49.9 (morbid obesity) ([address] 9/1/2015    Onychomycosis 4/27/2016    Pain of left thumb 11/1/2016    Prediabetes 7/5/2016    PTSD (post-traumatic stress disorder)     Smoker 9/1/2015    Vitamin D deficiency 5/31/2016     Social History     Socioeconomic History    Marital status: LEGALLY      Spouse name: Not on file    Number of children: Not on file    Years of education: Not on file    Highest education level: Not on file   Tobacco Use    Smoking status: Current Every Day Smoker     Packs/day: 1.00     Years: 25.00     Pack years: 25.00    Smokeless tobacco: Never Used   Substance and Sexual Activity    Alcohol use: Yes     Alcohol/week: 5.4 oz     Types: 9 Shots of liquor per week     Comment: socially    Drug use: No    Sexual activity: Yes     Partners: Male     Birth control/protection: None   Other Topics Concern     Past Surgical History:   Procedure Laterality Date    HX ACL RECONSTRUCTION      HX APPENDECTOMY      HX CERVICAL DISKECTOMY  02/07/2019    with fusion C5/6 C6/7    HX CERVICAL LAMINECTOMY  03/19/2019    with fusion    HX GYN Bilateral     tubal    HX ORTHOPAEDIC       Family History   Problem Relation Age of Onset    Hypertension Mother     Thyroid Disease Mother     No Known Problems Father     Psychiatric Disorder Sister     Psychiatric Disorder Brother     Psychiatric Disorder Sister     Diabetes Sister     Psychiatric Disorder Brother     Psychiatric Disorder Brother      No Known Allergies  Current Outpatient Medications   Medication Sig Dispense Refill    varenicline (CHANTIX) 1 mg tablet Take 1 mg by mouth two (2) times a day.  hydrOXYzine HCl (ATARAX) 25 mg tablet Take 25 mg by mouth three (3) times daily as needed for Anxiety.  lamoTRIgine (LAMICTAL) 200 mg tablet Take 200 mg by mouth every evening.  losartan (COZAAR) 50 mg tablet Take 75 mg by mouth daily.  DULoxetine (CYMBALTA) 60 mg capsule Take 60 mg by mouth two (2) times a day.       gabapentin (NEURONTIN) 300 mg capsule Take 2 Caps by mouth three (3) times daily. 180 Cap 2    lidocaine (LIDODERM) 5 % APPLY 1 PATCH TO THE AFFECTED AREA EVERY DAY. LEAVE ON FOR 12 HOURS THEN REMOVE FOR 12 HOURS 90 Patch 3    polyethylene glycol (MIRALAX) 17 gram/dose powder MIX AND DRINK 17 GRAMS BY MOUTH DAILY 1530 g 3    hydroCHLOROthiazide (HYDRODIURIL) 25 mg tablet Take 1 Tab by mouth daily. 90 Tab 3    lamoTRIgine (LAMICTAL) 100 mg tablet Take 100 mg by mouth daily.  buPROPion SR (WELLBUTRIN, ZYBAN) 200 mg SR tablet 200 mg two (2) times a day. 1    docusate sodium (STOOL SOFTENER PO) Take 100 mg by mouth daily.  traZODone (DESYREL) 100 mg tablet 100 mg every evening. 1       ROS:  Denies chills, fever,night sweats,  bowel or bladder dysfunction, unexplained weight loss/weight gain, chest pain, sob or anxiety. Physical Examination    Gen: Well developed, well nourished 53 y.o. female CONSTITUTIONAL: The patient is in no apparent distress and is alert and oriented x 3. HEENT: Normocephalic. Hearing grossly intact. NECK: Supple and symmetric. no tenderness, or masses were felt. RESPIRATORY: No labored breathing. CARDIOVASCULAR: The carotid pulses were normal. Peripheral pulses were 2+. CHEST: Normal AP diameter and normal contour without any kyphoscoliosis. LYMPHATIC: No lymphadenopathy was appreciated in the neck, axillae or groin. SKIN:  Incision healing well, no drainage, no erythema, no hernia, no seroma, no swelling, no dehiscence, incision well approximated. Negative for scars, rashes, lesions, or ulcers on the right upper, right lower, left upper, left lower and trunk. NEUROLOGICAL: Alert and oriented x 3. Presents in wheelchair. EXTREMITIES: See musculoskeletal.  MUSCULOSKELETAL:  · Head and Neck:  Negative for misalignment, asymmetry, crepitation, defects, tenderness masses or effusions. · Left Upper Extremity: Inspection, percussion and palpation performed. Dela Cruzs sign is negative.   · Right Upper Extremity: Inspection, percussion and palpation performed. Dela Cruzs sign is negative. · Spine, Ribs and Pelvis: Low back pain. Inspection, percussion and palpation performed. Negative for misalignment, asymmetry, crepitation, defects, tenderness masses or effusions. · Left Lower Extremity: Hyperreflexic. 3+/5 strength. Numbness. Inspection, percussion and palpation performed. Negative straight leg raise. · Right Lower Extremity: Hyperreflexic. 3+/5 strength. Numbness. Inspection, percussion and palpation performed. Negative straight leg raise.           SPINE EXAM:      Cervical spine: Neck is midline. Normal muscle tone. No focal atrophy is noted.     Lumbar spine: No rash, ecchymosis, or gross obliquity. No focal atrophy is noted.            Assessment and Plan    Due to the pt's persistent symptoms unrelieved by conservative measure Donavon Jones is being admitted to [facility] to undergo surgical intervention. The post-operative plan of care consists of physical therapy, home health and a 2 week f/u office visit. We are pending medical clearance by Dr. Leopold Rouse. The risks, benefits, complications and alternatives to surgery have been discussed in detail with the patient. The patient understands and agrees to proceed.      Terrance Chilel NP-C dictating for Wilber Villarreal MD

## 2019-05-09 DIAGNOSIS — I10 ESSENTIAL HYPERTENSION: ICD-10-CM

## 2019-05-09 RX ORDER — LOSARTAN POTASSIUM 50 MG/1
TABLET ORAL
Qty: 90 TAB | Refills: 0 | OUTPATIENT
Start: 2019-05-09

## 2019-05-12 ENCOUNTER — ANESTHESIA EVENT (OUTPATIENT)
Dept: SURGERY | Age: 44
End: 2019-05-12
Payer: MEDICAID

## 2019-05-13 ENCOUNTER — ANESTHESIA (OUTPATIENT)
Dept: SURGERY | Age: 44
End: 2019-05-13
Payer: MEDICAID

## 2019-05-13 ENCOUNTER — APPOINTMENT (OUTPATIENT)
Dept: GENERAL RADIOLOGY | Age: 44
End: 2019-05-13
Attending: ORTHOPAEDIC SURGERY
Payer: MEDICAID

## 2019-05-13 ENCOUNTER — HOSPITAL ENCOUNTER (OUTPATIENT)
Age: 44
Setting detail: OBSERVATION
Discharge: HOME | End: 2019-05-15
Attending: ORTHOPAEDIC SURGERY | Admitting: ORTHOPAEDIC SURGERY
Payer: MEDICAID

## 2019-05-13 DIAGNOSIS — Z98.1 S/P LUMBAR FUSION: Primary | ICD-10-CM

## 2019-05-13 PROBLEM — M48.061 LUMBAR SPINAL STENOSIS: Status: ACTIVE | Noted: 2019-05-13

## 2019-05-13 LAB — HCG UR QL: NEGATIVE

## 2019-05-13 PROCEDURE — 81025 URINE PREGNANCY TEST: CPT

## 2019-05-13 PROCEDURE — 77030002933 HC SUT MCRYL J&J -A: Performed by: ORTHOPAEDIC SURGERY

## 2019-05-13 PROCEDURE — 77030034475 HC MISC IMPL SPN: Performed by: ORTHOPAEDIC SURGERY

## 2019-05-13 PROCEDURE — 76210000017 HC OR PH I REC 1.5 TO 2 HR: Performed by: ORTHOPAEDIC SURGERY

## 2019-05-13 PROCEDURE — 74011250636 HC RX REV CODE- 250/636: Performed by: NURSE ANESTHETIST, CERTIFIED REGISTERED

## 2019-05-13 PROCEDURE — 77030020782 HC GWN BAIR PAWS FLX 3M -B: Performed by: ORTHOPAEDIC SURGERY

## 2019-05-13 PROCEDURE — 74011250636 HC RX REV CODE- 250/636

## 2019-05-13 PROCEDURE — C1713 ANCHOR/SCREW BN/BN,TIS/BN: HCPCS | Performed by: ORTHOPAEDIC SURGERY

## 2019-05-13 PROCEDURE — 77030019938 HC TBNG IV PCA ICUM -A: Performed by: ORTHOPAEDIC SURGERY

## 2019-05-13 PROCEDURE — 99218 HC RM OBSERVATION: CPT

## 2019-05-13 PROCEDURE — 77030012406 HC DRN WND PENRS BARD -A: Performed by: ORTHOPAEDIC SURGERY

## 2019-05-13 PROCEDURE — 76060000037 HC ANESTHESIA 3 TO 3.5 HR: Performed by: ORTHOPAEDIC SURGERY

## 2019-05-13 PROCEDURE — 77030013567 HC DRN WND RESERV BARD -A: Performed by: ORTHOPAEDIC SURGERY

## 2019-05-13 PROCEDURE — 77030035236 HC SUT PDS STRATFX BARB J&J -B: Performed by: ORTHOPAEDIC SURGERY

## 2019-05-13 PROCEDURE — 74011250636 HC RX REV CODE- 250/636: Performed by: ORTHOPAEDIC SURGERY

## 2019-05-13 PROCEDURE — 74011000250 HC RX REV CODE- 250

## 2019-05-13 PROCEDURE — 77030033138 HC SUT PGA STRATFX J&J -B: Performed by: ORTHOPAEDIC SURGERY

## 2019-05-13 PROCEDURE — 77030034850: Performed by: ORTHOPAEDIC SURGERY

## 2019-05-13 PROCEDURE — 74011250636 HC RX REV CODE- 250/636: Performed by: NURSE PRACTITIONER

## 2019-05-13 PROCEDURE — 74011000250 HC RX REV CODE- 250: Performed by: ORTHOPAEDIC SURGERY

## 2019-05-13 PROCEDURE — 74011000272 HC RX REV CODE- 272: Performed by: ORTHOPAEDIC SURGERY

## 2019-05-13 PROCEDURE — 77030032490 HC SLV COMPR SCD KNE COVD -B: Performed by: ORTHOPAEDIC SURGERY

## 2019-05-13 PROCEDURE — 74011250637 HC RX REV CODE- 250/637: Performed by: NURSE ANESTHETIST, CERTIFIED REGISTERED

## 2019-05-13 PROCEDURE — 77030026188 HC BN CANC CHP CRSH PR LIFV -E: Performed by: ORTHOPAEDIC SURGERY

## 2019-05-13 PROCEDURE — C1763 CONN TISS, NON-HUMAN: HCPCS | Performed by: ORTHOPAEDIC SURGERY

## 2019-05-13 PROCEDURE — 77030027138 HC INCENT SPIROMETER -A: Performed by: ORTHOPAEDIC SURGERY

## 2019-05-13 PROCEDURE — 76010000173 HC OR TIME 3 TO 3.5 HR INTENSV-TIER 1: Performed by: ORTHOPAEDIC SURGERY

## 2019-05-13 PROCEDURE — 77030030409 HC DIL SPN KT M5 DISP NUVA -G: Performed by: ORTHOPAEDIC SURGERY

## 2019-05-13 PROCEDURE — 77030012890: Performed by: ORTHOPAEDIC SURGERY

## 2019-05-13 PROCEDURE — 77030018836 HC SOL IRR NACL ICUM -A: Performed by: ORTHOPAEDIC SURGERY

## 2019-05-13 PROCEDURE — 77030003029 HC SUT VCRL J&J -B: Performed by: ORTHOPAEDIC SURGERY

## 2019-05-13 DEVICE — GRAFT BONE SUB 5ML PUTTY CA PHOS SYNTH GRAN BIOABSRB ATTRAX: Type: IMPLANTABLE DEVICE | Site: SPINE LUMBAR | Status: FUNCTIONAL

## 2019-05-13 DEVICE — ROD SPNL L65MM DIA55MM POST THORACOLUMBOSACRAL TI LORD: Type: IMPLANTABLE DEVICE | Site: SPINE LUMBAR | Status: FUNCTIONAL

## 2019-05-13 DEVICE — SCREW SPNL DIA5.5MM OPN TULIP LOK RELINE: Type: IMPLANTABLE DEVICE | Site: SPINE LUMBAR | Status: FUNCTIONAL

## 2019-05-13 DEVICE — BONE CHIP CANC CRSH 1-8MM 30ML --: Type: IMPLANTABLE DEVICE | Site: SPINE LUMBAR | Status: FUNCTIONAL

## 2019-05-13 RX ORDER — MIDAZOLAM HYDROCHLORIDE 1 MG/ML
INJECTION, SOLUTION INTRAMUSCULAR; INTRAVENOUS AS NEEDED
Status: DISCONTINUED | OUTPATIENT
Start: 2019-05-13 | End: 2019-05-13 | Stop reason: HOSPADM

## 2019-05-13 RX ORDER — SODIUM CHLORIDE 0.9 % (FLUSH) 0.9 %
5-40 SYRINGE (ML) INJECTION AS NEEDED
Status: DISCONTINUED | OUTPATIENT
Start: 2019-05-13 | End: 2019-05-15 | Stop reason: HOSPADM

## 2019-05-13 RX ORDER — HYDROCHLOROTHIAZIDE 25 MG/1
25 TABLET ORAL DAILY
Status: DISCONTINUED | OUTPATIENT
Start: 2019-05-14 | End: 2019-05-15 | Stop reason: HOSPADM

## 2019-05-13 RX ORDER — LAMOTRIGINE 100 MG/1
200 TABLET ORAL EVERY EVENING
Status: DISCONTINUED | OUTPATIENT
Start: 2019-05-14 | End: 2019-05-15 | Stop reason: HOSPADM

## 2019-05-13 RX ORDER — SODIUM CHLORIDE 0.9 % (FLUSH) 0.9 %
5-40 SYRINGE (ML) INJECTION EVERY 8 HOURS
Status: DISCONTINUED | OUTPATIENT
Start: 2019-05-14 | End: 2019-05-14 | Stop reason: SDUPTHER

## 2019-05-13 RX ORDER — LIDOCAINE HYDROCHLORIDE 10 MG/ML
0.1 INJECTION, SOLUTION EPIDURAL; INFILTRATION; INTRACAUDAL; PERINEURAL AS NEEDED
Status: DISCONTINUED | OUTPATIENT
Start: 2019-05-13 | End: 2019-05-13 | Stop reason: HOSPADM

## 2019-05-13 RX ORDER — SODIUM CHLORIDE, SODIUM LACTATE, POTASSIUM CHLORIDE, CALCIUM CHLORIDE 600; 310; 30; 20 MG/100ML; MG/100ML; MG/100ML; MG/100ML
50 INJECTION, SOLUTION INTRAVENOUS CONTINUOUS
Status: DISCONTINUED | OUTPATIENT
Start: 2019-05-13 | End: 2019-05-13 | Stop reason: HOSPADM

## 2019-05-13 RX ORDER — ONDANSETRON 2 MG/ML
4 INJECTION INTRAMUSCULAR; INTRAVENOUS ONCE
Status: DISCONTINUED | OUTPATIENT
Start: 2019-05-13 | End: 2019-05-13 | Stop reason: HOSPADM

## 2019-05-13 RX ORDER — LIDOCAINE HYDROCHLORIDE 20 MG/ML
INJECTION, SOLUTION EPIDURAL; INFILTRATION; INTRACAUDAL; PERINEURAL AS NEEDED
Status: DISCONTINUED | OUTPATIENT
Start: 2019-05-13 | End: 2019-05-13 | Stop reason: HOSPADM

## 2019-05-13 RX ORDER — SODIUM CHLORIDE 9 MG/ML
100 INJECTION, SOLUTION INTRAVENOUS CONTINUOUS
Status: DISCONTINUED | OUTPATIENT
Start: 2019-05-14 | End: 2019-05-14

## 2019-05-13 RX ORDER — SODIUM CHLORIDE 0.9 % (FLUSH) 0.9 %
5-40 SYRINGE (ML) INJECTION EVERY 8 HOURS
Status: DISCONTINUED | OUTPATIENT
Start: 2019-05-14 | End: 2019-05-15 | Stop reason: HOSPADM

## 2019-05-13 RX ORDER — PROPOFOL 10 MG/ML
VIAL (ML) INTRAVENOUS
Status: DISCONTINUED | OUTPATIENT
Start: 2019-05-13 | End: 2019-05-13 | Stop reason: HOSPADM

## 2019-05-13 RX ORDER — MIDAZOLAM HYDROCHLORIDE 1 MG/ML
INJECTION, SOLUTION INTRAMUSCULAR; INTRAVENOUS
Status: COMPLETED
Start: 2019-05-13 | End: 2019-05-13

## 2019-05-13 RX ORDER — DEXAMETHASONE SODIUM PHOSPHATE 4 MG/ML
INJECTION, SOLUTION INTRA-ARTICULAR; INTRALESIONAL; INTRAMUSCULAR; INTRAVENOUS; SOFT TISSUE AS NEEDED
Status: DISCONTINUED | OUTPATIENT
Start: 2019-05-13 | End: 2019-05-13 | Stop reason: HOSPADM

## 2019-05-13 RX ORDER — BISACODYL 5 MG
10 TABLET, DELAYED RELEASE (ENTERIC COATED) ORAL DAILY
Status: DISCONTINUED | OUTPATIENT
Start: 2019-05-14 | End: 2019-05-15 | Stop reason: HOSPADM

## 2019-05-13 RX ORDER — DULOXETIN HYDROCHLORIDE 60 MG/1
60 CAPSULE, DELAYED RELEASE ORAL 2 TIMES DAILY
Status: DISCONTINUED | OUTPATIENT
Start: 2019-05-14 | End: 2019-05-15 | Stop reason: HOSPADM

## 2019-05-13 RX ORDER — MORPHINE SULFATE 5 MG/ML
INJECTION, SOLUTION INTRAVENOUS
Status: DISCONTINUED | OUTPATIENT
Start: 2019-05-13 | End: 2019-05-14

## 2019-05-13 RX ORDER — HYDROXYZINE 25 MG/1
25 TABLET, FILM COATED ORAL
Status: DISCONTINUED | OUTPATIENT
Start: 2019-05-13 | End: 2019-05-15 | Stop reason: HOSPADM

## 2019-05-13 RX ORDER — ONDANSETRON 2 MG/ML
INJECTION INTRAMUSCULAR; INTRAVENOUS AS NEEDED
Status: DISCONTINUED | OUTPATIENT
Start: 2019-05-13 | End: 2019-05-13 | Stop reason: HOSPADM

## 2019-05-13 RX ORDER — PREGABALIN 75 MG/1
75 CAPSULE ORAL ONCE
Status: COMPLETED | OUTPATIENT
Start: 2019-05-13 | End: 2019-05-13

## 2019-05-13 RX ORDER — HYDROMORPHONE HYDROCHLORIDE 2 MG/ML
0.5 INJECTION, SOLUTION INTRAMUSCULAR; INTRAVENOUS; SUBCUTANEOUS
Status: DISCONTINUED | OUTPATIENT
Start: 2019-05-13 | End: 2019-05-13 | Stop reason: HOSPADM

## 2019-05-13 RX ORDER — HYDROMORPHONE HYDROCHLORIDE 2 MG/ML
INJECTION, SOLUTION INTRAMUSCULAR; INTRAVENOUS; SUBCUTANEOUS AS NEEDED
Status: DISCONTINUED | OUTPATIENT
Start: 2019-05-13 | End: 2019-05-13 | Stop reason: HOSPADM

## 2019-05-13 RX ORDER — TRAZODONE HYDROCHLORIDE 100 MG/1
100 TABLET ORAL
Status: DISCONTINUED | OUTPATIENT
Start: 2019-05-14 | End: 2019-05-15 | Stop reason: HOSPADM

## 2019-05-13 RX ORDER — FAMOTIDINE 20 MG/1
20 TABLET, FILM COATED ORAL ONCE
Status: COMPLETED | OUTPATIENT
Start: 2019-05-13 | End: 2019-05-13

## 2019-05-13 RX ORDER — NALOXONE HYDROCHLORIDE 0.4 MG/ML
0.04 INJECTION, SOLUTION INTRAMUSCULAR; INTRAVENOUS; SUBCUTANEOUS AS NEEDED
Status: DISCONTINUED | OUTPATIENT
Start: 2019-05-13 | End: 2019-05-13 | Stop reason: HOSPADM

## 2019-05-13 RX ORDER — SODIUM CHLORIDE 0.9 % (FLUSH) 0.9 %
5-40 SYRINGE (ML) INJECTION AS NEEDED
Status: DISCONTINUED | OUTPATIENT
Start: 2019-05-13 | End: 2019-05-14 | Stop reason: SDUPTHER

## 2019-05-13 RX ORDER — FENTANYL CITRATE 50 UG/ML
INJECTION, SOLUTION INTRAMUSCULAR; INTRAVENOUS AS NEEDED
Status: DISCONTINUED | OUTPATIENT
Start: 2019-05-13 | End: 2019-05-13 | Stop reason: HOSPADM

## 2019-05-13 RX ORDER — HYDROMORPHONE HYDROCHLORIDE 1 MG/ML
1 INJECTION, SOLUTION INTRAMUSCULAR; INTRAVENOUS; SUBCUTANEOUS
Status: DISCONTINUED | OUTPATIENT
Start: 2019-05-13 | End: 2019-05-15 | Stop reason: HOSPADM

## 2019-05-13 RX ORDER — DIPHENHYDRAMINE HYDROCHLORIDE 50 MG/ML
12.5 INJECTION, SOLUTION INTRAMUSCULAR; INTRAVENOUS
Status: DISCONTINUED | OUTPATIENT
Start: 2019-05-13 | End: 2019-05-13 | Stop reason: HOSPADM

## 2019-05-13 RX ORDER — PROPOFOL 10 MG/ML
INJECTION, EMULSION INTRAVENOUS AS NEEDED
Status: DISCONTINUED | OUTPATIENT
Start: 2019-05-13 | End: 2019-05-13 | Stop reason: HOSPADM

## 2019-05-13 RX ORDER — BUPROPION HYDROCHLORIDE 100 MG/1
200 TABLET, EXTENDED RELEASE ORAL 2 TIMES DAILY
Status: DISCONTINUED | OUTPATIENT
Start: 2019-05-14 | End: 2019-05-15 | Stop reason: HOSPADM

## 2019-05-13 RX ORDER — DIPHENHYDRAMINE HYDROCHLORIDE 50 MG/ML
12.5 INJECTION, SOLUTION INTRAMUSCULAR; INTRAVENOUS
Status: DISCONTINUED | OUTPATIENT
Start: 2019-05-13 | End: 2019-05-15 | Stop reason: HOSPADM

## 2019-05-13 RX ORDER — ONDANSETRON 2 MG/ML
4 INJECTION INTRAMUSCULAR; INTRAVENOUS AS NEEDED
Status: DISCONTINUED | OUTPATIENT
Start: 2019-05-13 | End: 2019-05-14 | Stop reason: SDUPTHER

## 2019-05-13 RX ORDER — BUPIVACAINE HYDROCHLORIDE 5 MG/ML
INJECTION, SOLUTION EPIDURAL; INTRACAUDAL AS NEEDED
Status: DISCONTINUED | OUTPATIENT
Start: 2019-05-13 | End: 2019-05-13 | Stop reason: HOSPADM

## 2019-05-13 RX ORDER — DIAZEPAM 5 MG/1
5 TABLET ORAL
Status: DISCONTINUED | OUTPATIENT
Start: 2019-05-13 | End: 2019-05-15 | Stop reason: HOSPADM

## 2019-05-13 RX ORDER — SUCCINYLCHOLINE CHLORIDE 20 MG/ML
INJECTION INTRAMUSCULAR; INTRAVENOUS AS NEEDED
Status: DISCONTINUED | OUTPATIENT
Start: 2019-05-13 | End: 2019-05-13 | Stop reason: HOSPADM

## 2019-05-13 RX ORDER — DIPHENHYDRAMINE HYDROCHLORIDE 50 MG/ML
12.5 INJECTION, SOLUTION INTRAMUSCULAR; INTRAVENOUS
Status: DISCONTINUED | OUTPATIENT
Start: 2019-05-13 | End: 2019-05-14 | Stop reason: SDUPTHER

## 2019-05-13 RX ORDER — ACETAMINOPHEN 500 MG
1000 TABLET ORAL EVERY 6 HOURS
Status: DISCONTINUED | OUTPATIENT
Start: 2019-05-14 | End: 2019-05-15 | Stop reason: HOSPADM

## 2019-05-13 RX ORDER — VANCOMYCIN HYDROCHLORIDE 1 G/20ML
INJECTION, POWDER, LYOPHILIZED, FOR SOLUTION INTRAVENOUS AS NEEDED
Status: DISCONTINUED | OUTPATIENT
Start: 2019-05-13 | End: 2019-05-13 | Stop reason: HOSPADM

## 2019-05-13 RX ORDER — GLYCOPYRROLATE 0.2 MG/ML
INJECTION INTRAMUSCULAR; INTRAVENOUS AS NEEDED
Status: DISCONTINUED | OUTPATIENT
Start: 2019-05-13 | End: 2019-05-13 | Stop reason: HOSPADM

## 2019-05-13 RX ORDER — ONDANSETRON 2 MG/ML
4 INJECTION INTRAMUSCULAR; INTRAVENOUS
Status: DISCONTINUED | OUTPATIENT
Start: 2019-05-13 | End: 2019-05-15 | Stop reason: HOSPADM

## 2019-05-13 RX ORDER — MAGNESIUM SULFATE 100 %
4 CRYSTALS MISCELLANEOUS AS NEEDED
Status: DISCONTINUED | OUTPATIENT
Start: 2019-05-13 | End: 2019-05-13 | Stop reason: HOSPADM

## 2019-05-13 RX ORDER — ALBUTEROL SULFATE 0.83 MG/ML
2.5 SOLUTION RESPIRATORY (INHALATION) AS NEEDED
Status: DISCONTINUED | OUTPATIENT
Start: 2019-05-13 | End: 2019-05-13 | Stop reason: HOSPADM

## 2019-05-13 RX ORDER — GABAPENTIN 300 MG/1
600 CAPSULE ORAL 3 TIMES DAILY
Status: DISCONTINUED | OUTPATIENT
Start: 2019-05-14 | End: 2019-05-15 | Stop reason: HOSPADM

## 2019-05-13 RX ORDER — DOCUSATE SODIUM 100 MG/1
100 CAPSULE, LIQUID FILLED ORAL DAILY
Status: DISCONTINUED | OUTPATIENT
Start: 2019-05-14 | End: 2019-05-15 | Stop reason: HOSPADM

## 2019-05-13 RX ORDER — VARENICLINE TARTRATE 0.5 MG/1
1 TABLET, FILM COATED ORAL 2 TIMES DAILY
Status: DISCONTINUED | OUTPATIENT
Start: 2019-05-14 | End: 2019-05-15 | Stop reason: HOSPADM

## 2019-05-13 RX ORDER — LORAZEPAM 2 MG/ML
1 INJECTION INTRAMUSCULAR
Status: DISCONTINUED | OUTPATIENT
Start: 2019-05-13 | End: 2019-05-15 | Stop reason: HOSPADM

## 2019-05-13 RX ORDER — SODIUM CHLORIDE 0.9 % (FLUSH) 0.9 %
5-40 SYRINGE (ML) INJECTION EVERY 8 HOURS
Status: DISCONTINUED | OUTPATIENT
Start: 2019-05-13 | End: 2019-05-13 | Stop reason: HOSPADM

## 2019-05-13 RX ORDER — DEXTROSE 50 % IN WATER (D50W) INTRAVENOUS SYRINGE
25-50 AS NEEDED
Status: DISCONTINUED | OUTPATIENT
Start: 2019-05-13 | End: 2019-05-13 | Stop reason: HOSPADM

## 2019-05-13 RX ORDER — PHENYLEPHRINE HCL IN 0.9% NACL 1 MG/10 ML
SYRINGE (ML) INTRAVENOUS AS NEEDED
Status: DISCONTINUED | OUTPATIENT
Start: 2019-05-13 | End: 2019-05-13 | Stop reason: HOSPADM

## 2019-05-13 RX ORDER — POLYETHYLENE GLYCOL 3350 17 G/17G
17 POWDER, FOR SOLUTION ORAL DAILY
Status: DISCONTINUED | OUTPATIENT
Start: 2019-05-14 | End: 2019-05-15 | Stop reason: HOSPADM

## 2019-05-13 RX ORDER — KETOROLAC TROMETHAMINE 30 MG/ML
INJECTION, SOLUTION INTRAMUSCULAR; INTRAVENOUS AS NEEDED
Status: DISCONTINUED | OUTPATIENT
Start: 2019-05-13 | End: 2019-05-13 | Stop reason: HOSPADM

## 2019-05-13 RX ORDER — CELECOXIB 400 MG/1
400 CAPSULE ORAL ONCE
Status: COMPLETED | OUTPATIENT
Start: 2019-05-13 | End: 2019-05-13

## 2019-05-13 RX ORDER — SODIUM CHLORIDE 0.9 % (FLUSH) 0.9 %
5-40 SYRINGE (ML) INJECTION AS NEEDED
Status: DISCONTINUED | OUTPATIENT
Start: 2019-05-13 | End: 2019-05-13 | Stop reason: HOSPADM

## 2019-05-13 RX ORDER — OXYCODONE HYDROCHLORIDE 5 MG/1
5-10 TABLET ORAL
Status: DISCONTINUED | OUTPATIENT
Start: 2019-05-13 | End: 2019-05-15 | Stop reason: HOSPADM

## 2019-05-13 RX ORDER — NALOXONE HYDROCHLORIDE 0.4 MG/ML
0.4 INJECTION, SOLUTION INTRAMUSCULAR; INTRAVENOUS; SUBCUTANEOUS AS NEEDED
Status: DISCONTINUED | OUTPATIENT
Start: 2019-05-13 | End: 2019-05-15 | Stop reason: HOSPADM

## 2019-05-13 RX ORDER — LAMOTRIGINE 100 MG/1
100 TABLET ORAL DAILY
Status: DISCONTINUED | OUTPATIENT
Start: 2019-05-14 | End: 2019-05-15 | Stop reason: HOSPADM

## 2019-05-13 RX ORDER — INSULIN LISPRO 100 [IU]/ML
INJECTION, SOLUTION INTRAVENOUS; SUBCUTANEOUS ONCE
Status: DISCONTINUED | OUTPATIENT
Start: 2019-05-13 | End: 2019-05-13 | Stop reason: HOSPADM

## 2019-05-13 RX ORDER — NALOXONE HYDROCHLORIDE 0.4 MG/ML
0.1 INJECTION, SOLUTION INTRAMUSCULAR; INTRAVENOUS; SUBCUTANEOUS AS NEEDED
Status: DISCONTINUED | OUTPATIENT
Start: 2019-05-13 | End: 2019-05-14 | Stop reason: SDUPTHER

## 2019-05-13 RX ADMIN — GLYCOPYRROLATE 0.2 MG: 0.2 INJECTION INTRAMUSCULAR; INTRAVENOUS at 14:22

## 2019-05-13 RX ADMIN — LIDOCAINE HYDROCHLORIDE 100 MG: 20 INJECTION, SOLUTION EPIDURAL; INFILTRATION; INTRACAUDAL; PERINEURAL at 14:31

## 2019-05-13 RX ADMIN — SUCCINYLCHOLINE CHLORIDE 140 MG: 20 INJECTION INTRAMUSCULAR; INTRAVENOUS at 14:31

## 2019-05-13 RX ADMIN — HYDROMORPHONE HYDROCHLORIDE 1 MG: 2 INJECTION, SOLUTION INTRAMUSCULAR; INTRAVENOUS; SUBCUTANEOUS at 17:46

## 2019-05-13 RX ADMIN — FENTANYL CITRATE 50 MCG: 50 INJECTION, SOLUTION INTRAMUSCULAR; INTRAVENOUS at 14:31

## 2019-05-13 RX ADMIN — FAMOTIDINE 20 MG: 20 TABLET ORAL at 11:28

## 2019-05-13 RX ADMIN — PREGABALIN 75 MG: 75 CAPSULE ORAL at 11:28

## 2019-05-13 RX ADMIN — SODIUM CHLORIDE, SODIUM LACTATE, POTASSIUM CHLORIDE, AND CALCIUM CHLORIDE: 600; 310; 30; 20 INJECTION, SOLUTION INTRAVENOUS at 15:30

## 2019-05-13 RX ADMIN — KETOROLAC TROMETHAMINE 30 MG: 30 INJECTION, SOLUTION INTRAMUSCULAR; INTRAVENOUS at 17:39

## 2019-05-13 RX ADMIN — SODIUM CHLORIDE, SODIUM LACTATE, POTASSIUM CHLORIDE, AND CALCIUM CHLORIDE: 600; 310; 30; 20 INJECTION, SOLUTION INTRAVENOUS at 14:22

## 2019-05-13 RX ADMIN — CELECOXIB 400 MG: 400 CAPSULE ORAL at 11:28

## 2019-05-13 RX ADMIN — Medication 150 MCG/KG/MIN: at 14:54

## 2019-05-13 RX ADMIN — SODIUM CHLORIDE, SODIUM LACTATE, POTASSIUM CHLORIDE, AND CALCIUM CHLORIDE 50 ML/HR: 600; 310; 30; 20 INJECTION, SOLUTION INTRAVENOUS at 11:28

## 2019-05-13 RX ADMIN — FENTANYL CITRATE 50 MCG: 50 INJECTION, SOLUTION INTRAMUSCULAR; INTRAVENOUS at 17:01

## 2019-05-13 RX ADMIN — HYDROMORPHONE HYDROCHLORIDE 1 MG: 2 INJECTION, SOLUTION INTRAMUSCULAR; INTRAVENOUS; SUBCUTANEOUS at 17:53

## 2019-05-13 RX ADMIN — MIDAZOLAM HYDROCHLORIDE 2 MG: 1 INJECTION, SOLUTION INTRAMUSCULAR; INTRAVENOUS at 14:22

## 2019-05-13 RX ADMIN — DEXAMETHASONE SODIUM PHOSPHATE 4 MG: 4 INJECTION, SOLUTION INTRA-ARTICULAR; INTRALESIONAL; INTRAMUSCULAR; INTRAVENOUS; SOFT TISSUE at 14:31

## 2019-05-13 RX ADMIN — ONDANSETRON 4 MG: 2 INJECTION INTRAMUSCULAR; INTRAVENOUS at 14:22

## 2019-05-13 RX ADMIN — Medication 100 MCG: at 15:26

## 2019-05-13 RX ADMIN — PROPOFOL 200 MG: 10 INJECTION, EMULSION INTRAVENOUS at 14:31

## 2019-05-13 RX ADMIN — CEFAZOLIN SODIUM IN SODIUM CHLORIDE 0.9% IV SOLN 3 GM/100ML 3 G: 3-0.9/1 SOLUTION at 14:22

## 2019-05-13 RX ADMIN — FENTANYL CITRATE 50 MCG: 50 INJECTION, SOLUTION INTRAMUSCULAR; INTRAVENOUS at 16:15

## 2019-05-13 RX ADMIN — FENTANYL CITRATE 50 MCG: 50 INJECTION, SOLUTION INTRAMUSCULAR; INTRAVENOUS at 14:37

## 2019-05-13 RX ADMIN — MORPHINE SULFATE: 10 INJECTION, SOLUTION INTRAMUSCULAR; INTRAVENOUS at 19:23

## 2019-05-13 NOTE — ANESTHESIA PREPROCEDURE EVALUATION
Relevant Problems No relevant active problems Anesthetic History No history of anesthetic complications Review of Systems / Medical History Patient summary reviewed and pertinent labs reviewed Pulmonary Smoker Neuro/Psych Within defined limits Cardiovascular Hypertension: well controlled Exercise tolerance: >4 METS 
  
GI/Hepatic/Renal 
Within defined limits Endo/Other Morbid obesity Other Findings Comments: PTSD<Fibromyalgia Physical Exam 
 
Airway Mallampati: II 
TM Distance: 4 - 6 cm Neck ROM: normal range of motion Mouth opening: Normal 
 
 Cardiovascular Regular rate and rhythm,  S1 and S2 normal,  no murmur, click, rub, or gallop Dental 
No notable dental hx Pulmonary Breath sounds clear to auscultation Abdominal 
GI exam deferred Other Findings Anesthetic Plan ASA: 3 Anesthesia type: general 
 
 
 
 
Induction: Intravenous Anesthetic plan and risks discussed with: Patient

## 2019-05-13 NOTE — ANESTHESIA POSTPROCEDURE EVALUATION
Procedure(s): 
L4/5/S1 LAMINECTOMY FUSION/C-ARM/NUVASIVE. general 
 
Anesthesia Post Evaluation Multimodal analgesia: multimodal analgesia used between 6 hours prior to anesthesia start to PACU discharge Patient location during evaluation: bedside Patient participation: complete - patient participated Level of consciousness: awake Pain score: 0 Pain management: adequate Airway patency: patent Anesthetic complications: no 
Cardiovascular status: stable Respiratory status: acceptable Hydration status: acceptable Post anesthesia nausea and vomiting:  controlled Vitals Value Taken Time /87 5/13/2019  7:22 PM  
Temp 36.8 °C (98.2 °F) 5/13/2019  5:53 PM  
Pulse 93 5/13/2019  7:29 PM  
Resp 9 5/13/2019  7:29 PM  
SpO2 97 % 5/13/2019  7:29 PM  
Vitals shown include unvalidated device data.

## 2019-05-13 NOTE — BRIEF OP NOTE
BRIEF OPERATIVE NOTE    Date of Procedure: 5/13/2019   Preoperative Diagnosis: Spinal stenosis, lumbar region with neurogenic claudication [M48.062]  Postoperative Diagnosis: Spinal stenosis, lumbar region with neurogenic claudication [M48.062]    Procedure(s):  L4/5/S1 LAMINECTOMY FUSION/C-ARM/NUVASIVE  Surgeon(s) and Role:     Edda Powers MD - Primary         Surgical Assistant: 0    Surgical Staff:  Circ-1: Edison Good RN  Radiology Technician: Phil David  Scrub Tech-1: Minerva Bee  Surg Asst-1: Mabeline Camel  Event Time In Time Out   Incision Start 1506    Incision Close       Anesthesia: General   Estimated Blood Loss: 500  Specimens: * No specimens in log *   Findings: stenosis   Complications: 0  Implants:   Implant Name Type Inv.  Item Serial No.  Lot No. LRB No. Used Action   BONE CHIP CANC [address] 1-8MM 30ML --  - [id number]  BONE CHIP CANC [address] 1-8MM 30ML --  [id number] [facility] TISSUE [facility]  N/A 1 Implanted   GRAFT PTTY BNE SYNTH 5CC -- ATTRAX - [id number]  GRAFT PTTY BNE SYNTH 5CC -- ATTRAX  NUVASIVE [id number] N/A 1 Implanted   GRAFT PTTY BNE SYNTH 5CC -- ATTRAX - [id number]  GRAFT PTTY BNE SYNTH 5CC -- ATTRAX [id number] NUVASIVE  N/A 1 Implanted   SCR LCK OPEN TULIP 5.5MM -- RELINE - [id number]  SCR LCK OPEN TULIP 5.5MM -- RELINE  NUVASIVE N/A N/A 6 Implanted   screw 6.5x50    NUVASIVE N/A N/A 2 Implanted   screw 6.5 x 45    NUVASIVE N/A N/A 4 Implanted   MARY SPNE LORDTC 5.5X65MM TI -- RELINE-O - [id number]  MARY SPNE LORDTC 5.5X65MM TI -- RELINE-O  NUVASIVE N/A N/A 2 Implanted

## 2019-05-13 NOTE — INTERVAL H&P NOTE
H&P Update: Jc Yip was seen and examined. History and physical has been reviewed. The patient has been examined.  There have been no significant clinical changes since the completion of the originally dated History and Physical.

## 2019-05-14 LAB
ERYTHROCYTE [DISTWIDTH] IN BLOOD BY AUTOMATED COUNT: 13.4 % (ref 11.6–14.5)
HCT VFR BLD AUTO: 33.1 % (ref 35–45)
HGB BLD-MCNC: 10.6 G/DL (ref 12–16)
MCH RBC QN AUTO: 29.7 PG (ref 24–34)
MCHC RBC AUTO-ENTMCNC: 32 G/DL (ref 31–37)
MCV RBC AUTO: 92.7 FL (ref 74–97)
PLATELET # BLD AUTO: 224 K/UL (ref 135–420)
PMV BLD AUTO: 9.8 FL (ref 9.2–11.8)
RBC # BLD AUTO: 3.57 M/UL (ref 4.2–5.3)
WBC # BLD AUTO: 12.6 K/UL (ref 4.6–13.2)

## 2019-05-14 PROCEDURE — 97535 SELF CARE MNGMENT TRAINING: CPT

## 2019-05-14 PROCEDURE — 97116 GAIT TRAINING THERAPY: CPT

## 2019-05-14 PROCEDURE — 36415 COLL VENOUS BLD VENIPUNCTURE: CPT

## 2019-05-14 PROCEDURE — 97161 PT EVAL LOW COMPLEX 20 MIN: CPT

## 2019-05-14 PROCEDURE — 99218 HC RM OBSERVATION: CPT

## 2019-05-14 PROCEDURE — 74011250637 HC RX REV CODE- 250/637: Performed by: ORTHOPAEDIC SURGERY

## 2019-05-14 PROCEDURE — 74011250636 HC RX REV CODE- 250/636: Performed by: ORTHOPAEDIC SURGERY

## 2019-05-14 PROCEDURE — 97165 OT EVAL LOW COMPLEX 30 MIN: CPT

## 2019-05-14 PROCEDURE — 85027 COMPLETE CBC AUTOMATED: CPT

## 2019-05-14 RX ADMIN — ACETAMINOPHEN 1000 MG: 500 TABLET ORAL at 11:27

## 2019-05-14 RX ADMIN — GABAPENTIN 600 MG: 300 CAPSULE ORAL at 08:17

## 2019-05-14 RX ADMIN — ACETAMINOPHEN 1000 MG: 500 TABLET ORAL at 06:01

## 2019-05-14 RX ADMIN — DULOXETINE HYDROCHLORIDE 60 MG: 60 CAPSULE, DELAYED RELEASE ORAL at 09:00

## 2019-05-14 RX ADMIN — POLYETHYLENE GLYCOL 3350 17 G: 17 POWDER, FOR SOLUTION ORAL at 08:18

## 2019-05-14 RX ADMIN — VARENICLINE TARTRATE 1 MG: 0.5 TABLET, FILM COATED ORAL at 18:51

## 2019-05-14 RX ADMIN — LOSARTAN POTASSIUM 75 MG: 25 TABLET ORAL at 08:17

## 2019-05-14 RX ADMIN — VARENICLINE TARTRATE 1 MG: 0.5 TABLET, FILM COATED ORAL at 09:00

## 2019-05-14 RX ADMIN — BUPROPION HYDROCHLORIDE 200 MG: 100 TABLET, FILM COATED, EXTENDED RELEASE ORAL at 18:42

## 2019-05-14 RX ADMIN — OXYCODONE HYDROCHLORIDE 10 MG: 5 TABLET ORAL at 13:50

## 2019-05-14 RX ADMIN — ACETAMINOPHEN 1000 MG: 500 TABLET ORAL at 00:37

## 2019-05-14 RX ADMIN — CEFAZOLIN 3 G: 1 INJECTION, POWDER, FOR SOLUTION INTRAMUSCULAR; INTRAVENOUS; PARENTERAL at 20:24

## 2019-05-14 RX ADMIN — TRAZODONE HYDROCHLORIDE 100 MG: 100 TABLET ORAL at 00:27

## 2019-05-14 RX ADMIN — HYDROMORPHONE HYDROCHLORIDE 1 MG: 1 INJECTION, SOLUTION INTRAMUSCULAR; INTRAVENOUS; SUBCUTANEOUS at 15:54

## 2019-05-14 RX ADMIN — Medication 10 ML: at 23:18

## 2019-05-14 RX ADMIN — OXYCODONE HYDROCHLORIDE 10 MG: 5 TABLET ORAL at 23:14

## 2019-05-14 RX ADMIN — CEFAZOLIN 3 G: 1 INJECTION, POWDER, FOR SOLUTION INTRAMUSCULAR; INTRAVENOUS; PARENTERAL at 11:35

## 2019-05-14 RX ADMIN — DULOXETINE HYDROCHLORIDE 60 MG: 60 CAPSULE, DELAYED RELEASE ORAL at 18:40

## 2019-05-14 RX ADMIN — GABAPENTIN 600 MG: 300 CAPSULE ORAL at 23:15

## 2019-05-14 RX ADMIN — CEFAZOLIN 3 G: 1 INJECTION, POWDER, FOR SOLUTION INTRAMUSCULAR; INTRAVENOUS; PARENTERAL at 01:30

## 2019-05-14 RX ADMIN — LAMOTRIGINE 200 MG: 100 TABLET ORAL at 18:41

## 2019-05-14 RX ADMIN — SODIUM CHLORIDE 100 ML/HR: 900 INJECTION, SOLUTION INTRAVENOUS at 00:30

## 2019-05-14 RX ADMIN — HYDROCHLOROTHIAZIDE 25 MG: 25 TABLET ORAL at 08:17

## 2019-05-14 RX ADMIN — DOCUSATE SODIUM 100 MG: 100 CAPSULE, LIQUID FILLED ORAL at 08:17

## 2019-05-14 RX ADMIN — LAMOTRIGINE 100 MG: 100 TABLET ORAL at 08:17

## 2019-05-14 RX ADMIN — GABAPENTIN 600 MG: 300 CAPSULE ORAL at 00:26

## 2019-05-14 RX ADMIN — OXYCODONE HYDROCHLORIDE 10 MG: 5 TABLET ORAL at 18:40

## 2019-05-14 RX ADMIN — HYDROMORPHONE HYDROCHLORIDE 1 MG: 1 INJECTION, SOLUTION INTRAMUSCULAR; INTRAVENOUS; SUBCUTANEOUS at 20:40

## 2019-05-14 RX ADMIN — Medication 10 ML: at 20:44

## 2019-05-14 RX ADMIN — BISACODYL 10 MG: 5 TABLET, COATED ORAL at 08:17

## 2019-05-14 RX ADMIN — ACETAMINOPHEN 1000 MG: 500 TABLET ORAL at 23:14

## 2019-05-14 RX ADMIN — TRAZODONE HYDROCHLORIDE 100 MG: 100 TABLET ORAL at 23:15

## 2019-05-14 RX ADMIN — ACETAMINOPHEN 1000 MG: 500 TABLET ORAL at 18:41

## 2019-05-14 RX ADMIN — Medication 10 ML: at 00:30

## 2019-05-14 RX ADMIN — BUPROPION HYDROCHLORIDE 200 MG: 100 TABLET, FILM COATED, EXTENDED RELEASE ORAL at 08:18

## 2019-05-14 RX ADMIN — OXYCODONE HYDROCHLORIDE 10 MG: 5 TABLET ORAL at 08:17

## 2019-05-14 RX ADMIN — GABAPENTIN 600 MG: 300 CAPSULE ORAL at 15:52

## 2019-05-14 NOTE — PROGRESS NOTES
PO day #1  VSS  She is up and ambulating in her room  Was nearly wheelchair bound prior to surgery  Still with some back and right leg pain  ADAN = 80 cc, will observe overnight  Dressing dry  Neuro intact.      Shade Dalal, NP

## 2019-05-14 NOTE — OP NOTES
[address] [place] [address]   OPERATIVE REPORT    Name:  Lizette Cota  MR#:   [id number]  :  1975  ACCOUNT #:  [de-identified]  DATE OF SERVICE:  2019    PREOPERATIVE DIAGNOSES:  Post-laminectomy syndrome, lumbar spinal stenosis, and spondylolisthesis. POSTOPERATIVE DIAGNOSES:  Post-laminectomy syndrome, lumbar spinal stenosis, and spondylolisthesis. PROCEDURE PERFORMED:  L4-L5 bilateral hemilaminectomy; medial facetectomy; foraminotomy; L5-S1 right hemilaminectomy; medial facetectomy revision; discectomy revision; L4, L5, S1 posterolateral fusion; segmental spinal instrumentation; NuVasive type L4, L5, S1.    SURGEON:  Jose Mills MD    ASSISTANT:  0.    ANESTHESIA:  General endotracheal.    COMPLICATIONS:  None. SPECIMENS REMOVED:  None. IMPLANTS:  NuVasive pedicle screw system. ESTIMATED BLOOD LOSS:  500 mL. FINDINGS:  The patient had severe central lateral recess stenosis at L4-L5 with severe lateral recess stenosis on the right at L5-S1. Facets were grossly incompetent with superior ligamentous and facet hypertrophy causing dramatic stenosis, right worse than left. The patient's morbid obesity made surgical exposure, decompression, and instrumentation more difficult. PROCEDURE:  Following induction of endotracheal anesthesia, the patient was turned to prone position on a spinal frame. The patient was prepped and draped in the usual fashion. A midline incision was made. Paramedian incisions were made in the lumbodorsal fascia and subperiosteal dissection done from L4 to the sacrum. L4-L5 facets looked synovitic and unstable. L5-S1 was densely scarred. There was dehiscence of the lumbodorsal fascia. It was never closed or simply lack of healing from previous decompressive surgery. Exposure of the spine was done. C-arm image verified a surgical level. Facets were defined first on the patient's right and than on the patient's left.   Pedicle screws were placed in the right at L4, L5, and S1; on the left at L4 and S1 utilizing anatomic landmarks, C-arm image, and direct palpation. Holes were made with an awl, palpated with a ball-tip probe, tapped, re-palpated. Neuromonitoring was utilized to proceed with clean runs as well as fluoroscopic imaging, anatomic landmark, and direct palpation of the pedicles. Holes were made, palpated, and appropriate screws were placed with good fixation. Hemilaminectomy was done at L4-L5 on the right with progressive medial facetectomy, foraminotomy. Subtotal facetectomy was done at L5-S1 revision with severe compression of the S1 nerve root noted from what appeared to be a recurrent central to right paracentral disc herniation, which was debrided. The neural elements were free of compression at the conclusion. There was severe global stenosis at L4-L5. A hemilaminectomy, medial facetectomy was done on the left at L4-L5 as well. Care was taken to try to maintain as much inherent stability as well as midline structures given the patient's morbid obesity and poor soft tissue quality and my desire not to lie entirely on my instrumentation. Fixation, however, was good. Neuromonitoring was clean. Adequate visualization and palpation of the instrumentation was obtained despite the patient's habitus. Once the decompression was completed bilaterally, instrumentation placed. Lordosed rods were placed and final tightening and torquing done. The intertransverse region of facets were decorticated with curettes and a high-speed bur; grafted with a combination of cancellous autograft, cancellous allograft, and demineralized bone matrix. Gelfoam was placed over the laminectomy site. A deep drain utilized. Vancomycin powder instilled for infection prophylaxis. The lumbodorsal fascia was closed over a deep drain with #1 Vicryl. Subcutaneous tissue was closed with 2-0 Vicryl. Skin closed with a 4-0 Monocryl subcuticular suture and Dermabond.   A sterile occlusive dressing was placed upon the wound. All counts were correct.       MD ANASTASIA [email or web address]  D:  05/13/2019 17:38  T:  05/14/2019 0:07  JOB #:  [id number]

## 2019-05-14 NOTE — PROGRESS NOTES
OCCUPATIONAL THERAPY EVALUATION/DISCHARGE    Patient: Johnson Ribeiro (53 y.o. female)  Date: 5/14/2019  Primary Diagnosis: Spinal stenosis, lumbar region with neurogenic claudication [M48.062]  Lumbar spinal stenosis [M48.061]  Procedure(s) (LRB):  L4/5/S1 LAMINECTOMY FUSION/C-ARM/NUVASIVE (N/A) 1 Day Post-Op   Precautions:   Fall, Back  PLOF: Pt required min assist with basic self care tasks and used a wheelchair/RW for functional mobility PTA. ASSESSMENT AND RECOMMENDATIONS:  Based on the objective data described below, the patient is able to perform basic self care tasks without assistance using AE (reacher, LHS, sock aid, [address]) given/owned after demonstration/practice. Back precautions reviewed and patient verbalized/demonstrated understanding. She has a supportive family at home to assist her prn and all needed DME for bathroom safety. Skilled occupational therapy is not indicated at this time. Discharge Recommendations: None  Further Equipment Recommendations for Discharge: N/A      SUBJECTIVE:   Patient stated ? I can't believe how well I'm doing. ?    OBJECTIVE DATA SUMMARY:     Past Medical History:   Diagnosis Date    Acute pain of left shoulder 11/1/2016    Bipolar 1 disorder (HCC)     Bipolar affective disorder, currently manic, moderate ([address] 4/27/2016    Chronic midline low back pain with sciatica 11/1/2016    Cigarette nicotine dependence in remission 8/2/2016    Cigarette nicotine dependence without complication 56/04/5279    Continuous nicotine dependence 1/24/2017    Depression     Fibromyalgia 9/1/2015    HLD (hyperlipidemia) 2/4/2016    Hypertension     Hypertriglyceridemia 5/15/2018    Impaired fasting glucose 5/31/2016    Irritable bowel syndrome without diarrhea 4/27/2016    Moderate episode of recurrent major depressive disorder ([address] 4/27/2016    Obesity, Class III, BMI 40-49.9 (morbid obesity) ([address] 9/1/2015    Onychomycosis 4/27/2016    Pain of left thumb 11/1/2016    Prediabetes 7/5/2016    PTSD (post-traumatic stress disorder)     Smoker 9/1/2015    Vitamin D deficiency 5/31/2016     Past Surgical History:   Procedure Laterality Date    HX ACL RECONSTRUCTION      HX APPENDECTOMY      HX CERVICAL DISKECTOMY  02/07/2019    with fusion C5/6 C6/7    HX CERVICAL LAMINECTOMY  03/19/2019    with fusion    HX GYN Bilateral     tubal    HX ORTHOPAEDIC       Barriers to Learning/Limitations: None  Compensate with: visual, verbal, tactile, kinesthetic cues/model    Home Situation:   Home Situation  Support Systems: Family member(s)  Tub or Shower Type: Tub/Shower combination(with seat)  ? Right hand dominant   ? Left hand dominant    Cognitive/Behavioral Status:  Neurologic State: Alert  Orientation Level: Oriented X4  Cognition: Appropriate decision making; Follows commands  Safety/Judgement: Awareness of environment; Fall prevention    Skin: Intact on UEs  Edema: None noted in UEs    Vision/Perceptual:     Acuity: Within Defined Limits      Coordination: BUE  Coordination: Within functional limits(BLE)  Fine Motor Skills-Upper: Left Intact; Right Intact    Gross Motor Skills-Upper: Left Intact; Right Intact    Balance:  Sitting: Intact  Standing: Impaired; With support  Standing - Static: Good  Standing - Dynamic : Fair    Strength: BUE  Strength: Within functional limits    Tone & Sensation: BUE  Tone: Normal  Sensation: Intact    Range of Motion: BUE  AROM: Within functional limits    Functional Mobility and Transfers for ADLs:  Bed Mobility:  Scooting: Supervision  Transfers:  Sit to Stand: Supervision   Toilet Transfer : Supervision    ADL Assessment:  Feeding: Independent    Oral Facial Hygiene/Grooming: Independent    Bathing: Supervision    Upper Body Dressing: Modified independent    Lower Body Dressing: Supervision    Toileting: Modified independent    ADL Intervention:  Patient maneuvered to the bathroom using a RW with supervision.   She was able to toilet herself with modified independence including clothing management and hygiene. Patient given a reacher and long handled shoe horn for LB self care after demonstration/practice. Cognitive Retraining  Safety/Judgement: Awareness of environment; Fall prevention    Pain:  Pain level pre-treatment: 0/10   Pain level post-treatment: 0/10   Pain Intervention(s): NA   Response to intervention: NA    Activity Tolerance:   Good  Please refer to the flowsheet for vital signs taken during this treatment. After treatment:   ?  Patient left in no apparent distress sitting up in chair  ? Patient left in no apparent distress in bed  ? Call bell left within reach  ? Nursing notified  ? Caregiver present  ? Bed alarm activated    COMMUNICATION/EDUCATION:   ?      Role of Occupational Therapy in the acute care setting  ? Home safety education was provided and the patient/caregiver indicated understanding. ? Patient/family have participated as able and agree with findings and recommendations. ?      Patient is unable to participate in plan of care at this time. Thank you for this referral.  Isela Hernandez MS OTR/L   Time Calculation: 20 mins      Eval Complexity: History: LOW Complexity : Brief history review ; Examination: LOW Complexity : 1-3 performance deficits relating to physical, cognitive , or psychosocial skils that result in activity limitations and / or participation restrictions ;    Decision Making:LOW Complexity : No comorbidities that affect functional and no verbal or physical assistance needed to complete eval tasks

## 2019-05-14 NOTE — PROGRESS NOTES
Problem: Mobility Impaired (Adult and Pediatric)  Goal: *Acute Goals and Plan of Care (Insert Text)  Description  Physical Therapy Goals  Initiated 5/14/2019 and to be accomplished within 7 day(s)  1. Patient will move from supine to sit and sit to supine, scoot up and down and roll side to side in bed with modified independence. 2.  Patient will transfer from bed to chair and chair to bed with modified independence using the least restrictive device. 3.  Patient will perform sit to stand with modified independence. 4.  Patient will ambulate with modified independence for 5-10 feet with the least restrictive device. 5.  Patient will ascend/descend 4 stairs with SPC with minimal assistance/contact guard assist. (MET 5/14/19)    PLOF:Patient lives in 1 story home with 4STE with no HR. Since ACDF in 03/2019, she has Molly from family and uses SPC to negotiate stairs. Patient has been using RW to transfer from bed to [facility] and primarily gets around by [facility] in her home. She reports she takes \"no more than a few steps\" because she falls so frequently at home. Patient lives with her sister, daughter, granddaughter, mom and also has her ex- who comes over to assist at times. Outcome: Progressing Towards Goal   PHYSICAL THERAPY EVALUATION    Patient: Mona Aponte (53 y.o. female)  Date: 5/14/2019  Primary Diagnosis: Spinal stenosis, lumbar region with neurogenic claudication [M48.062]  Lumbar spinal stenosis [M48.061]  Procedure(s) (LRB):  L4/5/S1 LAMINECTOMY FUSION/C-ARM/NUVASIVE (N/A) 1 Day Post-Op   Precautions: Fall, spinal     ASSESSMENT :  Patient is 52 yo F admitted to hospital for L4-S1 laminectomy/fusion and presents today alert and agreeable to therapy. Patient was educated on spinal precautions and demonstrated good compliance with precautions throughout session.   Patient was given demo with instruction on sit <> stand transfer and gait training and transferred to standing with supervision and ambulated 5ft with bariatric RW 5ft to locked  at supervision. Patient negotiated 4 steps as listed below. At conclusion of session patient transferred to sitting in recliner in bed and was left resting with call bell by the side and SCD?s donned. Patient instructed to call for assistance if they needed to get up for any reason and denied need for further assistance. Patient demonstrates decreased strength, mobility, and endurance and will benefit from skilled intervention to address the above impairments. Patient is safe for home mobility with bariatric RW with family assist for stair training. Patient's rehabilitation potential is considered to be Good  Factors which may influence rehabilitation potential include:   ? None noted  ? Mental ability/status  ? Medical condition  ? Home/family situation and support systems  ? Safety awareness  ? Pain tolerance/management  ? Other:      PLAN :  Recommendations and Planned Interventions:   ?           Bed Mobility Training             ? Neuromuscular Re-Education  ? Transfer Training                   ? Orthotic/Prosthetic Training  ? Gait Training                          ? Modalities  ? Therapeutic Exercises           ? Edema Management/Control  ? Therapeutic Activities            ? Family Training/Education  ? Patient Education  ? Other (comment):    Frequency/Duration: Patient will be followed by physical therapy 1-2 times per day to address goals. Discharge Recommendations: Home Health  Further Equipment Recommendations for Discharge: bariatric rolling walker     SUBJECTIVE:   Patient stated ? I was still in the wheelchair after the last surgery so that's why we did this one.?    OBJECTIVE DATA SUMMARY:     Past Medical History:   Diagnosis Date    Acute pain of left shoulder 11/1/2016    Bipolar 1 disorder (HCC)     Bipolar affective disorder, currently manic, moderate ([facility] 4/27/2016    Chronic midline low back pain with sciatica 11/1/2016    Cigarette nicotine dependence in remission 8/2/2016    Cigarette nicotine dependence without complication 21/39/5084    Continuous nicotine dependence 1/24/2017    Depression     Fibromyalgia 9/1/2015    HLD (hyperlipidemia) 2/4/2016    Hypertension     Hypertriglyceridemia 5/15/2018    Impaired fasting glucose 5/31/2016    Irritable bowel syndrome without diarrhea 4/27/2016    Moderate episode of recurrent major depressive disorder ([facility] 4/27/2016    Obesity, Class III, BMI 40-49.9 (morbid obesity) ([facility] 9/1/2015    Onychomycosis 4/27/2016    Pain of left thumb 11/1/2016    Prediabetes 7/5/2016    PTSD (post-traumatic stress disorder)     Smoker 9/1/2015    Vitamin D deficiency 5/31/2016     Past Surgical History:   Procedure Laterality Date    HX ACL RECONSTRUCTION      HX APPENDECTOMY      HX CERVICAL DISKECTOMY  02/07/2019    with fusion C5/6 C6/7    HX CERVICAL LAMINECTOMY  03/19/2019    with fusion    HX GYN Bilateral     tubal    HX ORTHOPAEDIC       Barriers to Learning/Limitations: None  Compensate with: N/A  Home Situation:  Home Situation  Support Systems: Family member(s)  Critical Behavior:    A&Ox4  Strength:    Strength: Generally decreased, functional(BLE)   Tone & Sensation:   Tone: Normal(BLE)   Sensation: Intact(BLE)   Range Of Motion:  AROM: Within functional limits(BLE)   Functional Mobility:  Bed Mobility:   Scooting: Supervision  Transfers:  Sit to Stand: Supervision  Stand to Sit: Supervision    Balance:   Sitting: Intact  Standing: Impaired; With support  Standing - Static: Good  Standing - Dynamic : Fair  Ambulation/Gait Training:  Distance (ft): 5 Feet (ft)  Assistive Device: Walker, rolling  Ambulation - Level of Assistance: Supervision   Gait Abnormalities: Decreased step clearance   Base of Support: Widened   Speed/[place]: Slow  Interventions: Verbal cues; Visual/Demos Stairs:   Stairs - Level of Assistance: Contact guard assistance/Minimum Assistance   4 steps with SPC in right hand and using therapist's knee to push land hand from  Pain:  Pain level pre-treatment: 2/10   Pain level post-treatment: 2/10   Pain Intervention(s) : Medication (see MAR); Rest, Ice, Repositioning  Response to intervention: Nurse notified, See doc flow    Activity Tolerance:   Patient tolerated activity well and demos no LoB during mobility. Please refer to the flowsheet for vital signs taken during this treatment. After treatment:   ?         Patient left in no apparent distress sitting up in chair  ? Patient left in no apparent distress in bed  ? Call bell left within reach  ? Nursing notified  ? Caregiver present  ? Bed alarm activated  ? SCDs applied    COMMUNICATION/EDUCATION:   ?         Role of Physical Therapy in the acute care setting. ?         Fall prevention education was provided and the patient/caregiver indicated understanding. ? Patient/family have participated as able in goal setting and plan of care. ?         Patient/family agree to work toward stated goals and plan of care. ?         Patient understands intent and goals of therapy, but is neutral about his/her participation. ? Patient is unable to participate in goal setting/plan of care: ongoing with therapy staff. ?         Other:     Thank you for this referral.  Annella Kocher, PT         Melodie Complexity: History: MEDIUM  Complexity : 1-2 comorbidities / personal factors will impact the outcome/ POC Exam:LOW Complexity : 1-2 Standardized tests and measures addressing body structure, function, activity limitation and / or participation in recreation  Presentation: LOW Complexity : Stable, uncomplicated  Clinical Decision Making:Low Complexity    Overall Complexity:LOW

## 2019-05-14 NOTE — ROUTINE PROCESS
TRANSFER - OUT REPORT:    Verbal report given to Belkys Flood on Ashutosh Richmond  being transferred to room 516 for routine progression of care       Report consisted of patients Situation, Background, Assessment and   Recommendations(SBAR). Information from the following report(s) SBAR, OR Summary, Intake/Output, MAR and Recent Results was reviewed with the receiving nurse. Lines:   Peripheral IV 05/13/19 Posterior;Right Hand (Active)   Site Assessment Clean, dry, & intact 5/13/2019  5:53 PM   Phlebitis Assessment 0 5/13/2019  5:53 PM   Infiltration Assessment 0 5/13/2019  5:53 PM   Dressing Status Clean, dry, & intact 5/13/2019  5:53 PM   Dressing Type Tape;Transparent 5/13/2019  5:53 PM   Hub Color/Line Status Pink; Infusing 5/13/2019  5:53 PM        Opportunity for questions and clarification was provided.       Patient transported with:   Patient's medications from home  Tech   PCA pump

## 2019-05-14 NOTE — PROGRESS NOTES
Patients is lying in bed alert awake and oriented op site to back dry and intact with ADAN drain paten draining serosanguinous drainage. CMS+. No s/s of distress or sob.

## 2019-05-14 NOTE — PROGRESS NOTES
Patient educated: Activity:  OOB for all meals, walk every hour to prevent blood clots  Follow back precautions (no bending, twisting, lifting &  Log roll in/out of bed). VTE prophylaxis:   Use SCD pumps except when walking. Ankle pumps 10 times an hour at hospital & home. Take blood thinner medication as ordered by surgeon. Do not skip a dose. Pain Control:  Pain medications side effects discussed. Wean off narcotics ASAP. Use Tylenol ( 3000 mg/24 hours) , ice, distraction, moving, & change position to help with pain. Don't get nauseated. Eat a snack before taking pain medication    Do not get constipated: take stool softener/mild laxative daily while on narcotics. Incentive Spirometry:    Use of incentive spirometer 10 x/hr. Demonstration  2500 ml x 3  Wound Care: Dressing dry and intact. Do not to take dressing off at home. Bathe daily with dial soap & wear clean clothes/clean towel. No lotions, powders, creams to surgical leg. .    Diet:   Eat for healing. Protein heals bone/muscle. Drink 8 glasses of water a day. Patient Safety:   Call light & belongings in reach. Call for help when want to walk or get OOB. Educational material given. Patient agreed to continue doing everything at home to prevent complications and have a successful recovery. Patient  verbalized understand. Given the opportunity for asking questions.                 Mobility Intervention:       [] Pt dangled at edge of bed    [] Pt assisted OOB to bedside commode    [] Pt assisted OOB to chair    [] Pt ambulated to bathroom    [] Patient was ambulated in room/hallway    Assistive Device Utilized:       [] Rolling walker   [] Crutches   [] Straight Cane   [] Knee immobilizer   [] IV pole    After Rounding and Checking on Patient     [x] Patient left in no apparent distress sitting up in chair  [] Patient left in no apparent distress in bed  [x] Call bell left within reach  [] SCDs on both legs & machine turned on  [x] Ice applied  [x] RN notified  []  present  [] Bed alarm activated    Reason patient not mobilized:      [] Patient refused   [] Nausea/vomiting   [] Low blood pressure   [] Drowsy/lethargic    Pain Rating:       Left patient with call light, cell phone and personal belongings in reach for safety.

## 2019-05-15 ENCOUNTER — APPOINTMENT (OUTPATIENT)
Dept: CT IMAGING | Age: 44
End: 2019-05-15
Attending: ORTHOPAEDIC SURGERY
Payer: MEDICAID

## 2019-05-15 ENCOUNTER — HOME HEALTH ADMISSION (OUTPATIENT)
Dept: HOME HEALTH SERVICES | Facility: HOME HEALTH | Age: 44
End: 2019-05-15
Payer: MEDICAID

## 2019-05-15 VITALS
RESPIRATION RATE: 16 BRPM | HEART RATE: 90 BPM | HEIGHT: 69 IN | BODY MASS INDEX: 43.4 KG/M2 | OXYGEN SATURATION: 95 % | SYSTOLIC BLOOD PRESSURE: 128 MMHG | TEMPERATURE: 98.3 F | WEIGHT: 293 LBS | DIASTOLIC BLOOD PRESSURE: 73 MMHG

## 2019-05-15 PROCEDURE — 97116 GAIT TRAINING THERAPY: CPT

## 2019-05-15 PROCEDURE — 74011250637 HC RX REV CODE- 250/637: Performed by: ORTHOPAEDIC SURGERY

## 2019-05-15 PROCEDURE — 74011000258 HC RX REV CODE- 258: Performed by: ORTHOPAEDIC SURGERY

## 2019-05-15 PROCEDURE — 74011250636 HC RX REV CODE- 250/636: Performed by: ORTHOPAEDIC SURGERY

## 2019-05-15 PROCEDURE — 72131 CT LUMBAR SPINE W/O DYE: CPT

## 2019-05-15 PROCEDURE — 99218 HC RM OBSERVATION: CPT

## 2019-05-15 RX ORDER — METHYLPREDNISOLONE 4 MG/1
TABLET ORAL
Qty: 1 DOSE PACK | Status: SHIPPED | OUTPATIENT
Start: 2019-05-15 | End: 2019-05-28 | Stop reason: ALTCHOICE

## 2019-05-15 RX ORDER — TRAZODONE HYDROCHLORIDE 100 MG/1
100 TABLET ORAL EVERY EVENING
Qty: 30 TAB | Refills: 0 | Status: SHIPPED
Start: 2019-05-15

## 2019-05-15 RX ORDER — BUPROPION HYDROCHLORIDE 200 MG/1
200 TABLET, EXTENDED RELEASE ORAL 2 TIMES DAILY
Qty: 30 TAB | Refills: 0 | Status: SHIPPED
Start: 2019-05-15

## 2019-05-15 RX ORDER — DEXAMETHASONE SODIUM PHOSPHATE 100 MG/10ML
10 INJECTION INTRAMUSCULAR; INTRAVENOUS EVERY 6 HOURS
Status: DISCONTINUED | OUTPATIENT
Start: 2019-05-15 | End: 2019-05-15 | Stop reason: CLARIF

## 2019-05-15 RX ORDER — OXYCODONE HYDROCHLORIDE 5 MG/1
5 TABLET ORAL
Qty: 20 TAB | Refills: 0 | Status: SHIPPED | OUTPATIENT
Start: 2019-05-15 | End: 2019-05-18

## 2019-05-15 RX ADMIN — OXYCODONE HYDROCHLORIDE 10 MG: 5 TABLET ORAL at 05:15

## 2019-05-15 RX ADMIN — HYDROCHLOROTHIAZIDE 25 MG: 25 TABLET ORAL at 08:35

## 2019-05-15 RX ADMIN — DULOXETINE HYDROCHLORIDE 60 MG: 60 CAPSULE, DELAYED RELEASE ORAL at 08:36

## 2019-05-15 RX ADMIN — BUPROPION HYDROCHLORIDE 200 MG: 100 TABLET, FILM COATED, EXTENDED RELEASE ORAL at 08:36

## 2019-05-15 RX ADMIN — ACETAMINOPHEN 1000 MG: 500 TABLET ORAL at 13:07

## 2019-05-15 RX ADMIN — BISACODYL 10 MG: 5 TABLET, COATED ORAL at 08:36

## 2019-05-15 RX ADMIN — Medication 10 ML: at 05:37

## 2019-05-15 RX ADMIN — GABAPENTIN 600 MG: 300 CAPSULE ORAL at 17:10

## 2019-05-15 RX ADMIN — Medication 10 ML: at 13:08

## 2019-05-15 RX ADMIN — LOSARTAN POTASSIUM 75 MG: 25 TABLET ORAL at 08:36

## 2019-05-15 RX ADMIN — VARENICLINE TARTRATE 1 MG: 0.5 TABLET, FILM COATED ORAL at 17:11

## 2019-05-15 RX ADMIN — HYDROMORPHONE HYDROCHLORIDE 1 MG: 1 INJECTION, SOLUTION INTRAMUSCULAR; INTRAVENOUS; SUBCUTANEOUS at 11:42

## 2019-05-15 RX ADMIN — OXYCODONE HYDROCHLORIDE 10 MG: 5 TABLET ORAL at 17:15

## 2019-05-15 RX ADMIN — OXYCODONE HYDROCHLORIDE 10 MG: 5 TABLET ORAL at 08:36

## 2019-05-15 RX ADMIN — POLYETHYLENE GLYCOL 3350 17 G: 17 POWDER, FOR SOLUTION ORAL at 08:36

## 2019-05-15 RX ADMIN — DOCUSATE SODIUM 100 MG: 100 CAPSULE, LIQUID FILLED ORAL at 08:36

## 2019-05-15 RX ADMIN — BUPROPION HYDROCHLORIDE 200 MG: 100 TABLET, FILM COATED, EXTENDED RELEASE ORAL at 17:10

## 2019-05-15 RX ADMIN — LAMOTRIGINE 100 MG: 100 TABLET ORAL at 08:36

## 2019-05-15 RX ADMIN — DULOXETINE HYDROCHLORIDE 60 MG: 60 CAPSULE, DELAYED RELEASE ORAL at 17:11

## 2019-05-15 RX ADMIN — ACETAMINOPHEN 1000 MG: 500 TABLET ORAL at 05:15

## 2019-05-15 RX ADMIN — GABAPENTIN 600 MG: 300 CAPSULE ORAL at 08:36

## 2019-05-15 RX ADMIN — VARENICLINE TARTRATE 1 MG: 0.5 TABLET, FILM COATED ORAL at 08:36

## 2019-05-15 RX ADMIN — CEFAZOLIN 3 G: 1 INJECTION, POWDER, FOR SOLUTION INTRAMUSCULAR; INTRAVENOUS; PARENTERAL at 13:05

## 2019-05-15 RX ADMIN — CEFAZOLIN 3 G: 1 INJECTION, POWDER, FOR SOLUTION INTRAMUSCULAR; INTRAVENOUS; PARENTERAL at 03:51

## 2019-05-15 RX ADMIN — DEXAMETHASONE SODIUM PHOSPHATE 10 MG: 4 INJECTION, SOLUTION INTRAMUSCULAR; INTRAVENOUS at 10:39

## 2019-05-15 RX ADMIN — LAMOTRIGINE 200 MG: 100 TABLET ORAL at 17:11

## 2019-05-15 NOTE — ROUTINE PROCESS
Bedside and Verbal shift change report given to Mack Guzmán RN (oncoming nurse) by Ana Paula Dsouza (offgoing nurse). Report included the following information SBAR, Kardex, Intake/Output and MAR.

## 2019-05-15 NOTE — PROGRESS NOTES
vss afeb  Neuro intact- motor nd sensation improved form pre op  Co increased rle from pre op  Will obtain ct ls spine to confirm instrumentation  This was area of most severe nerve compression pre op  Steroid to calm radiculopathy

## 2019-05-15 NOTE — PROGRESS NOTES
Discharge planning    Discharge order noted for today. Pt has been accepted to [facility] agency. Met with patient and  agreeable to the transition plan today. Transport has been arranged with family. Patient's discharge summary and [employer]  home health  orders have been forwarded to home health  agency via [employer]. Updated bedside RN, Marko Wayne, to the transition plan.   Discharge information has been documented on the AVS.     Benigno Schaumann, BSN, RN  Pager # [phone]  Care Manager

## 2019-05-15 NOTE — PROGRESS NOTES
Problem: Mobility Impaired (Adult and Pediatric)  Goal: *Acute Goals and Plan of Care (Insert Text)  Description  Physical Therapy Goals  Initiated 5/14/2019 and to be accomplished within 7 day(s)  1. Patient will move from supine to sit and sit to supine, scoot up and down and roll side to side in bed with modified independence. 2.  Patient will transfer from bed to chair and chair to bed with modified independence using the least restrictive device. 3.  Patient will perform sit to stand with modified independence. 4.  Patient will ambulate with modified independence for 5-10 feet with the least restrictive device. 5.  Patient will ascend/descend 4 stairs with SPC with minimal assistance/contact guard assist. (MET 5/14/19)    PLOF:Patient lives in 1 story home with 4STE with no HR. Since ACDF in 03/2019, she has Molly from family and uses SPC to negotiate stairs. Patient has been using RW to transfer from bed to [facility] and primarily gets around by [facility] in her home. She reports she takes \"no more than a few steps\" because she falls so frequently at home. Patient lives with her sister, daughter, granddaughter, mom and also has her ex- who comes over to assist at times. Outcome: Progressing Towards Goal   PHYSICAL THERAPY TREATMENT    Patient: Max Greer (53 y.o. female)  Date: 5/15/2019  Diagnosis: Spinal stenosis, lumbar region with neurogenic claudication [M48.062]  Lumbar spinal stenosis [M48.061] <principal problem not specified>  Procedure(s) (LRB):  L4/5/S1 LAMINECTOMY FUSION/C-ARM/NUVASIVE (N/A) 2 Days Post-Op  Precautions: Fall, Back    ASSESSMENT:  Pt found supine HOB elevated willing to participate w/ therapy. Pt voiced slight increase in pain this visit (incisional), however improved numbness/tingling in (B) LEs. Pt also voiced good recall of precautions and maintained t/o.  Pt able to maintain assistance levels this visit, except req CGA during gait training 2/2 to increased instability in (B) LEs (buckling) X2, able to self correct both times. Pt req to sit and rest both occurrences. Pt amb for a total of 100' (33'X3) and returned back to room via [address]. Pt returned to room to chair, positioned comfortably w/ good posture, and left in room w/ all needs in reach. Although pt displayed intermittent bouts of instability w/ gait training, pt voiced same occurrences PTA and pt able to navigate w/ WC for long distances. As pt is safe navigating steps, able to navigate short home distances, and has family support at home pt is safe to return home from a PT standpoint and will benefit from home health progressing to OP therapy. Nursing notified. Progression toward goals: good   ? Improving appropriately and progressing toward goals  ? Improving slowly and progressing toward goals  ? Not making progress toward goals and plan of care will be adjusted     PLAN:  Patient continues to benefit from skilled intervention to address the above impairments. Continue treatment per established plan of care. Discharge Recommendations:  Home Health  Further Equipment Recommendations for Discharge:  rolling walker     SUBJECTIVE:   Patient stated ? I get around in my WC in my house sometimes because of the instability. ?    OBJECTIVE DATA SUMMARY:   Critical Behavior:  Neurologic State: Alert  Orientation Level: Oriented X4  Cognition: Appropriate decision making, Appropriate for age attention/concentration, Follows commands  Safety/Judgement: Awareness of environment, Fall prevention  Functional Mobility Training:  Bed Mobility:  Scooting: Supervision  Transfers:  Sit to Stand: Supervision  Stand to Sit: Supervision  Balance:  Sitting: Intact  Standing: Impaired; With support  Standing - Static: Good  Standing - Dynamic : Fair  Ambulation/Gait Training:  Distance (ft): 100 Feet (ft)  Assistive Device: Walker, rolling(w/ WC follow)  Ambulation - Level of Assistance: Stand-by assistance;Contact guard assistance  Gait Abnormalities: Decreased step clearance  Base of Support: Center of gravity altered; Widened  Speed/[place]: Slow  Interventions: Safety awareness training;Verbal cues  Stairs:  Stairs - Level of Assistance: Contact guard assistance  Therapeutic Exercises:       EXERCISE   Sets   Reps   Active Active Assist   Passive Self ROM   Comments   Ankle Pumps 1 10  ? ? ? ? Pain:  Did not voice number, voiced improvement w/ symptoms in legs    Activity Tolerance:   Fair   Please refer to the flowsheet for vital signs taken during this treatment. After treatment:   ? Patient left in no apparent distress sitting up in chair  ? Patient left in no apparent distress in bed  ? Call bell left within reach  ? Nursing notified  ? Caregiver present  ? Bed alarm activated  ? SCDs applied      COMMUNICATION/EDUCATION:   ?         Role of Physical Therapy in the acute care setting. ?         Fall prevention education was provided and the patient/caregiver indicated understanding. ? Patient/family have participated as able in working toward goals and plan of care. ?         Patient/family agree to work toward stated goals and plan of care. ?         Patient understands intent and goals of therapy, but is neutral about his/her participation. ? Patient is unable to participate in stated goals/plan of care: ongoing with therapy staff.   ?         Other:        Ken Renae PTA   Time Calculation: 23 mins

## 2019-05-15 NOTE — ROUTINE PROCESS
I have reviewed discharge instructions with the patient. The patient verbalized understanding. Discharge medications reviewed with patient and appropriate educational materials and side effects teaching were provided. IV catheter discontinued intact. Site without signs and symptoms of complications. Dressing and pressure applied. Patient armband removed and shredded. Patient stable for discharge, vitals within normal limits. Left unit via wheelchair.

## 2019-05-15 NOTE — PROGRESS NOTES
Patient educated: Activity:  OOB for all meals, walk every hour to prevent blood clots  Follow back precautions (no bending, twisting, lifting &  Log roll in/out of bed). VTE prophylaxis:   Use SCD pumps except when walking. Ankle pumps 10 times an hour at hospital & home. Take blood thinner medication as ordered by surgeon. Do not skip a dose. Pain Control:  Pain medications side effects discussed. Wean off narcotics ASAP. Use Tylenol ( 3000 mg/24 hours) , ice, distraction, moving, & change position to help with pain. Don't get nauseated. Eat a snack before taking pain medication    Do not get constipated: take stool softener/mild laxative daily while on narcotics. Incentive Spirometry:    Use of incentive spirometer 10 x/hr. Demonstration  1500 ml x 3  Wound Care: Dressing  intact. Do not to take dressing off at home. Bathe daily with dial soap & wear clean clothes/clean towel. No lotions, powders, creams to surgical leg. .    Diet:   Eat for healing. Protein heals bone/muscle. Drink 8 glasses of water a day. Patient Safety:   Call light & belongings in reach. Call for help when want to walk or get OOB. Educational material given. Patient agreed to continue doing everything at home to prevent complications and have a successful recovery. Patient  verbalized understand. Given the opportunity for asking questions.

## 2019-05-15 NOTE — ROUTINE PROCESS
ADAN drain d/c'd per MD order. Pressure dressing applied, no bleeding noted at the site. Patient tolerated procedure well.

## 2019-05-15 NOTE — HOME CARE
Discharge noted for today. Received home health referral for [facility] for PT - post surgical/Moustapha protocol. Order processed and called to [facility] in intake. Patient has rolling walker, manual wheelchair, and bath chair.   Earlene Morales LPN

## 2019-05-15 NOTE — DISCHARGE SUMMARY
Discharge  Summary     Patient: Donavon Jones MRN: [id number]  SSN: [id number]    YOB: 1975  Age: 53 y.o.   Sex: female       Admit Date: 5/13/2019    Discharge Date: 5/15/2019      Admission Diagnoses: Spinal stenosis, lumbar region with neurogenic claudication [M48.062]  Lumbar spinal stenosis [M48.061]    Discharge Diagnoses:   Problem List as of 5/15/2019 Date Reviewed: 4/30/2019          Codes Class Noted - Resolved    Lumbar spinal stenosis ICD-10-CM: M48.061  ICD-9-CM: 724.02  5/13/2019 - Present        Cervical stenosis of spine ICD-10-CM: M48.02  ICD-9-CM: 723.0  3/19/2019 - Present        Cervical myelopathy ([facility] ICD-10-CM: G95.9  ICD-9-CM: 721.1  2/7/2019 - Present        Hypertriglyceridemia ICD-10-CM: E78.1  ICD-9-CM: 272.1  5/15/2018 - Present        Continuous nicotine dependence ICD-10-CM: F17.200  ICD-9-CM: 305.1  1/24/2017 - Present        Chronic midline low back pain with sciatica ICD-10-CM: M54.40, G89.29  ICD-9-CM: 724.2, 724.3, 338.29  11/1/2016 - Present        Prediabetes ICD-10-CM: R73.03  ICD-9-CM: 790.29  7/5/2016 - Present        Moderate episode of recurrent major depressive disorder ([facility] ICD-10-CM: F33.1  ICD-9-CM: 296.32  4/27/2016 - Present        Bipolar affective disorder, currently manic, moderate ([facility] ICD-10-CM: F31.12  ICD-9-CM: 296.42  4/27/2016 - Present        Onychomycosis ICD-10-CM: B35.1  ICD-9-CM: 110.1  4/27/2016 - Present        Irritable bowel syndrome without diarrhea ICD-10-CM: K58.9  ICD-9-CM: 564.1  4/27/2016 - Present        HTN (hypertension) ICD-10-CM: I10  ICD-9-CM: 401.9  12/29/2015 - Present        Obesity, Class III, BMI 40-49.9 (morbid obesity) ([facility] ICD-10-CM: E66.01  ICD-9-CM: 278.01  9/1/2015 - Present        Fibromyalgia ICD-10-CM: M79.7  ICD-9-CM: 729.1  9/1/2015 - Present    Overview Addendum 1/17/2017 10:53 AM by Jemima Calhoun MD     B/l shoulders and arms, b/l thighs, back, neck             RESOLVED: Pain of left thumb ICD-10-CM: P28.381  ICD-9-CM: 729.5  11/1/2016 - 1/15/2018               Discharge Condition: Good    Procedure:  L4-L5 bilateral hemilaminectomy; medial facetectomy; foraminotomy; L5-S1 right hemilaminectomy; medial facetectomy revision; discectomy revision; L4, L5, S1 posterolateral fusion; segmental spinal instrumentation; NuVasive type L4, L5, S1.           Hospital Course: Normal hospital course for this procedure. Tolerated surgical intervention well. Incision dry and intact. Ambulatory. Check CT scan for persistent right leg pain and instrumentation was appropriately placed. Right leg pain improved with steroids        Disposition: home    Discharge Medications:   Current Discharge Medication List      START taking these medications    Details   oxyCODONE IR (ROXICODONE) 5 mg immediate release tablet Take 1 Tab by mouth every six (6) hours as needed for Pain for up to 3 days. Max Daily Amount: 20 mg. Indications: Pain  Qty: 20 Tab, Refills: 0    Associated Diagnoses: S/P lumbar fusion      methylPREDNISolone (MEDROL, YUNIOR,) 4 mg tablet Per dose pack instructions  Qty: 1 Dose Pack, Refills: o         CONTINUE these medications which have CHANGED    Details   traZODone (DESYREL) 100 mg tablet Take 1 Tab by mouth every evening. Qty: 30 Tab, Refills: 0      buPROPion SR (WELLBUTRIN, ZYBAN) 200 mg SR tablet Take 1 Tab by mouth two (2) times a day. Qty: 30 Tab, Refills: 0         CONTINUE these medications which have NOT CHANGED    Details   varenicline (CHANTIX) 1 mg tablet Take 1 mg by mouth two (2) times a day. docusate sodium (STOOL SOFTENER PO) Take 100 mg by mouth daily. hydrOXYzine HCl (ATARAX) 25 mg tablet Take 25 mg by mouth three (3) times daily as needed for Anxiety. !! lamoTRIgine (LAMICTAL) 200 mg tablet Take 200 mg by mouth every evening. losartan (COZAAR) 50 mg tablet Take 75 mg by mouth daily. DULoxetine (CYMBALTA) 60 mg capsule Take 60 mg by mouth two (2) times a day. gabapentin (NEURONTIN) 300 mg capsule Take 2 Caps by mouth three (3) times daily. Qty: 180 Cap, Refills: 2    Associated Diagnoses: Cervical myelopathy ([facility] Myelopathy concurrent with and due to spinal stenosis of cervical region [facility]); Lumbar radiculopathy      lidocaine (LIDODERM) 5 % APPLY 1 PATCH TO THE AFFECTED AREA EVERY DAY. LEAVE ON FOR 12 HOURS THEN REMOVE FOR 12 HOURS  Qty: 90 Patch, Refills: 3    Associated Diagnoses: Chronic midline low back pain with left-sided sciatica      polyethylene glycol (MIRALAX) 17 gram/dose powder MIX AND DRINK 17 GRAMS BY MOUTH DAILY  Qty: 1530 g, Refills: 3    Associated Diagnoses: Irritable bowel syndrome without diarrhea      hydroCHLOROthiazide (HYDRODIURIL) 25 mg tablet Take 1 Tab by mouth daily. Qty: 90 Tab, Refills: 3    Associated Diagnoses: Essential hypertension      !! lamoTRIgine (LAMICTAL) 100 mg tablet Take 100 mg by mouth daily. !! - Potential duplicate medications found. Please discuss with provider. Follow-up Appointments   Procedures    FOLLOW UP VISIT Appointment in: Two Weeks     Standing Status:   Standing     Number of Occurrences:   1     Order Specific Question:   Appointment in     Answer:    Two Weeks       Signed By: Libra Lynch NP     May 15, 2019

## 2019-05-15 NOTE — PROGRESS NOTES
PO  Day #2  VSS  CT- Instrumentation well placed  States right leg pain improved with steroids  Ambulated 100 feet with PT  ADAN = 45 cc and has been d/c'ed  Dressing dry  Will d/c to home with MDP  F/u in office in 2 weeks    Coreen Wilson, NP

## 2019-05-15 NOTE — DISCHARGE INSTRUCTIONS
PATIENT DISCHARGE INSTRUCTIONS      PATIENT DISCHARGE INSTRUCTIONS    Jayce Joel / [id number] : 1975    Admitted 2019 Discharged: 5/15/2019       · It is important that you take the medication exactly as they are prescribed. · Keep your medication in the bottles provided by the pharmacist and keep a list of the medication names, dosages, and times to be taken in your wallet. · Do not take other medications without consulting your doctor. What to do at Home    Recommended Diet: Regular Diet    Recommended Activity: No lifting, Driving, or Strenuous exercise for 2 weeks    If you experience any of the following symptoms  fever greater than 101.5, increased pain or wound drainage, please follow up with [facility] Provider. Do not remove your dressing, the doctor will remove it in the office during your follow up visit. You may shower starting tomorrow, but do not scrub the incision or dressing. The dressing is waterproof, so water can run over it and it should be fine. If it falls off, keep the incision clean and dry. Physical therapy will be visiting you at your home, and they will provide wound care as needed. After general anesthesia or intravenous sedation, for 24 hours or while taking prescription Narcotics:  · Limit your activities  · Do not drive and operate hazardous machinery  · Do not make important personal or business decisions  · Do  not drink alcoholic beverages  · If you have not urinated within 8 hours after discharge, please contact your surgeon on call.     Report the following to your surgeon:  · Excessive pain, swelling, redness or odor of or around the surgical area  · Nausea and vomiting lasting longer than 4 hours or if unable to take medications  · Any signs of decreased circulation or nerve impairment to extremity: change in color, persistent  numbness, tingling, coldness or increase pain  · Any questions      *  Please give a list of your current medications to your Primary Care Provider. *  Please update this list whenever your medications are discontinued, doses are      changed, or new medications (including over-the-counter products) are added. *  Please carry medication information at all times in case of emergency situations. These are general instructions for a healthy lifestyle:    No smoking/ No tobacco products/ Avoid exposure to second hand smoke  Surgeon General's Warning:  Quitting smoking now greatly reduces serious risk to your health. Obesity, smoking, and sedentary lifestyle greatly increases your risk for illness    A healthy diet, regular physical exercise & weight monitoring are important for maintaining a healthy lifestyle    You may be retaining fluid if you have a history of heart failure or if you experience any of the following symptoms:  Weight gain of 3 pounds or more overnight or 5 pounds in a week, increased swelling in our hands or feet or shortness of breath while lying flat in bed. Please call your doctor as soon as you notice any of these symptoms; do not wait until your next office visit. Recognize signs and symptoms of STROKE:    F-face looks uneven    A-arms unable to move or move unevenly    S-speech slurred or non-existent    T-time-call 911 as soon as signs and symptoms begin-DO NOT go       Back to bed or wait to see if you get better-TIME IS BRAIN. Warning Signs of HEART ATTACK     Call 911 if you have these symptoms:   Chest discomfort. Most heart attacks involve discomfort in the center of the chest that lasts more than a few minutes, or that goes away and comes back. It can feel like uncomfortable pressure, squeezing, fullness, or pain.  Discomfort in other areas of the upper body. Symptoms can include pain or discomfort in one or both arms, the back, neck, jaw, or stomach.  Shortness of breath with or without chest discomfort.    Other signs may include breaking out in a cold sweat, nausea, or lightheadedness. Don't wait more than five minutes to call 911 - MINUTES MATTER! Fast action can save your life. Calling 911 is almost always the fastest way to get lifesaving treatment. Emergency Medical Services staff can begin treatment when they arrive -- up to an hour sooner than if someone gets to the hospital by car. The discharge information has been reviewed with the patient. The patient verbalized understanding. Discharge medications reviewed with the patient and appropriate educational materials and side effects teaching were provided.   ___________________________________________________________________________________________________________________________________

## 2019-05-16 ENCOUNTER — HOME CARE VISIT (OUTPATIENT)
Dept: SCHEDULING | Facility: HOME HEALTH | Age: 44
End: 2019-05-16
Payer: MEDICAID

## 2019-05-16 VITALS
DIASTOLIC BLOOD PRESSURE: 62 MMHG | SYSTOLIC BLOOD PRESSURE: 120 MMHG | TEMPERATURE: 97.7 F | HEART RATE: 90 BPM | OXYGEN SATURATION: 97 %

## 2019-05-16 PROCEDURE — G0151 HHCP-SERV OF PT,EA 15 MIN: HCPCS

## 2019-05-16 PROCEDURE — 400013 HH SOC

## 2019-05-17 ENCOUNTER — HOME CARE VISIT (OUTPATIENT)
Dept: SCHEDULING | Facility: HOME HEALTH | Age: 44
End: 2019-05-17
Payer: MEDICAID

## 2019-05-17 ENCOUNTER — HOME CARE VISIT (OUTPATIENT)
Dept: HOME HEALTH SERVICES | Facility: HOME HEALTH | Age: 44
End: 2019-05-17
Payer: MEDICAID

## 2019-05-17 VITALS
HEART RATE: 94 BPM | OXYGEN SATURATION: 98 % | SYSTOLIC BLOOD PRESSURE: 106 MMHG | TEMPERATURE: 98.3 F | DIASTOLIC BLOOD PRESSURE: 68 MMHG

## 2019-05-17 PROCEDURE — G0157 HHC PT ASSISTANT EA 15: HCPCS

## 2019-05-21 ENCOUNTER — HOME CARE VISIT (OUTPATIENT)
Dept: HOME HEALTH SERVICES | Facility: HOME HEALTH | Age: 44
End: 2019-05-21
Payer: MEDICAID

## 2019-05-21 DIAGNOSIS — M48.061 LUMBAR STENOSIS WITHOUT NEUROGENIC CLAUDICATION: Primary | ICD-10-CM

## 2019-05-21 PROCEDURE — G0157 HHC PT ASSISTANT EA 15: HCPCS

## 2019-05-21 RX ORDER — OXYCODONE HYDROCHLORIDE 5 MG/1
5 TABLET ORAL
Qty: 20 TAB | Refills: 0 | Status: SHIPPED | OUTPATIENT
Start: 2019-05-21 | End: 2019-05-28

## 2019-05-21 NOTE — TELEPHONE ENCOUNTER
rx printed   Call pt for       Decreased frequency to 1 tab every 8 hours, please make this last until her FU on 5.28

## 2019-05-21 NOTE — TELEPHONE ENCOUNTER
Opal Martinez with [address] [facility] left message requesting a refill on Roxicodone for the patient. Please advise and pend new Rx if appropriate.     Last Visit: 4/30/19 with MD Chelly Garcia *post-op 5/13/19*  Next Appointment: 5/28/19 with NP Vane  Previous Refill Encounter(s): 5/15/19 Roxicodone #20

## 2019-05-21 NOTE — TELEPHONE ENCOUNTER
Spoke with patient, informed Rx was ready for  at the . Photo ID needed. No further actions needed at this time.

## 2019-05-22 ENCOUNTER — TELEPHONE (OUTPATIENT)
Dept: ORTHOPEDIC SURGERY | Age: 44
End: 2019-05-22

## 2019-05-22 ENCOUNTER — HOME CARE VISIT (OUTPATIENT)
Dept: SCHEDULING | Facility: HOME HEALTH | Age: 44
End: 2019-05-22
Payer: MEDICAID

## 2019-05-22 VITALS
SYSTOLIC BLOOD PRESSURE: 110 MMHG | SYSTOLIC BLOOD PRESSURE: 110 MMHG | OXYGEN SATURATION: 96 % | HEART RATE: 71 BPM | TEMPERATURE: 97.5 F | DIASTOLIC BLOOD PRESSURE: 56 MMHG | DIASTOLIC BLOOD PRESSURE: 62 MMHG | OXYGEN SATURATION: 94 % | HEART RATE: 93 BPM | TEMPERATURE: 96.1 F

## 2019-05-22 PROCEDURE — G0157 HHC PT ASSISTANT EA 15: HCPCS

## 2019-05-22 NOTE — TELEPHONE ENCOUNTER
Patient is requesting a back brace - feels that she may benefit from it as she's used one in the past.  Please contact patient to further discuss the possibility at [phone].

## 2019-05-22 NOTE — TELEPHONE ENCOUNTER
Spoke with patient, informed of NP Edwards message below. Patient stated understanding, no further actions needed at this time.

## 2019-05-22 NOTE — TELEPHONE ENCOUNTER
We no longer prescribe back braces following surgery. They just weaken the spinal muscles that provide support to the spine.

## 2019-05-23 ENCOUNTER — HOME CARE VISIT (OUTPATIENT)
Dept: SCHEDULING | Facility: HOME HEALTH | Age: 44
End: 2019-05-23
Payer: MEDICAID

## 2019-05-23 VITALS
DIASTOLIC BLOOD PRESSURE: 64 MMHG | TEMPERATURE: 97.5 F | OXYGEN SATURATION: 97 % | SYSTOLIC BLOOD PRESSURE: 122 MMHG | HEART RATE: 75 BPM

## 2019-05-23 PROCEDURE — G0151 HHCP-SERV OF PT,EA 15 MIN: HCPCS

## 2019-05-28 ENCOUNTER — OFFICE VISIT (OUTPATIENT)
Dept: ORTHOPEDIC SURGERY | Age: 44
End: 2019-05-28

## 2019-05-28 VITALS
TEMPERATURE: 98.2 F | RESPIRATION RATE: 18 BRPM | OXYGEN SATURATION: 97 % | BODY MASS INDEX: 43.4 KG/M2 | WEIGHT: 293 LBS | DIASTOLIC BLOOD PRESSURE: 57 MMHG | HEIGHT: 69 IN | SYSTOLIC BLOOD PRESSURE: 130 MMHG | HEART RATE: 90 BPM

## 2019-05-28 DIAGNOSIS — Z98.1 S/P LUMBAR FUSION: Primary | ICD-10-CM

## 2019-05-28 RX ORDER — DEXTROMETHORPHAN HYDROBROMIDE, GUAIFENESIN 5; 100 MG/5ML; MG/5ML
650 LIQUID ORAL EVERY 8 HOURS
COMMUNITY

## 2019-05-28 NOTE — PROGRESS NOTES
Chief complaint/History of Present Illness:  Chief Complaint   Patient presents with    Surgical Follow-up     back 5/13     HPI  Wiliam De Luna is a  53 y.o.  female      Dictation #1  Chief complaint/History of Present Illness:  Chief Complaint   Patient presents with    Surgical Follow-up     back 5/13     HPI  Wiliam De Luna is a  53 y.o.  female      HISTORY OF PRESENT ILLNESS:  The patient comes in today two weeks status post her L4-5, L5-S1 PLIF. She is doing very well. She states her back pain is more just surgical dull pain. The bilateral leg numbness that she had prior to surgery has gotten better. She still has a little bit, but it is better than it was. She denies fever and bowel or bladder dysfunction. She has decreased her smoking to one-third pack of cigarettes per day from one pack a day. She is on Chantix now. She is on Social Security Disability for her knee. She was practically wheelchair bound prior to surgery and is now able to walk with a walker. She denies fever and bowel or bladder dysfunction. PHYSICAL EXAM:  Ms. Keith Conrad is a 78-year-old female. She is alert and oriented. She has a normal mood and affect. She has a full weightbearing, slow, but non-antalgic gait utilizing a walker. She has 5/5 strength of her bilateral lower extremities and negative straight leg raise. Her posterior lumbar incision is healing nicely. The edge is well-approximated. There is no erythema, warmth, drainage, or signs of infection. ASSESSMENT/PLAN:  This is a patient two weeks out from her L4 to S1 PLIF. Overall, she is doing very well. She is very happy with the outcome of her surgery. She will continue her efforts in smoking cessation. We went over wound care and activity level. She is taking Oxycodone 5 mg twice a day. She does not feel like she can go without it right now. She may need one more script. She is going to wait until the end of the week and see how she does with that.   She may call for a refill. She is also status post ACDF C5-6 and C6-7 on February 7, 2019, and a C5 to C7 posterior fusion on March 19, 2019. She states her neck is doing well. We will see her back in four weeks with Dr. Ben Montana, at which time, we will get a lumbar AP and lateral x-ray.           Review of systems:    Past Medical History:   Diagnosis Date    Acute pain of left shoulder 11/1/2016    Bipolar 1 disorder ([address]     Bipolar affective disorder, currently manic, moderate ([address] 4/27/2016    Chronic midline low back pain with sciatica 11/1/2016    Cigarette nicotine dependence in remission 8/2/2016    Cigarette nicotine dependence without complication 56/18/7633    Continuous nicotine dependence 1/24/2017    Depression     Fibromyalgia 9/1/2015    HLD (hyperlipidemia) 2/4/2016    Hypertension     Hypertriglyceridemia 5/15/2018    Impaired fasting glucose 5/31/2016    Irritable bowel syndrome without diarrhea 4/27/2016    Moderate episode of recurrent major depressive disorder ([address] 4/27/2016    Obesity, Class III, BMI 40-49.9 (morbid obesity) ([address] 9/1/2015    Onychomycosis 4/27/2016    Pain of left thumb 11/1/2016    Prediabetes 7/5/2016    PTSD (post-traumatic stress disorder)     Smoker 9/1/2015    Vitamin D deficiency 5/31/2016     Past Surgical History:   Procedure Laterality Date    HX ACL RECONSTRUCTION      HX APPENDECTOMY      HX CERVICAL DISKECTOMY  02/07/2019    with fusion C5/6 C6/7    HX CERVICAL LAMINECTOMY  03/19/2019    with fusion    HX GYN Bilateral     tubal    HX ORTHOPAEDIC       Social History     Socioeconomic History    Marital status: UNKNOWN     Spouse name: Not on file    Number of children: Not on file    Years of education: Not on file    Highest education level: Not on file   Occupational History    Not on file   Social Needs    Financial resource strain: Not on file    Food insecurity:     Worry: Not on file     Inability: Not on file   [address] [place] [address] Transportation needs:     Medical: Not on file     Non-medical: Not on file   Tobacco Use    Smoking status: Current Every Day Smoker     Packs/day: 1.00     Years: 25.00     Pack years: 25.00    Smokeless tobacco: Never Used   Substance and Sexual Activity    Alcohol use:  Yes     Alcohol/week: 5.4 oz     Types: 9 Shots of liquor per week     Comment: socially    Drug use: No    Sexual activity: Yes     Partners: Male     Birth control/protection: None   Lifestyle    Physical activity:     Days per week: Not on file     Minutes per session: Not on file    Stress: Not on file   Relationships    Social connections:     Talks on phone: Not on file     Gets together: Not on file     Attends Anabaptism service: Not on file     Active member of club or organization: Not on file     Attends meetings of clubs or organizations: Not on file     Relationship status: Not on file    Intimate partner violence:     Fear of current or ex partner: Not on file     Emotionally abused: Not on file     Physically abused: Not on file     Forced sexual activity: Not on file   Other Topics Concern     Service Not Asked    Blood Transfusions Not Asked    Caffeine Concern Not Asked    Occupational Exposure Not Asked   [place] Hazards Not Asked    Sleep Concern Not Asked    Stress Concern Not Asked    Weight Concern Not Asked    Special Diet Not Asked    Back Care Not Asked    Exercise Not Asked    Bike Helmet Not Asked   [address] Not Asked    Self-Exams Not Asked   Social History Narrative    Not on file     Family History   Problem Relation Age of Onset    Hypertension Mother     Thyroid Disease Mother     No Known Problems Father     Psychiatric Disorder Sister     Psychiatric Disorder Brother     Psychiatric Disorder Sister     Diabetes Sister     Psychiatric Disorder Brother     Psychiatric Disorder Brother        Physical Exam:  Visit Vitals  /57   Pulse 90   Temp 98.2 °F (36.8 °C)   Resp 18   Ht 5' 9\" (1.753 m)   Wt 294 lb (133.4 kg)   SpO2 97%   BMI 43.42 kg/m²     Pain Scale: 8/10       has been . reviewed and is appropriate        Diagnoses and all orders for this visit:    1. S/P lumbar fusion            Follow-up and Dispositions    · Return in about 1 month (around 6/25/2019) for with Dr Chas Morocho.              We have informed Tom Pendleton to notify us for immediate appointment if she has any worsening neurogical symptoms or if an emergency situation presents, then call [phone]  CSN:[id number]    Review of systems:    Past Medical History:   Diagnosis Date    Acute pain of left shoulder 11/1/2016    Bipolar 1 disorder ([address]     Bipolar affective disorder, currently manic, moderate ([address] 4/27/2016    Chronic midline low back pain with sciatica 11/1/2016    Cigarette nicotine dependence in remission 8/2/2016    Cigarette nicotine dependence without complication 61/09/5903    Continuous nicotine dependence 1/24/2017    Depression     Fibromyalgia 9/1/2015    HLD (hyperlipidemia) 2/4/2016    Hypertension     Hypertriglyceridemia 5/15/2018    Impaired fasting glucose 5/31/2016    Irritable bowel syndrome without diarrhea 4/27/2016    Moderate episode of recurrent major depressive disorder ([address] 4/27/2016    Obesity, Class III, BMI 40-49.9 (morbid obesity) ([address] 9/1/2015    Onychomycosis 4/27/2016    Pain of left thumb 11/1/2016    Prediabetes 7/5/2016    PTSD (post-traumatic stress disorder)     Smoker 9/1/2015    Vitamin D deficiency 5/31/2016     Past Surgical History:   Procedure Laterality Date    HX ACL RECONSTRUCTION      HX APPENDECTOMY      HX CERVICAL DISKECTOMY  02/07/2019    with fusion C5/6 C6/7    HX CERVICAL LAMINECTOMY  03/19/2019    with fusion    HX GYN Bilateral     tubal    HX ORTHOPAEDIC       Social History     Socioeconomic History    Marital status: UNKNOWN     Spouse name: Not on file    Number of children: Not on file    Years of education: Not on file    Highest education level: Not on file   Occupational History    Not on file   Social Needs    Financial resource strain: Not on file    Food insecurity:     Worry: Not on file     Inability: Not on file    Transportation needs:     Medical: Not on file     Non-medical: Not on file   Tobacco Use    Smoking status: Current Every Day Smoker     Packs/day: 1.00     Years: 25.00     Pack years: 25.00    Smokeless tobacco: Never Used   Substance and Sexual Activity    Alcohol use:  Yes     Alcohol/week: 5.4 oz     Types: 9 Shots of liquor per week     Comment: socially    Drug use: No    Sexual activity: Yes     Partners: Male     Birth control/protection: None   Lifestyle    Physical activity:     Days per week: Not on file     Minutes per session: Not on file    Stress: Not on file   Relationships    Social connections:     Talks on phone: Not on file     Gets together: Not on file     Attends Gnosticism service: Not on file     Active member of club or organization: Not on file     Attends meetings of clubs or organizations: Not on file     Relationship status: Not on file    Intimate partner violence:     Fear of current or ex partner: Not on file     Emotionally abused: Not on file     Physically abused: Not on file     Forced sexual activity: Not on file   Other Topics Concern     Service Not Asked    Blood Transfusions Not Asked    Caffeine Concern Not Asked    Occupational Exposure Not Asked   [place] Hazards Not Asked    Sleep Concern Not Asked    Stress Concern Not Asked    Weight Concern Not Asked    Special Diet Not Asked    Back Care Not Asked    Exercise Not Asked    Bike Helmet Not Asked    Seat Belt Not Asked    Self-Exams Not Asked   Social History Narrative    Not on file     Family History   Problem Relation Age of Onset    Hypertension Mother     Thyroid Disease Mother     No Known Problems Father     Psychiatric Disorder Sister    [facility] Psychiatric Disorder Brother     Psychiatric Disorder Sister     Diabetes Sister     Psychiatric Disorder Brother     Psychiatric Disorder Brother        Physical Exam:  Visit Vitals  /57   Pulse 90   Temp 98.2 °F (36.8 °C)   Resp 18   Ht 5' 9\" (1.753 m)   Wt 294 lb (133.4 kg)   SpO2 97%   BMI 43.42 kg/m²     Pain Scale: 8/10          has been . reviewed and is appropriate          Diagnoses and all orders for this visit:    1. S/P lumbar fusion            Follow-up and Dispositions    · Return in about 1 month (around 6/25/2019) for with Dr Justice Dumont.              We have informed Ila Sneed to notify us for immediate appointment if she has any worsening neurogical symptoms or if an emergency situation presents, then call [phone]

## 2019-05-31 DIAGNOSIS — Z98.1 S/P LUMBAR FUSION: Primary | ICD-10-CM

## 2019-05-31 RX ORDER — OXYCODONE HYDROCHLORIDE 5 MG/1
5 TABLET ORAL
Qty: 14 TAB | Refills: 0 | Status: SHIPPED | OUTPATIENT
Start: 2019-05-31 | End: 2019-06-07

## 2019-05-31 RX ORDER — OXYCODONE HYDROCHLORIDE 5 MG/1
5 TABLET ORAL
Qty: 20 TAB | Refills: 0 | OUTPATIENT
Start: 2019-05-31 | End: 2019-06-07

## 2019-05-31 NOTE — TELEPHONE ENCOUNTER
rx printed   Call pt for     At [facility]. Wally Duran will be picking up to take to [facility] so patient can pick this up.

## 2019-05-31 NOTE — TELEPHONE ENCOUNTER
Last Visit: 5/28/19 with ISSAC Cifuentes  Next Appointment: 6/25/19 with MD Ben Montana  Previous Refill Encounter(s): 5/21/19 #20    Requested Prescriptions     Pending Prescriptions Disp Refills    oxyCODONE IR (ROXICODONE) 5 mg immediate release tablet 20 Tab 0     Sig: Take 1 Tab by mouth every eight (8) hours as needed for Pain for up to 7 days. Max Daily Amount: 15 mg.

## 2019-05-31 NOTE — TELEPHONE ENCOUNTER
Called patient and verified her name and date and informed her that I was going to pick her prescription from our [facility] office and I will bring it back to our [facility] office. Patient states she she does not have a ride and will be here Monday to pick it up.

## 2019-05-31 NOTE — TELEPHONE ENCOUNTER
Can we get this dictation uploaded expidited? She is post op but I can not make a decision until I can review Medina's note.

## 2019-06-25 ENCOUNTER — OFFICE VISIT (OUTPATIENT)
Dept: ORTHOPEDIC SURGERY | Age: 44
End: 2019-06-25

## 2019-06-25 ENCOUNTER — DOCUMENTATION ONLY (OUTPATIENT)
Dept: ORTHOPEDIC SURGERY | Age: 44
End: 2019-06-25

## 2019-06-25 VITALS
HEIGHT: 69 IN | BODY MASS INDEX: 43.4 KG/M2 | SYSTOLIC BLOOD PRESSURE: 139 MMHG | WEIGHT: 293 LBS | RESPIRATION RATE: 16 BRPM | TEMPERATURE: 98.4 F | HEART RATE: 88 BPM | OXYGEN SATURATION: 96 % | DIASTOLIC BLOOD PRESSURE: 88 MMHG

## 2019-06-25 DIAGNOSIS — Z98.1 S/P CERVICAL SPINAL FUSION: ICD-10-CM

## 2019-06-25 DIAGNOSIS — Z98.1 S/P LUMBAR FUSION: Primary | ICD-10-CM

## 2019-06-25 DIAGNOSIS — Z98.1 S/P LUMBAR FUSION: ICD-10-CM

## 2019-06-25 DIAGNOSIS — M48.062 LUMBAR STENOSIS WITH NEUROGENIC CLAUDICATION: ICD-10-CM

## 2019-06-25 RX ORDER — NALOXONE HYDROCHLORIDE 4 MG/.1ML
SPRAY NASAL
Qty: 1 EACH | Refills: 0 | Status: SHIPPED | OUTPATIENT
Start: 2019-06-25 | End: 2022-05-10

## 2019-06-25 RX ORDER — OXYCODONE HYDROCHLORIDE 5 MG/1
5 TABLET ORAL
Qty: 60 TAB | Refills: 0 | Status: SHIPPED | OUTPATIENT
Start: 2019-06-25 | End: 2019-07-25 | Stop reason: SDUPTHER

## 2019-06-25 NOTE — PROGRESS NOTES
Opioid Assessment    This is a patient with who has had 2 surgeries in the past 5 months. She most recently had a lumbar fusion. She is doing better overall. She is having to take Roxicodone 5 mg BID PRN. Least pain over the last week has been 4/10. Worst pain over the last week has been 10/10. Opioid Risk Tool Reviewed: YES, Score 3. Aberrant behaviors: None. Urine Drug Screen: due - order placed. Controlled substance agreement on file: YES.  reviewed:yes  Pill count is consistent with her prescription: yes  Concomitant use of a benzodiazepine: no  MME:  Naloxone prescription is warranted. It has been provided or is already on file. Also,  abstinence syndrome was reviewed and discussed with her today N/A    Reports that pain would be a 10/10 w/out medication and that it goes down to a 4/10 after medication. This enables the pt to be functional, achieve ADLs and engage in social activities. Risks and benefits of opiate therapy have been reviewed with the patient. No pain behaviors. Denies thoughts of harming self or others. Pt has a good risk to benefit ratio which allows the pt to function in a home environment without side effects      This is a post op chronic pain problem that is improved. Per review of available records and patients , there are not sign of overuse, misuse, diversion, or concerning side effects. Today we reviewed: the risk of overdose, addiction, and dependency proper storage and disposal of medications the goals of treatment (improve functionality, quality of life, and pain) alternative treatment options including non-narcotic modalities  The following changes were made to the patients current treatment plan: medications adjusted, see orders. We discussed our goal is to taper down and off.  If she is unable to taper over the next 6-12 weeks we will refer her to PM.

## 2019-06-25 NOTE — PROGRESS NOTES
[facility]  [address]  [place], [address] [place] [address] Street  Phone: [phone]  Fax: [phone]  PROGRESS NOTE  Patient: Hermilo Patel                MRN: [id number]       SSN: [id number]  YOB: 1975        AGE: 53 y.o. SEX: female  Body mass index is 43.27 kg/m². PCP: Sisi Thompson MD  06/25/19    Chief Complaint   Patient presents with    Back Pain     low back pain       HISTORY OF PRESENT ILLNESS, RADIOGRAPHS, and PLAN:     HISTORY OF PRESENT ILLNESS:  Ms. Isra Logan returns today. She is 6 weeks out from her lumbar fusion. She is making slow progress, although she still continues to have some significant pain in her back and legs. She rates it as a 5/10 today. Her x-rays demonstrate appropriate and fixation as best they can. Details are obscured by obesity. She said she gets a popping sensation on the right with leg lifts. I see no disruption of her instrumentation per se. Her neck is steadily improving. ASSESSMENT/PLAN:  We are going to do what pain management we can for her and see her back again in 6 weeks for routine follow-up. We will have to hold off on getting a CT scan or any other studies if we can.           Past Medical History:   Diagnosis Date    Acute pain of left shoulder 11/1/2016    Bipolar 1 disorder ([address]     Bipolar affective disorder, currently manic, moderate ([address] 4/27/2016    Chronic midline low back pain with sciatica 11/1/2016    Cigarette nicotine dependence in remission 8/2/2016    Cigarette nicotine dependence without complication 69/84/4382    Continuous nicotine dependence 1/24/2017    Depression     Fibromyalgia 9/1/2015    HLD (hyperlipidemia) 2/4/2016    Hypertension     Hypertriglyceridemia 5/15/2018    Impaired fasting glucose 5/31/2016    Irritable bowel syndrome without diarrhea 4/27/2016    Moderate episode of recurrent major depressive disorder ([address] 4/27/2016    Obesity, Class III, BMI 40-49.9 (morbid obesity) ([facility] 9/1/2015    Onychomycosis 4/27/2016    Pain of left thumb 11/1/2016    Prediabetes 7/5/2016    PTSD (post-traumatic stress disorder)     Smoker 9/1/2015    Vitamin D deficiency 5/31/2016       Family History   Problem Relation Age of Onset    Hypertension Mother     Thyroid Disease Mother     No Known Problems Father     Psychiatric Disorder Sister     Psychiatric Disorder Brother     Psychiatric Disorder Sister     Diabetes Sister     Psychiatric Disorder Brother     Psychiatric Disorder Brother        Current Outpatient Medications   Medication Sig Dispense Refill    acetaminophen (TYLENOL ARTHRITIS PAIN) 650 mg TbER Take 650 mg by mouth every eight (8) hours.  traZODone (DESYREL) 100 mg tablet Take 1 Tab by mouth every evening. 30 Tab 0    buPROPion SR (WELLBUTRIN, ZYBAN) 200 mg SR tablet Take 1 Tab by mouth two (2) times a day. 30 Tab 0    varenicline (CHANTIX) 1 mg tablet Take 1 mg by mouth two (2) times a day.  docusate sodium (STOOL SOFTENER PO) Take 100 mg by mouth daily.  hydrOXYzine HCl (ATARAX) 25 mg tablet Take 25 mg by mouth three (3) times daily as needed for Anxiety.  lamoTRIgine (LAMICTAL) 200 mg tablet Take 200 mg by mouth every evening.  losartan (COZAAR) 50 mg tablet Take 75 mg by mouth daily.  DULoxetine (CYMBALTA) 60 mg capsule Take 60 mg by mouth two (2) times a day.  gabapentin (NEURONTIN) 300 mg capsule Take 2 Caps by mouth three (3) times daily. 180 Cap 2    lidocaine (LIDODERM) 5 % APPLY 1 PATCH TO THE AFFECTED AREA EVERY DAY. LEAVE ON FOR 12 HOURS THEN REMOVE FOR 12 HOURS 90 Patch 3    polyethylene glycol (MIRALAX) 17 gram/dose powder MIX AND DRINK 17 GRAMS BY MOUTH DAILY 1530 g 3    hydroCHLOROthiazide (HYDRODIURIL) 25 mg tablet Take 1 Tab by mouth daily. 90 Tab 3    lamoTRIgine (LAMICTAL) 100 mg tablet Take 100 mg by mouth daily.          No Known Allergies    Past Surgical History:   Procedure Laterality Date    HX ACL RECONSTRUCTION      HX APPENDECTOMY      HX CERVICAL DISKECTOMY  02/07/2019    with fusion C5/6 C6/7    HX CERVICAL LAMINECTOMY  03/19/2019    with fusion    HX GYN Bilateral     tubal    HX ORTHOPAEDIC         Past Medical History:   Diagnosis Date    Acute pain of left shoulder 11/1/2016    [facility]     Bipolar affective disorder, currently manic, moderate ([facility] 4/27/2016    Chronic midline low back pain with sciatica 11/1/2016    Cigarette nicotine dependence in remission 8/2/2016    Cigarette nicotine dependence without complication 50/24/1026    Continuous nicotine dependence 1/24/2017    Depression     Fibromyalgia 9/1/2015    HLD (hyperlipidemia) 2/4/2016    Hypertension     Hypertriglyceridemia 5/15/2018    Impaired fasting glucose 5/31/2016    Irritable bowel syndrome without diarrhea 4/27/2016    Moderate episode of recurrent major depressive disorder ([facility] 4/27/2016    Obesity, Class III, BMI 40-49.9 (morbid obesity) ([facility] 9/1/2015    Onychomycosis 4/27/2016    Pain of left thumb 11/1/2016    Prediabetes 7/5/2016    PTSD (post-traumatic stress disorder)     Smoker 9/1/2015    Vitamin D deficiency 5/31/2016       Social History     Socioeconomic History    Marital status: UNKNOWN     Spouse name: Not on file    Number of children: Not on file    Years of education: Not on file    Highest education level: Not on file   Occupational History    Not on file   Social Needs    Financial resource strain: Not on file    Food insecurity:     Worry: Not on file     Inability: Not on file    Transportation needs:     Medical: Not on file     Non-medical: Not on file   Tobacco Use    Smoking status: Current Every Day Smoker     Packs/day: 1.00     Years: 25.00     Pack years: 25.00    Smokeless tobacco: Never Used   Substance and Sexual Activity    Alcohol use:  Yes     Alcohol/week: 5.4 oz     Types: 9 Shots of liquor per week Comment: socially    Drug use: No    Sexual activity: Yes     Partners: Male     Birth control/protection: None   Lifestyle    Physical activity:     Days per week: Not on file     Minutes per session: Not on file    Stress: Not on file   Relationships    Social connections:     Talks on phone: Not on file     Gets together: Not on file     Attends Moravian service: Not on file     Active member of club or organization: Not on file     Attends meetings of clubs or organizations: Not on file     Relationship status: Not on file    Intimate partner violence:     Fear of current or ex partner: Not on file     Emotionally abused: Not on file     Physically abused: Not on file     Forced sexual activity: Not on file   Other Topics Concern     Service Not Asked    Blood Transfusions Not Asked    Caffeine Concern Not Asked    Occupational Exposure Not Asked   [place] Hazards Not Asked    Sleep Concern Not Asked    Stress Concern Not Asked    Weight Concern Not Asked    Special Diet Not Asked    Back Care Not Asked    Exercise Not Asked    Bike Helmet Not Asked   [address] Not Asked    Self-Exams Not Asked   Social History Narrative    Not on file         REVIEW OF SYSTEMS:   CONSTITUTIONAL SYMPTOMS:  Negative. EYES:  Negative. EARS, NOSE, THROAT AND MOUTH:  Negative. CARDIOVASCULAR:  Negative. RESPIRATORY:  Negative. GENITOURINARY: Per HPI. GASTROINTESTINAL:  Per HPI. INTEGUMENTARY (SKIN AND/OR BREAST):  Negative. MUSCULOSKELETAL: Per HPI.   ENDOCRINE/RHEUMATOLOGIC:  Negative. NEUROLOGICAL:  Per HPI. HEMATOLOGIC/LYMPHATIC:  Negative. ALLERGIC/IMMUNOLOGIC:  Negative. PSYCHIATRIC:  Negative. PHYSICAL EXAMINATION:   Visit Vitals  /88   Pulse 88   Temp 98.4 °F (36.9 °C)   Resp 16   Ht 5' 9\" (1.753 m)   Wt 293 lb (132.9 kg)   SpO2 96%   BMI 43.27 kg/m²    PAIN SCALE: 5/10    CONSTITUTIONAL: The patient is in no apparent distress and is alert and oriented x 3. HEENT: Normocephalic. Hearing grossly intact. NECK: Supple and symmetric. no tenderness, or masses were felt. RESPIRATORY: No labored breathing. CARDIOVASCULAR: The carotid pulses were normal. Peripheral pulses were 2+. CHEST: Normal AP diameter and normal contour without any kyphoscoliosis. LYMPHATIC: No lymphadenopathy was appreciated in the neck, axillae or groin. SKIN:  Incision healing well, no drainage, no erythema, no hernia, no seroma, no swelling, no dehiscence, incision well approximated. Negative for scars, rashes, lesions, or ulcers on the right upper, right lower, left upper, left lower and trunk. NEUROLOGICAL: Alert and oriented x 3. Ambulation with single point cane. FWB. EXTREMITIES: See musculoskeletal.  MUSCULOSKELETAL:   Head and Neck:  Negative for misalignment, asymmetry, crepitation, defects, tenderness masses or effusions.  Left Upper Extremity: Slight tingling. Inspection, percussion and palpation performed. Dela Cruzs sign is negative.  Right Upper Extremity: Slight tingling. Inspection, percussion and palpation performed. Dela Cruzs sign is negative.  Spine, Ribs and Pelvis: Back pain. Inspection, percussion and palpation performed. Negative for misalignment, asymmetry, crepitation, defects, tenderness masses or effusions.  Left Lower Extremity: Aching pain. Tingling in foot. Inspection, percussion and palpation performed. Negative straight leg raise.  Right Lower Extremity: Aching pain. Tingling in foot. Inspection, percussion and palpation performed. Negative straight leg raise. SPINE EXAM:     Cervical spine: Neck is midline. Normal muscle tone. No focal atrophy is noted. Lumbar spine: No rash, ecchymosis, or gross obliquity. No focal atrophy is noted. ASSESSMENT    ICD-10-CM ICD-9-CM    1. S/P lumbar fusion Z98.1 V45.4    2. Lumbar stenosis with neurogenic claudication M48.062 724.03    3.  S/P cervical spinal fusion Z98.1 V45.4 Written by Devin Yip, as dictated by Sanjuanita Esquivel MD.    I, Dr. Sanjuanita Esquivel MD, confirm that all documentation is accurate.

## 2019-06-26 ENCOUNTER — TELEPHONE (OUTPATIENT)
Dept: ORTHOPEDIC SURGERY | Age: 44
End: 2019-06-26

## 2019-06-26 NOTE — TELEPHONE ENCOUNTER
Patient states Yessica needs prior authorization to fill the Roxicodone 5mg prescribed yesterday 6/25/19      Barbara [facility]  [phone]    Her# [phone]

## 2019-06-28 NOTE — TELEPHONE ENCOUNTER
PA has been approved. I called pharmacy to inform them of this. They said med was a zero copay and patient received on 6/25/19.  No further action needed,

## 2019-07-20 DIAGNOSIS — G99.2 MYELOPATHY CONCURRENT WITH AND DUE TO SPINAL STENOSIS OF CERVICAL REGION (HCC): ICD-10-CM

## 2019-07-20 DIAGNOSIS — G95.9 CERVICAL MYELOPATHY (HCC): ICD-10-CM

## 2019-07-20 DIAGNOSIS — M48.02 MYELOPATHY CONCURRENT WITH AND DUE TO SPINAL STENOSIS OF CERVICAL REGION (HCC): ICD-10-CM

## 2019-07-20 DIAGNOSIS — M54.16 LUMBAR RADICULOPATHY: ICD-10-CM

## 2019-07-22 RX ORDER — GABAPENTIN 300 MG/1
CAPSULE ORAL
Qty: 180 CAP | Refills: 0 | Status: SHIPPED | OUTPATIENT
Start: 2019-07-22 | End: 2019-08-09 | Stop reason: SDUPTHER

## 2019-07-25 DIAGNOSIS — Z98.1 S/P LUMBAR FUSION: ICD-10-CM

## 2019-07-25 NOTE — TELEPHONE ENCOUNTER
Please call patient. She is supposed to be tapering down. Does she feel she is able to cut back to #45 a month, so 1-2 tabs daily as needed, some days only 1 tab. She also needs a 6 week FU in 2 weeks. Can be with NP on Tuesday or Friday when Dr. Sydney Dominguez is here.

## 2019-07-25 NOTE — TELEPHONE ENCOUNTER
Patient is requesting a refill on oxyCODONE IR (ROXICODONE) 5 mg. Please advise and pend new Rx if appropriate.     Last visit:  6/25/19 with MD Deni Fulton appt:  Return in 6 weeks  Previous refill encounter:  6/25/19 #60

## 2019-07-25 NOTE — TELEPHONE ENCOUNTER
Spoke with patient, she stated she believes she can do #45 a month. Patient also scheduled for 8/9/19 for 6 week appointment. Patient will  the Rx along with her Gabapentin Rx(ready upfront, did not want to make 2 trips), when it is ready.

## 2019-07-26 RX ORDER — OXYCODONE HYDROCHLORIDE 5 MG/1
5 TABLET ORAL
Qty: 45 TAB | Refills: 0 | Status: SHIPPED | OUTPATIENT
Start: 2019-07-26 | End: 2019-08-25

## 2019-07-26 NOTE — TELEPHONE ENCOUNTER
Spoke with patient, informed Rx was ready for . Photo ID needed, patient stated understanding no further actions needed at this time.

## 2019-08-09 ENCOUNTER — OFFICE VISIT (OUTPATIENT)
Dept: ORTHOPEDIC SURGERY | Age: 44
End: 2019-08-09

## 2019-08-09 VITALS
DIASTOLIC BLOOD PRESSURE: 67 MMHG | BODY MASS INDEX: 43.22 KG/M2 | SYSTOLIC BLOOD PRESSURE: 121 MMHG | WEIGHT: 291.8 LBS | TEMPERATURE: 98.8 F | RESPIRATION RATE: 16 BRPM | HEART RATE: 82 BPM | HEIGHT: 69 IN | OXYGEN SATURATION: 96 %

## 2019-08-09 DIAGNOSIS — M48.02 MYELOPATHY CONCURRENT WITH AND DUE TO SPINAL STENOSIS OF CERVICAL REGION (HCC): ICD-10-CM

## 2019-08-09 DIAGNOSIS — M54.16 LUMBAR RADICULOPATHY: ICD-10-CM

## 2019-08-09 DIAGNOSIS — G99.2 MYELOPATHY CONCURRENT WITH AND DUE TO SPINAL STENOSIS OF CERVICAL REGION (HCC): ICD-10-CM

## 2019-08-09 DIAGNOSIS — G95.9 CERVICAL MYELOPATHY (HCC): ICD-10-CM

## 2019-08-09 RX ORDER — GABAPENTIN 300 MG/1
CAPSULE ORAL
Qty: 180 CAP | Refills: 5 | Status: SHIPPED | OUTPATIENT
Start: 2019-08-09 | End: 2019-11-08 | Stop reason: ALTCHOICE

## 2019-08-09 RX ORDER — VORTIOXETINE 20 MG/1
1 TABLET, FILM COATED ORAL DAILY
Refills: 0 | COMMUNITY
Start: 2019-07-18 | End: 2022-05-10

## 2019-08-09 RX ORDER — MELOXICAM 7.5 MG/1
7.5 TABLET ORAL
Qty: 30 TAB | Refills: 2 | Status: SHIPPED | OUTPATIENT
Start: 2019-08-09 | End: 2019-11-04 | Stop reason: SDUPTHER

## 2019-08-09 NOTE — PATIENT INSTRUCTIONS

## 2019-08-09 NOTE — PROGRESS NOTES
[address]  [address], [address]  [address]  Phone: [phone]  Fax: [phone]    Eusebio Khoury  : 1975  PCP: Ashley Michael MD    PROGRESS NOTE    HISTORY OF PRESENT ILLNESS:  Chief Complaint   Patient presents with    Back Pain     lower    Surgical Follow-up     L4-L5 bilateral hemilaminectomy; medial facetectomy; foraminotomy; L5-S1 right hemilaminectomy; medial facetectomy revision; discectomy revision; L4, L5, S1 posterolateral fusion     Chris Navas is a 53 y.o.  female with history of back pain. She is about 12 weeks out from her lumbar fusion. She was last seen with Dr. Yaw Day. She was making slow progress. She had continued back and leg pain. Per this note, \"  Her x-rays demonstrate appropriate and fixation as best they can. Details are obscured by obesity. She said she gets a popping sensation on the right with leg lifts. I see no disruption of her instrumentation per se. Her neck is steadily improving. \"  He discussed holding off on a CT scan and conintued Pm while she heals. Today, she states she is doing better. She still has some numbness in her toes and intermittently from her knees down. This sensation is less for certain. She is taking gabapentin and PRN Roxicodone. She will take a PRN Roxicodone. She is asking about Mobic. Denies bladder/bowel dysfunction, saddle paresthesia, weakness, gait disturbance, or other neurological deficit. Pt at this time desires to  continue with current care/proceed with medication evaluation. ASSESSMENT  53 y.o. female with back pain. Diagnoses and all orders for this visit:    1. Cervical myelopathy (HCC)  -     gabapentin (NEURONTIN) 300 mg capsule; TAKE 2 CAPSULES BY MOUTH THREE TIMES DAILY  Indications: Neuropathic Pain    2.  Myelopathy concurrent with and due to spinal stenosis of cervical region (HCC)  -     gabapentin (NEURONTIN) 300 mg capsule; TAKE 2 CAPSULES BY MOUTH THREE TIMES DAILY  Indications: Neuropathic Pain    3. Lumbar radiculopathy  -     gabapentin (NEURONTIN) 300 mg capsule; TAKE 2 CAPSULES BY MOUTH THREE TIMES DAILY  Indications: Neuropathic Pain    Other orders  -     meloxicam (MOBIC) 7.5 mg tablet; Take 1 Tab by mouth daily as needed for Pain. IMPRESSION/PLAN    1) Pt was given information on lumbar exercises. 2)  Cont gabapentin. Does not need refill of Radha. Is weaning down. Will resume mobic PRN. We discussed we are still healing and she will use this only as needed. 3) can call for [facility]. 4) Ms. Frida Ricks has a reminder for a \"due or due soon\" health maintenance. I have asked that she contact her primary care provider, Cristian Link MD, for follow-up on this health maintenance. 5) We have informed patient to notify us for immediate appointment if he has any worsening neurogical symptoms or if an emergency situation presents, then call 911  6) Pt will follow-up in 3 months for med fu and post op fu. Risks and benefits of ongoing therapy have been reviewed with the patient.  is appropriate.        PAST MEDICAL HISTORY  Past Medical History:   Diagnosis Date    Acute pain of left shoulder 11/1/2016    Bipolar 1 disorder ([address]     Bipolar affective disorder, currently manic, moderate ([address] 4/27/2016    Chronic midline low back pain with sciatica 11/1/2016    Cigarette nicotine dependence in remission 8/2/2016    Cigarette nicotine dependence without complication 08/00/3384    Continuous nicotine dependence 1/24/2017    Depression     Fibromyalgia 9/1/2015    HLD (hyperlipidemia) 2/4/2016    Hypertension     Hypertriglyceridemia 5/15/2018    Impaired fasting glucose 5/31/2016    Irritable bowel syndrome without diarrhea 4/27/2016    Moderate episode of recurrent major depressive disorder ([address] 4/27/2016    Obesity, Class III, BMI 40-49.9 (morbid obesity) ([address] 9/1/2015    Onychomycosis 4/27/2016    Pain of left thumb 11/1/2016    Prediabetes 7/5/2016    PTSD (post-traumatic stress disorder)     Smoker 9/1/2015    Vitamin D deficiency 5/31/2016        MEDICATIONS  Current Outpatient Medications   Medication Sig Dispense Refill    TRINTELLIX 20 mg tablet Take 1 Tab by mouth daily. 0    gabapentin (NEURONTIN) 300 mg capsule TAKE 2 CAPSULES BY MOUTH THREE TIMES DAILY  Indications: Neuropathic Pain 180 Cap 5    meloxicam (MOBIC) 7.5 mg tablet Take 1 Tab by mouth daily as needed for Pain. 30 Tab 2    oxyCODONE IR (ROXICODONE) 5 mg immediate release tablet Take 1 Tab by mouth two (2) times daily as needed for Pain for up to 30 days. Max Daily Amount: 10 mg. Indications: chronic pain 45 Tab 0    naloxone (NARCAN) 4 mg/actuation nasal spray Use 1 spray intranasally, then discard. Repeat with new spray every 2 min as needed for opioid overdose symptoms, alternating nostrils. 1 Each 0    acetaminophen (TYLENOL ARTHRITIS PAIN) 650 mg TbER Take 650 mg by mouth every eight (8) hours.  traZODone (DESYREL) 100 mg tablet Take 1 Tab by mouth every evening. 30 Tab 0    buPROPion SR (WELLBUTRIN, ZYBAN) 200 mg SR tablet Take 1 Tab by mouth two (2) times a day. 30 Tab 0    docusate sodium (STOOL SOFTENER PO) Take 100 mg by mouth daily.  hydrOXYzine HCl (ATARAX) 25 mg tablet Take 25 mg by mouth three (3) times daily as needed for Anxiety.  lamoTRIgine (LAMICTAL) 200 mg tablet Take 200 mg by mouth every evening.  losartan (COZAAR) 50 mg tablet Take 75 mg by mouth daily.  DULoxetine (CYMBALTA) 60 mg capsule Take 60 mg by mouth two (2) times a day.  lidocaine (LIDODERM) 5 % APPLY 1 PATCH TO THE AFFECTED AREA EVERY DAY. LEAVE ON FOR 12 HOURS THEN REMOVE FOR 12 HOURS 90 Patch 3    polyethylene glycol (MIRALAX) 17 gram/dose powder MIX AND DRINK 17 GRAMS BY MOUTH DAILY 1530 g 3    hydroCHLOROthiazide (HYDRODIURIL) 25 mg tablet Take 1 Tab by mouth daily.  90 Tab 3    lamoTRIgine (LAMICTAL) 100 mg tablet Take 100 mg by mouth daily.      varenicline (CHANTIX) 1 mg tablet Take 1 mg by mouth two (2) times a day. ALLERGIES  No Known Allergies    SOCIAL HISTORY    Social History     Socioeconomic History    Marital status: UNKNOWN     Spouse name: Not on file    Number of children: Not on file    Years of education: Not on file    Highest education level: Not on file   Occupational History    Not on file   Social Needs    Financial resource strain: Not on file    Food insecurity:     Worry: Not on file     Inability: Not on file    Transportation needs:     Medical: Not on file     Non-medical: Not on file   Tobacco Use    Smoking status: Current Every Day Smoker     Packs/day: 1.00     Years: 25.00     Pack years: 25.00    Smokeless tobacco: Never Used   Substance and Sexual Activity    Alcohol use:  Yes     Alcohol/week: 9.0 standard drinks     Types: 9 Shots of liquor per week     Comment: socially    Drug use: No    Sexual activity: Yes     Partners: Male     Birth control/protection: None   Lifestyle    Physical activity:     Days per week: Not on file     Minutes per session: Not on file    Stress: Not on file   Relationships    Social connections:     Talks on phone: Not on file     Gets together: Not on file     Attends Advent service: Not on file     Active member of club or organization: Not on file     Attends meetings of clubs or organizations: Not on file     Relationship status: Not on file    Intimate partner violence:     Fear of current or ex partner: Not on file     Emotionally abused: Not on file     Physically abused: Not on file     Forced sexual activity: Not on file   Other Topics Concern     Service Not Asked    Blood Transfusions Not Asked    Caffeine Concern Not Asked    Occupational Exposure Not Asked   Bobbye Priestly Hazards Not Asked    Sleep Concern Not Asked    Stress Concern Not Asked    Weight Concern Not Asked    Special Diet Not Asked    Back Care Not Asked    Exercise Not Asked    Bike Helmet Not Asked    Seat Belt Not Asked    Self-Exams Not Asked   Social History Narrative    Not on file       SUBJECTIVE      Pain Scale: 4/10    Pain Assessment  8/9/2019   Location of Pain Back   Pain Location Comment -   Location Modifiers -   Severity of Pain 4   Quality of Pain Aching   Quality of Pain Comment -   Duration of Pain Persistent   Frequency of Pain Constant   Aggravating Factors -   Limiting Behavior Yes   Relieving Factors -   Relieving Factors Comment -   Result of Injury No       Accompanied by self. REVIEW OF SYSTEMS  ROS    Constitutional: Negative for fever, chills, or weight change. Respiratory: Negative for cough or shortness of breath. Cardiovascular: Negative for chest pain or palpitations. Gastrointestinal: Negative for acid reflux, change in bowel habits, or constipation. Genitourinary: Negative for incontinence, dysuria and flank pain. Musculoskeletal: Positive for neck and back pain. Skin: Negative for rash. Neurological: Negative for headaches, dizziness, or numbness. Endo/Heme/Allergies: Negative . Psychiatric/Behavioral: Negative. PHYSICAL EXAMINATION  Visit Vitals  /67   Pulse 82   Temp 98.8 °F (37.1 °C) (Oral)   Resp 16   Ht 5' 9\" (1.753 m)   Wt 291 lb 12.8 oz (132.4 kg)   SpO2 96%   BMI 43.09 kg/m²       Constitutional: Well developed,  well nourished,  awake, alert, and in no acute distress. Neurological:  Sensation to light touch is intact. Psychiatric: Affect and mood are appropriate. Integumentary: No rashes or abrasions noted on exposed areas,  warm, dry and intact. Cardiovascular/Peripheral Vascular:  No peripheral edema is noted. Lymphatic:  No evidence of lymphedema. No cervical lymphadenopathy. SPINE/MUSCULOSKELETAL EXAM    Cervical spine:  Neck is midline. Normal muscle tone. No focal atrophy is noted. Shoulder ROM intact. No Tenderness to palpation. Negative Spurling's sign. Negative Tinel's sign. Negative Dela Cruz's sign. Lumbar spine:  No rash, ecchymosis, or gross obliquity. No fasciculations. No focal atrophy is noted. Range of motion is intact. Mild Tenderness to palpation to lumbar spine. SI joints non-tender. Trochanters non tender. Musculoskeletal:  No pain with extension, axial loading, or forward flexion. No pain with internal or external rotation of her hips. MOTOR    Biceps  Triceps Deltoids Wrist Ext Wrist Flex Hand Intrin   Right +4/5 +4/5 +4/5 +4/5 +4/5 +4/5   Left +4/5 +4/5 +4/5 +4/5 +4/5 +4/5      Hip Flex  Quads Hamstrings Ankle DF EHL Ankle PF   Right +4/5 +4/5 +4/5 +4/5 +4/5 +4/5   Left +4/5 +4/5 +4/5 +4/5 +4/5 +4/5   Straight Leg raise - bilaterally. normal gait and station    Ambulation with single point cane. full weight bearing, non-antalgic gait.     Jj Reynolds, ISSAC

## 2019-08-26 ENCOUNTER — HOSPITAL ENCOUNTER (EMERGENCY)
Age: 44
Discharge: HOME | End: 2019-08-26
Attending: EMERGENCY MEDICINE
Payer: MEDICAID

## 2019-08-26 VITALS
BODY MASS INDEX: 42.23 KG/M2 | OXYGEN SATURATION: 98 % | DIASTOLIC BLOOD PRESSURE: 95 MMHG | HEART RATE: 85 BPM | SYSTOLIC BLOOD PRESSURE: 160 MMHG | RESPIRATION RATE: 14 BRPM | TEMPERATURE: 98.4 F | WEIGHT: 286 LBS

## 2019-08-26 DIAGNOSIS — S61.512A LACERATION OF LEFT WRIST, INITIAL ENCOUNTER: Primary | ICD-10-CM

## 2019-08-26 PROCEDURE — 99282 EMERGENCY DEPT VISIT SF MDM: CPT

## 2019-08-26 PROCEDURE — 75810000293 HC SIMP/SUPERF WND  RPR

## 2019-08-26 RX ORDER — CEPHALEXIN 500 MG/1
500 CAPSULE ORAL 2 TIMES DAILY
Qty: 14 CAP | Refills: 0 | Status: SHIPPED | OUTPATIENT
Start: 2019-08-26 | End: 2019-09-02

## 2019-08-26 NOTE — ED TRIAGE NOTES
Lac to left wrist 30 minutes ago while cleaning a glass jar. Last tetanus 3 years ago.   Bleeding controlled

## 2019-08-27 NOTE — DISCHARGE INSTRUCTIONS
[employer] Activation    Thank you for requesting access to [facility]. Please follow the instructions below to securely access and download your online medical record. [facility] allows you to send messages to your doctor, view your test results, renew your prescriptions, schedule appointments, and more. How Do I Sign Up? 1. In your internet browser, go to [email or web address]  2. Click on the First Time User? Click Here link in the Sign In box. You will be redirect to the New Member Sign Up page. 3. Enter your [facility] Access Code exactly as it appears below. You will not need to use this code after youve completed the sign-up process. If you do not sign up before the expiration date, you must request a new code. [facility] Access Code: Activation code not generated  Current [facility] Status: Active (This is the date your [facility] access code will )    4. Enter the last four digits of your Social Security Number (xxxx) and Date of Birth (mm/dd/yyyy) as indicated and click Submit. You will be taken to the next sign-up page. 5. Create a [facility] ID. This will be your [facility] login ID and cannot be changed, so think of one that is secure and easy to remember. 6. Create a [facility] password. You can change your password at any time. 7. Enter your Password Reset Question and Answer. This can be used at a later time if you forget your password. 8. Enter your e-mail address. You will receive e-mail notification when new information is available in [address] 9. Click Sign Up. You can now view and download portions of your medical record. 10. Click the Download Summary menu link to download a portable copy of your medical information. Additional Information    If you have questions, please visit the Frequently Asked Questions section of the [facility] website at [email or web address] Remember, [facility] is NOT to be used for urgent needs. For medical emergencies, dial 911.        Complete all medications as prescribed. Follow-up with primary care doctor in 1 week. Return to the ED immediately for any new or worsening symptoms.

## 2019-08-27 NOTE — ED PROVIDER NOTES
EMERGENCY DEPARTMENT HISTORY AND PHYSICAL EXAM    Date: 8/26/2019  Patient Name: Cira Ballard    History of Presenting Illness     Chief Complaint   Patient presents with    Laceration         History Provided By: patient     Chief Complaint: laceration  Duration:just PTA  Timing acute  Location: L wrist  Quality:throbbing  Severity:moderate  Modifying Factors: none   Associated Symptoms: none      Additional History (Context): Cira Ballard is a 53 y.o. female with PMH htn, bipolar disorder, and PTSD  who presents with c/o a laceration to the L wrist sustained PTA. Pt states she accidentally cut her wrist on a piece of glass while washing dishes. Tetanus is UTD. No other complaints. PCP: Ed Limon MD    Current Outpatient Medications   Medication Sig Dispense Refill    cephALEXin (KEFLEX) 500 mg capsule Take 1 Cap by mouth two (2) times a day for 7 days. 14 Cap 0    TRINTELLIX 20 mg tablet Take 1 Tab by mouth daily. 0    gabapentin (NEURONTIN) 300 mg capsule TAKE 2 CAPSULES BY MOUTH THREE TIMES DAILY  Indications: Neuropathic Pain 180 Cap 5    meloxicam (MOBIC) 7.5 mg tablet Take 1 Tab by mouth daily as needed for Pain. 30 Tab 2    naloxone (NARCAN) 4 mg/actuation nasal spray Use 1 spray intranasally, then discard. Repeat with new spray every 2 min as needed for opioid overdose symptoms, alternating nostrils. 1 Each 0    acetaminophen (TYLENOL ARTHRITIS PAIN) 650 mg TbER Take 650 mg by mouth every eight (8) hours.  traZODone (DESYREL) 100 mg tablet Take 1 Tab by mouth every evening. 30 Tab 0    buPROPion SR (WELLBUTRIN, ZYBAN) 200 mg SR tablet Take 1 Tab by mouth two (2) times a day. 30 Tab 0    varenicline (CHANTIX) 1 mg tablet Take 1 mg by mouth two (2) times a day.  docusate sodium (STOOL SOFTENER PO) Take 100 mg by mouth daily.  hydrOXYzine HCl (ATARAX) 25 mg tablet Take 25 mg by mouth three (3) times daily as needed for Anxiety.       lamoTRIgine (LAMICTAL) 200 mg tablet Take 200 mg by mouth every evening.  losartan (COZAAR) 50 mg tablet Take 75 mg by mouth daily.  DULoxetine (CYMBALTA) 60 mg capsule Take 60 mg by mouth two (2) times a day.  lidocaine (LIDODERM) 5 % APPLY 1 PATCH TO THE AFFECTED AREA EVERY DAY. LEAVE ON FOR 12 HOURS THEN REMOVE FOR 12 HOURS 90 Patch 3    polyethylene glycol (MIRALAX) 17 gram/dose powder MIX AND DRINK 17 GRAMS BY MOUTH DAILY 1530 g 3    hydroCHLOROthiazide (HYDRODIURIL) 25 mg tablet Take 1 Tab by mouth daily. 90 Tab 3    lamoTRIgine (LAMICTAL) 100 mg tablet Take 100 mg by mouth daily. Past History     Past Medical History:  Past Medical History:   Diagnosis Date    Acute pain of left shoulder 11/1/2016    Bipolar 1 disorder ([address]     Bipolar affective disorder, currently manic, moderate ([address] 4/27/2016    Chronic midline low back pain with sciatica 11/1/2016    Cigarette nicotine dependence in remission 8/2/2016    Cigarette nicotine dependence without complication 83/22/1750    Continuous nicotine dependence 1/24/2017    Depression     Fibromyalgia 9/1/2015    HLD (hyperlipidemia) 2/4/2016    Hypertension     Hypertriglyceridemia 5/15/2018    Impaired fasting glucose 5/31/2016    Irritable bowel syndrome without diarrhea 4/27/2016    Moderate episode of recurrent major depressive disorder ([address] 4/27/2016    Obesity, Class III, BMI 40-49.9 (morbid obesity) ([facility] 9/1/2015    Onychomycosis 4/27/2016    Pain of left thumb 11/1/2016    Prediabetes 7/5/2016    PTSD (post-traumatic stress disorder)     Smoker 9/1/2015    Vitamin D deficiency 5/31/2016       Past Surgical History:  Past Surgical History:   Procedure Laterality Date    HX ACL RECONSTRUCTION      HX APPENDECTOMY      HX CERVICAL DISKECTOMY  02/07/2019    with fusion C5/6 C6/7    HX CERVICAL FUSION  0513/19    L4-L5 bilateral hemilaminectomy; medial facetectomy; foraminotomy;  L5-S1 right hemilaminectomy; medial facetectomy revision; discectomy revision; L4, L5, S1 posterolateral fusion    HX CERVICAL LAMINECTOMY  03/19/2019    with fusion    HX GYN Bilateral     tubal    HX LUMBAR LAMINECTOMY Bilateral 05/13/2019    L4-L5 bilateral hemilaminectomy; medial facetectomy; foraminotomy; L5-S1 right hemilaminectomy; medial facetectomy revision; discectomy revision; L4, L5, S1 posterolateral fusion    HX ORTHOPAEDIC         Family History:  Family History   Problem Relation Age of Onset    Hypertension Mother     Thyroid Disease Mother     No Known Problems Father     Psychiatric Disorder Sister     Psychiatric Disorder Brother     Psychiatric Disorder Sister     Diabetes Sister     Psychiatric Disorder Brother     Psychiatric Disorder Brother        Social History:  Social History     Tobacco Use    Smoking status: Current Every Day Smoker     Packs/day: 1.00     Years: 25.00     Pack years: 25.00    Smokeless tobacco: Never Used   Substance Use Topics    Alcohol use: Yes     Alcohol/week: 9.0 standard drinks     Types: 9 Shots of liquor per week     Comment: socially    Drug use: No       Allergies:  No Known Allergies      Review of Systems   Review of Systems   Constitutional: Negative. Negative for chills and fever. HENT: Negative. Negative for congestion, ear pain and rhinorrhea. Eyes: Negative. Negative for pain and redness. Respiratory: Negative. Negative for cough, shortness of breath, wheezing and stridor. Cardiovascular: Negative. Negative for chest pain and leg swelling. Gastrointestinal: Negative. Negative for abdominal pain, constipation, diarrhea, nausea and vomiting. Genitourinary: Negative. Negative for dysuria and frequency. Musculoskeletal: Negative. Negative for back pain and neck pain. Skin: Positive for wound. Negative for rash. Neurological: Negative. Negative for dizziness, seizures, syncope and headaches. All other systems reviewed and are negative.     All Other Systems Negative  Physical Exam Vitals:    08/26/19 1951   BP: (!) 160/95   Pulse: 85   Resp: 14   Temp: 98.4 °F (36.9 °C)   SpO2: 98%   Weight: 129.7 kg (286 lb)     Physical Exam   Constitutional: She is oriented to person, place, and time. She appears well-developed and well-nourished. No distress. HENT:   Head: Normocephalic and atraumatic. Eyes: Conjunctivae are normal. Right eye exhibits no discharge. Left eye exhibits no discharge. No scleral icterus. Neck: Normal range of motion. Neck supple. Cardiovascular: Normal rate. Pulmonary/Chest: Effort normal. No stridor. No respiratory distress. Musculoskeletal: Normal range of motion. She exhibits no edema, tenderness or deformity. LUE: intact pulses, cap RF < 3 sec, no bony deformity TTP or edema noted. ROM of all joints intact. Neurological: She is alert and oriented to person, place, and time. Coordination normal.   Gait is steady. Able to ambulate without difficulty. Skin: Skin is warm and dry. No rash noted. She is not diaphoretic. No erythema. 5 cm laceration into the subcutaneous tissue noted to the volar surface of the wrist, bleeding controlled, no retained FB noted. No tendon involvement noted   Psychiatric: She has a normal mood and affect. Her behavior is normal. Thought content normal.   Nursing note and vitals reviewed. Diagnostic Study Results     Labs -   No results found for this or any previous visit (from the past 12 hour(s)). Radiologic Studies -   No orders to display     CT Results  (Last 48 hours)    None        CXR Results  (Last 48 hours)    None            Medical Decision Making   I am the first provider for this patient. I reviewed the vital signs, available nursing notes, past medical history, past surgical history, family history and social history. Vital Signs-Reviewed the patient's vital signs.         Records Reviewed: Hetal Hendrickson PA-C     Procedures:  Wound Repair  Date/Time: 8/26/2019 8:51 PM  Performed by: PAPreparation: skin prepped with Betadine and sterile field established  Pre-procedure re-eval: Immediately prior to the procedure, the patient was reevaluated and found suitable for the planned procedure and any planned medications. Time out: Immediately prior to the procedure a time out was called to verify the correct patient, procedure, equipment, staff and marking as appropriate. .  Location: L wrist.  Wound length:2.6 - 7.5 cm  Anesthesia: local infiltration    Anesthesia:  Local Anesthetic: lidocaine 1% without epinephrine  Anesthetic total: 5 mL  Foreign bodies: no foreign bodies  Irrigation solution: saline  Irrigation method: syringe  Debridement: none  Skin closure: 5-0 nylon  Number of sutures: 13  Technique: simple and interrupted  Approximation: close  Patient tolerance: Patient tolerated the procedure well with no immediate complications  My total time at bedside, performing this procedure was 16-30 minutes. Provider Notes (Medical Decision Making): Impression:  Laceration    Wound sutured, will plan to d/c with keflex with pcp follow-up. Pt agrees. Hetal Hendrickson PA-C     MED RECONCILIATION:  No current facility-administered medications for this encounter. Current Outpatient Medications   Medication Sig    cephALEXin (KEFLEX) 500 mg capsule Take 1 Cap by mouth two (2) times a day for 7 days.  TRINTELLIX 20 mg tablet Take 1 Tab by mouth daily.  gabapentin (NEURONTIN) 300 mg capsule TAKE 2 CAPSULES BY MOUTH THREE TIMES DAILY  Indications: Neuropathic Pain    meloxicam (MOBIC) 7.5 mg tablet Take 1 Tab by mouth daily as needed for Pain.  naloxone (NARCAN) 4 mg/actuation nasal spray Use 1 spray intranasally, then discard. Repeat with new spray every 2 min as needed for opioid overdose symptoms, alternating nostrils.  acetaminophen (TYLENOL ARTHRITIS PAIN) 650 mg TbER Take 650 mg by mouth every eight (8) hours.     traZODone (DESYREL) 100 mg tablet Take 1 Tab by mouth every evening.  buPROPion SR (WELLBUTRIN, ZYBAN) 200 mg SR tablet Take 1 Tab by mouth two (2) times a day.  varenicline (CHANTIX) 1 mg tablet Take 1 mg by mouth two (2) times a day.  docusate sodium (STOOL SOFTENER PO) Take 100 mg by mouth daily.  hydrOXYzine HCl (ATARAX) 25 mg tablet Take 25 mg by mouth three (3) times daily as needed for Anxiety.  lamoTRIgine (LAMICTAL) 200 mg tablet Take 200 mg by mouth every evening.  losartan (COZAAR) 50 mg tablet Take 75 mg by mouth daily.  DULoxetine (CYMBALTA) 60 mg capsule Take 60 mg by mouth two (2) times a day.  lidocaine (LIDODERM) 5 % APPLY 1 PATCH TO THE AFFECTED AREA EVERY DAY. LEAVE ON FOR 12 HOURS THEN REMOVE FOR 12 HOURS    polyethylene glycol (MIRALAX) 17 gram/dose powder MIX AND DRINK 17 GRAMS BY MOUTH DAILY    hydroCHLOROthiazide (HYDRODIURIL) 25 mg tablet Take 1 Tab by mouth daily.  lamoTRIgine (LAMICTAL) 100 mg tablet Take 100 mg by mouth daily. Disposition:  D/c    DISCHARGE NOTE:   Patient is stable for discharge at this time. Rx for keflex given. Rest and follow-up with PCP this week. Return to the ED immediately for any new or worsening sx. Hetal Hendrickson PA-C     Follow-up Information     Follow up With Specialties Details Why Contact Info    Sepideh Arriaga MD [facility] Schedule an appointment as soon as possible for a visit in 1 week For suture removal [address] [facility] [address] [phone]      [facility] EMERGENCY DEPT Emergency Medicine  As needed, If symptoms worsen [address] [phone]          Current Discharge Medication List      START taking these medications    Details   cephALEXin (KEFLEX) 500 mg capsule Take 1 Cap by mouth two (2) times a day for 7 days. Qty: 14 Cap, Refills: 0                 Diagnosis     Clinical Impression:   1.  Laceration of left wrist, initial encounter

## 2019-11-04 RX ORDER — MELOXICAM 7.5 MG/1
TABLET ORAL
Qty: 30 TAB | Refills: 0 | Status: SHIPPED | OUTPATIENT
Start: 2019-11-04 | End: 2019-12-05 | Stop reason: SDUPTHER

## 2019-11-08 ENCOUNTER — OFFICE VISIT (OUTPATIENT)
Dept: ORTHOPEDIC SURGERY | Age: 44
End: 2019-11-08

## 2019-11-08 VITALS
SYSTOLIC BLOOD PRESSURE: 153 MMHG | DIASTOLIC BLOOD PRESSURE: 85 MMHG | TEMPERATURE: 98.1 F | HEART RATE: 77 BPM | HEIGHT: 69 IN | RESPIRATION RATE: 16 BRPM | BODY MASS INDEX: 42.98 KG/M2 | WEIGHT: 290.2 LBS

## 2019-11-08 DIAGNOSIS — M54.16 LUMBAR RADICULOPATHY: ICD-10-CM

## 2019-11-08 DIAGNOSIS — Z98.1 S/P LUMBAR FUSION: ICD-10-CM

## 2019-11-08 DIAGNOSIS — Z98.1 S/P CERVICAL SPINAL FUSION: ICD-10-CM

## 2019-11-08 DIAGNOSIS — G95.9 CERVICAL MYELOPATHY (HCC): Primary | ICD-10-CM

## 2019-11-08 RX ORDER — CYCLOBENZAPRINE HCL 10 MG
10 TABLET ORAL
Qty: 30 TAB | Refills: 1 | Status: SHIPPED | OUTPATIENT
Start: 2019-11-08 | End: 2020-04-06

## 2019-11-08 RX ORDER — GABAPENTIN 800 MG/1
800 TABLET ORAL 3 TIMES DAILY
Qty: 90 TAB | Refills: 2 | Status: SHIPPED | OUTPATIENT
Start: 2019-11-08 | End: 2020-02-24

## 2019-11-08 NOTE — PATIENT INSTRUCTIONS
Gabapentin (By mouth)   Gabapentin (arnulfo-a-PEN-tin)  Treats seizures and pain caused by shingles. Brand Name(s): ACTIVE-PAC with Gabapentin, Convenience Jayjay, Cyclo/Ryley 10/300 Pack, FusePaq Fanatrex, Ryley-V, Gralise, [address] Pack, Neurontin, SmartRx Ryley Kit   There may be other brand names for this medicine. When This Medicine Should Not Be Used: This medicine is not right for everyone. Do not use it if you had an allergic reaction to gabapentin. How to Use This Medicine:   Capsule, Liquid, Tablet  · Take your medicine as directed. Your dose may need to be changed several times to find what works best for you. If you have epilepsy, do not allow more than 12 hours to pass between doses. · Capsule: Swallow the capsule whole with plenty of water. Do not open, crush, or chew it. · Gralise® tablet: Swallow the tablet whole . Do not crush, break, or chew it. · Neurontin® tablet: If you break a tablet into 2 pieces, use the second half as your next dose. If you don't use it within 28 days, throw it away. · Measure the oral liquid medicine with a marked measuring spoon, oral syringe, or medicine cup. · This medicine should come with a Medication Guide. Ask your pharmacist for a copy if you do not have one. · Missed dose: Take a dose as soon as you remember. If it is almost time for your next dose, wait until then and take a regular dose. Do not take extra medicine to make up for a missed dose. · Store the medicine in a closed container at room temperature, away from heat, moisture, and direct light. Store the Neurontin® oral liquid in the refrigerator. Do not freeze. Drugs and Foods to Avoid:   Ask your doctor or pharmacist before using any other medicine, including over-the-counter medicines, vitamins, and herbal products. · Some medicines can affect how gabapentin works.  Tell your doctor if you also use any of the following:   ¨ Hydrocodone  ¨ Morphine  · If you take an antacid, wait at least 2 hours before you take gabapentin. · Tell your doctor if you use anything else that makes you sleepy. Some examples are allergy medicine, narcotic pain medicine, and alcohol. Warnings While Using This Medicine:   · Tell your doctor if you are pregnant or breastfeeding, or if you have kidney problems or are receiving dialysis. Tell your doctor if you have a history of depression or mental health problems. · This medicine may increase depression or thoughts of suicide. Tell your doctor right away if you start to feel more depressed or think about hurting yourself. · This medicine may cause a serious allergic reaction called multiorgan hypersensitivity, which can damage organs and be life-threatening. · Do not stop using this medicine suddenly. Your doctor will need to slowly decrease your dose before you stop it completely. If you take this medicine to prevent seizures, your seizures may return or occur more often if you stop this medicine suddenly. · This medicine may make you dizzy or drowsy. Do not drive or do anything else that could be dangerous until you know how this medicine affects you. · Tell any doctor or dentist who treats you that you are using this medicine. This medicine may affect certain medical test results. · Your doctor will check your progress and the effects of this medicine at regular visits. Keep all appointments. · Keep all medicine out of the reach of children. Never share your medicine with anyone.   Possible Side Effects While Using This Medicine:   Call your doctor right away if you notice any of these side effects:  · Allergic reaction: Itching or hives, swelling in your face or hands, swelling or tingling in your mouth or throat, chest tightness, trouble breathing  · Behavior problems, aggression, restlessness, trouble concentrating, moodiness (especially in children)  · Blistering, peeling, red skin rash  · Change in how much or how often you urinate, bloody or cloudy urine,  · Chest pain, fast heartbeat, trouble breathing  · Dark urine or pale stools, nausea, vomiting, loss of appetite, stomach pain, yellow skin or eyes  · Fever, rash, swollen or tender glands in the neck, armpit, or groin  · Problems with coordination, shakiness, unsteadiness  · Rapid weight gain, swelling in your hands, ankles, or feet  · Unusual moods or behaviors, thoughts of hurting yourself, feeling depressed  If you notice these less serious side effects, talk with your doctor:   · Dizziness, drowsiness, sleepiness, tiredness  If you notice other side effects that you think are caused by this medicine, tell your doctor. Call your doctor for medical advice about side effects. You may report side effects to FDA at 6-664-FDA-0246  © 2017 [facility] Information is for End User's use only and may not be sold, redistributed or otherwise used for commercial purposes. The above information is an  only. It is not intended as medical advice for individual conditions or treatments. Talk to your doctor, nurse or pharmacist before following any medical regimen to see if it is safe and effective for you.

## 2019-11-08 NOTE — PROGRESS NOTES
Michelle Campbell presents today for   Chief Complaint   Patient presents with    Back Pain     fu       Is someone accompanying this pt? NO    Is the patient using any DME equipment during OV? YES, one point cane    Depression Screening:  3 most recent PHQ Screens 5/15/2018   Little interest or pleasure in doing things Nearly every day   Feeling down, depressed, irritable, or hopeless Nearly every day   Total Score PHQ 2 6   Trouble falling or staying asleep, or sleeping too much -   Feeling tired or having little energy -   Poor appetite, weight loss, or overeating -   Feeling bad about yourself - or that you are a failure or have let yourself or your family down -   Trouble concentrating on things such as school, work, reading, or watching TV -   Moving or speaking so slowly that other people could have noticed; or the opposite being so fidgety that others notice -   Thoughts of being better off dead, or hurting yourself in some way -   PHQ 9 Score -   How difficult have these problems made it for you to do your work, take care of your home and get along with others -       Learning Assessment:  Learning Assessment 11/8/2019   PRIMARY LEARNER Patient   HIGHEST LEVEL OF EDUCATION - PRIMARY LEARNER  -   BARRIERS PRIMARY LEARNER -   CO-LEARNER CAREGIVER -   PRIMARY LANGUAGE ENGLISH   LEARNER PREFERENCE PRIMARY LISTENING   ANSWERED BY patient   RELATIONSHIP SELF       Abuse Screening:  Abuse Screening Questionnaire 4/26/2017   Do you ever feel afraid of your partner? N   Are you in a relationship with someone who physically or mentally threatens you? N   Is it safe for you to go home? Y       Fall Risk  Fall Risk Assessment, last 12 mths 8/10/2017   Able to walk? Yes   Fall in past 12 months? No       OPIOID RISK TOOL  Opioid Risk Tool 6/25/2019   Family history of alcohol abuse? 0   Family history of illegal drug abuse? 0   Family history of prescription drug abuse? 0   Personal history of alcohol abuse?  0   Personal history of illegal drug abuse? 0   Personal history of prescription drug abuse? 0   Age range between 17-45? 0   History of preadolescent sexual abuse? 0   ADD, OCD, bipolar, schizophrenia? 2   Depression? 1   Opioid Risk Total Score 3       Coordination of Care:  1. Have you been to the ER, urgent care clinic since your last visit? YES  Hospitalized since your last visit? NO    2. Have you seen or consulted any other health care providers outside of the [address] [place] [address] since your last visit? NO Include any pap smears or colon screening.  NO    Last  Checked 11/8/19

## 2019-11-08 NOTE — PROGRESS NOTES
[facility]  [address], [address]  [place], [address] [place] [address] Street  Phone: [phone]  Fax: [phone]  PROGRESS NOTE  Patient: Amalai Abdalla                MRN: [id number]       SSN: [id number]  YOB: 1975        AGE: 53 y.o. SEX: female  Body mass index is 42.86 kg/m². PCP: Yessi Easton MD  11/08/19    Chief Complaint   Patient presents with    Back Pain     fu       HISTORY OF PRESENT ILLNESS:  Amalia Abdalla is a 53 y.o.  female with history of chronic neck and back pain with BUE/BLE numbness weakness and balance and dexterity issues. She had an ACDF C5-7 and then a posterior fusion earlier this year. She also had an L4-5 Fusion earlier this year. She had cervical myelopathy and lumbar stenosis. She was wheelchair bound before her surgeries. She can now ambulate w/out an assistive device but uses a cane for long distances. She is left w/ neck and shoulder pain, no arm pain and weakness is now resolved. She also has back pain and BLE numbness around her knees and her feet. She has difficulty sleeping at night. She is overall much improved. On exam she is neuro intact w/ good strength. Denies bladder/bowel dysfunction, saddle paresthesia, weakness, gait disturbance, or other neurological deficits. Medications: Gabapentin 600mg TID, Mobic 7.5 PRN, Cymbalta 60mg, Trazodone 100mg QHS and Lamictal with moderate, relief    PMHx: Fibromyalgia, Bipolar    Spine Surgeries  1. 2/7/2019  ACDF C5-7    2. 3/19/2019  C5-7 Posterior Fusion    3. 5/14/2019  L4-L5 bilateral hemilaminectomy; medial facetectomy; foraminotomy; L5-S1 right hemilaminectomy; medial facetectomy revision; discectomy revision; L4, L5, S1 posterolateral fusion; segmental spinal instrumentation; NuVasive type L4, L5, S1.    XRays Today  2V CS  Interpretation for Dr. Reid Baca  Interpretation for Dr. Atif Hou   Diagnoses and all orders for this visit:    1.  Cervical myelopathy (HCC)  -     REFERRAL TO PHYSICAL THERAPY  -     gabapentin (NEURONTIN) 800 mg tablet; Take 1 Tab by mouth three (3) times daily. Max Daily Amount: 2,400 mg.  -     AMB POC XRAY, SPINE, CERVICAL; 2 OR 3    2. Lumbar radiculopathy  -     REFERRAL TO PHYSICAL THERAPY  -     gabapentin (NEURONTIN) 800 mg tablet; Take 1 Tab by mouth three (3) times daily. Max Daily Amount: 2,400 mg.  -     AMB POC XRAY, SPINE, LUMBOSACRAL; 2 O    3. S/P lumbar fusion  -     AMB POC XRAY, SPINE, LUMBOSACRAL; 2 O    4. S/P cervical spinal fusion  -     AMB POC XRAY, SPINE, CERVICAL; 2 OR 3    Other orders  -     cyclobenzaprine (FLEXERIL) 10 mg tablet; Take 1 Tab by mouth nightly. IMPRESSION AND PLAN:  This is a pt with cervical and lumbar stenosis post fusions who is doing great. She is a smoker, I've advised that she quit. We are going to get her in some PT and try some Flexeril at night. We will increase her Gabapentin for her nerve pain.    > Pt was given information on Gabapentin   > PT aqua to land   > Increase Gabapentin  > Trial of Flexeril  > Cont Mobic  > Ms. Amaya Book has a reminder for a \"due or due soon\" health maintenance. I have asked that she contact her primary care provider, Saba Oates MD, for follow-up on this health maintenance.  > We have informed patient to notify us for immediate appointment if he has any worsening neurogical symptoms or if an emergency situation presents, then call 911  >  has been reviewed and is appropriate  > Pt will follow-up in 3 mo w/ Dr Cam Doyle. Subjective    Work Disability    Smoking Status 1/2 pack a day    Pain Scale: 3/10    Pain Assessment  11/8/2019   Location of Pain Back;Knee; Foot   Pain Location Comment -   Location Modifiers Left;Right   Severity of Pain 3   Quality of Pain Aching   Quality of Pain Comment numbness tingling   Duration of Pain Persistent   Frequency of Pain Constant   Aggravating Factors Walking;Standing   Limiting Behavior Some   Relieving Factors Nothing   Relieving Factors Comment -   Result of Injury -         REVIEW OF SYSTEMS  Constitutional: Negative for fever, chills, or weight change. Respiratory: Negative for cough or shortness of breath. Cardiovascular: Negative for chest pain or palpitations. Gastrointestinal: Negative for incontinence, acid reflux, change in bowel habits, or constipation. Genitourinary: Negative for incontinence, dysuria and flank pain. Musculoskeletal: Positive for neck, shoulder, back and BLE pain. See HPI. Skin: Negative for rash. Neurological:BLE foot neuropathy  radiculopathy. See HPI. Endo/Heme/Allergies: Negative. Psychiatric/Behavioral: Negative. PHYSICAL EXAMINATION  Visit Vitals  /85 (BP 1 Location: Left arm, BP Patient Position: Sitting)   Pulse 77   Temp 98.1 °F (36.7 °C) (Oral)   Resp 16   Ht 5' 9\" (1.753 m)   Wt 290 lb 3.2 oz (131.6 kg)   BMI 42.86 kg/m²         Accompanied by self. Constitutional:  Well developed, well nourished, in no acute distress. Psychiatric: Affect and mood are appropriate. Integumentary: No rashes or abrasions noted on exposed areas. Cardiovascular/Peripheral Vascular: +2 radial & pedal pulses. No peripheral edema is noted. Lymphatic:  No evidence of lymphedema. No cervical lymphadenopathy. SPINE/MUSCULOSKELETAL EXAM  Cervical spine:  Neck is midline. Normal muscle tone. No focal atrophy is noted. Neck ROM decreased with flexion, extension, turning right, turning left. Shoulder ROM intact. Tenderness to palpation bilat trap. Negative Spurling's sign. Negative Tinel's sign. Negative Dela Cruz's sign. Sensation grossly intact to light touch. Lumbar spine:  No rash, ecchymosis, or gross obliquity. No fasciculations. No focal atrophy is noted. Range of motion is decreased with flexion, extension. Tenderness to palpation diffuse low back pain. No tenderness to palpation at the sciatic notch. SI joints non-tender. Trochanters non tender. Straight leg raise neg    Sensation grossly intact to light touch. MOTOR:        Biceps  Triceps Deltoids Wrist Ext Wrist Flex Hand Intrin   Right +4/5 +4/5 +4/5 +4/5 +4/5 +4/5   Left +4/5 +4/5 +4/5 +4/5 +4/5 +4/5      Hip Flex Quads Hamstrings Ankle DF EHL Ankle PF   Right +4/5 +4/5 +4/5 +4/5 +4/5 +4/5   Left +4/5 +4/5 +4/5 +4/5 +4/5 +4/5         Ambulation with single point cane. FWB. Non antalgic gait        PAST MEDICAL HISTORY   Past Medical History:   Diagnosis Date    Acute pain of left shoulder 11/1/2016    Bipolar 1 disorder ([address]     Bipolar affective disorder, currently manic, moderate ([address] 4/27/2016    Chronic midline low back pain with sciatica 11/1/2016    Cigarette nicotine dependence in remission 8/2/2016    Cigarette nicotine dependence without complication 56/79/3150    Continuous nicotine dependence 1/24/2017    Depression     Fibromyalgia 9/1/2015    HLD (hyperlipidemia) 2/4/2016    Hypertension     Hypertriglyceridemia 5/15/2018    Impaired fasting glucose 5/31/2016    Irritable bowel syndrome without diarrhea 4/27/2016    Moderate episode of recurrent major depressive disorder ([address] 4/27/2016    Obesity, Class III, BMI 40-49.9 (morbid obesity) ([address] 9/1/2015    Onychomycosis 4/27/2016    Pain of left thumb 11/1/2016    Prediabetes 7/5/2016    PTSD (post-traumatic stress disorder)     Smoker 9/1/2015    Vitamin D deficiency 5/31/2016       Past Surgical History:   Procedure Laterality Date    HX ACL RECONSTRUCTION      HX APPENDECTOMY      HX CERVICAL DISKECTOMY  02/07/2019    with fusion C5/6 C6/7    HX CERVICAL FUSION  0513/19    L4-L5 bilateral hemilaminectomy; medial facetectomy; foraminotomy;  L5-S1 right hemilaminectomy; medial facetectomy revision; discectomy revision; L4, L5, S1 posterolateral fusion    HX CERVICAL LAMINECTOMY  03/19/2019    with fusion    HX GYN Bilateral     tubal    HX LUMBAR LAMINECTOMY Bilateral 05/13/2019    L4-L5 bilateral hemilaminectomy; medial facetectomy; foraminotomy; L5-S1 right hemilaminectomy; medial facetectomy revision; discectomy revision; L4, L5, S1 posterolateral fusion    HX ORTHOPAEDIC     . MEDICATIONS      Current Outpatient Medications   Medication Sig Dispense Refill    gabapentin (NEURONTIN) 800 mg tablet Take 1 Tab by mouth three (3) times daily. Max Daily Amount: 2,400 mg. 90 Tab 2    cyclobenzaprine (FLEXERIL) 10 mg tablet Take 1 Tab by mouth nightly. 30 Tab 1    meloxicam (MOBIC) 7.5 mg tablet TAKE 1 TABLET BY MOUTH DAILY AS NEEDED FOR PAIN 30 Tab 0    TRINTELLIX 20 mg tablet Take 1 Tab by mouth daily. 0    acetaminophen (TYLENOL ARTHRITIS PAIN) 650 mg TbER Take 650 mg by mouth every eight (8) hours.  traZODone (DESYREL) 100 mg tablet Take 1 Tab by mouth every evening. 30 Tab 0    buPROPion SR (WELLBUTRIN, ZYBAN) 200 mg SR tablet Take 1 Tab by mouth two (2) times a day. 30 Tab 0    docusate sodium (STOOL SOFTENER PO) Take 100 mg by mouth daily.  hydrOXYzine HCl (ATARAX) 25 mg tablet Take 25 mg by mouth three (3) times daily as needed for Anxiety.  lamoTRIgine (LAMICTAL) 200 mg tablet Take 200 mg by mouth every evening.  losartan (COZAAR) 50 mg tablet Take 75 mg by mouth daily.  lidocaine (LIDODERM) 5 % APPLY 1 PATCH TO THE AFFECTED AREA EVERY DAY. LEAVE ON FOR 12 HOURS THEN REMOVE FOR 12 HOURS 90 Patch 3    polyethylene glycol (MIRALAX) 17 gram/dose powder MIX AND DRINK 17 GRAMS BY MOUTH DAILY 1530 g 3    hydroCHLOROthiazide (HYDRODIURIL) 25 mg tablet Take 1 Tab by mouth daily. 90 Tab 3    naloxone (NARCAN) 4 mg/actuation nasal spray Use 1 spray intranasally, then discard. Repeat with new spray every 2 min as needed for opioid overdose symptoms, alternating nostrils. 1 Each 0    varenicline (CHANTIX) 1 mg tablet Take 1 mg by mouth two (2) times a day.  DULoxetine (CYMBALTA) 60 mg capsule Take 60 mg by mouth two (2) times a day.       lamoTRIgine (LAMICTAL) 100 mg tablet Take 100 mg by mouth daily. ALLERGIES  No Known Allergies       SOCIAL HISTORY    Social History     Socioeconomic History    Marital status: UNKNOWN     Spouse name: Not on file    Number of children: Not on file    Years of education: Not on file    Highest education level: Not on file   Occupational History    Not on file   Social Needs    Financial resource strain: Not on file    Food insecurity:     Worry: Not on file     Inability: Not on file    Transportation needs:     Medical: Not on file     Non-medical: Not on file   Tobacco Use    Smoking status: Current Every Day Smoker     Packs/day: 1.00     Years: 25.00     Pack years: 25.00    Smokeless tobacco: Never Used   Substance and Sexual Activity    Alcohol use:  Yes     Alcohol/week: 9.0 standard drinks     Types: 9 Shots of liquor per week     Comment: socially    Drug use: No    Sexual activity: Yes     Partners: Male     Birth control/protection: None   Lifestyle    Physical activity:     Days per week: Not on file     Minutes per session: Not on file    Stress: Not on file   Relationships    Social connections:     Talks on phone: Not on file     Gets together: Not on file     Attends Latter day service: Not on file     Active member of club or organization: Not on file     Attends meetings of clubs or organizations: Not on file     Relationship status: Not on file    Intimate partner violence:     Fear of current or ex partner: Not on file     Emotionally abused: Not on file     Physically abused: Not on file     Forced sexual activity: Not on file   Other Topics Concern     Service Not Asked    Blood Transfusions Not Asked    Caffeine Concern Not Asked    Occupational Exposure Not Asked   [place] Hazards Not Asked    Sleep Concern Not Asked    Stress Concern Not Asked    Weight Concern Not Asked    Special Diet Not Asked    Back Care Not Asked    Exercise Not Asked    Bike Helmet Not Asked    Seat Belt Not Asked    Self-Exams Not Asked   Social History Narrative    Not on file     Socioeconomic History    Marital status: UNKNOWN     Spouse name: Not on file    Number of children: Not on file    Years of education: Not on file    Highest education level: Not on file   Occupational History    Not on file   Social Needs    Financial resource strain: Not on file    Food insecurity:     Worry: Not on file     Inability: Not on file    Transportation needs:     Medical: Not on file     Non-medical: Not on file   Tobacco Use    Smoking status: Current Every Day Smoker     Packs/day: 1.00     Years: 25.00     Pack years: 25.00    Smokeless tobacco: Never Used   Substance and Sexual Activity    Alcohol use:  Yes     Alcohol/week: 9.0 standard drinks     Types: 9 Shots of liquor per week     Comment: socially    Drug use: No    Sexual activity: Yes     Partners: Male     Birth control/protection: None   Lifestyle    Physical activity:     Days per week: Not on file     Minutes per session: Not on file    Stress: Not on file   Relationships    Social connections:     Talks on phone: Not on file     Gets together: Not on file     Attends Jain service: Not on file     Active member of club or organization: Not on file     Attends meetings of clubs or organizations: Not on file     Relationship status: Not on file    Intimate partner violence:     Fear of current or ex partner: Not on file     Emotionally abused: Not on file     Physically abused: Not on file     Forced sexual activity: Not on file   Other Topics Concern     Service Not Asked    Blood Transfusions Not Asked    Caffeine Concern Not Asked    Occupational Exposure Not Asked   [place] Hazards Not Asked    Sleep Concern Not Asked    Stress Concern Not Asked    Weight Concern Not Asked    Special Diet Not Asked    Back Care Not Asked    Exercise Not Asked    Bike Helmet Not Asked   [address] Not Asked    Self-Exams Not Asked   Social History Narrative    Not on file      Problem Relation Age of Onset    Hypertension Mother     Thyroid Disease Mother     No Known Problems Father     Psychiatric Disorder Sister     Psychiatric Disorder Brother     Psychiatric Disorder Sister     Diabetes Sister     Psychiatric Disorder Brother     Psychiatric Disorder Brother          Tod Baron NP

## 2019-11-19 ENCOUNTER — HOSPITAL ENCOUNTER (OUTPATIENT)
Dept: PHYSICAL THERAPY | Age: 44
Discharge: HOME | End: 2019-11-19
Payer: MEDICAID

## 2019-11-19 PROCEDURE — 97162 PT EVAL MOD COMPLEX 30 MIN: CPT

## 2019-11-19 NOTE — PROGRESS NOTES
[address]  [facility] PHYSICAL THERAPY  [address] [facility] [place], [address] - Phone: [phone]  Fax: [phone] / [address] [facility]  Patient Name: Ghanshyam Augustine : 1975   Medical   Diagnosis: Low back pain [M54.5]  Neck pain [M54.2] Treatment Diagnosis: Cervical Myelopathy and Lumbar Radiculopathy   Onset Date: DOS: Early      Referral Source: Ruel Espinoza NP Start of Care [facility]): 2019   Prior Hospitalization: See medical history Provider #: [id number]   Prior Level of Function: Unemployed, Sedentary   Comorbidities: Fibromyalgia, Depression, HTN, OA, Tobacco Use   Medications: Verified on Patient Summary List   The Plan of Care and following information is based on the information from the initial evaluation.   ==========================================================================================  Assessment / key information:  Pt is a 53year old female who presents to PT today with multiple cervical and lumbar fusions in the past year. Prior to surgeries, she reports ambulating with wheelchairs and UE/LE weakness. Since then she has returned to ambulating with [facility] for community distances. She reports cervical, bilateral shoulder, lower back and bilateral hip pain currently. Lumbar Spine  Patient with a Functional Status score of 31 on FOTO (Focused on Therapeutic Outcomes), which corresponds to a functional limitation of 69%.       Cervical Spine  Patient with a Functional Status score of 47 on FOTO (Focused on Therapeutic Outcomes), which corresponds to a functional limitation of 53%.       Stair Negotiation: BUE, non-reciprocal pattern  Sit<>Stand Transfers: No UE's Needed but increased right LE weight bearing   30\" Chair Rise Test: 5 reps  Bed Mobility: Independent but pain    LUMBAR ROM  AROM Comments:pain, area   Forward flexion 40-60 75%  6In from floor    Extension 20-30 75%     SB right 20-30 75% To knee joint   SB left 20-30 75% To knee joint   Rotation right 5-10 75%     Rotation left 5-10 75%                                                         AROM                               Strength (1-5)      Left Right Left Right   Hip Flexion (0-120)  WFL WFL 4+ 4+     Extension (0-20)  WFL  WFL 4- 4-     IR (0-45) NT NT 5 5     ER (0-45) NT NT 5 5     Abduction (0-45)  WFL WFL  4 4+   Knee Flexion (0-135) WFL WFL 4+ 4+     Extension (0) WFL WFL 4 4+   Ankle Plantarflexion (0-50)  NT  NT  NT NT      Dorsiflexion (0-20)  NT NT  5 5        Dural Mobility:  SLR Supine:              [x] R    [x] L    [] +    [x] -   Prone Knee Bend:     [x] R    [x] L    [] +    [x] -      Special Tests  Ely's:           [x] R    [x] L    [x] +    [] -                                                Hamstrings 90/90:  [x] R    [x] L    [] +    [x] -     Outcome Measures:  Rhomberg EO:30\" EC: 5\"       AROM Pain? Cervical Flexion 22     Extension 42     Right Side Bending 29     Left Side Bending 39     Right Rotation 75% p!    Left Rotation 75%        SHOULDER AROM                                                             Left Right   [facility]   Extension [facility] WFL   Scaption/ABD [facility] WF   IR HBB WFL WFL   Functional ER WFL WFL      Strength:                                                                         L (1-5) R (1-5) Pain   Shoulder Flexion  5  5 [] Yes   [x] No   Shoulder Abduction  5 5  [] Yes   [x] No   Internal Rotators  5  5 [] Yes   [x] No   External Rotators  5  5 [] Yes   [x] No   Horizontal Abduction 5 5 [] Yes   [x] No   Shoulder Extension  5  5 [] Yes   [x] No   Elbow Flexion  5  5 [] Yes   [x] No   Elbow Extension  5  5 [] Yes   [x] No        Patient will benefit from skilled PT services to address these issues. Thank you for this referral.   ==========================================================================================  Eval Complexity: History: HIGH Complexity :3+ comorbidities / personal factors will impact the outcome/ POC Exam:MEDIUM Complexity : 3 Standardized tests and measures addressing body structure, function, activity limitation and / or participation in recreation  Presentation: MEDIUM Complexity : Evolving with changing characteristics  Clinical Decision Making:MEDIUM Complexity : FOTO score of 26-74Overall Complexity:MEDIUM    Problem List: pain affecting function, decrease ROM, decrease strength, impaired gait/ balance, decrease ADL/ functional abilitiies, decrease activity tolerance, decrease flexibility/ joint mobility and decrease transfer abilities   Treatment Plan may include any combination of the following: Therapeutic exercise, Therapeutic activities, Neuromuscular re-education, Physical agent/modality, Gait/balance training, Manual therapy, Aquatic therapy, Patient education, Self Care training, Functional mobility training, Home safety training and Stair training  Patient / Family readiness to learn indicated by: asking questions, trying to perform skills and interest  Persons(s) to be included in education: patient (P)  Barriers to Learning/Limitations: None  Patient Goal (s): \"improve balance, decrease pain and learn exercises\"   Patient self reported health status: fair  Rehabilitation Potential: good   Short Term Goals: To be accomplished in  4  weeks:  1) Patient will be compliant with HEP for symptom management at home. 2) Patient to be compliant with aquatic program attendance to ensure maximum benefits of PT. 3) Patient to tolerate greater than/equal to 30 minutes of aquatic therapy to improve ADL tolerance  4) Patient will report less than or equal to 3/10 pain at worst during aquatic therapy to allow increased activity tolerance.  Long Term Goals: To be accomplished in  8  weeks:  1) Patient to be Safe and Independent with aquatherapy to transition to aquatic HEP for self management/prevention after DC.   2) Patient will perform at least 8 reps during 30 second chair rise test to decrease falls risk  3) Patient will increase cervical FOTO Functional Status score to 57 to decrease functional limitations. 4) Patient will increase lumbar FOTO Functional Status score to 43 to decrease functional limitations. 5) Patient will demonstrate left knee extension strength of at least 4+/5 to engage in age appropriate activities. 6) Patient will demonstrate rhomberg EC of at least 12 seconds to improve stability over uneven surfaces. Frequency / Duration:   Patient to be seen  2  times per week for 8  weeks:  Patient / Caregiver education and instruction: provided education regarding diagnosis and prognosis as well as details regarding treatment plain. Therapist Signature: Jelly Diaz DPT Date: 89/12/0250   Certification Period: NA Time: 1:24 PM   ===========================================================================================  I certify that the above Physical Therapy Services are being furnished while the patient is under my care. I agree with the treatment plan and certify that this therapy is necessary. Physician Signature:        Date:       Time:     Please sign and return to [facility] [employer] or you may fax the signed copy to [phone]. Thank you.

## 2019-11-19 NOTE — PROGRESS NOTES
PHYSICAL THERAPY - DAILY TREATMENT NOTE    Patient Name: Colin Shani        Date: 2019  : 1975   YES Patient  Verified  Visit #:   1     Insurance: Payor: [address] / Plan: [address] / Product Type: Managed Care Medicaid /      In time: 147 Out time: 220   Total Treatment Time: 33     Medicare/BCBS Time Tracking (below)   Total Timed Codes (min):  NA 1:1 Treatment Time:  NA     TREATMENT AREA =  Low back pain [M54.5]  Neck pain [M54.2]    SUBJECTIVE    Pain Level (on 0 to 10 scale):  2  / 10   Medication Changes/New allergies or changes in medical history, any new surgeries or procedures? NO    If yes, update Summary List   Subjective Functional Status/Changes:  []  No changes reported   CC:  HPI:  Pt reports spinal cord impingement that caused her to have UE weakness/pain and difficulty walking (wheelchair for 8 months) that resulted in cervical fusion (2019). Lumbar Fusion in May 2019. Currently having pain in bilateral shoulder, neck, back and hips. Still has tingling in toes and left hand. Mediation has improved these symptoms. Most pain and weakness in the morning. Reports stumbling a lot. Uses cane for community ambulation. Previously completed aquatic therapy and it helped after 6 weeks. 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[employer]    Symptoms:  Pain rating (0-10): Today: 2                         Best: 2                        Worst: 10                         Numbness/tingling:  Yes                        [x] Constant:                                          [] Intermittent:      Past History/Treatments:   PMH: Fibromyalgia, Depression, HTN, OA, Tobacco Use  Patient Goals: \"improve balance, decrease pain and learn exercises\"     OBJECTIVE     Physical Therapy Evaluation - Lumbar Spine        OBJECTIVE  Kyphosis:                  [] Increased                 [] Decreased   [x]  WNL  Lordosis:                   [] Increased [x] Decreased   [] WNL  Gait:                                                              Stair Negotiation: BUE, non-reciprocal pattern    Sit<>Stand Transfers: No UE's Needed but increased right LE weight bearing   30\" Chair Rise Test: 5    Bed Mobility: Independent but pain    Palpation:  Active Movements:  ROM  AROM Comments:pain, area   Forward flexion 40-60 75%  6In from floor    Extension 20-30 75%    SB right 20-30 75% To knee joint   SB left 20-30 75% To knee joint   Rotation right 5-10 75%     Rotation left 5-10 75%      ROM / Strength                                                   AROM                               Strength (1-5)      Left Right Left Right   Hip Flexion (0-120)  WFL WFL 4+ 4+     Extension (0-20)  WFL  WFL 4- 4-    IR (0-45) NT NT 5 5    ER (0-45) NT NT 5 5     Abduction (0-45)  [facility] WFL  4 4+   Knee Flexion (0-135) WFL WFL 4+ 4+     Extension (0) WFL WFL 4 4+   Ankle Plantarflexion (0-50)  NT  NT       Dorsiflexion (0-20)  NT NT  5 5        Dural Mobility:  SLR Supine:              [x] R    [x] L    [] +    [x] -   Prone Knee Bend:     [x] R    [x] L    [] +    [x] -      Special Tests           Deficits:                         Ely's:           [x] R    [x] L    [x] +    [] -                                              Hamstrings 90/90:  [x] R    [x] L    [] +    [x] -   Outcome Measures:  Rhomberg EO:30\" EC: 5\"      AROM Pain? Cervical Flexion 22     Extension 42     Right Side Bending 29     Left Side Bending 39     Right Rotation 75% p!     Left Rotation 75%                                     SHOULDER AROM                                                             Left Right   Flexion [facility]   Extension [facility] WFL   Scaption/ABD [facility] WFL   IR HBB WFL [facility]   Functional ER [facility] WF       Strength:                                                                         L (1-5) R (1-5) Pain   Shoulder Flexion  5  5 [] Yes   [x] No   Shoulder Abduction  5 5  [] Yes   [x] No Internal Rotators  5  5 [] Yes   [x] No   External Rotators  5  5 [] Yes   [x] No   Horizontal Abduction 5 5 [] Yes   [x] No   Shoulder Extension  5  5 [] Yes   [x] No   Elbow Flexion  5  5 [] Yes   [x] No   Elbow Extension  5  5 [] Yes   [x] No      min    []  Progressed/Changed HEP based on: Other Objective/Functional Measures:    See Above       Post Treatment Pain Level (on 0 to 10) scale:   2  / 10     ASSESSMENT    Assessment/Changes in Function:     See POC    Justification for Eval Code Complexity:  Patient History : High  Examination: Medium See Objective  Clinical Presentation: Medium Evolving  Clinical Decision Making : Mod - FOTO      []  See Progress Note/Recertification   Patient will continue to benefit from skilled PT services to analyze,, cue,, progress,, modify,, demonstrate,, instruct, and address, movement patterns,, therapeutic interventions,, postural abnormalities,, soft tissue restrictions,, ROM,, strength,, functional mobility,, body mechanics/ergonomics, and home and community integration, to attain remaining goals.    Progress toward goals / Updated goals:    See POC

## 2019-11-26 ENCOUNTER — HOSPITAL ENCOUNTER (OUTPATIENT)
Dept: PHYSICAL THERAPY | Age: 44
Discharge: HOME | End: 2019-11-26
Payer: MEDICAID

## 2019-11-26 PROCEDURE — 97113 AQUATIC THERAPY/EXERCISES: CPT

## 2019-11-26 NOTE — PROGRESS NOTES
HYSICAL THERAPY - DAILY TREATMENT NOTE - AQUATICS    Patient Name: Sondra Kelly        Date: 2019  : 1975   YES Patient  Verified  Visit #:   2     Insurance: Payor: [facility] MEDICAID / Plan: [address] [place] [address] / Product Type: Managed Care Medicaid /      In time: 2:00 Out time: 2:54   Total Treatment Time: 54     Medicare/BCBS Time Tracking (below)   Total Timed Codes (min):  na 1:1 Treatment Time:  na     TREATMENT AREA =  Low back pain [M54.5]  Neck pain [M54.2]    SUBJECTIVE    Pain Level (on 0 to 10 scale):  5  / 10   Medication Changes/New allergies or changes in medical history, any new surgeries or procedures? NO    If yes, update Summary List   Subjective Functional Status/Changes:  []  No changes reported     Pt reports she had water therapy before, but it has been years. OBJECTIVE      54 min Therapeutic Exercise:  [x]  AQUATIC THERAPY   Rationale:      increase strength, increase ROM, and improve balance to improve the patient's ability to perform ADL's and ambulate with less pain and increase activity tolerance. [x]   Patient Education:  Continue Aquatic Therapy and HEP as instructed          Introduction to aquatic therapy program. Instructed patient to perform exercises to the intensity that was appropriate for her and to inform therapist if any movements were painful, pt agreeable. Forward, Retrograde and Lateral Walking at the beginning/end of session with no assistance. LE exercises performed with 2 UE support including heel raises, hip 3-way, mini squats, knee extensions and leg curls. UE exercises incorporated UE flexion, abduction, elbow flexion/extension, lumbo thoracic rotation and cues for engaging core musculature and maintaining upright posture.       UE exercise resistance: held resistance weights with UE ex this session to assess pt's response to ex without added resistance [x] none/wrist weights                                                                                   [] small water weights                                                          [] medium water weights                                                   [] large water weights                                                         [] back supported on wall    Dynamic Balance assessed as fair. Pt was able to perform: [x] SLS [] Tandem with: [x] EO [] EC and UE PRN     Pt required mod to max VC throughout session for proper form and increased safety. Pt required no assistance entering/exiting the pool. Post Treatment Pain Level (on 0 to 10) scale:   5  / 10     ASSESSMENT    Assessment/Changes in Function:     Pt with good tolerance to initiation of aquatic exercises. []  See Progress Note/Recertification   Patient will continue to benefit from skilled PT services to analyze,, cue,, progress,, modify,, demonstrate,, instruct, and address, movement patterns,, therapeutic interventions,, postural abnormalities,, soft tissue restrictions,, ROM,, strength,, functional mobility,, body mechanics/ergonomics, and home and community integration, to attain remaining goals. Progress toward goals / Updated goals: · Short Term Goals: To be accomplished in  4  weeks:  1) Patient will be compliant with HEP for symptom management at home. 2) Patient to be compliant with aquatic program attendance to ensure maximum benefits of PT. Initiated aquatic PT today   3) Patient to tolerate greater than/equal to 30 minutes of aquatic therapy to improve ADL tolerance  4) Patient will report less than or equal to 3/10 pain at worst during aquatic therapy to allow increased activity tolerance.      PLAN    []  Upgrade activities as tolerated YES Continue plan of care   []  Discharge due to :    []  Other:      Therapist: CAYLA Patrick     Date: 11/26/2019 Time: 4:49 PM Future Appointments   Date Time Provider Brown Della   12/3/2019  1:00 PM [facility]   12/6/2019 10:00 AM [facility]   12/10/2019  1:00 PM [facility]   12/13/2019 10:00 AM [facility]   12/17/2019  1:00 PM [facility]   12/20/2019 10:00 AM [facility]   2/4/2020  8:00 AM Fabienne Davey  [address]

## 2019-12-03 ENCOUNTER — APPOINTMENT (OUTPATIENT)
Dept: PHYSICAL THERAPY | Age: 44
End: 2019-12-03

## 2019-12-05 RX ORDER — MELOXICAM 7.5 MG/1
7.5 TABLET ORAL
Qty: 30 TAB | Refills: 0 | Status: CANCELLED | OUTPATIENT
Start: 2019-12-05

## 2019-12-05 NOTE — TELEPHONE ENCOUNTER
Last Visit: 11/8/19 with ISSAC Gibson  Next Appointment: 2/4/20 with MD Hina Fonseca  Previous Refill Encounter(s): 11/4/19 #30    Requested Prescriptions     Pending Prescriptions Disp Refills    meloxicam (MOBIC) 7.5 mg tablet 30 Tab 0     Sig: Take 1 Tab by mouth daily as needed for Pain.

## 2019-12-06 ENCOUNTER — APPOINTMENT (OUTPATIENT)
Dept: PHYSICAL THERAPY | Age: 44
End: 2019-12-06

## 2019-12-10 ENCOUNTER — APPOINTMENT (OUTPATIENT)
Dept: PHYSICAL THERAPY | Age: 44
End: 2019-12-10

## 2019-12-13 ENCOUNTER — APPOINTMENT (OUTPATIENT)
Dept: PHYSICAL THERAPY | Age: 44
End: 2019-12-13

## 2019-12-17 ENCOUNTER — APPOINTMENT (OUTPATIENT)
Dept: PHYSICAL THERAPY | Age: 44
End: 2019-12-17

## 2019-12-20 ENCOUNTER — APPOINTMENT (OUTPATIENT)
Dept: PHYSICAL THERAPY | Age: 44
End: 2019-12-20

## 2020-01-17 ENCOUNTER — HOSPITAL ENCOUNTER (OUTPATIENT)
Dept: PHYSICAL THERAPY | Age: 45
Discharge: HOME | End: 2020-01-17
Payer: MEDICAID

## 2020-01-17 PROCEDURE — 97110 THERAPEUTIC EXERCISES: CPT

## 2020-01-17 NOTE — PROGRESS NOTES
[address] [facility] PHYSICAL THERAPY  [address] [facility] [address], [address] - Phone: [phone]  Fax: [phone]  PROGRESS NOTE  Patient Name: Hoa Rodríguez : 1975   Treatment/Medical Diagnosis: Low back pain [M54.5]  Neck pain [M54.2]   Referral Source: Raffaele Medina NP     Date of Initial Visit: 2019 Attended Visits: 3 Missed Visits: 0     SUMMARY OF TREATMENT  Pt attended initial evaluation and one aquatic therapy session, then was placed on hold secondary to complications with her menstrual cycle. Pt has followed up with her PCP and is scheduled to see an OBGYN for follow up. Pt has requested to discontinue aquatic therapy at this time and initiated land based PT today (2020) with good tolerance. New Goals to be achieved in __2-4__  weeks:  1) Patient to be compliant with HEP for symptom management at home. 2) Patient will perform at least 8 reps during 30 second chair rise test to decrease falls risk  3) Patient will increase cervical FOTO Functional Status score to 57 to decrease functional limitations. 4) Patient will increase lumbar FOTO Functional Status score to 43 to decrease functional limitations. 5) Patient will demonstrate left knee extension strength of at least 4+/5 to engage in age appropriate activities. 6) Patient will demonstrate rhomberg EC of at least 12 seconds to improve stability over uneven surfaces. RECOMMENDATIONS  Pt would benefit from continued therapy to address core and LE strength deficits and imbalance to improve pt's activity tolerance and functional mobility. If you have any questions/comments please contact us directly at [phone]. Thank you for allowing us to assist in the care of your patient.   LPTA Signature: Gary Herrmann PTA  Date: 2020   PT Signature: Shimon Rodas DPT Time: 3:19 PM   NOTE TO PHYSICIAN:  PLEASE COMPLETE THE ORDERS BELOW AND FAX TO   [facility] Physical Therapy: [phone]. If you are unable to process this request in 24 hours please contact our office: [phone].    ___ I have read the above report and request that my patient continue as recommended.   ___ I have read the above report and request that my patient continue therapy with the following changes/special instructions:_________________________________________________________   ___ I have read the above report and request that my patient be discharged from therapy.      Physician Signature:        Date:       Time:

## 2020-01-17 NOTE — PROGRESS NOTES
PHYSICAL THERAPY - DAILY TREATMENT NOTE    Patient Name: Niurka Larkin        Date: 2020  : 1975   YES Patient  Verified  Visit #:   3     Insurance: Payor: [place] [employer] / Plan: [address] [place] [address] / Product Type: Managed Care Medicaid /      In time: 1:34 Out time: 2:25   Total Treatment Time: 51     Medicare/BCBS [place] Time Tracking (below)   Total Timed Codes (min):  na 1:1 Treatment Time:  na     TREATMENT AREA =  Low back pain [M54.5]  Neck pain [M54.2]    SUBJECTIVE    Pain Level (on 0 to 10 scale):  5  / 10   Medication Changes/New allergies or changes in medical history, any new surgeries or procedures? NO    If yes, update Summary List   Subjective Functional Status/Changes:  []  No changes reported     Pt reports she gets ms tightness in her lower back and c/o tightness in her upper traps whenever she is doing things. Pt reports she had a fall in the shower and hit her head, states she has numbness in her legs and feet which makes her off balance. She would like to work on her balance too. \"Sometimes my legs feel numb from the knees down. \"     OBJECTIVE    51 min Therapeutic Exercise:  [x]  See flow sheet   Rationale: Increase core strength/stabilization, ROM/flexibility to improve pt's ability to perform ADL's with increased ease and less pain to promote increased activity tolerance. min Patient Education:  YES  Reviewed HEP   [x]  Progressed/Changed HEP based on:   Issued HEP-see copy in chart. Other Objective/Functional Measures:    Initiated core strengthening for L/S and postural ex for C/S  per flow sheet. Pt required mod VCing for posture and form and with moderate ms fatigue with therapeutic exercise. Post Treatment Pain Level (on 0 to 10) scale:   5  / 10     ASSESSMENT    Assessment/Changes in Function:     Pt with good tolerance to initiation of strengthening ex.       [x]  See Progress Note/Recertification   Patient will continue to benefit from skilled PT services to modify and progress therapeutic interventions, address functional mobility deficits, address strength deficits, analyze and address soft tissue restrictions, analyze and cue movement patterns, assess and modify postural abnormalities, address imbalance/dizziness and instruct in home and community integration to attain remaining goals. Progress toward goals / Updated goals:    Pt has discontinued aquatic therapy secondary to medical complications with her menstrual cycle, today she initiated land therapy with good tolerance.         PLAN    []  Upgrade activities as tolerated YES Continue plan of care   []  Discharge due to :    []  Other:      Therapist: Paulino Burrell PTA    Date: 1/17/2020 Time: 2:56 PM     Future Appointments   Date Time Provider Brown Hull   1/22/2020  1:00 PM [facility]   1/24/2020  2:00 PM [facility]   1/28/2020  9:00 AM [facility]   1/29/2020  9:00 AM Holli Sessions, [facility]   2/4/2020  8:00 AM [facility] Egan  [facility]   2/5/2020  9:00 AM Holli Sessions, [facility]   2/7/2020  1:00 PM [address] [place] [address]   2/12/2020  9:00 AM Antwon Cagle, [facility]   2/14/2020  9:30 AM Antwon Cagle, [facility]   2/18/2020  9:00 AM [facility]   2/21/2020  9:00 AM Holli Sessions, [facility]   2/24/2020  9:00 AM [facility]   2/27/2020  9:00 AM [facility]

## 2020-01-22 ENCOUNTER — HOSPITAL ENCOUNTER (OUTPATIENT)
Dept: PHYSICAL THERAPY | Age: 45
Discharge: HOME | End: 2020-01-22
Payer: MEDICAID

## 2020-01-22 PROCEDURE — 97110 THERAPEUTIC EXERCISES: CPT

## 2020-01-22 PROCEDURE — 97112 NEUROMUSCULAR REEDUCATION: CPT

## 2020-01-22 NOTE — PROGRESS NOTES
PHYSICAL THERAPY - DAILY TREATMENT NOTE    Patient Name: Tom Hash        Date: 2020  : 1975   YES Patient  Verified  Visit #:   4     Insurance: Payor: Ernesto Mathew / Plan: [address] [place] [address] / Product Type: Managed Care Medicaid /      In time: 1:03 Out time: 2:00   Total Treatment Time: 57     Medicare/BCBS [place] Time Tracking (below)   Total Timed Codes (min):  na 1:1 Treatment Time:  na     TREATMENT AREA =  Low back pain [M54.5]  Neck pain [M54.2]    SUBJECTIVE    Pain Level (on 0 to 10 scale):  6  / 10 HA and LBP   Medication Changes/New allergies or changes in medical history, any new surgeries or procedures? NO    If yes, update Summary List   Subjective Functional Status/Changes:  []  No changes reported     Pt's c/o HA pain today, states it might be from the sun, she's been driving a lot today. Pt reports she was sore in her back, abdomen and legs after her last PT session. OBJECTIVE    47 min Therapeutic Exercise:  [x]  See flow sheet   Rationale:     Increase core strength/stabilization, ROM/flexibility to improve pt's ability to perform ADL's with increased ease and less pain to promote increased activity tolerance. 10 min Neuromuscular Re-ed: Static standing balance exercises with EO/EC    Rationale:   improve coordination, improve balance and increase proprioception to improve the patients ability to perform ADLs, transfers and gait safely and independently. min Patient Education:  YES  Reviewed HEP   [x]  Progressed/Changed HEP based on:   added balance to HEP     Other Objective/Functional Measures:    BP taken prior to treatment: 142/82 mmHg   BP taken at end of treatment 142/82 mmHg     Pt reports her HA symptoms resolved with therapeutic exercise.      Initiated static standing balance:   EO MSR bun 30\" B   EC ROM 30\"      Post Treatment Pain Level (on 0 to 10) scale:   5  / 10 LBP     ASSESSMENT    Assessment/Changes in Function: Pt edita's impaired balance strategy with initiation of static standing balance ex. []  See Progress Note/Recertification   Patient will continue to benefit from skilled PT services to modify and progress therapeutic interventions, address functional mobility deficits, address strength deficits, analyze and address soft tissue restrictions, analyze and cue movement patterns, assess and modify postural abnormalities, address imbalance/dizziness and instruct in home and community integration to attain remaining goals. Progress toward goals / Updated goals:    1) Patient to be compliant with HEP for symptom management at home. 2) Patient will perform at least 8 reps during 30 second chair rise test to decrease falls risk  3) Patient will increase cervical FOTO Functional Status score to 57 to decrease functional limitations. 4) Patient will increase lumbar FOTO Functional Status score to 43 to decrease functional limitations. 5) Patient will demonstrate left knee extension strength of at least 4+/5 to engage in age appropriate activities.    6) Patient will demonstrate rhomberg EC of at least 12 seconds to improve stability over uneven surfaces.  Initiated static standing balance ex      PLAN    []  Upgrade activities as tolerated YES Continue plan of care   []  Discharge due to :    []  Other:      Therapist: Rosa Flood PTA    Date: 1/22/2020 Time: 1:12 PM     Future Appointments   Date Time Provider Brown Hull   1/24/2020  2:00 PM [facility]   1/28/2020  9:00 AM [facility]   1/29/2020  9:00 AM Blake Painter PT [facility]   2/4/2020  8:00 AM Kb Day  [address]    2/5/2020  9:00 AM Blake Painter PT [facility]   2/7/2020  1:00 PM [address] [place] [address]   2/12/2020  9:00 AM Uriah Paez PT [facility]   2/14/2020  9:30 AM Uriah Paez PT [facility]   2/18/2020  9:00 AM [facility] 2/21/2020  9:00 AM Blanca Smith PT [facility]   [facility]  [facility]   2/27/2020  9:00 AM [facility]

## 2020-01-24 ENCOUNTER — HOSPITAL ENCOUNTER (OUTPATIENT)
Dept: PHYSICAL THERAPY | Age: 45
Discharge: HOME | End: 2020-01-24
Payer: MEDICAID

## 2020-01-24 PROCEDURE — 97110 THERAPEUTIC EXERCISES: CPT

## 2020-01-24 NOTE — PROGRESS NOTES
PHYSICAL THERAPY - DAILY TREATMENT NOTE    Patient Name: Ros Browne        Date: 2020  : 1975   YES Patient  Verified  Visit #:   5     Insurance: Payor: Ban Ramesh / Plan: [address] [place] [address] / Product Type: Managed Care Medicaid /      In time: 1:57 Out time: 2:55   Total Treatment Time: 58     Medicare/BCBS [place] Time Tracking (below)   Total Timed Codes (min):  na 1:1 Treatment Time:  na     TREATMENT AREA =  Low back pain [M54.5]  Neck pain [M54.2]    SUBJECTIVE    Pain Level (on 0 to 10 scale):  6  / 10   Medication Changes/New allergies or changes in medical history, any new surgeries or procedures? NO    If yes, update Summary List   Subjective Functional Status/Changes:  []  No changes reported     Pt reports she has been having sharp stabbing pain in her right heel. Pt reports she is sore today,walked about 3 blocks to her granddaughters school last night.          OBJECTIVE    Modalities Rationale: decrease pain and increase tissue extensibility to improve patient's ability to perform ADL's with less pain    min [] Estim, type/location:                                      []  att     []  unatt     []  w/US     []  w/ice    []  w/heat    min []  Mechanical Traction: type/lbs                   []  pro   []  sup   []  int   []  cont    []  before manual    []  after manual    min []  Ultrasound, settings/location:      min []  Iontophoresis w/ dexamethasone, location:                                               []  take home patch       []  in clinic   10 min []  Ice     [x]  Heat    location/position: MHP to L/S and C/S, Supine with LE wedge    min []  Vasopneumatic Device, press/temp:     min []  Other:    [x] Skin assessment post-treatment (if applicable):    [x]  intact    [x]  redness- no adverse reaction     []redness  adverse reaction:      48 min Therapeutic Exercise:  [x]  See flow sheet   Rationale:      Increase core strength/stabilization, ROM/flexibility to improve pt's ability to perform ADL's with increased ease and less pain to promote increased activity tolerance. min Patient Education:  YES  Reviewed HEP   []  Progressed/Changed HEP based on: Other Objective/Functional Measures: Added stagger gastroc stretch and 90/90 HS stretch for LE flexibility. Pt with c/o left HS cramping with bridge ex. Added LTR with core, SB #1 and 2 for core strengthening. Pt with significant ms fatigue abdominal ms. Post Treatment Pain Level (on 0 to 10) scale:   0  / 10     ASSESSMENT    Assessment/Changes in Function:     Pt challenged with progression of therapeutic exercise this session. []  See Progress Note/Recertification   Patient will continue to benefit from skilled PT services to modify and progress therapeutic interventions, address functional mobility deficits, address strength deficits, analyze and address soft tissue restrictions, analyze and cue movement patterns, assess and modify postural abnormalities, address imbalance/dizziness and instruct in home and community integration to attain remaining goals. Progress toward goals / Updated goals:    1) Patient to be compliant with HEP for symptom management at home. 2) Patient will perform at least 8 reps during 30 second chair rise test to decrease falls risk  3) Patient will increase cervical FOTO Functional Status score to 57 to decrease functional limitations. 4) Patient will increase lumbar FOTO Functional Status score to 43 to decrease functional limitations. 5) Patient will demonstrate left knee extension strength of at least 4+/5 to engage in age appropriate activities.    6) Patient will demonstrate rhomberg EC of at least 12 seconds to improve stability over uneven surfaces.  Pt reports performing baance ex at home daily      PLAN    []  Upgrade activities as tolerated YES Continue plan of care   []  Discharge due to :    []  Other:      Therapist: Shanae Lanza, DEION    Date: 1/24/2020 Time: 2:37 PM     Future Appointments   Date Time Provider Brown Davisi   1/28/2020  9:00 AM [facility]   1/29/2020  9:00 AM Sameer Charles [facility]   2/4/2020  8:00 AM Kayla Cordova  [address]    2/5/2020  9:00 AM Sameer Charles [facility]   2/7/2020  1:00 PM [address] [place] [address]   2/12/2020  9:00 AM Allegan Kenney, [facility]   2/14/2020  9:30 AM Theodore Watts [facility]   2/18/2020  9:00 AM [facility]   2/21/2020  9:00 AM Sameer Charles [facility]   2/24/2020  9:00 AM [facility]   2/27/2020  9:00 AM [facility]

## 2020-01-28 ENCOUNTER — HOSPITAL ENCOUNTER (OUTPATIENT)
Dept: PHYSICAL THERAPY | Age: 45
Discharge: HOME | End: 2020-01-28
Payer: MEDICAID

## 2020-01-28 PROCEDURE — 97112 NEUROMUSCULAR REEDUCATION: CPT

## 2020-01-28 PROCEDURE — 97110 THERAPEUTIC EXERCISES: CPT

## 2020-01-28 PROCEDURE — 97530 THERAPEUTIC ACTIVITIES: CPT

## 2020-01-28 NOTE — PROGRESS NOTES
PHYSICAL THERAPY - DAILY TREATMENT NOTE    Patient Name: Max Greer        Date: 2020  : 1975   YES Patient  Verified  Visit #:   6     Insurance: Payor: [facility] MEDICAID / Plan: [address] [place] [address] / Product Type: Managed Care Medicaid /      In time: 9:00 Out time: 9:53   Total Treatment Time: 53     Medicare/BCBS [place] Time Tracking (below)   Total Timed Codes (min):  na 1:1 Treatment Time:  na     TREATMENT AREA =  Low back pain [M54.5]  Neck pain [M54.2]    SUBJECTIVE    Pain Level (on 0 to 10 scale):  5  / 10   Medication Changes/New allergies or changes in medical history, any new surgeries or procedures? NO    If yes, update Summary List   Subjective Functional Status/Changes:  []  No changes reported     Pt reports her stomach feels numb. Pt reports the arthritis in her knees make her limp when she walks and makes it hard for her to get up and down (from chairs). OBJECTIVE    33 min Therapeutic Exercise:  [x]  See flow sheet   Rationale:  Increase core strength/stabilization, ROM/flexibility to improve pt's ability to perform ADL's with increased ease and less pain to promote increased activity tolerance. 10 min Therapeutic Activity: sit<>stand from elevated mat with KB, gait holding KB unilaterally,    Rationale:   improve coordination, improve balance and increase proprioception to improve the patients ability to perform ADLs, transfers and gait safely and independently. 10 min Neuromuscular Re-ed: Static standing balance exercises with EO/EC on compliant and non-compliant surfaces. Rationale:   improve coordination, improve balance and increase proprioception to improve the patients ability to perform ADLs, transfers and gait safely and independently.       min Patient Education:  YES  Reviewed HEP   [x]  Progressed/Changed HEP based on:   Progressed balance HEP      Other Objective/Functional Measures:    Progressed static standing balance:   EO MSR GT 30\" B  EC MSR instep 30\" B  (2 attempts right LE)     Initiated static standing on compliant surface:   EO ROM on Airex 30\"   EC Stance on Airex 30\"     Noted right>left ankle inversion with static standing balance on compliant surface. Pt with increased sway with EC and balance on compliant surface. Post Treatment Pain Level (on 0 to 10) scale:   5  / 10     ASSESSMENT    Assessment/Changes in Function:     Pt demo's impaired balance strategy with progression of static standing balance ex. []  See Progress Note/Recertification   Patient will continue to benefit from skilled PT services to modify and progress therapeutic interventions, address functional mobility deficits, address strength deficits, analyze and address soft tissue restrictions, analyze and cue movement patterns, assess and modify postural abnormalities, address imbalance/dizziness and instruct in home and community integration to attain remaining goals. Progress toward goals / Updated goals:    1) Patient to be compliant with HEP for symptom management at home. 2) Patient will perform at least 8 reps during 30 second chair rise test to decrease falls risk added sit<>stand with UE A for strengthening   3) Patient will increase cervical FOTO Functional Status score to 57 to decrease functional limitations. 4) Patient will increase lumbar FOTO Functional Status score to 43 to decrease functional limitations. 5) Patient will demonstrate left knee extension strength of at least 4+/5 to engage in age appropriate activities.    6) Patient will demonstrate rhomberg EC of at least 12 seconds to improve stability over uneven surfaces.      PLAN    []  Upgrade activities as tolerated YES Continue plan of care   []  Discharge due to :    []  Other:      Therapist: Johanna Crowley PTA    Date: 1/28/2020 Time: 9:11 AM     Future Appointments   Date Time Provider Brown [place]   1/29/2020  9:00 AM Reji Lopez PT [address] 2/4/2020  8:00 AM Jacqueline Booth  [address]   2/5/2020  9:00 AM Luis Shearer PT [facility]   2/7/2020  1:00 PM [facility]   2/12/2020  9:00 AM Douglas Keating PT [facility]   2/14/2020  9:30 AM Douglas Keating [facility]   2/18/2020  9:00 AM [facility]   2/21/2020  9:00 AM Luis Shearer [facility]   2/24/2020  9:00 AM [facility]   2/27/2020  9:00 AM [facility]

## 2020-01-29 ENCOUNTER — HOSPITAL ENCOUNTER (OUTPATIENT)
Dept: PHYSICAL THERAPY | Age: 45
End: 2020-01-29
Payer: MEDICAID

## 2020-02-04 ENCOUNTER — OFFICE VISIT (OUTPATIENT)
Dept: ORTHOPEDIC SURGERY | Age: 45
End: 2020-02-04

## 2020-02-04 VITALS
HEIGHT: 69 IN | SYSTOLIC BLOOD PRESSURE: 123 MMHG | BODY MASS INDEX: 43.4 KG/M2 | DIASTOLIC BLOOD PRESSURE: 79 MMHG | TEMPERATURE: 98.2 F | OXYGEN SATURATION: 97 % | WEIGHT: 293 LBS | HEART RATE: 77 BPM

## 2020-02-04 DIAGNOSIS — Z98.1 S/P LUMBAR FUSION: Primary | ICD-10-CM

## 2020-02-04 DIAGNOSIS — M48.062 LUMBAR STENOSIS WITH NEUROGENIC CLAUDICATION: ICD-10-CM

## 2020-02-04 NOTE — PROGRESS NOTES
Fany Amanda presents today for   Chief Complaint   Patient presents with    Back Pain     follow up       Is someone accompanying this pt? no    Is the patient using any DME equipment during OV? no    Depression Screening:  3 most recent PHQ Screens 5/15/2018   Little interest or pleasure in doing things Nearly every day   Feeling down, depressed, irritable, or hopeless Nearly every day   Total Score PHQ 2 6   Trouble falling or staying asleep, or sleeping too much -   Feeling tired or having little energy -   Poor appetite, weight loss, or overeating -   Feeling bad about yourself - or that you are a failure or have let yourself or your family down -   Trouble concentrating on things such as school, work, reading, or watching TV -   Moving or speaking so slowly that other people could have noticed; or the opposite being so fidgety that others notice -   Thoughts of being better off dead, or hurting yourself in some way -   PHQ 9 Score -   How difficult have these problems made it for you to do your work, take care of your home and get along with others -       Learning Assessment:  Learning Assessment 11/8/2019   PRIMARY LEARNER Patient   HIGHEST LEVEL OF EDUCATION - PRIMARY LEARNER  -   BARRIERS PRIMARY LEARNER -   CO-LEARNER CAREGIVER -   PRIMARY LANGUAGE ENGLISH   LEARNER PREFERENCE PRIMARY LISTENING   ANSWERED BY patient   RELATIONSHIP SELF       Abuse Screening:  Abuse Screening Questionnaire 4/26/2017   Do you ever feel afraid of your partner? N   Are you in a relationship with someone who physically or mentally threatens you? N   Is it safe for you to go home? Y       Fall Risk  Fall Risk Assessment, last 12 mths 8/10/2017   Able to walk? Yes   Fall in past 12 months? No       OPIOID RISK TOOL  Opioid Risk Tool 6/25/2019   Family history of alcohol abuse? 0   Family history of illegal drug abuse? 0   Family history of prescription drug abuse? 0   Personal history of alcohol abuse?  0   Personal history of illegal drug abuse? 0   Personal history of prescription drug abuse? 0   Age range between 17-45? 0   History of preadolescent sexual abuse? 0   ADD, OCD, bipolar, schizophrenia? 2   Depression? 1   Opioid Risk Total Score 3       Coordination of Care:  1. Have you been to the ER, urgent care clinic since your last visit? no  Hospitalized since your last visit? no    2. Have you seen or consulted any other health care providers outside of the [address] [place] [address] since your last visit? no Include any pap smears or colon screening.  none

## 2020-02-04 NOTE — PROGRESS NOTES
[facility]  [address]  [place], [address] [place] [address] Street  Phone: [phone]  Fax: [phone]  PROGRESS NOTE  Patient: Nidia Ramsey                MRN: [id number]       SSN: [id number]  YOB: 1975        AGE: 53 y.o. SEX: female  Body mass index is 43.56 kg/m². PCP: Jerrica Ramirez MD  02/04/20    Chief Complaint   Patient presents with    Back Pain     follow up       HISTORY OF PRESENT ILLNESS, RADIOGRAPHS, and PLAN:     HISTORY OF PRESENT ILLNESS:  Ms. Alise Santiago returns today. She is status post her circumferential cervical decompression and fusion for myeloradiculopathy, as well as her lumbar decompression and fusion for stenosis and instability. She was wheelchair-bound when she arrived. She now has some backache, which she classifies as a 4/10. She is neurologically intact with normal strength, sensation, and reflexes in her upper and lower extremities. She has been through a lot of stressors. She cares for her mom. Her  left her, but actually, she looks better than I have ever seen her and is doing quite well. ASSESSMENT/PLAN: I would like to see her back again in another three months, the approximate anniversary of her surgeries for a set of x-rays of her neck and back.   She requires an ongoing exercise and weight loss program.     cc: Jerrica Ramirez MD       4V Lumbar XR Next Visit  4V Cervical XR Next Visit    Past Medical History:   Diagnosis Date    Acute pain of left shoulder 11/1/2016    Bipolar 1 disorder ([address]     Bipolar affective disorder, currently manic, moderate ([address] 4/27/2016    Chronic midline low back pain with sciatica 11/1/2016    Cigarette nicotine dependence in remission 8/2/2016    Cigarette nicotine dependence without complication 20/75/5446    Continuous nicotine dependence 1/24/2017    Depression     Fibromyalgia 9/1/2015    HLD (hyperlipidemia) 2/4/2016    Hypertension     Hypertriglyceridemia 5/15/2018    Impaired fasting glucose 5/31/2016    Irritable bowel syndrome without diarrhea 4/27/2016    Moderate episode of recurrent major depressive disorder ([facility] 4/27/2016    Obesity, Class III, BMI 40-49.9 (morbid obesity) ([facility] 9/1/2015    Onychomycosis 4/27/2016    Pain of left thumb 11/1/2016    Prediabetes 7/5/2016    PTSD (post-traumatic stress disorder)     Smoker 9/1/2015    Vitamin D deficiency 5/31/2016       Family History   Problem Relation Age of Onset    Hypertension Mother     Thyroid Disease Mother     No Known Problems Father     Psychiatric Disorder Sister     Psychiatric Disorder Brother     Psychiatric Disorder Sister     Diabetes Sister     Psychiatric Disorder Brother     Psychiatric Disorder Brother        Current Outpatient Medications   Medication Sig Dispense Refill    vortioxetine (TRINTELLIX) 10 mg tablet Take 10 mg by mouth daily. Pt takes in addition to the 20 mg      meloxicam (MOBIC) 7.5 mg tablet TAKE 1 TABLET BY MOUTH DAILY AS NEEDED FOR PAIN 30 Tab 2    gabapentin (NEURONTIN) 800 mg tablet Take 1 Tab by mouth three (3) times daily. Max Daily Amount: 2,400 mg. 90 Tab 2    cyclobenzaprine (FLEXERIL) 10 mg tablet Take 1 Tab by mouth nightly. 30 Tab 1    TRINTELLIX 20 mg tablet Take 1 Tab by mouth daily. 0    acetaminophen (TYLENOL ARTHRITIS PAIN) 650 mg TbER Take 650 mg by mouth every eight (8) hours.  traZODone (DESYREL) 100 mg tablet Take 1 Tab by mouth every evening. 30 Tab 0    buPROPion SR (WELLBUTRIN, ZYBAN) 200 mg SR tablet Take 1 Tab by mouth two (2) times a day. 30 Tab 0    docusate sodium (STOOL SOFTENER PO) Take 100 mg by mouth daily.  hydrOXYzine HCl (ATARAX) 25 mg tablet Take 25 mg by mouth three (3) times daily as needed for Anxiety.  losartan (COZAAR) 50 mg tablet Take 75 mg by mouth daily.  DULoxetine (CYMBALTA) 60 mg capsule Take 60 mg by mouth two (2) times a day.       lidocaine (LIDODERM) 5 % APPLY 1 PATCH TO THE AFFECTED AREA EVERY DAY. LEAVE ON FOR 12 HOURS THEN REMOVE FOR 12 HOURS 90 Patch 3    polyethylene glycol (MIRALAX) 17 gram/dose powder MIX AND DRINK 17 GRAMS BY MOUTH DAILY 1530 g 3    hydroCHLOROthiazide (HYDRODIURIL) 25 mg tablet Take 1 Tab by mouth daily. 90 Tab 3    lamoTRIgine (LAMICTAL) 100 mg tablet Take 100 mg by mouth daily.  naloxone (NARCAN) 4 mg/actuation nasal spray Use 1 spray intranasally, then discard. Repeat with new spray every 2 min as needed for opioid overdose symptoms, alternating nostrils. 1 Each 0    varenicline (CHANTIX) 1 mg tablet Take 1 mg by mouth two (2) times a day.  lamoTRIgine (LAMICTAL) 200 mg tablet Take 200 mg by mouth every evening. No Known Allergies    Past Surgical History:   Procedure Laterality Date    HX ACL RECONSTRUCTION      HX APPENDECTOMY      HX CERVICAL DISKECTOMY  02/07/2019    with fusion C5/6 C6/7    HX CERVICAL FUSION  0513/19    L4-L5 bilateral hemilaminectomy; medial facetectomy; foraminotomy; L5-S1 right hemilaminectomy; medial facetectomy revision; discectomy revision; L4, L5, S1 posterolateral fusion    HX CERVICAL LAMINECTOMY  03/19/2019    with fusion    HX GYN Bilateral     tubal    HX LUMBAR LAMINECTOMY Bilateral 05/13/2019    L4-L5 bilateral hemilaminectomy; medial facetectomy; foraminotomy;  L5-S1 right hemilaminectomy; medial facetectomy revision; discectomy revision; L4, L5, S1 posterolateral fusion    HX ORTHOPAEDIC         Past Medical History:   Diagnosis Date    Acute pain of left shoulder 11/1/2016    Bipolar 1 disorder ([address]     Bipolar affective disorder, currently manic, moderate ([address] 4/27/2016    Chronic midline low back pain with sciatica 11/1/2016    Cigarette nicotine dependence in remission 8/2/2016    Cigarette nicotine dependence without complication 39/95/1220    Continuous nicotine dependence 1/24/2017    Depression     Fibromyalgia 9/1/2015    HLD (hyperlipidemia) 2/4/2016    Hypertension     Hypertriglyceridemia 5/15/2018    Impaired fasting glucose 5/31/2016    Irritable bowel syndrome without diarrhea 4/27/2016    Moderate episode of recurrent major depressive disorder ([facility] 4/27/2016    Obesity, Class III, BMI 40-49.9 (morbid obesity) ([facility] 9/1/2015    Onychomycosis 4/27/2016    Pain of left thumb 11/1/2016    Prediabetes 7/5/2016    PTSD (post-traumatic stress disorder)     Smoker 9/1/2015    Vitamin D deficiency 5/31/2016       Social History     Socioeconomic History    Marital status: UNKNOWN     Spouse name: Not on file    Number of children: Not on file    Years of education: Not on file    Highest education level: Not on file   Occupational History    Not on file   Social Needs    Financial resource strain: Not on file    Food insecurity:     Worry: Not on file     Inability: Not on file    Transportation needs:     Medical: Not on file     Non-medical: Not on file   Tobacco Use    Smoking status: Current Every Day Smoker     Packs/day: 1.00     Years: 25.00     Pack years: 25.00    Smokeless tobacco: Never Used    Tobacco comment: pt back on chanitx   Substance and Sexual Activity    Alcohol use:  Yes     Alcohol/week: 9.0 standard drinks     Types: 9 Shots of liquor per week     Comment: socially    Drug use: No    Sexual activity: Yes     Partners: Male     Birth control/protection: None   Lifestyle    Physical activity:     Days per week: Not on file     Minutes per session: Not on file    Stress: Not on file   Relationships    Social connections:     Talks on phone: Not on file     Gets together: Not on file     Attends Rastafarian service: Not on file     Active member of club or organization: Not on file     Attends meetings of clubs or organizations: Not on file     Relationship status: Not on file    Intimate partner violence:     Fear of current or ex partner: Not on file     Emotionally abused: Not on file     Physically abused: Not on file     Forced sexual activity: Not on file   Other Topics Concern     Service Not Asked    Blood Transfusions Not Asked    Caffeine Concern Not Asked    Occupational Exposure Not Asked    Hobby Hazards Not Asked    Sleep Concern Not Asked    Stress Concern Not Asked    Weight Concern Not Asked    Special Diet Not Asked    Back Care Not Asked    Exercise Not Asked    Bike Helmet Not Asked   [address] Not Asked    Self-Exams Not Asked   Social History Narrative    Not on file         REVIEW OF SYSTEMS:   CONSTITUTIONAL SYMPTOMS:  Negative. EYES:  Negative. EARS, NOSE, THROAT AND MOUTH:  Negative. CARDIOVASCULAR:  Negative. RESPIRATORY:  Negative. GENITOURINARY: Per HPI. GASTROINTESTINAL:  Per HPI. INTEGUMENTARY (SKIN AND/OR BREAST):  Negative. MUSCULOSKELETAL: Per HPI.   ENDOCRINE/RHEUMATOLOGIC:  Negative. NEUROLOGICAL:  Per HPI. HEMATOLOGIC/LYMPHATIC:  Negative. ALLERGIC/IMMUNOLOGIC:  Negative. PSYCHIATRIC:  Negative. PHYSICAL EXAMINATION:   Visit Vitals  /79 (BP 1 Location: Left arm, BP Patient Position: Sitting)   Pulse 77   Temp 98.2 °F (36.8 °C) (Oral)   Ht 5' 9\" (1.753 m)   Wt 295 lb (133.8 kg)   SpO2 97% Comment: RA   BMI 43.56 kg/m²    PAIN SCALE: 4/10    CONSTITUTIONAL: The patient is in no apparent distress and is alert and oriented x 3. HEENT: Normocephalic. Hearing grossly intact. NECK: Supple and symmetric. no tenderness, or masses were felt. RESPIRATORY: No labored breathing. CARDIOVASCULAR: The carotid pulses were normal. Peripheral pulses were 2+. CHEST: Normal AP diameter and normal contour without any kyphoscoliosis. LYMPHATIC: No lymphadenopathy was appreciated in the neck, axillae or groin. SKIN:  Incision healing well, no drainage, no erythema, no hernia, no seroma, no swelling, no dehiscence, incision well approximated.  Negative for scars, rashes, lesions, or ulcers on the right upper, right lower, left upper, left lower and trunk. NEUROLOGICAL: Alert and oriented x 3. Ambulation without assistive device. FWB. EXTREMITIES: See musculoskeletal.  MUSCULOSKELETAL:   Head and Neck:  Negative for misalignment, asymmetry, crepitation, defects, tenderness masses or effusions.  Left Upper Extremity: Inspection, percussion and palpation performed. Dela Cruzs sign is negative.  Right Upper Extremity: Inspection, percussion and palpation performed. Dela Cruzs sign is negative.  Spine, Ribs and Pelvis: Mild back pain. Inspection, percussion and palpation performed. Negative for misalignment, asymmetry, crepitation, defects, tenderness masses or effusions.  Left Lower Extremity: Inspection, percussion and palpation performed. Negative straight leg raise.  Right Lower Extremity: Inspection, percussion and palpation performed. Negative straight leg raise. SPINE EXAM:     Lumbar spine: No rash, ecchymosis, or gross obliquity. No focal atrophy is noted. ASSESSMENT    ICD-10-CM ICD-9-CM    1. S/P lumbar fusion Z98.1 V45.4    2. Lumbar stenosis with neurogenic claudication M48.062 724.03        Written by [place], as dictated by Benjy Carter MD.    I, Dr. Benjy Carter MD, confirm that all documentation is accurate.

## 2020-02-07 ENCOUNTER — APPOINTMENT (OUTPATIENT)
Dept: PHYSICAL THERAPY | Age: 45
End: 2020-02-07

## 2020-02-12 ENCOUNTER — APPOINTMENT (OUTPATIENT)
Dept: PHYSICAL THERAPY | Age: 45
End: 2020-02-12

## 2020-02-14 ENCOUNTER — APPOINTMENT (OUTPATIENT)
Dept: PHYSICAL THERAPY | Age: 45
End: 2020-02-14

## 2020-02-14 NOTE — PROGRESS NOTES
[address]  [facility] PHYSICAL THERAPY  [address], [place], [address] - Phone: [phone]  Fax: [phone] SUMMARY  Patient Name: Nimisha Montilla : 1975   Treatment/Medical Diagnosis: Low back pain [M54.5]  Neck pain [M54.2]   Referral Source: Ursula Mcdowell NP     Date of Initial Visit: 2019 Attended Visits: 6 Missed Visits: 2     SUMMARY OF TREATMENT  Pt started with aquatic therapy, but discontinued after one session secondary to medical complications, pt then returned to PT to trial land based PT for 4 sessions. CURRENT STATUS  Pt with fair tolerance to initiation of therapeutic exercise. Pt with significant ms fatigue and soreness with therapeutic activity. Pt participated in 4 sessions, then on 2020, pt called advising her MD is \"releasing her from Physical Therapy\" and requested discharge. RECOMMENDATIONS  Other: Self discharge per MD recommendation     If you have any questions/comments please contact us directly at [phone]. Thank you for allowing us to assist in the care of your patient. LPTA Signature: Satish Headley PTA Date: 2020   Therapist Signature: Aguilar Alan DPT Time: 3:55 PM     Physician Signature:        Date:       Time:      To ensure we are able to process the patients encounter and avoid risk of your patient receiving a bill for our services, please sign and return this discharge summary by 2020.  (30 days following DC date)

## 2020-02-18 ENCOUNTER — APPOINTMENT (OUTPATIENT)
Dept: PHYSICAL THERAPY | Age: 45
End: 2020-02-18

## 2020-02-21 ENCOUNTER — APPOINTMENT (OUTPATIENT)
Dept: PHYSICAL THERAPY | Age: 45
End: 2020-02-21

## 2020-02-24 ENCOUNTER — APPOINTMENT (OUTPATIENT)
Dept: PHYSICAL THERAPY | Age: 45
End: 2020-02-24

## 2020-02-24 DIAGNOSIS — M54.16 LUMBAR RADICULOPATHY: ICD-10-CM

## 2020-02-24 DIAGNOSIS — G95.9 CERVICAL MYELOPATHY (HCC): ICD-10-CM

## 2020-02-24 RX ORDER — GABAPENTIN 800 MG/1
TABLET ORAL
Qty: 90 TAB | Refills: 2 | Status: SHIPPED | OUTPATIENT
Start: 2020-02-24 | End: 2020-07-08

## 2020-02-24 RX ORDER — MELOXICAM 7.5 MG/1
TABLET ORAL
Qty: 30 TAB | Refills: 2 | Status: SHIPPED | OUTPATIENT
Start: 2020-02-24 | End: 2022-05-10

## 2020-02-27 ENCOUNTER — APPOINTMENT (OUTPATIENT)
Dept: PHYSICAL THERAPY | Age: 45
End: 2020-02-27

## 2020-04-06 RX ORDER — CYCLOBENZAPRINE HCL 10 MG
TABLET ORAL
Qty: 30 TAB | Refills: 1 | Status: SHIPPED | OUTPATIENT
Start: 2020-04-06 | End: 2020-08-12

## 2020-05-05 ENCOUNTER — OFFICE VISIT (OUTPATIENT)
Dept: ORTHOPEDIC SURGERY | Age: 45
End: 2020-05-05

## 2020-05-05 VITALS
BODY MASS INDEX: 43.56 KG/M2 | DIASTOLIC BLOOD PRESSURE: 73 MMHG | TEMPERATURE: 98.6 F | RESPIRATION RATE: 17 BRPM | HEART RATE: 89 BPM | SYSTOLIC BLOOD PRESSURE: 143 MMHG | HEIGHT: 69 IN

## 2020-05-05 DIAGNOSIS — Z98.1 S/P CERVICAL SPINAL FUSION: ICD-10-CM

## 2020-05-05 DIAGNOSIS — M54.16 LUMBAR RADICULOPATHY: ICD-10-CM

## 2020-05-05 DIAGNOSIS — Z98.1 S/P LUMBAR FUSION: Primary | ICD-10-CM

## 2020-05-05 RX ORDER — MELOXICAM 15 MG/1
15 TABLET ORAL DAILY
Qty: 30 TAB | Refills: 2 | Status: SHIPPED | OUTPATIENT
Start: 2020-05-05 | End: 2020-06-08

## 2020-05-05 NOTE — PROGRESS NOTES
[facility]  [address], [address]  [place], [address] [place] [address] Street  Phone: [phone]  Fax: [phone]  PROGRESS NOTE  Patient: Adrianne Corey                MRN: [id number]       SSN: [id number]  YOB: 1975        AGE: 50 y.o. SEX: female  Body mass index is 43.56 kg/m². PCP: Sofya Monroe MD  05/05/20    Chief Complaint   Patient presents with    Back Pain     fu increased pain    Neck Pain       HISTORY OF PRESENT ILLNESS, RADIOGRAPHS, and PLAN:     Heart is seen today. She overall is been doing well but about a month month and a half ago she began to experience severe lumbosacral back pain shocking sensations in her legs when she lays flat. She also has noted some weakness in eversion of her left ankle though I do not detect anything on manual motor testing. Her x-rays demonstrate appropriate alignment and fixation L4 to the sacrum no clear screw loosening or breakage or any other pathology as of that. Global cervical spine is doing well as is her upper extremities at this point I like to obtain an MRI of her lumbar spine see if there is some junctional stenosis developing or other pathology that would explain pathology. I restarted her on her Mobic which is been is stressful anti-inflammatory for her in the past.    This dictation was created utilizing voice recognition software. Errors may be present.        Past Medical History:   Diagnosis Date    Acute pain of left shoulder 11/1/2016    Bipolar 1 disorder ([facility]     Bipolar affective disorder, currently manic, moderate ([address] 4/27/2016    Chronic midline low back pain with sciatica 11/1/2016    Cigarette nicotine dependence in remission 8/2/2016    Cigarette nicotine dependence without complication 64/35/2204    Continuous nicotine dependence 1/24/2017    Depression     Fibromyalgia 9/1/2015    HLD (hyperlipidemia) 2/4/2016    Hypertension     Hypertriglyceridemia 5/15/2018    Impaired fasting glucose 5/31/2016    Irritable bowel syndrome without diarrhea 4/27/2016    Moderate episode of recurrent major depressive disorder ([facility] 4/27/2016    Obesity, Class III, BMI 40-49.9 (morbid obesity) ([facility] [facility] 9/1/2015    Onychomycosis 4/27/2016    Pain of left thumb 11/1/2016    Prediabetes 7/5/2016    PTSD (post-traumatic stress disorder)     Smoker 9/1/2015    Vitamin D deficiency 5/31/2016       Family History   Problem Relation Age of Onset    Hypertension Mother     Thyroid Disease Mother     No Known Problems Father     Psychiatric Disorder Sister     Psychiatric Disorder Brother     Psychiatric Disorder Sister     Diabetes Sister     Psychiatric Disorder Brother     Psychiatric Disorder Brother        Current Outpatient Medications   Medication Sig Dispense Refill    meloxicam (MOBIC) 7.5 mg tablet TAKE 1 TABLET BY MOUTH DAILY AS NEEDED FOR PAIN 30 Tab 2    gabapentin (NEURONTIN) 800 mg tablet TAKE 1 TABLET BY MOUTH THREE TIMES DAILY. MAX DAILY AMOUNT: 2400 MG 90 Tab 2    vortioxetine (TRINTELLIX) 10 mg tablet Take 10 mg by mouth daily. Pt takes in addition to the 20 mg      TRINTELLIX 20 mg tablet Take 1 Tab by mouth daily. 0    acetaminophen (TYLENOL ARTHRITIS PAIN) 650 mg TbER Take 650 mg by mouth every eight (8) hours.  traZODone (DESYREL) 100 mg tablet Take 1 Tab by mouth every evening. 30 Tab 0    buPROPion SR (WELLBUTRIN, ZYBAN) 200 mg SR tablet Take 1 Tab by mouth two (2) times a day. 30 Tab 0    docusate sodium (STOOL SOFTENER PO) Take 100 mg by mouth daily.  hydrOXYzine HCl (ATARAX) 25 mg tablet Take 25 mg by mouth three (3) times daily as needed for Anxiety.  lamoTRIgine (LAMICTAL) 200 mg tablet Take 200 mg by mouth every evening.  losartan (COZAAR) 50 mg tablet Take 75 mg by mouth daily.  DULoxetine (CYMBALTA) 60 mg capsule Take 60 mg by mouth two (2) times a day.       lidocaine (LIDODERM) 5 % APPLY 1 PATCH TO THE AFFECTED AREA EVERY DAY. LEAVE ON FOR 12 HOURS THEN REMOVE FOR 12 HOURS 90 Patch 3    polyethylene glycol (MIRALAX) 17 gram/dose powder MIX AND DRINK 17 GRAMS BY MOUTH DAILY 1530 g 3    hydroCHLOROthiazide (HYDRODIURIL) 25 mg tablet Take 1 Tab by mouth daily. 90 Tab 3    lamoTRIgine (LAMICTAL) 100 mg tablet Take 100 mg by mouth daily.  cyclobenzaprine (FLEXERIL) 10 mg tablet TAKE 1 TABLET BY MOUTH EVERY NIGHT 30 Tab 1    naloxone (NARCAN) 4 mg/actuation nasal spray Use 1 spray intranasally, then discard. Repeat with new spray every 2 min as needed for opioid overdose symptoms, alternating nostrils. 1 Each 0    varenicline (CHANTIX) 1 mg tablet Take 1 mg by mouth two (2) times a day. No Known Allergies    Past Surgical History:   Procedure Laterality Date    HX ACL RECONSTRUCTION      HX APPENDECTOMY      HX CERVICAL DISKECTOMY  02/07/2019    with fusion C5/6 C6/7    HX CERVICAL FUSION  0513/19    L4-L5 bilateral hemilaminectomy; medial facetectomy; foraminotomy; L5-S1 right hemilaminectomy; medial facetectomy revision; discectomy revision; L4, L5, S1 posterolateral fusion    HX CERVICAL LAMINECTOMY  03/19/2019    with fusion    HX GYN Bilateral     tubal    HX LUMBAR LAMINECTOMY Bilateral 05/13/2019    L4-L5 bilateral hemilaminectomy; medial facetectomy; foraminotomy;  L5-S1 right hemilaminectomy; medial facetectomy revision; discectomy revision; L4, L5, S1 posterolateral fusion    HX ORTHOPAEDIC         Past Medical History:   Diagnosis Date    Acute pain of left shoulder 11/1/2016    Bipolar 1 disorder ([address]     Bipolar affective disorder, currently manic, moderate ([address] 4/27/2016    Chronic midline low back pain with sciatica 11/1/2016    Cigarette nicotine dependence in remission 8/2/2016    Cigarette nicotine dependence without complication 99/02/9586    Continuous nicotine dependence 1/24/2017    Depression     Fibromyalgia 9/1/2015    HLD (hyperlipidemia) 2/4/2016    Hypertension     Hypertriglyceridemia 5/15/2018    Impaired fasting glucose 5/31/2016    Irritable bowel syndrome without diarrhea 4/27/2016    Moderate episode of recurrent major depressive disorder ([facility] 4/27/2016    Obesity, Class III, BMI 40-49.9 (morbid obesity) ([facility] 9/1/2015    Onychomycosis 4/27/2016    Pain of left thumb 11/1/2016    Prediabetes 7/5/2016    PTSD (post-traumatic stress disorder)     Smoker 9/1/2015    Vitamin D deficiency 5/31/2016       Social History     Socioeconomic History    Marital status: UNKNOWN     Spouse name: Not on file    Number of children: Not on file    Years of education: Not on file    Highest education level: Not on file   Occupational History    Not on file   Social Needs    Financial resource strain: Not on file    Food insecurity     Worry: Not on file     Inability: Not on file   [employer] needs     Medical: Not on file     Non-medical: Not on file   Tobacco Use    Smoking status: Current Every Day Smoker     Packs/day: 1.00     Years: 25.00     Pack years: 25.00    Smokeless tobacco: Never Used    Tobacco comment: pt back on chanitx   Substance and Sexual Activity    Alcohol use:  Yes     Alcohol/week: 9.0 standard drinks     Types: 9 Shots of liquor per week     Comment: socially    Drug use: No    Sexual activity: Yes     Partners: Male     Birth control/protection: None   Lifestyle    Physical activity     Days per week: Not on file     Minutes per session: Not on file    Stress: Not on file   Relationships    Social connections     Talks on phone: Not on file     Gets together: Not on file     Attends Sikhism service: Not on file     Active member of club or organization: Not on file     Attends meetings of clubs or organizations: Not on file     Relationship status: Not on file    Intimate partner violence     Fear of current or ex partner: Not on file     Emotionally abused: Not on file     Physically abused: Not on file     Forced sexual activity: Not on file   Other Topics Concern     Service Not Asked    Blood Transfusions Not Asked    Caffeine Concern Not Asked    Occupational Exposure Not Asked    Hobby Hazards Not Asked    Sleep Concern Not Asked    Stress Concern Not Asked    Weight Concern Not Asked    Special Diet Not Asked    Back Care Not Asked    Exercise Not Asked    Bike Helmet Not Asked   [address] Not Asked    Self-Exams Not Asked   Social History Narrative    Not on file         REVIEW OF SYSTEMS:   CONSTITUTIONAL SYMPTOMS:  Negative. EYES:  Negative. EARS, NOSE, THROAT AND MOUTH:  Negative. CARDIOVASCULAR:  Negative. RESPIRATORY:  Negative. GENITOURINARY: Per HPI. GASTROINTESTINAL:  Per HPI. INTEGUMENTARY (SKIN AND/OR BREAST):  Negative. MUSCULOSKELETAL: Per HPI.   ENDOCRINE/RHEUMATOLOGIC:  Negative. NEUROLOGICAL:  Per HPI. HEMATOLOGIC/LYMPHATIC:  Negative. ALLERGIC/IMMUNOLOGIC:  Negative. PSYCHIATRIC:  Negative. PHYSICAL EXAMINATION:   Visit Vitals  /73 (BP 1 Location: Left arm, BP Patient Position: Sitting)   Pulse 89   Temp 98.6 °F (37 °C) (Oral)   Resp 17   Ht 5' 9\" (1.753 m)   BMI 43.56 kg/m²    PAIN SCALE: 4/10    CONSTITUTIONAL: The patient is in no apparent distress and is alert and oriented x 3. HEENT: Normocephalic. Hearing grossly intact. NECK: Supple and symmetric. no tenderness, or masses were felt. RESPIRATORY: No labored breathing. CARDIOVASCULAR: The carotid pulses were normal. Peripheral pulses were 2+. CHEST: Normal AP diameter and normal contour without any kyphoscoliosis. LYMPHATIC: No lymphadenopathy was appreciated in the neck, axillae or groin. SKIN:  Negative for scars, rashes, lesions, or ulcers on the right upper, right lower, left upper, left lower and trunk. NEUROLOGICAL: Alert and oriented x 3. Ambulation without assistive device. FWB.   EXTREMITIES: See musculoskeletal.  MUSCULOSKELETAL:   Head and Neck:  Negative for misalignment, asymmetry, crepitation, defects, tenderness masses or effusions.  Left Upper Extremity: Inspection, percussion and palpation performed. Dela Cruzs sign is negative.  Right Upper Extremity: Inspection, percussion and palpation performed. Dela Cruzs sign is negative.  Spine, Ribs and Pelvis: Back pain. Inspection, percussion and palpation performed. Negative for misalignment, asymmetry, crepitation, defects, tenderness masses or effusions.  Left Lower Extremity: Inspection, percussion and palpation performed. Negative straight leg raise.  Right Lower Extremity: Pain. Inspection, percussion and palpation performed. Negative straight leg raise. SPINE EXAM:     Cervical spine: Neck is midline. Normal muscle tone. No focal atrophy is noted. Lumbar spine: No rash, ecchymosis, or gross obliquity. No focal atrophy is noted. ASSESSMENT    ICD-10-CM ICD-9-CM    1. S/P lumbar fusion Z98.1 V45.4 AMB POC XRAY, SPINE, LUMBOSACRAL; 4+      meloxicam (MOBIC) 15 mg tablet      MRI LUMB SPINE W CONT   2. S/P cervical spinal fusion Z98.1 V45.4 AMB POC XRAY, SPINE, CERVICAL; 4+ VIE      meloxicam (MOBIC) 15 mg tablet   3. Lumbar radiculopathy M54.16 724.4 meloxicam (MOBIC) 15 mg tablet      MRI LUMB SPINE W CONT       Written by Calli Conley, as dictated by Erum Cerna MD.    I, Dr. Erum Cerna MD, confirm that all documentation is accurate.

## 2020-05-05 NOTE — LETTER
5/5/20 Patient: Yany Mcnamara YOB: 1975 Date of Visit: 5/5/2020 Grady Paulson MD 
[address] [id number] VIA Facsimile: [phone] Dear Grady Paulson MD, Thank you for referring Ms. Yany Mcnamara to [facility] for evaluation. My notes for this consultation are attached. If you have questions, please do not hesitate to call me. I look forward to following your patient along with you.  
 
 
Sincerely, 
 
Fabio Laboy MD

## 2020-05-15 ENCOUNTER — HOSPITAL ENCOUNTER (OUTPATIENT)
Dept: MRI IMAGING | Age: 45
Discharge: HOME | End: 2020-05-15
Payer: MEDICAID

## 2020-05-15 VITALS — WEIGHT: 293 LBS | BODY MASS INDEX: 44.3 KG/M2

## 2020-05-15 DIAGNOSIS — M54.16 LUMBAR RADICULOPATHY: ICD-10-CM

## 2020-05-15 DIAGNOSIS — Z98.1 S/P LUMBAR FUSION: ICD-10-CM

## 2020-05-15 LAB — CREAT UR-MCNC: 0.4 MG/DL (ref 0.6–1.3)

## 2020-05-15 PROCEDURE — A9575 INJ GADOTERATE MEGLUMI 0.1ML: HCPCS

## 2020-05-15 PROCEDURE — 72158 MRI LUMBAR SPINE W/O & W/DYE: CPT

## 2020-05-15 PROCEDURE — 74011636320 HC RX REV CODE- 636/320

## 2020-05-15 PROCEDURE — 82565 ASSAY OF CREATININE: CPT

## 2020-05-15 RX ADMIN — GADOTERATE MEGLUMINE 30 ML: 376.9 INJECTION INTRAVENOUS at 10:23

## 2020-05-22 ENCOUNTER — OFFICE VISIT (OUTPATIENT)
Dept: ORTHOPEDIC SURGERY | Age: 45
End: 2020-05-22

## 2020-05-22 VITALS
HEIGHT: 69 IN | TEMPERATURE: 98.6 F | DIASTOLIC BLOOD PRESSURE: 78 MMHG | SYSTOLIC BLOOD PRESSURE: 120 MMHG | OXYGEN SATURATION: 96 % | BODY MASS INDEX: 43.4 KG/M2 | WEIGHT: 293 LBS | HEART RATE: 82 BPM | RESPIRATION RATE: 14 BRPM

## 2020-05-22 DIAGNOSIS — Z98.1 S/P LUMBAR FUSION: Primary | ICD-10-CM

## 2020-05-22 DIAGNOSIS — M54.16 LUMBAR RADICULOPATHY: ICD-10-CM

## 2020-05-22 DIAGNOSIS — Z98.1 S/P CERVICAL SPINAL FUSION: ICD-10-CM

## 2020-05-22 NOTE — LETTER
5/22/20 Patient: Javi Felix YOB: 1975 Date of Visit: 5/22/2020 Bambi Hernandez MD 
[address] [facility] [address] [address] VIA Facsimile: [phone] Dear Bambi Hernandez MD, Thank you for referring Ms. Javi Felix to [facility] for evaluation. My notes for this consultation are attached. If you have questions, please do not hesitate to call me. I look forward to following your patient along with you.  
 
 
Sincerely, 
 
Kobe Lyle MD

## 2020-05-22 NOTE — PROGRESS NOTES
[facility]  [address]  [place] [address]  Phone: [phone]  Fax: [phone]  PROGRESS NOTE  Patient: Bar Joaquin                MRN: [id number]       SSN: [id number]  YOB: 1975        AGE: 50 y.o. SEX: female  Body mass index is 44.42 kg/m². PCP: Leydi Ellsworth MD  05/22/20    Chief Complaint   Patient presents with    Back Pain    Leg Pain    Foot Pain    Toe Pain       HISTORY OF PRESENT ILLNESS, RADIOGRAPHS, and PLAN:     Ms. Jalil Sears returns today. She is complaining of some increasing mechanical back pain she has when she is doing her exercises and does a straight leg raise and let down she will occasionally get a popping sensation in her lumbar spine and right paramedian position in the operative area and this a painful popping sensation MRI was recently obtained of her lumbar spine a benign with mild degenerative changes good fixation reasonable decompression previous x-rays demonstrated what appeared to be progression towards fusion. Her physical exam is benign with good strength and sensation normal reflexes at this point she is about a year out from her lumbar fusion my concern be is is a pseudoarthrosis the other option is just junctional arthritic change to her spine which we know she has we will obtain a CAT scan of the lumbar spine to  the confluence of her fusion      This dictation was created utilizing voice recognition software. Errors may be present.        Past Medical History:   Diagnosis Date    Acute pain of left shoulder 11/1/2016    Bipolar 1 disorder ([address]     Bipolar affective disorder, currently manic, moderate ([address] 4/27/2016    Chronic midline low back pain with sciatica 11/1/2016    Cigarette nicotine dependence in remission 8/2/2016    Cigarette nicotine dependence without complication 33/36/6315    Continuous nicotine dependence 1/24/2017    Depression     Fibromyalgia 9/1/2015    HLD (hyperlipidemia) 2/4/2016    Hypertension     Hypertriglyceridemia 5/15/2018    Impaired fasting glucose 5/31/2016    Irritable bowel syndrome without diarrhea 4/27/2016    Moderate episode of recurrent major depressive disorder ([facility] 4/27/2016    Obesity, Class III, BMI 40-49.9 (morbid obesity) ([facility] 9/1/2015    Onychomycosis 4/27/2016    Pain of left thumb 11/1/2016    Prediabetes 7/5/2016    PTSD (post-traumatic stress disorder)     Smoker 9/1/2015    Vitamin D deficiency 5/31/2016       Family History   Problem Relation Age of Onset    Hypertension Mother     Thyroid Disease Mother     No Known Problems Father     Psychiatric Disorder Sister     Psychiatric Disorder Brother     Psychiatric Disorder Sister     Diabetes Sister     Psychiatric Disorder Brother     Psychiatric Disorder Brother        Current Outpatient Medications   Medication Sig Dispense Refill    meloxicam (MOBIC) 15 mg tablet Take 1 Tab by mouth daily. 30 Tab 2    cyclobenzaprine (FLEXERIL) 10 mg tablet TAKE 1 TABLET BY MOUTH EVERY NIGHT 30 Tab 1    gabapentin (NEURONTIN) 800 mg tablet TAKE 1 TABLET BY MOUTH THREE TIMES DAILY. MAX DAILY AMOUNT: 2400 MG 90 Tab 2    TRINTELLIX 20 mg tablet Take 1 Tab by mouth daily. 0    traZODone (DESYREL) 100 mg tablet Take 1 Tab by mouth every evening. 30 Tab 0    buPROPion SR (WELLBUTRIN, ZYBAN) 200 mg SR tablet Take 1 Tab by mouth two (2) times a day. 30 Tab 0    hydrOXYzine HCl (ATARAX) 25 mg tablet Take 25 mg by mouth three (3) times daily as needed for Anxiety.  lamoTRIgine (LAMICTAL) 200 mg tablet Take 200 mg by mouth every evening.  losartan (COZAAR) 50 mg tablet Take 75 mg by mouth daily.  polyethylene glycol (MIRALAX) 17 gram/dose powder MIX AND DRINK 17 GRAMS BY MOUTH DAILY 1530 g 3    hydroCHLOROthiazide (HYDRODIURIL) 25 mg tablet Take 1 Tab by mouth daily. 90 Tab 3    lamoTRIgine (LAMICTAL) 100 mg tablet Take 100 mg by mouth daily.       meloxicam (MOBIC) 7.5 mg tablet TAKE 1 TABLET BY MOUTH DAILY AS NEEDED FOR PAIN 30 Tab 2    vortioxetine (TRINTELLIX) 10 mg tablet Take 10 mg by mouth daily. Pt takes in addition to the 20 mg      naloxone (NARCAN) 4 mg/actuation nasal spray Use 1 spray intranasally, then discard. Repeat with new spray every 2 min as needed for opioid overdose symptoms, alternating nostrils. 1 Each 0    acetaminophen (TYLENOL ARTHRITIS PAIN) 650 mg TbER Take 650 mg by mouth every eight (8) hours.  varenicline (CHANTIX) 1 mg tablet Take 1 mg by mouth two (2) times a day.  docusate sodium (STOOL SOFTENER PO) Take 100 mg by mouth daily.  DULoxetine (CYMBALTA) 60 mg capsule Take 60 mg by mouth two (2) times a day.  lidocaine (LIDODERM) 5 % APPLY 1 PATCH TO THE AFFECTED AREA EVERY DAY. LEAVE ON FOR 12 HOURS THEN REMOVE FOR 12 HOURS 90 Patch 3       No Known Allergies    Past Surgical History:   Procedure Laterality Date    HX ACL RECONSTRUCTION      HX APPENDECTOMY      HX CERVICAL DISKECTOMY  02/07/2019    with fusion C5/6 C6/7    HX CERVICAL FUSION  0513/19    L4-L5 bilateral hemilaminectomy; medial facetectomy; foraminotomy; L5-S1 right hemilaminectomy; medial facetectomy revision; discectomy revision; L4, L5, S1 posterolateral fusion    HX CERVICAL LAMINECTOMY  03/19/2019    with fusion    HX GYN Bilateral     tubal    HX LUMBAR LAMINECTOMY Bilateral 05/13/2019    L4-L5 bilateral hemilaminectomy; medial facetectomy; foraminotomy;  L5-S1 right hemilaminectomy; medial facetectomy revision; discectomy revision; L4, L5, S1 posterolateral fusion    HX ORTHOPAEDIC         Past Medical History:   Diagnosis Date    Acute pain of left shoulder 11/1/2016    Bipolar 1 disorder ([address]     Bipolar affective disorder, currently manic, moderate ([address] 4/27/2016    Chronic midline low back pain with sciatica 11/1/2016    Cigarette nicotine dependence in remission 8/2/2016    Cigarette nicotine dependence without complication 11/29/2016    Continuous nicotine dependence 1/24/2017    Depression     Fibromyalgia 9/1/2015    HLD (hyperlipidemia) 2/4/2016    Hypertension     Hypertriglyceridemia 5/15/2018    Impaired fasting glucose 5/31/2016    Irritable bowel syndrome without diarrhea 4/27/2016    Moderate episode of recurrent major depressive disorder ([facility] 4/27/2016    Obesity, Class III, BMI 40-49.9 (morbid obesity) ([facility] 9/1/2015    Onychomycosis 4/27/2016    Pain of left thumb 11/1/2016    Prediabetes 7/5/2016    PTSD (post-traumatic stress disorder)     Smoker 9/1/2015    Vitamin D deficiency 5/31/2016       Social History     Socioeconomic History    Marital status: UNKNOWN     Spouse name: Not on file    Number of children: Not on file    Years of education: Not on file    Highest education level: Not on file   Occupational History    Not on file   Social Needs    Financial resource strain: Not on file    Food insecurity     Worry: Not on file     Inability: Not on file   [employer] needs     Medical: Not on file     Non-medical: Not on file   Tobacco Use    Smoking status: Current Every Day Smoker     Packs/day: 1.00     Years: 25.00     Pack years: 25.00    Smokeless tobacco: Never Used    Tobacco comment: pt back on chanitx   Substance and Sexual Activity    Alcohol use:  Yes     Alcohol/week: 9.0 standard drinks     Types: 9 Shots of liquor per week     Comment: socially    Drug use: No    Sexual activity: Yes     Partners: Male     Birth control/protection: None   Lifestyle    Physical activity     Days per week: Not on file     Minutes per session: Not on file    Stress: Not on file   Relationships    Social connections     Talks on phone: Not on file     Gets together: Not on file     Attends Mandaen service: Not on file     Active member of club or organization: Not on file     Attends meetings of clubs or organizations: Not on file     Relationship status: Not on file    Intimate partner violence     Fear of current or ex partner: Not on file     Emotionally abused: Not on file     Physically abused: Not on file     Forced sexual activity: Not on file   Other Topics Concern     Service Not Asked    Blood Transfusions Not Asked    Caffeine Concern Not Asked    Occupational Exposure Not Asked    Hobby Hazards Not Asked    Sleep Concern Not Asked    Stress Concern Not Asked    Weight Concern Not Asked    Special Diet Not Asked    Back Care Not Asked    Exercise Not Asked    Bike Helmet Not Asked   [address] Not Asked    Self-Exams Not Asked   Social History Narrative    Not on file         REVIEW OF SYSTEMS:   CONSTITUTIONAL SYMPTOMS:  Negative. EYES:  Negative. EARS, NOSE, THROAT AND MOUTH:  Negative. CARDIOVASCULAR:  Negative. RESPIRATORY:  Negative. GENITOURINARY: Per HPI. GASTROINTESTINAL:  Per HPI. INTEGUMENTARY (SKIN AND/OR BREAST):  Negative. MUSCULOSKELETAL: Per HPI.   ENDOCRINE/RHEUMATOLOGIC:  Negative. NEUROLOGICAL:  Per HPI. HEMATOLOGIC/LYMPHATIC:  Negative. ALLERGIC/IMMUNOLOGIC:  Negative. PSYCHIATRIC:  Negative. PHYSICAL EXAMINATION:   Visit Vitals  /78 (BP 1 Location: Right arm, BP Patient Position: Sitting)   Pulse 82   Temp 98.6 °F (37 °C) (Oral)   Resp 14   Ht 5' 9\" (1.753 m)   Wt 300 lb 12.8 oz (136.4 kg)   SpO2 96%   BMI 44.42 kg/m²    PAIN SCALE: 6/10    CONSTITUTIONAL: The patient is in no apparent distress and is alert and oriented x 3. HEENT: Normocephalic. Hearing grossly intact. NECK: Supple and symmetric. no tenderness, or masses were felt. RESPIRATORY: No labored breathing. CARDIOVASCULAR: The carotid pulses were normal. Peripheral pulses were 2+. CHEST: Normal AP diameter and normal contour without any kyphoscoliosis. LYMPHATIC: No lymphadenopathy was appreciated in the neck, axillae or groin.   SKIN: Negative for scars, rashes, lesions, or ulcers on the right upper, right lower, left upper, left lower and trunk. NEUROLOGICAL: Alert and oriented x 3. Ambulation without assistive device. FWB. EXTREMITIES: See musculoskeletal.  MUSCULOSKELETAL:   Head and Neck:  Negative for misalignment, asymmetry, crepitation, defects, tenderness masses or effusions.  Left Upper Extremity: Inspection, percussion and palpation performed. Dela Cruzs sign is negative.  Right Upper Extremity: Inspection, percussion and palpation performed. Dela Cruzs sign is negative.  Spine, Ribs and Pelvis: Inspection, percussion and palpation performed. Negative for misalignment, asymmetry, crepitation, defects, tenderness masses or effusions.  Left Lower Extremity: \"popping\" sensation in knee. Inspection, percussion and palpation performed. Negative straight leg raise.  Right Lower Extremity: \"popping\" sensation in knee. Inspection, percussion and palpation performed. Negative straight leg raise. SPINE EXAM:     Cervical spine: Neck is midline. Normal muscle tone. No focal atrophy is noted. Lumbar spine: No rash, ecchymosis, or gross obliquity. No focal atrophy is noted. ASSESSMENT    ICD-10-CM ICD-9-CM    1. S/P lumbar fusion Z98.1 V45.4    2. S/P cervical spinal fusion Z98.1 V45.4    3. Lumbar radiculopathy M54.16 724.4        Written by Wilbur Zurita, as dictated by Lanny Espinoza MD.    I, Dr. Lanny Espinoza MD, confirm that all documentation is accurate.

## 2020-05-29 ENCOUNTER — HOSPITAL ENCOUNTER (OUTPATIENT)
Dept: CT IMAGING | Age: 45
Discharge: HOME | End: 2020-05-29
Payer: MEDICAID

## 2020-05-29 DIAGNOSIS — Z98.1 S/P LUMBAR FUSION: ICD-10-CM

## 2020-05-29 DIAGNOSIS — M54.16 LUMBAR RADICULOPATHY: ICD-10-CM

## 2020-05-29 PROCEDURE — 72131 CT LUMBAR SPINE W/O DYE: CPT

## 2020-06-05 NOTE — PROGRESS NOTES
I have called the patient and informed her that Dr. Myron Jesus has reviewed her CT Lumbar Spine and he states he still needs to heal. I informed patient to keep her appointment on Tuesday unless she feels she needs to come in sooner. Patient states she can wait until Tuesday. No further action needed at this time.

## 2020-06-08 DIAGNOSIS — Z98.1 S/P LUMBAR FUSION: ICD-10-CM

## 2020-06-08 DIAGNOSIS — Z98.1 S/P CERVICAL SPINAL FUSION: ICD-10-CM

## 2020-06-08 DIAGNOSIS — M54.16 LUMBAR RADICULOPATHY: ICD-10-CM

## 2020-06-08 RX ORDER — MELOXICAM 15 MG/1
TABLET ORAL
Qty: 90 TAB | Refills: 3 | Status: SHIPPED | OUTPATIENT
Start: 2020-06-08

## 2020-06-09 ENCOUNTER — OFFICE VISIT (OUTPATIENT)
Dept: ORTHOPEDIC SURGERY | Age: 45
End: 2020-06-09

## 2020-06-09 VITALS
HEART RATE: 86 BPM | WEIGHT: 293 LBS | BODY MASS INDEX: 43.4 KG/M2 | SYSTOLIC BLOOD PRESSURE: 144 MMHG | HEIGHT: 69 IN | RESPIRATION RATE: 17 BRPM | TEMPERATURE: 98.2 F | DIASTOLIC BLOOD PRESSURE: 64 MMHG

## 2020-06-09 DIAGNOSIS — Z98.1 S/P LUMBAR FUSION: Primary | ICD-10-CM

## 2020-06-09 DIAGNOSIS — M54.16 LUMBAR RADICULOPATHY: ICD-10-CM

## 2020-06-09 DIAGNOSIS — Z98.1 S/P CERVICAL SPINAL FUSION: ICD-10-CM

## 2020-06-09 NOTE — PROGRESS NOTES
[facility]  [address]  [facility], [address]  Phone: [phone]  Fax: [phone]  PROGRESS NOTE  Patient: Jessica Witt                MRN: [id number]       SSN: [id number]  YOB: 1975        AGE: 50 y.o. SEX: female  Body mass index is 44.01 kg/m². PCP: Barbie Quinonez MD  06/09/20    Chief Complaint   Patient presents with    Back Pain     CT fu       HISTORY OF PRESENT ILLNESS, RADIOGRAPHS, and PLAN:     Today. She is continuing to have low back pain with some mechanical symptoms I reviewed her CAT scan as I feared there is some haloing around her L4 screws while some cuts appear to demonstrate a clear facet fusion it likely she has at least a subtle stable pseudoarthrosis at L4-5 that may be a source of her back pain she continues to be morbidly obese and is smoking which will return toward her fusing. At this point I recommended to her that she quit smoking and work on weight loss her current she is currently 300 pounds BMI of 44. The pain became more significant currently it is in the 5-6 range in a revision surgeries could be considered I probably consider an ex-lift 34 5 to try to bring her to a fusion. But it is too early to consider that I would work on weight loss and smoking cessation. We will see her back in several months to monitor her progress    This dictation was created utilizing voice recognition software. Errors may be present.        Past Medical History:   Diagnosis Date    Acute pain of left shoulder 11/1/2016    Bipolar 1 disorder ([address]     Bipolar affective disorder, currently manic, moderate ([address] 4/27/2016    Chronic midline low back pain with sciatica 11/1/2016    Cigarette nicotine dependence in remission 8/2/2016    Cigarette nicotine dependence without complication 82/24/3308    Continuous nicotine dependence 1/24/2017    Depression     Fibromyalgia 9/1/2015    HLD (hyperlipidemia) 2/4/2016  Hypertension     Hypertriglyceridemia 5/15/2018    Impaired fasting glucose 5/31/2016    Irritable bowel syndrome without diarrhea 4/27/2016    Moderate episode of recurrent major depressive disorder ([facility] 4/27/2016    Obesity, Class III, BMI 40-49.9 (morbid obesity) ([address] 9/1/2015    Onychomycosis 4/27/2016    Pain of left thumb 11/1/2016    Prediabetes 7/5/2016    PTSD (post-traumatic stress disorder)     Smoker 9/1/2015    Vitamin D deficiency 5/31/2016       Family History   Problem Relation Age of Onset    Hypertension Mother     Thyroid Disease Mother     No Known Problems Father     Psychiatric Disorder Sister     Psychiatric Disorder Brother     Psychiatric Disorder Sister     Diabetes Sister     Psychiatric Disorder Brother     Psychiatric Disorder Brother        Current Outpatient Medications   Medication Sig Dispense Refill    meloxicam (MOBIC) 15 mg tablet TAKE 1 TABLET BY MOUTH DAILY 90 Tab 3    cyclobenzaprine (FLEXERIL) 10 mg tablet TAKE 1 TABLET BY MOUTH EVERY NIGHT 30 Tab 1    gabapentin (NEURONTIN) 800 mg tablet TAKE 1 TABLET BY MOUTH THREE TIMES DAILY. MAX DAILY AMOUNT: 2400 MG 90 Tab 2    vortioxetine (TRINTELLIX) 10 mg tablet Take 10 mg by mouth daily. Pt takes in addition to the 20 mg      TRINTELLIX 20 mg tablet Take 1 Tab by mouth daily. 0    naloxone (NARCAN) 4 mg/actuation nasal spray Use 1 spray intranasally, then discard. Repeat with new spray every 2 min as needed for opioid overdose symptoms, alternating nostrils. 1 Each 0    acetaminophen (TYLENOL ARTHRITIS PAIN) 650 mg TbER Take 650 mg by mouth every eight (8) hours.  traZODone (DESYREL) 100 mg tablet Take 1 Tab by mouth every evening. 30 Tab 0    buPROPion SR (WELLBUTRIN, ZYBAN) 200 mg SR tablet Take 1 Tab by mouth two (2) times a day. 30 Tab 0    docusate sodium (STOOL SOFTENER PO) Take 100 mg by mouth daily.       hydrOXYzine HCl (ATARAX) 25 mg tablet Take 25 mg by mouth three (3) times daily as needed for Anxiety.  lamoTRIgine (LAMICTAL) 200 mg tablet Take 200 mg by mouth every evening.  losartan (COZAAR) 50 mg tablet Take 75 mg by mouth daily.  DULoxetine (CYMBALTA) 60 mg capsule Take 60 mg by mouth two (2) times a day.  lidocaine (LIDODERM) 5 % APPLY 1 PATCH TO THE AFFECTED AREA EVERY DAY. LEAVE ON FOR 12 HOURS THEN REMOVE FOR 12 HOURS 90 Patch 3    polyethylene glycol (MIRALAX) 17 gram/dose powder MIX AND DRINK 17 GRAMS BY MOUTH DAILY 1530 g 3    hydroCHLOROthiazide (HYDRODIURIL) 25 mg tablet Take 1 Tab by mouth daily. 90 Tab 3    lamoTRIgine (LAMICTAL) 100 mg tablet Take 100 mg by mouth daily.  meloxicam (MOBIC) 7.5 mg tablet TAKE 1 TABLET BY MOUTH DAILY AS NEEDED FOR PAIN 30 Tab 2    varenicline (CHANTIX) 1 mg tablet Take 1 mg by mouth two (2) times a day. No Known Allergies    Past Surgical History:   Procedure Laterality Date    HX ACL RECONSTRUCTION      HX APPENDECTOMY      HX CERVICAL DISKECTOMY  02/07/2019    with fusion C5/6 C6/7    HX CERVICAL FUSION  0513/19    L4-L5 bilateral hemilaminectomy; medial facetectomy; foraminotomy; L5-S1 right hemilaminectomy; medial facetectomy revision; discectomy revision; L4, L5, S1 posterolateral fusion    HX CERVICAL LAMINECTOMY  03/19/2019    with fusion    HX GYN Bilateral     tubal    HX LUMBAR LAMINECTOMY Bilateral 05/13/2019    L4-L5 bilateral hemilaminectomy; medial facetectomy; foraminotomy;  L5-S1 right hemilaminectomy; medial facetectomy revision; discectomy revision; L4, L5, S1 posterolateral fusion    HX ORTHOPAEDIC         Past Medical History:   Diagnosis Date    Acute pain of left shoulder 11/1/2016    Bipolar 1 disorder ([address]     Bipolar affective disorder, currently manic, moderate ([address] 4/27/2016    Chronic midline low back pain with sciatica 11/1/2016    Cigarette nicotine dependence in remission 8/2/2016    Cigarette nicotine dependence without complication 95/06/7904    Continuous nicotine dependence 1/24/2017    Depression     Fibromyalgia 9/1/2015    HLD (hyperlipidemia) 2/4/2016    Hypertension     Hypertriglyceridemia 5/15/2018    Impaired fasting glucose 5/31/2016    Irritable bowel syndrome without diarrhea 4/27/2016    Moderate episode of recurrent major depressive disorder ([facility] 4/27/2016    Obesity, Class III, BMI 40-49.9 (morbid obesity) ([facility] 9/1/2015    Onychomycosis 4/27/2016    Pain of left thumb 11/1/2016    Prediabetes 7/5/2016    PTSD (post-traumatic stress disorder)     Smoker 9/1/2015    Vitamin D deficiency 5/31/2016       Social History     Socioeconomic History    Marital status: UNKNOWN     Spouse name: Not on file    Number of children: Not on file    Years of education: Not on file    Highest education level: Not on file   Occupational History    Not on file   Social Needs    Financial resource strain: Not on file    Food insecurity     Worry: Not on file     Inability: Not on file   [employer] needs     Medical: Not on file     Non-medical: Not on file   Tobacco Use    Smoking status: Current Every Day Smoker     Packs/day: 1.00     Years: 25.00     Pack years: 25.00    Smokeless tobacco: Never Used    Tobacco comment: pt back on chanitx   Substance and Sexual Activity    Alcohol use:  Yes     Alcohol/week: 9.0 standard drinks     Types: 9 Shots of liquor per week     Comment: socially    Drug use: No    Sexual activity: Yes     Partners: Male     Birth control/protection: None   Lifestyle    Physical activity     Days per week: Not on file     Minutes per session: Not on file    Stress: Not on file   Relationships    Social connections     Talks on phone: Not on file     Gets together: Not on file     Attends Sikh service: Not on file     Active member of club or organization: Not on file     Attends meetings of clubs or organizations: Not on file     Relationship status: Not on file    Intimate partner violence     Fear of current or ex partner: Not on file     Emotionally abused: Not on file     Physically abused: Not on file     Forced sexual activity: Not on file   Other Topics Concern     Service Not Asked    Blood Transfusions Not Asked    Caffeine Concern Not Asked    Occupational Exposure Not Asked    Hobby Hazards Not Asked    Sleep Concern Not Asked    Stress Concern Not Asked    Weight Concern Not Asked    Special Diet Not Asked    Back Care Not Asked    Exercise Not Asked    Bike Helmet Not Asked   [address] Not Asked    Self-Exams Not Asked   Social History Narrative    Not on file         REVIEW OF SYSTEMS:   CONSTITUTIONAL SYMPTOMS:  Negative. EYES:  Negative. EARS, NOSE, THROAT AND MOUTH:  Negative. CARDIOVASCULAR:  Negative. RESPIRATORY:  Negative. GENITOURINARY: Per HPI. GASTROINTESTINAL:  Per HPI. INTEGUMENTARY (SKIN AND/OR BREAST):  Negative. MUSCULOSKELETAL: Per HPI.   ENDOCRINE/RHEUMATOLOGIC:  Negative. NEUROLOGICAL:  Per HPI. HEMATOLOGIC/LYMPHATIC:  Negative. ALLERGIC/IMMUNOLOGIC:  Negative. PSYCHIATRIC:  Negative. PHYSICAL EXAMINATION:   Visit Vitals  /64 (BP 1 Location: Left arm, BP Patient Position: Sitting)   Pulse 86   Temp 98.2 °F (36.8 °C) (Oral)   Resp 17   Ht 5' 9\" (1.753 m)   Wt 298 lb (135.2 kg)   BMI 44.01 kg/m²    PAIN SCALE: 6/10    CONSTITUTIONAL: The patient is in no apparent distress and is alert and oriented x 3. HEENT: Normocephalic. Hearing grossly intact. NECK: Supple and symmetric. no tenderness, or masses were felt. RESPIRATORY: No labored breathing. CARDIOVASCULAR: The carotid pulses were normal. Peripheral pulses were 2+. CHEST: Normal AP diameter and normal contour without any kyphoscoliosis. LYMPHATIC: No lymphadenopathy was appreciated in the neck, axillae or groin. SKIN: Negative for scars, rashes, lesions, or ulcers on the right upper, right lower, left upper, left lower and trunk.    NEUROLOGICAL: Alert and oriented x 3.  Ambulation without assistive device. FWB. EXTREMITIES: See musculoskeletal.  MUSCULOSKELETAL:   Head and Neck:  Negative for misalignment, asymmetry, crepitation, defects, tenderness masses or effusions.  Left Upper Extremity: Inspection, percussion and palpation performed. Dela Cruzs sign is negative.  Right Upper Extremity: Inspection, percussion and palpation performed. Dela Cruzs sign is negative.  Spine, Ribs and Pelvis: Back pain. Inspection, percussion and palpation performed. Negative for misalignment, asymmetry, crepitation, defects, tenderness masses or effusions.  Left Lower Extremity: Inspection, percussion and palpation performed. Negative straight leg raise.  Right Lower Extremity: Inspection, percussion and palpation performed. Negative straight leg raise. SPINE EXAM:     Cervical spine: Neck is midline. Normal muscle tone. No focal atrophy is noted. Lumbar spine: No rash, ecchymosis, or gross obliquity. No focal atrophy is noted. ASSESSMENT    ICD-10-CM ICD-9-CM    1. S/P lumbar fusion Z98.1 V45.4    2. S/P cervical spinal fusion Z98.1 V45.4    3. Lumbar radiculopathy M54.16 724.4        Written by Kezia Monzon, as dictated by Ryley James MD.    I, Dr. Ryley James MD, confirm that all documentation is accurate.

## 2020-06-09 NOTE — LETTER
6/9/20 Patient: Bar Joaquin YOB: 1975 Date of Visit: 6/9/2020 Aurelio Barrera MD 
[id number] [facility] [id number] VIA Facsimile: [phone] Dear Aurelio Barrera MD, Thank you for referring Ms. Bar Joaquin to [facility] for evaluation. My notes for this consultation are attached. If you have questions, please do not hesitate to call me. I look forward to following your patient along with you.  
 
 
Sincerely, 
 
Juliana Earl MD

## 2020-06-09 NOTE — PATIENT INSTRUCTIONS
Learning About Benefits From Quitting Smoking How does quitting smoking make you healthier? If you're thinking about quitting smoking, you may have a few reasons to be smoke-free. Your health may be one of them. · When you quit smoking, you lower your risks for cancer, lung disease, heart attack, stroke, blood vessel disease, and blindness from macular degeneration. · When you're smoke-free, you get sick less often, and you heal faster. You are less likely to get colds, flu, bronchitis, and pneumonia. · As a nonsmoker, you may find that your mood is better and you are less stressed. When and how will you feel healthier? Quitting has real health benefits that start from day 1 of being smoke-free. And the longer you stay smoke-free, the healthier you get and the better you feel. The first hours · After just 20 minutes, your blood pressure and heart rate go down. That means there's less stress on your heart and blood vessels. · Within 12 hours, the level of carbon monoxide in your blood drops back to normal. That makes room for more oxygen. With more oxygen in your body, you may notice that you have more energy than when you smoked. After 2 weeks · Your lungs start to work better. · Your risk of heart attack starts to drop. After 1 month · When your lungs are clear, you cough less and breathe deeper, so it's easier to be active. · Your sense of taste and smell return. That means you can enjoy food more than you have since you started smoking. Over the years · Over the years, your risks of heart disease, heart attack, and stroke are lower. · After 10 years, your risk of dying from lung cancer is cut by about half. And your risk for many other types of cancer is lower too. How would quitting help others in your life? When you quit smoking, you improve the health of everyone who now breathes in your smoke. · Their heart, lung, and cancer risks drop, much like yours. · They are sick less. For babies and small children, living smoke-free means they're less likely to have ear infections, pneumonia, and bronchitis. · If you're a woman who is or will be pregnant someday, quitting smoking means a healthier . · Children who are close to you are less likely to become adult smokers. Where can you learn more? Go to http://jocelyn-amrik.info/ Enter 052 [id number] 72 11 in the search box to learn more about \"Learning About Benefits From Quitting Smoking. \" Current as of: 2020               Content Version: 12.5 © 4627-7531 [employer]. Care instructions adapted under license by [employer] (which disclaims liability or warranty for this information). If you have questions about a medical condition or this instruction, always ask your healthcare professional. [address] any warranty or liability for your use of this information.

## 2020-06-09 NOTE — PROGRESS NOTES
Gladis Feng presents today for   Chief Complaint   Patient presents with    Back Pain     CT fu       Is someone accompanying this pt? NO    Is the patient using any DME equipment during OV? NO    Depression Screening:  3 most recent PHQ Screens 5/15/2018   Little interest or pleasure in doing things Nearly every day   Feeling down, depressed, irritable, or hopeless Nearly every day   Total Score PHQ 2 6   Trouble falling or staying asleep, or sleeping too much -   Feeling tired or having little energy -   Poor appetite, weight loss, or overeating -   Feeling bad about yourself - or that you are a failure or have let yourself or your family down -   Trouble concentrating on things such as school, work, reading, or watching TV -   Moving or speaking so slowly that other people could have noticed; or the opposite being so fidgety that others notice -   Thoughts of being better off dead, or hurting yourself in some way -   PHQ 9 Score -   How difficult have these problems made it for you to do your work, take care of your home and get along with others -       Learning Assessment:  Learning Assessment 11/8/2019   PRIMARY LEARNER Patient   HIGHEST LEVEL OF EDUCATION - PRIMARY LEARNER  -   BARRIERS PRIMARY LEARNER -   CO-LEARNER CAREGIVER -   PRIMARY LANGUAGE ENGLISH   LEARNER PREFERENCE PRIMARY LISTENING   ANSWERED BY patient   RELATIONSHIP SELF       Abuse Screening:  Abuse Screening Questionnaire 4/26/2017   Do you ever feel afraid of your partner? N   Are you in a relationship with someone who physically or mentally threatens you? N   Is it safe for you to go home? Y       Fall Risk  Fall Risk Assessment, last 12 mths 8/10/2017   Able to walk? Yes   Fall in past 12 months? No       OPIOID RISK TOOL  Opioid Risk Tool 6/25/2019   Family history of alcohol abuse? 0   Family history of illegal drug abuse? 0   Family history of prescription drug abuse? 0   Personal history of alcohol abuse?  0   Personal history of illegal drug abuse? 0   Personal history of prescription drug abuse? 0   Age range between 17-45? 0   History of preadolescent sexual abuse? 0   ADD, OCD, bipolar, schizophrenia? 2   Depression? 1   Opioid Risk Total Score 3       Coordination of Care:  1. Have you been to the ER, urgent care clinic since your last visit? NO  Hospitalized since your last visit? NO    2. Have you seen or consulted any other health care providers outside of the [address] [place] [address] since your last visit? NO Include any pap smears or colon screening.  NO

## 2020-07-08 DIAGNOSIS — M54.16 LUMBAR RADICULOPATHY: ICD-10-CM

## 2020-07-08 DIAGNOSIS — G95.9 CERVICAL MYELOPATHY (HCC): ICD-10-CM

## 2020-07-08 RX ORDER — GABAPENTIN 800 MG/1
TABLET ORAL
Qty: 90 TAB | Refills: 2 | Status: SHIPPED | OUTPATIENT
Start: 2020-07-08 | End: 2020-09-08 | Stop reason: DRUGHIGH

## 2020-08-12 RX ORDER — CYCLOBENZAPRINE HCL 10 MG
TABLET ORAL
Qty: 30 TAB | Refills: 1 | Status: SHIPPED | OUTPATIENT
Start: 2020-08-12 | End: 2021-04-14

## 2020-09-08 ENCOUNTER — OFFICE VISIT (OUTPATIENT)
Dept: ORTHOPEDIC SURGERY | Age: 45
End: 2020-09-08

## 2020-09-08 VITALS
HEART RATE: 74 BPM | BODY MASS INDEX: 43.4 KG/M2 | OXYGEN SATURATION: 97 % | HEIGHT: 69 IN | RESPIRATION RATE: 14 BRPM | TEMPERATURE: 97.5 F | WEIGHT: 293 LBS | SYSTOLIC BLOOD PRESSURE: 133 MMHG | DIASTOLIC BLOOD PRESSURE: 76 MMHG

## 2020-09-08 DIAGNOSIS — M48.062 LUMBAR STENOSIS WITH NEUROGENIC CLAUDICATION: Primary | ICD-10-CM

## 2020-09-08 DIAGNOSIS — Z98.1 S/P LUMBAR FUSION: ICD-10-CM

## 2020-09-08 RX ORDER — GABAPENTIN 300 MG/1
CAPSULE ORAL
Qty: 90 CAP | Refills: 0 | Status: SHIPPED | OUTPATIENT
Start: 2020-09-08 | End: 2021-04-14

## 2020-09-08 RX ORDER — TRIAMCINOLONE ACETONIDE 1 MG/G
CREAM TOPICAL
COMMUNITY
Start: 2020-08-11 | End: 2022-07-19

## 2020-09-08 RX ORDER — LACTULOSE 10 G/15ML
20 SOLUTION ORAL; RECTAL
Qty: 480 ML | Refills: 1 | Status: SHIPPED | OUTPATIENT
Start: 2020-09-08 | End: 2022-05-10

## 2020-09-08 NOTE — PATIENT INSTRUCTIONS
Taper off Gabapentin  1. Take 2 300mg Tab 3x a day for a week  2. Take 1 300mg Tab 3x a day for 10 days  3. Take 1, 300mg Tab 2x a day for 3 days  4. Take 1, 300mg Tab at night for 3 nights then stop itTopiramate (By mouth)   Topiramate (toe-PIR-a-mate)  Treats and prevents seizures, and helps prevent migraine headaches. Brand Name(s): Qudexy XR, Topamax, Trokendi XR   There may be other brand names for this medicine. When This Medicine Should Not Be Used: This medicine is not right for everyone. Do not use it if you had an allergic reaction to topiramate, or if you are pregnant. How to Use This Medicine:   Capsule, Long Acting Capsule, Tablet  · Take your medicine as directed. Your dose may need to be changed several times to find what works best for you. · Tablet: Swallow whole. Do not break, crush, or chew the tablet. It has a very bitter taste. · Capsule or extended-release capsule: Do not crush or chew the capsule. Swallow whole or open the capsule and pour the medicine into a small amount (1 teaspoon) of soft food, such as applesauce. Swallow the mixture right away without chewing. Do not store the mixture for use at a later time. · Drink extra fluids so you will urinate more often and help prevent kidney problems. · This medicine should come with a Medication Guide. Ask your pharmacist for a copy if you do not have one. · Missed dose: Take a dose as soon as you remember. If it is almost time for your next dose, wait until then and take a regular dose. Do not take extra medicine to make up for a missed dose. If you miss a dose or forget to use your medicine, use it as soon as you can. If your next regular dose of Topamax® is less than 6 hours away, wait until then to use the medicine and skip the missed dose. If you miss more than 1 dose of Topamax®, call your doctor for instructions. · Store the medicine in a closed container at room temperature, away from heat, moisture, and direct light.   Drugs and Foods to Avoid:   Ask your doctor or pharmacist before using any other medicine, including over-the-counter medicines, vitamins, and herbal products. · Do not drink alcohol with Qudexy XR or Topamax®. Do not drink alcohol for 6 hours before and 6 hours after you take the Trokendi XR capsule. · Some medicines can affect how topiramate works. Tell your doctor if you are using acetazolamide, dichlorphenamide, dichloralphenazone, digoxin, lithium, metformin, zonisamide, other medicine for seizures (such as carbamazepine, phenytoin, valproic acid), or birth control pills. · Tell your doctor if you are using any medicine that makes you sleepy, such as allergy medicine or narcotic pain medicine. Warnings While Using This Medicine:   · It is not safe to take this medicine during pregnancy. It could harm an unborn baby. Tell your doctor right away if you become pregnant. · Tell your doctor if you are breastfeeding, or if you have kidney disease, liver disease, glaucoma, lung or breathing problems, osteoporosis, or a history of depression or mood disorders. Tell your doctor if you are on a ketogenic diet (high in fat and low in carbohydrates). · This medicine may cause the following problems:  ¨ Eye pain or vision changes, including glaucoma  ¨ Changes in body temperature  ¨ Metabolic acidosis (too much acid in the blood)  ¨ Kidney stones  · This medicine may increase depression or thoughts of suicide. Tell your doctor right away if you start to feel more depressed or think about hurting yourself. · This medicine may make you dizzy, drowsy, or tired. Do not drive or do anything else that could be dangerous until you know how this medicine affects you. · Do not stop using this medicine suddenly. Your doctor will need to slowly decrease your dose before you stop it completely. · Your doctor will do lab tests at regular visits to check on the effects of this medicine. Keep all appointments.   · Keep all medicine out of the reach of children. Never share your medicine with anyone. Possible Side Effects While Using This Medicine:   Call your doctor right away if you notice any of these side effects:  · Allergic reaction: Itching or hives, swelling in your face or hands, swelling or tingling in your mouth or throat, chest tightness, trouble breathing  · Bloody or cloudy urine, painful urination, sudden lower back or stomach pain  · Changes in vision, eye pain  · Confusion, problems with walking, clumsiness, dizziness, or trouble talking, concentrating, or remembering  · Feeling agitated, depressed, nervous, or irritable, thoughts of hurting yourself or others, unusual mood or behavior  · Fever, decreased sweating  · Numbness, tingling, or burning pain in your hands, arms, legs, or feet  · Rapid, deep breathing, loss of appetite, fast or uneven heartbeat  · Vomiting, unusual drowsiness, tiredness, or weakness  If you notice these less serious side effects, talk with your doctor:   · Change in taste  · Nausea, diarrhea  · Stuffy or runny nose  · Weight loss  If you notice other side effects that you think are caused by this medicine, tell your doctor. Call your doctor for medical advice about side effects. You may report side effects to FDA at 4-658-FDA-2944  © 2017 [facility] Information is for End User's use only and may not be sold, redistributed or otherwise used for commercial purposes. The above information is an  only. It is not intended as medical advice for individual conditions or treatments. Talk to your doctor, nurse or pharmacist before following any medical regimen to see if it is safe and effective for you.

## 2020-09-08 NOTE — PROGRESS NOTES
[facility]  [address]  [place], [address] [place] [address] Street  Phone: [phone]  Fax: [phone]  PROGRESS NOTE  Patient: Isela Conklin                MRN: [id number]       SSN: [id number]  YOB: 1975        AGE: 50 y.o. SEX: female  Body mass index is 43.42 kg/m². PCP: Dinora Tyler MD  09/08/20    Chief Complaint   Patient presents with    Back Pain       HISTORY OF PRESENT ILLNESS:  Isela Conklin is a 50 y.o.  female with history of chronic neck and back pain for several years and radiation of pain into her BLE that got better after her back surgery in 2019 but didn't resolve. Prior history of back problems: previous spinal surgery - she has had 2 neck surgeries: ACDF C5-7 2/2019 and posterior fusion C5-7 3/2019. She has had 2 lower back surgeries: first in 2007 by another surgeon and an L4-S1 PLF 5/20189 At last OV she saw Dr. Real Avila he recommended wgt loss and smoking cessation prior to considering a revision for her pseudoarthrosis. Today, her main pain generator is her BLE pain at night, it is most likely some permanent nerve damage. She has R hip pain and weakness in her hip flexor. Pain is aching, burning, numbing and tingling, pain is worse with manual/sedentary work, walking and affects recreational activities. Pain is better with nothing. She reports issues w/ constipation that Miralax and stool softeners haven't really helped for. Denies bladder/bowel dysfunction, saddle paresthesia, new weakness, gait disturbance, or other neurological deficits. Medications: Gabapentin 800mg TID, Flexeril 10mg, Mobic 15mg  with minimal, relief    PMHx: Bipolar, Fibro, smoker-1/2 pack a day. Radiographs  MRI was recently obtained of her lumbar spine a benign with mild degenerative changes good fixation reasonable decompression previous x-rays demonstrated what appeared to be progression towards fusion.     CAT scan as I feared there is some haloing around her L4 screws while some cuts appear to demonstrate a clear facet fusion it likely she has at least a subtle stable pseudoarthrosis at L4-5       ASSESSMENT   Diagnoses and all orders for this visit:    1. Lumbar stenosis with neurogenic claudication  -     gabapentin (NEURONTIN) 300 mg capsule; Taper off as directed 1-2 tab TID  Indications: neuropathic pain    2. S/P lumbar fusion  -     gabapentin (NEURONTIN) 300 mg capsule; Taper off as directed 1-2 tab TID  Indications: neuropathic pain    Other orders  -     lactulose (CHRONULAC) 10 gram/15 mL solution; Take 30 mL by mouth two (2) times daily as needed (constipation). IMPRESSION AND PLAN:  This is a pt with a psuedoarthrosis and BLE pain at night that is likely permanent nerve damage. We discussed options and she would like to try Topamax. > Pt was given information on Topamax   > Taper off Gabapentin over a month  > Then she will call for trial of Topamax  > May continue or stop Flexeril  > Lactulose trial for constipation  > Ms. Sam Beasley has a reminder for a \"due or due soon\" health maintenance. I have asked that she contact her primary care provider, Freddie Ruff MD, for follow-up on this health maintenance.  > We have informed patient to notify us for immediate appointment if he has any worsening neurogical symptoms or if an emergency situation presents, then call 911  >  has been reviewed and is appropriate  > Pt will follow-up in 2 mo. Subjective      Pain Scale: 4/10    Pain Assessment  9/8/2020   Location of Pain Back   Pain Location Comment -   Location Modifiers -   Severity of Pain 4   Quality of Pain Sharp; Aching; Other (Comment)   Quality of Pain Comment numbness from legs to feet after sleeping   Duration of Pain Persistent   Frequency of Pain Constant   Aggravating Factors Standing; Other (Comment)   Aggravating Factors Comment laying down to sleep   Limiting Behavior Yes   Relieving Factors Rest;Other (Comment) Relieving Factors Comment sit, change position   Result of Injury -   Work-Related Injury -   Type of Injury -         REVIEW OF SYSTEMS  Constitutional: Negative for fever, chills, or weight change. Respiratory: Negative for cough or shortness of breath. Cardiovascular: Negative for chest pain or palpitations. Gastrointestinal: Negative for incontinence, acid reflux, change in bowel habits, + constipation. Genitourinary: Negative for incontinence, dysuria and flank pain. Musculoskeletal: Positive for neck, back, BLE pain. See HPI. Skin: Negative for rash. Neurological:BLE L4-S1  radiculopathy. See HPI. Endo/Heme/Allergies: Negative. Psychiatric/Behavioral: Negative. PHYSICAL EXAMINATION  Visit Vitals  /76 (BP 1 Location: Left arm, BP Patient Position: Sitting)   Pulse 74   Temp 97.5 °F (36.4 °C) (Skin)   Resp 14   Ht 5' 9\" (1.753 m)   Wt 294 lb (133.4 kg)   SpO2 97% Comment: RA   BMI 43.42 kg/m²         Accompanied by Self. Constitutional:  Well developed, well nourished, in no acute distress. Psychiatric: Affect and mood are appropriate. Integumentary: No rashes or abrasions noted on exposed areas. Cardiovascular/Peripheral Vascular: +2 radial & pedal pulses. No peripheral edema is noted. Lymphatic:  No evidence of lymphedema. No cervical lymphadenopathy. SPINE/MUSCULOSKELETAL EXAM  Cervical spine:  Neck is midline. Normal muscle tone. No focal atrophy is noted. Neck ROM DECREASED with flexion, extension, turning right, turning left. Shoulder ROM intact. Tenderness to palpation Posterior neck pain. Negative Spurling's sign. Negative Tinel's sign. Negative Dela Cruz's sign. Sensation grossly intact to light touch. Lumbar spine:  No rash, ecchymosis, or gross obliquity. No fasciculations. No focal atrophy is noted. Range of motion is decreased with flexion, extension, turning right, turning left. Tenderness to palpation diffuse low back.  No tenderness to palpation at the sciatic notch. SI joints non-tender. Trochanters non tender. Straight leg raise neg  Hip Impingement positive R    Sensation grossly intact to light touch. MOTOR:        Biceps  Triceps Deltoids Wrist Ext Wrist Flex Hand Intrin   Right +4/5 +4/5 +4/5 +4/5 +4/5 +4/5   Left +4/5 +4/5 +4/5 +4/5 +4/5 +4/5      Hip Flex Quads Hamstrings Ankle DF EHL Ankle PF   Right 3/5 +4/5 +4/5 +4/5 +4/5 +4/5   Left +4/5 +4/5 +4/5 +4/5 +4/5 +4/5       Ambulation with single point cane. FWB.    + 's cart        PAST MEDICAL HISTORY   Past Medical History:   Diagnosis Date    Acute pain of left shoulder 11/1/2016    Bipolar 1 disorder ([address]     Bipolar affective disorder, currently manic, moderate ([address] 4/27/2016    Chronic midline low back pain with sciatica 11/1/2016    Cigarette nicotine dependence in remission 8/2/2016    Cigarette nicotine dependence without complication 99/13/9495    Continuous nicotine dependence 1/24/2017    Depression     Fibromyalgia 9/1/2015    HLD (hyperlipidemia) 2/4/2016    Hypertension     Hypertriglyceridemia 5/15/2018    Impaired fasting glucose 5/31/2016    Irritable bowel syndrome without diarrhea 4/27/2016    Moderate episode of recurrent major depressive disorder ([address] 4/27/2016    Obesity, Class III, BMI 40-49.9 (morbid obesity) ([address] 9/1/2015    Onychomycosis 4/27/2016    Pain of left thumb 11/1/2016    Prediabetes 7/5/2016    PTSD (post-traumatic stress disorder)     Smoker 9/1/2015    Vitamin D deficiency 5/31/2016       Past Surgical History:   Procedure Laterality Date    HX ACL RECONSTRUCTION      HX APPENDECTOMY      HX CERVICAL DISKECTOMY  02/07/2019    with fusion C5/6 C6/7    HX CERVICAL FUSION  0513/19    L4-L5 bilateral hemilaminectomy; medial facetectomy; foraminotomy;  L5-S1 right hemilaminectomy; medial facetectomy revision; discectomy revision; L4, L5, S1 posterolateral fusion    HX CERVICAL LAMINECTOMY  03/19/2019    with fusion    HX GYN Bilateral     tubal    HX LUMBAR LAMINECTOMY Bilateral 05/13/2019    L4-L5 bilateral hemilaminectomy; medial facetectomy; foraminotomy; L5-S1 right hemilaminectomy; medial facetectomy revision; discectomy revision; L4, L5, S1 posterolateral fusion    HX ORTHOPAEDIC     . MEDICATIONS      Current Outpatient Medications   Medication Sig Dispense Refill    triamcinolone acetonide (KENALOG) 0.1 % topical cream Apply  to affected area daily as needed.  lactulose (CHRONULAC) 10 gram/15 mL solution Take 30 mL by mouth two (2) times daily as needed (constipation). 480 mL 1    gabapentin (NEURONTIN) 300 mg capsule Taper off as directed 1-2 tab TID  Indications: neuropathic pain 90 Cap 0    cyclobenzaprine (FLEXERIL) 10 mg tablet TAKE 1 TABLET BY MOUTH EVERY NIGHT 30 Tab 1    meloxicam (MOBIC) 15 mg tablet TAKE 1 TABLET BY MOUTH DAILY 90 Tab 3    TRINTELLIX 20 mg tablet Take 1 Tab by mouth daily. 0    acetaminophen (TYLENOL ARTHRITIS PAIN) 650 mg TbER Take 650 mg by mouth every eight (8) hours.  traZODone (DESYREL) 100 mg tablet Take 1 Tab by mouth every evening. 30 Tab 0    buPROPion SR (WELLBUTRIN, ZYBAN) 200 mg SR tablet Take 1 Tab by mouth two (2) times a day. 30 Tab 0    docusate sodium (STOOL SOFTENER PO) Take 100 mg by mouth daily.  hydrOXYzine HCl (ATARAX) 25 mg tablet Take 25 mg by mouth three (3) times daily as needed for Anxiety.  lamoTRIgine (LAMICTAL) 200 mg tablet Take 200 mg by mouth every evening.  losartan (COZAAR) 50 mg tablet Take 75 mg by mouth daily.  polyethylene glycol (MIRALAX) 17 gram/dose powder MIX AND DRINK 17 GRAMS BY MOUTH DAILY 1530 g 3    hydroCHLOROthiazide (HYDRODIURIL) 25 mg tablet Take 1 Tab by mouth daily. 90 Tab 3    lamoTRIgine (LAMICTAL) 100 mg tablet Take 100 mg by mouth daily.       meloxicam (MOBIC) 7.5 mg tablet TAKE 1 TABLET BY MOUTH DAILY AS NEEDED FOR PAIN 30 Tab 2    vortioxetine (TRINTELLIX) 10 mg tablet Take 10 mg by mouth daily. Pt takes in addition to the 20 mg      naloxone (NARCAN) 4 mg/actuation nasal spray Use 1 spray intranasally, then discard. Repeat with new spray every 2 min as needed for opioid overdose symptoms, alternating nostrils. 1 Each 0    varenicline (CHANTIX) 1 mg tablet Take 1 mg by mouth two (2) times a day.  DULoxetine (CYMBALTA) 60 mg capsule Take 60 mg by mouth two (2) times a day.  lidocaine (LIDODERM) 5 % APPLY 1 PATCH TO THE AFFECTED AREA EVERY DAY. LEAVE ON FOR 12 HOURS THEN REMOVE FOR 12 HOURS 90 Patch 3        ALLERGIES  No Known Allergies       SOCIAL HISTORY    Social History     Socioeconomic History    Marital status: UNKNOWN     Spouse name: Not on file    Number of children: Not on file    Years of education: Not on file    Highest education level: Not on file   Occupational History    Not on file   Social Needs    Financial resource strain: Not on file    Food insecurity     Worry: Not on file     Inability: Not on file    Transportation needs     Medical: Not on file     Non-medical: Not on file   Tobacco Use    Smoking status: Current Every Day Smoker     Packs/day: 1.00     Years: 25.00     Pack years: 25.00    Smokeless tobacco: Never Used    Tobacco comment: pt back on chanitx   Substance and Sexual Activity    Alcohol use:  Yes     Alcohol/week: 9.0 standard drinks     Types: 9 Shots of liquor per week     Comment: socially    Drug use: No    Sexual activity: Yes     Partners: Male     Birth control/protection: None   Lifestyle    Physical activity     Days per week: Not on file     Minutes per session: Not on file    Stress: Not on file   Relationships    Social connections     Talks on phone: Not on file     Gets together: Not on file     Attends Worship service: Not on file     Active member of club or organization: Not on file     Attends meetings of clubs or organizations: Not on file     Relationship status: Not on file    Intimate partner violence Fear of current or ex partner: Not on file     Emotionally abused: Not on file     Physically abused: Not on file     Forced sexual activity: Not on file   Other Topics Concern     Service Not Asked    Blood Transfusions Not Asked    Caffeine Concern Not Asked    Occupational Exposure Not Asked    Hobby Hazards Not Asked    Sleep Concern Not Asked    Stress Concern Not Asked    Weight Concern Not Asked    Special Diet Not Asked    Back Care Not Asked    Exercise Not Asked    Bike Helmet Not Asked   [address] Not Asked    Self-Exams Not Asked   Social History Narrative    Not on file       FAMILY HISTORY    Family History   Problem Relation Age of Onset    Hypertension Mother     Thyroid Disease Mother     No Known Problems Father     Psychiatric Disorder Sister     Psychiatric Disorder Brother     Psychiatric Disorder Sister     Diabetes Sister     Psychiatric Disorder Brother     Psychiatric Disorder Brother          Megan Barbour, ISSAC

## 2020-09-08 NOTE — PROGRESS NOTES
Colin Mcleod presents today for   Chief Complaint   Patient presents with    Back Pain       Is someone accompanying this pt? no    Is the patient using any DME equipment during OV? Yes, cane    Depression Screening:  3 most recent PHQ Screens 5/15/2018   Little interest or pleasure in doing things Nearly every day   Feeling down, depressed, irritable, or hopeless Nearly every day   Total Score PHQ 2 6   Trouble falling or staying asleep, or sleeping too much -   Feeling tired or having little energy -   Poor appetite, weight loss, or overeating -   Feeling bad about yourself - or that you are a failure or have let yourself or your family down -   Trouble concentrating on things such as school, work, reading, or watching TV -   Moving or speaking so slowly that other people could have noticed; or the opposite being so fidgety that others notice -   Thoughts of being better off dead, or hurting yourself in some way -   PHQ 9 Score -   How difficult have these problems made it for you to do your work, take care of your home and get along with others -       Learning Assessment:  Learning Assessment 11/8/2019   PRIMARY LEARNER Patient   HIGHEST LEVEL OF EDUCATION - PRIMARY LEARNER  -   BARRIERS PRIMARY LEARNER -   CO-LEARNER CAREGIVER -   PRIMARY LANGUAGE ENGLISH   LEARNER PREFERENCE PRIMARY LISTENING   ANSWERED BY patient   RELATIONSHIP SELF       Abuse Screening:  Abuse Screening Questionnaire 4/26/2017   Do you ever feel afraid of your partner? N   Are you in a relationship with someone who physically or mentally threatens you? N   Is it safe for you to go home? Y       Fall Risk  Fall Risk Assessment, last 12 mths 8/10/2017   Able to walk? Yes   Fall in past 12 months? No       OPIOID RISK TOOL  Opioid Risk Tool 6/25/2019   Family history of alcohol abuse? 0   Family history of illegal drug abuse? 0   Family history of prescription drug abuse? 0   Personal history of alcohol abuse?  0   Personal history of illegal drug abuse? 0   Personal history of prescription drug abuse? 0   Age range between 17-45? 0   History of preadolescent sexual abuse? 0   ADD, OCD, bipolar, schizophrenia? 2   Depression? 1   Opioid Risk Total Score 3       Coordination of Care:  1. Have you been to the ER, urgent care clinic since your last visit? no  Hospitalized since your last visit? no    2. Have you seen or consulted any other health care providers outside of the [address] [place] [address] since your last visit? Yes, pcp Include any pap smears or colon screening.  no

## 2020-12-09 DIAGNOSIS — G95.9 CERVICAL MYELOPATHY (HCC): ICD-10-CM

## 2020-12-09 DIAGNOSIS — M54.16 LUMBAR RADICULOPATHY: ICD-10-CM

## 2020-12-09 RX ORDER — GABAPENTIN 800 MG/1
TABLET ORAL
Qty: 90 TAB | OUTPATIENT
Start: 2020-12-09

## 2020-12-14 RX ORDER — GABAPENTIN 800 MG/1
TABLET ORAL
Qty: 90 TAB | Refills: 2 | Status: SHIPPED | OUTPATIENT
Start: 2020-12-14 | End: 2021-04-14

## 2020-12-14 NOTE — TELEPHONE ENCOUNTER
She did try to wean off of the Gabapentin and was not able to do so. She was in so much pain that she went back to the 800 mg TID. It is working for her.  She needs a refill

## 2020-12-14 NOTE — TELEPHONE ENCOUNTER
Per Keisha's note from Sept:   Taper off Gabapentin over a month  > Then she will call for trial of Topamax    Call pt and find out what is going on.

## 2021-04-14 ENCOUNTER — OFFICE VISIT (OUTPATIENT)
Dept: ORTHOPEDIC SURGERY | Age: 46
End: 2021-04-14
Payer: MEDICAID

## 2021-04-14 VITALS
HEIGHT: 69 IN | TEMPERATURE: 97.3 F | RESPIRATION RATE: 20 BRPM | HEART RATE: 83 BPM | WEIGHT: 287 LBS | BODY MASS INDEX: 42.51 KG/M2

## 2021-04-14 DIAGNOSIS — M54.16 LUMBAR RADICULOPATHY: ICD-10-CM

## 2021-04-14 DIAGNOSIS — M48.062 LUMBAR STENOSIS WITH NEUROGENIC CLAUDICATION: Primary | ICD-10-CM

## 2021-04-14 DIAGNOSIS — Z98.1 S/P CERVICAL SPINAL FUSION: ICD-10-CM

## 2021-04-14 DIAGNOSIS — M48.061 LUMBAR STENOSIS WITHOUT NEUROGENIC CLAUDICATION: ICD-10-CM

## 2021-04-14 DIAGNOSIS — Z98.1 S/P LUMBAR FUSION: ICD-10-CM

## 2021-04-14 DIAGNOSIS — G95.9 CERVICAL MYELOPATHY (HCC): ICD-10-CM

## 2021-04-14 PROCEDURE — 99213 OFFICE O/P EST LOW 20 MIN: CPT | Performed by: NURSE PRACTITIONER

## 2021-04-14 RX ORDER — CYCLOBENZAPRINE HCL 5 MG
5 TABLET ORAL
Qty: 30 TAB | Refills: 2 | Status: SHIPPED | OUTPATIENT
Start: 2021-04-14 | End: 2022-05-10

## 2021-04-14 RX ORDER — METHYLPREDNISOLONE 4 MG/1
TABLET ORAL
Qty: 1 DOSE PACK | Refills: 0 | Status: SHIPPED | OUTPATIENT
Start: 2021-04-14 | End: 2022-05-10 | Stop reason: ALTCHOICE

## 2021-04-14 RX ORDER — GABAPENTIN 800 MG/1
TABLET ORAL
Qty: 210 TAB | Refills: 1 | Status: SHIPPED | OUTPATIENT
Start: 2021-04-14 | End: 2022-07-19

## 2021-04-14 NOTE — PROGRESS NOTES
[address]  [address], [address]  [place], [facility] [address]  Phone: [phone]  Fax: [phone]    Sandi Hinojosa  : 1975  PCP: Yaneth Christine MD    PROGRESS NOTE    HISTORY OF PRESENT ILLNESS:  Chief Complaint   Patient presents with    Back Pain    Follow-up     Lorenza Nagy is a 48 y.o. \ female with history of chronic neck and back pain for several years and radiation of pain into her BLE that got better after her back surgery in  but didn't resolve. Prior history of back problems: previous spinal surgery - she has had 2 neck surgeries: ACDF C5-7 2019 and posterior fusion C5-7 3/2019. She has had 2 lower back surgeries: first in  by another surgeon and an L4-S1 PLF 2018. She saw Dr. Carlitos Rico about 6 months ago and he recommended weight loss and smoking cessation prior to considering a revision for her pseudoarthrosis. She was last to taper off gabapentin and a trial of Topamax was discussed. She could take PRN flexeril. Today, she states she was not able to come off of the gabapentin- it is very helpful. She would like a TENS unit/ ESTIM. She is in a flare of pain in her upper back from fixing a busted pipe. Denies bladder/bowel dysfunction, saddle paresthesia, weakness, gait disturbance, or other neurological deficit. Pt at this time desires to  continue with current care/proceed with medication evaluation.     Medications: Gabapentin 800mg TID, Flexeril 10mg, Mobic 15mg  with minimal, relief     PMHx: Bipolar, Fibro, smoker-1/2 pack a day.      Radiographs  MRI was recently obtained of her lumbar spine a benign with mild degenerative changes good fixation reasonable decompression previous x-rays demonstrated what appeared to be progression towards fusion.     CAT scan as I feared there is some haloing around her L4 screws while some cuts appear to demonstrate a clear facet fusion it likely she has at least a subtle stable pseudoarthrosis at L4-5      ASSESSMENT  48 y.o. female with back and neck pain. Diagnoses and all orders for this visit:    1. Lumbar stenosis with neurogenic claudication    2. Cervical myelopathy (HCC)  -     gabapentin (NEURONTIN) 800 mg tablet; TAKE 1 TABLET BY MOUTH THREE TIMES DAILY. MAX DAILY AMOUNT: 2400 MG    3. Lumbar radiculopathy  -     gabapentin (NEURONTIN) 800 mg tablet; TAKE 1 TABLET BY MOUTH THREE TIMES DAILY. MAX DAILY AMOUNT: 2400 MG    4. S/P lumbar fusion    5. S/P cervical spinal fusion    6. Lumbar stenosis without neurogenic claudication         IMPRESSION/PLAN    1) Pt was given information on neck and back exercises. Recommend chair yoga. 2) cont gabapentin  3) MDP for flare of neck/ upper back  4) DME for ESTIM   5) Ms. Mandi Clifford has a reminder for a \"due or due soon\" health maintenance. I have asked that she contact her primary care provider, Seda Morton MD, for follow-up on this health maintenance. 6) We have informed patient to notify us for immediate appointment if he has any worsening neurogical symptoms or if an emergency situation presents, then call 911  7) Pt will follow-up in 6 months if PCP will not prescribe gabapentin. Risks and benefits of ongoing therapy have been reviewed with the patient.  is appropriate. No pain behaviors. Denies thoughts of harming self or others. Pt has a good risk to benefit ratio which allows the pt to function in a home environment without side effects.          PAST MEDICAL HISTORY  Past Medical History:   Diagnosis Date    Acute pain of left shoulder 11/1/2016    Bipolar 1 disorder ([address]     Bipolar affective disorder, currently manic, moderate ([address] 4/27/2016    Chronic midline low back pain with sciatica 11/1/2016    Cigarette nicotine dependence in remission 8/2/2016    Cigarette nicotine dependence without complication 18/49/0784    Continuous nicotine dependence 1/24/2017    Depression     Fibromyalgia 9/1/2015    HLD (hyperlipidemia) 2/4/2016  Hypertension     Hypertriglyceridemia 5/15/2018    Impaired fasting glucose 5/31/2016    Irritable bowel syndrome without diarrhea 4/27/2016    Moderate episode of recurrent major depressive disorder ([facility] 4/27/2016    Obesity, Class III, BMI 40-49.9 (morbid obesity) ([facility] 9/1/2015    Onychomycosis 4/27/2016    Pain of left thumb 11/1/2016    Prediabetes 7/5/2016    PTSD (post-traumatic stress disorder)     Smoker 9/1/2015    Vitamin D deficiency 5/31/2016        MEDICATIONS  Current Outpatient Medications   Medication Sig Dispense Refill    gabapentin (NEURONTIN) 800 mg tablet TAKE 1 TABLET BY MOUTH THREE TIMES DAILY. MAX DAILY AMOUNT: 2400 MG 90 Tab 2    triamcinolone acetonide (KENALOG) 0.1 % topical cream Apply  to affected area daily as needed.  lactulose (CHRONULAC) 10 gram/15 mL solution Take 30 mL by mouth two (2) times daily as needed (constipation). 480 mL 1    meloxicam (MOBIC) 15 mg tablet TAKE 1 TABLET BY MOUTH DAILY 90 Tab 3    traZODone (DESYREL) 100 mg tablet Take 1 Tab by mouth every evening. 30 Tab 0    docusate sodium (STOOL SOFTENER PO) Take 100 mg by mouth daily.  hydrOXYzine HCl (ATARAX) 25 mg tablet Take 25 mg by mouth three (3) times daily as needed for Anxiety.  lamoTRIgine (LAMICTAL) 200 mg tablet Take 200 mg by mouth every evening.  losartan (COZAAR) 50 mg tablet Take 75 mg by mouth daily.  DULoxetine (CYMBALTA) 60 mg capsule Take 60 mg by mouth two (2) times a day.  polyethylene glycol (MIRALAX) 17 gram/dose powder MIX AND DRINK 17 GRAMS BY MOUTH DAILY 1530 g 3    hydroCHLOROthiazide (HYDRODIURIL) 25 mg tablet Take 1 Tab by mouth daily. 90 Tab 3    lamoTRIgine (LAMICTAL) 100 mg tablet Take 100 mg by mouth daily.  meloxicam (MOBIC) 7.5 mg tablet TAKE 1 TABLET BY MOUTH DAILY AS NEEDED FOR PAIN 30 Tab 2    vortioxetine (TRINTELLIX) 10 mg tablet Take 10 mg by mouth daily.  Pt takes in addition to the 20 mg      TRINTELLIX 20 mg tablet Take 1 Tab by mouth daily. 0    naloxone (NARCAN) 4 mg/actuation nasal spray Use 1 spray intranasally, then discard. Repeat with new spray every 2 min as needed for opioid overdose symptoms, alternating nostrils. 1 Each 0    acetaminophen (TYLENOL ARTHRITIS PAIN) 650 mg TbER Take 650 mg by mouth every eight (8) hours.  buPROPion SR (WELLBUTRIN, ZYBAN) 200 mg SR tablet Take 1 Tab by mouth two (2) times a day. 30 Tab 0    varenicline (CHANTIX) 1 mg tablet Take 1 mg by mouth two (2) times a day.  lidocaine (LIDODERM) 5 % APPLY 1 PATCH TO THE AFFECTED AREA EVERY DAY. LEAVE ON FOR 12 HOURS THEN REMOVE FOR 12 HOURS 90 Patch 3       ALLERGIES  No Known Allergies    SOCIAL HISTORY    Social History     Socioeconomic History    Marital status: UNKNOWN     Spouse name: Not on file    Number of children: Not on file    Years of education: Not on file    Highest education level: Not on file   Occupational History    Not on file   Social Needs    Financial resource strain: Not on file    Food insecurity     Worry: Not on file     Inability: Not on file    Transportation needs     Medical: Not on file     Non-medical: Not on file   Tobacco Use    Smoking status: Current Every Day Smoker     Packs/day: 1.00     Years: 25.00     Pack years: 25.00    Smokeless tobacco: Never Used    Tobacco comment: pt back on chanitx   Substance and Sexual Activity    Alcohol use:  Yes     Alcohol/week: 9.0 standard drinks     Types: 9 Shots of liquor per week     Comment: socially    Drug use: No    Sexual activity: Yes     Partners: Male     Birth control/protection: None   Lifestyle    Physical activity     Days per week: Not on file     Minutes per session: Not on file    Stress: Not on file   Relationships    Social connections     Talks on phone: Not on file     Gets together: Not on file     Attends Christian service: Not on file     Active member of club or organization: Not on file     Attends meetings of clubs or organizations: Not on file     Relationship status: Not on file    Intimate partner violence     Fear of current or ex partner: Not on file     Emotionally abused: Not on file     Physically abused: Not on file     Forced sexual activity: Not on file   Other Topics Concern     Service Not Asked    Blood Transfusions Not Asked    Caffeine Concern Not Asked    Occupational Exposure Not Asked    Hobby Hazards Not Asked    Sleep Concern Not Asked    Stress Concern Not Asked    Weight Concern Not Asked    Special Diet Not Asked    Back Care Not Asked    Exercise Not Asked    Bike Helmet Not Asked   [address] Not Asked    Self-Exams Not Asked   Social History Narrative    Not on file       SUBJECTIVE    Pain Scale: 6/10    Pain Assessment  4/14/2021   Location of Pain Back   Pain Location Comment lower mid back   Location Modifiers -   Severity of Pain 6   Quality of Pain Aching; [place]; Other (Comment)   Quality of Pain Comment stiff, cramping in legs, n/t toes and knees weakness in legs   Duration of Pain Persistent   Frequency of Pain Constant   Aggravating Factors -   Aggravating Factors Comment -   Limiting Behavior -   Relieving Factors -   Relieving Factors Comment -   Result of Injury -   Work-Related Injury -   Type of Injury -       Accompanied by self. REVIEW OF SYSTEMS  ROS    Constitutional: Negative for fever, chills, or weight change. Respiratory: Negative for cough or shortness of breath. Cardiovascular: Negative for chest pain or palpitations. Gastrointestinal: Negative for acid reflux, change in bowel habits, or constipation. Genitourinary: Negative for incontinence, dysuria and flank pain. Musculoskeletal: Positive for back and neck pain. Skin: Negative for rash. Neurological: Negative for headaches, dizziness, or numbness. Endo/Heme/Allergies: Negative . Psychiatric/Behavioral: Negative.        PHYSICAL EXAMINATION  Visit Vitals  Pulse 83   Temp 97.3 °F (36.3 °C) (Tympanic)   Resp 20   Ht 5' 9\" (1.753 m)   Wt 287 lb (130.2 kg)   BMI 42.38 kg/m²       Constitutional: Well developed,  well nourished,  awake, alert, and in no acute distress. Neurological:  Sensation to light touch is intact. Psychiatric: Affect and mood are appropriate. Integumentary: No rashes or abrasions noted on exposed areas,  warm, dry and intact. Cardiovascular/Peripheral Vascular:  No peripheral edema is noted. Lymphatic:  No evidence of lymphedema. No cervical lymphadenopathy. SPINE/MUSCULOSKELETAL EXAM    Cervical spine:  Neck is midline. Normal muscle tone. No focal atrophy is noted. Shoulder ROM intact. No Tenderness to palpation . Negative Spurling's sign. Negative Tinel's sign. Negative Dela Cruz's sign. Lumbar spine:  No rash, ecchymosis, or gross obliquity. No fasciculations. No focal atrophy is noted. Range of motion is intact. Tenderness to palpation to low back. SI joints non-tender. Trochanters non tender. Musculoskeletal:  No pain with extension, axial loading, or forward flexion. No pain with internal or external rotation of her hips. MOTOR    Biceps  Triceps Deltoids Wrist Ext Wrist Flex Hand Intrin   Right +4/5 +4/5 +4/5 +4/5 +4/5 +4/5   Left +4/5 +4/5 +4/5 +4/5 +4/5 +4/5      Hip Flex  Quads Hamstrings Ankle DF EHL Ankle PF   Right +4/5 +4/5 +4/5 +4/5 +4/5 +4/5   Left +4/5 +4/5 +4/5 +4/5 +4/5 +4/5   Straight Leg raise - bilaterally. normal gait and station    Ambulation with single point cane. full weight bearing, non-antalgic gait.     Corinne Cleverly, NP

## 2021-10-05 RX ORDER — CYCLOBENZAPRINE HCL 5 MG
5 TABLET ORAL
Qty: 30 TABLET | Refills: 2 | OUTPATIENT
Start: 2021-10-05

## 2021-10-05 NOTE — TELEPHONE ENCOUNTER
Last Visit: 4/14/21 with ISSAC Giang  Next Appointment: 10/12/21 pt cancelled appt, no show 6/22/21  Previous Refill Encounter(s): 4/14/21 #30 with 2 refills    Requested Prescriptions     Pending Prescriptions Disp Refills    cyclobenzaprine (FLEXERIL) 5 mg tablet 30 Tablet 2     Sig: Take 1 Tablet by mouth nightly as needed for Muscle Spasm(s).  Indications: muscle spasm

## 2021-12-01 NOTE — PROGRESS NOTES
Patient is here today for a Mantoux (TST) test results.    Did patient return to clinic 48-72 hours from Mantoux (TST) placement:   Yes -     PPD Induration   Date Value Ref Range Status   12/01/2021 0 0 - 4.99 mm Final     PPD Redness   Date Value Ref Range Status   12/01/2021 Not Present  Final       Induration Size? Induration <5mm - Enter results in Enter/Edit Activity. Route results to ordering provider.     Patient needs form signed? No    Patient reports having previously had the BCG Vaccine: No    Does patient need a two step? No          Rose Mary Christiansen RN                    Reason for Admission:  Spinal stenosis, lumbar region with neurogenic claudication [M48.062]  Lumbar spinal stenosis [M48.061]                 RRAT Score:   11           Plan for utilizing home health: [address]                    Likelihood of Readmission:   LOW                         Transition of Care Plan:      Home with Home Health        Initial assessment completed with patient. Cognitive status of patient: oriented to time, place, person and situation. Face sheet information confirmed:  yes. The patient designates Daryl Marquez, ex-spouse to participate in her discharge plan and to receive any needed information. This patient lives in a single family home with patient, mother, sister, daughter, grandchild, and ex-. Patient is not able to navigate steps as needed. Family assist pt with steps via wheelchair. Prior to hospitalization, patient was considered to be independent with ADLs/IADLS : yes . Patient has a current ACP document on file: no  The patient's family will be available to transport patient home upon discharge. The patient already has Morgan Donna, W/C, and shower seat medical equipment available in the home. Patient is not currently active with home health. Patient has not stayed in a skilled nursing facility or rehab. This patient is on dialysis :no    List of available Home Health agencies were provided and reviewed with the patient prior to discharge. Freedom of choice signed: yes, for [facility] [employer] home health and referral sent. .     Currently, the discharge plan is Home with [address] [place] [address] Zef[facility] Teran. The patient states that she can obtain her medications from the pharmacy, and take her medications as directed.     Patient's current insurance is [facility] Medicaid       Care Management Interventions  PCP Verified by CM: Yes(per pt, saw pcp 2 weeks ago.)  Mode of Transport at Discharge: Self  Transition of Care Consult (CM Consult): Discharge Planning  MyChart Signup: No  Discharge Durable Medical Equipment: No  Physical Therapy Consult: Yes  Occupational Therapy Consult: Yes  Speech Therapy Consult: No  Current Support Network: Relative's Home  Confirm Follow Up Transport: Family  Plan discussed with Pt/Family/Caregiver: Yes  Freedom of Choice Offered: Yes  Discharge Location  Discharge Placement: Home with home health      CINTIA Amor, RN  Pager # [phone]  Care Manager

## 2022-02-02 DIAGNOSIS — G95.9 CERVICAL MYELOPATHY (HCC): ICD-10-CM

## 2022-02-02 DIAGNOSIS — M54.16 LUMBAR RADICULOPATHY: ICD-10-CM

## 2022-02-02 RX ORDER — GABAPENTIN 800 MG/1
TABLET ORAL
Qty: 210 TABLET | Refills: 1 | OUTPATIENT
Start: 2022-02-02

## 2022-02-02 RX ORDER — CYCLOBENZAPRINE HCL 5 MG
5 TABLET ORAL
Qty: 30 TABLET | Refills: 2 | OUTPATIENT
Start: 2022-02-02

## 2022-02-02 NOTE — TELEPHONE ENCOUNTER
Last Visit: 4/14/21 with ISSAC Tavarez  Next Appointment: 10/12/21 pt cancelled appt  Previous Refill Encounter(s): 4/14/21    Requested Prescriptions     Pending Prescriptions Disp Refills    gabapentin (NEURONTIN) 800 mg tablet 210 Tablet 1     Sig: TAKE 1 TABLET BY MOUTH THREE TIMES DAILY. MAX DAILY AMOUNT: 2400 MG    cyclobenzaprine (FLEXERIL) 5 mg tablet 30 Tablet 2     Sig: Take 1 Tablet by mouth nightly as needed for Muscle Spasm(s).  Indications: muscle spasm

## 2022-03-18 PROBLEM — G95.9 CERVICAL MYELOPATHY (HCC): Status: ACTIVE | Noted: 2019-02-07

## 2022-03-19 PROBLEM — M48.02 CERVICAL STENOSIS OF SPINE: Status: ACTIVE | Noted: 2019-03-19

## 2022-03-19 PROBLEM — E78.1 HYPERTRIGLYCERIDEMIA: Status: ACTIVE | Noted: 2018-05-15

## 2022-03-20 PROBLEM — M48.061 LUMBAR SPINAL STENOSIS: Status: ACTIVE | Noted: 2019-05-13

## 2022-03-20 PROBLEM — F17.200 CONTINUOUS NICOTINE DEPENDENCE: Status: ACTIVE | Noted: 2017-01-24

## 2022-05-10 ENCOUNTER — OFFICE VISIT (OUTPATIENT)
Dept: ORTHOPEDIC SURGERY | Age: 47
End: 2022-05-10
Payer: MEDICAID

## 2022-05-10 VITALS
OXYGEN SATURATION: 99 % | TEMPERATURE: 97.2 F | HEIGHT: 69 IN | BODY MASS INDEX: 40.14 KG/M2 | WEIGHT: 271 LBS | HEART RATE: 97 BPM

## 2022-05-10 DIAGNOSIS — M54.50 LUMBAR PAIN: Primary | ICD-10-CM

## 2022-05-10 DIAGNOSIS — M54.2 NECK PAIN: ICD-10-CM

## 2022-05-10 DIAGNOSIS — M79.18 MYOFASCIAL PAIN: ICD-10-CM

## 2022-05-10 PROCEDURE — 99204 OFFICE O/P NEW MOD 45 MIN: CPT | Performed by: NURSE PRACTITIONER

## 2022-05-10 RX ORDER — GABAPENTIN 300 MG/1
CAPSULE ORAL
Qty: 90 CAPSULE | Refills: 1 | Status: SHIPPED | OUTPATIENT
Start: 2022-05-10 | End: 2022-07-19

## 2022-05-10 RX ORDER — ERGOCALCIFEROL 1.25 MG/1
1 CAPSULE ORAL
COMMUNITY
Start: 2021-10-27

## 2022-05-10 RX ORDER — CYCLOBENZAPRINE HCL 10 MG
10 TABLET ORAL
Qty: 30 TABLET | Refills: 1 | Status: SHIPPED | OUTPATIENT
Start: 2022-05-10 | End: 2022-08-23 | Stop reason: SDUPTHER

## 2022-05-10 NOTE — PROGRESS NOTES
Dileep Padilla presents today for   Chief Complaint   Patient presents with    Back Pain    Leg Pain     left       Is someone accompanying this pt? no    Is the patient using any DME equipment during OV? no    Depression Screening:  3 most recent PHQ Screens 5/15/2018   Little interest or pleasure in doing things Nearly every day   Feeling down, depressed, irritable, or hopeless Nearly every day   Total Score PHQ 2 6   Trouble falling or staying asleep, or sleeping too much -   Feeling tired or having little energy -   Poor appetite, weight loss, or overeating -   Feeling bad about yourself - or that you are a failure or have let yourself or your family down -   Trouble concentrating on things such as school, work, reading, or watching TV -   Moving or speaking so slowly that other people could have noticed; or the opposite being so fidgety that others notice -   Thoughts of being better off dead, or hurting yourself in some way -   PHQ 9 Score -   How difficult have these problems made it for you to do your work, take care of your home and get along with others -       Learning Assessment:  Learning Assessment 11/8/2019   PRIMARY LEARNER Patient   HIGHEST LEVEL OF EDUCATION - PRIMARY LEARNER  -   BARRIERS PRIMARY LEARNER -   CO-LEARNER CAREGIVER -   PRIMARY LANGUAGE ENGLISH   LEARNER PREFERENCE PRIMARY LISTENING   ANSWERED BY patient   RELATIONSHIP SELF       Abuse Screening:  Abuse Screening Questionnaire 4/26/2017   Do you ever feel afraid of your partner? N   Are you in a relationship with someone who physically or mentally threatens you? N   Is it safe for you to go home? Y       Fall Risk  Fall Risk Assessment, last 12 mths 8/10/2017   Able to walk? Yes   Fall in past 12 months? No       OPIOID RISK TOOL  Opioid Risk Tool 6/25/2019   Family history of alcohol abuse? 0   Family history of illegal drug abuse? 0   Family history of prescription drug abuse? 0   Personal history of alcohol abuse?  0   Personal history of illegal drug abuse? 0   Personal history of prescription drug abuse? 0   Age range between 17-45? 0   History of preadolescent sexual abuse? 0   ADD, OCD, bipolar, schizophrenia? 2   Depression? 1   Opioid Risk Total Score 3       Coordination of Care:  1. Have you been to the ER, urgent care clinic since your last visit? no  Hospitalized since your last visit? no    2. Have you seen or consulted any other health care providers outside of the [address] [place] [address] since your last visit? no Include any pap smears or colon screening.  no

## 2022-05-10 NOTE — PROGRESS NOTES
Chief complaint   Chief Complaint   Patient presents with    Back Pain    Leg Pain     left       History of Present Illness: Ashlyn Gonzalez is a  50 y.o.  female who comes in today after last being seen by Darby Conner NP on April 14, 2021. She has chronic neck and back pain. She has a history of an ACDF C5 C7 February 2019 followed by a posterior fusion C5-C7 in March 2019. She has also had 2 lumbar surgeries with the first being in 2007 followed by L4-S1 PLIF May 2018 by Dr. Kalina Concepcion. She states she continues to have neck and back pain. She is not really having radicular pain. She states her pain was helped with gabapentin 300 mg twice a day. She has been on is much as 800 mg 3 times a day but she ran out of that sometime ago and had the 300s left over. She was taking it twice a day. She states it did help especially with her fibromyalgia but she ran out of that 2 months ago. She did go through some withdrawal symptoms of fatigue and aching. She would like to start back on it. She has an unusual symptom when she lays on her right side in bed and swallow she feels pain in her low back. She is on Social Security disability. She smokes half pack surge per day. She states she cares for her 70-year-old mother. She denies fever bowel bladder dysfunction. Physical Exam: Patient is a 43-year-old female well-developed well-nourished who is alert and oriented with a normal mood and affect. She has a full weightbearing slightly antalgic gait favoring that right leg. She has 5 out of 5 strength of her right lower extremity and 4 out of 5 on the left. She has pain with hyperextension of her spine. She has 5 out of 5 strength bilateral upper extremities. Negative Nico's. Negative straight leg raise. Assessment and Plan: This is a patient who has both neck and back surgery and has chronic neck and back pain. I will restart her on gabapentin 300 mg and taper up to 3 times a day.   She is requesting a refill of Flexeril 10 mg I have sent that in for her myofascial pain. We will see her back in 6 weeks for reevaluation. Medications:  Current Outpatient Medications   Medication Sig Dispense Refill    ergocalciferol (ERGOCALCIFEROL) 1,250 mcg (50,000 unit) capsule Take 1 Capsule by mouth every seven (7) days.  gabapentin (NEURONTIN) 300 mg capsule 1 cap q hs x 1 week then 1 cap bid x 1 week then 1 cap tid  Indications: neuropathic pain 90 Capsule 1    cyclobenzaprine (FLEXERIL) 10 mg tablet Take 1 Tablet by mouth nightly as needed for Muscle Spasm(s). Indications: muscle spasm 30 Tablet 1    triamcinolone acetonide (KENALOG) 0.1 % topical cream Apply  to affected area daily as needed.  meloxicam (MOBIC) 15 mg tablet TAKE 1 TABLET BY MOUTH DAILY 90 Tab 3    acetaminophen (TYLENOL ARTHRITIS PAIN) 650 mg TbER Take 650 mg by mouth every eight (8) hours.  traZODone (DESYREL) 100 mg tablet Take 1 Tab by mouth every evening. 30 Tab 0    buPROPion SR (WELLBUTRIN, ZYBAN) 200 mg SR tablet Take 1 Tab by mouth two (2) times a day. 30 Tab 0    hydrOXYzine HCl (ATARAX) 25 mg tablet Take 25 mg by mouth three (3) times daily as needed for Anxiety.  lamoTRIgine (LAMICTAL) 200 mg tablet Take 200 mg by mouth every evening.  DULoxetine (CYMBALTA) 60 mg capsule Take 60 mg by mouth daily. Pt also takes a 30 mg in the evening      lidocaine (LIDODERM) 5 % APPLY 1 PATCH TO THE AFFECTED AREA EVERY DAY. LEAVE ON FOR 12 HOURS THEN REMOVE FOR 12 HOURS 90 Patch 3    lamoTRIgine (LAMICTAL) 100 mg tablet Take 100 mg by mouth daily.  gabapentin (NEURONTIN) 800 mg tablet TAKE 1 TABLET BY MOUTH THREE TIMES DAILY. MAX DAILY AMOUNT: 2400  Tab 1    cyclobenzaprine (FLEXERIL) 5 mg tablet Take 1 Tab by mouth nightly as needed for Muscle Spasm(s).  Indications: muscle spasm 30 Tab 2    lactulose (CHRONULAC) 10 gram/15 mL solution Take 30 mL by mouth two (2) times daily as needed (constipation). 480 mL 1    meloxicam (MOBIC) 7.5 mg tablet TAKE 1 TABLET BY MOUTH DAILY AS NEEDED FOR PAIN 30 Tab 2    vortioxetine (TRINTELLIX) 10 mg tablet Take 10 mg by mouth daily. Pt takes in addition to the 20 mg      TRINTELLIX 20 mg tablet Take 1 Tab by mouth daily. 0    naloxone (NARCAN) 4 mg/actuation nasal spray Use 1 spray intranasally, then discard. Repeat with new spray every 2 min as needed for opioid overdose symptoms, alternating nostrils. 1 Each 0    varenicline (CHANTIX) 1 mg tablet Take 1 mg by mouth two (2) times a day.  docusate sodium (STOOL SOFTENER PO) Take 100 mg by mouth daily.  losartan (COZAAR) 50 mg tablet Take 75 mg by mouth daily.  polyethylene glycol (MIRALAX) 17 gram/dose powder MIX AND DRINK 17 GRAMS BY MOUTH DAILY 1530 g 3    hydroCHLOROthiazide (HYDRODIURIL) 25 mg tablet Take 1 Tab by mouth daily.  90 Tab 3           Review of systems:    Past Medical History:   Diagnosis Date    Acute pain of left shoulder 11/1/2016    Bipolar 1 disorder ([address]     Bipolar affective disorder, currently manic, moderate ([address] 4/27/2016    Chronic midline low back pain with sciatica 11/1/2016    Cigarette nicotine dependence in remission 8/2/2016    Cigarette nicotine dependence without complication 86/45/1882    Continuous nicotine dependence 1/24/2017    Depression     Fibromyalgia 9/1/2015    HLD (hyperlipidemia) 2/4/2016    Hypertension     Hypertriglyceridemia 5/15/2018    Impaired fasting glucose 5/31/2016    Irritable bowel syndrome without diarrhea 4/27/2016    Moderate episode of recurrent major depressive disorder ([address] 4/27/2016    Obesity, Class III, BMI 40-49.9 (morbid obesity) ([address] 9/1/2015    Onychomycosis 4/27/2016    Pain of left thumb 11/1/2016    Prediabetes 7/5/2016    PTSD (post-traumatic stress disorder)     Smoker 9/1/2015    Vitamin D deficiency 5/31/2016     Past Surgical History:   Procedure Laterality Date    HX ACL RECONSTRUCTION      HX APPENDECTOMY      HX CERVICAL DISKECTOMY  02/07/2019    with fusion C5/6 C6/7    HX CERVICAL FUSION  0513/19    L4-L5 bilateral hemilaminectomy; medial facetectomy; foraminotomy; L5-S1 right hemilaminectomy; medial facetectomy revision; discectomy revision; L4, L5, S1 posterolateral fusion    HX CERVICAL LAMINECTOMY  03/19/2019    with fusion    HX GYN Bilateral     tubal    HX LUMBAR LAMINECTOMY Bilateral 05/13/2019    L4-L5 bilateral hemilaminectomy; medial facetectomy; foraminotomy; L5-S1 right hemilaminectomy; medial facetectomy revision; discectomy revision; L4, L5, S1 posterolateral fusion    HX ORTHOPAEDIC       Social History     Socioeconomic History    Marital status: UNKNOWN     Spouse name: Not on file    Number of children: Not on file    Years of education: Not on file    Highest education level: Not on file   Occupational History    Not on file   Tobacco Use    Smoking status: Current Every Day Smoker     Packs/day: 1.00     Years: 25.00     Pack years: 25.00    Smokeless tobacco: Never Used    Tobacco comment: pt back on chanitx   Substance and Sexual Activity    Alcohol use:  Yes     Alcohol/week: 9.0 standard drinks     Types: 9 Shots of liquor per week     Comment: socially    Drug use: No    Sexual activity: Yes     Partners: Male     Birth control/protection: None   Other Topics Concern     Service Not Asked    Blood Transfusions Not Asked    Caffeine Concern Not Asked    Occupational Exposure Not Asked    Hobby Hazards Not Asked    Sleep Concern Not Asked    Stress Concern Not Asked    Weight Concern Not Asked    Special Diet Not Asked    Back Care Not Asked    Exercise Not Asked    Bike Helmet Not Asked   [address] Not Asked    Self-Exams Not Asked   Social History Narrative    Not on file     Social Determinants of Health     Financial Resource Strain:     Difficulty of Paying Living Expenses: Not on file   Food Insecurity:     Worried About [address] [employer] in the Last Year: Not on file    Rehan of Food in the Last Year: Not on file   Transportation Needs:     Lack of Transportation (Medical): Not on file    Lack of Transportation (Non-Medical): Not on file   Physical Activity:     Days of Exercise per Week: Not on file    Minutes of Exercise per Session: Not on file   Stress:     Feeling of Stress : Not on file   Social Connections:     Frequency of Communication with Friends and Family: Not on file    Frequency of Social Gatherings with Friends and Family: Not on file    Attends Pentecostal Services: Not on file    Active Member of Clubs or Organizations: Not on file    Attends Club or Organization Meetings: Not on file    Marital Status: Not on file   Intimate Partner Violence:     Fear of Current or Ex-Partner: Not on file    Emotionally Abused: Not on file    Physically Abused: Not on file    Sexually Abused: Not on file   Housing Stability:     Unable to Pay for Housing in the Last Year: Not on file    Number of [place] in the Last Year: Not on file    Unstable Housing in the Last Year: Not on file     Family History   Problem Relation Age of Onset    Hypertension Mother     Thyroid Disease Mother     No Known Problems Father     Psychiatric Disorder Sister     Psychiatric Disorder Brother     Psychiatric Disorder Sister     Diabetes Sister     Psychiatric Disorder Brother     Psychiatric Disorder Brother        Physical Exam:  Visit Vitals  Pulse 97   Temp 97.2 °F (36.2 °C) (Tympanic)   Ht 5' 9\" (1.753 m)   Wt 271 lb (122.9 kg)   SpO2 99% Comment: RA   BMI 40.02 kg/m²     Pain Scale: 4/10       has been . reviewed and is appropriate          Diagnoses and all orders for this visit:    1. Lumbar pain  -     gabapentin (NEURONTIN) 300 mg capsule; 1 cap q hs x 1 week then 1 cap bid x 1 week then 1 cap tid  Indications: neuropathic pain    2.  Neck pain  -     gabapentin (NEURONTIN) 300 mg capsule; 1 cap q hs x 1 week then 1 cap bid x 1 week then 1 cap tid  Indications: neuropathic pain    3. Myofascial pain  -     cyclobenzaprine (FLEXERIL) 10 mg tablet; Take 1 Tablet by mouth nightly as needed for Muscle Spasm(s). Indications: muscle spasm            Follow-up and Dispositions    · Return in about 6 weeks (around 6/21/2022) for with NP Sabine. We have informed Rhae Scheuermann to notify us for immediate appointment if she has any worsening neurogical symptoms or if an emergency situation presents, then call 911    Please note that this dictation was completed with [employer], the computer voice recognition software. Quite often unanticipated grammatical, syntax, homophones, and other interpretive errors are inadvertently transcribed by the computer software. Please disregard these errors. Please excuse any errors that have escaped final proofreading.

## 2022-06-21 ENCOUNTER — OFFICE VISIT (OUTPATIENT)
Dept: ORTHOPEDIC SURGERY | Age: 47
End: 2022-06-21
Payer: MEDICAID

## 2022-06-21 VITALS
BODY MASS INDEX: 41.32 KG/M2 | HEIGHT: 69 IN | TEMPERATURE: 97.2 F | HEART RATE: 90 BPM | WEIGHT: 279 LBS | OXYGEN SATURATION: 98 %

## 2022-06-21 DIAGNOSIS — M25.562 CHRONIC PAIN OF LEFT KNEE: ICD-10-CM

## 2022-06-21 DIAGNOSIS — G89.29 CHRONIC PAIN OF LEFT KNEE: ICD-10-CM

## 2022-06-21 DIAGNOSIS — M79.7 FIBROMYALGIA: ICD-10-CM

## 2022-06-21 DIAGNOSIS — M54.50 LUMBAR PAIN: Primary | ICD-10-CM

## 2022-06-21 DIAGNOSIS — M54.2 NECK PAIN: ICD-10-CM

## 2022-06-21 PROCEDURE — 99214 OFFICE O/P EST MOD 30 MIN: CPT | Performed by: NURSE PRACTITIONER

## 2022-06-21 RX ORDER — GABAPENTIN 600 MG/1
600 TABLET ORAL 3 TIMES DAILY
Qty: 90 TABLET | Refills: 1 | Status: SHIPPED | OUTPATIENT
Start: 2022-06-21 | End: 2022-08-23 | Stop reason: DRUGHIGH

## 2022-06-21 NOTE — PROGRESS NOTES
Chief complaint   Chief Complaint   Patient presents with    Back Pain    Knee Pain     left       History of Present Illness: Tonie Galindo is a  50 y.o.  female who comes in today for follow up of her chronic neck and back pain. She has a history of an ACDF C5 C7 February 2019 followed by a posterior fusion C5-C7 in March 2019. She has also had 2 lumbar surgeries with the first being in 2007 followed by L4-S1 PLIF May 2018 by Dr. Shayla Bosworth. She states she continues to have neck and back pain. She is not really having radicular pain. Last visit we restarted her on gabapentin and taper up to 300 mg 3 times a day. She states it did help both her neck and back pain along with her fibromyalgia somewhat but feels the increase in the dose would be more helpful. She denies any side effects from the gabapentin. Today she is complaining of chronic left knee pain for which she has had an ACL repair in the past.  She would like to be referred to somebody to have that we looked at. She is on Social Security disability. She smokes half pack per day. She states she continues to caresfor her elderly mother. She denies fever bowel bladder dysfunction. Physical Exam: Patient is a 57-year-old female well-developed well-nourished who is alert and oriented with a normal mood and affect. She has a full weightbearing slightly antalgic gait utilizing a single-point cane. She has 4 out of 5 strength bilateral lower extremities. She has 4 out of 5 strength bilateral upper extremities. Negative Nico's. Negative straight leg raise. She does have some pain with hyperextension lumbar spine. Assessment and Plan: This is a patient who has both neck and back surgery and has chronic neck and back pain. I will increase her gabapentin to 600 mg 3 times a day. I will refer her to Dr. Renate Jordan for her left knee   We will see her back in 2 months for reevaluation.       Medications:  Current Outpatient Medications Medication Sig Dispense Refill    gabapentin (Neurontin) 600 mg tablet Take 1 Tablet by mouth three (3) times daily. Max Daily Amount: 1,800 mg. 90 Tablet 1    ergocalciferol (ERGOCALCIFEROL) 1,250 mcg (50,000 unit) capsule Take 1 Capsule by mouth every seven (7) days.  gabapentin (NEURONTIN) 300 mg capsule 1 cap q hs x 1 week then 1 cap bid x 1 week then 1 cap tid  Indications: neuropathic pain 90 Capsule 1    cyclobenzaprine (FLEXERIL) 10 mg tablet Take 1 Tablet by mouth nightly as needed for Muscle Spasm(s). Indications: muscle spasm 30 Tablet 1    triamcinolone acetonide (KENALOG) 0.1 % topical cream Apply  to affected area daily as needed.  meloxicam (MOBIC) 15 mg tablet TAKE 1 TABLET BY MOUTH DAILY 90 Tab 3    acetaminophen (TYLENOL ARTHRITIS PAIN) 650 mg TbER Take 650 mg by mouth every eight (8) hours.  traZODone (DESYREL) 100 mg tablet Take 1 Tab by mouth every evening. 30 Tab 0    buPROPion SR (WELLBUTRIN, ZYBAN) 200 mg SR tablet Take 1 Tab by mouth two (2) times a day. 30 Tab 0    hydrOXYzine HCl (ATARAX) 25 mg tablet Take 25 mg by mouth three (3) times daily as needed for Anxiety.  lamoTRIgine (LAMICTAL) 200 mg tablet Take 200 mg by mouth every evening.  DULoxetine (CYMBALTA) 60 mg capsule Take 60 mg by mouth daily. Pt also takes a 30 mg in the evening      lamoTRIgine (LAMICTAL) 100 mg tablet Take 100 mg by mouth daily.  gabapentin (NEURONTIN) 800 mg tablet TAKE 1 TABLET BY MOUTH THREE TIMES DAILY. MAX DAILY AMOUNT: 2400 MG (Patient not taking: Reported on 6/21/2022) 210 Tab 1    lidocaine (LIDODERM) 5 % APPLY 1 PATCH TO THE AFFECTED AREA EVERY DAY.  LEAVE ON FOR 12 HOURS THEN REMOVE FOR 12 HOURS 90 Patch 3           Review of systems:    Past Medical History:   Diagnosis Date    Acute pain of left shoulder 11/1/2016    Bipolar 1 disorder ([address]     Bipolar affective disorder, currently manic, moderate ([address] 4/27/2016    Chronic midline low back pain with sciatica 11/1/2016    Cigarette nicotine dependence in remission 8/2/2016    Cigarette nicotine dependence without complication 26/93/1009    Continuous nicotine dependence 1/24/2017    Depression     Fibromyalgia 9/1/2015    HLD (hyperlipidemia) 2/4/2016    Hypertension     Hypertriglyceridemia 5/15/2018    Impaired fasting glucose 5/31/2016    Irritable bowel syndrome without diarrhea 4/27/2016    Moderate episode of recurrent major depressive disorder ([facility] 4/27/2016    Obesity, Class III, BMI 40-49.9 (morbid obesity) ([facility] 9/1/2015    Onychomycosis 4/27/2016    Pain of left thumb 11/1/2016    Prediabetes 7/5/2016    PTSD (post-traumatic stress disorder)     Smoker 9/1/2015    Vitamin D deficiency 5/31/2016     Past Surgical History:   Procedure Laterality Date    HX ACL RECONSTRUCTION      HX APPENDECTOMY      HX CERVICAL DISKECTOMY  02/07/2019    with fusion C5/6 C6/7    HX CERVICAL FUSION  0513/19    L4-L5 bilateral hemilaminectomy; medial facetectomy; foraminotomy; L5-S1 right hemilaminectomy; medial facetectomy revision; discectomy revision; L4, L5, S1 posterolateral fusion    HX CERVICAL LAMINECTOMY  03/19/2019    with fusion    HX GYN Bilateral     tubal    HX LUMBAR LAMINECTOMY Bilateral 05/13/2019    L4-L5 bilateral hemilaminectomy; medial facetectomy; foraminotomy;  L5-S1 right hemilaminectomy; medial facetectomy revision; discectomy revision; L4, L5, S1 posterolateral fusion    HX ORTHOPAEDIC       Social History     Socioeconomic History    Marital status: UNKNOWN     Spouse name: Not on file    Number of children: Not on file    Years of education: Not on file    Highest education level: Not on file   Occupational History    Not on file   Tobacco Use    Smoking status: Current Every Day Smoker     Packs/day: 1.00     Years: 25.00     Pack years: 25.00    Smokeless tobacco: Never Used    Tobacco comment: pt back on chanitx   Substance and Sexual Activity    Alcohol use: Yes     Alcohol/week: 9.0 standard drinks     Types: 9 Shots of liquor per week     Comment: socially    Drug use: No    Sexual activity: Yes     Partners: Male     Birth control/protection: None   Other Topics Concern     Service Not Asked    Blood Transfusions Not Asked    Caffeine Concern Not Asked    Occupational Exposure Not Asked    Hobby Hazards Not Asked    Sleep Concern Not Asked    Stress Concern Not Asked    Weight Concern Not Asked    Special Diet Not Asked    Back Care Not Asked    Exercise Not Asked    Bike Helmet Not Asked   [address] Not Asked    Self-Exams Not Asked   Social History Narrative    Not on file     Social Determinants of Health     Financial Resource Strain:     Difficulty of Paying Living Expenses: Not on file   Food Insecurity:     Worried About Running Out of Food in the Last Year: Not on file    Rehan of Food in the Last Year: Not on file   Transportation Needs:     Lack of Transportation (Medical): Not on file    Lack of Transportation (Non-Medical):  Not on file   Physical Activity:     Days of Exercise per Week: Not on file    Minutes of Exercise per Session: Not on file   Stress:     Feeling of Stress : Not on file   Social Connections:     Frequency of Communication with Friends and Family: Not on file    Frequency of Social Gatherings with Friends and Family: Not on file    Attends Anabaptist Services: Not on file    Active Member of [address] [place] [address] or Organizations: Not on file    Attends Club or Organization Meetings: Not on file    Marital Status: Not on file   Intimate Partner Violence:     Fear of Current or Ex-Partner: Not on file    Emotionally Abused: Not on file    Physically Abused: Not on file    Sexually Abused: Not on file   Housing Stability:     Unable to Pay for Housing in the Last Year: Not on file    Number of [place] in the Last Year: Not on file    Unstable Housing in the Last Year: Not on file     Family History   Problem Relation Age of Onset    Hypertension Mother     Thyroid Disease Mother     No Known Problems Father     Psychiatric Disorder Sister     Psychiatric Disorder Brother     Psychiatric Disorder Sister     Diabetes Sister     Psychiatric Disorder Brother     Psychiatric Disorder Brother        Physical Exam:  Visit Vitals  Pulse 90   Temp 97.2 °F (36.2 °C) (Tympanic)   Ht 5' 9\" (1.753 m)   Wt 279 lb (126.6 kg)   SpO2 98% Comment: RA   BMI 41.20 kg/m²     Pain Scale: 4/10       has been . reviewed and is appropriate          Diagnoses and all orders for this visit:    1. Lumbar pain  -     gabapentin (Neurontin) 600 mg tablet; Take 1 Tablet by mouth three (3) times daily. Max Daily Amount: 1,800 mg.    2. Neck pain  -     gabapentin (Neurontin) 600 mg tablet; Take 1 Tablet by mouth three (3) times daily. Max Daily Amount: 1,800 mg.    3. Fibromyalgia  -     gabapentin (Neurontin) 600 mg tablet; Take 1 Tablet by mouth three (3) times daily. Max Daily Amount: 1,800 mg.    4. Chronic pain of left knee  -     REFERRAL TO ORTHOPEDICS            Follow-up and Dispositions    · Return in about 2 months (around 8/21/2022) for with NP Vane and appt with Dr Sheyla Lopez for knee evaluation. We have informed Nyasia Nagel to notify us for immediate appointment if she has any worsening neurogical symptoms or if an emergency situation presents, then call 911    Please note that this dictation was completed with [employer], the computer voice recognition software. Quite often unanticipated grammatical, syntax, homophones, and other interpretive errors are inadvertently transcribed by the computer software. Please disregard these errors. Please excuse any errors that have escaped final proofreading.

## 2022-06-21 NOTE — PROGRESS NOTES
Berry Raul presents today for   Chief Complaint   Patient presents with    Back Pain    Knee Pain     left       Is someone accompanying this pt? no    Is the patient using any DME equipment during OV? Yes, cane    Depression Screening:  3 most recent PHQ Screens 5/15/2018   Little interest or pleasure in doing things Nearly every day   Feeling down, depressed, irritable, or hopeless Nearly every day   Total Score PHQ 2 6   Trouble falling or staying asleep, or sleeping too much -   Feeling tired or having little energy -   Poor appetite, weight loss, or overeating -   Feeling bad about yourself - or that you are a failure or have let yourself or your family down -   Trouble concentrating on things such as school, work, reading, or watching TV -   Moving or speaking so slowly that other people could have noticed; or the opposite being so fidgety that others notice -   Thoughts of being better off dead, or hurting yourself in some way -   PHQ 9 Score -   How difficult have these problems made it for you to do your work, take care of your home and get along with others -       Learning Assessment:  Learning Assessment 11/8/2019   PRIMARY LEARNER Patient   HIGHEST LEVEL OF EDUCATION - PRIMARY LEARNER  -   BARRIERS PRIMARY LEARNER -   CO-LEARNER CAREGIVER -   PRIMARY LANGUAGE ENGLISH   LEARNER PREFERENCE PRIMARY LISTENING   ANSWERED BY patient   RELATIONSHIP SELF       Abuse Screening:  Abuse Screening Questionnaire 4/26/2017   Do you ever feel afraid of your partner? N   Are you in a relationship with someone who physically or mentally threatens you? N   Is it safe for you to go home? Y       Fall Risk  Fall Risk Assessment, last 12 mths 8/10/2017   Able to walk? Yes   Fall in past 12 months? No       OPIOID RISK TOOL  Opioid Risk Tool 6/25/2019   Family history of alcohol abuse? 0   Family history of illegal drug abuse? 0   Family history of prescription drug abuse?  0   Personal history of alcohol abuse? 0 Personal history of illegal drug abuse? 0   Personal history of prescription drug abuse? 0   Age range between 17-45? 0   History of preadolescent sexual abuse? 0   ADD, OCD, bipolar, schizophrenia? 2   Depression? 1   Opioid Risk Total Score 3       Coordination of Care:  1. Have you been to the ER, urgent care clinic since your last visit? no  Hospitalized since your last visit? no    2. Have you seen or consulted any other health care providers outside of the [address] [place] [address] since your last visit? Yes, psychiatry Include any pap smears or colon screening.  no

## 2022-07-19 ENCOUNTER — OFFICE VISIT (OUTPATIENT)
Dept: ORTHOPEDIC SURGERY | Age: 47
End: 2022-07-19
Payer: MEDICAID

## 2022-07-19 VITALS
BODY MASS INDEX: 40.88 KG/M2 | TEMPERATURE: 97.9 F | OXYGEN SATURATION: 98 % | HEART RATE: 87 BPM | WEIGHT: 276 LBS | HEIGHT: 69 IN

## 2022-07-19 DIAGNOSIS — S83.512S TEARS OF MENISCUS AND ANTERIOR CRUCIATE LIGAMENT OF LEFT KNEE, SEQUELA: ICD-10-CM

## 2022-07-19 DIAGNOSIS — S83.207S TEARS OF MENISCUS AND ANTERIOR CRUCIATE LIGAMENT OF LEFT KNEE, SEQUELA: ICD-10-CM

## 2022-07-19 DIAGNOSIS — S83.207S TEARS OF MENISCUS AND ANTERIOR CRUCIATE LIGAMENT OF LEFT KNEE, SEQUELA: Primary | ICD-10-CM

## 2022-07-19 DIAGNOSIS — M25.562 CHRONIC PAIN OF LEFT KNEE: ICD-10-CM

## 2022-07-19 DIAGNOSIS — G89.29 CHRONIC PAIN OF LEFT KNEE: ICD-10-CM

## 2022-07-19 DIAGNOSIS — S83.512S TEARS OF MENISCUS AND ANTERIOR CRUCIATE LIGAMENT OF LEFT KNEE, SEQUELA: Primary | ICD-10-CM

## 2022-07-19 PROCEDURE — 99214 OFFICE O/P EST MOD 30 MIN: CPT | Performed by: PHYSICAL MEDICINE & REHABILITATION

## 2022-07-19 NOTE — PROGRESS NOTES
Cindy Su presents today for   Chief Complaint   Patient presents with    Knee Pain       Is someone accompanying this pt? no    Is the patient using any DME equipment during OV? no    Depression Screening:  3 most recent PHQ Screens 5/15/2018   Little interest or pleasure in doing things Nearly every day   Feeling down, depressed, irritable, or hopeless Nearly every day   Total Score PHQ 2 6   Trouble falling or staying asleep, or sleeping too much -   Feeling tired or having little energy -   Poor appetite, weight loss, or overeating -   Feeling bad about yourself - or that you are a failure or have let yourself or your family down -   Trouble concentrating on things such as school, work, reading, or watching TV -   Moving or speaking so slowly that other people could have noticed; or the opposite being so fidgety that others notice -   Thoughts of being better off dead, or hurting yourself in some way -   PHQ 9 Score -   How difficult have these problems made it for you to do your work, take care of your home and get along with others -       Learning Assessment:  Learning Assessment 11/8/2019   PRIMARY LEARNER Patient   HIGHEST LEVEL OF EDUCATION - PRIMARY LEARNER  -   BARRIERS PRIMARY LEARNER -   CO-LEARNER CAREGIVER -   PRIMARY LANGUAGE ENGLISH   LEARNER PREFERENCE PRIMARY LISTENING   ANSWERED BY patient   RELATIONSHIP SELF       Abuse Screening:  Abuse Screening Questionnaire 4/26/2017   Do you ever feel afraid of your partner? N   Are you in a relationship with someone who physically or mentally threatens you? N   Is it safe for you to go home? Y       Fall Risk  Fall Risk Assessment, last 12 mths 8/10/2017   Able to walk? Yes   Fall in past 12 months? No       OPIOID RISK TOOL  Opioid Risk Tool 6/25/2019   Family history of alcohol abuse? 0   Family history of illegal drug abuse? 0   Family history of prescription drug abuse? 0   Personal history of alcohol abuse?  0   Personal history of illegal drug abuse? 0   Personal history of prescription drug abuse? 0   Age range between 17-45? 0   History of preadolescent sexual abuse? 0   ADD, OCD, bipolar, schizophrenia? 2   Depression? 1   Opioid Risk Total Score 3       Coordination of Care:  1. Have you been to the ER, urgent care clinic since your last visit? no  Hospitalized since your last visit? no    2. Have you seen or consulted any other health care providers outside of the [address] [place] [address] since your last visit? no Include any pap smears or colon screening.  no

## 2022-07-19 NOTE — PATIENT INSTRUCTIONS
[address] [facility] Radiology    Please expect an automated call within 24-48 business hours to schedule your outpatient study with [facility]    If you have not received an automated call, please call [phone] to speak directly with a     [address] at [address]

## 2022-07-19 NOTE — PROGRESS NOTES
[facility]  [address]  [address]  Phone: [phone]  Fax: [phone]      Patient: Gonsalo Degroot                                                                              MRN: [id number]        YOB: 1975          AGE: 50 y.o. PCP: None  Date:  07/19/22    Reason for Consultation: Knee Pain      HPI:  Gonsalo Degroot is a 50 y.o. female with relevant PMH of  ACDF C5-7 in 2019 followed by a posterior fusion in March 2019  with Dr Sukumar Galindo and L4-S1 PLIF May 2018 with Dr. Sukumar Galindo who presents with left knee pain. She has a prior history of ACL and meniscus tear s/p ACL cadaver reconstruction in 2010 at [address] Since that time she has had left knee pain. + swelling, + buckling which has progressively worsened. Neurologic symptoms: No  weakness, bowel or bladder changes. No recent falls. Ambulates with a cane    Location: The pain is located in the left knee anterior medial lateral  Radiation: The pain does not radiate. Pain Score: Currently: 6/10    Quality: Pain is of a Achy and Stabbing quality. Aggravating: Pain is exacerbated by walking, sitting, standing and lying down  Alleviating: The pain is alleviated by nothing    Prior Treatments:  Physical therapy: YES-  After surgery in 2010  Injections:NO    Previous Medications:   Current Medications:gabapentin 600mg tid, duloxetine 60mg, meloxicam 15mg , flexeril 10mg prn   Previous work-up has included:   X-ray bilateral knee 7/19/2022  Right knee: No acute fracture or dislocation are identified.  No significant degenerative changes are seen.  The soft tissues are unremarkable. Left knee: Postsurgical changes status post ACL repair are noted.  Minimal osteophyte formation is present in the medial compartment.  The soft tissues are unremarkable.       Past Medical History:   Past Medical History:   Diagnosis Date    Acute pain of left shoulder 11/1/2016    Bipolar 1 disorder [facility]     Bipolar affective disorder, currently manic, moderate ([facility] 4/27/2016    Chronic midline low back pain with sciatica 11/1/2016    Cigarette nicotine dependence in remission 8/2/2016    Cigarette nicotine dependence without complication 48/11/0935    Continuous nicotine dependence 1/24/2017    Depression     Fibromyalgia 9/1/2015    HLD (hyperlipidemia) 2/4/2016    Hypertension     Hypertriglyceridemia 5/15/2018    Impaired fasting glucose 5/31/2016    Irritable bowel syndrome without diarrhea 4/27/2016    Moderate episode of recurrent major depressive disorder ([facility] 4/27/2016    Obesity, Class III, BMI 40-49.9 (morbid obesity) ([facility] 9/1/2015    Onychomycosis 4/27/2016    Pain of left thumb 11/1/2016    Prediabetes 7/5/2016    PTSD (post-traumatic stress disorder)     Smoker 9/1/2015    Vitamin D deficiency 5/31/2016      Past Surgical History:   Past Surgical History:   Procedure Laterality Date    HX ACL RECONSTRUCTION      HX APPENDECTOMY      HX CERVICAL DISKECTOMY  02/07/2019    with fusion C5/6 C6/7    HX CERVICAL FUSION  0513/19    L4-L5 bilateral hemilaminectomy; medial facetectomy; foraminotomy; L5-S1 right hemilaminectomy; medial facetectomy revision; discectomy revision; L4, L5, S1 posterolateral fusion    HX CERVICAL LAMINECTOMY  03/19/2019    with fusion    HX GYN Bilateral     tubal    HX LUMBAR LAMINECTOMY Bilateral 05/13/2019    L4-L5 bilateral hemilaminectomy; medial facetectomy; foraminotomy; L5-S1 right hemilaminectomy; medial facetectomy revision; discectomy revision; L4, L5, S1 posterolateral fusion    HX ORTHOPAEDIC        SocHx:   Social History     Tobacco Use    Smoking status: Current Every Day Smoker     Packs/day: 1.00     Years: 25.00     Pack years: 25.00    Smokeless tobacco: Never Used    Tobacco comment: pt back on chanitx   Substance Use Topics    Alcohol use:  Yes     Alcohol/week: 9.0 standard drinks     Types: 9 Shots of liquor per week     Comment: socially      FamHx:? Family History   Problem Relation Age of Onset    Hypertension Mother     Thyroid Disease Mother     No Known Problems Father     Psychiatric Disorder Sister     Psychiatric Disorder Brother     Psychiatric Disorder Sister     Diabetes Sister     Psychiatric Disorder Brother     Psychiatric Disorder Brother        Current Medications:    Current Outpatient Medications   Medication Sig Dispense Refill    gabapentin (Neurontin) 600 mg tablet Take 1 Tablet by mouth three (3) times daily. Max Daily Amount: 1,800 mg. 90 Tablet 1    ergocalciferol (ERGOCALCIFEROL) 1,250 mcg (50,000 unit) capsule Take 1 Capsule by mouth every seven (7) days.  cyclobenzaprine (FLEXERIL) 10 mg tablet Take 1 Tablet by mouth nightly as needed for Muscle Spasm(s). Indications: muscle spasm 30 Tablet 1    meloxicam (MOBIC) 15 mg tablet TAKE 1 TABLET BY MOUTH DAILY 90 Tab 3    acetaminophen (TYLENOL ARTHRITIS PAIN) 650 mg TbER Take 650 mg by mouth every eight (8) hours.  traZODone (DESYREL) 100 mg tablet Take 1 Tab by mouth every evening. 30 Tab 0    buPROPion SR (WELLBUTRIN, ZYBAN) 200 mg SR tablet Take 1 Tab by mouth two (2) times a day. 30 Tab 0    hydrOXYzine HCl (ATARAX) 25 mg tablet Take 25 mg by mouth three (3) times daily as needed for Anxiety.  lamoTRIgine (LAMICTAL) 200 mg tablet Take 200 mg by mouth every evening.  DULoxetine (CYMBALTA) 60 mg capsule Take 60 mg by mouth daily. Pt also takes a 30 mg in the evening      lamoTRIgine (LAMICTAL) 100 mg tablet Take 100 mg by mouth daily.  gabapentin (NEURONTIN) 300 mg capsule 1 cap q hs x 1 week then 1 cap bid x 1 week then 1 cap tid  Indications: neuropathic pain (Patient not taking: Reported on 7/19/2022) 90 Capsule 1    gabapentin (NEURONTIN) 800 mg tablet TAKE 1 TABLET BY MOUTH THREE TIMES DAILY.  MAX DAILY AMOUNT: 2400 MG (Patient not taking: Reported on 6/21/2022) 210 Tab 1    triamcinolone acetonide (KENALOG) 0.1 % topical cream Apply  to affected area daily as needed. (Patient not taking: Reported on 7/19/2022)        Allergies:  No Known Allergies     Review of Systems:   Gen:    Denied fevers, chills, malaise, fatigue, weight changes   Resp: Denied shortness of breath, cough, wheezing   CVS: Denied chest pain, palpitations   : Denied urinary urgency, frequency, incontinence   GI: Denied nausea, vomiting, constipation, diarrhea   Skin: Denied rashes, wounds   Psych: Denied anxiety, depression   Vasc: Denied claudication, ulcers   Hem: Denied easy bruising/bleeding   MSK: See HPI   Neuro: See HPI         Physical Exam     Vital Signs:   Visit Vitals  Pulse 87   Temp 97.9 °F (36.6 °C) (Temporal)   Ht 5' 9\" (1.753 m)   Wt 276 lb (125.2 kg)   SpO2 98%   BMI 40.76 kg/m²      General: ??????? Well nourished and well developed female without any acute distress   Psychiatric: ?  Alert and oriented x 3 with normal mood    HEENT: ???????? Atraumatic   Respiratory:   Breathing non-labored and non dyspneic   CV: ???????????????? Peripheral pulses intact, no peripheral edema   Skin: ????????????? No rashes       Neurologic: ??          Strength: 5/5 in the bilateral, lower extremity(s)  Reflexes: reveals 2+ symmetric DTRs throughout patella  Gait: antalgic gait decreased stance time left knee    Musculoskeletal: Knee Exam -left knee  Inspection:   Atrophy: None     Effusion: Positive    Alignment:    Genu Valgum: Negative  Genu Varum: Negative    Tenderness to Palpation:   Medial joint line: Positive  Lateral joint line: Positive  Patellar tendon: Negative  Quadriceps tendon: Negative  Pes anserine bursa: Negative  Greater trochanter, IT band, Anterior hip:  No reproduction of pain with palpation     ROM:   Knee Instability: None   Knee ROM: Abnormal pain at end range knee flexion   Crepitus: Normal   Hip ROM: No reproduction of pain with movement     Special Tests:   Lachman's Test: Negative  Anterior drawer: Negative  Posterior drawer: Negative  Varus stress: Negative  Valgus stress:  Negative  Patellar Grind Test: Negative  Fely's Test: Positive- pain       Medical Decision Making:    Images: The imaging results as well as the actual images of the studies below were reviewed, visualized and interpreted by me. Labs: The results below were reviewed. Left knee x-ray 2020- ACL repair, mild medial compartment OA  Limited ultrasound of the left knee demonstrates- moderate joint effusion, extrusion of medial meniscus , intact quadricep and patella tendon     Assessment:   -chronic left knee pain after ACL tear s/p repair in 2010    Plan:      -Medications -continue current medications. Counseled regarding side effects and appropriate administration of medications.    -Diagnostics/Imaging - MRI left knee evaluate meniscus tear, ACL graft  -Injections - consider knee aspiration. Patient prefers to avoid sterod injections   -Lifestyle - Recommend weight loss  -Education - The patient's diagnosis, prognosis and treatment options were discussed today. All questions were answered. F/U - after MRI left knee can be virtual         [address] [facility]          Total time spent with patient:  32 mins for today's visit was devoted to face-to-face counseling regarding the following:  Discussed diagnosis, treatment options, and risks and benefits of treatment          ?

## 2022-08-06 ENCOUNTER — HOSPITAL ENCOUNTER (OUTPATIENT)
Age: 47
Discharge: HOME | End: 2022-08-06
Attending: PHYSICAL MEDICINE & REHABILITATION
Payer: MEDICAID

## 2022-08-06 PROCEDURE — 73721 MRI JNT OF LWR EXTRE W/O DYE: CPT

## 2022-08-22 ENCOUNTER — VIRTUAL VISIT (OUTPATIENT)
Dept: ORTHOPEDIC SURGERY | Age: 47
End: 2022-08-22
Payer: MEDICAID

## 2022-08-22 DIAGNOSIS — M25.562 CHRONIC PAIN OF LEFT KNEE: Primary | ICD-10-CM

## 2022-08-22 DIAGNOSIS — G89.29 CHRONIC PAIN OF LEFT KNEE: Primary | ICD-10-CM

## 2022-08-22 PROCEDURE — 99441 PR PHYS/QHP TELEPHONE EVALUATION 5-10 MIN: CPT | Performed by: PHYSICAL MEDICINE & REHABILITATION

## 2022-08-22 NOTE — PROGRESS NOTES
[facility]  [address], [address]  [place], [address] [place] [address] Street  Phone: [phone]  Fax: [phone]      Patient: Cindy Su                                                                              MRN: [id number]        YOB: 1975          AGE: 50 y.o. PCP: None  Date:  08/22/22    Reason for Consultation: Knee Pain      HPI:  Cindy Su is a 50 y.o. female with relevant PMH of  ACDF C5-7 in 2019 followed by a posterior fusion in March 2019  with Dr Blair Javed and L4-S1 PLIF May 2018 with Dr. Blair Javed who presents with left knee pain. She has a prior history of ACL and meniscus tear s/p ACL cadaver reconstruction in 2010 at [address] Since that time she has had left knee pain. MRI of her left knee demonstrates reconstruction of ACL with thinning of ACL mild abnormal alignment, acl impingement, moderate joint effusion , moderate cartilage loss lateral femoral condyle and lateral tibial plateau, lateral meniscus tear    + swelling, + buckling which has progressively worsened. Neurologic symptoms: No  weakness, bowel or bladder changes. No recent falls. Ambulates with a cane    Location: The pain is located in the left knee anterior medial lateral  Radiation: The pain does not radiate. Pain Score: Currently: 6/10    Quality: Pain is of a Achy and Stabbing quality. Aggravating: Pain is exacerbated by walking, sitting, standing and lying down  Alleviating: The pain is alleviated by nothing    Prior Treatments:  Physical therapy: YES-  After surgery in 2010  Injections:NO    Previous Medications:   Current Medications:gabapentin 600mg tid, duloxetine 60mg, meloxicam 15mg , flexeril 10mg prn   Previous work-up has included:   X-ray bilateral knee 7/19/2022  Right knee: No acute fracture or dislocation are identified. No significant degenerative changes are seen. The soft tissues are unremarkable.      Left knee: Postsurgical changes status post ACL repair are noted. Minimal osteophyte formation is present in the medial compartment. The soft tissues are unremarkable. MRI Results (most recent):  Results from Orders Only encounter on 07/19/22    MRI KNEE LT [facility]    EXAM: MRI of the Left Knee    CLINICAL INDICATION: left knee pain, history of ACL reconstruction, meniscus  tear    TECHNIQUE: MRI of the  Left  knee. Multiplanar multisequence MR imaging  obtained. IV Contrast: None    COMPARISON: Plain film dated 12/30/2008    FINDINGS:  Osseous Structures:  Patient is status post ACL reconstruction with tunnels in  the distal femur and proximal tibia. There is mild marrow edema along the tibial  tunnel. No cystic components appreciated. There is additional mild subchondral  marrow edema in the posterior aspect of the tibia. Small focus of edema is noted  in the lateral femoral condyle adjacent subchondral chondromalacia. No evidence  of fracture appreciated. Osteophyte formation is noted in the medial and lateral compartments. Joints/Cartilage: Moderate joint effusion is present. In the anteromedial  aspect of the joint space at the level of the mid patella, there is a linear  focus of possible cartilage which is 1.3 cm wide and 0.6 and measures in  superior-inferior dimension. A punctate near full-thickness defect is noted in  the anterior mid articular surface of the medial femoral condyle which is 0.3 cm  wide and 0.7 cm in anterior posterior dimension. In the mid to posterior aspect  of the lateral femoral condyle, there is a 1.2 cm wide and 1.3 cm in anterior  posterior dimension full-thickness defect with adjacent subchondral edema at the  lateral femoral condyle. There is a full-thickness defect in the 0.3 cm wide  area of the lateral mid trochlea. Medial Meniscus: The medial meniscus is intact and unremarkable. Lateral Meniscus:   There is an oblique horizontal tear of the body of the  lateral meniscus. Ligaments:  ACL:  There is thinning of the ACL as well as mild abnormal alignment. No  evidence of tear. PCL:  The PCL is intact. MCL:  The MCL is unremarkable in shape and contour. LCL:  The LCL is normal in shape and contour. The patellofemoral retinacula are intact. Extensor Mechanism: The quadriceps tendon is unremarkable. The patellar tendon  demonstrates normal signal and contour. The iliotibial band is intact and  unremarkable. Musculature: Unremarkable    Soft Tissues/Other:  Hoffa's fat pad is unremarkable. No fluid collection or  soft tissue mass identified. No significant bursal formation or Baker's cyst  identified. Impression  1. ACL reconstruction with some adjacent edema in the tibial tunnel and  thinning and abnormal alignment of the ACL suggestive of mild impingement. 2.  Moderate area of full thickness defect in the lateral femoral condylar  cartilage with adjacent femoral condylar and lateral tibial plateau edema. 3.  Likely free fragment of cartilage in the joint space. 4.   Oblique horizontal tear of the body of the lateral meniscus. 5.  Small focus of grade IV chondromalacia of the trochlea and medial femoral  condyle.       Past Medical History:   Past Medical History:   Diagnosis Date    Acute pain of left shoulder 11/1/2016    Bipolar 1 disorder ([address]     Bipolar affective disorder, currently manic, moderate ([address] 4/27/2016    Chronic midline low back pain with sciatica 11/1/2016    Cigarette nicotine dependence in remission 8/2/2016    Cigarette nicotine dependence without complication 81/32/5392    Continuous nicotine dependence 1/24/2017    Depression     Fibromyalgia 9/1/2015    HLD (hyperlipidemia) 2/4/2016    Hypertension     Hypertriglyceridemia 5/15/2018    Impaired fasting glucose 5/31/2016    Irritable bowel syndrome without diarrhea 4/27/2016    Moderate episode of recurrent major depressive disorder ([address] 4/27/2016 Obesity, Class III, BMI 40-49.9 (morbid obesity) ([facility] 9/1/2015    Onychomycosis 4/27/2016    Pain of left thumb 11/1/2016    Prediabetes 7/5/2016    PTSD (post-traumatic stress disorder)     Smoker 9/1/2015    Vitamin D deficiency 5/31/2016      Past Surgical History:   Past Surgical History:   Procedure Laterality Date    HX ACL RECONSTRUCTION      HX APPENDECTOMY      HX CERVICAL DISKECTOMY  02/07/2019    with fusion C5/6 C6/7    HX CERVICAL FUSION  0513/19    L4-L5 bilateral hemilaminectomy; medial facetectomy; foraminotomy; L5-S1 right hemilaminectomy; medial facetectomy revision; discectomy revision; L4, L5, S1 posterolateral fusion    HX CERVICAL LAMINECTOMY  03/19/2019    with fusion    HX GYN Bilateral     tubal    HX LUMBAR LAMINECTOMY Bilateral 05/13/2019    L4-L5 bilateral hemilaminectomy; medial facetectomy; foraminotomy; L5-S1 right hemilaminectomy; medial facetectomy revision; discectomy revision; L4, L5, S1 posterolateral fusion    HX ORTHOPAEDIC        SocHx:   Social History     Tobacco Use    Smoking status: Every Day     Packs/day: 1.00     Years: 25.00     Pack years: 25.00     Types: Cigarettes    Smokeless tobacco: Never    Tobacco comments:     pt back on chanitx   Substance Use Topics    Alcohol use: Yes     Alcohol/week: 9.0 standard drinks     Types: 9 Shots of liquor per week     Comment: socially      FamHx:? Family History   Problem Relation Age of Onset    Hypertension Mother     Thyroid Disease Mother     No Known Problems Father     Psychiatric Disorder Sister     Psychiatric Disorder Brother     Psychiatric Disorder Sister     Diabetes Sister     Psychiatric Disorder Brother     Psychiatric Disorder Brother        Current Medications:    Current Outpatient Medications   Medication Sig Dispense Refill    gabapentin (Neurontin) 600 mg tablet Take 1 Tablet by mouth three (3) times daily.  Max Daily Amount: 1,800 mg. 90 Tablet 1    ergocalciferol (ERGOCALCIFEROL) 1,250 mcg (50,000 unit) capsule Take 1 Capsule by mouth every seven (7) days. cyclobenzaprine (FLEXERIL) 10 mg tablet Take 1 Tablet by mouth nightly as needed for Muscle Spasm(s). Indications: muscle spasm 30 Tablet 1    meloxicam (MOBIC) 15 mg tablet TAKE 1 TABLET BY MOUTH DAILY 90 Tab 3    acetaminophen (TYLENOL ARTHRITIS PAIN) 650 mg TbER Take 650 mg by mouth every eight (8) hours. traZODone (DESYREL) 100 mg tablet Take 1 Tab by mouth every evening. 30 Tab 0    buPROPion SR (WELLBUTRIN, ZYBAN) 200 mg SR tablet Take 1 Tab by mouth two (2) times a day. 30 Tab 0    hydrOXYzine HCl (ATARAX) 25 mg tablet Take 25 mg by mouth three (3) times daily as needed for Anxiety. lamoTRIgine (LAMICTAL) 200 mg tablet Take 200 mg by mouth every evening. DULoxetine (CYMBALTA) 60 mg capsule Take 60 mg by mouth daily. Pt also takes a 30 mg in the evening      lamoTRIgine (LAMICTAL) 100 mg tablet Take 100 mg by mouth daily. Allergies:  No Known Allergies     Review of Systems:   Gen:    Denied fevers, chills, malaise, fatigue, weight changes   Resp: Denied shortness of breath, cough, wheezing   CVS: Denied chest pain, palpitations   : Denied urinary urgency, frequency, incontinence   GI: Denied nausea, vomiting, constipation, diarrhea   Skin: Denied rashes, wounds   Psych: Denied anxiety, depression   Vasc: Denied claudication, ulcers   Hem: Denied easy bruising/bleeding   MSK: See HPI   Neuro: See HPI         Physical Exam     Vital Signs: There were no vitals taken for this visit. G  Medical Decision Making:    Images: The imaging results as well as the actual images of the studies below were reviewed, visualized and interpreted by me. Labs: The results below were reviewed. MRI left knee 8/7/22  1. ACL reconstruction with some adjacent edema in the tibial tunnel and  thinning and abnormal alignment of the ACL suggestive of mild impingement.       2.  Moderate area of full thickness defect in the lateral femoral condylar  cartilage with adjacent femoral condylar and lateral tibial plateau edema. 3.  Likely free fragment of cartilage in the joint space. 4.   Oblique horizontal tear of the body of the lateral meniscus. 5.  Small focus of grade IV chondromalacia of the trochlea and medial femoral  condyle. Assessment:   -chronic left knee pain after ACL tear s/p repair in 2010    Plan:      -Medications -continue current medications. Counseled regarding side effects and appropriate administration of medications.    -Diagnostics/Imaging - MRI left knee reviewed  -Injections - consider knee aspiration. Patient prefers to avoid sterod injections   -Lifestyle - Recommend weight loss  -Education - The patient's diagnosis, prognosis and treatment options were discussed today. All questions were answered. F/U - for knee aspiration. Discussed possible surgical referral         [address] [facility]      I was in the office while conducting this encounter. Patient was at home     Consent:  She and/or her healthcare decision maker is aware that this patient-initiated Telehealth encounter is a billable service, with coverage as determined by her insurance carrier. She is aware that she may receive a bill and has provided verbal consent to proceed: Yes     This virtual visit was conducted telephone encounter only. -  I affirm this is a Patient Initiated Episode with an Established Patient who has not had a related appointment within my department in the past 7 days or scheduled within the next 24 hours. Note: this encounter is not billable if this call serves to triage the patient into an appointment for the relevant concern. Total Time: minutes: 5-10 minutes.       ?

## 2022-08-23 ENCOUNTER — OFFICE VISIT (OUTPATIENT)
Dept: ORTHOPEDIC SURGERY | Age: 47
End: 2022-08-23
Payer: MEDICAID

## 2022-08-23 VITALS — HEART RATE: 91 BPM | TEMPERATURE: 95.4 F | BODY MASS INDEX: 40.76 KG/M2 | OXYGEN SATURATION: 94 % | WEIGHT: 276 LBS

## 2022-08-23 DIAGNOSIS — M48.062 LUMBAR STENOSIS WITH NEUROGENIC CLAUDICATION: ICD-10-CM

## 2022-08-23 DIAGNOSIS — M54.16 LUMBAR RADICULOPATHY: ICD-10-CM

## 2022-08-23 DIAGNOSIS — M79.18 MYOFASCIAL PAIN: ICD-10-CM

## 2022-08-23 DIAGNOSIS — M79.7 FIBROMYALGIA: ICD-10-CM

## 2022-08-23 DIAGNOSIS — M54.50 LUMBAR PAIN: Primary | ICD-10-CM

## 2022-08-23 DIAGNOSIS — M54.2 NECK PAIN: ICD-10-CM

## 2022-08-23 PROCEDURE — 99214 OFFICE O/P EST MOD 30 MIN: CPT | Performed by: NURSE PRACTITIONER

## 2022-08-23 RX ORDER — GABAPENTIN 800 MG/1
800 TABLET ORAL 3 TIMES DAILY
Qty: 270 TABLET | Refills: 1 | Status: SHIPPED | OUTPATIENT
Start: 2022-08-23

## 2022-08-23 RX ORDER — CYCLOBENZAPRINE HCL 10 MG
10 TABLET ORAL
Qty: 90 TABLET | Refills: 1 | Status: SHIPPED | OUTPATIENT
Start: 2022-08-23

## 2022-08-23 NOTE — PROGRESS NOTES
Chief complaint   Chief Complaint   Patient presents with    Follow-up     Med refill         History of Present Illness: Dot Wise is a  50 y.o.  female who comes in today for follow up of her chronic neck and back pain. She has a history of an ACDF C5 C7 February 2019 followed by a posterior fusion C5-C7 in March 2019. She has also had 2 lumbar surgeries with the first being in 2007 followed by L4-S1 PLIF May 2018 by Dr. Norma Fuller. She states she continues to have neck and back pain. She is not really having radicular pain. Last visit we increased her gabapentin to 600 mg 3 times a day. . She states it did help both her neck and back pain along with her fibromyalgia, but is still having some pain and tingling in her arms feet and fingertips. She would like to go back to the 803 times a day that she was on previously. She did have a virtual visit with Dr Eliseo Leo yesterday for her chronic left knee pain. She has hadan ACL repair in the past.  They are planning on draining that knee eventually. She is on Social Security disability. She smokes half pack per day. She states she continues to caresfor her elderly mother. She denies fever bowel bladder dysfunction. Physical Exam: Patient is a 49-year-old female well-developed well-nourished who is alert and oriented with a normal mood and affect. She has a full weightbearing slightly antalgic gait utilizing a single-point cane. She has 4 out of 5 strength bilateral lower extremities. She has 4 out of 5 strength bilateral upper extremities. Negative Nico's. Negative straight leg raise. She does have some pain with hyperextension lumbar spine. Assessment and Plan: This is a patient who has both neck and back surgery and has chronic neck and back pain. I will increase her gabapentin to 800 mg 3 times a day. She has tolerated this well in the past and it seemed to be a good dose for her.    We will see her back in 6 months for reevaluation. Medications:  Current Outpatient Medications   Medication Sig Dispense Refill    gabapentin (Neurontin) 800 mg tablet Take 1 Tablet by mouth three (3) times daily. Max Daily Amount: 2,400 mg. 270 Tablet 1    cyclobenzaprine (FLEXERIL) 10 mg tablet Take 1 Tablet by mouth nightly as needed for Muscle Spasm(s). Indications: muscle spasm 90 Tablet 1    ergocalciferol (ERGOCALCIFEROL) 1,250 mcg (50,000 unit) capsule Take 1 Capsule by mouth every seven (7) days. meloxicam (MOBIC) 15 mg tablet TAKE 1 TABLET BY MOUTH DAILY 90 Tab 3    acetaminophen (TYLENOL) 650 mg TbER Take 650 mg by mouth every eight (8) hours. traZODone (DESYREL) 100 mg tablet Take 1 Tab by mouth every evening. 30 Tab 0    buPROPion SR (WELLBUTRIN, ZYBAN) 200 mg SR tablet Take 1 Tab by mouth two (2) times a day. 30 Tab 0    hydrOXYzine HCl (ATARAX) 25 mg tablet Take 25 mg by mouth three (3) times daily as needed for Anxiety. lamoTRIgine (LAMICTAL) 200 mg tablet Take 200 mg by mouth every evening. DULoxetine (CYMBALTA) 60 mg capsule Take 60 mg by mouth daily. Pt also takes a 30 mg in the evening      lamoTRIgine (LaMICtal) 100 mg tablet Take 100 mg by mouth daily.              Review of systems:    Past Medical History:   Diagnosis Date    Acute pain of left shoulder 11/1/2016    Bipolar 1 disorder ([facility]     Bipolar affective disorder, currently manic, moderate ([address] 4/27/2016    Chronic midline low back pain with sciatica 11/1/2016    Cigarette nicotine dependence in remission 8/2/2016    Cigarette nicotine dependence without complication 88/71/0019    Continuous nicotine dependence 1/24/2017    Depression     Fibromyalgia 9/1/2015    HLD (hyperlipidemia) 2/4/2016    Hypertension     Hypertriglyceridemia 5/15/2018    Impaired fasting glucose 5/31/2016    Irritable bowel syndrome without diarrhea 4/27/2016    Moderate episode of recurrent major depressive disorder ([address] 4/27/2016    Obesity, Class III, BMI 40-49.9 (morbid obesity) ([facility] 9/1/2015    Onychomycosis 4/27/2016    Pain of left thumb 11/1/2016    Prediabetes 7/5/2016    PTSD (post-traumatic stress disorder)     Smoker 9/1/2015    Vitamin D deficiency 5/31/2016     Past Surgical History:   Procedure Laterality Date    HX ACL RECONSTRUCTION      HX APPENDECTOMY      HX CERVICAL DISKECTOMY  02/07/2019    with fusion C5/6 C6/7    HX CERVICAL FUSION  0513/19    L4-L5 bilateral hemilaminectomy; medial facetectomy; foraminotomy; L5-S1 right hemilaminectomy; medial facetectomy revision; discectomy revision; L4, L5, S1 posterolateral fusion    HX CERVICAL LAMINECTOMY  03/19/2019    with fusion    HX GYN Bilateral     tubal    HX LUMBAR LAMINECTOMY Bilateral 05/13/2019    L4-L5 bilateral hemilaminectomy; medial facetectomy; foraminotomy; L5-S1 right hemilaminectomy; medial facetectomy revision; discectomy revision; L4, L5, S1 posterolateral fusion    HX ORTHOPAEDIC       Social History     Socioeconomic History    Marital status: LEGALLY      Spouse name: Not on file    Number of children: Not on file    Years of education: Not on file    Highest education level: Not on file   Occupational History    Not on file   Tobacco Use    Smoking status: Every Day     Packs/day: 1.00     Years: 25.00     Pack years: 25.00     Types: Cigarettes    Smokeless tobacco: Never    Tobacco comments:     pt back on chanitx   Substance and Sexual Activity    Alcohol use:  Yes     Alcohol/week: 9.0 standard drinks     Types: 9 Shots of liquor per week     Comment: socially    Drug use: No    Sexual activity: Yes     Partners: Male     Birth control/protection: None   Other Topics Concern     Service Not Asked    Blood Transfusions Not Asked    Caffeine Concern Not Asked    Occupational Exposure Not Asked    Hobby Hazards Not Asked    Sleep Concern Not Asked    Stress Concern Not Asked    Weight Concern Not Asked    Special Diet Not Asked    Back Care Not Asked    Exercise Not Asked    Bike Helmet Not Asked    Seat Belt Not Asked    Self-Exams Not Asked   Social History Narrative    Not on file     Social Determinants of Health     Financial Resource Strain: Not on file   Food Insecurity: Not on file   Transportation Needs: Not on file   Physical Activity: Not on file   Stress: Not on file   Social Connections: Not on file   Intimate Partner Violence: Not on file   Housing Stability: Not on file     Family History   Problem Relation Age of Onset    Hypertension Mother     Thyroid Disease Mother     No Known Problems Father     Psychiatric Disorder Sister     Psychiatric Disorder Brother     Psychiatric Disorder Sister     Diabetes Sister     Psychiatric Disorder Brother     Psychiatric Disorder Brother        Physical Exam:  Visit Vitals  Pulse 91   Temp (!) 95.4 °F (35.2 °C) (Tympanic)   Wt 276 lb (125.2 kg)   SpO2 94%   BMI 40.76 kg/m²     Pain Scale: 4/10       has been . reviewed and is appropriate          Diagnoses and all orders for this visit:    1. Lumbar pain  -     gabapentin (Neurontin) 800 mg tablet; Take 1 Tablet by mouth three (3) times daily. Max Daily Amount: 2,400 mg.    2. Neck pain  -     gabapentin (Neurontin) 800 mg tablet; Take 1 Tablet by mouth three (3) times daily. Max Daily Amount: 2,400 mg.    3. Fibromyalgia  -     gabapentin (Neurontin) 800 mg tablet; Take 1 Tablet by mouth three (3) times daily. Max Daily Amount: 2,400 mg.  -     cyclobenzaprine (FLEXERIL) 10 mg tablet; Take 1 Tablet by mouth nightly as needed for Muscle Spasm(s). Indications: muscle spasm    4. Lumbar radiculopathy  -     gabapentin (Neurontin) 800 mg tablet; Take 1 Tablet by mouth three (3) times daily. Max Daily Amount: 2,400 mg.    5. Lumbar stenosis with neurogenic claudication  -     gabapentin (Neurontin) 800 mg tablet; Take 1 Tablet by mouth three (3) times daily. Max Daily Amount: 2,400 mg.    6. Myofascial pain  -     cyclobenzaprine (FLEXERIL) 10 mg tablet;  Take 1 Tablet by mouth nightly as needed for Muscle Spasm(s). Indications: muscle spasm          Follow-up and Dispositions    Return in about 6 months (around 2/23/2023) for With nurse practitioner Lisa Escobedo. We have informed Manish Bonilla to notify us for immediate appointment if she has any worsening neurogical symptoms or if an emergency situation presents, then call [phone]    Please note that this dictation was completed with [employer], the computer voice recognition software. Quite often unanticipated grammatical, syntax, homophones, and other interpretive errors are inadvertently transcribed by the computer software. Please disregard these errors. Please excuse any errors that have escaped final proofreading.

## 2022-10-06 ENCOUNTER — OFFICE VISIT (OUTPATIENT)
Dept: ORTHOPEDIC SURGERY | Age: 47
End: 2022-10-06
Payer: MEDICAID

## 2022-10-06 DIAGNOSIS — S83.512S TEARS OF MENISCUS AND ANTERIOR CRUCIATE LIGAMENT OF LEFT KNEE, SEQUELA: Primary | ICD-10-CM

## 2022-10-06 DIAGNOSIS — M25.572 CHRONIC PAIN OF LEFT ANKLE: ICD-10-CM

## 2022-10-06 DIAGNOSIS — G89.29 CHRONIC PAIN OF LEFT ANKLE: ICD-10-CM

## 2022-10-06 DIAGNOSIS — M25.562 CHRONIC PAIN OF LEFT KNEE: ICD-10-CM

## 2022-10-06 DIAGNOSIS — S83.207S TEARS OF MENISCUS AND ANTERIOR CRUCIATE LIGAMENT OF LEFT KNEE, SEQUELA: Primary | ICD-10-CM

## 2022-10-06 DIAGNOSIS — G89.29 CHRONIC PAIN OF LEFT KNEE: ICD-10-CM

## 2022-10-06 PROCEDURE — 99213 OFFICE O/P EST LOW 20 MIN: CPT | Performed by: PHYSICAL MEDICINE & REHABILITATION

## 2022-10-06 PROCEDURE — 20611 DRAIN/INJ JOINT/BURSA W/US: CPT | Performed by: PHYSICAL MEDICINE & REHABILITATION

## 2022-10-06 RX ORDER — ROPIVACAINE HYDROCHLORIDE 2 MG/ML
3 INJECTION, SOLUTION EPIDURAL; INFILTRATION; PERINEURAL
Status: COMPLETED | OUTPATIENT
Start: 2022-10-06 | End: 2022-10-06

## 2022-10-06 RX ORDER — HYDROCHLOROTHIAZIDE 25 MG/1
1 TABLET ORAL DAILY
COMMUNITY

## 2022-10-06 RX ORDER — TRIAMCINOLONE ACETONIDE 40 MG/ML
40 INJECTION, SUSPENSION INTRA-ARTICULAR; INTRAMUSCULAR ONCE
Status: COMPLETED | OUTPATIENT
Start: 2022-10-06 | End: 2022-10-06

## 2022-10-06 RX ADMIN — ROPIVACAINE HYDROCHLORIDE 3 ML: 2 INJECTION, SOLUTION EPIDURAL; INFILTRATION; PERINEURAL at 10:55

## 2022-10-06 RX ADMIN — TRIAMCINOLONE ACETONIDE 40 MG: 40 INJECTION, SUSPENSION INTRA-ARTICULAR; INTRAMUSCULAR at 10:56

## 2022-10-06 NOTE — PROGRESS NOTES
[facility]  [address]  [address]  Phone: [phone]  Fax: [phone]      Patient: Dale Barrett                                                                              MRN: [id number]        YOB: 1975          AGE: 50 y.o. PCP: None  Date:  10/06/22    Reason for Consultation: Knee Pain (Left) and Hip Pain (Right)      HPI:  Dale Barrett is a 50 y.o. female with relevant PMH of  ACDF C5-7 in 2019 followed by a posterior fusion in March 2019  with Dr David Johnson and L4-S1 PLIF May 2018 with Dr. David Johnson who presents with left knee pain. She has a prior history of ACL and meniscus tear s/p ACL cadaver reconstruction in 2010 at [facility]. Since that time she has had left knee pain. MRI of her left knee demonstrates reconstruction of ACL with thinning of ACL mild abnormal alignment, acl impingement, moderate joint effusion , moderate cartilage loss lateral femoral condyle and lateral tibial plateau, lateral meniscus tear. She is here today for left knee aspiration and corticosteroid injection. She also reports right ankle pain, swelling, and instability. History of recurrent left ankle sprains    + swelling, + buckling which has progressively worsened. Neurologic symptoms: No  weakness, bowel or bladder changes. No recent falls. Ambulates with a cane    Location: The pain is located in the left knee anterior medial lateral  Radiation: The pain does not radiate. Pain Score: Currently: 6/10    Quality: Pain is of a Achy and Stabbing quality. Aggravating: Pain is exacerbated by walking, sitting, standing and lying down  Alleviating:  The pain is alleviated by nothing    Prior Treatments:  Physical therapy: YES-  After surgery in 2010  Injections:NO    Previous Medications:   Current Medications:gabapentin 600mg tid, duloxetine 60mg, meloxicam 15mg , flexeril 10mg prn   Previous work-up has included:   X-ray bilateral knee 7/19/2022  Right knee: No acute fracture or dislocation are identified. No significant degenerative changes are seen. The soft tissues are unremarkable. Left knee: Postsurgical changes status post ACL repair are noted. Minimal osteophyte formation is present in the medial compartment. The soft tissues are unremarkable. MRI Results (most recent):  Results from Orders Only encounter on 07/19/22    MRI KNEE LT [facility]    EXAM: MRI of the Left Knee    CLINICAL INDICATION: left knee pain, history of ACL reconstruction, meniscus  tear    TECHNIQUE: MRI of the  Left  knee. Multiplanar multisequence MR imaging  obtained. IV Contrast: None    COMPARISON: Plain film dated 12/30/2008    FINDINGS:  Osseous Structures:  Patient is status post ACL reconstruction with tunnels in  the distal femur and proximal tibia. There is mild marrow edema along the tibial  tunnel. No cystic components appreciated. There is additional mild subchondral  marrow edema in the posterior aspect of the tibia. Small focus of edema is noted  in the lateral femoral condyle adjacent subchondral chondromalacia. No evidence  of fracture appreciated. Osteophyte formation is noted in the medial and lateral compartments. Joints/Cartilage: Moderate joint effusion is present. In the anteromedial  aspect of the joint space at the level of the mid patella, there is a linear  focus of possible cartilage which is 1.3 cm wide and 0.6 and measures in  superior-inferior dimension. A punctate near full-thickness defect is noted in  the anterior mid articular surface of the medial femoral condyle which is 0.3 cm  wide and 0.7 cm in anterior posterior dimension. In the mid to posterior aspect  of the lateral femoral condyle, there is a 1.2 cm wide and 1.3 cm in anterior  posterior dimension full-thickness defect with adjacent subchondral edema at the  lateral femoral condyle.    There is a full-thickness defect in the 0.3 cm wide  area of the lateral mid trochlea. Medial Meniscus: The medial meniscus is intact and unremarkable. Lateral Meniscus: There is an oblique horizontal tear of the body of the  lateral meniscus. Ligaments:  ACL:  There is thinning of the ACL as well as mild abnormal alignment. No  evidence of tear. PCL:  The PCL is intact. MCL:  The MCL is unremarkable in shape and contour. LCL:  The LCL is normal in shape and contour. The patellofemoral retinacula are intact. Extensor Mechanism: The quadriceps tendon is unremarkable. The patellar tendon  demonstrates normal signal and contour. The iliotibial band is intact and  unremarkable. Musculature: Unremarkable    Soft Tissues/Other:  Hoffa's fat pad is unremarkable. No fluid collection or  soft tissue mass identified. No significant bursal formation or Baker's cyst  identified. Impression  1. ACL reconstruction with some adjacent edema in the tibial tunnel and  thinning and abnormal alignment of the ACL suggestive of mild impingement. 2.  Moderate area of full thickness defect in the lateral femoral condylar  cartilage with adjacent femoral condylar and lateral tibial plateau edema. 3.  Likely free fragment of cartilage in the joint space. 4.   Oblique horizontal tear of the body of the lateral meniscus. 5.  Small focus of grade IV chondromalacia of the trochlea and medial femoral  condyle.       Past Medical History:   Past Medical History:   Diagnosis Date    Acute pain of left shoulder 11/1/2016    Bipolar 1 disorder ([address]     Bipolar affective disorder, currently manic, moderate ([address] 4/27/2016    Chronic midline low back pain with sciatica 11/1/2016    Cigarette nicotine dependence in remission 8/2/2016    Cigarette nicotine dependence without complication 36/25/2349    Continuous nicotine dependence 1/24/2017    Depression     Fibromyalgia 9/1/2015    HLD (hyperlipidemia) 2/4/2016    Hypertension Hypertriglyceridemia 5/15/2018    Impaired fasting glucose 5/31/2016    Irritable bowel syndrome without diarrhea 4/27/2016    Moderate episode of recurrent major depressive disorder ([facility] 4/27/2016    Obesity, Class III, BMI 40-49.9 (morbid obesity) ([facility] 9/1/2015    Onychomycosis 4/27/2016    Pain of left thumb 11/1/2016    Prediabetes 7/5/2016    PTSD (post-traumatic stress disorder)     Smoker 9/1/2015    Vitamin D deficiency 5/31/2016      Past Surgical History:   Past Surgical History:   Procedure Laterality Date    HX ACL RECONSTRUCTION      HX APPENDECTOMY      HX CERVICAL DISKECTOMY  02/07/2019    with fusion C5/6 C6/7    HX CERVICAL FUSION  0513/19    L4-L5 bilateral hemilaminectomy; medial facetectomy; foraminotomy; L5-S1 right hemilaminectomy; medial facetectomy revision; discectomy revision; L4, L5, S1 posterolateral fusion    HX CERVICAL LAMINECTOMY  03/19/2019    with fusion    HX GYN Bilateral     tubal    HX LUMBAR LAMINECTOMY Bilateral 05/13/2019    L4-L5 bilateral hemilaminectomy; medial facetectomy; foraminotomy; L5-S1 right hemilaminectomy; medial facetectomy revision; discectomy revision; L4, L5, S1 posterolateral fusion    HX ORTHOPAEDIC        SocHx:   Social History     Tobacco Use    Smoking status: Every Day     Packs/day: 1.00     Years: 25.00     Pack years: 25.00     Types: Cigarettes    Smokeless tobacco: Never    Tobacco comments:     pt back on chanitx   Substance Use Topics    Alcohol use: Yes     Alcohol/week: 9.0 standard drinks     Types: 9 Shots of liquor per week     Comment: socially      FamHx:?    Family History   Problem Relation Age of Onset    Hypertension Mother     Thyroid Disease Mother     No Known Problems Father     Psychiatric Disorder Sister     Psychiatric Disorder Brother     Psychiatric Disorder Sister     Diabetes Sister     Psychiatric Disorder Brother     Psychiatric Disorder Brother        Current Medications:    Current Outpatient Medications   Medication Sig Dispense Refill    hydroCHLOROthiazide (HYDRODIURIL) 25 mg tablet Take 1 Tablet by mouth daily. gabapentin (Neurontin) 800 mg tablet Take 1 Tablet by mouth three (3) times daily. Max Daily Amount: 2,400 mg. 270 Tablet 1    cyclobenzaprine (FLEXERIL) 10 mg tablet Take 1 Tablet by mouth nightly as needed for Muscle Spasm(s). Indications: muscle spasm 90 Tablet 1    ergocalciferol (ERGOCALCIFEROL) 1,250 mcg (50,000 unit) capsule Take 1 Capsule by mouth every seven (7) days. meloxicam (MOBIC) 15 mg tablet TAKE 1 TABLET BY MOUTH DAILY 90 Tab 3    acetaminophen (TYLENOL) 650 mg TbER Take 650 mg by mouth every eight (8) hours. traZODone (DESYREL) 100 mg tablet Take 1 Tab by mouth every evening. 30 Tab 0    buPROPion SR (WELLBUTRIN, ZYBAN) 200 mg SR tablet Take 1 Tab by mouth two (2) times a day. 30 Tab 0    hydrOXYzine HCl (ATARAX) 25 mg tablet Take 25 mg by mouth three (3) times daily as needed for Anxiety. lamoTRIgine (LAMICTAL) 200 mg tablet Take 200 mg by mouth every evening. DULoxetine (CYMBALTA) 60 mg capsule Take 60 mg by mouth two (2) times a day. lamoTRIgine (LaMICtal) 100 mg tablet Take 100 mg by mouth daily.  (Patient not taking: Reported on 10/6/2022)       Current Facility-Administered Medications   Medication Dose Route Frequency Provider Last Rate Last Admin    ropivacaine (NAROPIN) 2 mg/mL (0.2 %) injection 3 mL  3 mL Other NOW Gardenia Cox MD        triamcinolone acetonide (KENALOG-40) 40 mg/mL injection 40 mg  40 mg Intra artICUlar ONCE Gardenia Cox MD          Allergies:  No Known Allergies     Review of Systems:   Gen:    Denied fevers, chills, malaise, fatigue, weight changes   Resp: Denied shortness of breath, cough, wheezing   CVS: Denied chest pain, palpitations   : Denied urinary urgency, frequency, incontinence   GI: Denied nausea, vomiting, constipation, diarrhea   Skin: Denied rashes, wounds   Psych: Denied anxiety, depression Vasc: Denied claudication, ulcers   Hem: Denied easy bruising/bleeding   MSK: See HPI   Neuro: See HPI          Neurologic: ?? Strength: 5/5 in the bilateral, lower extremity(s)  Reflexes: reveals 2+ symmetric DTRs throughout patella  Gait: antalgic gait decreased stance time left knee     Musculoskeletal: Knee Exam -left knee  Inspection:   Atrophy: None     Effusion: Positive     Alignment:    Genu Valgum: Negative  Genu Varum: Negative     Tenderness to Palpation:   Medial joint line: Positive  Lateral joint line: Positive  Patellar tendon: Negative  Quadriceps tendon: Negative  Pes anserine bursa: Negative  Greater trochanter, IT band, Anterior hip:  No reproduction of pain with palpation      ROM:   Knee Instability: Yes  + pivot shift    Knee ROM: Abnormal pain at end range knee flexion   Crepitus: Normal   Hip ROM: No reproduction of pain with movement      Special Tests:   + pivot shift   Posterior drawer: Negative  Varus stress:+  Valgus stress:  +  Patellar Grind Test: Negative  Fely's Test: Positive- pain     Left ankle   Tender over ATFL, + anterior draw      Medical Decision Making:    Images: The imaging results as well as the actual images of the studies below were reviewed, visualized and interpreted by me. Labs: The results below were reviewed. MRI left knee 8/7/22  1. ACL reconstruction with some adjacent edema in the tibial tunnel and  thinning and abnormal alignment of the ACL suggestive of mild impingement. 2.  Moderate area of full thickness defect in the lateral femoral condylar  cartilage with adjacent femoral condylar and lateral tibial plateau edema. 3.  Likely free fragment of cartilage in the joint space. 4.   Oblique horizontal tear of the body of the lateral meniscus. 5.  Small focus of grade IV chondromalacia of the trochlea and medial femoral  condyle.      Assessment:   -chronic left knee pain after ACL tear s/p repair in 2010  -chronic left ankle instability     Plan:    -discussed PT for ankle, does not have time now but provided exercises for patient  -DME   - left knee brace- support   - left ankle figure 8 lace up ankle brace   -Medications -continue current medications. Counseled regarding side effects and appropriate administration of medications.    -Diagnostics/Imaging - MRI left knee reviewed  -Injections - left knee aspiration  and corticosteroid injection- see note below   -Lifestyle - Recommend weight loss  -Education - The patient's diagnosis, prognosis and treatment options were discussed today. All questions were answered. F/U - follow up in 3 weeks         [address], [address]  Phone: [phone]  Fax: [phone]      Encounter Date: 10/6/2022          Diagnosis: Left knee pain prior ACL tear with reconstruction, impingement       Indication: left knee pain          Procedure: Sonographically guided left knee aspiration and corticosteroid injection         Informed Consent: Following denial of allergy and review of potential side effects and complications including, but not limited to, infection, allergic reaction, local tissue breakdown, injury to soft tissue and/or nerves and seizure, the patient indicated understanding and agreed to proceed. Procedural pause conducted to verify: correct patient identity, procedure to be performed and, as applicable, correct side and site, correct patient position, availability of any special equipment or other special requirements. Justification for use of ultrasound guidance:  The use of direct sonographic visualization (rather than a non-guided injection) was required to ensure accurate injection placement for diagnostic specificity, to maximize clinical efficacy, and for safety purposes to minimize risk of bleeding or injury to nearby structures. Technique: The procedure was carried out under sterile technique utilizing a sterile ultrasound transducer cover and sterile ultrasound gel. Pre-procedural scanning was performed to determine optimal approach for the procedure. The patient was prepped and draped in the usual sterile fashion. Patient position: seated knee flexed 30 degrees      Approach: lateral to medial      Needle: 21 gauge 2 inch      Details: Live sonographic guidance with a 12 MHz transducer was used throughout the procedure. A 25 gauge 1.5 inch needle with 2 mL 2% polocaine (lot [id number], 09/2024) was used for local anesthesia. Then a 21 gauge 2 inch needle was advanced into the target area 10 ml of clear yellow fluid was aspirated and 3ml 0.2% ropivacaine, and 40mg kenalog 1ml. Post-Procedure Instructions: The patient tolerated the procedure well without complication and was discharged in good condition after a short observation period. The patient was instructed to avoid submerging the procedure site in water for 48-72 hours. The patient was instructed to contact me with any questions pertaining to the procedure          Key images were saved. Impression: Technically successful sonographically guided left knee aspiration and corticosteroid injection.            Anthony Chaudhari MD

## 2022-10-06 NOTE — LETTER
NAME: Sam Garcia  : 1975  MRN: [id number]    PROCEDURAL INFORMED CONSENT FOR OPERATION / PROCEDURE     1. I (we),      Sam Garcia      authorize    Lisette Moreira MD       and/or such assistants as may be selected by him/her, to perform the following operation/procedures    Left knee aspiration and corticosteroid injection ultrasound guided     Note: If unable to obtain consent prior to an emergent procedure, document the emergent reason in the medical record. This procedure has been explained to my (our) satisfaction and included in the explanation was:   A) the intended benefit, nature, and extent of the procedure to be performed;  B) the significant risks involved and the probability of success;  C) alternative procedures and methods of treatment;  D) the dangers and probable consequences of such alternatives (including no procedure or treatment); E) the expected consequences of the procedure on my future health;  F) whether other qualified individuals would be performing important surgical tasks and / or whether  would be present to advise or support the procedure. I (we) understand that there are other risks of infection and other serious complications in the pre-operative/procedural and postoperative/procedural stages of my (our) care. I (we) have asked all of the questions which I (we) thought were important in deciding whether or not to undergo treatment or diagnosis. These questions have been answered to my (our) satisfaction. I (we) understand that no assurance can be given that the procedure will be a success, and no guarantee or warranty of success has been given to me (us). 2.  It has been explained to me (us) that during the course of the operation/procedure, unforeseen conditions may be revealed that necessitate extension of the original procedure(s) or different procedure(s) than those set forth in Paragraph 1.  I (we) authorize and request that the above-named physician, his/her assistants or his/her designees, perform procedures as necessary and desirable if deemed to be in my (our) best interest.    3.  I acknowledge that other health care personnel may be observing this procedure for the purpose of medical education or other specified purposes as may be necessary as requested and/or approved by my (our) physician. 4.  I (we) consent to the disposal by the hospital Pathologist of the removed tissue, parts or organs in accordance with hospital policy. Page 1 of 2    NAME: Daniel Wright  : 1975  MRN: [id number]    1. I do_____ do not______ consent to the use of a local infiltration pain blocking agent that will be used by my provider/surgical provider to help alleviate pain during my procedure. 6.  I do_____ do not_____ consent to an emergent blood transfusion in the case of a life-threatening situation that requires blood components to be administered. This consent is valid for 24 hours from the beginning of the procedure. 7.  This patient does _____ or does not ______currently have a DNR status/order. If DNR order is in place, obtain Addendum to the Surgical Consent for ALL Patients with a DNR Order to address geri-operative status for limited intervention or DNR suspension. 8.  I have read and fully understand the above Consent for Operation/Procedure and that all blanks were completed before I signed the consent.       Daniel Nutoshia     Signature of Patient (or legal representative)  Printed Name / Heather Jones                10/6/2022 /         AM / PM   Witness to Signature  Printed Name   Date/Time        (If patient is unable to sign or is a minor, complete the following)               Patient is a minor, ____years of age, or unable to sign because: _____________________          ________________________________________________________________________  Raffi Stager If a phone consent is obtained, consent will be documented by using two health care professionals, each affirming that the consenting party   has no questions and gives consent for the procedure discussed with the physician/provider. 10/6/2022 /               AM / PM   2nd Witness to phone consent  Printed Name   Date/Time     Informed Consent:  I have provided the explanation described above in section 1 to the patient and/or legal representative. I have provided the patient and/or legal representative with an opportunity to ask any questions about the proposed operation/procedure. Thiago Herndon MD    10/6/2022   /            AM / PM   Provider / Proceduralist  Printed Name  Date/Time     This Provider / Janene Diaz performing the surgery is ONLY for Office-based procedures in [place]   [ x] Board certified or Board eligible by one of the [employer] [place], the [employer] [facility] of the [employer], the [employer] of Podiatric Medicine, the [employer] of Foot and Ankle Surgery, or other board as approved by the [facility] for medical staff appointment.             Page 2 of 2  Revised 8/2/2021              Sincerely,      Thiago Herndon MD

## 2022-11-03 ENCOUNTER — VIRTUAL VISIT (OUTPATIENT)
Dept: ORTHOPEDIC SURGERY | Age: 47
End: 2022-11-03
Payer: MEDICAID

## 2022-11-03 DIAGNOSIS — G89.29 CHRONIC PAIN OF LEFT KNEE: Primary | ICD-10-CM

## 2022-11-03 DIAGNOSIS — M25.562 CHRONIC PAIN OF LEFT KNEE: Primary | ICD-10-CM

## 2022-11-03 PROCEDURE — 99442 PR PHYS/QHP TELEPHONE EVALUATION 11-20 MIN: CPT | Performed by: PHYSICAL MEDICINE & REHABILITATION

## 2022-11-03 NOTE — PROGRESS NOTES
[facility]  [address]  [place], [address] [place] [address] Street  Phone: [phone]  Fax: [phone]      Patient: Tish Portillo                                                                              MRN: [id number]        YOB: 1975          AGE: 50 y.o. PCP: None  Date:  11/03/22    Reason for Consultation: No chief complaint on file. HPI:  Tish Portillo is a 50 y.o. female with relevant PMH of  ACDF C5-7 in 2019 followed by a posterior fusion in March 2019  with Dr Melissa De Luna and L4-S1 PLIF May 2018 with Dr. Melissa De Luna who presented with left knee pain. She has a prior history of ACL and meniscus tear s/p ACL cadaver reconstruction in 2010 at [address]. Since that time she has had left knee pain. MRI of her left knee demonstrates reconstruction of ACL with thinning of ACL mild abnormal alignment, acl impingement, moderate joint effusion , moderate cartilage loss lateral femoral condyle and lateral tibial plateau, lateral meniscus tear. She tried a right left knee aspiration and corticosteroid injection which only gave about 4 days of relief. She reports + swelling, + buckling which has progressively worsened. Neurologic symptoms: No  weakness, bowel or bladder changes. No recent falls. Ambulates with a cane    Location: The pain is located in the left knee anterior medial lateral  Radiation: The pain does not radiate. Pain Score: Currently: 6/10    Quality: Pain is of a Achy and Stabbing quality. Aggravating: Pain is exacerbated by walking, sitting, standing and lying down  Alleviating:  The pain is alleviated by nothing    Prior Treatments:  Physical therapy: YES-  After surgery in 2010  Injections:  10/6/2022- left knee aspiration and corticosteroid injection- 4 days relief then pain returned    Previous Medications:   Current Medications:gabapentin 600mg tid, duloxetine 60mg, meloxicam 15mg , flexeril 10mg prn   Previous work-up has included:   X-ray bilateral knee 7/19/2022  Right knee: No acute fracture or dislocation are identified. No significant degenerative changes are seen. The soft tissues are unremarkable. Left knee: Postsurgical changes status post ACL repair are noted. Minimal osteophyte formation is present in the medial compartment. The soft tissues are unremarkable. MRI Results (most recent):  Results from Orders Only encounter on 07/19/22    MRI KNEE LT [facility]    EXAM: MRI of the Left Knee    CLINICAL INDICATION: left knee pain, history of ACL reconstruction, meniscus  tear    TECHNIQUE: MRI of the  Left  knee. Multiplanar multisequence MR imaging  obtained. IV Contrast: None    COMPARISON: Plain film dated 12/30/2008    FINDINGS:  Osseous Structures:  Patient is status post ACL reconstruction with tunnels in  the distal femur and proximal tibia. There is mild marrow edema along the tibial  tunnel. No cystic components appreciated. There is additional mild subchondral  marrow edema in the posterior aspect of the tibia. Small focus of edema is noted  in the lateral femoral condyle adjacent subchondral chondromalacia. No evidence  of fracture appreciated. Osteophyte formation is noted in the medial and lateral compartments. Joints/Cartilage: Moderate joint effusion is present. In the anteromedial  aspect of the joint space at the level of the mid patella, there is a linear  focus of possible cartilage which is 1.3 cm wide and 0.6 and measures in  superior-inferior dimension. A punctate near full-thickness defect is noted in  the anterior mid articular surface of the medial femoral condyle which is 0.3 cm  wide and 0.7 cm in anterior posterior dimension. In the mid to posterior aspect  of the lateral femoral condyle, there is a 1.2 cm wide and 1.3 cm in anterior  posterior dimension full-thickness defect with adjacent subchondral edema at the  lateral femoral condyle. There is a full-thickness defect in the 0.3 cm wide  area of the lateral mid trochlea. Medial Meniscus: The medial meniscus is intact and unremarkable. Lateral Meniscus: There is an oblique horizontal tear of the body of the  lateral meniscus. Ligaments:  ACL:  There is thinning of the ACL as well as mild abnormal alignment. No  evidence of tear. PCL:  The PCL is intact. MCL:  The MCL is unremarkable in shape and contour. LCL:  The LCL is normal in shape and contour. The patellofemoral retinacula are intact. Extensor Mechanism: The quadriceps tendon is unremarkable. The patellar tendon  demonstrates normal signal and contour. The iliotibial band is intact and  unremarkable. Musculature: Unremarkable    Soft Tissues/Other:  Hoffa's fat pad is unremarkable. No fluid collection or  soft tissue mass identified. No significant bursal formation or Baker's cyst  identified. Impression  1. ACL reconstruction with some adjacent edema in the tibial tunnel and  thinning and abnormal alignment of the ACL suggestive of mild impingement. 2.  Moderate area of full thickness defect in the lateral femoral condylar  cartilage with adjacent femoral condylar and lateral tibial plateau edema. 3.  Likely free fragment of cartilage in the joint space. 4.   Oblique horizontal tear of the body of the lateral meniscus. 5.  Small focus of grade IV chondromalacia of the trochlea and medial femoral  condyle.       Past Medical History:   Past Medical History:   Diagnosis Date    Acute pain of left shoulder 11/1/2016    Bipolar 1 disorder ([address]     Bipolar affective disorder, currently manic, moderate ([address] 4/27/2016    Chronic midline low back pain with sciatica 11/1/2016    Cigarette nicotine dependence in remission 8/2/2016    Cigarette nicotine dependence without complication 89/38/5264    Continuous nicotine dependence 1/24/2017    Depression     Fibromyalgia 9/1/2015    HLD (hyperlipidemia) 2/4/2016    Hypertension     Hypertriglyceridemia 5/15/2018    Impaired fasting glucose 5/31/2016    Irritable bowel syndrome without diarrhea 4/27/2016    Moderate episode of recurrent major depressive disorder ([facility] 4/27/2016    Obesity, Class III, BMI 40-49.9 (morbid obesity) ([facility] 9/1/2015    Onychomycosis 4/27/2016    Pain of left thumb 11/1/2016    Prediabetes 7/5/2016    PTSD (post-traumatic stress disorder)     Smoker 9/1/2015    Vitamin D deficiency 5/31/2016      Past Surgical History:   Past Surgical History:   Procedure Laterality Date    HX ACL RECONSTRUCTION      HX APPENDECTOMY      HX CERVICAL DISKECTOMY  02/07/2019    with fusion C5/6 C6/7    HX CERVICAL FUSION  0513/19    L4-L5 bilateral hemilaminectomy; medial facetectomy; foraminotomy; L5-S1 right hemilaminectomy; medial facetectomy revision; discectomy revision; L4, L5, S1 posterolateral fusion    HX CERVICAL LAMINECTOMY  03/19/2019    with fusion    HX GYN Bilateral     tubal    HX LUMBAR LAMINECTOMY Bilateral 05/13/2019    L4-L5 bilateral hemilaminectomy; medial facetectomy; foraminotomy; L5-S1 right hemilaminectomy; medial facetectomy revision; discectomy revision; L4, L5, S1 posterolateral fusion    HX ORTHOPAEDIC        SocHx:   Social History     Tobacco Use    Smoking status: Every Day     Packs/day: 1.00     Years: 25.00     Pack years: 25.00     Types: Cigarettes    Smokeless tobacco: Never    Tobacco comments:     pt back on chanitx   Substance Use Topics    Alcohol use: Yes     Alcohol/week: 9.0 standard drinks     Types: 9 Shots of liquor per week     Comment: socially      FamHx:?    Family History   Problem Relation Age of Onset    Hypertension Mother     Thyroid Disease Mother     No Known Problems Father     Psychiatric Disorder Sister     Psychiatric Disorder Brother     Psychiatric Disorder Sister     Diabetes Sister     Psychiatric Disorder Brother     Psychiatric Disorder Brother        Current Medications:    Current Outpatient Medications   Medication Sig Dispense Refill    hydroCHLOROthiazide (HYDRODIURIL) 25 mg tablet Take 1 Tablet by mouth daily. gabapentin (Neurontin) 800 mg tablet Take 1 Tablet by mouth three (3) times daily. Max Daily Amount: 2,400 mg. 270 Tablet 1    cyclobenzaprine (FLEXERIL) 10 mg tablet Take 1 Tablet by mouth nightly as needed for Muscle Spasm(s). Indications: muscle spasm 90 Tablet 1    ergocalciferol (ERGOCALCIFEROL) 1,250 mcg (50,000 unit) capsule Take 1 Capsule by mouth every seven (7) days. meloxicam (MOBIC) 15 mg tablet TAKE 1 TABLET BY MOUTH DAILY 90 Tab 3    acetaminophen (TYLENOL) 650 mg TbER Take 650 mg by mouth every eight (8) hours. traZODone (DESYREL) 100 mg tablet Take 1 Tab by mouth every evening. 30 Tab 0    buPROPion SR (WELLBUTRIN, ZYBAN) 200 mg SR tablet Take 1 Tab by mouth two (2) times a day. 30 Tab 0    hydrOXYzine HCl (ATARAX) 25 mg tablet Take 25 mg by mouth three (3) times daily as needed for Anxiety. lamoTRIgine (LAMICTAL) 200 mg tablet Take 200 mg by mouth every evening. DULoxetine (CYMBALTA) 60 mg capsule Take 60 mg by mouth two (2) times a day. lamoTRIgine (LaMICtal) 100 mg tablet Take 100 mg by mouth daily. (Patient not taking: Reported on 10/6/2022)        Allergies:  No Known Allergies     Medical Decision Making:    Images: The imaging results as well as the actual images of the studies below were reviewed, visualized and interpreted by me. Labs: The results below were reviewed. MRI left knee 8/7/22  1. ACL reconstruction with some adjacent edema in the tibial tunnel and  thinning and abnormal alignment of the ACL suggestive of mild impingement. 2.  Moderate area of full thickness defect in the lateral femoral condylar  cartilage with adjacent femoral condylar and lateral tibial plateau edema. 3.  Likely free fragment of cartilage in the joint space. 4.   Oblique horizontal tear of the body of the lateral meniscus. 5.  Small focus of grade IV chondromalacia of the trochlea and medial femoral  condyle. Assessment:   -chronic left knee pain after ACL tear s/p repair in 2010  -chronic left ankle instability     Plan:    -Referral to orthopedics to discuss possible left knee surgery she is willing to consider  knee replacement surgery if arthroscopic surgery not a viable option due to her cartilage loss  -DME   - left knee brace- support   - left ankle figure 8 lace up ankle brace   -Medications -continue current medications. Counseled regarding side effects and appropriate administration of medications.    -Diagnostics/Imaging -NA  -Injections - NA  -Lifestyle - Recommend weight loss  -Education - The patient's diagnosis, prognosis and treatment options were discussed today. All questions were answered. F/U -after ortho consult        [address] MD  [facility]    I was in the office while conducting this encounter. Patient at home     Consent:  She and/or her healthcare decision maker is aware that this patient-initiated Telehealth encounter is a billable service, with coverage as determined by her insurance carrier. She is aware that she may receive a bill and has provided verbal consent to proceed: Yes    This virtual visit was conducted telephone encounter only. -  I affirm this is a Patient Initiated Episode with an Established Patient who has not had a related appointment within my department in the past 7 days or scheduled within the next 24 hours. Note: this encounter is not billable if this call serves to triage the patient into an appointment for the relevant concern. Total Time: minutes: 11-20 minutes.

## 2022-12-16 ENCOUNTER — OFFICE VISIT (OUTPATIENT)
Dept: ORTHOPEDIC SURGERY | Age: 47
End: 2022-12-16
Payer: MEDICAID

## 2022-12-16 VITALS — WEIGHT: 274.6 LBS | BODY MASS INDEX: 40.67 KG/M2 | HEART RATE: 96 BPM | HEIGHT: 69 IN | OXYGEN SATURATION: 95 %

## 2022-12-16 DIAGNOSIS — S83.512S TEARS OF MENISCUS AND ANTERIOR CRUCIATE LIGAMENT OF LEFT KNEE, SEQUELA: ICD-10-CM

## 2022-12-16 DIAGNOSIS — M17.32 POST-TRAUMATIC OSTEOARTHRITIS OF LEFT KNEE: Primary | ICD-10-CM

## 2022-12-16 DIAGNOSIS — S83.207S TEARS OF MENISCUS AND ANTERIOR CRUCIATE LIGAMENT OF LEFT KNEE, SEQUELA: ICD-10-CM

## 2022-12-16 DIAGNOSIS — Z72.0 TOBACCO ABUSE: ICD-10-CM

## 2022-12-16 DIAGNOSIS — M17.32 POST-TRAUMATIC OSTEOARTHRITIS OF LEFT KNEE: ICD-10-CM

## 2022-12-16 DIAGNOSIS — E66.01 CLASS 3 SEVERE OBESITY WITH BODY MASS INDEX (BMI) OF 40.0 TO 44.9 IN ADULT, UNSPECIFIED OBESITY TYPE, UNSPECIFIED WHETHER SERIOUS COMORBIDITY PRESENT (HCC): ICD-10-CM

## 2022-12-16 NOTE — PROGRESS NOTES
Patient: Nathanael Espinal                MRN: [id number]       SSN: [id number]  YOB: 1975        AGE: 50 y.o. SEX: female  Body mass index is 40.55 kg/m². PCP: None  12/16/22  Occupation:   Disability    Chief Complaint: Knee Pain (Left knee pain)        ICD-10-CM ICD-9-CM    1. Primary osteoarthritis of left knee  M17.12 715.16 AMB POC XRAY, KNEE; COMPLETE, 4+ VIEW      2. Tears of meniscus and anterior cruciate ligament of left knee, sequela  S83.207S 905.7 AMB POC XRAY, KNEE; COMPLETE, 4+ VIEW    S83.512S C591       3. Class 3 severe obesity with body mass index (BMI) of 40.0 to 44.9 in adult, unspecified obesity type, unspecified whether serious comorbidity present ([email or web address]  E66.01 278.01     Z68.41 V85.41       4. Tobacco abuse  Z72.0 305.1           HPI: Nathanael Espinal is a 50 y.o. female patient with Knee Pain (Left knee pain)  She is referred by Dr. Tahira Pandey for evaluation of the left knee. She had an ACL reconstruction with cadaver allograft in 2010. She is having problems with instability and had a couple of falls backwards which subsequently required cervical and lumbar surgeries by Dr. Mora Burks around 2018. Prior to the surgery she was in a wheelchair for about 9 months. She is now able to ambulate but still has instability in her left knee. She is gotten a corticosteroid in the left knee by Dr. Tahira Pandey with only about a weeks worth of relief. She has had bracing of the left knee which does improve it somewhat. However she continues to have pain that is debilitating to her every day activities despite gabapentin, Mobic, injections, physical therapy. Tobacco Use: High Risk    Smoking Tobacco Use: Every Day    Smokeless Tobacco Use: Never    Passive Exposure: Not on file         PHYSICAL EXAMINATION:  Visit Vitals  Pulse 96   Ht 5' 9\" (1.753 m)   Wt 274 lb 9.6 oz (124.6 kg)   SpO2 95%   BMI 40.55 kg/m²     Body mass index is 40.55 kg/m².   GENERAL: Alert and oriented x3, in no acute distress. HEENT: Normocephalic, atraumatic. MSK: Left Knee Exam     Muscle Strength   The patient has normal left knee strength. Tenderness   The patient is experiencing tenderness in the medial joint line, lateral joint line and patella. Range of Motion   Extension:  5   Flexion:  120     Tests   Fely:  Medial - negative Lateral - negative  Varus: negative   Lachman:  Anterior - negative      Drawer:  Anterior - negative     Posterior - negative    Other   Erythema: absent  Sensation: normal  Pulse: present  Swelling: mild           IMAGING:  Imaging read by myself and interpreted as follows:  4 view x-ray of the left knee demonstrates complete loss of joint space in the lateral compartment on notch view. There are osteophytes off the medial joint line and patellofemoral joint. There is also subchondral sclerosis and subchondral cyst formation. MRI of the left knee performed August 6, 2022 shows that the medial lateral collateral ligaments are intact. The ACL is somewhat wispy but the graft appears to be intact with slightly vertical tunnels. There is full-thickness cartilage loss in the lateral compartment of both the femur and the tibia there is also full-thickness defects in the patellofemoral and medial compartments with large medial osteophytes. There is subchondral edema in both the femur and tibia in the lateral compartment. ASSESSMENT & PLAN  Diagnosis: 50 y.o. female with end-stage posttraumatic osteoarthritis of the left knee following ACL reconstruction in 2010. She is failed conservative treatment. I discussed with the patient the natural history of arthritis and its progressive nature.   We discussed conservative treatment options to include ice, rest, compression, elevation, physical therapy, activity modification, weight loss, dieting, Tylenol, NSAIDs, topicals, prescription medication, as well as touched on more invasive treatments including injection therapy and joint replacement. and At this point the patient has exhausted all conservative treatments short of surgery including NSAIDs, Tylenol, bracing, physical therapy, injections. We would like to move forward with surgical treatment at this time. We will begin the preoperative process and schedule the patient for a left total knee arthroplasty. The patient will need to stop smoking 4 weeks prior to her surgery. She was started on the clearance process and labs and once these are complete we will have her back and schedule her surgery.     Past Medical History:   Diagnosis Date    Acute pain of left shoulder 11/1/2016    Bipolar 1 disorder (HCC)     Bipolar affective disorder, currently manic, moderate ([address] 4/27/2016    Chronic midline low back pain with sciatica 11/1/2016    Cigarette nicotine dependence in remission 8/2/2016    Cigarette nicotine dependence without complication 18/48/6933    Continuous nicotine dependence 1/24/2017    Depression     Fibromyalgia 9/1/2015    HLD (hyperlipidemia) 2/4/2016    Hypertension     Hypertriglyceridemia 5/15/2018    Impaired fasting glucose 5/31/2016    Irritable bowel syndrome without diarrhea 4/27/2016    Moderate episode of recurrent major depressive disorder ([address] 4/27/2016    Obesity, Class III, BMI 40-49.9 (morbid obesity) ([address] 9/1/2015    Onychomycosis 4/27/2016    Pain of left thumb 11/1/2016    Prediabetes 7/5/2016    PTSD (post-traumatic stress disorder)     Smoker 9/1/2015    Vitamin D deficiency 5/31/2016       Family History   Problem Relation Age of Onset    Hypertension Mother     Thyroid Disease Mother     No Known Problems Father     Psychiatric Disorder Sister     Psychiatric Disorder Brother     Psychiatric Disorder Sister     Diabetes Sister     Psychiatric Disorder Brother     Psychiatric Disorder Brother        Current Outpatient Medications   Medication Sig Dispense Refill    hydroCHLOROthiazide (HYDRODIURIL) 25 mg tablet Take 1 Tablet by mouth daily.      gabapentin (Neurontin) 800 mg tablet Take 1 Tablet by mouth three (3) times daily. Max Daily Amount: 2,400 mg. 270 Tablet 1    cyclobenzaprine (FLEXERIL) 10 mg tablet Take 1 Tablet by mouth nightly as needed for Muscle Spasm(s). Indications: muscle spasm 90 Tablet 1    ergocalciferol (ERGOCALCIFEROL) 1,250 mcg (50,000 unit) capsule Take 1 Capsule by mouth every seven (7) days. meloxicam (MOBIC) 15 mg tablet TAKE 1 TABLET BY MOUTH DAILY 90 Tab 3    acetaminophen (TYLENOL) 650 mg TbER Take 650 mg by mouth every eight (8) hours. traZODone (DESYREL) 100 mg tablet Take 1 Tab by mouth every evening. 30 Tab 0    buPROPion SR (WELLBUTRIN, ZYBAN) 200 mg SR tablet Take 1 Tab by mouth two (2) times a day. 30 Tab 0    hydrOXYzine HCl (ATARAX) 25 mg tablet Take 25 mg by mouth three (3) times daily as needed for Anxiety. lamoTRIgine (LAMICTAL) 200 mg tablet Take 200 mg by mouth every evening. DULoxetine (CYMBALTA) 60 mg capsule Take 60 mg by mouth two (2) times a day. lamoTRIgine (LaMICtal) 100 mg tablet Take 100 mg by mouth daily. (Patient not taking: Reported on 10/6/2022)          No Known Allergies    Past Surgical History:   Procedure Laterality Date    HX ACL RECONSTRUCTION      HX APPENDECTOMY      HX CERVICAL DISKECTOMY  02/07/2019    with fusion C5/6 C6/7    HX CERVICAL FUSION  0513/19    L4-L5 bilateral hemilaminectomy; medial facetectomy; foraminotomy; L5-S1 right hemilaminectomy; medial facetectomy revision; discectomy revision; L4, L5, S1 posterolateral fusion    HX CERVICAL LAMINECTOMY  03/19/2019    with fusion    HX GYN Bilateral     tubal    HX LUMBAR LAMINECTOMY Bilateral 05/13/2019    L4-L5 bilateral hemilaminectomy; medial facetectomy; foraminotomy;  L5-S1 right hemilaminectomy; medial facetectomy revision; discectomy revision; L4, L5, S1 posterolateral fusion    HX ORTHOPAEDIC         Social History     Socioeconomic History    Marital status: LEGALLY      Spouse name: Not on file    Number of children: Not on file    Years of education: Not on file    Highest education level: Not on file   Occupational History    Not on file   Tobacco Use    Smoking status: Every Day     Packs/day: 1.00     Years: 25.00     Pack years: 25.00     Types: Cigarettes    Smokeless tobacco: Never    Tobacco comments:     pt back on chanitx   Vaping Use    Vaping Use: Never used   Substance and Sexual Activity    Alcohol use: Yes     Alcohol/week: 9.0 standard drinks     Types: 9 Shots of liquor per week     Comment: socially    Drug use: No    Sexual activity: Yes     Partners: Male     Birth control/protection: None   Other Topics Concern     Service Not Asked    Blood Transfusions Not Asked    Caffeine Concern Not Asked    Occupational Exposure Not Asked    Hobby Hazards Not Asked    Sleep Concern Not Asked    Stress Concern Not Asked    Weight Concern Not Asked    Special Diet Not Asked    Back Care Not Asked    Exercise Not Asked    Bike Helmet Not Asked    Seat Belt Not Asked    Self-Exams Not Asked   Social History Narrative    Not on file     Social Determinants of Health     Financial Resource Strain: Not on file   Food Insecurity: Not on file   Transportation Needs: Not on file   Physical Activity: Not on file   Stress: Not on file   Social Connections: Not on file   Intimate Partner Violence: Not on file   Housing Stability: Not on file       REVIEW OF SYSTEMS:      No changes from previous review of systems unless noted. Prescription medication management discussed with patient. Electronically signed by: Juan Alberto Santiago DO    Note: This note was completed using voice recognition software.   Any typographical/name errors or mistakes are unintentional.

## 2023-01-28 ENCOUNTER — TRANSCRIBE ORDERS (OUTPATIENT)
Facility: HOSPITAL | Age: 48
End: 2023-01-28

## 2023-01-28 DIAGNOSIS — M17.32 POST-TRAUMATIC OSTEOARTHRITIS OF LEFT KNEE: Primary | ICD-10-CM

## 2023-02-15 ENCOUNTER — HOSPITAL ENCOUNTER (OUTPATIENT)
Facility: HOSPITAL | Age: 48
Discharge: HOME | End: 2023-02-18
Payer: COMMERCIAL

## 2023-02-15 DIAGNOSIS — M17.32 POST-TRAUMATIC OSTEOARTHRITIS OF LEFT KNEE: ICD-10-CM

## 2023-02-15 PROCEDURE — 73700 CT LOWER EXTREMITY W/O DYE: CPT

## 2023-02-21 ENCOUNTER — OFFICE VISIT (OUTPATIENT)
Age: 48
End: 2023-02-21
Payer: MEDICAID

## 2023-02-21 VITALS
HEIGHT: 69 IN | TEMPERATURE: 97.4 F | OXYGEN SATURATION: 97 % | BODY MASS INDEX: 41.62 KG/M2 | HEART RATE: 83 BPM | WEIGHT: 281 LBS

## 2023-02-21 DIAGNOSIS — M79.7 FIBROMYALGIA: Primary | ICD-10-CM

## 2023-02-21 DIAGNOSIS — M54.2 CERVICALGIA: ICD-10-CM

## 2023-02-21 DIAGNOSIS — M48.062 SPINAL STENOSIS, LUMBAR REGION WITH NEUROGENIC CLAUDICATION: ICD-10-CM

## 2023-02-21 PROCEDURE — 99213 OFFICE O/P EST LOW 20 MIN: CPT | Performed by: NURSE PRACTITIONER

## 2023-02-21 RX ORDER — GABAPENTIN 800 MG/1
800 TABLET ORAL 3 TIMES DAILY
Qty: 90 TABLET | Refills: 5 | Status: SHIPPED | OUTPATIENT
Start: 2023-02-21 | End: 2023-08-20

## 2023-02-21 ASSESSMENT — PATIENT HEALTH QUESTIONNAIRE - PHQ9
SUM OF ALL RESPONSES TO PHQ QUESTIONS 1-9: 2
1. LITTLE INTEREST OR PLEASURE IN DOING THINGS: 1
SUM OF ALL RESPONSES TO PHQ QUESTIONS 1-9: 2
2. FEELING DOWN, DEPRESSED OR HOPELESS: 1
SUM OF ALL RESPONSES TO PHQ QUESTIONS 1-9: 2
SUM OF ALL RESPONSES TO PHQ QUESTIONS 1-9: 2
SUM OF ALL RESPONSES TO PHQ9 QUESTIONS 1 & 2: 2

## 2023-02-21 NOTE — PROGRESS NOTES
Chief complaint   Chief Complaint   Patient presents with    Back Pain       History of Present Illness: Rebekah Greer is a  50 y.o.  female who comes in today for follow up of her chronic neck and back pain. She has a history of an ACDF C5 C7 February 2019 followed by a posterior fusion C5-C7 in March 2019. She has also had 2 lumbar surgeries with the first being in 2007 followed by L4-S1 PLIF May 2018 by Dr. Nnamdi Ha. She states she continues to have neck and back pain. She is not really having radicular pain, but does get some tingling in her toes. Last visit we increased her gabapentin back to 800 mg 3 times a day. She states it did help both her neck and back pain along with her fibromyalgia. She would like to stay on this dose. She states she saw Dr. Og Tapia and is planning on having a left total knee replacement in the near future. She is on Social Security disability. She smokes half pack per day. She states she continues to care for her elderly mother. She denies fever bowel bladder dysfunction. Physical Exam: Patient is a 40-year-old female well-developed well-nourished who is alert and oriented with a normal mood and affect. She has a full weightbearing slightly antalgic gait utilizing a single-point cane. She has 4 out of 5 strength bilateral lower extremities. She has 4 out of 5 strength bilateral upper extremities. Negative Melida's. Negative straight leg raise. She does have some pain with hyperextension lumbar spine. Assessment and Plan: This is a patient who has both neck and back surgery and has chronic neck and back pain. I have refilled her gabapentin to 800 mg 3 times a day. We will see her back in 6 months for reevaluation. Fallon Gonzalez was seen today for back pain. Diagnoses and all orders for this visit:    Fibromyalgia  -     gabapentin (NEURONTIN) 800 MG tablet; Take 1 tablet by mouth 3 times daily for 180 days.  Max Daily Amount: 2,400 mg    Cervicalgia  - gabapentin (NEURONTIN) 800 MG tablet; Take 1 tablet by mouth 3 times daily for 180 days. Max Daily Amount: 2,400 mg    Spinal stenosis, lumbar region with neurogenic claudication  -     gabapentin (NEURONTIN) 800 MG tablet; Take 1 tablet by mouth 3 times daily for 180 days. Max Daily Amount: 2,400 mg      Return in about 6 months (around 8/21/2023) for fu with NP [place].  has been . reviewed and is appropriate    Medications:  Current Outpatient Medications   Medication Sig Dispense Refill    Cyanocobalamin (VITAMIN B 12 PO) Take 5,000 mcg by mouth daily      gabapentin (NEURONTIN) 800 MG tablet Take 1 tablet by mouth 3 times daily for 180 days. Max Daily Amount: 2,400 mg 90 tablet 5    acetaminophen (TYLENOL) 650 MG extended release tablet Take 650 mg by mouth in the morning and 650 mg at noon and 650 mg in the evening. Pt states she takes this every other day. buPROPion (WELLBUTRIN SR) 200 MG extended release tablet Take 200 mg by mouth 2 times daily      cyclobenzaprine (FLEXERIL) 10 MG tablet Take 10 mg by mouth nightly as needed      DULoxetine (CYMBALTA) 60 MG extended release capsule Take 60 mg by mouth 2 times daily      ergocalciferol (ERGOCALCIFEROL) 1.25 MG (98131 UT) capsule Take 1 capsule by mouth every 7 days      hydroCHLOROthiazide (HYDRODIURIL) 25 MG tablet Take 1 tablet by mouth daily as needed      hydrOXYzine HCl (ATARAX) 25 MG tablet Take 25 mg by mouth 3 times daily as needed      lamoTRIgine (LAMICTAL) 100 MG tablet Take 100 mg by mouth daily      lamoTRIgine (LAMICTAL) 200 MG tablet Take 200 mg by mouth every evening      meloxicam (MOBIC) 15 MG tablet Take 15 mg by mouth every other day      traZODone (DESYREL) 100 MG tablet Take 100 mg by mouth every evening       No current facility-administered medications for this visit.        Review of systems:    Past Medical History:   Diagnosis Date    Acute pain of left shoulder 11/1/2016    Bipolar 1 disorder (HCC)     Bipolar affective disorder, currently manic, moderate ([facility] 4/27/2016    Chronic midline low back pain with sciatica 11/1/2016    Cigarette nicotine dependence in remission 8/2/2016    Cigarette nicotine dependence without complication 55/96/3134    Continuous nicotine dependence 1/24/2017    Depression     Fibromyalgia 9/1/2015    HLD (hyperlipidemia) 2/4/2016    Hypertension     Hypertriglyceridemia 5/15/2018    Impaired fasting glucose 5/31/2016    Irritable bowel syndrome without diarrhea 4/27/2016    Moderate episode of recurrent major depressive disorder ([facility] 4/27/2016    Obesity, Class III, BMI 40-49.9 (morbid obesity) ([facility] 9/1/2015    Onychomycosis 4/27/2016    Pain of left thumb 11/1/2016    Prediabetes 7/5/2016    PTSD (post-traumatic stress disorder)     Smoker 9/1/2015    Vitamin D deficiency 5/31/2016     Past Surgical History:   Procedure Laterality Date    ANTERIOR CRUCIATE LIGAMENT REPAIR      APPENDECTOMY      CERVICAL DISCECTOMY  02/07/2019    with fusion C5/6 C6/7    CERVICAL FUSION  0513/19    L4-L5 bilateral hemilaminectomy; medial facetectomy; foraminotomy; L5-S1 right hemilaminectomy; medial facetectomy revision; discectomy revision; L4, L5, S1 posterolateral fusion    CERVICAL LAMINECTOMY  03/19/2019    with fusion    GYN Bilateral     tubal    LUMBAR LAMINECTOMY Bilateral 05/13/2019    L4-L5 bilateral hemilaminectomy; medial facetectomy; foraminotomy; L5-S1 right hemilaminectomy; medial facetectomy revision; discectomy revision; L4, L5, S1 posterolateral fusion    ORTHOPEDIC SURGERY       Social History     Socioeconomic History    Marital status: Legally      Spouse name: Not on file    Number of children: Not on file    Years of education: Not on file    Highest education level: Not on file   Occupational History    Not on file   Tobacco Use    Smoking status: Every Day     Packs/day: 1.00     Types: Cigarettes    Smokeless tobacco: Never   Substance and Sexual Activity    Alcohol use:  Yes Alcohol/week: 9.0 standard drinks    Drug use: No    Sexual activity: Not on file   Other Topics Concern    Not on file   Social History Narrative    Not on file     Social Determinants of Health     Financial Resource Strain: Not on file   Food Insecurity: Not on file   Transportation Needs: Not on file   Physical Activity: Not on file   Stress: Not on file   Social Connections: Not on file   Intimate Partner Violence: Not on file   Housing Stability: Not on file     Family History   Problem Relation Age of Onset    Psychiatric Disorder Sister     Psychiatric Disorder Brother     Psychiatric Disorder Sister     Psychiatric Disorder Brother     Diabetes Sister     Psychiatric Disorder Brother     Hypertension Mother     No Known Problems Father     Thyroid Disease Mother        Physical Exam:  Pulse 83   Temp 97.4 °F (36.3 °C) (Temporal)   Ht 5' 9\" (1.753 m)   Wt 281 lb (127.5 kg)   LMP 02/06/2023 (Exact Date)   SpO2 97% Comment: RA  BMI 41.50 kg/m²   Pain Scale: 7/10      We have informed German Brown to notify us for immediate appointment if she has any worsening neurogical symptoms or if an emergency situation presents, then call 911    Please note that this dictation was completed with [employer], the [employer] voice recognition software. Quite often unanticipated grammatical, syntax, homophones, and other interpretive errors are inadvertently transcribed by the computer software. Please disregard these errors. Please excuse any errors that have escaped final proofreading.      YAKELIN Wright NP

## 2023-02-21 NOTE — PROGRESS NOTES
German Brown presents today for   Chief Complaint   Patient presents with    Back Pain       Is someone accompanying this pt? no    Is the patient using any DME equipment during OV? no      Learning Assessment:  Who is the primary learner? Patient    What is the preferred language for health care of the primary learner? ENGLISH    How does the primary learner prefer to learn new concepts? DEMONSTRATION    Answered By patient    Relationship to Learner SELF        Coordination of Care:  1. Have you been to the ER, urgent care clinic since your last visit? no  Hospitalized since your last visit? no    2. Have you seen or consulted any other health care providers outside of the [address] [place] [address] since your last visit? Yes, ortho Include any pap smears or colon screening.  no

## 2023-03-02 ENCOUNTER — OFFICE VISIT (OUTPATIENT)
Facility: CLINIC | Age: 48
End: 2023-03-02
Payer: MEDICAID

## 2023-03-02 VITALS
RESPIRATION RATE: 17 BRPM | BODY MASS INDEX: 41.47 KG/M2 | DIASTOLIC BLOOD PRESSURE: 87 MMHG | HEIGHT: 69 IN | SYSTOLIC BLOOD PRESSURE: 163 MMHG | HEART RATE: 81 BPM | OXYGEN SATURATION: 95 % | TEMPERATURE: 98 F | WEIGHT: 280 LBS

## 2023-03-02 DIAGNOSIS — Z12.11 COLON CANCER SCREENING: ICD-10-CM

## 2023-03-02 DIAGNOSIS — Z01.818 PREOPERATIVE TESTING: ICD-10-CM

## 2023-03-02 DIAGNOSIS — G43.109 MIGRAINE WITH AURA AND WITHOUT STATUS MIGRAINOSUS, NOT INTRACTABLE: ICD-10-CM

## 2023-03-02 DIAGNOSIS — Z11.59 NEED FOR HEPATITIS C SCREENING TEST: ICD-10-CM

## 2023-03-02 DIAGNOSIS — M17.12 PRIMARY OSTEOARTHRITIS OF LEFT KNEE: Primary | ICD-10-CM

## 2023-03-02 DIAGNOSIS — R73.03 PREDIABETES: ICD-10-CM

## 2023-03-02 DIAGNOSIS — Z01.810 PREOP CARDIOVASCULAR EXAM: ICD-10-CM

## 2023-03-02 DIAGNOSIS — Z11.3 ROUTINE SCREENING FOR STI (SEXUALLY TRANSMITTED INFECTION): ICD-10-CM

## 2023-03-02 DIAGNOSIS — Z01.811 PRE-OP CHEST EXAM: ICD-10-CM

## 2023-03-02 DIAGNOSIS — I10 PRIMARY HYPERTENSION: Primary | ICD-10-CM

## 2023-03-02 DIAGNOSIS — F17.200 TOBACCO USE DISORDER: ICD-10-CM

## 2023-03-02 DIAGNOSIS — E78.1 HYPERTRIGLYCERIDEMIA: ICD-10-CM

## 2023-03-02 DIAGNOSIS — Z12.31 ENCOUNTER FOR SCREENING MAMMOGRAM FOR MALIGNANT NEOPLASM OF BREAST: ICD-10-CM

## 2023-03-02 PROCEDURE — 3077F SYST BP >= 140 MM HG: CPT | Performed by: STUDENT IN AN ORGANIZED HEALTH CARE EDUCATION/TRAINING PROGRAM

## 2023-03-02 PROCEDURE — 99204 OFFICE O/P NEW MOD 45 MIN: CPT | Performed by: STUDENT IN AN ORGANIZED HEALTH CARE EDUCATION/TRAINING PROGRAM

## 2023-03-02 PROCEDURE — 3079F DIAST BP 80-89 MM HG: CPT | Performed by: STUDENT IN AN ORGANIZED HEALTH CARE EDUCATION/TRAINING PROGRAM

## 2023-03-02 RX ORDER — SUMATRIPTAN 50 MG/1
50 TABLET, FILM COATED ORAL
Qty: 10 TABLET | Refills: 0 | Status: SHIPPED | OUTPATIENT
Start: 2023-03-02 | End: 2023-03-02

## 2023-03-02 RX ORDER — LOSARTAN POTASSIUM 25 MG/1
25 TABLET ORAL DAILY
Qty: 30 TABLET | Refills: 5 | Status: SHIPPED | OUTPATIENT
Start: 2023-03-02

## 2023-03-02 RX ORDER — VARENICLINE TARTRATE 1 MG/1
1 TABLET, FILM COATED ORAL 2 TIMES DAILY
Qty: 60 TABLET | Refills: 0 | Status: SHIPPED | OUTPATIENT
Start: 2023-03-02

## 2023-03-02 RX ORDER — VARENICLINE TARTRATE 0.5 MG/1
TABLET, FILM COATED ORAL
Qty: 11 TABLET | Refills: 0 | Status: SHIPPED | OUTPATIENT
Start: 2023-03-02

## 2023-03-02 SDOH — ECONOMIC STABILITY: FOOD INSECURITY: WITHIN THE PAST 12 MONTHS, YOU WORRIED THAT YOUR FOOD WOULD RUN OUT BEFORE YOU GOT MONEY TO BUY MORE.: NEVER TRUE

## 2023-03-02 SDOH — ECONOMIC STABILITY: FOOD INSECURITY: WITHIN THE PAST 12 MONTHS, THE FOOD YOU BOUGHT JUST DIDN'T LAST AND YOU DIDN'T HAVE MONEY TO GET MORE.: NEVER TRUE

## 2023-03-02 SDOH — ECONOMIC STABILITY: INCOME INSECURITY: HOW HARD IS IT FOR YOU TO PAY FOR THE VERY BASICS LIKE FOOD, HOUSING, MEDICAL CARE, AND HEATING?: NOT HARD AT ALL

## 2023-03-02 SDOH — ECONOMIC STABILITY: HOUSING INSECURITY
IN THE LAST 12 MONTHS, WAS THERE A TIME WHEN YOU DID NOT HAVE A STEADY PLACE TO SLEEP OR SLEPT IN A SHELTER (INCLUDING NOW)?: NO

## 2023-03-02 ASSESSMENT — PATIENT HEALTH QUESTIONNAIRE - PHQ9
9. THOUGHTS THAT YOU WOULD BE BETTER OFF DEAD, OR OF HURTING YOURSELF: 0
1. LITTLE INTEREST OR PLEASURE IN DOING THINGS: 3
2. FEELING DOWN, DEPRESSED OR HOPELESS: 1
SUM OF ALL RESPONSES TO PHQ QUESTIONS 1-9: 18
3. TROUBLE FALLING OR STAYING ASLEEP: 3
6. FEELING BAD ABOUT YOURSELF - OR THAT YOU ARE A FAILURE OR HAVE LET YOURSELF OR YOUR FAMILY DOWN: 1
SUM OF ALL RESPONSES TO PHQ QUESTIONS 1-9: 18
4. FEELING TIRED OR HAVING LITTLE ENERGY: 3
SUM OF ALL RESPONSES TO PHQ9 QUESTIONS 1 & 2: 4
SUM OF ALL RESPONSES TO PHQ QUESTIONS 1-9: 18
SUM OF ALL RESPONSES TO PHQ QUESTIONS 1-9: 18
5. POOR APPETITE OR OVEREATING: 3
8. MOVING OR SPEAKING SO SLOWLY THAT OTHER PEOPLE COULD HAVE NOTICED. OR THE OPPOSITE, BEING SO FIGETY OR RESTLESS THAT YOU HAVE BEEN MOVING AROUND A LOT MORE THAN USUAL: 1
7. TROUBLE CONCENTRATING ON THINGS, SUCH AS READING THE NEWSPAPER OR WATCHING TELEVISION: 3

## 2023-03-02 ASSESSMENT — ENCOUNTER SYMPTOMS
ABDOMINAL PAIN: 0
SHORTNESS OF BREATH: 0
BACK PAIN: 1

## 2023-03-02 NOTE — PROGRESS NOTES
Nusrat Nam is a 50 y.o. presents today for   Chief Complaint   Patient presents with    Establish Care    Knee Injury     Patient has left knee issues and needs surgery. Headache     States she has had frequent headaches, Wakes up with a headache. Is someone accompanying this pt? no    Is the patient using any DME equipment during [address]? no    Depression Screening:   PHQ-9 Questionaire 3/2/2023 2/21/2023   Little interest or pleasure in doing things 3 1   Feeling down, depressed, or hopeless 3 1   PHQ-9 Total Score 6 2       Abuse Screening:  No flowsheet data found. Learning Assessment:  No question data found. Fall Risk:  No flowsheet data found. Coordination of Care:   1. \"Have you been to the ER, urgent care clinic since your last visit? Hospitalized since your last visit? \" no    2. \"Have you seen or consulted any other health care providers outside of the [address] [place] [address] since your last visit? \" no    3. For patients aged 39-70: Has the patient had a colonoscopy / FIT/ Cologuard? no    If the patient is female:    4. For patients aged 41-77: Has the patient had a mammogram within the past 2 years? no    5. For patients aged 21-65: Has the patient had a pap smear? no    Health Maintenance: reviewed and discussed and ordered per Provider.     Health Maintenance Due   Topic Date Due    COVID-19 Vaccine (1) Never done    A1C test (Diabetic or Prediabetic)  Never done    HIV screen  Never done    Hepatitis C screen  Never done    Lipids  Never done    DTaP/Tdap/Td vaccine (1 - Tdap) 01/29/2016    Colorectal Cancer Screen  Never done    Cervical cancer screen  01/28/2021    Flu vaccine (1) 08/01/2022

## 2023-03-02 NOTE — PROGRESS NOTES
History of Present Illness  Arden Cardona is a 50 y.o. female who presents today for management of    Chief Complaint   Patient presents with    Establish Care    Knee Injury     Patient has left knee issues and needs surgery. Headache     States she has had frequent headaches, Wakes up with a headache. CC: new patient here for healthcare maintenance    -Patient is here to establish care. -Previous PCP: Last seen 1-2 years ago   -Retried , now disabled  -She lives at home with sister and mom   -Pt reports good support system with family/friends and feels safe at home. Chronic arthritis of hips, back  -sees rheumatology  -attempting to wean off mobic  -aslo seen by ortho for L knee, previous ACL injury (L)    Tobacco use:  -cut down to 1/5 pack of 1 ppd  -would like to try chantix again    HA:  -reports 1 month of HA every other day  -has noticed photophobia and phonophobia, no nausea  -BC powder helps somewhat    Healthcare maintenance:  Immunizations:  Flu vacc: UTD  TDaP vacc: UTD  COVID vacc: UTD  Last Pap: overdue  Last Mammo: needs   Last Colonoscopy: never done    Sexual health: Pt would like STI screening  Mood: Reports stable mood, Hx of bipolar disorder. Sees psychiatrist and therapist regularly. PHQ-9  3/2/2023   Little interest or pleasure in doing things 3   Feeling down, depressed, or hopeless 1   Trouble falling or staying asleep, or sleeping too much 3   Feeling tired or having little energy 3   Poor appetite or overeating 3   Feeling bad about yourself - or that you are a failure or have let yourself or your family down 1   Trouble concentrating on things, such as reading the newspaper or watching television 3   Moving or speaking so slowly that other people could have noticed.  Or the opposite - being so fidgety or restless that you have been moving around a lot more than usual 1   Thoughts that you would be better off dead, or of hurting yourself in some way 0   PHQ-2 Score 4 PHQ-9 Total Score 18       Past Medical History  Past Medical History:   Diagnosis Date    Acute pain of left shoulder 11/1/2016    Bipolar 1 disorder ([facility] [address]     Bipolar affective disorder, currently manic, moderate ([address] 4/27/2016    Chronic midline low back pain with sciatica 11/1/2016    Cigarette nicotine dependence in remission 8/2/2016    Cigarette nicotine dependence without complication 71/08/8109    Continuous nicotine dependence 1/24/2017    Depression     Fibromyalgia 9/1/2015    HLD (hyperlipidemia) 2/4/2016    Hypertension     Hypertriglyceridemia 5/15/2018    Impaired fasting glucose 5/31/2016    Irritable bowel syndrome without diarrhea 4/27/2016    Moderate episode of recurrent major depressive disorder ([address] 4/27/2016    Obesity, Class III, BMI 40-49.9 (morbid obesity) ([facility] [address] 9/1/2015    Onychomycosis 4/27/2016    Pain of left thumb 11/1/2016    Prediabetes 7/5/2016    PTSD (post-traumatic stress disorder)     Smoker 9/1/2015    Vitamin D deficiency 5/31/2016      Surgical History  Past Surgical History:   Procedure Laterality Date    ANTERIOR CRUCIATE LIGAMENT REPAIR      APPENDECTOMY      CERVICAL DISCECTOMY  02/07/2019    with fusion C5/6 C6/7    CERVICAL FUSION  0513/19    L4-L5 bilateral hemilaminectomy; medial facetectomy; foraminotomy; L5-S1 right hemilaminectomy; medial facetectomy revision; discectomy revision; L4, L5, S1 posterolateral fusion    CERVICAL LAMINECTOMY  03/19/2019    with fusion    GYN Bilateral     tubal    LUMBAR LAMINECTOMY Bilateral 05/13/2019    L4-L5 bilateral hemilaminectomy; medial facetectomy; foraminotomy;  L5-S1 right hemilaminectomy; medial facetectomy revision; discectomy revision; L4, L5, S1 posterolateral fusion    ORTHOPEDIC SURGERY        Current Medications  Current Outpatient Medications   Medication Sig    losartan (COZAAR) 25 MG tablet Take 1 tablet by mouth daily    varenicline (CHANTIX) 1 MG tablet Take 1 tablet by mouth 2 times daily    SUMAtriptan (IMITREX) 50 MG tablet Take 1 tablet by mouth once as needed for Migraine    varenicline (CHANTIX) 0.5 MG tablet 0.5mg DAILY for 3 days followed by 0.5mg TWICE DAILY for 4 days    Cyanocobalamin (VITAMIN B 12 PO) Take 5,000 mcg by mouth daily    gabapentin (NEURONTIN) 800 MG tablet Take 1 tablet by mouth 3 times daily for 180 days. Max Daily Amount: 2,400 mg    acetaminophen (TYLENOL) 650 MG extended release tablet Take 650 mg by mouth in the morning and 650 mg at noon and 650 mg in the evening. Pt states she takes this every other day. buPROPion (WELLBUTRIN SR) 200 MG extended release tablet Take 200 mg by mouth 2 times daily    cyclobenzaprine (FLEXERIL) 10 MG tablet Take 10 mg by mouth nightly as needed    DULoxetine (CYMBALTA) 60 MG extended release capsule Take 60 mg by mouth 2 times daily    ergocalciferol (ERGOCALCIFEROL) 1.25 MG (67795 UT) capsule Take 1 capsule by mouth every 7 days    hydrOXYzine HCl (ATARAX) 25 MG tablet Take 25 mg by mouth 3 times daily as needed    lamoTRIgine (LAMICTAL) 100 MG tablet Take 100 mg by mouth daily    lamoTRIgine (LAMICTAL) 200 MG tablet Take 200 mg by mouth every evening    meloxicam (MOBIC) 15 MG tablet Take 15 mg by mouth every other day    traZODone (DESYREL) 100 MG tablet Take 100 mg by mouth every evening     No current facility-administered medications for this visit.      Allergies/Drug Reactions  No Known Allergies   Family History  Family History   Problem Relation Age of Onset    Psychiatric Disorder Sister     Psychiatric Disorder Brother     Psychiatric Disorder Sister     Psychiatric Disorder Brother     Diabetes Sister     Psychiatric Disorder Brother     Hypertension Mother     No Known Problems Father     Thyroid Disease Mother       Social History  Social History     Tobacco Use    Smoking status: Every Day     Packs/day: 0.50     Types: Cigarettes    Smokeless tobacco: Never   Vaping Use    Vaping Use: Never used   Substance Use Topics    Alcohol use: Yes     Alcohol/week: 9.0 standard drinks    Drug use: No      Health Maintenance   Topic Date Due    A1C test (Diabetic or Prediabetic)  Never done    HIV screen  Never done    Hepatitis C screen  Never done    Lipids  Never done    DTaP/Tdap/Td vaccine (1 - Tdap) 01/29/2016    Colorectal Cancer Screen  Never done    Cervical cancer screen  01/28/2021    Depression Monitoring  02/21/2024    Flu vaccine  Completed    Pneumococcal 0-64 years Vaccine  Completed    COVID-19 Vaccine  Completed    Hepatitis A vaccine  Aged Out    Hib vaccine  Aged Out    Meningococcal (ACWY) vaccine  Aged Out     Immunization History   Administered Date(s) Administered    COVID-19, PFIZER Bivalent BOOSTER, DO NOT Dilute, (age 12y+), IM, 30 mcg/0.3 mL 12/07/2021, 11/07/2022    COVID-19, PFIZER PURPLE top, DILUTE for use, (age 15 y+), 30mcg/0.3mL 04/20/2021, 05/11/2021    Influenza Virus Vaccine 09/01/2015, 11/01/2016, 11/07/2022    Influenza, FLUARIX, FLULAVAL, FLUZONE (age 10 mo+) AND AFLURIA, (age 1 y+), PF, 0.5mL 09/01/2015, 11/01/2016    Pneumococcal Polysaccharide ([id number]) 09/01/2015    Td (Adult), 5 Lf Tetanus Toxoid, Pf (Tenivac, Decavac) 01/28/2016       Review of Systems  Review of Systems   Constitutional:  Negative for chills and fever. Respiratory:  Negative for shortness of breath. Cardiovascular:  Negative for chest pain. Gastrointestinal:  Negative for abdominal pain. Musculoskeletal:  Positive for back pain, gait problem and neck pain. Neurological:  Positive for headaches. Psychiatric/Behavioral:  Positive for dysphoric mood.          Physical Exam  Vital signs:   Vitals:    03/02/23 0836 03/02/23 0837   BP: (!) 160/94 (!) 163/87   Site: Right Upper Arm Right Upper Arm   Position: Sitting Sitting   Cuff Size: Medium Adult Medium Adult   Pulse: 81    Resp: 17    Temp: 98 °F (36.7 °C)    TempSrc: Temporal    SpO2: 95%    Weight: 280 lb (127 kg)    Height: 5' 9\" (1.753 m)        General: alert, oriented, not in distress  Chest/Lungs: clear breath sounds, no wheezing or crackles  Heart: normal rate, regular rhythm, no murmur  Extremities: no focal deformities, no edema  Skin: no active skin lesions    Assessment/Plan: Kala Diaz was seen today for establish care, knee injury and headache. Diagnoses and all orders for this visit:    Primary hypertension  Not at goal, will start losartan, previously tolerated by pt. -     losartan (COZAAR) 25 MG tablet; Take 1 tablet by mouth daily  -     Urinalysis; Future  -     CBC with Auto Differential; Future  -     Basic Metabolic Panel; Future  -     Hepatic Function Panel; Future    Prediabetes  -     HEMOGLOBIN A1C W/O EAG; Future    Hypertriglyceridemia  -     Lipid Panel; Future    Tobacco use disorder  -     varenicline (CHANTIX) 1 MG tablet; Take 1 tablet by mouth 2 times daily  -     varenicline (CHANTIX) 0.5 MG tablet; 0.5mg DAILY for 3 days followed by 0.5mg TWICE DAILY for 4 days    Migraine with aura and without status migrainosus, not intractable  Trial of triptan, given frequency, may need daily prevention therapy  -     SUMAtriptan (IMITREX) 50 MG tablet; Take 1 tablet by mouth once as needed for Migraine    Routine screening for STI (sexually transmitted infection)  -     HIV 1/2 Ag/Ab, 4TH Generation,W Rflx Confirm; Future  -     RPR; Future  -     C.trachomatis N.gonorrhoeae DNA; Future    Encounter for screening mammogram for malignant neoplasm of breast  -     DESIRE DIGITAL SCREEN W OR WO CAD BILATERAL; Future    Colon cancer screening  -     External Referral To Gastroenterology    Need for hepatitis C screening test  -     Hepatitis C Antibody; Future      I have discussed the diagnosis with the patient and the intended plan as seen in the above orders. The patient has received an after-visit summary and questions were answered concerning future plans. I have discussed medication side effects and warnings with the patient as well.  I have reviewed the plan of care with the patient, accepted their input and they are in agreement with the treatment goals. Follow-up and Dispositions    Return in about 4 weeks (around 3/30/2023) for Pap.          South Castaneda MD  March 2, 2023

## 2023-03-03 LAB
A/G RATIO: 1.8 RATIO (ref 1.1–2.6)
ALBUMIN SERPL-MCNC: 4.5 G/DL (ref 3.5–5)
ALP BLD-CCNC: 111 U/L (ref 25–115)
ALT SERPL-CCNC: 15 U/L (ref 5–40)
ANION GAP SERPL CALCULATED.3IONS-SCNC: 8 MMOL/L (ref 3–15)
AST SERPL-CCNC: 13 U/L (ref 10–37)
AVERAGE GLUCOSE: 115 MG/DL (ref 91–123)
BASOPHILS # BLD: 1 % (ref 0–2)
BASOPHILS ABSOLUTE: 0.1 K/UL (ref 0–0.2)
BILIRUB SERPL-MCNC: 0.2 MG/DL (ref 0.2–1.2)
BILIRUB SERPL-MCNC: NEGATIVE MG/DL
BILIRUBIN DIRECT: <0.2 MG/DL (ref 0–0.3)
BLOOD: NEGATIVE
BUN BLDV-MCNC: 12 MG/DL (ref 6–22)
CALCIUM SERPL-MCNC: 9.5 MG/DL (ref 8.4–10.5)
CHLORIDE BLD-SCNC: 103 MMOL/L (ref 98–110)
CHOLESTEROL/HDL RATIO: 5.3 (ref 0–5)
CHOLESTEROL: 216 MG/DL (ref 110–200)
CLARITY: CLEAR
CO2: 31 MMOL/L (ref 20–32)
COLOR: YELLOW
CREAT SERPL-MCNC: 0.6 MG/DL (ref 0.5–1.2)
EOSINOPHIL # BLD: 3 % (ref 0–6)
EOSINOPHILS ABSOLUTE: 0.2 K/UL (ref 0–0.5)
GLOBULIN: 2.5 G/DL (ref 2–4)
GLOMERULAR FILTRATION RATE: >60 ML/MIN/1.73 SQ.M.
GLUCOSE: 100 MG/DL (ref 70–99)
GLUCOSE: NEGATIVE MG/DL
HBA1C MFR BLD: 5.7 % (ref 4.8–5.6)
HCT VFR BLD CALC: 41.1 % (ref 35.1–48)
HDLC SERPL-MCNC: 41 MG/DL
HEMOGLOBIN: 12.5 G/DL (ref 11.7–16)
HIV -1/0/2 AG/AB WITH REFLEX: NON REACTIVE
HIV INTERPRETATION: NORMAL
KETONES, URINE: NEGATIVE MG/DL
LDL CHOLESTEROL CALCULATED: 142 MG/DL (ref 50–99)
LDL/HDL RATIO: 3.5
LEUKOCYTE ESTERASE, URINE: NEGATIVE
LYMPHOCYTES # BLD: 20 % (ref 20–45)
LYMPHOCYTES ABSOLUTE: 1.3 K/UL (ref 1–4.8)
MCH RBC QN AUTO: 33 PG (ref 26–34)
MCHC RBC AUTO-ENTMCNC: 30 G/DL (ref 31–36)
MCV RBC AUTO: 107 FL (ref 80–99)
MONOCYTES ABSOLUTE: 0.5 K/UL (ref 0.1–1)
MONOCYTES: 8 % (ref 3–12)
NEUTROPHILS ABSOLUTE: 4.4 K/UL (ref 1.8–7.7)
NEUTROPHILS: 69 % (ref 40–75)
NITRITE, URINE: NEGATIVE
NON-HDL CHOLESTEROL: 175 MG/DL
PDW BLD-RTO: 12.9 % (ref 10–15.5)
PH, URINE: 7 PH (ref 5–8)
PLATELET # BLD: 231 K/UL (ref 140–440)
PMV BLD AUTO: 11 FL (ref 9–13)
POTASSIUM SERPL-SCNC: 4.6 MMOL/L (ref 3.5–5.5)
PROTEIN UA: NEGATIVE MG/DL
RBC: 3.85 M/UL (ref 3.8–5.2)
SODIUM BLD-SCNC: 142 MMOL/L (ref 133–145)
SPECIFIC GRAVITY: 1.01 (ref 1–1.03)
T. PALLIDUM, IGG/IGM: NON REACTIVE
TOTAL PROTEIN: 7 G/DL (ref 6.4–8.3)
TRIGL SERPL-MCNC: 166 MG/DL (ref 40–149)
UROBILINOGEN: 0.2 MG/DL
VLDLC SERPL CALC-MCNC: 33 MG/DL (ref 8–30)
WBC: 6.4 K/UL (ref 4–11)

## 2023-03-13 ENCOUNTER — HOSPITAL ENCOUNTER (OUTPATIENT)
Facility: HOSPITAL | Age: 48
Discharge: HOME | End: 2023-03-16
Payer: MEDICAID

## 2023-03-13 DIAGNOSIS — M17.12 PRIMARY OSTEOARTHRITIS OF LEFT KNEE: ICD-10-CM

## 2023-03-13 DIAGNOSIS — Z01.811 PRE-OP CHEST EXAM: ICD-10-CM

## 2023-03-13 LAB
EKG ATRIAL RATE: 88 BPM
EKG DIAGNOSIS: NORMAL
EKG P AXIS: 31 DEGREES
EKG P-R INTERVAL: 156 MS
EKG Q-T INTERVAL: 364 MS
EKG QRS DURATION: 84 MS
EKG QTC CALCULATION (BAZETT): 440 MS
EKG R AXIS: -10 DEGREES
EKG T AXIS: 55 DEGREES
EKG VENTRICULAR RATE: 88 BPM

## 2023-03-13 PROCEDURE — 71046 X-RAY EXAM CHEST 2 VIEWS: CPT

## 2023-03-13 PROCEDURE — 93010 ELECTROCARDIOGRAM REPORT: CPT | Performed by: INTERNAL MEDICINE

## 2023-03-13 PROCEDURE — 93005 ELECTROCARDIOGRAM TRACING: CPT | Performed by: ORTHOPAEDIC SURGERY

## 2023-03-23 ENCOUNTER — CLINICAL DOCUMENTATION (OUTPATIENT)
Age: 48
End: 2023-03-23

## 2023-03-23 NOTE — PROGRESS NOTES
Patient dropped off FMLA forms at [facility] office, faxed to [employer].  Patient requests a call when ready for  [phone]

## 2023-03-29 ENCOUNTER — OFFICE VISIT (OUTPATIENT)
Facility: CLINIC | Age: 48
End: 2023-03-29
Payer: MEDICAID

## 2023-03-29 VITALS
HEIGHT: 69 IN | DIASTOLIC BLOOD PRESSURE: 75 MMHG | SYSTOLIC BLOOD PRESSURE: 154 MMHG | OXYGEN SATURATION: 97 % | BODY MASS INDEX: 42.21 KG/M2 | HEART RATE: 77 BPM | RESPIRATION RATE: 17 BRPM | WEIGHT: 285 LBS

## 2023-03-29 DIAGNOSIS — B35.1 ONYCHOMYCOSIS: ICD-10-CM

## 2023-03-29 DIAGNOSIS — Z71.3 WEIGHT LOSS COUNSELING, ENCOUNTER FOR: ICD-10-CM

## 2023-03-29 DIAGNOSIS — E66.01 CLASS 3 SEVERE OBESITY DUE TO EXCESS CALORIES WITHOUT SERIOUS COMORBIDITY WITH BODY MASS INDEX (BMI) OF 40.0 TO 44.9 IN ADULT (HCC): ICD-10-CM

## 2023-03-29 DIAGNOSIS — Z01.818 PRE-OP EVALUATION: Primary | ICD-10-CM

## 2023-03-29 DIAGNOSIS — I10 PRIMARY HYPERTENSION: ICD-10-CM

## 2023-03-29 DIAGNOSIS — F17.200 TOBACCO USE DISORDER: ICD-10-CM

## 2023-03-29 PROCEDURE — 3078F DIAST BP <80 MM HG: CPT | Performed by: STUDENT IN AN ORGANIZED HEALTH CARE EDUCATION/TRAINING PROGRAM

## 2023-03-29 PROCEDURE — 99214 OFFICE O/P EST MOD 30 MIN: CPT | Performed by: STUDENT IN AN ORGANIZED HEALTH CARE EDUCATION/TRAINING PROGRAM

## 2023-03-29 PROCEDURE — 3077F SYST BP >= 140 MM HG: CPT | Performed by: STUDENT IN AN ORGANIZED HEALTH CARE EDUCATION/TRAINING PROGRAM

## 2023-03-29 RX ORDER — TERBINAFINE HYDROCHLORIDE 250 MG/1
250 TABLET ORAL DAILY
Qty: 84 TABLET | Refills: 0 | Status: SHIPPED | OUTPATIENT
Start: 2023-03-29 | End: 2023-06-21

## 2023-03-29 RX ORDER — LOSARTAN POTASSIUM 50 MG/1
50 TABLET ORAL DAILY
Qty: 30 TABLET | Refills: 3 | Status: SHIPPED | OUTPATIENT
Start: 2023-03-29

## 2023-03-29 ASSESSMENT — PATIENT HEALTH QUESTIONNAIRE - PHQ9
SUM OF ALL RESPONSES TO PHQ QUESTIONS 1-9: 0
2. FEELING DOWN, DEPRESSED OR HOPELESS: 0
SUM OF ALL RESPONSES TO PHQ QUESTIONS 1-9: 0
1. LITTLE INTEREST OR PLEASURE IN DOING THINGS: 0
SUM OF ALL RESPONSES TO PHQ9 QUESTIONS 1 & 2: 0

## 2023-03-29 ASSESSMENT — ENCOUNTER SYMPTOMS
SHORTNESS OF BREATH: 0
ABDOMINAL PAIN: 0

## 2023-03-29 NOTE — PROGRESS NOTES
Aissatou Hollis is a 46 y.o. presents today for   Chief Complaint   Patient presents with    Pre-op Exam     Patient here for pre-op on left knee for May 17 2023 with         Is someone accompanying this pt? no    Is the patient using any DME equipment during [address]? no    Depression Screening:   PHQ-9 Questionaire 3/29/2023 3/2/2023 2/21/2023   Little interest or pleasure in doing things 0 3 1   Feeling down, depressed, or hopeless 0 1 1   Trouble falling or staying asleep, or sleeping too much - 3 -   Feeling tired or having little energy - 3 -   Poor appetite or overeating - 3 -   Feeling bad about yourself - or that you are a failure or have let yourself or your family down - 1 -   Trouble concentrating on things, such as reading the newspaper or watching television - 3 -   Moving or speaking so slowly that other people could have noticed. Or the opposite - being so fidgety or restless that you have been moving around a lot more than usual - 1 -   Thoughts that you would be better off dead, or of hurting yourself in some way - 0 -   PHQ-9 Total Score 0 18 2       Abuse Screening:  No flowsheet data found. Learning Assessment:  No question data found. Fall Risk:  No flowsheet data found. Coordination of Care:   1. \"Have you been to the ER, urgent care clinic since your last visit? Hospitalized since your last visit? \" no    2. \"Have you seen or consulted any other health care providers outside of the [address] [place] [address] since your last visit? \" no    3. For patients aged 39-70: Has the patient had a colonoscopy / FIT/ Cologuard? no    If the patient is female:    4. For patients aged 41-77: Has the patient had a mammogram within the past 2 years? no    5. For patients aged 21-65: Has the patient had a pap smear? no    Health Maintenance: reviewed and discussed and ordered per Provider.     Health Maintenance Due   Topic Date Due    Hepatitis C screen  Never done    DTaP/Tdap/Td vaccine (1 -
SpO2: 97%    Weight: 285 lb (129.3 kg)    Height: 5' 9\" (1.753 m)        General: alert, oriented, not in distress  Chest/Lungs: clear breath sounds, no wheezing or crackles  Heart: normal rate, regular rhythm, no murmur  Extremities: no focal deformities, no edema  Skin: no active skin lesions    Impression:    James Brown was seen today for pre-op exam.    Diagnoses and all orders for this visit:    Pre-op evaluation  LOW RISK:  -Pt does have the ability to perform > 4 MET levels of activity and has no active cardiac conditions.   -Pt may proceed with surgery with no additional cardiac testing or procedures. -Based on the [facility] perioperative cardiac risk, patient's estimated risk probability for perioperative myocardial infarct or cardiac arrest is: 0.12%    Primary hypertension  -Improved, however not at goal. Increase losartan. -     losartan (COZAAR) 50 MG tablet; Take 1 tablet by mouth daily    Tobacco use disorder  -Encouraged continued cutting down. Continue chantix. Discussed importnace of tobacco cessation for improved wound healing post op    Weight loss counseling, encounter for  Class 3 severe obesity due to excess calories without serious comorbidity with body mass index (BMI) of 40.0 to 44.9 in adult [facility])  -     Stu Ponce CNP, Weight Loss, [place]    Onychomycosis  -     terbinafine (LAMISIL) 250 MG tablet;  Take 1 tablet by mouth daily        Sekou Lopez MD  03/29/23

## 2023-05-01 ENCOUNTER — OFFICE VISIT (OUTPATIENT)
Age: 48
End: 2023-05-01

## 2023-05-01 VITALS
RESPIRATION RATE: 18 BRPM | OXYGEN SATURATION: 90 % | BODY MASS INDEX: 43.1 KG/M2 | HEIGHT: 69 IN | HEART RATE: 96 BPM | DIASTOLIC BLOOD PRESSURE: 83 MMHG | SYSTOLIC BLOOD PRESSURE: 148 MMHG | WEIGHT: 291 LBS

## 2023-05-01 DIAGNOSIS — E66.01 OBESITY, CLASS III, BMI 40-49.9 (MORBID OBESITY) (HCC): ICD-10-CM

## 2023-05-01 DIAGNOSIS — R73.03 PREDIABETES: ICD-10-CM

## 2023-05-01 DIAGNOSIS — M17.12 PRIMARY OSTEOARTHRITIS OF LEFT KNEE: Primary | ICD-10-CM

## 2023-05-01 NOTE — ASSESSMENT & PLAN NOTE
57-year-old female with osteoarthritis of the left knee. Is secondary to ACL reconstruction in the past.  She has failed conservative treatment for her knee pain including injections, Tylenol, ibuprofen, physical therapy. She is here for preoperative appointment for left total knee arthroplasty performed on May 17, 2023. She has completed her CT scan, clearances and lab work. She has quit smoking tobacco as well. We will plan to go forward as scheduled. Surgery was discussed with the patient today. The risks and benefits of surgical and conservative (nonsurgical) treatment were discussed at length. The risks of surgery include but are not limited to pain, scar, infection, painful hardware, hardware failure, Deep Veinous Thrombosis/Pulmonary Embolism, anesthetic risks including heart attack/stroke, injury to nerves and/or blood vessels, bleeding, the need for further surgery and death. In the case of fracture repair, the risks also include nonunion or malunion. The recovery from surgery was also discussed at length. All of the patient's questions were entertained and answered. Understanding the diagnosis, risks and benefits of surgical treatment, the patient wishes to proceed with surgery. Patient signed written consent for left total knee arthroplasty in the office with me today.

## 2023-05-01 NOTE — PROGRESS NOTES
Patient: Kirt Santoyo                MRN: [id number]       SSN: [id number]  YOB: 1975        AGE: 46 y.o. SEX: female    PCP: Haleigh Gomez MD  05/01/23    Chief Complaint: H&P (Left knee pain)      HPI:  Kirt Santoyo is a 46 y.o. female with chief complaint of   Chief Complaint   Patient presents with    H&P     Left knee pain       1. Primary osteoarthritis of left knee  2. Obesity, Class III, BMI 40-49.9 (morbid obesity) ([address]  3. Prediabetes        AMB PAIN ASSESSMENT 5/1/2023   Location of Pain Knee   Location Modifiers Left   Severity of Pain 8   Quality of Pain -   Duration of Pain -   Frequency of Pain -   Aggravating Factors -   Limiting Behavior -   Relieving Factors -   Result of Injury -   Work-Related Injury -   Are there other pain locations you wish to document? -     Tobacco Use: High Risk    Smoking Tobacco Use: Every Day    Smokeless Tobacco Use: Never    Passive Exposure: Not on file           PHYSICAL EXAMINATION:  BP (!) 148/83 (Site: Left Upper Arm, Position: Sitting, Cuff Size: Large Adult)   Pulse 96   Resp 18   Ht 5' 9\" (1.753 m)   Wt 291 lb (132 kg)   SpO2 90%   BMI 42.97 kg/m²   Body mass index is 42.97 kg/m². Wt Readings from Last 3 Encounters:   05/01/23 291 lb (132 kg)   03/29/23 285 lb (129.3 kg)   03/02/23 280 lb (127 kg)       GENERAL: Alert and oriented x3, in no acute distress. HEENT: Normocephalic, atraumatic. MSK: Left Knee Exam     Tenderness   The patient is experiencing tenderness in the medial joint line and patella.     Range of Motion   Extension:  5   Flexion:  120     Tests   Varus: negative Valgus: negative  Drawer:  Anterior - negative     Posterior - negative    Other   Erythema: absent  Scars: present  Sensation: normal  Pulse: present  Swelling: mild  Effusion: effusion present    Comments:  Positive patellar crepitus    Positive pseudolaxity           IMAGING:  Imaging read by myself and interpreted as follows:  February 21,

## 2023-05-08 ENCOUNTER — OFFICE VISIT (OUTPATIENT)
Facility: CLINIC | Age: 48
End: 2023-05-08
Payer: COMMERCIAL

## 2023-05-08 VITALS
RESPIRATION RATE: 17 BRPM | BODY MASS INDEX: 41.92 KG/M2 | SYSTOLIC BLOOD PRESSURE: 128 MMHG | OXYGEN SATURATION: 93 % | WEIGHT: 283 LBS | DIASTOLIC BLOOD PRESSURE: 88 MMHG | HEIGHT: 69 IN | HEART RATE: 78 BPM

## 2023-05-08 DIAGNOSIS — I10 PRIMARY HYPERTENSION: Primary | ICD-10-CM

## 2023-05-08 DIAGNOSIS — N39.46 MIXED INCONTINENCE: ICD-10-CM

## 2023-05-08 DIAGNOSIS — F17.200 TOBACCO USE DISORDER: ICD-10-CM

## 2023-05-08 PROCEDURE — 3079F DIAST BP 80-89 MM HG: CPT | Performed by: STUDENT IN AN ORGANIZED HEALTH CARE EDUCATION/TRAINING PROGRAM

## 2023-05-08 PROCEDURE — 99213 OFFICE O/P EST LOW 20 MIN: CPT | Performed by: STUDENT IN AN ORGANIZED HEALTH CARE EDUCATION/TRAINING PROGRAM

## 2023-05-08 PROCEDURE — 3074F SYST BP LT 130 MM HG: CPT | Performed by: STUDENT IN AN ORGANIZED HEALTH CARE EDUCATION/TRAINING PROGRAM

## 2023-05-08 RX ORDER — ACETAMINOPHEN 325 MG/1
650 TABLET ORAL EVERY 8 HOURS PRN
Qty: 120 TABLET | Refills: 1 | Status: SHIPPED | OUTPATIENT
Start: 2023-05-08

## 2023-05-08 ASSESSMENT — PATIENT HEALTH QUESTIONNAIRE - PHQ9
SUM OF ALL RESPONSES TO PHQ QUESTIONS 1-9: 0
SUM OF ALL RESPONSES TO PHQ QUESTIONS 1-9: 0
1. LITTLE INTEREST OR PLEASURE IN DOING THINGS: 0
SUM OF ALL RESPONSES TO PHQ QUESTIONS 1-9: 0
SUM OF ALL RESPONSES TO PHQ9 QUESTIONS 1 & 2: 0
2. FEELING DOWN, DEPRESSED OR HOPELESS: 0
SUM OF ALL RESPONSES TO PHQ QUESTIONS 1-9: 0

## 2023-05-08 ASSESSMENT — ENCOUNTER SYMPTOMS: SHORTNESS OF BREATH: 0

## 2023-05-08 NOTE — PROGRESS NOTES
Ghazala Caballero is a 46 y.o. presents today for   Chief Complaint   Patient presents with    Pre-op Exam     Patient here for pre-op for knee       Is someone accompanying this pt? no    Is the patient using any DME equipment during OV? no    Depression Screening:   PHQ-9 Questionaire 5/8/2023 3/29/2023 3/2/2023 2/21/2023   Little interest or pleasure in doing things 0 0 3 1   Feeling down, depressed, or hopeless 0 0 1 1   Trouble falling or staying asleep, or sleeping too much - - 3 -   Feeling tired or having little energy - - 3 -   Poor appetite or overeating - - 3 -   Feeling bad about yourself - or that you are a failure or have let yourself or your family down - - 1 -   Trouble concentrating on things, such as reading the newspaper or watching television - - 3 -   Moving or speaking so slowly that other people could have noticed. Or the opposite - being so fidgety or restless that you have been moving around a lot more than usual - - 1 -   Thoughts that you would be better off dead, or of hurting yourself in some way - - 0 -   PHQ-9 Total Score 0 0 18 2       Abuse Screening:  No flowsheet data found. Learning Assessment:  No question data found. Fall Risk:  No flowsheet data found. Coordination of Care:   1. \"Have you been to the ER, urgent care clinic since your last visit? Hospitalized since your last visit? \" no    2. \"Have you seen or consulted any other health care providers outside of the [address] [place] [address] since your last visit? \" no    3. For patients aged 39-70: Has the patient had a colonoscopy / FIT/ Cologuard? no    If the patient is female:    4. For patients aged 41-77: Has the patient had a mammogram within the past 2 years? no    5. For patients aged 21-65: Has the patient had a pap smear? no    Health Maintenance: reviewed and discussed and ordered per Provider.     Health Maintenance Due   Topic Date Due    Hepatitis C screen  Never done    DTaP/Tdap/Td vaccine (1 - Tdap)
quitting  -continue chantix    Mixed incontinence  -given bladder training and Kegel exercises  -consider pelvic floor PT vs medication    Other orders  -     acetaminophen (AMINOFEN) 325 MG tablet; Take 2 tablets by mouth every 8 hours as needed for Pain      I have discussed the diagnosis with the patient and the intended plan as seen in the above orders. The patient has received an after-visit summary and questions were answered concerning future plans. I have discussed medication side effects and warnings with the patient as well. I have reviewed the plan of care with the patient, accepted their input and they are in agreement with the treatment goals. Follow-up and Dispositions    Return in about 4 weeks (around 6/5/2023).        Alma Elias MD   May 8, 2023

## 2023-05-16 ENCOUNTER — ANESTHESIA EVENT (OUTPATIENT)
Facility: HOSPITAL | Age: 48
End: 2023-05-16
Payer: COMMERCIAL

## 2023-05-16 NOTE — H&P
every evening        acetaminophen (AMINOFEN) 325 MG tablet Take 2 tablets by mouth every 8 hours as needed for Pain 120 tablet 1    SUMAtriptan (IMITREX) 50 MG tablet Take 1 tablet by mouth once as needed for Migraine 10 tablet 0      No current facility-administered medications for this visit. No Known Allergies     Past Surgical History         Past Surgical History:   Procedure Laterality Date    ANTERIOR CRUCIATE LIGAMENT REPAIR        APPENDECTOMY        BACK SURGERY        CERVICAL DISCECTOMY   2019     with fusion C5/6 C6/7    CERVICAL FUSION        L4-L5 bilateral hemilaminectomy; medial facetectomy; foraminotomy; L5-S1 right hemilaminectomy; medial facetectomy revision; discectomy revision; L4, L5, S1 posterolateral fusion    CERVICAL LAMINECTOMY   2019     with fusion    GYN Bilateral       tubal    LUMBAR LAMINECTOMY Bilateral 2019     L4-L5 bilateral hemilaminectomy; medial facetectomy; foraminotomy;  L5-S1 right hemilaminectomy; medial facetectomy revision; discectomy revision; L4, L5, S1 posterolateral fusion    ORTHOPEDIC SURGERY        TUBAL LIGATION                Social History               Socioeconomic History    Marital status: Legally        Spouse name: Not on file    Number of children: Not on file    Years of education: Not on file    Highest education level: Not on file   Occupational History    Not on file   Tobacco Use    Smoking status: Former       Packs/day: 0.50       Years: 37.00       Pack years: 18.50       Types: Cigarettes       Start date: 1986       Quit date: 2023       Years since quittin.0    Smokeless tobacco: Never    Tobacco comments:       Not continuously through the years   Vaping Use    Vaping Use: Never used   Substance and Sexual Activity    Alcohol use: Yes       Comment: socially    Drug use: Never    Sexual activity: Not Currently       Partners: Male       Birth control/protection: None   Other Topics

## 2023-05-16 NOTE — DISCHARGE INSTRUCTIONS
OUTPATIENT MEDICATIONS    Tylenol (Acetaminophen) 500 mg: Take 2 tabs by mouth every 8 hours for pain. Celebrex (Celecoxib) 200 mg: Take 1 cap by mouth once daily with food in the evening. OR  Mobic (Meloxicam) 15 mg: Take 1 cap by mouth once daily with food in the evening. Ultram (Tramadol) 50 mg: Take 1 tab by mouth every 6 hours for pain not controlled by Tylenol. Oxycodone (Roxicodone) 5 mg: Take 1 tab by mouth every 4 hours as needed for pain not controlled by acetaminophen and Ultram (Tramadol)     Colace (Docusate sodium) 100 mg: Take 1 cap by mouth twice daily while taking narcotics to avoid constipation. Miralax (Polyethylene glycol): Mix 17 Grams with 8 oz. juice or water and take by mouth up to twice daily as needed for constipation. Pantoprazole (Protonix) 20 mg: Take 1 tablet daily while taking aspirin to prevent stomach ulcers. Aspirin EC 81 mg: Take 1 tablet by mouth twice daily for 6 weeks to prevent blood clots. OR  Eliquis (Apixaban) 2.5 mg: Take 1 tablet by mouth twice daily for 30 days to prevent blood clots. OR  Lovenox 40mg: Inject 40mg (or 30mg if have kidney disease) subcutaneously into abdomen daily for 30 days to prevent blood clots    Naloxone 4mg Nasal Spray: 1 spray in each nostril every 2-3 minutes as needed for narcotic overdose. ICE  ? Inflammation in the operative leg is normal for several months after surgery  ? Use ice for 20 minutes every hour if ice pack or continuously if using the Ice circulating machine. DO NOT apply heat to the wound. DISCHARGE ACTIVITY  ? You may put full weight on your knee. Use crutches or a walker for a minimum of 2 weeks to prevent falls. After you feel comfortable (everyone is different), slowly transition to a cane and then nothing. This can take anywhere from a few weeks to a few months. No running or high impact activities. ? Avoid ANY kneeling for 3 months. THERAPY AFTER A KNEE REPLACEMENT  ?

## 2023-05-17 ENCOUNTER — APPOINTMENT (OUTPATIENT)
Facility: HOSPITAL | Age: 48
End: 2023-05-17
Attending: ORTHOPAEDIC SURGERY
Payer: COMMERCIAL

## 2023-05-17 ENCOUNTER — HOSPITAL ENCOUNTER (OUTPATIENT)
Facility: HOSPITAL | Age: 48
Setting detail: OBSERVATION
Discharge: HOME | End: 2023-05-17
Attending: ORTHOPAEDIC SURGERY | Admitting: ORTHOPAEDIC SURGERY
Payer: COMMERCIAL

## 2023-05-17 ENCOUNTER — ANESTHESIA (OUTPATIENT)
Facility: HOSPITAL | Age: 48
End: 2023-05-17
Payer: COMMERCIAL

## 2023-05-17 VITALS
BODY MASS INDEX: 41.03 KG/M2 | RESPIRATION RATE: 14 BRPM | HEIGHT: 69 IN | HEART RATE: 73 BPM | OXYGEN SATURATION: 98 % | TEMPERATURE: 97.2 F | SYSTOLIC BLOOD PRESSURE: 135 MMHG | DIASTOLIC BLOOD PRESSURE: 86 MMHG | WEIGHT: 277 LBS

## 2023-05-17 DIAGNOSIS — Z96.652 STATUS POST LEFT KNEE REPLACEMENT: Primary | ICD-10-CM

## 2023-05-17 DIAGNOSIS — M17.12 PRIMARY OSTEOARTHRITIS OF LEFT KNEE: ICD-10-CM

## 2023-05-17 LAB — HCG UR QL: NEGATIVE

## 2023-05-17 PROCEDURE — 2500000003 HC RX 250 WO HCPCS: Performed by: NURSE ANESTHETIST, CERTIFIED REGISTERED

## 2023-05-17 PROCEDURE — 6370000000 HC RX 637 (ALT 250 FOR IP): Performed by: ORTHOPAEDIC SURGERY

## 2023-05-17 PROCEDURE — 3700000000 HC ANESTHESIA ATTENDED CARE: Performed by: ORTHOPAEDIC SURGERY

## 2023-05-17 PROCEDURE — 97162 PT EVAL MOD COMPLEX 30 MIN: CPT

## 2023-05-17 PROCEDURE — 6360000002 HC RX W HCPCS: Performed by: ANESTHESIOLOGY

## 2023-05-17 PROCEDURE — 2500000003 HC RX 250 WO HCPCS: Performed by: ORTHOPAEDIC SURGERY

## 2023-05-17 PROCEDURE — 6370000000 HC RX 637 (ALT 250 FOR IP): Performed by: NURSE ANESTHETIST, CERTIFIED REGISTERED

## 2023-05-17 PROCEDURE — 2580000003 HC RX 258: Performed by: ORTHOPAEDIC SURGERY

## 2023-05-17 PROCEDURE — 2709999900 HC NON-CHARGEABLE SUPPLY: Performed by: ORTHOPAEDIC SURGERY

## 2023-05-17 PROCEDURE — 7100000000 HC PACU RECOVERY - FIRST 15 MIN: Performed by: ORTHOPAEDIC SURGERY

## 2023-05-17 PROCEDURE — 73560 X-RAY EXAM OF KNEE 1 OR 2: CPT

## 2023-05-17 PROCEDURE — 2580000003 HC RX 258: Performed by: NURSE ANESTHETIST, CERTIFIED REGISTERED

## 2023-05-17 PROCEDURE — 7100000001 HC PACU RECOVERY - ADDTL 15 MIN: Performed by: ORTHOPAEDIC SURGERY

## 2023-05-17 PROCEDURE — A4217 STERILE WATER/SALINE, 500 ML: HCPCS | Performed by: ORTHOPAEDIC SURGERY

## 2023-05-17 PROCEDURE — 3600000004 HC SURGERY LEVEL 4 BASE: Performed by: ORTHOPAEDIC SURGERY

## 2023-05-17 PROCEDURE — 6360000002 HC RX W HCPCS: Performed by: NURSE ANESTHETIST, CERTIFIED REGISTERED

## 2023-05-17 PROCEDURE — 3600000014 HC SURGERY LEVEL 4 ADDTL 15MIN: Performed by: ORTHOPAEDIC SURGERY

## 2023-05-17 PROCEDURE — C1713 ANCHOR/SCREW BN/BN,TIS/BN: HCPCS | Performed by: ORTHOPAEDIC SURGERY

## 2023-05-17 PROCEDURE — G0378 HOSPITAL OBSERVATION PER HR: HCPCS

## 2023-05-17 PROCEDURE — 6360000002 HC RX W HCPCS: Performed by: ORTHOPAEDIC SURGERY

## 2023-05-17 PROCEDURE — A4216 STERILE WATER/SALINE, 10 ML: HCPCS | Performed by: ORTHOPAEDIC SURGERY

## 2023-05-17 PROCEDURE — 64447 NJX AA&/STRD FEMORAL NRV IMG: CPT | Performed by: ANESTHESIOLOGY

## 2023-05-17 PROCEDURE — 2720000010 HC SURG SUPPLY STERILE: Performed by: ORTHOPAEDIC SURGERY

## 2023-05-17 PROCEDURE — 97116 GAIT TRAINING THERAPY: CPT

## 2023-05-17 PROCEDURE — C1776 JOINT DEVICE (IMPLANTABLE): HCPCS | Performed by: ORTHOPAEDIC SURGERY

## 2023-05-17 PROCEDURE — 81025 URINE PREGNANCY TEST: CPT

## 2023-05-17 PROCEDURE — 3700000001 HC ADD 15 MINUTES (ANESTHESIA): Performed by: ORTHOPAEDIC SURGERY

## 2023-05-17 DEVICE — INSERT TIB CNDYL STBL 5 9 MM KNEE 5/PK X3 TRIATHLON: Type: IMPLANTABLE DEVICE | Site: KNEE | Status: FUNCTIONAL

## 2023-05-17 DEVICE — IMPLANTABLE DEVICE: Type: IMPLANTABLE DEVICE | Site: KNEE | Status: FUNCTIONAL

## 2023-05-17 DEVICE — STEM TIB L50MM DIA12MM KNEE TOT STBL CEM END CAP TRIATHLON: Type: IMPLANTABLE DEVICE | Site: KNEE | Status: FUNCTIONAL

## 2023-05-17 DEVICE — COMPONENT PAT DIA35MM THK10MM SUPERIOR/INFERIOR KNEE: Type: IMPLANTABLE DEVICE | Site: KNEE | Status: FUNCTIONAL

## 2023-05-17 DEVICE — BASEPLATE TIB SZ 5 UNIV KNEE TRITANIUM TOT STBL CEM: Type: IMPLANTABLE DEVICE | Site: KNEE | Status: FUNCTIONAL

## 2023-05-17 DEVICE — CEMENT BNE 20ML 41GM FULL DOSE PMMA W/ TOBRA M VISC RADPQ: Type: IMPLANTABLE DEVICE | Site: KNEE | Status: FUNCTIONAL

## 2023-05-17 RX ORDER — FAMOTIDINE 20 MG/1
20 TABLET, FILM COATED ORAL ONCE
Status: COMPLETED | OUTPATIENT
Start: 2023-05-17 | End: 2023-05-17

## 2023-05-17 RX ORDER — DIPHENHYDRAMINE HCL 25 MG
25 CAPSULE ORAL EVERY 6 HOURS PRN
Status: DISCONTINUED | OUTPATIENT
Start: 2023-05-17 | End: 2023-05-17 | Stop reason: HOSPADM

## 2023-05-17 RX ORDER — DEXAMETHASONE SODIUM PHOSPHATE 10 MG/ML
10 INJECTION, SOLUTION INTRAMUSCULAR; INTRAVENOUS ONCE
Status: COMPLETED | OUTPATIENT
Start: 2023-05-17 | End: 2023-05-17

## 2023-05-17 RX ORDER — POLYETHYLENE GLYCOL 3350 17 G/17G
17 POWDER, FOR SOLUTION ORAL DAILY PRN
Status: DISCONTINUED | OUTPATIENT
Start: 2023-05-17 | End: 2023-05-17 | Stop reason: HOSPADM

## 2023-05-17 RX ORDER — MELOXICAM 15 MG/1
15 TABLET ORAL DAILY
Qty: 30 TABLET | Refills: 0 | Status: SHIPPED | OUTPATIENT
Start: 2023-05-17 | End: 2023-06-16

## 2023-05-17 RX ORDER — SODIUM CHLORIDE, SODIUM LACTATE, POTASSIUM CHLORIDE, CALCIUM CHLORIDE 600; 310; 30; 20 MG/100ML; MG/100ML; MG/100ML; MG/100ML
INJECTION, SOLUTION INTRAVENOUS CONTINUOUS
Status: DISCONTINUED | OUTPATIENT
Start: 2023-05-17 | End: 2023-05-17 | Stop reason: HOSPADM

## 2023-05-17 RX ORDER — PANTOPRAZOLE SODIUM 40 MG/1
40 TABLET, DELAYED RELEASE ORAL DAILY
Qty: 30 TABLET | Refills: 0 | Status: SHIPPED | OUTPATIENT
Start: 2023-05-17 | End: 2023-06-16

## 2023-05-17 RX ORDER — ACETAMINOPHEN 500 MG
1000 TABLET ORAL EVERY 8 HOURS SCHEDULED
Status: DISCONTINUED | OUTPATIENT
Start: 2023-05-17 | End: 2023-05-17 | Stop reason: HOSPADM

## 2023-05-17 RX ORDER — ONDANSETRON 8 MG/1
8 TABLET, ORALLY DISINTEGRATING ORAL EVERY 8 HOURS PRN
Qty: 10 TABLET | Refills: 0 | Status: SHIPPED | OUTPATIENT
Start: 2023-05-17

## 2023-05-17 RX ORDER — FENTANYL CITRATE 50 UG/ML
25 INJECTION, SOLUTION INTRAMUSCULAR; INTRAVENOUS EVERY 5 MIN PRN
Status: DISCONTINUED | OUTPATIENT
Start: 2023-05-18 | End: 2023-05-17 | Stop reason: HOSPADM

## 2023-05-17 RX ORDER — PREGABALIN 75 MG/1
75 CAPSULE ORAL ONCE
Status: COMPLETED | OUTPATIENT
Start: 2023-05-17 | End: 2023-05-17

## 2023-05-17 RX ORDER — PROPOFOL 10 MG/ML
INJECTION, EMULSION INTRAVENOUS PRN
Status: DISCONTINUED | OUTPATIENT
Start: 2023-05-17 | End: 2023-05-17 | Stop reason: SDUPTHER

## 2023-05-17 RX ORDER — TRAMADOL HYDROCHLORIDE 50 MG/1
50 TABLET ORAL EVERY 6 HOURS
Status: DISCONTINUED | OUTPATIENT
Start: 2023-05-17 | End: 2023-05-17 | Stop reason: HOSPADM

## 2023-05-17 RX ORDER — DIPHENHYDRAMINE HYDROCHLORIDE 50 MG/ML
25 INJECTION INTRAMUSCULAR; INTRAVENOUS EVERY 6 HOURS PRN
Status: DISCONTINUED | OUTPATIENT
Start: 2023-05-17 | End: 2023-05-17 | Stop reason: HOSPADM

## 2023-05-17 RX ORDER — SODIUM CHLORIDE 0.9 % (FLUSH) 0.9 %
5-40 SYRINGE (ML) INJECTION EVERY 12 HOURS SCHEDULED
Status: DISCONTINUED | OUTPATIENT
Start: 2023-05-17 | End: 2023-05-17 | Stop reason: HOSPADM

## 2023-05-17 RX ORDER — TRAMADOL HYDROCHLORIDE 50 MG/1
50 TABLET ORAL EVERY 6 HOURS PRN
Qty: 42 TABLET | Refills: 0 | Status: SHIPPED | OUTPATIENT
Start: 2023-05-17 | End: 2023-05-24

## 2023-05-17 RX ORDER — ASPIRIN 81 MG/1
81 TABLET, CHEWABLE ORAL 2 TIMES DAILY
Qty: 60 TABLET | Refills: 0 | Status: SHIPPED | OUTPATIENT
Start: 2023-05-17 | End: 2023-06-16

## 2023-05-17 RX ORDER — DEXAMETHASONE SODIUM PHOSPHATE 10 MG/ML
10 INJECTION, SOLUTION INTRAMUSCULAR; INTRAVENOUS ONCE
Status: DISCONTINUED | OUTPATIENT
Start: 2023-05-17 | End: 2023-05-17 | Stop reason: HOSPADM

## 2023-05-17 RX ORDER — DOCUSATE SODIUM 100 MG/1
100 CAPSULE, LIQUID FILLED ORAL 2 TIMES DAILY
Qty: 60 CAPSULE | Refills: 0 | Status: SHIPPED | OUTPATIENT
Start: 2023-05-17 | End: 2023-06-16

## 2023-05-17 RX ORDER — LIDOCAINE HYDROCHLORIDE 10 MG/ML
1 INJECTION, SOLUTION EPIDURAL; INFILTRATION; INTRACAUDAL; PERINEURAL
Status: DISCONTINUED | OUTPATIENT
Start: 2023-05-17 | End: 2023-05-17 | Stop reason: HOSPADM

## 2023-05-17 RX ORDER — ASPIRIN 81 MG/1
81 TABLET ORAL 2 TIMES DAILY
Status: DISCONTINUED | OUTPATIENT
Start: 2023-05-18 | End: 2023-05-17 | Stop reason: HOSPADM

## 2023-05-17 RX ORDER — ONDANSETRON 2 MG/ML
4 INJECTION INTRAMUSCULAR; INTRAVENOUS EVERY 6 HOURS PRN
Status: DISCONTINUED | OUTPATIENT
Start: 2023-05-17 | End: 2023-05-17 | Stop reason: HOSPADM

## 2023-05-17 RX ORDER — BISACODYL 5 MG/1
5 TABLET, DELAYED RELEASE ORAL DAILY
Status: DISCONTINUED | OUTPATIENT
Start: 2023-05-17 | End: 2023-05-17 | Stop reason: HOSPADM

## 2023-05-17 RX ORDER — SODIUM CHLORIDE 0.9 % (FLUSH) 0.9 %
5-40 SYRINGE (ML) INJECTION PRN
Status: DISCONTINUED | OUTPATIENT
Start: 2023-05-17 | End: 2023-05-17 | Stop reason: HOSPADM

## 2023-05-17 RX ORDER — ONDANSETRON 2 MG/ML
INJECTION INTRAMUSCULAR; INTRAVENOUS PRN
Status: DISCONTINUED | OUTPATIENT
Start: 2023-05-17 | End: 2023-05-17 | Stop reason: SDUPTHER

## 2023-05-17 RX ORDER — OXYCODONE HYDROCHLORIDE 5 MG/1
5 TABLET ORAL EVERY 6 HOURS PRN
Qty: 20 TABLET | Refills: 0 | Status: SHIPPED | OUTPATIENT
Start: 2023-05-17 | End: 2023-05-24

## 2023-05-17 RX ORDER — ONDANSETRON 4 MG/1
4 TABLET, ORALLY DISINTEGRATING ORAL EVERY 8 HOURS PRN
Status: DISCONTINUED | OUTPATIENT
Start: 2023-05-17 | End: 2023-05-17 | Stop reason: HOSPADM

## 2023-05-17 RX ORDER — ACETAMINOPHEN 500 MG
1000 TABLET ORAL ONCE
Status: COMPLETED | OUTPATIENT
Start: 2023-05-17 | End: 2023-05-17

## 2023-05-17 RX ORDER — ONDANSETRON 2 MG/ML
4 INJECTION INTRAMUSCULAR; INTRAVENOUS
Status: COMPLETED | OUTPATIENT
Start: 2023-05-17 | End: 2023-05-17

## 2023-05-17 RX ORDER — NEOSTIGMINE METHYLSULFATE 1 MG/ML
INJECTION, SOLUTION INTRAVENOUS PRN
Status: DISCONTINUED | OUTPATIENT
Start: 2023-05-17 | End: 2023-05-17 | Stop reason: SDUPTHER

## 2023-05-17 RX ORDER — MELOXICAM 7.5 MG/1
15 TABLET ORAL DAILY
Status: DISCONTINUED | OUTPATIENT
Start: 2023-05-18 | End: 2023-05-17 | Stop reason: HOSPADM

## 2023-05-17 RX ORDER — GLYCOPYRROLATE 0.2 MG/ML
INJECTION INTRAMUSCULAR; INTRAVENOUS PRN
Status: DISCONTINUED | OUTPATIENT
Start: 2023-05-17 | End: 2023-05-17 | Stop reason: SDUPTHER

## 2023-05-17 RX ORDER — LIDOCAINE HYDROCHLORIDE 20 MG/ML
INJECTION, SOLUTION EPIDURAL; INFILTRATION; INTRACAUDAL; PERINEURAL PRN
Status: DISCONTINUED | OUTPATIENT
Start: 2023-05-17 | End: 2023-05-17 | Stop reason: SDUPTHER

## 2023-05-17 RX ORDER — ACETAMINOPHEN 500 MG
1000 TABLET ORAL EVERY 8 HOURS
Qty: 180 TABLET | Refills: 0 | Status: SHIPPED | OUTPATIENT
Start: 2023-05-17 | End: 2023-06-16

## 2023-05-17 RX ORDER — LABETALOL HYDROCHLORIDE 5 MG/ML
10 INJECTION, SOLUTION INTRAVENOUS
Status: DISCONTINUED | OUTPATIENT
Start: 2023-05-17 | End: 2023-05-17 | Stop reason: HOSPADM

## 2023-05-17 RX ORDER — OXYCODONE HYDROCHLORIDE 5 MG/1
10 TABLET ORAL EVERY 4 HOURS PRN
Status: DISCONTINUED | OUTPATIENT
Start: 2023-05-17 | End: 2023-05-17 | Stop reason: HOSPADM

## 2023-05-17 RX ORDER — ROPIVACAINE HYDROCHLORIDE 2 MG/ML
INJECTION, SOLUTION EPIDURAL; INFILTRATION; PERINEURAL
Status: COMPLETED | OUTPATIENT
Start: 2023-05-17 | End: 2023-05-17

## 2023-05-17 RX ORDER — FENTANYL CITRATE 50 UG/ML
100 INJECTION, SOLUTION INTRAMUSCULAR; INTRAVENOUS
Status: COMPLETED | OUTPATIENT
Start: 2023-05-17 | End: 2023-05-17

## 2023-05-17 RX ORDER — OXYCODONE HYDROCHLORIDE 5 MG/1
5 TABLET ORAL EVERY 4 HOURS PRN
Status: DISCONTINUED | OUTPATIENT
Start: 2023-05-17 | End: 2023-05-17 | Stop reason: HOSPADM

## 2023-05-17 RX ORDER — TRAMADOL HYDROCHLORIDE 50 MG/1
50 TABLET ORAL EVERY 6 HOURS PRN
Qty: 42 TABLET | Refills: 0 | Status: SHIPPED | OUTPATIENT
Start: 2023-05-17 | End: 2023-05-31

## 2023-05-17 RX ORDER — HYDRALAZINE HYDROCHLORIDE 20 MG/ML
INJECTION INTRAMUSCULAR; INTRAVENOUS PRN
Status: DISCONTINUED | OUTPATIENT
Start: 2023-05-17 | End: 2023-05-17 | Stop reason: SDUPTHER

## 2023-05-17 RX ORDER — SODIUM CHLORIDE 9 MG/ML
INJECTION, SOLUTION INTRAVENOUS PRN
Status: DISCONTINUED | OUTPATIENT
Start: 2023-05-17 | End: 2023-05-17 | Stop reason: HOSPADM

## 2023-05-17 RX ORDER — SODIUM PHOSPHATE, DIBASIC AND SODIUM PHOSPHATE, MONOBASIC 7; 19 G/133ML; G/133ML
1 ENEMA RECTAL DAILY PRN
Status: DISCONTINUED | OUTPATIENT
Start: 2023-05-17 | End: 2023-05-17 | Stop reason: HOSPADM

## 2023-05-17 RX ORDER — FAMOTIDINE 20 MG/1
20 TABLET, FILM COATED ORAL 2 TIMES DAILY
Status: DISCONTINUED | OUTPATIENT
Start: 2023-05-17 | End: 2023-05-17 | Stop reason: HOSPADM

## 2023-05-17 RX ORDER — MINERAL OIL AND WHITE PETROLATUM 150; 830 MG/G; MG/G
OINTMENT OPHTHALMIC PRN
Status: DISCONTINUED | OUTPATIENT
Start: 2023-05-17 | End: 2023-05-17 | Stop reason: SDUPTHER

## 2023-05-17 RX ORDER — OXYCODONE HYDROCHLORIDE 5 MG/1
5 TABLET ORAL EVERY 6 HOURS PRN
Qty: 20 TABLET | Refills: 0 | Status: SHIPPED | OUTPATIENT
Start: 2023-05-17 | End: 2023-05-31

## 2023-05-17 RX ORDER — MIDAZOLAM HYDROCHLORIDE 2 MG/2ML
2 INJECTION, SOLUTION INTRAMUSCULAR; INTRAVENOUS
Status: COMPLETED | OUTPATIENT
Start: 2023-05-17 | End: 2023-05-17

## 2023-05-17 RX ORDER — FENTANYL CITRATE 50 UG/ML
INJECTION, SOLUTION INTRAMUSCULAR; INTRAVENOUS PRN
Status: DISCONTINUED | OUTPATIENT
Start: 2023-05-17 | End: 2023-05-17 | Stop reason: SDUPTHER

## 2023-05-17 RX ORDER — MELOXICAM 7.5 MG/1
15 TABLET ORAL ONCE
Status: COMPLETED | OUTPATIENT
Start: 2023-05-17 | End: 2023-05-17

## 2023-05-17 RX ORDER — PROCHLORPERAZINE EDISYLATE 5 MG/ML
5 INJECTION INTRAMUSCULAR; INTRAVENOUS
Status: DISCONTINUED | OUTPATIENT
Start: 2023-05-17 | End: 2023-05-17 | Stop reason: HOSPADM

## 2023-05-17 RX ORDER — ROPIVACAINE HYDROCHLORIDE 2 MG/ML
30 INJECTION, SOLUTION EPIDURAL; INFILTRATION; PERINEURAL ONCE
Status: DISCONTINUED | OUTPATIENT
Start: 2023-05-17 | End: 2023-05-17 | Stop reason: HOSPADM

## 2023-05-17 RX ORDER — VANCOMYCIN HYDROCHLORIDE 1 G/20ML
INJECTION, POWDER, LYOPHILIZED, FOR SOLUTION INTRAVENOUS PRN
Status: DISCONTINUED | OUTPATIENT
Start: 2023-05-17 | End: 2023-05-17 | Stop reason: ALTCHOICE

## 2023-05-17 RX ORDER — LABETALOL HYDROCHLORIDE 5 MG/ML
INJECTION, SOLUTION INTRAVENOUS PRN
Status: DISCONTINUED | OUTPATIENT
Start: 2023-05-17 | End: 2023-05-17 | Stop reason: SDUPTHER

## 2023-05-17 RX ORDER — KETOROLAC TROMETHAMINE 15 MG/ML
15 INJECTION, SOLUTION INTRAMUSCULAR; INTRAVENOUS EVERY 6 HOURS
Status: DISCONTINUED | OUTPATIENT
Start: 2023-05-17 | End: 2023-05-17 | Stop reason: HOSPADM

## 2023-05-17 RX ORDER — ROCURONIUM BROMIDE 10 MG/ML
INJECTION, SOLUTION INTRAVENOUS PRN
Status: DISCONTINUED | OUTPATIENT
Start: 2023-05-17 | End: 2023-05-17 | Stop reason: SDUPTHER

## 2023-05-17 RX ADMIN — ACETAMINOPHEN 1000 MG: 500 TABLET ORAL at 16:55

## 2023-05-17 RX ADMIN — GLYCOPYRROLATE 0.4 MG: 0.2 INJECTION, SOLUTION INTRAMUSCULAR; INTRAVENOUS at 13:30

## 2023-05-17 RX ADMIN — DEXAMETHASONE SODIUM PHOSPHATE 10 MG: 10 INJECTION INTRAMUSCULAR; INTRAVENOUS at 10:55

## 2023-05-17 RX ADMIN — FAMOTIDINE 20 MG: 20 TABLET ORAL at 08:42

## 2023-05-17 RX ADMIN — LIDOCAINE HYDROCHLORIDE 100 MG: 20 INJECTION, SOLUTION EPIDURAL; INFILTRATION; INTRACAUDAL; PERINEURAL at 10:48

## 2023-05-17 RX ADMIN — FENTANYL CITRATE 25 MCG: 50 INJECTION, SOLUTION INTRAMUSCULAR; INTRAVENOUS at 12:10

## 2023-05-17 RX ADMIN — ROCURONIUM BROMIDE 10 MG: 10 INJECTION, SOLUTION INTRAVENOUS at 11:55

## 2023-05-17 RX ADMIN — ONDANSETRON 4 MG: 2 INJECTION INTRAMUSCULAR; INTRAVENOUS at 14:40

## 2023-05-17 RX ADMIN — BISACODYL 5 MG: 5 TABLET, COATED ORAL at 16:55

## 2023-05-17 RX ADMIN — ACETAMINOPHEN 1000 MG: 500 TABLET ORAL at 08:42

## 2023-05-17 RX ADMIN — FENTANYL CITRATE 25 MCG: 50 INJECTION, SOLUTION INTRAMUSCULAR; INTRAVENOUS at 12:58

## 2023-05-17 RX ADMIN — PREGABALIN 75 MG: 75 CAPSULE ORAL at 08:41

## 2023-05-17 RX ADMIN — ONDANSETRON 4 MG: 2 INJECTION INTRAMUSCULAR; INTRAVENOUS at 13:24

## 2023-05-17 RX ADMIN — LABETALOL HYDROCHLORIDE 10 MG: 5 INJECTION, SOLUTION INTRAVENOUS at 11:15

## 2023-05-17 RX ADMIN — FENTANYL CITRATE 50 MCG: 50 INJECTION, SOLUTION INTRAMUSCULAR; INTRAVENOUS at 13:39

## 2023-05-17 RX ADMIN — CEFAZOLIN 3000 MG: 1 INJECTION, POWDER, FOR SOLUTION INTRAMUSCULAR; INTRAVENOUS at 18:25

## 2023-05-17 RX ADMIN — PROPOFOL 200 MG: 10 INJECTION, EMULSION INTRAVENOUS at 10:48

## 2023-05-17 RX ADMIN — SODIUM CHLORIDE, POTASSIUM CHLORIDE, SODIUM LACTATE AND CALCIUM CHLORIDE: 600; 310; 30; 20 INJECTION, SOLUTION INTRAVENOUS at 14:45

## 2023-05-17 RX ADMIN — FENTANYL CITRATE 50 MCG: 50 INJECTION, SOLUTION INTRAMUSCULAR; INTRAVENOUS at 11:25

## 2023-05-17 RX ADMIN — KETOROLAC TROMETHAMINE 15 MG: 15 INJECTION, SOLUTION INTRAMUSCULAR; INTRAVENOUS at 18:25

## 2023-05-17 RX ADMIN — MIDAZOLAM 2 MG: 1 INJECTION INTRAMUSCULAR; INTRAVENOUS at 10:30

## 2023-05-17 RX ADMIN — TRANEXAMIC ACID 1000 MG: 100 INJECTION, SOLUTION INTRAVENOUS at 11:00

## 2023-05-17 RX ADMIN — SODIUM CHLORIDE, SODIUM LACTATE, POTASSIUM CHLORIDE, AND CALCIUM CHLORIDE: 600; 310; 30; 20 INJECTION, SOLUTION INTRAVENOUS at 08:00

## 2023-05-17 RX ADMIN — WATER 3000 MG: 1 INJECTION, SOLUTION INTRAMUSCULAR; INTRAVENOUS; SUBCUTANEOUS at 10:55

## 2023-05-17 RX ADMIN — PROPOFOL 50 MG: 10 INJECTION, EMULSION INTRAVENOUS at 11:14

## 2023-05-17 RX ADMIN — LABETALOL HYDROCHLORIDE 10 MG: 5 INJECTION, SOLUTION INTRAVENOUS at 11:08

## 2023-05-17 RX ADMIN — TRAMADOL HYDROCHLORIDE 50 MG: 50 TABLET, COATED ORAL at 16:55

## 2023-05-17 RX ADMIN — ROCURONIUM BROMIDE 50 MG: 10 INJECTION, SOLUTION INTRAVENOUS at 10:48

## 2023-05-17 RX ADMIN — HYDRALAZINE HYDROCHLORIDE 5 MG: 20 INJECTION, SOLUTION INTRAMUSCULAR; INTRAVENOUS at 12:20

## 2023-05-17 RX ADMIN — MINERAL OIL AND WHITE PETROLATUM 1 INCH: 150; 830 OINTMENT OPHTHALMIC at 12:45

## 2023-05-17 RX ADMIN — SODIUM CHLORIDE, SODIUM LACTATE, POTASSIUM CHLORIDE, AND CALCIUM CHLORIDE: 600; 310; 30; 20 INJECTION, SOLUTION INTRAVENOUS at 11:05

## 2023-05-17 RX ADMIN — FENTANYL CITRATE 100 MCG: 50 INJECTION, SOLUTION INTRAMUSCULAR; INTRAVENOUS at 10:30

## 2023-05-17 RX ADMIN — NEOSTIGMINE METHYLSULFATE 3 MG: 1 INJECTION, SOLUTION INTRAVENOUS at 13:30

## 2023-05-17 RX ADMIN — MELOXICAM 15 MG: 7.5 TABLET ORAL at 08:41

## 2023-05-17 RX ADMIN — HYDROMORPHONE HYDROCHLORIDE 0.5 MG: 1 INJECTION, SOLUTION INTRAMUSCULAR; INTRAVENOUS; SUBCUTANEOUS at 14:40

## 2023-05-17 RX ADMIN — ROPIVACAINE HYDROCHLORIDE 30 ML: 2 INJECTION, SOLUTION EPIDURAL; INFILTRATION at 10:30

## 2023-05-17 RX ADMIN — FENTANYL CITRATE 50 MCG: 50 INJECTION, SOLUTION INTRAMUSCULAR; INTRAVENOUS at 14:05

## 2023-05-17 RX ADMIN — GLYCOPYRROLATE 0.2 MG: 0.2 INJECTION, SOLUTION INTRAMUSCULAR; INTRAVENOUS at 10:45

## 2023-05-17 RX ADMIN — TRANEXAMIC ACID 1000 MG: 100 INJECTION, SOLUTION INTRAVENOUS at 13:25

## 2023-05-17 ASSESSMENT — PAIN DESCRIPTION - ORIENTATION
ORIENTATION: LEFT
ORIENTATION: LEFT
ORIENTATION: LEFT;ANTERIOR

## 2023-05-17 ASSESSMENT — PAIN DESCRIPTION - LOCATION
LOCATION: KNEE

## 2023-05-17 ASSESSMENT — PAIN SCALES - GENERAL
PAINLEVEL_OUTOF10: 2
PAINLEVEL_OUTOF10: 8
PAINLEVEL_OUTOF10: 8
PAINLEVEL_OUTOF10: 10
PAINLEVEL_OUTOF10: 3

## 2023-05-17 ASSESSMENT — PAIN DESCRIPTION - DESCRIPTORS
DESCRIPTORS: ACHING
DESCRIPTORS: ACHING

## 2023-05-17 ASSESSMENT — PAIN - FUNCTIONAL ASSESSMENT
PAIN_FUNCTIONAL_ASSESSMENT: ACTIVITIES ARE NOT PREVENTED
PAIN_FUNCTIONAL_ASSESSMENT: 0-10

## 2023-05-17 ASSESSMENT — PAIN DESCRIPTION - PAIN TYPE: TYPE: ACUTE PAIN;SURGICAL PAIN

## 2023-05-17 NOTE — CARE COORDINATION
Case Management Assessment  Initial Evaluation    Date/Time of Evaluation: 5/17/2023 5:27 PM  Assessment Completed by: Kostas Padilla       05/17/23 6328   Service Assessment   Patient Orientation Alert and Oriented   Cognition Alert   History Provided By Patient   Primary Caregiver Self   Support Systems Family Members   PCP Verified by CM Yes   Last Visit to PCP Within last 3 months   Prior Functional Level Independent in ADLs/IADLs   Current Functional Level Independent in ADLs/IADLs   Can patient return to prior living arrangement Yes   Ability to make needs known: Good   Family able to assist with home care needs: Yes   Would you like for me to discuss the discharge plan with any other family members/significant others, and if so, who? No   Financial Resources Medicare   Community Resources None   Social/Functional History   Lives With Family   Type of [facility] Two level; Able to Live on Main level with bedroom/bathroom   Home Access Ramped entrance;Stairs to enter with rails   Entrance Stairs - Number of Steps 81 [address] Help From [address] [facility],[address] Responsibilities No   Ambulation Assistance Independent   Transfer Assistance Independent   Occupation On disability   Discharge Planning   Type of Residence House   Living Arrangements Family Members   Current Services Prior To Admission None   Potential Assistance Needed N/A   DME Ordered? No   Potential Assistance Purchasing Medications No   Type of Home Care Services None   Patient expects to be discharged to: House   One/Two Story Residence Two story   # of Interior Steps 5   Interior Rails Both   Lift Chair Available No   History of falls? 0   Services At/After Discharge   Transition of Care Consult (CM Consult) Discharge Ana [address] Discharge None   [place] Resource Information Provided?  No   Mode

## 2023-05-17 NOTE — PLAN OF CARE
Problem: Physical Therapy - Adult  Goal: By Discharge: Performs mobility at highest level of function for planned discharge setting. See evaluation for individualized goals. Description: Physical Therapy Goals  Initiated 5/17/2023 and to be accomplished within 7 day(s)  1. Patient will move from supine to sit and sit to supine  in bed with modified independence. 2.  Patient will transfer from bed to chair and chair to bed with modified independence using the least restrictive device. 3.  Patient will perform sit to stand with modified independence. 4.  Patient will ambulate with modified independence for 150 feet with the least restrictive device. 5.  Patient will ascend/descend 3 stairs with handrail(s) with modified independence. PLOF: Lives with family. Two story home; plans to stay on first floor. Ramp entry. History of left ankle sprain 1.5 months ago. Independent. Outcome: Progressing   PHYSICAL THERAPY EVALUATION    Patient: Kasey Bruno (46 y.o. female)  Date: 5/17/2023  Primary Diagnosis: Post-traumatic osteoarthritis of left knee [M17.32]  Status post left knee replacement [Z96.652]  Procedure(s) (LRB):  LEFT TOTAL KNEE ARTHROPLASTY; TAYLOR LONNIE AND HARDWARE REMOVAL (Left) Day of Surgery   Precautions: Fall Risk, Weight Bearing, Left Lower Extremity Weight Bearing: Weight Bearing As Tolerated  ASSESSMENT :  Underwent left TKA 5/17/2023. WBAT LLE. Demonstrates quad set LLE. Supervision for supine to sit. Seated EOB with good balance. Supervision for sit to stand. Amb 15ft to bathroom with ww. Supervision for transfers to/from toilet. Supervision for amb 10ft with ww to chair. Seated in recliner with BLE elevated. Ice applied to left knee. Educated on need for RN assistance with mobility; verbalized understanding. Call bell in reach. Will follow up to progress amb and trial stairs as needed. Patient does have ramp entry to home. Will need ww for discharge.      DEFICITS/IMPAIRMENTS:   Body

## 2023-05-17 NOTE — INTERVAL H&P NOTE
Update History & Physical    The patient's History and Physical of May 15, 2023 was reviewed with the patient and I examined the patient. There was no change. The surgical site was confirmed by the patient and me. Plan: The risks, benefits, expected outcome, and alternative to the recommended procedure have been discussed with the patient. Patient understands and wants to proceed with the procedure.      Electronically signed by Sridhar Gonzalez DO on 5/17/2023 at 10:05 AM

## 2023-05-17 NOTE — PERIOP NOTE
Patient said that her left ankle was sprained about 1 and 1/2 months ago. Same tender to touch when larger non-skid socks were applied. 2 Surgical Nurse  Orthopedic  Nurse Navigator Cleotha Koyanagi made aware so that this new information will be given to  patient's receiving nurse and physical therapy.

## 2023-05-17 NOTE — ANESTHESIA POSTPROCEDURE EVALUATION
Department of Anesthesiology  Postprocedure Note    Patient: Sylwia Richards  MRN: [id number]  YOB: 1975  Date of evaluation: 5/17/2023      Procedure Summary     Date: 05/17/23 Room / Location: [facility] MAIN 05 / [facility] MAIN OR    Anesthesia Start: 1042 Anesthesia Stop: 2344    Procedure: LEFT TOTAL KNEE ARTHROPLASTY; TAYLOR LONNIE AND HARDWARE REMOVAL (Left: Knee) Diagnosis:       Post-traumatic osteoarthritis of left knee      (Post-traumatic osteoarthritis of left knee [M17.32])    Surgeons: Molly Eldridge DO Responsible Provider: Praneeth Morin DO    Anesthesia Type: general ASA Status: 3          Anesthesia Type: No value filed.     Bobby Phase I: Bobby Score: 8    Bobby Phase II:        Anesthesia Post Evaluation    Patient location during evaluation: PACU  Patient participation: complete - patient participated  Level of consciousness: awake and alert  Airway patency: patent  Nausea & Vomiting: no nausea and no vomiting  Complications: no  Cardiovascular status: hemodynamically stable  Respiratory status: acceptable  Hydration status: stable  Multimodal analgesia pain management approach

## 2023-05-17 NOTE — OP NOTE
* No LDAs found *    Findings: none    A 22 modifier will be billed due to excessive effort needed to dissect through and gain exposure through excess adipose tissue. The patient's BMI is 41. ANESTHESIA STAFF: Anesthesiologist: Lisa Dodge DO  CRNA: YAKELIN Mathur - CRNA; YAKELIN Cristina - CRNA       Total Tourniquet Time Documented:  Thigh (Left) - 20 minutes  Total: Thigh (Left) - 20 minutes       IV FLUIDS:  see anesthesia record     INDICATIONS FOR PROCEDURE:  The patient has failed conservative management of their knee arthritis to include activity modification, oral medication, and injections. The disease has caused significant impact on their quality of life and wished to undergo surgical management understanding the risk of the surgery and the benefit to relieve pain and restore function following failure of conservative management. PROCEDURE:  After informed consent was confirmed in the preoperative holding area, the operative extremity was marked. The patient was brought to the operating room and anesthesia was initiated. The anesthesia team maintained control of the head, neck and airway at all times. All bony prominences were padded and the non-operative limb had mechanical deep vein thrombosis (DVT) prophylaxis placed. The operative leg was prepped and draped in the usual sterile fashion. A time-out  was done to ensure the right patient, side, and procedure to be performed. The patient received weight based preoperative antibiotics and received Tranexamic Acid. A standard medial parapatellar approach to the knee was performed. Due to her large soft tissue envelope, extra effort and time was needed to achieve and maintain exposure. This added 15 in addition to the time needed to remove the interference screw. There were significant degenerative changes indicating a total knee arthroplasty. The patella was everted, measured with caliper and cut for resurfacing.

## 2023-05-17 NOTE — PROGRESS NOTES
S:   C/O left ankle pain from a sprain 1 month ago  Ambulated1 0 feet to the bathroom  No chest pain, shortness of breath, nausea,vomiting, fever, or chills  Tolerating PO  Voiding spontaneously  Passing gas    O:   Patient Vitals for the past 8 hrs:   Temp Pulse Resp BP SpO2   05/17/23 1626 97.2 °F (36.2 °C) 73 -- 135/86 98 %   05/17/23 1603 -- 75 14 -- 98 %   05/17/23 1553 -- 73 19 (!) 153/85 97 %   05/17/23 1533 -- 75 12 (!) 146/82 99 %   05/17/23 1527 -- 74 21 -- 98 %   05/17/23 1525 -- 75 18 -- 97 %   05/17/23 1523 -- 77 16 (!) 141/82 99 %   05/17/23 1520 -- 74 18 -- 99 %   05/17/23 1515 -- 73 12 -- 98 %   05/17/23 1513 -- 74 18 133/78 98 %   05/17/23 1510 -- 74 20 -- 98 %   05/17/23 1500 -- 73 16 (!) 146/83 98 %   05/17/23 1445 -- 72 10 127/64 90 %   05/17/23 1433 -- 74 16 (!) 142/76 96 %   05/17/23 1423 -- 74 12 (!) 146/76 100 %   05/17/23 1413 -- 72 14 134/76 100 %   05/17/23 1404 98 °F (36.7 °C) 74 12 (!) 153/97 99 %   05/17/23 1035 -- 65 14 (!) 140/70 97 %          Alert, Oriented, NAD, non labored  Operative extremity:  Dressing clean, dry, intact  Extremity 2+, no swelling, sensation and motor intact throughout. No calf pain. Pt able to walk 200 ft in the hallway with me and ascend and descend 4 stairs    A/P:   46 y.o. female post-op day Day of Surgery status post Procedure(s):  LEFT TOTAL KNEE ARTHROPLASTY; TAYLOR LONNIE AND HARDWARE REMOVAL   doing well. Postoperative protocol discussed. - WBAT, OOB as much as tolerated.   - Abx x 24hours post-op (finish AM of POD#1)  - DVT prophy: TEDs, SCDs, asa  - Pain control: Tylenol, Nsaid (Toradol then oral), Tramadol, Oxycodone  - PT/OT  - Dispo: D/C soha Whitaker DO  5/17/2023  6:00 PM

## 2023-05-17 NOTE — PERIOP NOTE
TRANSFER - OUT REPORT:    Verbal report given to Shanell on Bernardino Mooney  being transferred to [address] [place] [address] for routine post-op       Report consisted of patient's Situation, Background, Assessment and   Recommendations(SBAR). Information from the following report(s) Surgery Report was reviewed with the receiving nurse. [place] Assessment: No data recorded  Lines:   Peripheral IV 05/17/23 Posterior;Right Hand (Active)   Site Assessment Clean, dry & intact 05/17/23 0840   Line Status Infusing 05/17/23 0840   Line Care Connections checked and tightened 05/17/23 0840   Phlebitis Assessment No symptoms 05/17/23 0840   Infiltration Assessment 0 05/17/23 0840   Dressing Status New dressing applied 05/17/23 0840   Dressing Type Transparent 05/17/23 0840   Dressing Intervention New 05/17/23 0840        Opportunity for questions and clarification was provided.       Patient transported with:  Geronimo Da Silva

## 2023-05-17 NOTE — ANESTHESIA PROCEDURE NOTES
Peripheral Block    Patient location during procedure: pre-op  Reason for block: post-op pain management  Start time: 5/17/2023 10:30 AM  End time: 5/17/2023 10:35 AM  Staffing  Performed: anesthesiologist   Anesthesiologist: Rossi Kamara DO  Preanesthetic Checklist  Completed: patient identified, IV checked, site marked, risks and benefits discussed, surgical/procedural consents, equipment checked, pre-op evaluation, timeout performed, anesthesia consent given, oxygen available and monitors applied/VS acknowledged  Peripheral Block   Patient position: supine  Prep: ChloraPrep  Provider prep: mask and sterile gloves  Patient monitoring: cardiac monitor, continuous pulse ox, continuous capnometry, frequent blood pressure checks, IV access, oxygen and responsive to questions  Block type: Femoral  Adductor canal  Laterality: left  Injection technique: single-shot  Guidance: ultrasound guided    Needle   Needle type: insulated echogenic nerve stimulator needle   Needle gauge: 21 G  Needle localization: ultrasound guidance  Needle length: 8 cm  Assessment   Injection assessment: negative aspiration for heme, no paresthesia on injection, local visualized surrounding nerve on ultrasound and no intravascular symptoms  Paresthesia pain: none  Slow fractionated injection: yes  Hemodynamics: stable  Outcomes: patient tolerated procedure well    Medications Administered  ropivacaine (NAROPIN) injection 0.2% - Perineural   30 mL - 5/17/2023 10:30:00 AM

## 2023-05-17 NOTE — ANESTHESIA PRE PROCEDURE
03/02/2023 09:35 AM    RBC 3.85 03/02/2023 09:35 AM    HGB 12.5 03/02/2023 09:35 AM    HCT 41.1 03/02/2023 09:35 AM     03/02/2023 09:35 AM    RDW 12.9 03/02/2023 09:35 AM     03/02/2023 09:35 AM       CMP:   Lab Results   Component Value Date/Time     03/02/2023 09:35 AM    K 4.6 03/02/2023 09:35 AM     03/02/2023 09:35 AM    CO2 31 03/02/2023 09:35 AM    BUN 12 03/02/2023 09:35 AM    CREATININE 0.6 03/02/2023 09:35 AM    GFRAA >60 05/15/2020 10:25 AM    AGRATIO 1.8 03/02/2023 09:35 AM    LABGLOM >60 05/15/2020 10:25 AM    GLUCOSE 100 03/02/2023 09:35 AM    GLUCOSE Negative 03/02/2023 09:35 AM    PROT 7.0 03/02/2023 09:35 AM    CALCIUM 9.5 03/02/2023 09:35 AM    BILITOT 0.2 03/02/2023 09:35 AM    BILITOT Negative 03/02/2023 09:35 AM    ALKPHOS 111 03/02/2023 09:35 AM    AST 13 03/02/2023 09:35 AM    ALT 15 03/02/2023 09:35 AM       POC Tests: No results for input(s): POCGLU, POCNA, POCK, POCCL, POCBUN, POCHEMO, POCHCT in the last 72 hours.     Coags: No results found for: PROTIME, INR, APTT    HCG (If Applicable):   Lab Results   Component Value Date    PREGTESTUR Negative 05/17/2023        ABGs: No results found for: PHART, PO2ART, LCA0DRF, RRB4WRO, BEART, C7WRDERH     Type & Screen (If Applicable):  No results found for: LABABO, LABRH    Drug/Infectious Status (If Applicable):  No results found for: HIV, HEPCAB    COVID-19 Screening (If Applicable): No results found for: COVID19        Anesthesia Evaluation  Patient summary reviewed and Nursing notes reviewed  Airway: Mallampati: II  TM distance: >3 FB   Neck ROM: full  Mouth opening: > = 3 FB   Dental:    (+) poor dentition      Pulmonary:Negative Pulmonary ROS and normal exam                               Cardiovascular:    (+) hypertension:,                   Neuro/Psych:   (+) neuromuscular disease:, psychiatric history: stable with treatment            GI/Hepatic/Renal: Neg GI/Hepatic/Renal ROS            Endo/Other: Negative

## 2023-05-17 NOTE — NURSE NAVIGATOR
Rounded on patient s/p left total knee replacement with Dr. Lena Streeter, [facility] 05/17/2023. Patient observed to be alert and oriented x 3, sitting up in bed. She denies chest pain, shortness of breath, nausea, vomiting or calf pain. She is rating her pain at 7/10 at this time, RN aware and will medicate. Ace wrap observed to left lower extremity, dressing underneath observed to be clean, dry and intact. Patient has full feeling in left lower extremity and is able to perform a straight leg raise. Patient received a total knee replacement education book at the clinic level and all information was reviewed on the pre op phone call. Education provided regarding the use of incentive spirometry. Encouraged use 10 times hourly to keep lungs expanded and prevent lung complications. Reviewed postop showering instructions. Patient reminded that ace wrap may be removed tomorrow as dressing is waterproof. She may shower, no tubs or submersion in water is allowed. She is to call the office if more than 50 percent drainage is noted on dressing or if water accumulates underneath dressing. Education provided regarding the importance of hourly ambulation and icing to assist with pain and stiffness. Encouraged ambulation 10 minutes every hour followed by icing for 20 minutes not to be placed directly on her skin. Reviewed signs and symptoms of DVT. Patient verbalized understanding of all information provided. All questions were answered.
that may arise later.

## 2023-05-17 NOTE — PROGRESS NOTES
Pharmacy Note - Dose adjustment made per P/T protocol    Original order:  Toradol 30 mg IVP q 6 h x 5 days    Estimated Creatinine Clearance: 159 mL/min (based on SCr of 0.6 mg/dL). No results for input(s): BUN, CREATININE in the last 72 hours. [facility] Policy adjusted order:  Toradol 15 mg IVP q 6 h x 5 days    Please call Inpatient Pharmacy with any questions.     Thank you,  Gian Davis [facility] MS  5/17/2023 3:39 PM

## 2023-05-18 NOTE — PROGRESS NOTES
Discharge instructions reviewed with patient. Pt verbalized understanding and denied any further questions. IV site removed, pt taken downstairs for discharge via wheelchair by RN.

## 2023-05-19 ENCOUNTER — TELEPHONE (OUTPATIENT)
Age: 48
End: 2023-05-19

## 2023-05-19 ENCOUNTER — TELEPHONE (OUTPATIENT)
Facility: HOSPITAL | Age: 48
End: 2023-05-19

## 2023-05-19 NOTE — TELEPHONE ENCOUNTER
Patient states that she just had total knee replacement and was told to call in if she saw a lot of fluid coming from the knee. Please contact the patient back as soon as possible at [phone].

## 2023-05-19 NOTE — TELEPHONE ENCOUNTER
Call placed to patient, ID verified x 2. patient s/p left total knee replacement with Dr. Abigail Aquino, [facility] 05/17/2023. She denies chest pain, shortness of breath, nausea, vomiting, fever, chills or calf pain. Patient states that pain is well controlled taking her pain medications scheduled as prescribed. Medications were able to be obtained from [facility] with good RX coupon after prescriptions were cancelled at [facility]. Patient denies any numbness or tingling to left lower extremity, she denies any difficulty with bowel or bladder. She states that she is up ambulating ad jeremy with her walker, she is icing to assist with pain and swelling. Her dressing is described as clean, dry and intact. Reminded patient that ace wrap can come off so she may shower. Patient will call clinic if she notices drainage of more than 50 percent on dressing or if water starts accumulating underneath. Overall patient is doing well. She has no questions or concerns at this time. She will follow up with Dr. Abigail Aquino in two weeks or sooner if needed.

## 2023-05-20 NOTE — TELEPHONE ENCOUNTER
Called spoke with the patient today. She reports that the lower third of her bandage is soaked with blood. He has not advanced any over the last several hours. The edges of the bandage are intact. I told her that I would like her to call back if the drainage expands any and will need to see her immediately at that point.

## 2023-06-02 ENCOUNTER — OFFICE VISIT (OUTPATIENT)
Age: 48
End: 2023-06-02

## 2023-06-02 VITALS — WEIGHT: 283 LBS | BODY MASS INDEX: 41.92 KG/M2 | HEIGHT: 69 IN

## 2023-06-02 DIAGNOSIS — Z47.89 ORTHOPEDIC AFTERCARE: ICD-10-CM

## 2023-06-02 DIAGNOSIS — Z96.652 STATUS POST LEFT KNEE REPLACEMENT: Primary | ICD-10-CM

## 2023-06-02 DIAGNOSIS — E66.01 OBESITY, CLASS III, BMI 40-49.9 (MORBID OBESITY) (HCC): ICD-10-CM

## 2023-06-02 PROBLEM — M17.12 PRIMARY OSTEOARTHRITIS OF LEFT KNEE: Status: RESOLVED | Noted: 2023-05-01 | Resolved: 2023-06-02

## 2023-06-02 NOTE — PROGRESS NOTES
Patient: Gregg Le                MRN: [id number]       SSN: [id number]  YOB: 1975        AGE: 46 y.o. SEX: female    PCP: Alma Elias MD  06/02/23    Chief Complaint: Knee Pain and Post-Op Check (LEFT TOTAL KNEE ARTHROPLASTY; TAYLOR LONNIE AND HARDWARE REMOVAL/DOS 05/17/2023)      HPI:  Gregg Le is a 46 y.o. female with chief complaint of   Chief Complaint   Patient presents with    Knee Pain    Post-Op Check     LEFT TOTAL KNEE ARTHROPLASTY; [address]  DOS 05/17/2023       1. Status post left knee replacement  Assessment & Plan:  78-year-old female who is 2 weeks out from left total knee replacement. She is doing well and is ambulating with a walker. The lower third of her bandage has dried blood on it which happened early after surgery and is not advanced since that time. Her incision is clean and dry. There is a small area of about the inch just inferior to the patella that is not fully epithelialized at this time. The patient is warned against using any ointments or gels or submerging her knee in water at this time. I would like to see her again in 6 weeks for reevaluation. I will get her started on formal physical therapy. Orders:  -     Ambulatory referral to Physical Therapy  2. Obesity, Class III, BMI 40-49.9 (morbid obesity) ([address]  3.  Orthopedic aftercare        AMB PAIN ASSESSMENT 6/2/2023   Location of Pain Knee   Location Modifiers Left   Severity of Pain 6   Quality of Pain -   Duration of Pain -   Frequency of Pain -   Aggravating Factors -   Limiting Behavior -   Relieving Factors -   Result of Injury -   Work-Related Injury -   Are there other pain locations you wish to document? -     Tobacco Use: Medium Risk    Smoking Tobacco Use: Former    Smokeless Tobacco Use: Never    Passive Exposure: Not on file           PHYSICAL EXAMINATION:  Ht 5' 9\" (1.753 m)   Wt 283 lb (128.4 kg)   LMP 05/09/2023   BMI 41.79 kg/m²   Body mass index

## 2023-06-02 NOTE — ASSESSMENT & PLAN NOTE
69-year-old female who is 2 weeks out from left total knee replacement. She is doing well and is ambulating with a walker. The lower third of her bandage has dried blood on it which happened early after surgery and is not advanced since that time. Her incision is clean and dry. There is a small area of about the inch just inferior to the patella that is not fully epithelialized at this time. The patient is warned against using any ointments or gels or submerging her knee in water at this time. I would like to see her again in 6 weeks for reevaluation. I will get her started on formal physical therapy.

## 2023-06-07 DIAGNOSIS — Z96.652 STATUS POST LEFT KNEE REPLACEMENT: Primary | ICD-10-CM

## 2023-06-07 RX ORDER — TRAMADOL HYDROCHLORIDE 50 MG/1
50 TABLET ORAL EVERY 6 HOURS PRN
Qty: 28 TABLET | Refills: 0 | Status: SHIPPED | OUTPATIENT
Start: 2023-06-07 | End: 2023-06-21

## 2023-06-07 RX ORDER — CEPHALEXIN 500 MG/1
500 CAPSULE ORAL 4 TIMES DAILY
Qty: 40 CAPSULE | Refills: 0 | Status: SHIPPED | OUTPATIENT
Start: 2023-06-07 | End: 2023-06-17

## 2023-06-07 NOTE — PROGRESS NOTES
Patient contact the office for refill of tramadol. She is also worried that she might have an infection starting. She sent a picture which looks like she does have erythema and some drainage coming from the most proximal aspect of her incision. I will start her on Keflex and see her in the office early next week.

## 2023-06-08 ENCOUNTER — OFFICE VISIT (OUTPATIENT)
Facility: CLINIC | Age: 48
End: 2023-06-08
Payer: COMMERCIAL

## 2023-06-08 VITALS
HEART RATE: 68 BPM | BODY MASS INDEX: 41.92 KG/M2 | HEIGHT: 69 IN | OXYGEN SATURATION: 97 % | SYSTOLIC BLOOD PRESSURE: 139 MMHG | DIASTOLIC BLOOD PRESSURE: 79 MMHG | WEIGHT: 283 LBS | TEMPERATURE: 97.1 F | RESPIRATION RATE: 17 BRPM

## 2023-06-08 DIAGNOSIS — F17.200 TOBACCO USE DISORDER: ICD-10-CM

## 2023-06-08 DIAGNOSIS — N39.46 MIXED INCONTINENCE: ICD-10-CM

## 2023-06-08 DIAGNOSIS — Z96.652 S/P TOTAL KNEE ARTHROPLASTY, LEFT: Primary | ICD-10-CM

## 2023-06-08 DIAGNOSIS — I10 PRIMARY HYPERTENSION: ICD-10-CM

## 2023-06-08 DIAGNOSIS — L03.116 CELLULITIS OF LEFT LOWER EXTREMITY: ICD-10-CM

## 2023-06-08 PROCEDURE — 3078F DIAST BP <80 MM HG: CPT | Performed by: STUDENT IN AN ORGANIZED HEALTH CARE EDUCATION/TRAINING PROGRAM

## 2023-06-08 PROCEDURE — 99214 OFFICE O/P EST MOD 30 MIN: CPT | Performed by: STUDENT IN AN ORGANIZED HEALTH CARE EDUCATION/TRAINING PROGRAM

## 2023-06-08 PROCEDURE — 3075F SYST BP GE 130 - 139MM HG: CPT | Performed by: STUDENT IN AN ORGANIZED HEALTH CARE EDUCATION/TRAINING PROGRAM

## 2023-06-08 RX ORDER — OXYBUTYNIN CHLORIDE 5 MG/1
5 TABLET, EXTENDED RELEASE ORAL DAILY
Qty: 30 TABLET | Refills: 3 | Status: SHIPPED | OUTPATIENT
Start: 2023-06-08

## 2023-06-08 ASSESSMENT — ENCOUNTER SYMPTOMS
SHORTNESS OF BREATH: 0
ABDOMINAL PAIN: 0

## 2023-06-08 NOTE — PROGRESS NOTES
Zen Ojeda is a 46 y.o. presents today for   Chief Complaint   Patient presents with    Follow-up     Pt here to follow up with HTN        Is someone accompanying this pt? no    Is the patient using any DME equipment during OV? Yes a walker    Depression Screening:   PHQ-9 Questionaire 5/8/2023 3/29/2023 3/2/2023 2/21/2023   Little interest or pleasure in doing things 0 0 3 1   Feeling down, depressed, or hopeless 0 0 1 1   Trouble falling or staying asleep, or sleeping too much - - 3 -   Feeling tired or having little energy - - 3 -   Poor appetite or overeating - - 3 -   Feeling bad about yourself - or that you are a failure or have let yourself or your family down - - 1 -   Trouble concentrating on things, such as reading the newspaper or watching television - - 3 -   Moving or speaking so slowly that other people could have noticed. Or the opposite - being so fidgety or restless that you have been moving around a lot more than usual - - 1 -   Thoughts that you would be better off dead, or of hurting yourself in some way - - 0 -   PHQ-9 Total Score 0 0 18 2       Abuse Screening:  No flowsheet data found. Learning Assessment:  No question data found. Fall Risk:  No flowsheet data found. Coordination of Care:   1. \"Have you been to the ER, urgent care clinic since your last visit? Hospitalized since your last visit? \" no    2. \"Have you seen or consulted any other health care providers outside of the [address] [place] [address] since your last visit? \" no    3. For patients aged 39-70: Has the patient had a colonoscopy / FIT/ Cologuard? no    If the patient is female:    4. For patients aged 41-77: Has the patient had a mammogram within the past 2 years? no    5. For patients aged 21-65: Has the patient had a pap smear? no    Health Maintenance: reviewed and discussed and ordered per Provider.     Health Maintenance Due   Topic Date Due    Hepatitis C screen  Never done    DTaP/Tdap/Td vaccine (1 - Tdap)

## 2023-06-08 NOTE — PROGRESS NOTES
History of Present Illness  Bisi Tomas is a 46 y.o. female who presents today for management of    Chief Complaint   Patient presents with    Follow-up     Pt here to follow up with HTN        - s/p L knee surgery 5/17/23  - last seen 6/2/23 for follow up  - spoke to Dr Lilia Meyers yesterday regarding increased swelling, redness, and draining at surgical scar  - given abx for presumed SSTI, appt scheduled for f/u next week   - reports some ongoing pain, currently managed with tramadol  - using walker to aid ambulation  - PT starting tomorrow   - continues to abstain from smoking and alcohol   - pt also having worsening stress and urge incontinence, de to reduced mobility, pt is having more accidents when she is unable to reach the bathroom in time  -has tried bladder training and kegel exercises      Patient Active Problem List   Diagnosis    Obesity, Class III, BMI 40-49.9 (morbid obesity) ([address]    Bipolar affective disorder, currently manic, moderate ([address]    HTN (hypertension)    Cervical myelopathy (HCC)    Hypertriglyceridemia    Cervical stenosis of spine    Fibromyalgia    Chronic midline low back pain with sciatica    Onychomycosis    Prediabetes    Irritable bowel syndrome without diarrhea    Moderate episode of recurrent major depressive disorder ([address]    Lumbar spinal stenosis    Continuous nicotine dependence    Status post left knee replacement      Past Medical History:   Diagnosis Date    Acute pain of left shoulder 11/1/2016    Bipolar 1 disorder ([address]     Bipolar affective disorder, currently manic, moderate ([address] 4/27/2016    Chronic midline low back pain with sciatica 11/1/2016    Cigarette nicotine dependence in remission 8/2/2016    Cigarette nicotine dependence without complication 71/76/5988    Continuous nicotine dependence 1/24/2017    Depression     Fibromyalgia 9/1/2015    HLD (hyperlipidemia) 2/4/2016    Hypertension     Hypertriglyceridemia 5/15/2018    Impaired fasting glucose 5/31/2016

## 2023-06-09 ENCOUNTER — HOSPITAL ENCOUNTER (OUTPATIENT)
Dept: WOMENS IMAGING | Facility: HOSPITAL | Age: 48
End: 2023-06-09
Payer: COMMERCIAL

## 2023-06-09 DIAGNOSIS — Z12.31 ENCOUNTER FOR SCREENING MAMMOGRAM FOR MALIGNANT NEOPLASM OF BREAST: ICD-10-CM

## 2023-06-09 PROCEDURE — 77063 BREAST TOMOSYNTHESIS BI: CPT

## 2023-06-12 PROBLEM — T81.41XA SUPERFICIAL INCISIONAL SURGICAL SITE INFECTION: Status: ACTIVE | Noted: 2023-06-12

## 2023-06-12 PROBLEM — M25.372 LEFT ANKLE INSTABILITY: Status: ACTIVE | Noted: 2023-06-12

## 2023-06-15 ENCOUNTER — HOSPITAL ENCOUNTER (OUTPATIENT)
Facility: HOSPITAL | Age: 48
Setting detail: RECURRING SERIES
Discharge: HOME | End: 2023-06-18
Payer: COMMERCIAL

## 2023-06-15 PROCEDURE — 97530 THERAPEUTIC ACTIVITIES: CPT

## 2023-06-15 PROCEDURE — 97116 GAIT TRAINING THERAPY: CPT

## 2023-06-15 PROCEDURE — 97112 NEUROMUSCULAR REEDUCATION: CPT

## 2023-06-15 PROCEDURE — 97110 THERAPEUTIC EXERCISES: CPT

## 2023-06-22 ENCOUNTER — HOSPITAL ENCOUNTER (OUTPATIENT)
Facility: HOSPITAL | Age: 48
Setting detail: RECURRING SERIES
Discharge: HOME | End: 2023-06-25
Payer: COMMERCIAL

## 2023-06-22 PROCEDURE — 97016 VASOPNEUMATIC DEVICE THERAPY: CPT

## 2023-06-22 PROCEDURE — 97112 NEUROMUSCULAR REEDUCATION: CPT

## 2023-06-22 PROCEDURE — 97110 THERAPEUTIC EXERCISES: CPT

## 2023-06-22 PROCEDURE — 97530 THERAPEUTIC ACTIVITIES: CPT

## 2023-06-29 ENCOUNTER — HOSPITAL ENCOUNTER (OUTPATIENT)
Facility: HOSPITAL | Age: 48
Setting detail: RECURRING SERIES
End: 2023-06-29
Payer: COMMERCIAL

## 2023-06-29 PROCEDURE — 97016 VASOPNEUMATIC DEVICE THERAPY: CPT

## 2023-06-29 PROCEDURE — 97110 THERAPEUTIC EXERCISES: CPT

## 2023-06-29 PROCEDURE — 97112 NEUROMUSCULAR REEDUCATION: CPT

## 2023-06-29 PROCEDURE — 97530 THERAPEUTIC ACTIVITIES: CPT

## 2023-06-30 ENCOUNTER — OFFICE VISIT (OUTPATIENT)
Age: 48
End: 2023-06-30

## 2023-06-30 VITALS — BODY MASS INDEX: 41.62 KG/M2 | WEIGHT: 281 LBS | HEIGHT: 69 IN

## 2023-06-30 DIAGNOSIS — Z96.652 STATUS POST LEFT KNEE REPLACEMENT: Primary | ICD-10-CM

## 2023-06-30 DIAGNOSIS — M48.061 SPINAL STENOSIS OF LUMBAR REGION, UNSPECIFIED WHETHER NEUROGENIC CLAUDICATION PRESENT: ICD-10-CM

## 2023-06-30 DIAGNOSIS — T81.41XA SUPERFICIAL INCISIONAL SURGICAL SITE INFECTION: ICD-10-CM

## 2023-06-30 DIAGNOSIS — M25.372 LEFT ANKLE INSTABILITY: ICD-10-CM

## 2023-06-30 DIAGNOSIS — E66.01 OBESITY, CLASS III, BMI 40-49.9 (MORBID OBESITY) (HCC): ICD-10-CM

## 2023-06-30 PROCEDURE — 99024 POSTOP FOLLOW-UP VISIT: CPT | Performed by: ORTHOPAEDIC SURGERY

## 2023-06-30 RX ORDER — ACETAMINOPHEN 500 MG
1000 TABLET ORAL EVERY 8 HOURS
Qty: 180 TABLET | Refills: 0 | Status: SHIPPED | OUTPATIENT
Start: 2023-06-30 | End: 2023-07-30

## 2023-06-30 RX ORDER — TRAMADOL HYDROCHLORIDE 50 MG/1
50 TABLET ORAL EVERY 6 HOURS PRN
Qty: 20 TABLET | Refills: 0 | Status: SHIPPED | OUTPATIENT
Start: 2023-06-30 | End: 2023-07-07

## 2023-07-06 ENCOUNTER — HOSPITAL ENCOUNTER (OUTPATIENT)
Facility: HOSPITAL | Age: 48
Setting detail: RECURRING SERIES
Discharge: HOME | End: 2023-07-09
Payer: COMMERCIAL

## 2023-07-06 PROCEDURE — 97530 THERAPEUTIC ACTIVITIES: CPT | Performed by: PHYSICAL THERAPIST

## 2023-07-06 PROCEDURE — 97016 VASOPNEUMATIC DEVICE THERAPY: CPT | Performed by: PHYSICAL THERAPIST

## 2023-07-06 PROCEDURE — 97110 THERAPEUTIC EXERCISES: CPT | Performed by: PHYSICAL THERAPIST

## 2023-07-06 PROCEDURE — 97112 NEUROMUSCULAR REEDUCATION: CPT | Performed by: PHYSICAL THERAPIST

## 2023-07-14 ENCOUNTER — HOSPITAL ENCOUNTER (OUTPATIENT)
Facility: HOSPITAL | Age: 48
Setting detail: RECURRING SERIES
Discharge: HOME | End: 2023-07-17
Payer: COMMERCIAL

## 2023-07-14 PROCEDURE — 97016 VASOPNEUMATIC DEVICE THERAPY: CPT

## 2023-07-14 PROCEDURE — 97110 THERAPEUTIC EXERCISES: CPT

## 2023-07-14 PROCEDURE — 97112 NEUROMUSCULAR REEDUCATION: CPT

## 2023-07-14 PROCEDURE — 97530 THERAPEUTIC ACTIVITIES: CPT

## 2023-07-20 ENCOUNTER — APPOINTMENT (OUTPATIENT)
Facility: HOSPITAL | Age: 48
End: 2023-07-20
Payer: COMMERCIAL

## 2023-07-25 ENCOUNTER — OFFICE VISIT (OUTPATIENT)
Facility: CLINIC | Age: 48
End: 2023-07-25
Payer: COMMERCIAL

## 2023-07-25 VITALS
HEART RATE: 78 BPM | SYSTOLIC BLOOD PRESSURE: 152 MMHG | TEMPERATURE: 97.8 F | WEIGHT: 289 LBS | DIASTOLIC BLOOD PRESSURE: 88 MMHG | OXYGEN SATURATION: 97 % | BODY MASS INDEX: 42.68 KG/M2 | RESPIRATION RATE: 17 BRPM

## 2023-07-25 DIAGNOSIS — M54.2 CERVICALGIA: ICD-10-CM

## 2023-07-25 DIAGNOSIS — M79.7 FIBROMYALGIA: ICD-10-CM

## 2023-07-25 DIAGNOSIS — N39.46 MIXED INCONTINENCE: ICD-10-CM

## 2023-07-25 DIAGNOSIS — L70.5 EXCORIATED ACNE: ICD-10-CM

## 2023-07-25 DIAGNOSIS — Z01.419 ENCOUNTER FOR GYNECOLOGICAL EXAMINATION WITH PAPANICOLAOU SMEAR OF CERVIX: Primary | ICD-10-CM

## 2023-07-25 DIAGNOSIS — F17.200 TOBACCO USE DISORDER: ICD-10-CM

## 2023-07-25 DIAGNOSIS — N95.1 PERIMENOPAUSE: ICD-10-CM

## 2023-07-25 DIAGNOSIS — M48.062 SPINAL STENOSIS, LUMBAR REGION WITH NEUROGENIC CLAUDICATION: ICD-10-CM

## 2023-07-25 DIAGNOSIS — Z11.59 ENCOUNTER FOR HEPATITIS C SCREENING TEST FOR LOW RISK PATIENT: ICD-10-CM

## 2023-07-25 PROCEDURE — 3079F DIAST BP 80-89 MM HG: CPT | Performed by: STUDENT IN AN ORGANIZED HEALTH CARE EDUCATION/TRAINING PROGRAM

## 2023-07-25 PROCEDURE — 3077F SYST BP >= 140 MM HG: CPT | Performed by: STUDENT IN AN ORGANIZED HEALTH CARE EDUCATION/TRAINING PROGRAM

## 2023-07-25 PROCEDURE — 99214 OFFICE O/P EST MOD 30 MIN: CPT | Performed by: STUDENT IN AN ORGANIZED HEALTH CARE EDUCATION/TRAINING PROGRAM

## 2023-07-25 RX ORDER — VARENICLINE TARTRATE 1 MG/1
1 TABLET, FILM COATED ORAL 2 TIMES DAILY
Qty: 60 TABLET | Refills: 0 | Status: SHIPPED | OUTPATIENT
Start: 2023-07-25

## 2023-07-25 RX ORDER — GABAPENTIN 800 MG/1
800 TABLET ORAL 3 TIMES DAILY
Qty: 90 TABLET | Refills: 2 | Status: SHIPPED | OUTPATIENT
Start: 2023-07-25 | End: 2023-10-23

## 2023-07-25 RX ORDER — OXYBUTYNIN CHLORIDE 5 MG/1
10 TABLET, EXTENDED RELEASE ORAL DAILY
Qty: 30 TABLET | Refills: 3 | Status: SHIPPED | OUTPATIENT
Start: 2023-07-25

## 2023-07-25 ASSESSMENT — PATIENT HEALTH QUESTIONNAIRE - PHQ9
SUM OF ALL RESPONSES TO PHQ QUESTIONS 1-9: 0
2. FEELING DOWN, DEPRESSED OR HOPELESS: 0
SUM OF ALL RESPONSES TO PHQ9 QUESTIONS 1 & 2: 0
1. LITTLE INTEREST OR PLEASURE IN DOING THINGS: 0
SUM OF ALL RESPONSES TO PHQ QUESTIONS 1-9: 0

## 2023-07-25 ASSESSMENT — ENCOUNTER SYMPTOMS
SHORTNESS OF BREATH: 0
ABDOMINAL PAIN: 0

## 2023-07-25 NOTE — PROGRESS NOTES
History of Present Illness  Lei Mercado is a 46 y.o. female who presents today for management of    Chief Complaint   Patient presents with    Gynecologic Exam     Pt here for routine GYN exam          -pt here for gynecologic exam  -would like STI testing as well  -denies vaginal itching or discharge  -reports some spotting today, has had irregular periods for past several months    Tobacco use:  -reports intermittent cigarette use, however chantix is helping    Acne:  -pt has bothersome acne on face  -using facial cleanser and moisturizer  -has found that she is picking at scabs as well    Patient Active Problem List   Diagnosis    Obesity, Class III, BMI 40-49.9 (morbid obesity) ([address])    Bipolar affective disorder, currently manic, moderate (HCC)    HTN (hypertension)    Cervical myelopathy (HCC)    Hypertriglyceridemia    Cervical stenosis of spine    Fibromyalgia    Chronic midline low back pain with sciatica    Onychomycosis    Prediabetes    Irritable bowel syndrome without diarrhea    Moderate episode of recurrent major depressive disorder ([address])    Lumbar spinal stenosis    Continuous nicotine dependence    Status post left knee replacement    Superficial incisional surgical site infection    Left ankle instability      Past Medical History:   Diagnosis Date    Acute pain of left shoulder 11/1/2016    Bipolar 1 disorder ([address])     Bipolar affective disorder, currently manic, moderate ([address]) 4/27/2016    Chronic midline low back pain with sciatica 11/1/2016    Cigarette nicotine dependence in remission 8/2/2016    Cigarette nicotine dependence without complication 39/64/4451    Continuous nicotine dependence 1/24/2017    Depression     Fibromyalgia 9/1/2015    HLD (hyperlipidemia) 2/4/2016    Hypertension     Hypertriglyceridemia 5/15/2018    Impaired fasting glucose 5/31/2016    Irritable bowel syndrome without diarrhea 4/27/2016    Moderate episode of recurrent major depressive disorder ([address]) 4/27/2016

## 2023-07-25 NOTE — PROGRESS NOTES
Mendy Babin is a 46 y.o. presents today for   Chief Complaint   Patient presents with    Gynecologic Exam     Pt here for routine GYN exam        Is someone accompanying this pt? no    Is the patient using any DME equipment during OV? Yes a cane     Depression Screening:   PHQ-9 Questionaire 5/8/2023 3/29/2023 3/2/2023 2/21/2023   Little interest or pleasure in doing things 0 0 3 1   Feeling down, depressed, or hopeless 0 0 1 1   Trouble falling or staying asleep, or sleeping too much - - 3 -   Feeling tired or having little energy - - 3 -   Poor appetite or overeating - - 3 -   Feeling bad about yourself - or that you are a failure or have let yourself or your family down - - 1 -   Trouble concentrating on things, such as reading the newspaper or watching television - - 3 -   Moving or speaking so slowly that other people could have noticed. Or the opposite - being so fidgety or restless that you have been moving around a lot more than usual - - 1 -   Thoughts that you would be better off dead, or of hurting yourself in some way - - 0 -   PHQ-9 Total Score 0 0 18 2       Abuse Screening:  No flowsheet data found. Learning Assessment:  No question data found. Fall Risk:  No flowsheet data found. Coordination of Care:   1. \"Have you been to the ER, urgent care clinic since your last visit? Hospitalized since your last visit? \" no    2. \"Have you seen or consulted any other health care providers outside of the [address] [place] [address] since your last visit? \" no    3. For patients aged 43-73: Has the patient had a colonoscopy / FIT/ Cologuard? no    If the patient is female:    4. For patients aged 43-66: Has the patient had a mammogram within the past 2 years? no    5. For patients aged 21-65: Has the patient had a pap smear? no    Health Maintenance: reviewed and discussed and ordered per Provider.     Health Maintenance Due   Topic Date Due    Hepatitis C screen  Never done    Colorectal Cancer Screen

## 2023-07-26 LAB — HEPATITIS C ANTIBODY: NORMAL

## 2023-07-27 ENCOUNTER — HOSPITAL ENCOUNTER (OUTPATIENT)
Facility: HOSPITAL | Age: 48
Setting detail: RECURRING SERIES
Discharge: HOME | End: 2023-07-30
Payer: COMMERCIAL

## 2023-07-27 LAB
BACTERIAL VAGINOSIS, NAA: POSITIVE
C TRACH DNA SPEC NAA+PROBE: NEGATIVE
CANDIDA GLABRATA, NAA: ABNORMAL
CANDIDA SPECIES, NAA: ABNORMAL
NEISSERIA GONORRHOEAE, NAA: NEGATIVE
TRICHOMONAS VAGINALIS BY NAA: ABNORMAL

## 2023-07-27 PROCEDURE — 97110 THERAPEUTIC EXERCISES: CPT

## 2023-07-27 PROCEDURE — 97530 THERAPEUTIC ACTIVITIES: CPT

## 2023-07-27 PROCEDURE — 97112 NEUROMUSCULAR REEDUCATION: CPT

## 2023-07-27 NOTE — PROGRESS NOTES
PHYSICAL / OCCUPATIONAL THERAPY - DAILY TREATMENT NOTE (updated )    Patient Name: Reina Eduardo    Date: 2023    : 1975  Insurance: Payor: [address] [place] [address] / Plan: [facility] [address] / Product Type: *No Product type* /      Patient  verified Yes     Visit #   Current / Total 1 8   Time   In / Out 9:06 9:47   Pain   In / Out 3-4 1   Subjective Functional Status/Changes: Pt reports she is using the [address] less. TREATMENT AREA =  Aftercare following left total knee replacement; left knee pain    OBJECTIVE  Therapeutic Procedures: Tx Min Billable or 1:1 Min (if diff from Tx Min) Procedure, Rationale, Specifics   15  68196 Therapeutic Exercise (timed):  increase ROM, strength, coordination, balance, and proprioception to improve patient's ability to progress to PLOF and address remaining functional goals. (see flow sheet as applicable)     Details if applicable:       16  22012 Neuromuscular Re-Education (timed):  improve balance, coordination, kinesthetic sense, posture, core stability and proprioception to improve patient's ability to develop conscious control of individual muscles and awareness of position of extremities in order to progress to PLOF and address remaining functional goals. (see flow sheet as applicable)     Details if applicable:       10  67476 Therapeutic Activity (timed):  use of dynamic activities replicating functional movements to increase ROM, strength, coordination, balance, and proprioception in order to improve patient's ability to progress to PLOF and address remaining functional goals.   (see flow sheet as applicable)     Details if applicable:     39  [facility] Totals Reminder: bill using total billable min of TIMED therapeutic procedures (example: do not include dry needle or estim unattended, both untimed codes, in totals to left)  8-22 min = 1 unit; 23-37 min = 2 units; 38-52 min = 3 units; 53-67 min = 4 units; 68-82 min = 5 units   Total

## 2023-07-28 DIAGNOSIS — B96.89 BACTERIAL VAGINOSIS: Primary | ICD-10-CM

## 2023-07-28 DIAGNOSIS — N76.0 BACTERIAL VAGINOSIS: Primary | ICD-10-CM

## 2023-07-28 RX ORDER — METRONIDAZOLE 500 MG/1
500 TABLET ORAL 2 TIMES DAILY
Qty: 14 TABLET | Refills: 0 | Status: SHIPPED | OUTPATIENT
Start: 2023-07-28 | End: 2023-08-04

## 2023-08-04 ENCOUNTER — HOSPITAL ENCOUNTER (OUTPATIENT)
Facility: HOSPITAL | Age: 48
Setting detail: RECURRING SERIES
Discharge: HOME | End: 2023-08-07
Payer: COMMERCIAL

## 2023-08-04 PROCEDURE — 97530 THERAPEUTIC ACTIVITIES: CPT | Performed by: PHYSICAL THERAPIST

## 2023-08-04 PROCEDURE — 97112 NEUROMUSCULAR REEDUCATION: CPT | Performed by: PHYSICAL THERAPIST

## 2023-08-04 PROCEDURE — 97110 THERAPEUTIC EXERCISES: CPT | Performed by: PHYSICAL THERAPIST

## 2023-08-04 PROCEDURE — 97016 VASOPNEUMATIC DEVICE THERAPY: CPT | Performed by: PHYSICAL THERAPIST

## 2023-08-04 NOTE — PROGRESS NOTES
improve pain and mobility. Patient will continue to benefit from skilled PT / OT services to modify and progress therapeutic interventions, analyze and address functional mobility deficits, analyze and address ROM deficits, analyze and address strength deficits, analyze and address soft tissue restrictions, analyze and cue for proper movement patterns, analyze and modify for postural abnormalities, analyze and address imbalance/dizziness, and instruct in home and community integration to address functional deficits and attain remaining goals. Progress toward goals / Updated goals:  []  See Progress Note/Recertification  Short Term Goals: To be accomplished in 4 treatments  Patient will report daily compliance with HEP to improve tolerance to ADLs. Status at last assessment: provided with HEP at   Current: goal met (6/15/23)  Long Term Goals: To be accomplished in 10 treatments  Patient will report pain 2/10 at worst to improve tolerance to standing and walking. Status at last assessment: pain 10/10 at worst, continues with high pain in lateral knee 7/6/23  Current: goal progressing; 9/10 worst pain  Current: 3-4/10 pain today 7/27/2023  Patient will increase left knee AROM 0-120 degrees to improve tolerance to squats and stair negotiation. Status at last assessment: left knee AROM 5-95 degrees  Current: goal progressing; 6-115 deg  Patient will increased TUG to 15 seconds without AD and without UE assist to improve tolerance to ADLs. Status at last assessment: 26 seconds with bilateral UE assist with sit to stand transfers and use of RW  Current: goal progressing; 18 seconds with use of SPC  4. Patient will increase FOTO Score to 53 points to improve tolerance to ADLs.    Status at last assessment: FOTO Score 22 points  Current: goal progressing; 38 points    PLAN  Yes  Continue plan of care  [x]  Upgrade activities as tolerated  []  Discharge due to :  []  Other:     Mira Norris, PT    8/4/2023

## 2023-08-06 LAB
HPV DNA HIGH RISK: NEGATIVE
HPV GENOTYPE: NEGATIVE
HPV, GENOTYPE 18: NEGATIVE
PAP IMAGE GUIDED: NORMAL

## 2023-08-07 RX ORDER — METRONIDAZOLE 500 MG/1
500 TABLET ORAL 2 TIMES DAILY
Qty: 14 TABLET | Refills: 0 | Status: SHIPPED | OUTPATIENT
Start: 2023-08-07 | End: 2023-08-14

## 2023-08-08 DIAGNOSIS — R05.1 ACUTE COUGH: Primary | ICD-10-CM

## 2023-08-08 RX ORDER — AMOXICILLIN AND CLAVULANATE POTASSIUM 875; 125 MG/1; MG/1
1 TABLET, FILM COATED ORAL 2 TIMES DAILY
Qty: 14 TABLET | Refills: 0 | Status: SHIPPED | OUTPATIENT
Start: 2023-08-08 | End: 2023-08-15

## 2023-08-08 RX ORDER — BENZONATATE 200 MG/1
200 CAPSULE ORAL 3 TIMES DAILY PRN
Qty: 21 CAPSULE | Refills: 0 | Status: SHIPPED | OUTPATIENT
Start: 2023-08-08 | End: 2023-08-15

## 2023-08-11 ENCOUNTER — HOSPITAL ENCOUNTER (OUTPATIENT)
Facility: HOSPITAL | Age: 48
Setting detail: RECURRING SERIES
Discharge: HOME | End: 2023-08-14
Payer: COMMERCIAL

## 2023-08-11 PROCEDURE — 97110 THERAPEUTIC EXERCISES: CPT

## 2023-08-11 PROCEDURE — 97016 VASOPNEUMATIC DEVICE THERAPY: CPT

## 2023-08-11 PROCEDURE — 97112 NEUROMUSCULAR REEDUCATION: CPT

## 2023-08-11 PROCEDURE — 97530 THERAPEUTIC ACTIVITIES: CPT

## 2023-08-11 NOTE — PROGRESS NOTES
PHYSICAL / OCCUPATIONAL THERAPY - DAILY TREATMENT NOTE (updated )    Patient Name: Brayden Johnson    Date: 2023    : 1975  Insurance: Payor: [address] [place] [address] / Plan: [address] / Product Type: *No Product type* /      Patient  verified Yes     Visit #   Current / Total 3 8   Time   In / Out 9:40 am 10:34 am   Pain   In / Out 3/10 in knee 1/10   Subjective Functional Status/Changes: \"I need to work on my balance. \"     TREATMENT AREA =  Aftercare following left total knee replacement; left knee pain  Modalities Rationale:     decrease inflammation, decrease pain, and increase muscle contraction/control to improve patient's ability to progress to PLOF and address remaining functional goals. min [] Estim Unattended, type/location:                                      []  w/ice    []  w/heat    min [] Estim Attended, type/location:                                     []  w/US     []  w/ice    []  w/heat    []  TENS insruct      min []  Mechanical Traction: type/lbs                   []  pro   []  sup   []  int   []  cont    []  before manual    []  after manual    min []  Ultrasound, settings/location:      min []  Iontophoresis w/ dexamethasone, location:                                               []  take home patch       []  in clinic    min  unbill []  Ice     []  Heat    location/position:     min []  Paraffin,  details:    15 min []  Vasopneumatic Device, press/temp: Medium / low    min []  Rhiannon Campo / Jessica Nicole: If using vaso (only need to measure limb vaso being performed on)      pre-treatment girth : 48.5 cm      post-treatment girth : 47.8 cm      measured at (landmark location) :  joint line    min []  Other:    Skin assessment post-treatment (if applicable):    []  intact    []  redness- no adverse reaction                 []redness - adverse reaction:        OBJECTIVE  Therapeutic Procedures:     Tx Min Billable or 1:1 Min (if diff from Sam)

## 2023-08-16 DIAGNOSIS — I10 PRIMARY HYPERTENSION: ICD-10-CM

## 2023-08-16 RX ORDER — LOSARTAN POTASSIUM 50 MG/1
50 TABLET ORAL DAILY
Qty: 30 TABLET | Refills: 3 | Status: SHIPPED | OUTPATIENT
Start: 2023-08-16

## 2023-08-17 ENCOUNTER — HOSPITAL ENCOUNTER (OUTPATIENT)
Facility: HOSPITAL | Age: 48
Setting detail: RECURRING SERIES
Discharge: HOME | End: 2023-08-20
Payer: COMMERCIAL

## 2023-08-17 PROCEDURE — 97112 NEUROMUSCULAR REEDUCATION: CPT

## 2023-08-17 PROCEDURE — 97016 VASOPNEUMATIC DEVICE THERAPY: CPT

## 2023-08-17 PROCEDURE — 97110 THERAPEUTIC EXERCISES: CPT

## 2023-08-17 PROCEDURE — 97530 THERAPEUTIC ACTIVITIES: CPT

## 2023-08-17 NOTE — PROGRESS NOTES
[address] [facility],[address] [facility]   [facility] [address] [place], [address]  Phone: [phone] Fax: [phone]  PROGRESS NOTE  Patient Name: Reina Eduardo : 1975   Treatment/Medical Diagnosis: Pain in left knee [M25.562]   Referral Source:  Payor Yoon Martinez MD  Payor: [facility] / Plan: [address] / Product Type: *No Product type* /      Date of Initial Visit: 23 Attended Visits: 10 Missed Visits: [address] TREATMENT  Reina Eduardo is a 46 y.o.  yo female with Dx of Pain in left knee [M25.562]. Left TKR 23. Treatment has consisted of the followin Therapeutic Exercise, 72096 Neuromuscular Re-Education, 36285 Therapeutic Activity, 50446 Self Care/Home Management, and 96697 Vasopneumatic Device . CURRENT STATUS  Patient is currently 13 weeks, 1 day post-operatively and continuing to make steady progress in PT. Patient demonstrates increased knee strength as evidenced by her ability to ambulate independently and with minimal safety concerns regarding gait quality/pattern in office. Patient chief complaint remains lack of full knee extension which affect her ability to walk normally/symmtrically and with full TKE. Objectively, patient demonstrates significantly limited left ankle DF A/PROM and reduced hip extension strength/mobility.  Assessment as follows:    Pain:    Worst: 6/10  Best: 0/10  Location: left knee     Living Situation: 2 story home, with bed and bath, ramp to enter with one step enter, bathroom guard rail and walk in shower and seat        Improvements: walking (I), standing longer (10 minutes washing dishes), tub transfers, not taking pain medication, driving  Deficits: stair ascent, carrying laundry up the stairs, balance when attempting to carry heavier items     Patient's goal: \"walking balance\"     Objective Findings:      Range of Motion:  Knee AROM Right Left   Extension  0

## 2023-08-17 NOTE — PROGRESS NOTES
PHYSICAL / OCCUPATIONAL THERAPY - DAILY TREATMENT NOTE (updated )    Patient Name: Em Snyder    Date: 2023    : 1975  Insurance: Payor: [address] [place] [address] / Plan: [facility] [address] / Product Type: *No Product type* /      Patient  verified Yes     Visit #   Current / Total 4 8   Time   In / Out 9:42 am 10:47 am   Pain   In / Out 3/10 in knee 0/10   Subjective Functional Status/Changes: \"It's just my balance. I have been walking without the cane. \"     TREATMENT AREA =  Aftercare following left total knee replacement; left knee pain  Modalities Rationale:     decrease inflammation, decrease pain, and increase muscle contraction/control to improve patient's ability to progress to PLOF and address remaining functional goals. min [] Estim Unattended, type/location:                                      []  w/ice    []  w/heat    min [] Estim Attended, type/location:                                     []  w/US     []  w/ice    []  w/heat    []  TENS insruct      min []  Mechanical Traction: type/lbs                   []  pro   []  sup   []  int   []  cont    []  before manual    []  after manual    min []  Ultrasound, settings/location:      min []  Iontophoresis w/ dexamethasone, location:                                               []  take home patch       []  in clinic    min  unbill []  Ice     []  Heat    location/position:     min []  Paraffin,  details:    15 min [x]  Vasopneumatic Device, press/temp: Medium / low    min []  Luma Keith / Rachael Hay: If using vaso (only need to measure limb vaso being performed on)      pre-treatment girth : 47.8      post-treatment girth : 47.5 cm      measured at (landmark location) :  mid-patella    min []  Other:    Skin assessment post-treatment (if applicable):    [x]  intact    []  redness- no adverse reaction                 []redness - adverse reaction:        OBJECTIVE  Therapeutic Procedures:     Tx Min Billable or 1:1 Min

## 2023-08-18 ENCOUNTER — OFFICE VISIT (OUTPATIENT)
Age: 48
End: 2023-08-18

## 2023-08-18 VITALS — HEIGHT: 69 IN | BODY MASS INDEX: 43.28 KG/M2 | WEIGHT: 292.2 LBS

## 2023-08-18 DIAGNOSIS — Z96.652 STATUS POST LEFT KNEE REPLACEMENT: Primary | ICD-10-CM

## 2023-08-18 DIAGNOSIS — E66.01 OBESITY, CLASS III, BMI 40-49.9 (MORBID OBESITY) (HCC): ICD-10-CM

## 2023-08-18 NOTE — ASSESSMENT & PLAN NOTE
53-year-old female who is 3 months out from a left total knee replacement. She had been doing well but had a couple of falls. She attributes it to sensation of hyperextension of her knee. Her knee on physical exam looks excellent with range of motion 0 to 110 degrees. The incision is well-healed. There is no pain with range of motion, erythema or hot feeling. The feeling of hyperextension is most likely due to some swelling and increased stretch in the posterior knee with increased full extension postoperatively. She actually reports after the second fall that her pain improved. She still reports pain running down most of the lateral side of her knee which I attribute to the nerve damage from her incision. I believe she will continue to improve. I would like to see her when she is 1 year out from surgery but she may come back if she is having any issues prior to that. I will get three-view x-ray of the left knee at the 1 year visit.

## 2023-08-18 NOTE — PROGRESS NOTES
Patient: Marion Steiner                MRN: [id number]       SSN: [id number]  YOB: 1975        AGE: 46 y.o. SEX: female    PCP: Camryn Acosta MD  08/18/23    Chief Complaint: Post-Op Check and Knee Pain (SX 5/17/23)      HPI:  Marion Steiner is a 46 y.o. female with chief complaint of   Chief Complaint   Patient presents with    Post-Op Check    Knee Pain     SX 5/17/23       1. Status post left knee replacement  Assessment & Plan:  66-year-old female who is 3 months out from a left total knee replacement. She had been doing well but had a couple of falls. She attributes it to sensation of hyperextension of her knee. Her knee on physical exam looks excellent with range of motion 0 to 110 degrees. The incision is well-healed. There is no pain with range of motion, erythema or hot feeling. The feeling of hyperextension is most likely due to some swelling and increased stretch in the posterior knee with increased full extension postoperatively. She actually reports after the second fall that her pain improved. She still reports pain running down most of the lateral side of her knee which I attribute to the nerve damage from her incision. I believe she will continue to improve. I would like to see her when she is 1 year out from surgery but she may come back if she is having any issues prior to that. I will get three-view x-ray of the left knee at the 1 year visit. Orders:  -     AMB POC X-RAY KNEE 3 VIEW  2.  Obesity, Class III, BMI 40-49.9 (morbid obesity) ([facility])        AMB PAIN ASSESSMENT 6/2/2023   Location of Pain Knee   Location Modifiers Left   Severity of Pain 6   Quality of Pain -   Duration of Pain -   Frequency of Pain -   Aggravating Factors -   Limiting Behavior -   Relieving Factors -   Result of Injury -   Work-Related Injury -   Are there other pain locations you wish to document? -     Tobacco Use: Medium Risk    Smoking Tobacco Use: Former    Smokeless Tobacco Use:

## 2023-08-24 ENCOUNTER — APPOINTMENT (OUTPATIENT)
Facility: HOSPITAL | Age: 48
End: 2023-08-24
Payer: COMMERCIAL

## 2023-08-24 ENCOUNTER — TELEPHONE (OUTPATIENT)
Facility: CLINIC | Age: 48
End: 2023-08-24

## 2023-08-31 ENCOUNTER — APPOINTMENT (OUTPATIENT)
Facility: HOSPITAL | Age: 48
End: 2023-08-31
Payer: COMMERCIAL

## 2023-09-13 ENCOUNTER — HOSPITAL ENCOUNTER (OUTPATIENT)
Facility: HOSPITAL | Age: 48
Setting detail: RECURRING SERIES
Discharge: HOME | End: 2023-09-16
Payer: COMMERCIAL

## 2023-09-13 PROCEDURE — 97530 THERAPEUTIC ACTIVITIES: CPT

## 2023-09-13 PROCEDURE — 97016 VASOPNEUMATIC DEVICE THERAPY: CPT

## 2023-09-13 PROCEDURE — 97112 NEUROMUSCULAR REEDUCATION: CPT

## 2023-09-13 PROCEDURE — 97110 THERAPEUTIC EXERCISES: CPT

## 2023-09-13 NOTE — PROGRESS NOTES
Exercise (timed):  increase ROM, strength, coordination, balance, and proprioception to improve patient's ability to progress to PLOF and address remaining functional goals. (see flow sheet as applicable)     Details if applicable:       15 30634 Neuromuscular Re-Education (timed):  improve balance, coordination, kinesthetic sense, posture, core stability and proprioception to improve patient's ability to develop conscious control of individual muscles and awareness of position of extremities in order to progress to PLOF and address remaining functional goals. (see flow sheet as applicable)     Details if applicable:  balance     15 44153 Therapeutic Activity (timed):  use of dynamic activities replicating functional movements to increase ROM, strength, coordination, balance, and proprioception in order to improve patient's ability to progress to PLOF and address remaining functional goals.   (see flow sheet as applicable)     Details if applicable:  squatting   40 [facility] Totals Reminder: bill using total billable min of TIMED therapeutic procedures (example: do not include dry needle or estim unattended, both untimed codes, in totals to left)  8-22 min = 1 unit; 23-37 min = 2 units; 38-52 min = 3 units; 53-67 min = 4 units; 68-82 min = 5 units   Total     [x]  Patient Education billed concurrently with other procedures   [x] Review HEP    [] Progressed/Changed HEP, detail:   [x] Other detail: schedule more apts , continue HEP     Objective Information/Functional Measures/Assessment:    Gait : presents amb with no AD today  + R knee recurvatum  with SLS   Deficits - fear of falling sec to hx of knee giving out   Addressed with addition of step ups and SLR with QS   Challenged step up to 8 in to improve functional quad strength   Challenged and requires UE assistance     Patient will continue to benefit from skilled PT / OT services to modify and progress therapeutic interventions, analyze and address functional

## 2023-09-22 ENCOUNTER — HOSPITAL ENCOUNTER (OUTPATIENT)
Facility: HOSPITAL | Age: 48
Setting detail: RECURRING SERIES
Discharge: HOME | End: 2023-09-25
Payer: COMMERCIAL

## 2023-09-22 PROCEDURE — 97530 THERAPEUTIC ACTIVITIES: CPT

## 2023-09-22 PROCEDURE — 97110 THERAPEUTIC EXERCISES: CPT

## 2023-09-22 PROCEDURE — 97112 NEUROMUSCULAR REEDUCATION: CPT

## 2023-09-22 NOTE — PROGRESS NOTES
[address] [facility],6Th [address] [facility] AT [facility]   [address] [facility] [address], [address]  Phone: [phone] Fax: [phone]  PROGRESS NOTE  Patient Name: Britt Tejada : 1975   Treatment/Medical Diagnosis: Pain in left knee [M25.562]   Referral Source:  Payor Kayla Gross MD  Payor: [facility] / Plan: [address] / Product Type: *No Product type* /      Date of Initial Visit: 23 Attended Visits: 12 Missed Visits: [address] TREATMENT  Britt Tejada is a 46 y.o.  yo female with Dx of Pain in left knee [M25.562]. Left TKR 23. Treatment has consisted of the followin Therapeutic Exercise, 56185 Neuromuscular Re-Education, 25643 Therapeutic Activity, 76917 Self Care/Home Management, and 92218 Vasopneumatic Device . CURRENT STATUS  Patient is currently 18 weeks, 2 days post-operatively and continuing to make steady progress in PT. Patient demonstrates improved knee mobility and improved quadriceps strength, likely contributing to patient's decreased pain. C/c regarding impaired, Trendelenburg gait pattern and difficulty squatting. Patient also admits chronic low back has been more problematic than her left knee.  Assessment follows:    Improvements: less knee pain, walking more, increased mobility, confidence, stair negotiation (patient is able to lead with the left LE)  Deficits: functional limitations affected by chronic back pain, impaired gait  FOTO score: 38/100 - patient notes limited by low back pain more than knee deficits  (L) knee A/PROM: 0-130 deg  (L) knee strength: ext 5/5, flex 4/5  (L) hip strength: flex 5/5, abd 4/5, ext <3/5  Core strength: 75% bridge  Balance:  ROM 30 sec  SLS (R) 7 sec, (L) 8 sec  Pain: (A) 0-3/10    Progress toward goals / Updated goals:    Patient will increase left knee AROM 0-120 degrees to improve tolerance to squats and stair negotiation. -Goal met; 0-130 deg  Patient will
deficits  (L) knee A/PROM: 0-130 deg  (L) knee strength: ext 5/5, flex 4/5  (L) hip strength: flex 5/5, abd 4/5, ext <3/5  Core strength: 75% bridge  Balance:  ROM 30 sec  SLS (R) 7 sec, (L) 8 sec  Pain: (A) 0-3/10    Patient will continue to benefit from skilled PT / OT services to modify and progress therapeutic interventions, analyze and address functional mobility deficits, analyze and address ROM deficits, analyze and address strength deficits, analyze and address soft tissue restrictions, analyze and cue for proper movement patterns, analyze and modify for postural abnormalities, analyze and address imbalance/dizziness, and instruct in home and community integration to address functional deficits and attain remaining goals. Progress toward goals / Updated goals:  [x]  See Progress Note/Recertification  New Goals to be achieved in __4-8__ treatments  1. Patient will increase left knee AROM 0-120 degrees to improve tolerance to squats and stair negotiation. -Goal met; 0-130 deg  2. Patient will increase FOTO Score to 53 points to improve tolerance to ADLs. -Goal not met; 38 points, patient notes chronic back pain limits function greater than left knee pain  3.  Patient will increase SLS to >/= 10 seconds to indicate improved balance for ambulating on uneven surfaces. -Goal progressing; (R) 7 sec, (L) 8 seconds    PLAN  Yes  Continue plan of care  [x]  Upgrade activities as tolerated  []  Discharge due to :  [x]  Other: patient to make more apts 1x per week sec to needing care for her mom; see TELMA Mcneill PTA    9/22/2023       Future Appointments   Date Time Provider [address]   9/28/2023  9:00 AM TAHMINA TuttlePTJOSE RENO [facility]   10/6/2023  9:00 AM AKIN Graff [facility]   10/13/2023  9:00 AM TAHMINA Tuttle [facility]   10/18/2023  8:00 AM DMA, LAB DMA BS AMB   10/20/2023  9:00 AM AKIN Graff [facility]   10/24/2023  8:00 AM Daniel Larsen MD DMA BS AMB

## 2023-09-25 DIAGNOSIS — M79.7 FIBROMYALGIA: ICD-10-CM

## 2023-09-25 DIAGNOSIS — M48.062 SPINAL STENOSIS, LUMBAR REGION WITH NEUROGENIC CLAUDICATION: ICD-10-CM

## 2023-09-25 DIAGNOSIS — M54.2 CERVICALGIA: ICD-10-CM

## 2023-09-25 RX ORDER — GABAPENTIN 800 MG/1
TABLET ORAL
Qty: 90 TABLET | OUTPATIENT
Start: 2023-09-25

## 2023-09-26 DIAGNOSIS — M54.2 CERVICALGIA: ICD-10-CM

## 2023-09-26 DIAGNOSIS — M48.062 SPINAL STENOSIS, LUMBAR REGION WITH NEUROGENIC CLAUDICATION: ICD-10-CM

## 2023-09-26 DIAGNOSIS — M79.7 FIBROMYALGIA: ICD-10-CM

## 2023-09-26 RX ORDER — GABAPENTIN 800 MG/1
800 TABLET ORAL 3 TIMES DAILY
Qty: 270 TABLET | Refills: 0 | Status: SHIPPED | OUTPATIENT
Start: 2023-09-26 | End: 2023-12-25

## 2023-09-28 ENCOUNTER — HOSPITAL ENCOUNTER (OUTPATIENT)
Facility: HOSPITAL | Age: 48
Setting detail: RECURRING SERIES
End: 2023-09-28
Payer: COMMERCIAL

## 2023-10-06 ENCOUNTER — HOSPITAL ENCOUNTER (OUTPATIENT)
Facility: HOSPITAL | Age: 48
Setting detail: RECURRING SERIES
Discharge: HOME | End: 2023-10-09
Payer: COMMERCIAL

## 2023-10-06 PROCEDURE — 97530 THERAPEUTIC ACTIVITIES: CPT

## 2023-10-06 PROCEDURE — 97110 THERAPEUTIC EXERCISES: CPT

## 2023-10-06 PROCEDURE — 97016 VASOPNEUMATIC DEVICE THERAPY: CPT

## 2023-10-06 PROCEDURE — 97112 NEUROMUSCULAR REEDUCATION: CPT

## 2023-10-06 NOTE — PROGRESS NOTES
although improved after quadriceps setting when cued for proper technique. Patient will continue to benefit from skilled PT / OT services to modify and progress therapeutic interventions, analyze and address functional mobility deficits, analyze and address ROM deficits, analyze and address strength deficits, analyze and address soft tissue restrictions, analyze and cue for proper movement patterns, analyze and modify for postural abnormalities, analyze and address imbalance/dizziness, and instruct in home and community integration to address functional deficits and attain remaining goals. Progress toward goals / Updated goals:  [x]  See Progress Note/Recertification  1. Patient will increase FOTO Score to 53 points to improve tolerance to ADLs. 2. Patient will increase SLS to >/= 10 seconds to indicate improved balance for ambulating on uneven surfaces. -Goal progressing; SLS (R) 6 sec, (L) 16 sec (10/6/23)  3. Patient will increase knee flexion strength to >/= 5/5 to promote patient's ability to ambulate with normalized gait pattern.     PLAN  Yes  Continue plan of care  [x]  Upgrade activities as tolerated  []  Discharge due to :  [x]  Other: patient to make more apts 1x per week sec to needing care for her mom    Alton MurphyAKIN han    10/6/2023       Future Appointments   Date Time Provider [address]   10/13/2023  9:00 AM Bella Uriostegui PT [facility]   10/18/2023  8:00 AM DMA, LAB DMA BS AMB   10/20/2023  9:00 AM Alton Camara PTA [facility]   10/24/2023  8:00 AM Tomas Mccartney MD DMA BS AMB   10/27/2023  9:00 AM Alton Camara PTA [facility]   5/17/2024  9:15 AM Suma Reyna DO [facility] BS AMB

## 2023-10-13 ENCOUNTER — HOSPITAL ENCOUNTER (OUTPATIENT)
Facility: HOSPITAL | Age: 48
Setting detail: RECURRING SERIES
Discharge: HOME | End: 2023-10-16
Payer: COMMERCIAL

## 2023-10-13 PROCEDURE — 97110 THERAPEUTIC EXERCISES: CPT

## 2023-10-13 PROCEDURE — 97530 THERAPEUTIC ACTIVITIES: CPT

## 2023-10-13 PROCEDURE — 97112 NEUROMUSCULAR REEDUCATION: CPT

## 2023-10-13 PROCEDURE — 97016 VASOPNEUMATIC DEVICE THERAPY: CPT

## 2023-10-13 NOTE — PROGRESS NOTES
PHYSICAL / OCCUPATIONAL THERAPY - DAILY TREATMENT NOTE (updated )    Patient Name: Brayden Johnson    Date: 10/13/2023    : 1975  Insurance: Payor: [address] [place] [address] / Plan: [facility] / Product Type: *No Product type* /      Patient  verified yes     Visit #   Current / Total 2 4-8 Total Time   Time   In / Out 9:00 9:55 55   Pain   In / Out 3 3  in lower back    Subjective Functional Status/Changes: Pt reports slight knee pain today. TREATMENT AREA =  Pain in left knee [M25.562]    OBJECTIVE    Modalities Rationale:   decrease pain and decrease edema to improve patient's ability to progress to PLOF and address remaining functional goals. 10 min []  Vasopneumatic Device, press/temp:  Position: Moderate compression / 34 deg   Long sitting   If using vaso (only need to measure limb vaso being performed on)      pre-treatment girth : 48.5cm      post-treatment girth : 48.3cm      measured at (landmark location) :  mid patella   --      Skin assessment post-treatment (if applicable):    [x]  intact    []  redness- no adverse reaction                 []redness - adverse reaction:          Therapeutic Procedures:  45  Total   BC Totals Reminder: bill using total billable min of TIMED therapeutic procedures (example: do not include dry needle or estim unattended, both untimed codes, in totals to left)  8-22 min = 1 unit; 23-37 min = 2 units; 38-52 min = 3 units; 53-67 min = 4 units; 68-82 min = 5 units   Tx Min  Procedure, Rationale, Specifics   17  88223 Therapeutic Exercise (timed):  increase ROM, strength, coordination, balance, and proprioception to improve patient's ability to progress to PLOF and address remaining functional goals.  (see flow sheet as applicable)   Details if applicable:       18  32226 Therapeutic Activity (timed):  use of dynamic activities replicating functional movements to increase ROM, strength, coordination, balance, and

## 2023-10-20 ENCOUNTER — APPOINTMENT (OUTPATIENT)
Facility: HOSPITAL | Age: 48
End: 2023-10-20
Payer: COMMERCIAL

## 2023-10-24 ENCOUNTER — OFFICE VISIT (OUTPATIENT)
Facility: CLINIC | Age: 48
End: 2023-10-24
Payer: COMMERCIAL

## 2023-10-24 VITALS
OXYGEN SATURATION: 99 % | WEIGHT: 293 LBS | SYSTOLIC BLOOD PRESSURE: 122 MMHG | BODY MASS INDEX: 43.4 KG/M2 | RESPIRATION RATE: 16 BRPM | HEART RATE: 72 BPM | TEMPERATURE: 97.9 F | HEIGHT: 69 IN | DIASTOLIC BLOOD PRESSURE: 75 MMHG

## 2023-10-24 DIAGNOSIS — R73.03 PREDIABETES: ICD-10-CM

## 2023-10-24 DIAGNOSIS — I10 PRIMARY HYPERTENSION: Primary | ICD-10-CM

## 2023-10-24 DIAGNOSIS — E78.2 MIXED HYPERLIPIDEMIA: ICD-10-CM

## 2023-10-24 DIAGNOSIS — M48.062 SPINAL STENOSIS, LUMBAR REGION WITH NEUROGENIC CLAUDICATION: ICD-10-CM

## 2023-10-24 DIAGNOSIS — M54.2 CERVICALGIA: ICD-10-CM

## 2023-10-24 DIAGNOSIS — Z12.11 COLON CANCER SCREENING: ICD-10-CM

## 2023-10-24 DIAGNOSIS — N39.46 MIXED INCONTINENCE: ICD-10-CM

## 2023-10-24 DIAGNOSIS — Z96.652 STATUS POST LEFT KNEE REPLACEMENT: ICD-10-CM

## 2023-10-24 DIAGNOSIS — M79.7 FIBROMYALGIA: ICD-10-CM

## 2023-10-24 DIAGNOSIS — F17.200 TOBACCO USE DISORDER: ICD-10-CM

## 2023-10-24 PROCEDURE — 99214 OFFICE O/P EST MOD 30 MIN: CPT | Performed by: STUDENT IN AN ORGANIZED HEALTH CARE EDUCATION/TRAINING PROGRAM

## 2023-10-24 PROCEDURE — 3078F DIAST BP <80 MM HG: CPT | Performed by: STUDENT IN AN ORGANIZED HEALTH CARE EDUCATION/TRAINING PROGRAM

## 2023-10-24 PROCEDURE — 3074F SYST BP LT 130 MM HG: CPT | Performed by: STUDENT IN AN ORGANIZED HEALTH CARE EDUCATION/TRAINING PROGRAM

## 2023-10-24 RX ORDER — VARENICLINE TARTRATE 1 MG/1
1 TABLET, FILM COATED ORAL 2 TIMES DAILY
Qty: 60 TABLET | Refills: 3 | Status: SHIPPED | OUTPATIENT
Start: 2023-10-24

## 2023-10-24 RX ORDER — ACETAMINOPHEN 500 MG
1000 TABLET ORAL EVERY 8 HOURS
Qty: 180 TABLET | Refills: 0 | Status: SHIPPED | OUTPATIENT
Start: 2023-10-24 | End: 2023-11-23

## 2023-10-24 RX ORDER — OXYBUTYNIN CHLORIDE 5 MG/1
10 TABLET, EXTENDED RELEASE ORAL DAILY
Qty: 30 TABLET | Refills: 3 | Status: SHIPPED | OUTPATIENT
Start: 2023-10-24

## 2023-10-24 ASSESSMENT — ENCOUNTER SYMPTOMS
ABDOMINAL PAIN: 0
BACK PAIN: 1
SHORTNESS OF BREATH: 0

## 2023-10-24 NOTE — PROGRESS NOTES
Renan Maier is a 46y.o. year old female who presents today for   Chief Complaint   Patient presents with    Back Pain    3 Month Follow-Up       Is someone accompanying this pt? No    Is the patient using any DME equipment during OV? No    Depression Screenin/25/2023     8:30 AM 2023     8:50 AM 3/29/2023     8:37 AM 3/2/2023     8:34 AM 2023     8:29 AM   PHQ-9 Questionaire   Little interest or pleasure in doing things 0 0 0 3 1   Feeling down, depressed, or hopeless 0 0 0 1 1   Trouble falling or staying asleep, or sleeping too much    3    Feeling tired or having little energy    3    Poor appetite or overeating    3    Feeling bad about yourself - or that you are a failure or have let yourself or your family down    1    Trouble concentrating on things, such as reading the newspaper or watching television    3    Moving or speaking so slowly that other people could have noticed. Or the opposite - being so fidgety or restless that you have been moving around a lot more than usual    1    Thoughts that you would be better off dead, or of hurting yourself in some way    0    PHQ-9 Total Score 0 0 0 18 2       Abuse Screening:       No data to display                Learning Assessment:  No question data found. Fall Risk:       No data to display                    Coordination of Care:   1. \"Have you been to the ER, urgent care clinic since your last visit? Hospitalized since your last visit? \" No    2. \"Have you seen or consulted any other health care providers outside of the [address] [place] [address] since your last visit? \" No    3. For patients aged 43-73: Has the patient had a colonoscopy / FIT/ Cologuard? She states she completed cologuard. If the patient is female:    4. For patients aged 43-66: Has the patient had a mammogram within the past 2 years? No    5. For patients aged 21-65: Has the patient had a pap smear?  Not    Health Maintenance: reviewed and discussed and ordered per

## 2023-10-24 NOTE — PROGRESS NOTES
History of Present Illness  Kaylah Monique is a 46 y.o. female who presents today for management of    Chief Complaint   Patient presents with    Back Pain    3 Month Follow-Up         - s/p L knee surgery 5/17/23  -still doing PT for knee, and ain has been improving, 3/10 now  -back pain is worsening, hx cervical stenosis, pt planning on scheduling appt with Ortho to discuss this  - has been out of chantix for 2 months, increased smoking , now 7-10 cig per day  -reports compliance to medications  -denies HA, dizziness, or lightheadedness    Patient Active Problem List   Diagnosis    Obesity, Class III, BMI 40-49.9 (morbid obesity) ([address])    Bipolar affective disorder, currently manic, moderate (HCC)    HTN (hypertension)    Cervical myelopathy (HCC)    Hypertriglyceridemia    Cervical stenosis of spine    Fibromyalgia    Chronic midline low back pain with sciatica    Onychomycosis    Prediabetes    Irritable bowel syndrome without diarrhea    Moderate episode of recurrent major depressive disorder ([address])    Spinal stenosis, lumbar region with neurogenic claudication    Tobacco use disorder    Status post left knee replacement    Superficial incisional surgical site infection    Left ankle instability    Cervicalgia    Mixed incontinence    Perimenopause    Excoriated acne      Past Medical History:   Diagnosis Date    Acute pain of left shoulder 11/1/2016    Bipolar 1 disorder ([address])     Bipolar affective disorder, currently manic, moderate ([address]) 4/27/2016    Chronic midline low back pain with sciatica 11/1/2016    Cigarette nicotine dependence in remission 8/2/2016    Cigarette nicotine dependence without complication 11/49/0007    Continuous nicotine dependence 1/24/2017    Depression     Fibromyalgia 9/1/2015    HLD (hyperlipidemia) 2/4/2016    Hypertension     Hypertriglyceridemia 5/15/2018    Impaired fasting glucose 5/31/2016    Irritable bowel syndrome without diarrhea 4/27/2016    Moderate episode of recurrent

## 2023-10-27 ENCOUNTER — HOSPITAL ENCOUNTER (OUTPATIENT)
Facility: HOSPITAL | Age: 48
Setting detail: RECURRING SERIES
Discharge: HOME | End: 2023-10-30
Payer: COMMERCIAL

## 2023-10-27 PROCEDURE — 97016 VASOPNEUMATIC DEVICE THERAPY: CPT

## 2023-10-27 PROCEDURE — 97112 NEUROMUSCULAR REEDUCATION: CPT

## 2023-10-27 PROCEDURE — 97110 THERAPEUTIC EXERCISES: CPT

## 2023-10-27 PROCEDURE — 97530 THERAPEUTIC ACTIVITIES: CPT

## 2023-11-05 ENCOUNTER — TELEPHONE (OUTPATIENT)
Facility: CLINIC | Age: 48
End: 2023-11-05

## 2023-11-10 LAB — NONINV COLON CA DNA+OCC BLD SCRN STL QL: NEGATIVE

## 2023-12-12 DIAGNOSIS — I10 PRIMARY HYPERTENSION: ICD-10-CM

## 2023-12-12 RX ORDER — LOSARTAN POTASSIUM 50 MG/1
50 TABLET ORAL DAILY
Qty: 30 TABLET | Refills: 3 | Status: SHIPPED | OUTPATIENT
Start: 2023-12-12

## 2023-12-17 DIAGNOSIS — Z96.652 STATUS POST LEFT KNEE REPLACEMENT: ICD-10-CM

## 2023-12-18 RX ORDER — CEPHALEXIN 500 MG/1
CAPSULE ORAL
Qty: 40 CAPSULE | Refills: 0 | OUTPATIENT
Start: 2023-12-18

## 2024-01-11 ENCOUNTER — OFFICE VISIT (OUTPATIENT)
Age: 49
End: 2024-01-11

## 2024-01-11 VITALS — BODY MASS INDEX: 43.4 KG/M2 | WEIGHT: 293 LBS | HEIGHT: 69 IN

## 2024-01-11 DIAGNOSIS — Z98.1 S/P LUMBAR FUSION: Primary | ICD-10-CM

## 2024-01-11 DIAGNOSIS — M48.062 SPINAL STENOSIS, LUMBAR REGION WITH NEUROGENIC CLAUDICATION: ICD-10-CM

## 2024-01-11 RX ORDER — ZIPRASIDONE HYDROCHLORIDE 80 MG/1
CAPSULE ORAL
COMMUNITY
Start: 2024-01-08

## 2024-01-11 RX ORDER — HYDROCODONE BITARTRATE AND ACETAMINOPHEN 5; 325 MG/1; MG/1
TABLET ORAL
COMMUNITY
Start: 2024-01-09

## 2024-01-11 RX ORDER — IBUPROFEN 800 MG/1
TABLET ORAL
COMMUNITY
Start: 2024-01-09

## 2024-01-11 RX ORDER — GABAPENTIN 800 MG/1
800 TABLET ORAL 3 TIMES DAILY
Qty: 270 TABLET | Refills: 0 | Status: SHIPPED | OUTPATIENT
Start: 2024-01-11 | End: 2024-04-10

## 2024-01-11 RX ORDER — ZIPRASIDONE HYDROCHLORIDE 40 MG/1
CAPSULE ORAL
COMMUNITY
Start: 2024-01-02

## 2024-01-11 ASSESSMENT — PATIENT HEALTH QUESTIONNAIRE - PHQ9
1. LITTLE INTEREST OR PLEASURE IN DOING THINGS: 0
2. FEELING DOWN, DEPRESSED OR HOPELESS: 0
SUM OF ALL RESPONSES TO PHQ QUESTIONS 1-9: 0
SUM OF ALL RESPONSES TO PHQ QUESTIONS 1-9: 0
SUM OF ALL RESPONSES TO PHQ9 QUESTIONS 1 & 2: 0
SUM OF ALL RESPONSES TO PHQ QUESTIONS 1-9: 0
SUM OF ALL RESPONSES TO PHQ QUESTIONS 1-9: 0

## 2024-01-11 NOTE — PROGRESS NOTES
Renea Artis presents today for   Chief Complaint   Patient presents with    Back Pain       Is someone accompanying this pt? no    Is the patient using any DME equipment during OV? no    Depression Screening:       No data to display                Learning Assessment:      Abuse Screening:       No data to display                Fall Risk      OPIOID RISK TOOL      Coordination of Care:  1. Have you been to the ER, urgent care clinic since your last visit? no  Hospitalized since your last visit? no    2. Have you seen or consulted any other health care providers outside of the [facility] since your last visit? no Include any pap smears or colon screening. no    
(MOBIC) 15 MG tablet Take 1 tablet by mouth daily 30 tablet 0    SUMAtriptan (IMITREX) 50 MG tablet Take 1 tablet by mouth once as needed for Migraine 10 tablet 0     No current facility-administered medications for this visit.      No Known Allergies      Sensation: normal and grossly intact thebilateral, lower extremity(s)   Strength: 5/5 in the bilateral, lower extremity(s)   Reflexes: reveals 2+ symmetric DTRs throughout LE  Gait: normal     Musculoskeletal: Lumbar Exam     Inspection:   Alignment: Normal  Atrophy: None       Tenderness to Palpation:   Lumbar paraspinals Positive  Lumbar spinous processes Negative  SI Joint:  Positive- left  Gluteal:Positive -left    ROM:   Lumbar ROM: Abnormal pain with flexion and extension  Lumbar facet loading: Negative  Hip ROM: No reproduction of pain with movement     Special Tests      Slump test: Negative  SLR: Negative            condyle.       Assessment:  -s/p lumbar fusion chronic left sided low back pain  -s/p left TKA          Plan:     -Referral to PT  -renew gabapentin 800mg tid   -Diagnostics/Imaging -Consider updated MRI lumbar spine   -Injections - Consider KANDIS    -Lifestyle - Recommend weight loss   -Education - The patient's diagnosis, prognosis and treatment options were discussed today. All questions were answered.    F/U -after PT consider Mri lumbar spine         Chelsey Collins MD   [facility]      I wa

## 2024-01-17 LAB
AVERAGE GLUCOSE: 120 MG/DL (ref 91–123)
CHOLESTEROL/HDL RATIO: 6.3 (ref 0–5)
CHOLESTEROL: 239 MG/DL (ref 110–200)
HBA1C MFR BLD: 5.8 % (ref 4.8–5.6)
HDLC SERPL-MCNC: 38 MG/DL
LDL CHOLESTEROL CALCULATED: 175 MG/DL (ref 50–99)
LDL/HDL RATIO: 4.6
NON-HDL CHOLESTEROL: 201 MG/DL
TRIGL SERPL-MCNC: 130 MG/DL (ref 40–149)
VLDLC SERPL CALC-MCNC: 26 MG/DL (ref 8–30)

## 2024-01-24 ENCOUNTER — OFFICE VISIT (OUTPATIENT)
Facility: CLINIC | Age: 49
End: 2024-01-24
Payer: MEDICAID

## 2024-01-24 VITALS
TEMPERATURE: 98.1 F | OXYGEN SATURATION: 95 % | RESPIRATION RATE: 17 BRPM | SYSTOLIC BLOOD PRESSURE: 137 MMHG | HEIGHT: 69 IN | HEART RATE: 76 BPM | WEIGHT: 293 LBS | BODY MASS INDEX: 43.4 KG/M2 | DIASTOLIC BLOOD PRESSURE: 61 MMHG

## 2024-01-24 DIAGNOSIS — R73.03 PREDIABETES: ICD-10-CM

## 2024-01-24 DIAGNOSIS — E66.01 CLASS 3 SEVERE OBESITY DUE TO EXCESS CALORIES WITH SERIOUS COMORBIDITY AND BODY MASS INDEX (BMI) OF 40.0 TO 44.9 IN ADULT (HCC): ICD-10-CM

## 2024-01-24 DIAGNOSIS — F17.200 TOBACCO USE DISORDER: ICD-10-CM

## 2024-01-24 DIAGNOSIS — E78.2 MIXED HYPERLIPIDEMIA: ICD-10-CM

## 2024-01-24 DIAGNOSIS — I10 PRIMARY HYPERTENSION: Primary | ICD-10-CM

## 2024-01-24 DIAGNOSIS — Z96.652 STATUS POST LEFT KNEE REPLACEMENT: ICD-10-CM

## 2024-01-24 PROCEDURE — 3075F SYST BP GE 130 - 139MM HG: CPT | Performed by: STUDENT IN AN ORGANIZED HEALTH CARE EDUCATION/TRAINING PROGRAM

## 2024-01-24 PROCEDURE — 3078F DIAST BP <80 MM HG: CPT | Performed by: STUDENT IN AN ORGANIZED HEALTH CARE EDUCATION/TRAINING PROGRAM

## 2024-01-24 PROCEDURE — 99214 OFFICE O/P EST MOD 30 MIN: CPT | Performed by: STUDENT IN AN ORGANIZED HEALTH CARE EDUCATION/TRAINING PROGRAM

## 2024-01-24 RX ORDER — LIRAGLUTIDE 6 MG/ML
INJECTION SUBCUTANEOUS
Qty: 2 ADJUSTABLE DOSE PRE-FILLED PEN SYRINGE | Refills: 3 | Status: SHIPPED | OUTPATIENT
Start: 2024-01-24

## 2024-01-24 RX ORDER — NICOTINE 21 MG/24HR
1 PATCH, TRANSDERMAL 24 HOURS TRANSDERMAL DAILY
Qty: 30 PATCH | Refills: 2 | Status: SHIPPED | OUTPATIENT
Start: 2024-01-24

## 2024-01-24 RX ORDER — ROSUVASTATIN CALCIUM 10 MG/1
10 TABLET, COATED ORAL NIGHTLY
Qty: 30 TABLET | Refills: 2 | Status: SHIPPED | OUTPATIENT
Start: 2024-01-24

## 2024-01-24 ASSESSMENT — ENCOUNTER SYMPTOMS
ABDOMINAL PAIN: 0
SHORTNESS OF BREATH: 0
BACK PAIN: 1

## 2024-01-24 NOTE — PROGRESS NOTES
Renea Artis is a 48 y.o. presents today for   Chief Complaint   Patient presents with    Follow-up     Pt here to follow up with provider          Is someone accompanying this pt? no    Is the patient using any DME equipment during OV? no    Depression Screenin/24/2024     8:19 AM 2024     2:48 PM 2023     8:30 AM 2023     8:50 AM 3/29/2023     8:37 AM 3/2/2023     8:34 AM 2023     8:29 AM   PHQ-9 Questionaire   Little interest or pleasure in doing things 0 0 0 0 0 3 1   Feeling down, depressed, or hopeless 0 0 0 0 0 1 1   Trouble falling or staying asleep, or sleeping too much      3    Feeling tired or having little energy      3    Poor appetite or overeating      3    Feeling bad about yourself - or that you are a failure or have let yourself or your family down      1    Trouble concentrating on things, such as reading the newspaper or watching television      3    Moving or speaking so slowly that other people could have noticed. Or the opposite - being so fidgety or restless that you have been moving around a lot more than usual      1    Thoughts that you would be better off dead, or of hurting yourself in some way      0    PHQ-9 Total Score 0 0 0 0 0 18 2       Abuse Screening:       No data to display                Learning Assessment:  No question data found.    Fall Risk:       No data to display                    Coordination of Care:   1. \"Have you been to the ER, urgent care clinic since your last visit?  Hospitalized since your last visit?\" no    2. \"Have you seen or consulted any other health care providers outside of the [facility] since your last visit?\" no    3. For patients aged 45-75: Has the patient had a colonoscopy / FIT/ Cologuard? yes    If the patient is female:    4. For patients aged 40-74: Has the patient had a mammogram within the past 2 years? Yes     5. For patients aged 21-65: Has the patient had a pap smear? Yes     Health Maintenance: 
4/27/2016    Moderate episode of recurrent major depressive disorder (HCC) 4/27/2016    Obesity, Class III, BMI 40-49.9 (morbid obesity) (HCC) 9/1/2015    Onychomycosis 4/27/2016    Osteoarthritis 2017    Pain of left thumb 11/1/2016    Prediabetes 7/5/2016    PTSD (post-traumatic stress disorder)     Smoker 9/1/2015    Vitamin D deficiency 5/31/2016      Past Surgical History:   Procedure Laterality Date    ANTERIOR CRUCIATE LIGAMENT REPAIR      APPENDECTOMY      BACK SURGERY      CERVICAL DISCECTOMY  02/07/2019    with fusion C5/6 C6/7    CERVICAL FUSION  0513/19    L4-L5 bilateral hemilaminectomy; medial facetectomy; foraminotomy; L5-S1 right hemilaminectomy; medial facetectomy revision; discectomy revision; L4, L5, S1 posterolateral fusion    CERVICAL LAMINECTOMY  03/19/2019    with fusion    GYN Bilateral     tubal    KNEE ARTHROPLASTY Left 5/17/2023    LEFT TOTAL KNEE ARTHROPLASTY; TAYLOR LONNIE AND HARDWARE REMOVAL performed by Chung Reyna DO at [facility] MAIN OR    LUMBAR LAMINECTOMY Bilateral 05/13/2019    L4-L5 bilateral hemilaminectomy; medial facetectomy; foraminotomy; L5-S1 right hemilaminectomy; medial facetectomy revision; discectomy revision; L4, L5, S1 posterolateral fusion    ORTHOPEDIC SURGERY      TUBAL LIGATION        Family History   Problem Relation Age of Onset    Psychiatric Disorder Sister     Arthritis Sister     Depression Sister     Lupus Sister     Obesity Sister     Psychiatric Disorder Brother     Depression Brother     Psychiatric Disorder Sister     Diabetes Sister     Depression Sister     Lupus Sister     Obesity Sister     Psychiatric Disorder Brother     Psychiatric Disorder Brother     Hypertension Mother     Thyroid Disease Mother     Arthritis Mother     High Blood Pressure Mother     Kidney Disease Mother     Obesity Mother     Arthritis Father     Depression Paternal Grandmother      Social History     Socioeconomic History    Marital status: Legally      Spouse

## 2024-01-26 ENCOUNTER — TELEPHONE (OUTPATIENT)
Facility: CLINIC | Age: 49
End: 2024-01-26

## 2024-01-26 NOTE — TELEPHONE ENCOUNTER
Patient is requesting a script for needles to accommodate the following:    Liraglutide (VICTOZA) 18 MG/3ML SOPN SC injection [[id number]]     She just picked up the above script and just needs the needles.       Please advise    Thank you.

## 2024-01-26 NOTE — TELEPHONE ENCOUNTER
----- Message from Renea Artis sent at 1/26/2024 11:40 AM EST -----  Regarding: Renea Artis   Contact: [phone]  They did not give me needles for the pens. Is that a separate script?

## 2024-02-08 NOTE — PROGRESS NOTES
PT LUMBAR EVAL AND TREATMENT     Patient Name: Renea Artis  Date:2024  : 1975  [x]  Patient  Verified  [facility] / Plan: [employer]([facility]) / Product Type: *No Product type* /    In time:859  Out time:943  Total Treatment Time (min): 44  Visit #: 1 of 8-10    Treatment Area: Other low back pain [M54.59]    SUBJECTIVE  Pain Level (0-10 scale): (C): 3 (B):3  (W):  10  Any medication changes, allergies to medications, diagnosis change, or new procedure performed: see summary sheet for update  Subjective functional status/changes  CHIEF COMPLAINT: Patient presents with co long hx of LS  starting after multiplied spinal sx in  and Feb (CS fusion) March  (CS fusion) and May 2019. Recent exacerbation of LS pain associated with recovering from TKR May 17,2023.   Patient reports today with antalgic gait and noted balance issues with freq falls. Patient denies use of AD. Past treatments attempt at PT but limited by COVID.  Pain levels range from 3/10 to 10/10 and managed with tylenol arthritis and meloxicam and heating pad.  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

## 2024-02-08 NOTE — PROGRESS NOTES
[facility]  [address] [place] [address] Phone: [phone] Fax   Plan of Care / Statement of Necessity for Physical Therapy Services     Patient Name: Renea Artis : 1975   Medical   Diagnosis: *Other low back pain [M54.59] Treatment Diagnosis: M54.59  OTHER LOWER BACK PAIN    Onset Date:  Payor:  Payor: [facility] MEDICAID / [facility] / Product Type: *No Product type* /    Referral Source: Rylie Collins* Start of Care (SOC): 2024   Prior Hospitalization: See medical history Provider #: [id number]   Prior Level of Function: Mod I, 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 year old female who presents with co long hx of LS  starting after multiplied spinal sx in  and Feb (CS fusion) March  (CS fusion) and May 2019. Recent exacerbation of LS pain associated with recovering from TKR May 17,2023.   Patient reports today with antalgic gait and noted balance issues with freq falls. Patient denies use of AD. Past treatments attempt at PT but limited by COVID.  Pain levels range from 3/10 to 10/10 and managed with tylenol arthritis and meloxicam and heating pad.  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 Positive objective assessment as follows:   Posture:  Kyphosis:        Increased   Lordosis:         Decreased LS   Gait:                Antalgic, decreased Wbing L     Active Movements:  ROM % AROM Comments:pain, area   Forward

## 2024-02-09 ENCOUNTER — HOSPITAL ENCOUNTER (OUTPATIENT)
Facility: HOSPITAL | Age: 49
Setting detail: RECURRING SERIES
Discharge: HOME | End: 2024-02-12
Payer: MEDICAID

## 2024-02-09 PROCEDURE — 97162 PT EVAL MOD COMPLEX 30 MIN: CPT

## 2024-02-12 ENCOUNTER — OFFICE VISIT (OUTPATIENT)
Age: 49
End: 2024-02-12
Payer: MEDICAID

## 2024-02-12 VITALS
OXYGEN SATURATION: 93 % | SYSTOLIC BLOOD PRESSURE: 130 MMHG | WEIGHT: 293 LBS | HEART RATE: 65 BPM | BODY MASS INDEX: 44.41 KG/M2 | TEMPERATURE: 98 F | DIASTOLIC BLOOD PRESSURE: 75 MMHG | HEIGHT: 68 IN

## 2024-02-12 DIAGNOSIS — Z71.89 ENCOUNTER FOR PRE-BARIATRIC SURGERY COUNSELING AND EDUCATION: Primary | ICD-10-CM

## 2024-02-12 DIAGNOSIS — F17.200 TOBACCO USE DISORDER: ICD-10-CM

## 2024-02-12 DIAGNOSIS — E66.01 MORBID OBESITY (HCC): ICD-10-CM

## 2024-02-12 DIAGNOSIS — Z01.818 PRE-OP TESTING: ICD-10-CM

## 2024-02-12 PROCEDURE — 3078F DIAST BP <80 MM HG: CPT | Performed by: REGISTERED NURSE

## 2024-02-12 PROCEDURE — 99203 OFFICE O/P NEW LOW 30 MIN: CPT | Performed by: REGISTERED NURSE

## 2024-02-12 PROCEDURE — 3075F SYST BP GE 130 - 139MM HG: CPT | Performed by: REGISTERED NURSE

## 2024-02-12 RX ORDER — ACETAMINOPHEN 325 MG/1
650 TABLET ORAL EVERY 6 HOURS PRN
COMMUNITY

## 2024-02-14 NOTE — PROGRESS NOTES
Renea Artis is a 48 y.o. female (: 1975) presenting to address:    Chief Complaint   Patient presents with    New Patient     Bariatric consult        Medication list and allergies have been reviewed with Renea Artis and updated as of today's date.     I have gone over all Medical, Surgical and Social History with Renea Artis and updated/added the information accordingly.     
medical and psychological clearance along with establishing with our dietician and joining the pre / post operative support group. They will be screened for depression and sleep apnea and treated pre operatively if needed. After successful completion of the preoperative regimen the patient will be submitted for insurance approval and pending this will be scheduled for surgery.    Tests/clearances ordered: UDS, nicotine. Currently using patch 14 mg to assist with cessation. Down to 1 cigarette a day.     When formally enrolled into bariatric preoperative pathway, order:   CBC, CMP, A1c, H pylori, B1, B12, folate, TSH, Vitamin D, CXR, EKG, UGI  Nutrition, support group, PCP clearance, psychological clearance, rheumatology recommendation      All questions from the patient have been answered and they have demonstrated appropriate understanding of the process.      Signed By: Alondra West  Bariatric Nurse Practitioner   [facility]    February 13, 2024

## 2024-02-27 ENCOUNTER — OFFICE VISIT (OUTPATIENT)
Age: 49
End: 2024-02-27
Payer: MEDICAID

## 2024-02-27 VITALS — WEIGHT: 293 LBS | HEIGHT: 68 IN | BODY MASS INDEX: 44.41 KG/M2

## 2024-02-27 DIAGNOSIS — M48.062 SPINAL STENOSIS, LUMBAR REGION WITH NEUROGENIC CLAUDICATION: ICD-10-CM

## 2024-02-27 DIAGNOSIS — Z98.1 S/P LUMBAR FUSION: Primary | ICD-10-CM

## 2024-02-27 DIAGNOSIS — M25.551 RIGHT HIP PAIN: ICD-10-CM

## 2024-02-27 PROCEDURE — 73502 X-RAY EXAM HIP UNI 2-3 VIEWS: CPT | Performed by: PHYSICAL MEDICINE & REHABILITATION

## 2024-02-27 PROCEDURE — 72110 X-RAY EXAM L-2 SPINE 4/>VWS: CPT | Performed by: PHYSICAL MEDICINE & REHABILITATION

## 2024-02-27 PROCEDURE — 99214 OFFICE O/P EST MOD 30 MIN: CPT | Performed by: PHYSICAL MEDICINE & REHABILITATION

## 2024-02-27 ASSESSMENT — PATIENT HEALTH QUESTIONNAIRE - PHQ9
4. FEELING TIRED OR HAVING LITTLE ENERGY: 0
5. POOR APPETITE OR OVEREATING: 0
SUM OF ALL RESPONSES TO PHQ QUESTIONS 1-9: 0
8. MOVING OR SPEAKING SO SLOWLY THAT OTHER PEOPLE COULD HAVE NOTICED. OR THE OPPOSITE, BEING SO FIGETY OR RESTLESS THAT YOU HAVE BEEN MOVING AROUND A LOT MORE THAN USUAL: 0
SUM OF ALL RESPONSES TO PHQ QUESTIONS 1-9: 0
7. TROUBLE CONCENTRATING ON THINGS, SUCH AS READING THE NEWSPAPER OR WATCHING TELEVISION: 0
2. FEELING DOWN, DEPRESSED OR HOPELESS: 0
3. TROUBLE FALLING OR STAYING ASLEEP: 0
SUM OF ALL RESPONSES TO PHQ9 QUESTIONS 1 & 2: 0
SUM OF ALL RESPONSES TO PHQ QUESTIONS 1-9: 0
1. LITTLE INTEREST OR PLEASURE IN DOING THINGS: 0
9. THOUGHTS THAT YOU WOULD BE BETTER OFF DEAD, OR OF HURTING YOURSELF: 0
SUM OF ALL RESPONSES TO PHQ QUESTIONS 1-9: 0
6. FEELING BAD ABOUT YOURSELF - OR THAT YOU ARE A FAILURE OR HAVE LET YOURSELF OR YOUR FAMILY DOWN: 0

## 2024-02-27 NOTE — PROGRESS NOTES
[facility]  [address] [place] [address], [address]   [place] [address]   Phone: [phone]   Fax: [phone]         Patient: Renea Artis                                                                               MRN: [id number]         YOB: 1975           AGE: 47 y.o.               PCP: None   Date:  11/03/22      Reason for Consultation:low back pain         HPI:   Renea Artis is a 47 y.o.  female with relevant PMH of  ACDF C5-7 in 2019 followed by a posterior fusion in March 2019  with Dr Izquierdo and L4-S1 PLIF May 2018 with Dr. Izquierdo who was followed by Earline Broussard NP and managing her low back pain with gabapentin 800mg. She was seen by me in 2022 with left knee pain -She has a prior  history of ACL and meniscus tear s/p ACL cadaver reconstruction in 2010 at [facility]- she developed post traumatic OA and had a left knee replacement-  5/17/2023 with Dr. houston which went well.  Now she reports increased low back pain.  She was referred to PT and had her evaluation and will start 3/8/2024.  She has had trouble walking due to her pain and feels as if her back is unstable.        She reports + swelling, + buckling which has progressively worsened.        Neurologic symptoms: No  weakness, bowel or bladder changes.  No recent falls. Ambulates with a cane      Location: The pain is located in the low back pain   Radiation: The pain does radiate   Pain Score: Currently: 6/10     Quality: Pain is of a Achy and Stabbing quality.     Aggravating: Pain is exacerbated by walking, sitting, standing and lying down   Alleviating: The pain is alleviated by nothing      Prior Treatments:   Physical therapy: YES-  After surgery in 2010   Injections:   10/6/2022- left knee aspiration and corticosteroid injection- 4 days relief then pain returned      Previous Medications:    Current Medications:[id number] tid, duloxetine 60mg, meloxicam 15mg , flexeril 10mg prn    Previous

## 2024-02-27 NOTE — PROGRESS NOTES
Renea Artis presents today for   Chief Complaint   Patient presents with    Back Pain     lumnbar       Is someone accompanying this pt? no    Is the patient using any DME equipment during OV? no    Depression Screening:       No data to display                Learning Assessment:  Failed to redirect to the Timeline version of the Got[employer] SmartLink.    Abuse Screening:       No data to display                Fall Risk  Failed to redirect to the Timeline version of the [employer] SmartLink.    OPIOID RISK TOOL  Failed to redirect to the Timeline version of the [employer] SmartLink.    Coordination of Care:  1. Have you been to the ER, urgent care clinic since your last visit? no  Hospitalized since your last visit? no    2. Have you seen or consulted any other health care providers outside of the [facility] since your last visit? no Include any pap smears or colon screening. no

## 2024-03-07 DIAGNOSIS — N39.46 MIXED INCONTINENCE: ICD-10-CM

## 2024-03-07 RX ORDER — OXYBUTYNIN CHLORIDE 5 MG/1
10 TABLET, EXTENDED RELEASE ORAL DAILY
Qty: 30 TABLET | Refills: 3 | Status: SHIPPED | OUTPATIENT
Start: 2024-03-07

## 2024-03-25 ENCOUNTER — PATIENT MESSAGE (OUTPATIENT)
Age: 49
End: 2024-03-25

## 2024-03-25 ENCOUNTER — OFFICE VISIT (OUTPATIENT)
Facility: CLINIC | Age: 49
End: 2024-03-25
Payer: MEDICAID

## 2024-03-25 VITALS
TEMPERATURE: 97.6 F | BODY MASS INDEX: 44.41 KG/M2 | SYSTOLIC BLOOD PRESSURE: 137 MMHG | WEIGHT: 293 LBS | OXYGEN SATURATION: 97 % | HEART RATE: 72 BPM | HEIGHT: 68 IN | DIASTOLIC BLOOD PRESSURE: 81 MMHG | RESPIRATION RATE: 10 BRPM

## 2024-03-25 DIAGNOSIS — E78.2 MIXED HYPERLIPIDEMIA: ICD-10-CM

## 2024-03-25 DIAGNOSIS — F17.200 TOBACCO USE DISORDER: ICD-10-CM

## 2024-03-25 DIAGNOSIS — I10 PRIMARY HYPERTENSION: Primary | ICD-10-CM

## 2024-03-25 DIAGNOSIS — M48.062 SPINAL STENOSIS, LUMBAR REGION WITH NEUROGENIC CLAUDICATION: ICD-10-CM

## 2024-03-25 DIAGNOSIS — Z71.3 WEIGHT LOSS COUNSELING, ENCOUNTER FOR: ICD-10-CM

## 2024-03-25 DIAGNOSIS — R73.03 PREDIABETES: ICD-10-CM

## 2024-03-25 DIAGNOSIS — E66.01 CLASS 3 SEVERE OBESITY DUE TO EXCESS CALORIES WITH SERIOUS COMORBIDITY AND BODY MASS INDEX (BMI) OF 45.0 TO 49.9 IN ADULT (HCC): ICD-10-CM

## 2024-03-25 DIAGNOSIS — G43.109 MIGRAINE WITH AURA AND WITHOUT STATUS MIGRAINOSUS, NOT INTRACTABLE: ICD-10-CM

## 2024-03-25 PROCEDURE — 3075F SYST BP GE 130 - 139MM HG: CPT | Performed by: STUDENT IN AN ORGANIZED HEALTH CARE EDUCATION/TRAINING PROGRAM

## 2024-03-25 PROCEDURE — 99214 OFFICE O/P EST MOD 30 MIN: CPT | Performed by: STUDENT IN AN ORGANIZED HEALTH CARE EDUCATION/TRAINING PROGRAM

## 2024-03-25 PROCEDURE — 3079F DIAST BP 80-89 MM HG: CPT | Performed by: STUDENT IN AN ORGANIZED HEALTH CARE EDUCATION/TRAINING PROGRAM

## 2024-03-25 RX ORDER — VARENICLINE TARTRATE 1 MG/1
1 TABLET, FILM COATED ORAL 2 TIMES DAILY
Qty: 60 TABLET | Refills: 3 | Status: SHIPPED | OUTPATIENT
Start: 2024-03-25

## 2024-03-25 RX ORDER — NALTREXONE HYDROCHLORIDE 50 MG/1
TABLET, FILM COATED ORAL
Qty: 60 TABLET | Refills: 1 | Status: SHIPPED | OUTPATIENT
Start: 2024-03-25

## 2024-03-25 RX ORDER — SUMATRIPTAN 50 MG/1
50 TABLET, FILM COATED ORAL DAILY PRN
Qty: 9 TABLET | Refills: 0 | Status: SHIPPED | OUTPATIENT
Start: 2024-03-25

## 2024-03-25 RX ORDER — ROSUVASTATIN CALCIUM 20 MG/1
20 TABLET, COATED ORAL NIGHTLY
Qty: 90 TABLET | Refills: 2 | Status: SHIPPED | OUTPATIENT
Start: 2024-03-25

## 2024-03-25 SDOH — ECONOMIC STABILITY: FOOD INSECURITY: WITHIN THE PAST 12 MONTHS, YOU WORRIED THAT YOUR FOOD WOULD RUN OUT BEFORE YOU GOT MONEY TO BUY MORE.: NEVER TRUE

## 2024-03-25 SDOH — ECONOMIC STABILITY: FOOD INSECURITY: WITHIN THE PAST 12 MONTHS, THE FOOD YOU BOUGHT JUST DIDN'T LAST AND YOU DIDN'T HAVE MONEY TO GET MORE.: NEVER TRUE

## 2024-03-25 SDOH — ECONOMIC STABILITY: INCOME INSECURITY: HOW HARD IS IT FOR YOU TO PAY FOR THE VERY BASICS LIKE FOOD, HOUSING, MEDICAL CARE, AND HEATING?: SOMEWHAT HARD

## 2024-03-25 ASSESSMENT — ENCOUNTER SYMPTOMS
BACK PAIN: 1
ABDOMINAL PAIN: 0
SHORTNESS OF BREATH: 0

## 2024-03-25 NOTE — PROGRESS NOTES
Renea Artis is a 49 y.o. year old female who presents today for   Chief Complaint   Patient presents with    Follow-up       Is someone accompanying this pt? No     Is the patient using any DME equipment during OV? No     Depression Screenin/27/2024     9:40 AM 2024     8:19 AM 2024     2:48 PM 2023     8:30 AM 2023     8:50 AM 3/29/2023     8:37 AM 3/2/2023     8:34 AM   PHQ-9 Questionaire   Little interest or pleasure in doing things 0 0 0 0 0 0 3   Feeling down, depressed, or hopeless 0 0 0 0 0 0 1   Trouble falling or staying asleep, or sleeping too much 0      3   Feeling tired or having little energy 0      3   Poor appetite or overeating 0      3   Feeling bad about yourself - or that you are a failure or have let yourself or your family down 0      1   Trouble concentrating on things, such as reading the newspaper or watching television 0      3   Moving or speaking so slowly that other people could have noticed. Or the opposite - being so fidgety or restless that you have been moving around a lot more than usual 0      1   Thoughts that you would be better off dead, or of hurting yourself in some way 0      0   PHQ-9 Total Score 0 0 0 0 0 0 18       Abuse Screening:       No data to display                Learning Assessment:  No question data found.    Fall Risk:       No data to display                    Coordination of Care:   1. \"Have you been to the ER, urgent care clinic since your last visit?  Hospitalized since your last visit?\" No    2. \"Have you seen or consulted any other health care providers outside of the [facility] since your last visit?\" Yes     3. For patients aged 45-75: Has the patient had a colonoscopy / FIT/ Cologuard? Not due     If the patient is female:    4. For patients aged 40-74: Has the patient had a mammogram within the past 2 years? Not due     5. For patients aged 21-65: Has the patient had a pap smear? Not due     Health

## 2024-03-25 NOTE — PROGRESS NOTES
History of Present Illness  Renea Artis is a 49 y.o. female who presents today for management of    Chief Complaint   Patient presents with    Follow-up         - pt overall well since last visit  - still smoking, would like to restart chantix to help quit  - nicotine patches not sticking to her skin, not working well for her  - previously started victoza for weight loss, however this has not assisted with appetite suppression, pt feels like she craves food more while taking victoza  - would like to try something else for appetite suppression  - recently seen by Ortho, referred to Dr Izquierdo for further evaluation of hardware issues in spine      Patient Active Problem List   Diagnosis    Obesity, Class III, BMI 40-49.9 (morbid obesity) ([facility])    Bipolar affective disorder, currently manic, moderate ([facility])    HTN (hypertension)    Cervical myelopathy (HCC)    Hypertriglyceridemia    Cervical stenosis of spine    Fibromyalgia    Chronic midline low back pain with sciatica    Onychomycosis    Prediabetes    Irritable bowel syndrome without diarrhea    Moderate episode of recurrent major depressive disorder (HCC)    Spinal stenosis, lumbar region with neurogenic claudication    Tobacco use disorder    Status post left knee replacement    Superficial incisional surgical site infection    Left ankle instability    Cervicalgia    Mixed incontinence    Perimenopause    Excoriated acne      Past Medical History:   Diagnosis Date    Acute pain of left shoulder 11/1/2016    Bipolar 1 disorder (HCC)     Bipolar affective disorder, currently manic, moderate ([facility]) 4/27/2016    Chronic midline low back pain with sciatica 11/1/2016    Cigarette nicotine dependence in remission 8/2/2016    Cigarette nicotine dependence without complication 11/29/2016    Continuous nicotine dependence 1/24/2017    Depression     Fibromyalgia 9/1/2015    HLD (hyperlipidemia) 2/4/2016    Hypertension     Hypertriglyceridemia 5/15/2018    Impaired fasting

## 2024-03-26 ENCOUNTER — TELEPHONE (OUTPATIENT)
Age: 49
End: 2024-03-26

## 2024-03-26 DIAGNOSIS — N39.46 MIXED INCONTINENCE: ICD-10-CM

## 2024-03-26 RX ORDER — OXYBUTYNIN CHLORIDE 10 MG/1
10 TABLET, EXTENDED RELEASE ORAL DAILY
Qty: 30 TABLET | Refills: 2 | Status: SHIPPED | OUTPATIENT
Start: 2024-03-26

## 2024-03-26 RX ORDER — GABAPENTIN 800 MG/1
800 TABLET ORAL 3 TIMES DAILY
Qty: 270 TABLET | Refills: 0 | Status: SHIPPED | OUTPATIENT
Start: 2024-03-26 | End: 2024-06-24

## 2024-03-26 NOTE — TELEPHONE ENCOUNTER
Patient is requesting an update on the peer to peer because she is hoping to be able to keep her scheduled 3/28 appt for her CT.    Patient tel [phone].

## 2024-03-26 NOTE — TELEPHONE ENCOUNTER
Follow Up -  Denied           Procedures:     [id number] - CT LUMBAR SPINE WO CONTRAST 35481     Diagnoses:    Z98.1 (ICD-10-CM) - S/P lumbar fusion    M48.062 (ICD-10-CM) - Spinal stenosis, lumbar region with neurogenic claudication         Primary Insurance:  [facility] Medicaid   Secondary Insurance: NA   Does Secondary Coverage Require Auth: AN   Source: Website    Source Details: ANA   Case/Tracking/Auth/Ref #:    [id number]  Case Status:  Denied  Denial Reason: Before we can approve the request, the following notes should be given: doctor's notes that say you did six weeks of back exercises (physical therapy, chiropractic treatments, or medically directed home exercise program) in the last six months. We also need to know the number of visits you went to. If you did this, the notes do not show the dates that you did the exercises. We also need to know that you did not get better. The information we got did not show these things have been done.  Valid Dates if Approved: NA   Clinicals Submitted: NA  Additional Comments: Called Lindsey at doctors office [phone], gave peer to peer phone # [phone] and Tracking along with denial reason and my direct # for call back.            Electronically signed by Anahi Del Rio on 3/26/24 at 9:14 AM

## 2024-03-26 NOTE — TELEPHONE ENCOUNTER
From: Renea Artis  To: Dr. Chelsey Collins  Sent: 3/25/2024 7:54 PM EDT  Subject: Refill new prescription     I'm not sure if you recall our conversation about using you to refill my gabapentin script instead of my pcp, that way all things can still be addressed every 6 months. We'll it's due lol.

## 2024-03-27 DIAGNOSIS — Z98.1 S/P LUMBAR FUSION: ICD-10-CM

## 2024-03-27 DIAGNOSIS — M48.062 SPINAL STENOSIS, LUMBAR REGION WITH NEUROGENIC CLAUDICATION: ICD-10-CM

## 2024-03-27 NOTE — TELEPHONE ENCOUNTER
I called for peer to peer-  they did not have the note dated 2/27/2024- the date the CT scan was ordered- they are holding case open and asked us to fax copy of note 2/27/2024 to [phone] and include tracking #.  I believe she is scheduled for CT tomorrow

## 2024-03-28 ENCOUNTER — HOSPITAL ENCOUNTER (OUTPATIENT)
Facility: HOSPITAL | Age: 49
Discharge: HOME | End: 2024-03-28
Attending: PHYSICAL MEDICINE & REHABILITATION
Payer: MEDICAID

## 2024-03-28 PROCEDURE — 72131 CT LUMBAR SPINE W/O DYE: CPT

## 2024-04-02 DIAGNOSIS — I10 PRIMARY HYPERTENSION: ICD-10-CM

## 2024-04-02 RX ORDER — LOSARTAN POTASSIUM 50 MG/1
50 TABLET ORAL DAILY
Qty: 30 TABLET | Refills: 3 | Status: SHIPPED | OUTPATIENT
Start: 2024-04-02

## 2024-04-02 NOTE — TELEPHONE ENCOUNTER
Medication(s) requesting:   Requested Prescriptions     Pending Prescriptions Disp Refills    losartan (COZAAR) 50 MG tablet 30 tablet 3     Sig: Take 1 tablet by mouth daily       Last office visit:  03/25/24  Next office visit DMA: 5/29/2024

## 2024-04-08 RX ORDER — CYCLOBENZAPRINE HCL 10 MG
TABLET ORAL
Qty: 90 TABLET | Refills: 0 | Status: SHIPPED | OUTPATIENT
Start: 2024-04-08 | End: 2024-07-07

## 2024-04-10 NOTE — TELEPHONE ENCOUNTER
Called patient identified by two verifiers. Explained  has she seen dr Izquierdo yet. Patient stated she did see him and that he scheduled surgery for May 20,2024. Please advise

## 2024-04-29 ENCOUNTER — HOSPITAL ENCOUNTER (OUTPATIENT)
Facility: HOSPITAL | Age: 49
Discharge: HOME | End: 2024-05-02
Attending: ORTHOPAEDIC SURGERY
Payer: MEDICAID

## 2024-04-29 DIAGNOSIS — M48.061 SPINAL STENOSIS, LUMBAR REGION WITHOUT NEUROGENIC CLAUDICATION: ICD-10-CM

## 2024-04-29 DIAGNOSIS — M43.16 SPONDYLOLISTHESIS OF LUMBAR REGION: ICD-10-CM

## 2024-04-29 PROCEDURE — 72148 MRI LUMBAR SPINE W/O DYE: CPT

## 2024-05-09 ENCOUNTER — OFFICE VISIT (OUTPATIENT)
Facility: CLINIC | Age: 49
End: 2024-05-09
Payer: MEDICAID

## 2024-05-09 VITALS
WEIGHT: 293 LBS | DIASTOLIC BLOOD PRESSURE: 73 MMHG | TEMPERATURE: 95.9 F | OXYGEN SATURATION: 94 % | RESPIRATION RATE: 10 BRPM | HEART RATE: 70 BPM | SYSTOLIC BLOOD PRESSURE: 117 MMHG | BODY MASS INDEX: 44.41 KG/M2 | HEIGHT: 68 IN

## 2024-05-09 DIAGNOSIS — R73.03 PREDIABETES: ICD-10-CM

## 2024-05-09 DIAGNOSIS — R53.83 FATIGUE, UNSPECIFIED TYPE: ICD-10-CM

## 2024-05-09 DIAGNOSIS — I10 PRIMARY HYPERTENSION: ICD-10-CM

## 2024-05-09 DIAGNOSIS — N95.1 HOT FLASHES DUE TO MENOPAUSE: ICD-10-CM

## 2024-05-09 DIAGNOSIS — Z01.818 PRE-OP EVALUATION: Primary | ICD-10-CM

## 2024-05-09 LAB — HBA1C MFR BLD: 5.5 %

## 2024-05-09 PROCEDURE — 93000 ELECTROCARDIOGRAM COMPLETE: CPT | Performed by: STUDENT IN AN ORGANIZED HEALTH CARE EDUCATION/TRAINING PROGRAM

## 2024-05-09 PROCEDURE — 99214 OFFICE O/P EST MOD 30 MIN: CPT | Performed by: STUDENT IN AN ORGANIZED HEALTH CARE EDUCATION/TRAINING PROGRAM

## 2024-05-09 PROCEDURE — 3074F SYST BP LT 130 MM HG: CPT | Performed by: STUDENT IN AN ORGANIZED HEALTH CARE EDUCATION/TRAINING PROGRAM

## 2024-05-09 PROCEDURE — 83036 HEMOGLOBIN GLYCOSYLATED A1C: CPT | Performed by: STUDENT IN AN ORGANIZED HEALTH CARE EDUCATION/TRAINING PROGRAM

## 2024-05-09 PROCEDURE — 3078F DIAST BP <80 MM HG: CPT | Performed by: STUDENT IN AN ORGANIZED HEALTH CARE EDUCATION/TRAINING PROGRAM

## 2024-05-09 RX ORDER — FEZOLINETANT 45 MG/1
45 TABLET, FILM COATED ORAL DAILY
Qty: 30 TABLET | Refills: 2 | Status: SHIPPED | OUTPATIENT
Start: 2024-05-09

## 2024-05-09 NOTE — PROGRESS NOTES
History of Present Illness  Renea Artis is a 49 y.o. female who presents today for management of    Chief Complaint   Patient presents with    Pre-op Exam           Presents today for the pre-operative evaluation.    Procedure to be performed: L2-S1 laminectomy fusion, TILF transforaminal lumbar interbody fusion L2/3, L3/4  Procedure to be performed by: Pablo Izquierdo MD  Procedure to be performed at: [facility]  Procedure to be performed on: 5/20/24      MET Questionnaire:  ? Can patient take care of self, such as eat, dress, or use the toilet (1 MET)? yes  ? Can patient walk up a flight of steps or a hill or walk on level ground at 3 to 4 mph (4 METs)? yes  ? Can patient do heavy work around the house such as scrubbing floors or lifting or moving heavy furniture or climb two flights of stairs (between 4 and 10 METs)? yes  ? Can patient participate in strenuous sports such as swimming, singles tennis, football, basketball, and skiing (>10 METs)? no      Patient Active Problem List   Diagnosis    Obesity, Class III, BMI 40-49.9 (morbid obesity) (HCC)    Bipolar affective disorder, currently manic, moderate (HCC)    HTN (hypertension)    Cervical myelopathy (HCC)    Hypertriglyceridemia    Cervical stenosis of spine    Fibromyalgia    Chronic midline low back pain with sciatica    Onychomycosis    Prediabetes    Irritable bowel syndrome without diarrhea    Moderate episode of recurrent major depressive disorder (HCC)    Spinal stenosis, lumbar region with neurogenic claudication    Tobacco use disorder    Status post left knee replacement    Superficial incisional surgical site infection    Left ankle instability    Cervicalgia    Mixed incontinence    Perimenopause    Excoriated acne      Past Medical History:   Diagnosis Date    Acute pain of left shoulder 11/1/2016    Bipolar 1 disorder (HCC)     Bipolar affective disorder, currently manic, moderate ([facility]) 4/27/2016    Chronic midline low back pain with sciatica 11/1/2016

## 2024-05-09 NOTE — PROGRESS NOTES
Renea Artis is a 49 y.o. year old female who presents today for   Chief Complaint   Patient presents with    Pre-op Exam       Is someone accompanying this pt? No     Is the patient using any DME equipment during OV? No     Depression Screenin/27/2024     9:40 AM 2024     8:19 AM 2024     2:48 PM 2023     8:30 AM 2023     8:50 AM 3/29/2023     8:37 AM 3/2/2023     8:34 AM   PHQ-9 Questionaire   Little interest or pleasure in doing things 0 0 0 0 0 0 3   Feeling down, depressed, or hopeless 0 0 0 0 0 0 1   Trouble falling or staying asleep, or sleeping too much 0      3   Feeling tired or having little energy 0      3   Poor appetite or overeating 0      3   Feeling bad about yourself - or that you are a failure or have let yourself or your family down 0      1   Trouble concentrating on things, such as reading the newspaper or watching television 0      3   Moving or speaking so slowly that other people could have noticed. Or the opposite - being so fidgety or restless that you have been moving around a lot more than usual 0      1   Thoughts that you would be better off dead, or of hurting yourself in some way 0      0   PHQ-9 Total Score 0 0 0 0 0 0 18       Abuse Screening:       No data to display                Learning Assessment:  No question data found.    Fall Risk:       No data to display                    Coordination of Care:   1. \"Have you been to the ER, urgent care clinic since your last visit?  Hospitalized since your last visit?\" No     2. \"Have you seen or consulted any other health care providers outside of the [facility] since your last visit?\" Yes     3. For patients aged 45-75: Has the patient had a colonoscopy / FIT/ Cologuard? Not due     If the patient is female:    4. For patients aged 40-74: Has the patient had a mammogram within the past 2 years? Not due     5. For patients aged 21-65: Has the patient had a pap smear? Not due     Health

## 2024-05-10 LAB
TSH SERPL DL<=0.05 MIU/L-ACNC: 0.44 MCU/ML (ref 0.27–4.2)
VITAMIN B-12: 243 PG/ML (ref 211–911)
VITAMIN D 25-HYDROXY: 31.4 NG/ML (ref 32–100)

## 2024-05-13 ENCOUNTER — TELEPHONE (OUTPATIENT)
Facility: CLINIC | Age: 49
End: 2024-05-13

## 2024-05-13 DIAGNOSIS — Z01.818 PRE-OP EVALUATION: Primary | ICD-10-CM

## 2024-05-13 NOTE — TELEPHONE ENCOUNTER
Called patient and let her know that she needs to get blood work done again and she will come in some time this week.

## 2024-05-14 ENCOUNTER — NURSE ONLY (OUTPATIENT)
Facility: CLINIC | Age: 49
End: 2024-05-14

## 2024-05-14 DIAGNOSIS — Z01.818 PRE-OP EVALUATION: ICD-10-CM

## 2024-05-15 ENCOUNTER — TELEPHONE (OUTPATIENT)
Facility: CLINIC | Age: 49
End: 2024-05-15

## 2024-05-15 LAB
A/G RATIO: 1.6 RATIO (ref 1.1–2.6)
ALBUMIN: 4.1 G/DL (ref 3.5–5)
ALP BLD-CCNC: 105 U/L (ref 25–115)
ALT SERPL-CCNC: 13 U/L (ref 5–40)
ANION GAP SERPL CALCULATED.3IONS-SCNC: 10 MMOL/L (ref 3–15)
AST SERPL-CCNC: 14 U/L (ref 10–37)
BASOPHILS ABSOLUTE: 0 K/UL (ref 0–0.2)
BASOPHILS RELATIVE PERCENT: 1 % (ref 0–2)
BILIRUB SERPL-MCNC: 0.4 MG/DL (ref 0.2–1.2)
BUN BLDV-MCNC: 12 MG/DL (ref 6–22)
CALCIUM SERPL-MCNC: 9.4 MG/DL (ref 8.4–10.5)
CHLORIDE BLD-SCNC: 101 MMOL/L (ref 98–110)
CO2: 28 MMOL/L (ref 20–32)
CREAT SERPL-MCNC: 0.7 MG/DL (ref 0.5–1.2)
EOSINOPHIL # BLD: 4 % (ref 0–6)
EOSINOPHILS ABSOLUTE: 0.2 K/UL (ref 0–0.5)
GFR, ESTIMATED: >60 ML/MIN/1.73 SQ.M.
GLOBULIN: 2.6 G/DL (ref 2–4)
GLUCOSE: 107 MG/DL (ref 70–99)
HCT VFR BLD CALC: 42.4 % (ref 35.1–48)
HEMOGLOBIN: 13.3 G/DL (ref 11.7–16)
LYMPHOCYTES # BLD: 18 % (ref 20–45)
LYMPHOCYTES ABSOLUTE: 1.1 K/UL (ref 1–4.8)
MCH RBC QN AUTO: 32 PG (ref 26–34)
MCHC RBC AUTO-ENTMCNC: 31 G/DL (ref 31–36)
MCV RBC AUTO: 101 FL (ref 80–99)
MONOCYTES ABSOLUTE: 0.5 K/UL (ref 0.1–1)
MONOCYTES: 8 % (ref 3–12)
NEUTROPHILS ABSOLUTE: 4.3 K/UL (ref 1.8–7.7)
NEUTROPHILS: 69 % (ref 40–75)
PDW BLD-RTO: 13.2 % (ref 10–15.5)
PLATELET # BLD: 238 K/UL (ref 140–440)
PMV BLD AUTO: 10.5 FL (ref 9–13)
POTASSIUM SERPL-SCNC: 4.3 MMOL/L (ref 3.5–5.5)
RBC # BLD: 4.21 M/UL (ref 3.8–5.2)
SODIUM BLD-SCNC: 139 MMOL/L (ref 133–145)
TOTAL PROTEIN: 6.7 G/DL (ref 6.4–8.3)
WBC # BLD: 6.2 K/UL (ref 4–11)

## 2024-05-15 RX ORDER — LANOLIN ALCOHOL/MO/W.PET/CERES
1000 CREAM (GRAM) TOPICAL DAILY
Qty: 90 TABLET | Refills: 1 | Status: SHIPPED | OUTPATIENT
Start: 2024-05-15

## 2024-05-15 NOTE — TELEPHONE ENCOUNTER
Pt was see on 5/9 for a pre-op clearance. The note from the visit isn't clear that the patient is cleared for surgery. Can you please document in the note or do a documentation stating patient is cleared for surgery. Pt is scheduled for Monday @ [facility]. Pt is scheduled for Back Surgery.    Please reach out to Sharon @ [phone] if you have any questions.    The documentation does not have to be faxed. They will be able to view in chart.

## 2024-05-16 ASSESSMENT — ENCOUNTER SYMPTOMS
ABDOMINAL PAIN: 0
SHORTNESS OF BREATH: 0
BACK PAIN: 1

## 2024-05-31 ENCOUNTER — TELEPHONE (OUTPATIENT)
Facility: CLINIC | Age: 49
End: 2024-05-31

## 2024-05-31 NOTE — TELEPHONE ENCOUNTER
Called patient and left her a voicemail to call back to reschedule her July 18th, 2024 appointment to mid June.

## 2024-05-31 NOTE — TELEPHONE ENCOUNTER
----- Message from Caron Richey sent at 5/31/2024  1:19 PM EDT -----  Subject: Message to Provider    QUESTIONS  Information for Provider? Patient returned the call, I did try to find an   appointment for Mid June but nothing was available, only late June.  ---------------------------------------------------------------------------  --------------  CALL BACK INFO  [phone]; OK to leave message on voicemail  ---------------------------------------------------------------------------  --------------  SCRIPT ANSWERS  undefined

## 2024-06-28 DIAGNOSIS — N39.46 MIXED INCONTINENCE: ICD-10-CM

## 2024-06-28 RX ORDER — OXYBUTYNIN CHLORIDE 10 MG/1
10 TABLET, EXTENDED RELEASE ORAL DAILY
Qty: 30 TABLET | Refills: 2 | Status: SHIPPED | OUTPATIENT
Start: 2024-06-28

## 2024-06-28 NOTE — TELEPHONE ENCOUNTER
Medication(s) requesting:   Requested Prescriptions     Pending Prescriptions Disp Refills    oxyBUTYnin (DITROPAN-XL) 10 MG extended release tablet 30 tablet 2     Sig: Take 1 tablet by mouth daily       Last office visit:  05/09/24  Next office visit DMA: Visit date not found

## 2024-07-07 DIAGNOSIS — M48.062 SPINAL STENOSIS, LUMBAR REGION WITH NEUROGENIC CLAUDICATION: ICD-10-CM

## 2024-07-09 RX ORDER — GABAPENTIN 800 MG/1
TABLET ORAL
Qty: 90 TABLET | Refills: 0 | Status: SHIPPED | OUTPATIENT
Start: 2024-07-09 | End: 2024-08-08

## 2024-07-18 DIAGNOSIS — E78.2 MIXED HYPERLIPIDEMIA: Primary | ICD-10-CM

## 2024-07-18 DIAGNOSIS — E55.9 VITAMIN D DEFICIENCY: ICD-10-CM

## 2024-07-22 DIAGNOSIS — N39.46 MIXED INCONTINENCE: ICD-10-CM

## 2024-07-22 RX ORDER — OXYBUTYNIN CHLORIDE 10 MG/1
10 TABLET, EXTENDED RELEASE ORAL DAILY
Qty: 30 TABLET | Refills: 5 | Status: SHIPPED | OUTPATIENT
Start: 2024-07-22

## 2024-07-22 NOTE — TELEPHONE ENCOUNTER
Medication(s) requesting:   Requested Prescriptions     Pending Prescriptions Disp Refills    oxyBUTYnin (DITROPAN-XL) 10 MG extended release tablet 30 tablet 2     Sig: Take 1 tablet by mouth daily       Last office visit:  05/09/24  Next office visit DMA: 7/29/2024

## 2024-07-24 LAB
AMPHETAMINE, URINE: NEGATIVE NG/ML
BARBITURATES, URINE: NEGATIVE NG/ML
BENZODIAZEPINES, URINE: NEGATIVE NG/ML
BUPRENORPHINE SCREEN, URINE: NEGATIVE NG/ML
COCAINE METABOLITE, URINE: NEGATIVE NG/ML
COTININE, URINE: <2 NG/ML
CREATININE URINE: 52.1 MG/DL
DESMETHYLTRAMADOL, URINE: NEGATIVE NG/ML
FENTANYL COMMENTS: NORMAL
FENTANYL SCREEN, URINE: NEGATIVE NG/ML
Lab: NORMAL
MARIJUANA METABOLITE SCREEN URINE: NEGATIVE NG/ML
MEDMATCH SUMMARY: NORMAL
METHADONE METABOLITE, UR: NEGATIVE NG/ML
NICOTINE URINE: <2 NG/ML
OPIATE SCREEN URINE: NEGATIVE NG/ML
OXIDANTS, URINE: NEGATIVE MCG/ML
OXYCODONE SCREEN URINE: NEGATIVE NG/ML
PH, URINE: 7.5 (ref 4.5–9)
TRAMADOL COMMENTS: NORMAL
TRAMADOL, URINE: NEGATIVE NG/ML

## 2024-07-26 RX ORDER — CYCLOBENZAPRINE HCL 10 MG
10 TABLET ORAL
COMMUNITY
Start: 2024-07-18

## 2024-07-29 ENCOUNTER — OFFICE VISIT (OUTPATIENT)
Facility: CLINIC | Age: 49
End: 2024-07-29
Payer: MEDICAID

## 2024-07-29 VITALS
SYSTOLIC BLOOD PRESSURE: 132 MMHG | HEIGHT: 69 IN | OXYGEN SATURATION: 95 % | RESPIRATION RATE: 15 BRPM | DIASTOLIC BLOOD PRESSURE: 80 MMHG | WEIGHT: 293 LBS | BODY MASS INDEX: 43.4 KG/M2 | TEMPERATURE: 96.6 F | HEART RATE: 76 BPM

## 2024-07-29 DIAGNOSIS — Z82.69 FAMILY HISTORY OF SYSTEMIC LUPUS ERYTHEMATOSUS: ICD-10-CM

## 2024-07-29 DIAGNOSIS — R73.03 PREDIABETES: ICD-10-CM

## 2024-07-29 DIAGNOSIS — E78.2 MIXED HYPERLIPIDEMIA: ICD-10-CM

## 2024-07-29 DIAGNOSIS — N95.1 HOT FLASHES DUE TO MENOPAUSE: ICD-10-CM

## 2024-07-29 DIAGNOSIS — Z01.818 PRE-OP EVALUATION: Primary | ICD-10-CM

## 2024-07-29 DIAGNOSIS — I10 PRIMARY HYPERTENSION: ICD-10-CM

## 2024-07-29 DIAGNOSIS — E55.9 VITAMIN D DEFICIENCY: ICD-10-CM

## 2024-07-29 DIAGNOSIS — Z87.891 RECENTLY QUIT USING TOBACCO: ICD-10-CM

## 2024-07-29 PROCEDURE — 3079F DIAST BP 80-89 MM HG: CPT | Performed by: STUDENT IN AN ORGANIZED HEALTH CARE EDUCATION/TRAINING PROGRAM

## 2024-07-29 PROCEDURE — 3075F SYST BP GE 130 - 139MM HG: CPT | Performed by: STUDENT IN AN ORGANIZED HEALTH CARE EDUCATION/TRAINING PROGRAM

## 2024-07-29 PROCEDURE — 99214 OFFICE O/P EST MOD 30 MIN: CPT | Performed by: STUDENT IN AN ORGANIZED HEALTH CARE EDUCATION/TRAINING PROGRAM

## 2024-07-29 ASSESSMENT — PATIENT HEALTH QUESTIONNAIRE - PHQ9
SUM OF ALL RESPONSES TO PHQ QUESTIONS 1-9: 0
SUM OF ALL RESPONSES TO PHQ QUESTIONS 1-9: 0
3. TROUBLE FALLING OR STAYING ASLEEP: NOT AT ALL
2. FEELING DOWN, DEPRESSED OR HOPELESS: NOT AT ALL
5. POOR APPETITE OR OVEREATING: NOT AT ALL
SUM OF ALL RESPONSES TO PHQ QUESTIONS 1-9: 0
8. MOVING OR SPEAKING SO SLOWLY THAT OTHER PEOPLE COULD HAVE NOTICED. OR THE OPPOSITE, BEING SO FIGETY OR RESTLESS THAT YOU HAVE BEEN MOVING AROUND A LOT MORE THAN USUAL: NOT AT ALL
4. FEELING TIRED OR HAVING LITTLE ENERGY: NOT AT ALL
10. IF YOU CHECKED OFF ANY PROBLEMS, HOW DIFFICULT HAVE THESE PROBLEMS MADE IT FOR YOU TO DO YOUR WORK, TAKE CARE OF THINGS AT HOME, OR GET ALONG WITH OTHER PEOPLE: NOT DIFFICULT AT ALL
1. LITTLE INTEREST OR PLEASURE IN DOING THINGS: NOT AT ALL
9. THOUGHTS THAT YOU WOULD BE BETTER OFF DEAD, OR OF HURTING YOURSELF: NOT AT ALL
SUM OF ALL RESPONSES TO PHQ QUESTIONS 1-9: 0
SUM OF ALL RESPONSES TO PHQ9 QUESTIONS 1 & 2: 0
7. TROUBLE CONCENTRATING ON THINGS, SUCH AS READING THE NEWSPAPER OR WATCHING TELEVISION: NOT AT ALL
6. FEELING BAD ABOUT YOURSELF - OR THAT YOU ARE A FAILURE OR HAVE LET YOURSELF OR YOUR FAMILY DOWN: NOT AT ALL

## 2024-07-29 ASSESSMENT — ENCOUNTER SYMPTOMS
BACK PAIN: 1
ABDOMINAL PAIN: 0
SHORTNESS OF BREATH: 0

## 2024-07-29 NOTE — PROGRESS NOTES
History of Present Illness  Renea Artis is a 49 y.o. female who presents today for management of    Chief Complaint   Patient presents with    Pre-op Exam         Presents today for the pre-operative evaluation.     Procedure to be performed: L2-S1 laminectomy fusion, TILF transforaminal lumbar interbody fusion L2/3, L3/4  Procedure to be performed by: Pablo Izquierdo MD  Procedure to be performed at: [facility]  Procedure to be performed on: 8/5/24    MET Questionnaire:  ? Can patient take care of self, such as eat, dress, or use the toilet (1 MET)? yes  ? Can patient walk up a flight of steps or a hill or walk on level ground at 3 to 4 mph (4 METs)? yes  ? Can patient do heavy work around the house such as scrubbing floors or lifting or moving heavy furniture or climb two flights of stairs (between 4 and 10 METs)? yes  ? Can patient participate in strenuous sports such as swimming, singles tennis, football, basketball, and skiing (>10 METs)? no      -Surgery initially scheduled for 5/20, however this was rescheduled as pt needed to quit smoking prior to surgery. Pt has been tobacco free and off of chantix since 5/16      Patient Active Problem List   Diagnosis    Obesity, Class III, BMI 40-49.9 (morbid obesity) (HCC)    Bipolar affective disorder, currently manic, moderate (HCC)    HTN (hypertension)    Cervical myelopathy (HCC)    Hypertriglyceridemia    Cervical stenosis of spine    Fibromyalgia    Chronic midline low back pain with sciatica    Onychomycosis    Prediabetes    Irritable bowel syndrome without diarrhea    Moderate episode of recurrent major depressive disorder (HCC)    Spinal stenosis, lumbar region with neurogenic claudication    Tobacco use disorder    Status post left knee replacement    Superficial incisional surgical site infection    Left ankle instability    Cervicalgia    Mixed incontinence    Perimenopause    Excoriated acne      Past Medical History:   Diagnosis Date    Bipolar affective disorder,

## 2024-07-29 NOTE — PROGRESS NOTES
Renea Artis is a 49 y.o. year old female who presents today for   Chief Complaint   Patient presents with    Pre-op Exam       Is someone accompanying this pt? no    Is the patient using any DME equipment during OV? yes    Depression Screenin/29/2024     9:23 AM 2024     9:40 AM 2024     8:19 AM 2024     2:48 PM 2023     8:30 AM 2023     8:50 AM 3/29/2023     8:37 AM   PHQ-9 Questionaire   Little interest or pleasure in doing things 0 0 0 0 0 0 0   Feeling down, depressed, or hopeless 0 0 0 0 0 0 0   Trouble falling or staying asleep, or sleeping too much 0 0        Feeling tired or having little energy 0 0        Poor appetite or overeating 0 0        Feeling bad about yourself - or that you are a failure or have let yourself or your family down 0 0        Trouble concentrating on things, such as reading the newspaper or watching television 0 0        Moving or speaking so slowly that other people could have noticed. Or the opposite - being so fidgety or restless that you have been moving around a lot more than usual 0 0        Thoughts that you would be better off dead, or of hurting yourself in some way 0 0        PHQ-9 Total Score 0 0 0 0 0 0 0   If you checked off any problems, how difficult have these problems made it for you to do your work, take care of things at home, or get along with other people? 0             Abuse Screening:       No data to display                Learning Assessment:  No question data found.    Fall Risk:       No data to display                    Coordination of Care:   1. \"Have you been to the ER, urgent care clinic since your last visit?  Hospitalized since your last visit?\" no    2. \"Have you seen or consulted any other health care providers outside of the [facility] since your last visit?\" no    3. For patients aged 45-75: Has the patient had a colonoscopy / FIT/ Cologuard? yes    If the patient is female:    4. For patients aged

## 2024-08-02 ENCOUNTER — TELEPHONE (OUTPATIENT)
Facility: HOSPITAL | Age: 49
End: 2024-08-02

## 2024-08-02 ENCOUNTER — ANESTHESIA EVENT (OUTPATIENT)
Facility: HOSPITAL | Age: 49
DRG: 304 | End: 2024-08-02
Payer: MEDICAID

## 2024-08-02 NOTE — TELEPHONE ENCOUNTER
Call placed to patient, ID verified x 2. Patient  has decided with their surgeon to have lumbar/sacral surgery to decrease  pain and improve mobility . Topics discussed included surgery preparation, what to expect the day of surgery, medications, physical and discharge planning.  It was discussed that this is considered an elective surgery and that prior to the surgery  decisions such as arranging for help at home once they are discharged needs to be made.Patient agreed to get home ready for surgery and to have a ride arranged to go home. She identifies her sister as her support system, has a walker at home and will be spending her first night in the hospital. She lives in a two story home with three steps to enter, her bedroom is downstairs. Instructions were given for CHG bathing. Patient states that she will shower with dial soap.  Patient will complete the procedure the morning of surgery.  Patient was reminded not to apply any deoderant, perfumes, makeup or lotions to skin the morning of surgery. She will remain NPO after midnight and  will take only the medications as instructed to take by her surgeon the morning of surgery with a sip of water. Patient was instructed to take her Cymbalta, her Wellbutrin and her Lamictal the morning of surgery with a sip of water. A spine surgery education book will be provided to patient post op prior to discharge. Education regarding the importance of early and frequent ambulation to avoid surgical complications and to assist with pain was provided. Recommended the use of ice to assist with pain and swelling post op for 20 minutes an hour, not to be placed directly on her  skin. Patient verbalized understanding of all information provided.  Opportunity was given to ask questions and phone number of the Orthopaedic   was given for any questions or concerns that may arise later.

## 2024-08-02 NOTE — H&P
Pre-Admission History and Physical    Patient: Renea Artis   MRN: [id number]   SSN: [id number]   YOB: 1975   Age: 49 y.o.   Sex: female     Patient scheduled for: .Lumbar two/three/four/five/sacral one laminectomy fusion transforaminal lumbar interbody fusion lumbar two/three, lumbar three/four  Date of surgery: 8/5/2024  Surgeon: Pablo Izquierdo MD    HPI:  Renea Artis is a 49 y.o. female with severe back pain. She feels unstable and feels her legs give out. CAT scan demonstrates loss of fixation from screws with development of mobile degenerative spondylolisthesis with pseudoarthrosis at L4/5 segment. She also has relative stenosis at C3/4. This patient has failed the presurgical conservative treatments  including physical therapy, spinal block injections and medications. Pain has impacted the patient's functional ability. She is being admitted for surgical intervention.         Past Medical History:   Diagnosis Date    Bipolar affective disorder, currently manic, moderate ([facility]) 4/27/2016    Chronic midline low back pain with sciatica 11/1/2016    Fibromyalgia 9/1/2015    HLD (hyperlipidemia) 2/4/2016    Hypertension     Hypertriglyceridemia 5/15/2018    Irritable bowel syndrome     Moderate episode of recurrent major depressive disorder ([facility]) 4/27/2016    Obesity, Class III, BMI 40-49.9 (morbid obesity) ([facility]) 9/1/2015    Onychomycosis 4/27/2016    Osteoarthritis 2017    PTSD (post-traumatic stress disorder)     Substance abuse ([facility])      Social History     Socioeconomic History    Marital status: Legally      Spouse name: None    Number of children: None    Years of education: None    Highest education level: None   Tobacco Use    Smoking status: Former     Current packs/day: 0.50     Average packs/day: 0.5 packs/day for 38.6 years (19.3 ttl pk-yrs)     Types: Cigarettes     Start date: 1/1/1986    Smokeless tobacco: Never    Tobacco comments:     Not continuously through the years   Vaping

## 2024-08-03 NOTE — DISCHARGE INSTRUCTIONS
were provided.  ___________________________________________________________________________________________________________________________________

## 2024-08-04 DIAGNOSIS — M48.062 SPINAL STENOSIS, LUMBAR REGION WITH NEUROGENIC CLAUDICATION: ICD-10-CM

## 2024-08-05 ENCOUNTER — ANESTHESIA (OUTPATIENT)
Facility: HOSPITAL | Age: 49
DRG: 304 | End: 2024-08-05
Payer: MEDICAID

## 2024-08-05 ENCOUNTER — APPOINTMENT (OUTPATIENT)
Facility: HOSPITAL | Age: 49
DRG: 304 | End: 2024-08-05
Attending: ORTHOPAEDIC SURGERY
Payer: MEDICAID

## 2024-08-05 ENCOUNTER — HOSPITAL ENCOUNTER (INPATIENT)
Facility: HOSPITAL | Age: 49
LOS: 1 days | Discharge: HOME HEALTH | DRG: 304 | End: 2024-08-06
Attending: ORTHOPAEDIC SURGERY | Admitting: ORTHOPAEDIC SURGERY
Payer: MEDICAID

## 2024-08-05 DIAGNOSIS — Z98.1 S/P LUMBAR FUSION: Primary | ICD-10-CM

## 2024-08-05 PROBLEM — S32.009K LUMBAR PSEUDOARTHROSIS: Status: ACTIVE | Noted: 2024-08-05

## 2024-08-05 LAB — HCG UR QL: NEGATIVE

## 2024-08-05 PROCEDURE — 6360000002 HC RX W HCPCS

## 2024-08-05 PROCEDURE — 94761 N-INVAS EAR/PLS OXIMETRY MLT: CPT

## 2024-08-05 PROCEDURE — 0SP304Z REMOVAL OF INTERNAL FIXATION DEVICE FROM LUMBOSACRAL JOINT, OPEN APPROACH: ICD-10-PCS | Performed by: ORTHOPAEDIC SURGERY

## 2024-08-05 PROCEDURE — 3600000002 HC SURGERY LEVEL 2 BASE: Performed by: ORTHOPAEDIC SURGERY

## 2024-08-05 PROCEDURE — 2500000003 HC RX 250 WO HCPCS: Performed by: NURSE ANESTHETIST, CERTIFIED REGISTERED

## 2024-08-05 PROCEDURE — 6370000000 HC RX 637 (ALT 250 FOR IP): Performed by: ORTHOPAEDIC SURGERY

## 2024-08-05 PROCEDURE — 2700000000 HC OXYGEN THERAPY PER DAY

## 2024-08-05 PROCEDURE — 2720000010 HC SURG SUPPLY STERILE: Performed by: ORTHOPAEDIC SURGERY

## 2024-08-05 PROCEDURE — 2500000003 HC RX 250 WO HCPCS: Performed by: ORTHOPAEDIC SURGERY

## 2024-08-05 PROCEDURE — 6360000002 HC RX W HCPCS: Performed by: ORTHOPAEDIC SURGERY

## 2024-08-05 PROCEDURE — 3600000012 HC SURGERY LEVEL 2 ADDTL 15MIN: Performed by: ORTHOPAEDIC SURGERY

## 2024-08-05 PROCEDURE — 0SG00A0 FUSION OF LUMBAR VERTEBRAL JOINT WITH INTERBODY FUSION DEVICE, ANTERIOR APPROACH, ANTERIOR COLUMN, OPEN APPROACH: ICD-10-PCS | Performed by: ORTHOPAEDIC SURGERY

## 2024-08-05 PROCEDURE — 2580000003 HC RX 258: Performed by: ORTHOPAEDIC SURGERY

## 2024-08-05 PROCEDURE — 0SG3071 FUSION OF LUMBOSACRAL JOINT WITH AUTOLOGOUS TISSUE SUBSTITUTE, POSTERIOR APPROACH, POSTERIOR COLUMN, OPEN APPROACH: ICD-10-PCS | Performed by: ORTHOPAEDIC SURGERY

## 2024-08-05 PROCEDURE — 2580000003 HC RX 258: Performed by: NURSE ANESTHETIST, CERTIFIED REGISTERED

## 2024-08-05 PROCEDURE — 0SP004Z REMOVAL OF INTERNAL FIXATION DEVICE FROM LUMBAR VERTEBRAL JOINT, OPEN APPROACH: ICD-10-PCS | Performed by: ORTHOPAEDIC SURGERY

## 2024-08-05 PROCEDURE — 4A11X4G MONITORING OF PERIPHERAL NERVOUS ELECTRICAL ACTIVITY, INTRAOPERATIVE, EXTERNAL APPROACH: ICD-10-PCS | Performed by: ORTHOPAEDIC SURGERY

## 2024-08-05 PROCEDURE — 0ST20ZZ RESECTION OF LUMBAR VERTEBRAL DISC, OPEN APPROACH: ICD-10-PCS | Performed by: ORTHOPAEDIC SURGERY

## 2024-08-05 PROCEDURE — 1100000000 HC RM PRIVATE

## 2024-08-05 PROCEDURE — C1713 ANCHOR/SCREW BN/BN,TIS/BN: HCPCS | Performed by: ORTHOPAEDIC SURGERY

## 2024-08-05 PROCEDURE — 6370000000 HC RX 637 (ALT 250 FOR IP): Performed by: NURSE ANESTHETIST, CERTIFIED REGISTERED

## 2024-08-05 PROCEDURE — 0SG1071 FUSION OF 2 OR MORE LUMBAR VERTEBRAL JOINTS WITH AUTOLOGOUS TISSUE SUBSTITUTE, POSTERIOR APPROACH, POSTERIOR COLUMN, OPEN APPROACH: ICD-10-PCS | Performed by: ORTHOPAEDIC SURGERY

## 2024-08-05 PROCEDURE — 7100000000 HC PACU RECOVERY - FIRST 15 MIN: Performed by: ORTHOPAEDIC SURGERY

## 2024-08-05 PROCEDURE — C1729 CATH, DRAINAGE: HCPCS | Performed by: ORTHOPAEDIC SURGERY

## 2024-08-05 PROCEDURE — 3700000001 HC ADD 15 MINUTES (ANESTHESIA): Performed by: ORTHOPAEDIC SURGERY

## 2024-08-05 PROCEDURE — 6360000002 HC RX W HCPCS: Performed by: PHYSICIAN ASSISTANT

## 2024-08-05 PROCEDURE — 3700000000 HC ANESTHESIA ATTENDED CARE: Performed by: ORTHOPAEDIC SURGERY

## 2024-08-05 PROCEDURE — 2780000010 HC IMPLANT OTHER: Performed by: ORTHOPAEDIC SURGERY

## 2024-08-05 PROCEDURE — 6360000002 HC RX W HCPCS: Performed by: NURSE ANESTHETIST, CERTIFIED REGISTERED

## 2024-08-05 PROCEDURE — 2580000003 HC RX 258: Performed by: PHYSICIAN ASSISTANT

## 2024-08-05 PROCEDURE — 81025 URINE PREGNANCY TEST: CPT

## 2024-08-05 PROCEDURE — 2709999900 HC NON-CHARGEABLE SUPPLY: Performed by: ORTHOPAEDIC SURGERY

## 2024-08-05 PROCEDURE — 01NB0ZZ RELEASE LUMBAR NERVE, OPEN APPROACH: ICD-10-PCS | Performed by: ORTHOPAEDIC SURGERY

## 2024-08-05 PROCEDURE — 7100000001 HC PACU RECOVERY - ADDTL 15 MIN: Performed by: ORTHOPAEDIC SURGERY

## 2024-08-05 DEVICE — SCREW SPNL L50MM DIA7.5MM POST THORACOLUMBOSACRAL POLYAX 2S: Type: IMPLANTABLE DEVICE | Site: SPINE LUMBAR | Status: FUNCTIONAL

## 2024-08-05 DEVICE — ROD SPNL LORDTC 5.5X90 MM TI RELINE-O: Type: IMPLANTABLE DEVICE | Site: SPINE LUMBAR | Status: FUNCTIONAL

## 2024-08-05 DEVICE — SCREW SPNL L55MM DIA6.5MM POST THORACOLUMBOSACRAL POLYAX 2S: Type: IMPLANTABLE DEVICE | Site: SPINE LUMBAR | Status: FUNCTIONAL

## 2024-08-05 DEVICE — I-FACTOR™ PUTTY, 5.0 CC SYRINGE
Type: IMPLANTABLE DEVICE | Site: SPINE LUMBAR | Status: FUNCTIONAL
Brand: I-FACTOR™ PEPTIDE ENHANCED BONE GRAFT

## 2024-08-05 DEVICE — IMPLANTABLE DEVICE: Type: IMPLANTABLE DEVICE | Site: SPINE LUMBAR | Status: FUNCTIONAL

## 2024-08-05 DEVICE — ALLOGRAFT BNE DBM 10 CC VESUVIUS 4104K0810DC: Type: IMPLANTABLE DEVICE | Site: SPINE LUMBAR | Status: FUNCTIONAL

## 2024-08-05 DEVICE — BONE GRAFT KIT 7510100 INFUSE X SMALL
Type: IMPLANTABLE DEVICE | Site: SPINE LUMBAR | Status: FUNCTIONAL
Brand: INFUSE® BONE GRAFT

## 2024-08-05 DEVICE — AFT DIVERTED TUBE (3/4 FILLED)
Type: IMPLANTABLE DEVICE | Site: SPINE LUMBAR | Status: FUNCTIONAL
Brand: DBX®

## 2024-08-05 DEVICE — SCREW SPNL DIA5.5MM OPN TULIP LOK RELINE: Type: IMPLANTABLE DEVICE | Site: SPINE LUMBAR | Status: FUNCTIONAL

## 2024-08-05 DEVICE — SCREW SPNL L50MM DIA6.5MM POST THORACOLUMBOSACRAL POLYAX 2S: Type: IMPLANTABLE DEVICE | Site: SPINE LUMBAR | Status: FUNCTIONAL

## 2024-08-05 DEVICE — SCREW SPNL L55MM DIA7.5MM POST THORACOLUMBOSACRAL POLYAX 2S: Type: IMPLANTABLE DEVICE | Site: SPINE LUMBAR | Status: FUNCTIONAL

## 2024-08-05 RX ORDER — DIPHENHYDRAMINE HYDROCHLORIDE 50 MG/ML
25 INJECTION INTRAMUSCULAR; INTRAVENOUS EVERY 6 HOURS PRN
Status: DISCONTINUED | OUTPATIENT
Start: 2024-08-05 | End: 2024-08-06 | Stop reason: HOSPADM

## 2024-08-05 RX ORDER — BUPROPION HYDROCHLORIDE 100 MG/1
200 TABLET, EXTENDED RELEASE ORAL 2 TIMES DAILY
Status: DISCONTINUED | OUTPATIENT
Start: 2024-08-05 | End: 2024-08-05 | Stop reason: CLARIF

## 2024-08-05 RX ORDER — TAMSULOSIN HYDROCHLORIDE 0.4 MG/1
0.4 CAPSULE ORAL ONCE
Status: COMPLETED | OUTPATIENT
Start: 2024-08-05 | End: 2024-08-05

## 2024-08-05 RX ORDER — OXYCODONE HYDROCHLORIDE 10 MG/1
10 TABLET ORAL EVERY 4 HOURS PRN
Status: DISCONTINUED | OUTPATIENT
Start: 2024-08-05 | End: 2024-08-06 | Stop reason: HOSPADM

## 2024-08-05 RX ORDER — LORAZEPAM 2 MG/ML
0.5 INJECTION INTRAMUSCULAR
Status: COMPLETED | OUTPATIENT
Start: 2024-08-05 | End: 2024-08-05

## 2024-08-05 RX ORDER — PHENYLEPHRINE HCL IN 0.9% NACL 1 MG/10 ML
SYRINGE (ML) INTRAVENOUS PRN
Status: DISCONTINUED | OUTPATIENT
Start: 2024-08-05 | End: 2024-08-05 | Stop reason: SDUPTHER

## 2024-08-05 RX ORDER — SUCCINYLCHOLINE/SOD CL,ISO/PF 100 MG/5ML
SYRINGE (ML) INTRAVENOUS PRN
Status: DISCONTINUED | OUTPATIENT
Start: 2024-08-05 | End: 2024-08-05 | Stop reason: SDUPTHER

## 2024-08-05 RX ORDER — ACETAMINOPHEN 500 MG
1000 TABLET ORAL ONCE
Status: COMPLETED | OUTPATIENT
Start: 2024-08-05 | End: 2024-08-05

## 2024-08-05 RX ORDER — LORAZEPAM 2 MG/ML
0.5 INJECTION INTRAMUSCULAR ONCE
Status: COMPLETED | OUTPATIENT
Start: 2024-08-05 | End: 2024-08-05

## 2024-08-05 RX ORDER — NALTREXONE HYDROCHLORIDE 50 MG/1
25 TABLET, FILM COATED ORAL 2 TIMES DAILY
Status: DISCONTINUED | OUTPATIENT
Start: 2024-08-05 | End: 2024-08-06 | Stop reason: HOSPADM

## 2024-08-05 RX ORDER — LAMOTRIGINE 100 MG/1
200 TABLET ORAL EVERY EVENING
Status: DISCONTINUED | OUTPATIENT
Start: 2024-08-05 | End: 2024-08-06 | Stop reason: HOSPADM

## 2024-08-05 RX ORDER — SODIUM CHLORIDE 0.9 % (FLUSH) 0.9 %
5-40 SYRINGE (ML) INJECTION EVERY 12 HOURS SCHEDULED
Status: DISCONTINUED | OUTPATIENT
Start: 2024-08-05 | End: 2024-08-06 | Stop reason: HOSPADM

## 2024-08-05 RX ORDER — LORAZEPAM 2 MG/ML
INJECTION INTRAMUSCULAR
Status: COMPLETED
Start: 2024-08-05 | End: 2024-08-05

## 2024-08-05 RX ORDER — KETAMINE HCL 50MG/ML(1)
SYRINGE (ML) INTRAVENOUS PRN
Status: DISCONTINUED | OUTPATIENT
Start: 2024-08-05 | End: 2024-08-05 | Stop reason: SDUPTHER

## 2024-08-05 RX ORDER — ONDANSETRON 2 MG/ML
4 INJECTION INTRAMUSCULAR; INTRAVENOUS
Status: DISCONTINUED | OUTPATIENT
Start: 2024-08-05 | End: 2024-08-05 | Stop reason: HOSPADM

## 2024-08-05 RX ORDER — SODIUM CHLORIDE 450 MG/100ML
INJECTION, SOLUTION INTRAVENOUS CONTINUOUS
Status: DISCONTINUED | OUTPATIENT
Start: 2024-08-05 | End: 2024-08-06 | Stop reason: HOSPADM

## 2024-08-05 RX ORDER — SODIUM CHLORIDE 9 MG/ML
INJECTION, SOLUTION INTRAVENOUS PRN
Status: DISCONTINUED | OUTPATIENT
Start: 2024-08-05 | End: 2024-08-05 | Stop reason: HOSPADM

## 2024-08-05 RX ORDER — PREGABALIN 75 MG/1
75 CAPSULE ORAL ONCE
Status: COMPLETED | OUTPATIENT
Start: 2024-08-05 | End: 2024-08-05

## 2024-08-05 RX ORDER — LIDOCAINE HYDROCHLORIDE 10 MG/ML
1 INJECTION, SOLUTION EPIDURAL; INFILTRATION; INTRACAUDAL; PERINEURAL
Status: DISCONTINUED | OUTPATIENT
Start: 2024-08-05 | End: 2024-08-05 | Stop reason: HOSPADM

## 2024-08-05 RX ORDER — HYDROXYZINE HYDROCHLORIDE 10 MG/1
25 TABLET, FILM COATED ORAL 3 TIMES DAILY PRN
Status: DISCONTINUED | OUTPATIENT
Start: 2024-08-05 | End: 2024-08-06 | Stop reason: HOSPADM

## 2024-08-05 RX ORDER — ROCURONIUM BROMIDE 10 MG/ML
INJECTION, SOLUTION INTRAVENOUS PRN
Status: DISCONTINUED | OUTPATIENT
Start: 2024-08-05 | End: 2024-08-05 | Stop reason: SDUPTHER

## 2024-08-05 RX ORDER — ACETAMINOPHEN 500 MG
1000 TABLET ORAL ONCE
Status: DISCONTINUED | OUTPATIENT
Start: 2024-08-05 | End: 2024-08-05

## 2024-08-05 RX ORDER — LOSARTAN POTASSIUM 50 MG/1
50 TABLET ORAL DAILY
Status: DISCONTINUED | OUTPATIENT
Start: 2024-08-05 | End: 2024-08-06 | Stop reason: HOSPADM

## 2024-08-05 RX ORDER — EPHEDRINE SULFATE/0.9% NACL/PF 25 MG/5 ML
SYRINGE (ML) INTRAVENOUS PRN
Status: DISCONTINUED | OUTPATIENT
Start: 2024-08-05 | End: 2024-08-05 | Stop reason: SDUPTHER

## 2024-08-05 RX ORDER — DULOXETIN HYDROCHLORIDE 60 MG/1
60 CAPSULE, DELAYED RELEASE ORAL 2 TIMES DAILY
Status: DISCONTINUED | OUTPATIENT
Start: 2024-08-05 | End: 2024-08-06 | Stop reason: HOSPADM

## 2024-08-05 RX ORDER — HYDROMORPHONE HYDROCHLORIDE 1 MG/ML
0.5 INJECTION, SOLUTION INTRAMUSCULAR; INTRAVENOUS; SUBCUTANEOUS
Status: DISCONTINUED | OUTPATIENT
Start: 2024-08-05 | End: 2024-08-06 | Stop reason: HOSPADM

## 2024-08-05 RX ORDER — METAXALONE 800 MG/1
800 TABLET ORAL EVERY 8 HOURS PRN
Status: DISCONTINUED | OUTPATIENT
Start: 2024-08-05 | End: 2024-08-06 | Stop reason: HOSPADM

## 2024-08-05 RX ORDER — VANCOMYCIN HYDROCHLORIDE 1 G/20ML
INJECTION, POWDER, LYOPHILIZED, FOR SOLUTION INTRAVENOUS PRN
Status: DISCONTINUED | OUTPATIENT
Start: 2024-08-05 | End: 2024-08-05 | Stop reason: ALTCHOICE

## 2024-08-05 RX ORDER — BUPROPION HYDROCHLORIDE 100 MG/1
100 TABLET ORAL 4 TIMES DAILY
Status: DISCONTINUED | OUTPATIENT
Start: 2024-08-05 | End: 2024-08-06 | Stop reason: HOSPADM

## 2024-08-05 RX ORDER — FENTANYL CITRATE 50 UG/ML
INJECTION, SOLUTION INTRAMUSCULAR; INTRAVENOUS PRN
Status: DISCONTINUED | OUTPATIENT
Start: 2024-08-05 | End: 2024-08-05 | Stop reason: SDUPTHER

## 2024-08-05 RX ORDER — CEFAZOLIN SODIUM 1 G/3ML
INJECTION, POWDER, FOR SOLUTION INTRAMUSCULAR; INTRAVENOUS PRN
Status: DISCONTINUED | OUTPATIENT
Start: 2024-08-05 | End: 2024-08-05 | Stop reason: SDUPTHER

## 2024-08-05 RX ORDER — HYDROMORPHONE HYDROCHLORIDE 1 MG/ML
0.25 INJECTION, SOLUTION INTRAMUSCULAR; INTRAVENOUS; SUBCUTANEOUS
Status: DISCONTINUED | OUTPATIENT
Start: 2024-08-05 | End: 2024-08-06 | Stop reason: HOSPADM

## 2024-08-05 RX ORDER — DEXTROSE MONOHYDRATE 100 MG/ML
INJECTION, SOLUTION INTRAVENOUS CONTINUOUS PRN
Status: DISCONTINUED | OUTPATIENT
Start: 2024-08-05 | End: 2024-08-05 | Stop reason: HOSPADM

## 2024-08-05 RX ORDER — ACETAMINOPHEN 325 MG/1
650 TABLET ORAL EVERY 6 HOURS
Status: DISCONTINUED | OUTPATIENT
Start: 2024-08-05 | End: 2024-08-06 | Stop reason: HOSPADM

## 2024-08-05 RX ORDER — FAMOTIDINE 20 MG/1
20 TABLET, FILM COATED ORAL ONCE
Status: COMPLETED | OUTPATIENT
Start: 2024-08-05 | End: 2024-08-05

## 2024-08-05 RX ORDER — PREGABALIN 75 MG/1
75 CAPSULE ORAL ONCE
Status: DISCONTINUED | OUTPATIENT
Start: 2024-08-05 | End: 2024-08-05 | Stop reason: SDUPTHER

## 2024-08-05 RX ORDER — BISACODYL 5 MG/1
5 TABLET, DELAYED RELEASE ORAL DAILY
Status: DISCONTINUED | OUTPATIENT
Start: 2024-08-05 | End: 2024-08-06 | Stop reason: HOSPADM

## 2024-08-05 RX ORDER — SODIUM CHLORIDE 0.9 % (FLUSH) 0.9 %
5-40 SYRINGE (ML) INJECTION EVERY 12 HOURS SCHEDULED
Status: DISCONTINUED | OUTPATIENT
Start: 2024-08-05 | End: 2024-08-05 | Stop reason: HOSPADM

## 2024-08-05 RX ORDER — DIPHENHYDRAMINE HCL 25 MG
25 CAPSULE ORAL EVERY 6 HOURS PRN
Status: DISCONTINUED | OUTPATIENT
Start: 2024-08-05 | End: 2024-08-06 | Stop reason: HOSPADM

## 2024-08-05 RX ORDER — ONDANSETRON 4 MG/1
4 TABLET, ORALLY DISINTEGRATING ORAL EVERY 8 HOURS PRN
Status: DISCONTINUED | OUTPATIENT
Start: 2024-08-05 | End: 2024-08-06 | Stop reason: HOSPADM

## 2024-08-05 RX ORDER — DEXAMETHASONE SODIUM PHOSPHATE 4 MG/ML
INJECTION, SOLUTION INTRA-ARTICULAR; INTRALESIONAL; INTRAMUSCULAR; INTRAVENOUS; SOFT TISSUE PRN
Status: DISCONTINUED | OUTPATIENT
Start: 2024-08-05 | End: 2024-08-05 | Stop reason: SDUPTHER

## 2024-08-05 RX ORDER — CELECOXIB 100 MG/1
200 CAPSULE ORAL ONCE
Status: COMPLETED | OUTPATIENT
Start: 2024-08-05 | End: 2024-08-05

## 2024-08-05 RX ORDER — NALOXONE HYDROCHLORIDE 0.4 MG/ML
INJECTION, SOLUTION INTRAMUSCULAR; INTRAVENOUS; SUBCUTANEOUS PRN
Status: DISCONTINUED | OUTPATIENT
Start: 2024-08-05 | End: 2024-08-05 | Stop reason: HOSPADM

## 2024-08-05 RX ORDER — TRAZODONE HYDROCHLORIDE 100 MG/1
100 TABLET ORAL
Status: DISCONTINUED | OUTPATIENT
Start: 2024-08-05 | End: 2024-08-06 | Stop reason: HOSPADM

## 2024-08-05 RX ORDER — POLYETHYLENE GLYCOL 3350 17 G/17G
17 POWDER, FOR SOLUTION ORAL DAILY
Status: DISCONTINUED | OUTPATIENT
Start: 2024-08-05 | End: 2024-08-06 | Stop reason: HOSPADM

## 2024-08-05 RX ORDER — PROPOFOL 10 MG/ML
INJECTION, EMULSION INTRAVENOUS PRN
Status: DISCONTINUED | OUTPATIENT
Start: 2024-08-05 | End: 2024-08-05 | Stop reason: SDUPTHER

## 2024-08-05 RX ORDER — MIDAZOLAM HYDROCHLORIDE 1 MG/ML
INJECTION INTRAMUSCULAR; INTRAVENOUS PRN
Status: DISCONTINUED | OUTPATIENT
Start: 2024-08-05 | End: 2024-08-05 | Stop reason: SDUPTHER

## 2024-08-05 RX ORDER — SODIUM CHLORIDE, SODIUM LACTATE, POTASSIUM CHLORIDE, CALCIUM CHLORIDE 600; 310; 30; 20 MG/100ML; MG/100ML; MG/100ML; MG/100ML
INJECTION, SOLUTION INTRAVENOUS CONTINUOUS
Status: DISCONTINUED | OUTPATIENT
Start: 2024-08-05 | End: 2024-08-05 | Stop reason: HOSPADM

## 2024-08-05 RX ORDER — ONDANSETRON 2 MG/ML
INJECTION INTRAMUSCULAR; INTRAVENOUS PRN
Status: DISCONTINUED | OUTPATIENT
Start: 2024-08-05 | End: 2024-08-05 | Stop reason: SDUPTHER

## 2024-08-05 RX ORDER — ONDANSETRON 2 MG/ML
4 INJECTION INTRAMUSCULAR; INTRAVENOUS EVERY 6 HOURS PRN
Status: DISCONTINUED | OUTPATIENT
Start: 2024-08-05 | End: 2024-08-06 | Stop reason: HOSPADM

## 2024-08-05 RX ORDER — GABAPENTIN 400 MG/1
800 CAPSULE ORAL 3 TIMES DAILY
Status: DISCONTINUED | OUTPATIENT
Start: 2024-08-05 | End: 2024-08-06 | Stop reason: HOSPADM

## 2024-08-05 RX ORDER — LAMOTRIGINE 100 MG/1
100 TABLET ORAL DAILY
Status: DISCONTINUED | OUTPATIENT
Start: 2024-08-06 | End: 2024-08-06 | Stop reason: HOSPADM

## 2024-08-05 RX ORDER — SODIUM CHLORIDE 0.9 % (FLUSH) 0.9 %
5-40 SYRINGE (ML) INJECTION PRN
Status: DISCONTINUED | OUTPATIENT
Start: 2024-08-05 | End: 2024-08-05 | Stop reason: HOSPADM

## 2024-08-05 RX ORDER — OXYBUTYNIN CHLORIDE 5 MG/1
10 TABLET, EXTENDED RELEASE ORAL DAILY
Status: DISCONTINUED | OUTPATIENT
Start: 2024-08-05 | End: 2024-08-06 | Stop reason: HOSPADM

## 2024-08-05 RX ORDER — OXYCODONE HYDROCHLORIDE 5 MG/1
5 TABLET ORAL EVERY 4 HOURS PRN
Status: DISCONTINUED | OUTPATIENT
Start: 2024-08-05 | End: 2024-08-06 | Stop reason: HOSPADM

## 2024-08-05 RX ORDER — LIDOCAINE HYDROCHLORIDE 20 MG/ML
INJECTION, SOLUTION EPIDURAL; INFILTRATION; INTRACAUDAL; PERINEURAL PRN
Status: DISCONTINUED | OUTPATIENT
Start: 2024-08-05 | End: 2024-08-05 | Stop reason: SDUPTHER

## 2024-08-05 RX ORDER — ROSUVASTATIN CALCIUM 10 MG/1
20 TABLET, COATED ORAL NIGHTLY
Status: DISCONTINUED | OUTPATIENT
Start: 2024-08-05 | End: 2024-08-06 | Stop reason: HOSPADM

## 2024-08-05 RX ORDER — FENTANYL CITRATE 50 UG/ML
25 INJECTION, SOLUTION INTRAMUSCULAR; INTRAVENOUS AS NEEDED
Status: DISCONTINUED | OUTPATIENT
Start: 2024-08-05 | End: 2024-08-05 | Stop reason: HOSPADM

## 2024-08-05 RX ORDER — GLUCAGON 1 MG
1 KIT INJECTION PRN
Status: DISCONTINUED | OUTPATIENT
Start: 2024-08-05 | End: 2024-08-05 | Stop reason: HOSPADM

## 2024-08-05 RX ORDER — FAMOTIDINE 20 MG/1
20 TABLET, FILM COATED ORAL 2 TIMES DAILY
Status: DISCONTINUED | OUTPATIENT
Start: 2024-08-05 | End: 2024-08-06 | Stop reason: HOSPADM

## 2024-08-05 RX ADMIN — FENTANYL CITRATE 25 MCG: 50 INJECTION INTRAMUSCULAR; INTRAVENOUS at 10:51

## 2024-08-05 RX ADMIN — CELECOXIB 200 MG: 100 CAPSULE ORAL at 06:25

## 2024-08-05 RX ADMIN — Medication 100 MCG: at 08:49

## 2024-08-05 RX ADMIN — TAMSULOSIN HYDROCHLORIDE 0.4 MG: 0.4 CAPSULE ORAL at 06:25

## 2024-08-05 RX ADMIN — FAMOTIDINE 20 MG: 20 TABLET ORAL at 21:21

## 2024-08-05 RX ADMIN — ROCURONIUM BROMIDE 10 MG: 10 INJECTION, SOLUTION INTRAVENOUS at 07:43

## 2024-08-05 RX ADMIN — ACETAMINOPHEN 1000 MG: 500 TABLET ORAL at 06:25

## 2024-08-05 RX ADMIN — BISACODYL 5 MG: 5 TABLET, COATED ORAL at 17:35

## 2024-08-05 RX ADMIN — FENTANYL CITRATE 50 MCG: 50 INJECTION INTRAMUSCULAR; INTRAVENOUS at 08:05

## 2024-08-05 RX ADMIN — LAMOTRIGINE 200 MG: 100 TABLET ORAL at 17:35

## 2024-08-05 RX ADMIN — PREGABALIN 75 MG: 75 CAPSULE ORAL at 06:25

## 2024-08-05 RX ADMIN — DULOXETINE HYDROCHLORIDE 60 MG: 60 CAPSULE, DELAYED RELEASE ORAL at 21:21

## 2024-08-05 RX ADMIN — LIDOCAINE HYDROCHLORIDE 100 MG: 20 INJECTION, SOLUTION EPIDURAL; INFILTRATION; INTRACAUDAL; PERINEURAL at 07:43

## 2024-08-05 RX ADMIN — ROSUVASTATIN CALCIUM 20 MG: 10 TABLET, COATED ORAL at 21:23

## 2024-08-05 RX ADMIN — FENTANYL CITRATE 50 MCG: 50 INJECTION INTRAMUSCULAR; INTRAVENOUS at 07:43

## 2024-08-05 RX ADMIN — HYDROMORPHONE HYDROCHLORIDE 0.5 MG: 1 INJECTION, SOLUTION INTRAMUSCULAR; INTRAVENOUS; SUBCUTANEOUS at 13:20

## 2024-08-05 RX ADMIN — POLYETHYLENE GLYCOL 3350 17 G: 17 POWDER, FOR SOLUTION ORAL at 15:30

## 2024-08-05 RX ADMIN — Medication 25 MG: at 07:46

## 2024-08-05 RX ADMIN — CEFAZOLIN 3 G: 1 INJECTION, POWDER, FOR SOLUTION INTRAMUSCULAR; INTRAVENOUS at 11:50

## 2024-08-05 RX ADMIN — CEFAZOLIN 3000 MG: 1 INJECTION, POWDER, FOR SOLUTION INTRAMUSCULAR; INTRAVENOUS at 16:30

## 2024-08-05 RX ADMIN — GABAPENTIN 800 MG: 400 CAPSULE ORAL at 21:21

## 2024-08-05 RX ADMIN — ONDANSETRON 4 MG: 2 INJECTION INTRAMUSCULAR; INTRAVENOUS at 12:00

## 2024-08-05 RX ADMIN — EPHEDRINE SULFATE 5 MG: 5 INJECTION INTRAVENOUS at 09:17

## 2024-08-05 RX ADMIN — MIDAZOLAM 2 MG: 1 INJECTION, SOLUTION INTRAMUSCULAR; INTRAVENOUS at 07:33

## 2024-08-05 RX ADMIN — LOSARTAN POTASSIUM 50 MG: 50 TABLET, FILM COATED ORAL at 17:35

## 2024-08-05 RX ADMIN — TRANEXAMIC ACID 1000 MG: 100 INJECTION, SOLUTION INTRAVENOUS at 11:30

## 2024-08-05 RX ADMIN — LORAZEPAM 0.5 MG: 2 INJECTION INTRAMUSCULAR; INTRAVENOUS at 13:23

## 2024-08-05 RX ADMIN — BUPROPION HYDROCHLORIDE 100 MG: 100 TABLET, FILM COATED ORAL at 17:35

## 2024-08-05 RX ADMIN — PROPOFOL 100 MCG/KG/MIN: 10 INJECTION, EMULSION INTRAVENOUS at 07:46

## 2024-08-05 RX ADMIN — PROPOFOL 100 MCG/KG/MIN: 10 INJECTION, EMULSION INTRAVENOUS at 08:44

## 2024-08-05 RX ADMIN — NALTREXONE HYDROCHLORIDE 25 MG: 50 TABLET, FILM COATED ORAL at 21:21

## 2024-08-05 RX ADMIN — TRANEXAMIC ACID 1000 MG: 100 INJECTION, SOLUTION INTRAVENOUS at 07:33

## 2024-08-05 RX ADMIN — SODIUM CHLORIDE, PRESERVATIVE FREE 10 ML: 5 INJECTION INTRAVENOUS at 21:27

## 2024-08-05 RX ADMIN — ACETAMINOPHEN 325MG 650 MG: 325 TABLET ORAL at 21:23

## 2024-08-05 RX ADMIN — GABAPENTIN 800 MG: 400 CAPSULE ORAL at 16:30

## 2024-08-05 RX ADMIN — BUPROPION HYDROCHLORIDE 100 MG: 100 TABLET, FILM COATED ORAL at 21:23

## 2024-08-05 RX ADMIN — FENTANYL CITRATE 25 MCG: 50 INJECTION INTRAMUSCULAR; INTRAVENOUS at 10:47

## 2024-08-05 RX ADMIN — LORAZEPAM 0.5 MG: 2 INJECTION INTRAMUSCULAR; INTRAVENOUS at 14:33

## 2024-08-05 RX ADMIN — WATER 2000 MG: 1 INJECTION INTRAMUSCULAR; INTRAVENOUS; SUBCUTANEOUS at 07:44

## 2024-08-05 RX ADMIN — PROPOFOL 200 MG: 10 INJECTION, EMULSION INTRAVENOUS at 07:43

## 2024-08-05 RX ADMIN — ACETAMINOPHEN 325MG 650 MG: 325 TABLET ORAL at 17:35

## 2024-08-05 RX ADMIN — HYDROMORPHONE HYDROCHLORIDE 0.5 MG: 1 INJECTION, SOLUTION INTRAMUSCULAR; INTRAVENOUS; SUBCUTANEOUS at 13:46

## 2024-08-05 RX ADMIN — Medication 160 MG: at 07:43

## 2024-08-05 RX ADMIN — FENTANYL CITRATE 50 MCG: 50 INJECTION INTRAMUSCULAR; INTRAVENOUS at 08:19

## 2024-08-05 RX ADMIN — CEFAZOLIN 1 G: 1 INJECTION, POWDER, FOR SOLUTION INTRAMUSCULAR; INTRAVENOUS at 07:46

## 2024-08-05 RX ADMIN — OXYBUTYNIN CHLORIDE 10 MG: 5 TABLET, EXTENDED RELEASE ORAL at 17:35

## 2024-08-05 RX ADMIN — FAMOTIDINE 20 MG: 20 TABLET ORAL at 06:25

## 2024-08-05 RX ADMIN — SODIUM CHLORIDE, SODIUM LACTATE, POTASSIUM CHLORIDE, AND CALCIUM CHLORIDE: 600; 310; 30; 20 INJECTION, SOLUTION INTRAVENOUS at 09:08

## 2024-08-05 RX ADMIN — OXYCODONE HYDROCHLORIDE 10 MG: 10 TABLET ORAL at 21:21

## 2024-08-05 RX ADMIN — LORAZEPAM 0.5 MG: 2 INJECTION INTRAMUSCULAR at 14:33

## 2024-08-05 RX ADMIN — DEXAMETHASONE SODIUM PHOSPHATE 10 MG: 4 INJECTION INTRA-ARTICULAR; INTRALESIONAL; INTRAMUSCULAR; INTRAVENOUS; SOFT TISSUE at 07:46

## 2024-08-05 RX ADMIN — Medication 25 MG: at 08:36

## 2024-08-05 RX ADMIN — FENTANYL CITRATE 25 MCG: 50 INJECTION INTRAMUSCULAR; INTRAVENOUS at 14:21

## 2024-08-05 RX ADMIN — SODIUM CHLORIDE, SODIUM LACTATE, POTASSIUM CHLORIDE, AND CALCIUM CHLORIDE: 600; 310; 30; 20 INJECTION, SOLUTION INTRAVENOUS at 06:25

## 2024-08-05 ASSESSMENT — PAIN SCALES - WONG BAKER
WONGBAKER_NUMERICALRESPONSE: NO HURT

## 2024-08-05 ASSESSMENT — PAIN SCALES - GENERAL
PAINLEVEL_OUTOF10: 9
PAINLEVEL_OUTOF10: 6
PAINLEVEL_OUTOF10: 0
PAINLEVEL_OUTOF10: 5
PAINLEVEL_OUTOF10: 10
PAINLEVEL_OUTOF10: 0
PAINLEVEL_OUTOF10: 8
PAINLEVEL_OUTOF10: 8
PAINLEVEL_OUTOF10: 7
PAINLEVEL_OUTOF10: 0

## 2024-08-05 ASSESSMENT — PAIN DESCRIPTION - FREQUENCY
FREQUENCY: CONTINUOUS
FREQUENCY: INTERMITTENT
FREQUENCY: CONTINUOUS
FREQUENCY: INTERMITTENT

## 2024-08-05 ASSESSMENT — PAIN DESCRIPTION - PAIN TYPE
TYPE: SURGICAL PAIN
TYPE: SURGICAL PAIN;ACUTE PAIN
TYPE: ACUTE PAIN;SURGICAL PAIN

## 2024-08-05 ASSESSMENT — PAIN DESCRIPTION - DESCRIPTORS
DESCRIPTORS: PRESSURE;SPASM
DESCRIPTORS: ACHING
DESCRIPTORS: OTHER (COMMENT)
DESCRIPTORS: SHARP;PRESSURE
DESCRIPTORS: ACHING
DESCRIPTORS: ACHING
DESCRIPTORS: SHARP;PRESSURE

## 2024-08-05 ASSESSMENT — PAIN DESCRIPTION - ONSET
ONSET: ON-GOING

## 2024-08-05 ASSESSMENT — PAIN - FUNCTIONAL ASSESSMENT
PAIN_FUNCTIONAL_ASSESSMENT: ACTIVITIES ARE NOT PREVENTED
PAIN_FUNCTIONAL_ASSESSMENT: 0-10
PAIN_FUNCTIONAL_ASSESSMENT: ACTIVITIES ARE NOT PREVENTED

## 2024-08-05 ASSESSMENT — PAIN DESCRIPTION - ORIENTATION
ORIENTATION: POSTERIOR

## 2024-08-05 ASSESSMENT — PAIN DESCRIPTION - LOCATION
LOCATION: BACK

## 2024-08-05 NOTE — NURSE NAVIGATOR
Rounded on patient s/p[ Removal segmental instrumentation L4/5/S1  Posterior lumbar fusion L3/4/5/S1 with cancellous bone, dbm and infuse and autograft  Anterior lumbar fusion L3/4 through spinology/osteolift procedure with infuse and cancellous allograft  Segmental spinal instrumentation L3/4/5/S1 nuvasive with Dr. Izquierdo, dos 08/05/2024. Patient observed to be sedated in bed. Will awaken to voice, but then falls back to sleep again.  Coordinator was able to convince patient to roll to side. Dressings to lower back observed to be clean, dry and intact with 2 ke drains present to suction with drainage noted. Leavitt in place. With assistance of nurse able to awaken patient and sit her at bedside. Attempted to keep patient awake to assist to bedside chair. Patient too sedated to be able to ambulate or follow directions at this time. Patient placed back in bed in position of comfort with call bell within reach. Unsafe to ambulate patient at this time due to level of sedation.

## 2024-08-05 NOTE — INTERVAL H&P NOTE
Update History & Physical    The patient's History and Physical of August 2, 2024 was reviewed with the patient and I examined the patient. There was no change. The surgical site was confirmed by the patient and me.     Plan: The risks, benefits, expected outcome, and alternative to the recommended procedure have been discussed with the patient. Patient understands and wants to proceed with the procedure.     Electronically signed by MESHA JUAN MD on 8/5/2024 at 6:25 AM

## 2024-08-05 NOTE — PERIOP NOTE
Patient /Family /Designee has been informed that [facility] is not responsible for patient belongings per policy and the signed [facility] Patient Agreement document.  Personal items should be sent home or checked in with security.  Patient /Family /Designee selected the following action:                            [x]  Send personal items home with a family member or friend                                                 []  Check in personal items with security, excluding clothing                            []  Maintain personal items at the bedside, against recommendation                                 by Moi Inman [facility]                                   ** If patient /family /designee chooses to maintain personal items at the bedside,                                      Complete the patient belongings inventory in the EMR.    
TRANSFER - OUT REPORT:    Verbal report given to TRANG Jolley on Renea Artis  being transferred to 2 Surgical for routine progression of patient care       Report consisted of patient's Situation, Background, Assessment and   Recommendations(SBAR).     Information from the following report(s) Nurse Handoff Report, Surgery Report, Intake/Output, MAR, and Cardiac Rhythm sinus rhythm  was reviewed with the receiving nurse.           Lines:   Peripheral IV 08/05/24 Left Forearm (Active)        Opportunity for questions and clarification was provided.      Patient transported with:  O2 @ 2lpm and Tech    Pt status post spine surgery. Pt following commands with strong and symmetrical strength in bilateral upper and lower extremities. Pt has no numbness or tinging in any extremity. Pt educated to notify Nurse if they have extremity pain, weakness, numbness or tingling, or loss of control of bowel or bladder. Pt is resting quietly at this time.  Appears to be comfortable snoring softly.  Leavitt patent.  DIONTE drains emptied.             
VISITORS will be allowed in the waiting area during your surgery.  Exceptions may be made for surgical admissions, per nursing unit guidelines      Special Instructions:      Bring a list of CURRENT medications.  Follow instructions from the office regarding Blood Thinners.  Follow instructions from the office regarding medications to take the morning of surgery. None    On day of surgery if you are running late, unable to make procedure time, or sick, please call the Pre-op department at [phone]    These surgical instructions were reviewed with Renea during the PAT phone call.

## 2024-08-05 NOTE — OP NOTE
nerves to be in the vicinity and the approach was abandoned.  The segment appeared to be significantly stiff although we know it is not fused.    CULTURES:  None.        MD JOSAFAT THOMPSON/AQS  D:  08/05/2024 11:59:22  T:  08/05/2024 16:48:25  JOB #:  [id number]

## 2024-08-05 NOTE — CARE COORDINATION
Met with Patient at bedside. CM introduces self and explains role. Patient is alert and oriented x 4. Hipaa verified. Patient agrees to complete initial  assessment.    Patient is medically ready.   DCP: Home with supportive family and [facility], established with needed DME, Niesha Artis will transport patient home via their POV.      08/05/24 3370   Service Assessment   Patient Orientation Alert and Oriented;Person;Place;Situation;Self   Cognition Alert   History Provided By Patient   Primary Caregiver Self   Accompanied By/Relationship SELF   Support Systems Family Members;/   Patient's Healthcare Decision Maker is: Legal Next of Kin   PCP Verified by CM Yes   Last Visit to PCP Within last 3 months   Prior Functional Level Independent in ADLs/IADLs   Current Functional Level Independent in ADLs/IADLs   Can patient return to prior living arrangement Yes   Ability to make needs known: Good   Family able to assist with home care needs: Yes   Would you like for me to discuss the discharge plan with any other family members/significant others, and if so, who? No   Financial Resources Medicaid   Community Resources Transportation   Social/Functional History   Lives With Family   Type of Home House   Home Layout One level   Home Access Stairs to enter without rails   Entrance Stairs - Number of Steps 1   Bathroom Shower/Tub Walk-in shower;Shower chair with back   Bathroom Toilet Standard   Bathroom Equipment Shower chair   Bathroom Accessibility Accessible   Home Equipment Walker - Rolling   Receives Help From Family   ADL Assistance Needs assistance   Bath Minimal assistance   Dressing Minimal assistance   Grooming Minimal assistance   Feeding Independent   Toileting Needs assistance   Homemaking Assistance Needs assistance   Meal Prep Total   Laundry Total   Vacuuming Total   Cleaning Total   Gardening Total   Yard Work Total   Driving Total   Shopping Total   Homemaking Responsibilities Yes

## 2024-08-05 NOTE — BRIEF OP NOTE
Brief Postoperative Note      Patient: Renea Artis  YOB: 1975  MRN: [id number]    Date of Procedure: 8/5/2024    Pre-Op Diagnosis Codes:     * Spinal stenosis, lumbar region with neurogenic claudication [M48.062]     * Pseudoarthrosis of lumbar spine [S32.009K]    Post-Op Diagnosis: Same       Procedure  Removal segmental instrumentation L4/5/S1  Posterior lumbar fusion L3/4/5/S1 with cancellous bone, dbm and infuse and autograft  Anterior lumbar fusion L3/4 through spinology/osteolift procedure with infuse and cancellous allograft  Segmental spinal instrumentation L3/4/5/S1 nuvasive  Intraoperative neuro monitoring emg/ssep      Surgeon(s):  Pablo Izquierdo MD    Assistant:  Surgical Assistant: Audra Del Rio  Physician Assistant: Kathy Villalobos PA-C    Anesthesia: General    Estimated Blood Loss (mL): 500    Complications: None    Specimens:   * No specimens in log *    Implants:  Implant Name Type Inv. Item Serial No.  Lot No. LRB No. Used Action   KIT BNE GRFT XSM 1.4CC RHBMP-2 ABSRB CLLGN SPNG INFUSE - [id number]  KIT BNE GRFT XSM 1.4CC RHBMP-2 ABSRB CLLGN SPNG INFUSE  [employer] [facility] [id number] N/A 2 Implanted   GRAFT BNE SUB CANC JOSE DEMIN 3/4 FILL DIVERTED TB AFT G2 - [id number]  GRAFT BNE SUB CANC JOSE DEMIN 3/4 FILL DIVERTED TB AFT G2  [facility] TRANSPLANT [facility] [id number] N/A 1 Implanted   GRAFT BNE SUB CANC JOSE DEMIN 3/4 FILL DIVERTED TB AFT G2 - [id number]  GRAFT BNE SUB CANC JOSE DEMIN 3/4 FILL DIVERTED TB AFT G2  [facility] TRANSPLANT [facility] [id number] N/A 1 Implanted   GRAFT BNE SUB CANC JOSE DEMIN 3/4 FILL DIVERTED TB AFT G2 - [id number]  GRAFT BNE SUB CANC JOES DEMIN 3/4 FILL DIVERTED TB AFT G2  [facility] TRANSPLANT [employer] [facility] [id number] N/A 1 Implanted   GRAFT BNE SUB CANC JOSE DEMIN 3/4 FILL DIVERTED TB AFT G2 - [id number]  GRAFT BNE SUB CANC JOSE DEMIN 3/4 FILL DIVERTED TB AFT G2  MUSCULOSKELETAL TRANSPLANT [facility]

## 2024-08-05 NOTE — ANESTHESIA PRE PROCEDURE
Department of Anesthesiology  Preprocedure Note       Name:  Renea Artis   Age:  49 y.o.  :  1975                                          MRN:  [id number]         Date:  2024      Surgeon: Surgeon(s):  Pablo Izquierdo MD    Procedure: Procedure(s):  LUMBAR TWO/THREE/FOUR/FIVE/SACRAL ONE LAMINECTOMY FUSION; TRANSFORAMINAL LUMBAR INTERBODY FUSION LUMBAR TWO/THREE, LUMBAR THREE/FOUR; REVISION INSTRUMENTATION LUMBAR TWO-SACRAL ONE; C-ARM; [NUVASIVE][SPINEOLOGY]; 23 HR    Medications prior to admission:   Prior to Admission medications    Medication Sig Start Date End Date Taking? Authorizing Provider   cyclobenzaprine (FLEXERIL) 10 MG tablet Take 1 tablet by mouth nightly as needed for Muscle spasms 24  Yes Radha Avelar MD   oxyBUTYnin (DITROPAN-XL) 10 MG extended release tablet Take 1 tablet by mouth daily 24   Katharina Yi MD   gabapentin (NEURONTIN) 800 MG tablet TAKE 1 TABLET BY MOUTH THREE TIMES DAILY. MAX DAILY AMOUNT: 2400 MG 24  Chelsey Collins MD   vitamin D (CHOLECALCIFEROL) 25 MCG (1000 UT) TABS tablet Take 1 tablet by mouth daily 5/15/24   Katharina Yi MD   vitamin B-12 (CYANOCOBALAMIN) 1000 MCG tablet Take 1 tablet by mouth daily 5/15/24   Katharina Yi MD   diclofenac sodium (VOLTAREN) 1 % GEL 4 times daily as needed for Pain 24   ProviderRadha MD   losartan (COZAAR) 50 MG tablet Take 1 tablet by mouth daily  Patient taking differently: Take 1 tablet by mouth nightly 24   Katharina Yi MD   SUMAtriptan (IMITREX) 50 MG tablet Take 1 tablet by mouth daily as needed for Migraine 3/25/24   Katharina Yi MD   rosuvastatin (CRESTOR) 20 MG tablet Take 1 tablet by mouth nightly 3/25/24   Katharina Yi MD   naltrexone (DEPADE) 50 MG tablet Take 1/2 tablet daily for one week, then take 1/2 tablet twice daily  Patient taking differently: Take 0.5 tablets by mouth 2 times daily Take 1/2 tablet daily for one week, then take

## 2024-08-06 ENCOUNTER — HOME HEALTH ADMISSION (OUTPATIENT)
Age: 49
End: 2024-08-06
Payer: MEDICAID

## 2024-08-06 VITALS
DIASTOLIC BLOOD PRESSURE: 82 MMHG | TEMPERATURE: 98.2 F | RESPIRATION RATE: 14 BRPM | HEIGHT: 69 IN | SYSTOLIC BLOOD PRESSURE: 154 MMHG | HEART RATE: 77 BPM | BODY MASS INDEX: 43.4 KG/M2 | WEIGHT: 293 LBS | OXYGEN SATURATION: 94 %

## 2024-08-06 LAB
ANION GAP SERPL CALC-SCNC: 5 MMOL/L (ref 3–18)
BUN SERPL-MCNC: 9 MG/DL (ref 7–18)
BUN/CREAT SERPL: 11 (ref 12–20)
CALCIUM SERPL-MCNC: 8.7 MG/DL (ref 8.5–10.1)
CHLORIDE SERPL-SCNC: 107 MMOL/L (ref 100–111)
CO2 SERPL-SCNC: 28 MMOL/L (ref 21–32)
CREAT SERPL-MCNC: 0.81 MG/DL (ref 0.6–1.3)
ERYTHROCYTE [DISTWIDTH] IN BLOOD BY AUTOMATED COUNT: 13.2 % (ref 11.6–14.5)
GLUCOSE SERPL-MCNC: 120 MG/DL (ref 74–99)
HCT VFR BLD AUTO: 30.9 % (ref 35–45)
HGB BLD-MCNC: 10.2 G/DL (ref 12–16)
MCH RBC QN AUTO: 32.1 PG (ref 24–34)
MCHC RBC AUTO-ENTMCNC: 33 G/DL (ref 31–37)
MCV RBC AUTO: 97.2 FL (ref 78–100)
NRBC # BLD: 0 K/UL (ref 0–0.01)
NRBC BLD-RTO: 0 PER 100 WBC
PLATELET # BLD AUTO: 216 K/UL (ref 135–420)
PMV BLD AUTO: 10.1 FL (ref 9.2–11.8)
POTASSIUM SERPL-SCNC: 4.1 MMOL/L (ref 3.5–5.5)
RBC # BLD AUTO: 3.18 M/UL (ref 4.2–5.3)
SODIUM SERPL-SCNC: 140 MMOL/L (ref 136–145)
WBC # BLD AUTO: 11 K/UL (ref 4.6–13.2)

## 2024-08-06 PROCEDURE — 97162 PT EVAL MOD COMPLEX 30 MIN: CPT

## 2024-08-06 PROCEDURE — 6360000002 HC RX W HCPCS: Performed by: ORTHOPAEDIC SURGERY

## 2024-08-06 PROCEDURE — 2580000003 HC RX 258: Performed by: ORTHOPAEDIC SURGERY

## 2024-08-06 PROCEDURE — 94761 N-INVAS EAR/PLS OXIMETRY MLT: CPT

## 2024-08-06 PROCEDURE — 85027 COMPLETE CBC AUTOMATED: CPT

## 2024-08-06 PROCEDURE — 80048 BASIC METABOLIC PNL TOTAL CA: CPT

## 2024-08-06 PROCEDURE — 6370000000 HC RX 637 (ALT 250 FOR IP): Performed by: ORTHOPAEDIC SURGERY

## 2024-08-06 PROCEDURE — 97530 THERAPEUTIC ACTIVITIES: CPT

## 2024-08-06 PROCEDURE — 97535 SELF CARE MNGMENT TRAINING: CPT

## 2024-08-06 PROCEDURE — 2500000003 HC RX 250 WO HCPCS: Performed by: ORTHOPAEDIC SURGERY

## 2024-08-06 PROCEDURE — 36415 COLL VENOUS BLD VENIPUNCTURE: CPT

## 2024-08-06 PROCEDURE — 97166 OT EVAL MOD COMPLEX 45 MIN: CPT

## 2024-08-06 RX ORDER — OXYCODONE HYDROCHLORIDE 5 MG/1
5 TABLET ORAL EVERY 6 HOURS PRN
Qty: 28 TABLET | Refills: 0 | Status: SHIPPED | OUTPATIENT
Start: 2024-08-06 | End: 2024-08-13

## 2024-08-06 RX ADMIN — LAMOTRIGINE 100 MG: 100 TABLET ORAL at 09:05

## 2024-08-06 RX ADMIN — NALTREXONE HYDROCHLORIDE 25 MG: 50 TABLET, FILM COATED ORAL at 09:05

## 2024-08-06 RX ADMIN — CEFAZOLIN 3000 MG: 1 INJECTION, POWDER, FOR SOLUTION INTRAMUSCULAR; INTRAVENOUS at 00:06

## 2024-08-06 RX ADMIN — LOSARTAN POTASSIUM 50 MG: 50 TABLET, FILM COATED ORAL at 09:05

## 2024-08-06 RX ADMIN — TRANEXAMIC ACID 1000 MG: 100 INJECTION, SOLUTION INTRAVENOUS at 06:45

## 2024-08-06 RX ADMIN — OXYCODONE HYDROCHLORIDE 10 MG: 10 TABLET ORAL at 03:53

## 2024-08-06 RX ADMIN — BUPROPION HYDROCHLORIDE 100 MG: 100 TABLET, FILM COATED ORAL at 14:28

## 2024-08-06 RX ADMIN — ACETAMINOPHEN 325MG 650 MG: 325 TABLET ORAL at 09:05

## 2024-08-06 RX ADMIN — SODIUM CHLORIDE, PRESERVATIVE FREE 10 ML: 5 INJECTION INTRAVENOUS at 09:06

## 2024-08-06 RX ADMIN — ACETAMINOPHEN 325MG 650 MG: 325 TABLET ORAL at 14:28

## 2024-08-06 RX ADMIN — GABAPENTIN 800 MG: 400 CAPSULE ORAL at 14:28

## 2024-08-06 RX ADMIN — DIPHENHYDRAMINE HYDROCHLORIDE 25 MG: 50 INJECTION, SOLUTION INTRAMUSCULAR; INTRAVENOUS at 00:07

## 2024-08-06 RX ADMIN — HYDROMORPHONE HYDROCHLORIDE 0.5 MG: 1 INJECTION, SOLUTION INTRAMUSCULAR; INTRAVENOUS; SUBCUTANEOUS at 00:07

## 2024-08-06 RX ADMIN — POLYETHYLENE GLYCOL 3350 17 G: 17 POWDER, FOR SOLUTION ORAL at 09:05

## 2024-08-06 RX ADMIN — OXYBUTYNIN CHLORIDE 10 MG: 5 TABLET, EXTENDED RELEASE ORAL at 09:05

## 2024-08-06 RX ADMIN — ACETAMINOPHEN 325MG 650 MG: 325 TABLET ORAL at 03:53

## 2024-08-06 RX ADMIN — OXYCODONE HYDROCHLORIDE 5 MG: 5 TABLET ORAL at 09:04

## 2024-08-06 RX ADMIN — GABAPENTIN 800 MG: 400 CAPSULE ORAL at 09:05

## 2024-08-06 RX ADMIN — FAMOTIDINE 20 MG: 20 TABLET ORAL at 09:05

## 2024-08-06 RX ADMIN — BISACODYL 5 MG: 5 TABLET, COATED ORAL at 09:05

## 2024-08-06 RX ADMIN — BUPROPION HYDROCHLORIDE 100 MG: 100 TABLET, FILM COATED ORAL at 09:05

## 2024-08-06 RX ADMIN — DULOXETINE HYDROCHLORIDE 60 MG: 60 CAPSULE, DELAYED RELEASE ORAL at 09:05

## 2024-08-06 ASSESSMENT — PAIN - FUNCTIONAL ASSESSMENT
PAIN_FUNCTIONAL_ASSESSMENT: ACTIVITIES ARE NOT PREVENTED

## 2024-08-06 ASSESSMENT — PAIN SCALES - WONG BAKER
WONGBAKER_NUMERICALRESPONSE: NO HURT
WONGBAKER_NUMERICALRESPONSE: NO HURT
WONGBAKER_NUMERICALRESPONSE: HURTS A LITTLE BIT

## 2024-08-06 ASSESSMENT — PAIN DESCRIPTION - ONSET
ONSET: ON-GOING

## 2024-08-06 ASSESSMENT — PAIN DESCRIPTION - PAIN TYPE
TYPE: ACUTE PAIN;SURGICAL PAIN
TYPE: SURGICAL PAIN
TYPE: ACUTE PAIN;SURGICAL PAIN

## 2024-08-06 ASSESSMENT — PAIN SCALES - GENERAL
PAINLEVEL_OUTOF10: 7
PAINLEVEL_OUTOF10: 6
PAINLEVEL_OUTOF10: 4
PAINLEVEL_OUTOF10: 0
PAINLEVEL_OUTOF10: 7

## 2024-08-06 ASSESSMENT — PAIN DESCRIPTION - FREQUENCY
FREQUENCY: INTERMITTENT

## 2024-08-06 ASSESSMENT — PAIN DESCRIPTION - DESCRIPTORS
DESCRIPTORS: ACHING

## 2024-08-06 ASSESSMENT — PAIN DESCRIPTION - ORIENTATION
ORIENTATION: POSTERIOR

## 2024-08-06 ASSESSMENT — PAIN DESCRIPTION - LOCATION
LOCATION: BACK

## 2024-08-06 NOTE — PROGRESS NOTES
4 Eyes Skin Assessment     NAME:  Renea Artis  YOB: 1975  MEDICAL RECORD NUMBER:  [id number]    The patient is being assessed for  Shift Handoff    I agree that at least one RN has performed a thorough Head to Toe Skin Assessment on the patient. ALL assessment sites listed below have been assessed.      Areas assessed by both nurses:    Head, Face, Ears, Shoulders, Back, Chest, Arms, Elbows, Hands, Sacrum. Buttock, Coccyx, Ischium, Legs. Feet and Heels, and Under Medical Devices         Does the Patient have a Wound? Yes wound(s) were present on assessment. LDA wound assessment was Initiated and completed by RN       Prosper Prevention initiated by RN: Yes  Wound Care Orders initiated by RN: No    Pressure Injury (Stage 3,4, Unstageable, DTI, NWPT, and Complex wounds) if present, place Wound referral order by RN under : No    New Ostomies, if present place, Ostomy referral order under : No     Nurse 1 eSignature: Electronically signed by Samreen Busby RN on 8/5/24 at 7:31 PM EDT    **SHARE this note so that the co-signing nurse can place an eSignature**    Nurse 2 eSignature: Electronically signed by Nicki Vazquez RN on 8/6/24 at 6:51 AM EDT    
4 Eyes Skin Assessment     NAME:  Renea Artis  YOB: 1975  MEDICAL RECORD NUMBER:  [id number]    The patient is being assessed for  Shift Handoff    I agree that at least one RN has performed a thorough Head to Toe Skin Assessment on the patient. ALL assessment sites listed below have been assessed.      Areas assessed by both nurses:    Head, Face, Ears, Shoulders, Back, Chest, Arms, Elbows, Hands, Sacrum. Buttock, Coccyx, Ischium, and Legs. Feet and Heels        Does the Patient have a Wound? Yes wound(s) were present on assessment. LDA wound assessment was Initiated and completed by RN       Prosper Prevention initiated by RN: Yes  Wound Care Orders initiated by RN: No    Pressure Injury (Stage 3,4, Unstageable, DTI, NWPT, and Complex wounds) if present, place Wound referral order by RN under : No    New Ostomies, if present place, Ostomy referral order under : No     Nurse 1 eSignature: Electronically signed by Nicki Vazquez RN on 8/6/24 at 6:52 AM EDT    **SHARE this note so that the co-signing nurse can place an eSignature**    Nurse 2 eSignature: Electronically signed by Samreen Busby RN on 8/6/24 at 7:39 AM EDT   
Arrival note:    Patient was received from the PACU at 1500 for routine post-op care to 2 Surgical.      Report received from nurse TRANG Levine with Situation, Background, Assessment and Recommendations (SBAR).      Patient oriented to room 2212, use of call bell, safety measures. Ms. Artis is very drowsy but will respond when her name is called. She is oriented times 4.     Care on-going.   
Catheter was removed per order once patient as able to transfer to the chair with walker use and one person assist.     Tolerated cath removal well with no c/o pain or discomfort.     Patient is resting in chair at this time, call bell in reach, bedside table in reach with fluids.   
Discharge note:    Patient is being discharged from [facility] to home or residence of choice. Patient is noted to be in stable condition for discharge and released by medical provider per order.     DIONTE drain teaching provided to the patient with handouts and demonstration. Supplies and tracking form given to the patient to monitor home drainage. She was reminded to follow up with Dr. Izquierdo's office for drain removal on Thursday 8/8/24.     Discharge instructions reviewed with patient and was able to verbalize understanding of instructions given.     No questions or concerns at this time.        
Neuro intact   Expected back incisional pain   Deep drain with 25ccs, superficial drain with scant amount   Mobilize as tolerated   Pt it 
OT order received and chart reviewed. Patient was seen for skilled OT and is safe for d/c home when medically stable/cleared by PT. Recommending RW as pt has rollator at home. Full note to follow.         Thank you for this referral,  Marycruz Retana MS, OTR/L    
Physical Therapy  Unable to see pt at this time due to not being cleared by nursing and ortho coordinator due to drowsiness and inability to stay awake for safe PT participation.     Paula Mishra PT, DPT    
Spiritual Health Assessment/Progress Note  [facility]    Pre-Op, Initial Encounter (iv-sa-eje),  ,  ,      Name: Renea Artis MRN: [id number]    Age: 49 y.o.     Sex: female   Language: English   Zoroastrianism: Non-Baptism   Lumbar pseudoarthrosis     Date: 8/5/2024            Total Time Calculated: 7 min              Spiritual Assessment began in [facility]        Referral/Consult From: Rounding     Encounter Overview/Reason: Pre-Op, Initial Encounter (iv-sa-eje)  Service Provided For: Patient (new admit  back surgery)    Zabrina, Belief, Meaning:   Patient has beliefs or practices that help with coping during difficult times  Family/Friends No family/friends present      Importance and Influence:  Patient has no beliefs influential to healthcare decision-making identified during this visit  Family/Friends no family/friends present    Community:  Patient feels well-supported. Support system includes: Children, Friends, and Extended family  Family/Friends Other: not present now    Assessment and Plan of Care:     Patient Interventions include: Facilitated expression of thoughts and feelings  Family/Friends Interventions include: not present now    Patient Plan of Care: Spiritual Care available upon further referral  Family/Friends Plan of Care: Spiritual Care available upon further referral    Electronically signed by Vadim Callahan Jr., [facility] on 8/5/2024 at 12:42 PM    
Therapeutic Substitution per the P&T Committee approved Therapeutic Interchanges Policy     Nonformulary Medication Formulary Interchange   Bupropion SR 200mg BID     Bupropion IR 100mg QID          
Vss afeb  Neuro intact  Feeling better  Drain wet  Ambulating  Plan  Txa pt ot  Hopefully dc in afternoon  
setting. Patient's social support, diagnosis, medical stability, and prior level of function should also be taken into consideration.     SUBJECTIVE:   Patient stated “I usually can cross my legs but I can't now”    OBJECTIVE DATA SUMMARY:     Past Medical History:   Diagnosis Date    Bipolar affective disorder, currently manic, moderate ([facility]) 4/27/2016    Chronic midline low back pain with sciatica 11/1/2016    Fibromyalgia 9/1/2015    HLD (hyperlipidemia) 2/4/2016    Hypertension     Hypertriglyceridemia 5/15/2018    Irritable bowel syndrome     Moderate episode of recurrent major depressive disorder ([facility]) 4/27/2016    Obesity, Class III, BMI 40-49.9 (morbid obesity) ([facility]) 9/1/2015    Onychomycosis 4/27/2016    Osteoarthritis 2017    PTSD (post-traumatic stress disorder)     Substance abuse ([facility])      Past Surgical History:   Procedure Laterality Date    ANTERIOR CRUCIATE LIGAMENT REPAIR      APPENDECTOMY      BACK SURGERY      CERVICAL DISCECTOMY  02/07/2019    with fusion C5/6 C6/7    CERVICAL FUSION  0513/19    L4-L5 bilateral hemilaminectomy; medial facetectomy; foraminotomy; L5-S1 right hemilaminectomy; medial facetectomy revision; discectomy revision; L4, L5, S1 posterolateral fusion    CERVICAL LAMINECTOMY  03/19/2019    with fusion    KNEE ARTHROPLASTY Left 05/17/2023    LEFT TOTAL KNEE ARTHROPLASTY; TAYLOR LONNIE AND HARDWARE REMOVAL performed by Chung Reyna DO at [facility] MAIN OR    LUMBAR FUSION N/A 8/5/2024    Lumbar two/three/four/five/sacral one laminectomy fusion transforaminal lumbar interbody fusion lumbar two/three, lumbar three/four performed by Pablo Izquierdo MD at [facility] MAIN OR    LUMBAR LAMINECTOMY Bilateral 05/13/2019    L4-L5 bilateral hemilaminectomy; medial facetectomy; foraminotomy; L5-S1 right hemilaminectomy; medial facetectomy revision; discectomy revision; L4, L5, S1 posterolateral fusion    TUBAL LIGATION         Home Situation:   Social/Functional History  Lives With:

## 2024-08-06 NOTE — PLAN OF CARE
Problem: Physical Therapy - Adult  Goal: By Discharge: Performs mobility at highest level of function for planned discharge setting.  See evaluation for individualized goals.  Description: Physical Therapy Goals:  Initiated 8/6/2024 to be met within 7-10 days.    1.  Patient will move from supine to sit and sit to supine  in bed with modified independence.    2.  Patient will transfer from bed to chair and chair to bed with modified independent using the least restrictive device.  3.  Patient will perform sit to stand with modified independent.  4.  Patient will ambulate with supervision/set-up for 150 feet with the least restrictive device.   5.  Patient will ascend/descend 4 stairs with 1 handrail(s) with supervision/set-up.    PLOF: ambulated with a cane or used w/c prior to surgery, 4 steps with one rail to enter home     Outcome: Progressing   PHYSICAL THERAPY EVALUATION    Patient: Renea Artis (49 y.o. female)  Date: 8/6/2024  Primary Diagnosis: Spinal stenosis, lumbar region with neurogenic claudication [M48.062]  Pseudoarthrosis of lumbar spine [S32.009K]  Lumbar pseudoarthrosis [S32.009K]  Procedure(s) (LRB):  Lumbar two/three/four/five/sacral one laminectomy fusion transforaminal lumbar interbody fusion lumbar two/three, lumbar three/four (N/A) 1 Day Post-Op   Precautions: Fall Risk, Surgical Protocols, Spinal Precautions: No Bending, No Lifting, No Twisting,  ,      ASSESSMENT :  Pt is in the recliner on arrival and agreeable to PT evaluation.  Pt reports 8/10 pain at rest and 9/10 with activity, however states it is a different pain than before surgery. Pt stood with SBA and increased time to there RW.  Pt ambulated 60 feet into the hallway and then took a seated rest break prior to ascending/descending stairs.  Pt performed stair training with CGA and one rail using an alternating step pattern. Pt did not require a seated rest before ambulating the 60 feet back to her room.  Pt was left sitting in her

## 2024-08-06 NOTE — HOME CARE
Home care referral received for PT SN. Chart reviewed.Spoke to to Patient Verified address and telephone numbers. Explained services ordered and agency routines. Orders noted and arranged.    Agency contact information on AVS.       Luanne Vitale RN, BSN   [facility]

## 2024-08-06 NOTE — NURSE NAVIGATOR
Perfect serve message sent to Dr. Izquierdo regarding drain output increasing. Drain one 60 cc, drain two 20 cc. Per Dr Izquierdo ok to discharge patient with drains in place . They will remove the drains Thursday  in the [facility] office. Nurses will teach patient drain care prior to discharge.

## 2024-08-06 NOTE — ANESTHESIA POSTPROCEDURE EVALUATION
Department of Anesthesiology  Postprocedure Note    Patient: Renea Artis  MRN: [id number]  YOB: 1975  Date of evaluation: 8/5/2024    Procedure Summary       Date: 08/05/24 Room / Location: [facility] MAIN 06 / [facility] MAIN OR    Anesthesia Start: 0733 Anesthesia Stop: 1253    Procedure: Lumbar two/three/four/five/sacral one laminectomy fusion transforaminal lumbar interbody fusion lumbar two/three, lumbar three/four (Spine Lumbar) Diagnosis:       Spinal stenosis, lumbar region with neurogenic claudication      Pseudoarthrosis of lumbar spine      (Spinal stenosis, lumbar region with neurogenic claudication [M48.062])      (Pseudoarthrosis of lumbar spine [S32.009K])    Surgeons: Pablo Izquierdo MD Responsible Provider: Terry Reyes Jr., MD    Anesthesia Type: General ASA Status: 3            Anesthesia Type: General    Bobby Phase I: Bobby Score: 9    Bobby Phase II:      Anesthesia Post Evaluation    Patient location during evaluation: bedside  Patient participation: complete - patient participated  Level of consciousness: responsive to verbal stimuli  Airway patency: patent  Nausea & Vomiting: no nausea  Respiratory status: acceptable  Hydration status: euvolemic    No notable events documented.

## 2024-08-06 NOTE — DISCHARGE SUMMARY
Discharge/Transfer  Summary     Patient: Renea Artis MRN: [id number]  SSN: [id number]    YOB: 1975  Age: 49 y.o.  Sex: female       Admit Date: 8/5/2024    Discharge Date: 8/6/2024      Admission Diagnoses: Spinal stenosis, lumbar region with neurogenic claudication [M48.062]  Pseudoarthrosis of lumbar spine [S32.009K]  Lumbar pseudoarthrosis [S32.009K]    Discharge Diagnoses:      Discharge Condition: Good      Surgery: Lumbar two/three/four/five/sacral one laminectomy fusion transforaminal lumbar interbody fusion lumbar two/three, lumbar three/four: 76531 (CPT®)     Procedure(s) (LRB):  Lumbar two/three/four/five/sacral one laminectomy fusion transforaminal lumbar interbody fusion lumbar two/three, lumbar three/four (N/A)       Hospital Course: benign      Disposition: home    Discharge Medications:   Current Discharge Medication List        START taking these medications    Details   oxyCODONE (ROXICODONE) 5 MG immediate release tablet Take 1 tablet by mouth every 6 hours as needed for Pain for up to 7 days. Intended supply: 7 days. Take lowest dose possible to manage pain Max Daily Amount: 20 mg  Qty: 28 tablet, Refills: 0    Comments: Reduce doses taken as pain becomes manageable  Associated Diagnoses: S/P lumbar fusion           CONTINUE these medications which have NOT CHANGED    Details   cyclobenzaprine (FLEXERIL) 10 MG tablet Take 1 tablet by mouth nightly as needed for Muscle spasms      oxyBUTYnin (DITROPAN-XL) 10 MG extended release tablet Take 1 tablet by mouth daily  Qty: 30 tablet, Refills: 5    Associated Diagnoses: Mixed incontinence      gabapentin (NEURONTIN) 800 MG tablet TAKE 1 TABLET BY MOUTH THREE TIMES DAILY. MAX DAILY AMOUNT: 2400 MG  Qty: 90 tablet, Refills: 0    Associated Diagnoses: Spinal stenosis, lumbar region with neurogenic claudication      vitamin D (CHOLECALCIFEROL) 25 MCG (1000 UT) TABS tablet Take 1 tablet by mouth daily  Qty: 90 tablet, Refills: 1      vitamin

## 2024-08-06 NOTE — NURSE NAVIGATOR
Rounded on patient s/p Removal segmental instrumentation L4/5/S1  Posterior lumbar fusion L3/4/5/S1 with cancellous bone, dbm and infuse and autograft  Anterior lumbar fusion L3/4 through spinology/osteolift procedure with infuse and cancellous allograft  Segmental spinal instrumentation L3/4/5/S1 nuvasive with Dr. Izquierdo, dos 08/05/2024.Patient observed alert and oriented x 3, sitting up in bedside chair. She denies chest pain, shortness of breath, nausea, or vomiting. She states that the numbness and pain she was having in her left leg is much improved since prior to surgery. She states that her pain has been well controlled throughout the night. She reports ambulating to the restroom, with use of walker, voiding without difficulty. Dressing to lower back observed to be clean, dry and intact. DIONTE drains remain present but relatively dry. TXA was administered earlier due to high drain output. Encouraged continued use of incentive spirometry for the next few days to keep lungs expanded and free from complications. Reminded patient of the importance of ambulation hourly to assist with pain and stiffness. Encouraged icing over incisional area to assist with pain and swelling. Reviewed postoperative showering instructions with patient. Patient instructed that she may shower in three days, no tubs or submersion in water. She is to contact clinic with any dressing issues. Patient verbalized understanding of all information provided. She has been cleared safe for discharge by PT / OT at this time and will discharge with home physical therapy. She has a walker at home for use. Will follow patient postoperatively.

## 2024-08-07 ENCOUNTER — HOME CARE VISIT (OUTPATIENT)
Age: 49
End: 2024-08-07
Payer: MEDICAID

## 2024-08-07 ENCOUNTER — HOME CARE VISIT (OUTPATIENT)
Age: 49
End: 2024-08-07

## 2024-08-07 VITALS
TEMPERATURE: 97.8 F | SYSTOLIC BLOOD PRESSURE: 138 MMHG | OXYGEN SATURATION: 97 % | HEART RATE: 74 BPM | DIASTOLIC BLOOD PRESSURE: 86 MMHG | RESPIRATION RATE: 18 BRPM

## 2024-08-07 VITALS
OXYGEN SATURATION: 97 % | RESPIRATION RATE: 18 BRPM | TEMPERATURE: 97.8 F | DIASTOLIC BLOOD PRESSURE: 86 MMHG | SYSTOLIC BLOOD PRESSURE: 138 MMHG | HEART RATE: 74 BPM

## 2024-08-07 PROCEDURE — G0299 HHS/HOSPICE OF RN EA 15 MIN: HCPCS

## 2024-08-07 PROCEDURE — G0151 HHCP-SERV OF PT,EA 15 MIN: HCPCS

## 2024-08-07 ASSESSMENT — ENCOUNTER SYMPTOMS
DYSPNEA ACTIVITY LEVEL: AFTER AMBULATING 10 - 20 FT
PAIN LOCATION - PAIN QUALITY: ACHING
PAIN LOCATION - PAIN QUALITY: ACHE

## 2024-08-07 NOTE — HOME HEALTH
S: patient reports that this is her 5th surgery back  she had a TKR left knee last summer 2023  patient states that she is supposed to call in the morning on  8/8/24 and go in for drain removal   O: PAIN: patient is taking pain medication on schedule, educated patient on use of ice for pain and edema  patient to apply ice to the back for pain and swelling control per MD protocol using a barrier to protect the skin. Patient to monitor the skin for any adverse effects such as color changes, irritation, mottled appearance. Patient to use ice for 20 minutes an hour for the first 2-3 days and then can reduce to 20 minutes 4-5 times a day.  WOUND:back incision covered in a honeycomb dressing with drains attached - nursing will provide wound care   ROM: B LE hip flexion, abduction and adduction WFL  B knee flexion, extension WFL   STRENGTH: R knee ext 4/5, R knee flexion 4/5, R hip flex 3-/5, R hip abd/adduction 3-/5  L knee flexion and extension 4-/5, L hip flexion 3-/5 abduction and adduction 3-/5  BED MOBILITY: patient requires max cues and min A for completion of bed mobility - constant cues needed to maintain lumbar precautuions and avoid twisting. Patient was educated on the importance of adherence to the prescautions to protect the surgical site. With the max consistent cues she was able to complete log rolling, sidelying to sit and sit to sidelying with SBA.  EQUIPMENT: FWW, grab bars by toilet an din bathroom, commode frame over the toilet   TRANSFERS: patient completed sit to stand from the chair in the living room, the toilet and the side of the bed with B UE Push off and SBA. She rquires use of the wlaker for initial stance. She needed cues to avoid trunk flexion and making sure all flexion was done by the hips. She uses the walker for initial standing balance for safety and to facilitate erect posture.  GAIT: ambulating with FWW short symmetrical step length, reduced foot clearance and slow cautious lamont.

## 2024-08-07 NOTE — HOME HEALTH
Skilled services/Home bound verification:     Skilled Reason for admission/summary of clinical condition: Lumbar Lami/Fusion/ drain care/teaching recent hospital admit \"Surgery: Lumbar two/three/four/five/sacral one laminectomy fusion transforaminal lumbar interbody fusion lumbar two/three, lumbar three/four:by Dr. Izquierdo 8/5/24 .Drain at discharge was increasing in draingage. Per Dr. Izquierdo. Ok to d/c will remove drains in office on Thursday. Bedside nurse taught pt how to drain.\"      This patient is homebound for the following reasons Requires considerable and taxing effort to leave the home , Requires the assistance of 1 or more persons to leave the home  and Only leaves the home for medical reasons or [employer] services and are infrequent and of short duration for other reasons .    Caregiver: relative.  Caregiver assists with ADLS/meal prep/transportation also uses medicaid transportation.    Medications reconciled and all medications are available in the home this visit.      The following education was provided regarding medications: reviewed all medication bottles in home for frequency, side effects and precautions.. verbalized understanding but needs reinforcement    Medications  are too early to assess effectiveness. at this time.      High risk medication teaching regarding anticoagulants, antiplatelets, antibiotics, antipsychotics, hypoglycemic agents, or opioid/narcotics performed (specify): see intervention tab.      Dr Izquierdo/Dr Yi notified of any discrepancies/look a like medications/medication interactions major interaction:traZODone and DULoxetine Serotonergic effects of trazodone and serotonin/norepinephrine reuptake inhibitors (SNRIs) may be additive. The risk of serotonin syndrome/toxicity may be increased. reported taking for a long time. verbalized understanding and repeat back.naltrexone and oxyCODONE :Naltrexone may decrease or attenuate the pharmacologic effects of oxyCODONE.

## 2024-08-07 NOTE — CASE COMMUNICATION
Therapy Functional Score Assessment  Question   Score   Grooming  2     Upper Dressing             2   Lower Dressing             3   Bathing              5   Toilet Transfer  1   Toileting Hygiene             2   Transfer              2   Ambulation  3   Dyspnea                         2     Est number therapy visits      6

## 2024-08-08 ENCOUNTER — HOME CARE VISIT (OUTPATIENT)
Age: 49
End: 2024-08-08
Payer: MEDICAID

## 2024-08-08 ENCOUNTER — TELEPHONE (OUTPATIENT)
Facility: HOSPITAL | Age: 49
End: 2024-08-08

## 2024-08-08 NOTE — TELEPHONE ENCOUNTER
Call placed to patient, ID verified x 2. Patient is s/p s/p Removal segmental instrumentation L4/5/S1  Posterior lumbar fusion L3/4/5/S1 with cancellous bone, dbm and infuse and autograft  Anterior lumbar fusion L3/4 through spinology/osteolift procedure with infuse and cancellous allograft  Segmental spinal instrumentation L3/4/5/S1 nuvasive with Dr. Izuqierdo, dos 08/05/2024. She denies chest pain, shortness of breath, nausea, vomiting, fever or chills. She denies any numbness in her lower extremities, she denies any difficulty with bowel or bladder. She continues to endorse pain and numbness in her upper left thigh where she had her knee replaced last year. This has been present since prior to surgery. She states that home health has been out and her drains have been pulled. Her dressings are reported as clean, dry and intact. She states that her pain has been controlled with medication that she is taking in addition to the flexeril that she was instructed that she may take now three times daily and icing. She is up ambulating hourly to assist with pain and stiffness. Overall she feels she is doing well. She will follow up with Dr. Izquierdo in two weeks or sooner if needed.

## 2024-08-09 ENCOUNTER — HOME CARE VISIT (OUTPATIENT)
Age: 49
End: 2024-08-09
Payer: MEDICAID

## 2024-08-09 PROCEDURE — G0300 HHS/HOSPICE OF LPN EA 15 MIN: HCPCS

## 2024-08-10 ENCOUNTER — HOME CARE VISIT (OUTPATIENT)
Age: 49
End: 2024-08-10
Payer: MEDICAID

## 2024-08-11 VITALS
SYSTOLIC BLOOD PRESSURE: 126 MMHG | DIASTOLIC BLOOD PRESSURE: 67 MMHG | HEART RATE: 79 BPM | TEMPERATURE: 97.5 F | OXYGEN SATURATION: 99 % | RESPIRATION RATE: 18 BRPM

## 2024-08-11 NOTE — HOME HEALTH
Skilled reason for visit: Wound Care    Caregiver involvement: Family assists with care for patient    Medications reviewed and all medications are available in the home this visit.  Y  The following education was provided regarding medications:  N/A  MD notified of any discrepancies/look a-like medications/medication interactions: N/A  Medications are effective at this time.  Y    Home health supplies by type and quantity ordered/delivered this visit include: N/A    Patient education provided this visit: Infection prevention, Post operative complications, wound monitoring. Per patient, patients DIONTE drains were removed by another homecare nurse. Dressings around old DIONTE drains were clean dry and intact, without s/s of infection. Patient educated to notify a provider if swelling, redness, or increased pain occurs around surgical areas. Patient Verbalized understanding of teachings, able to repeat back.     Sharps education provided: N/A    Patient level of understanding of education provided: Verbalizes understanding. Dependent in care due to wound location    Patient response to procedure performed:  N/A    Agency Progress toward goals: Agency continues visits at ordered frequency to provide wound care at ordered frequency. Agency notfied of care from another homecare nurse by this clinician. Care continues.    Patient's Progress towards personal goals: Patient continues homecare visits at ordered frequency to provide wound wound care and patient education.    Home exercise program: N/A    Continued need for the following skills: Wound care, monitoring.    Plan for next visit: Wound care    Patient and/or caregiver notified and agrees to changes in the Plan of Care: yes

## 2024-08-12 ENCOUNTER — HOME CARE VISIT (OUTPATIENT)
Age: 49
End: 2024-08-12
Payer: MEDICAID

## 2024-08-12 PROCEDURE — G0300 HHS/HOSPICE OF LPN EA 15 MIN: HCPCS

## 2024-08-13 VITALS
OXYGEN SATURATION: 98 % | RESPIRATION RATE: 18 BRPM | TEMPERATURE: 97.6 F | SYSTOLIC BLOOD PRESSURE: 119 MMHG | HEART RATE: 71 BPM | DIASTOLIC BLOOD PRESSURE: 69 MMHG

## 2024-08-13 RX ORDER — GABAPENTIN 800 MG/1
TABLET ORAL
Qty: 90 TABLET | OUTPATIENT
Start: 2024-08-13 | End: 2024-09-12

## 2024-08-13 ASSESSMENT — ENCOUNTER SYMPTOMS: PAIN LOCATION - PAIN QUALITY: THROBBING, SHARP

## 2024-08-13 NOTE — TELEPHONE ENCOUNTER
Refill has been denied multiple times due to patient needing to be seen. We have been unable to reach her. Please delete RX so message can be closed.

## 2024-08-13 NOTE — HOME HEALTH
Skilled reason for visit: Patient teaching reinforcement, wound monitoring    Caregiver involvement: Family assists with care for patient.    Medications reviewed and all medications are available in the home this visit.  Y  The following education was provided regarding medications:  N/A  MD notified of any discrepancies/look a-like medications/medication interactions: N/A  Medications are effective at this time.  Y    Home health supplies by type and quantity ordered/delivered this visit include: N/A    Patient education provided this visit: Infection prevention, post op complications, wound care monitoring.     Sharps education provided: N/A    Patient level of understanding of education provided: Verbalizes understanding, able to repeat back. May need reinforcement due to knowledge deficit and dependence in wound location.    Patient response to procedure performed:  effective    Agency Progress toward goals: agency continues visits at ordered frequency to provide teaching reinforcement.    Patient's Progress towards personal goals: Patient continues homecare and provider visits at ordered frequency.    Home exercise program: N/A    Continued need for the following skills: Patient education reinforcement.    Plan for next visit: Patient education    Patient and/or caregiver notified and agrees to changes in the Plan of Care: yes

## 2024-08-14 NOTE — CASE COMMUNICATION
Good Morning,    Mrs. Artis has missed her PT frequency this week d/t patient initiated cancellation. She felt she did not need therapy today, after her frequency was explained. She agreed to a Saturday visit with our weekend staff.

## 2024-08-16 ENCOUNTER — TELEPHONE (OUTPATIENT)
Age: 49
End: 2024-08-16

## 2024-08-16 ENCOUNTER — HOME CARE VISIT (OUTPATIENT)
Age: 49
End: 2024-08-16
Payer: MEDICAID

## 2024-08-16 DIAGNOSIS — M48.062 SPINAL STENOSIS, LUMBAR REGION WITH NEUROGENIC CLAUDICATION: ICD-10-CM

## 2024-08-16 NOTE — TELEPHONE ENCOUNTER
Patient is req a refill of Gabapentin to hold her until her appt on 09/26.          [facility] [id number] - [place], [place] - [address] [phone] - F [phone] [[id number]]

## 2024-08-19 RX ORDER — GABAPENTIN 800 MG/1
800 TABLET ORAL 3 TIMES DAILY
Qty: 90 TABLET | Refills: 0 | Status: SHIPPED | OUTPATIENT
Start: 2024-08-19 | End: 2024-09-18

## 2024-09-03 ENCOUNTER — HOME CARE VISIT (OUTPATIENT)
Age: 49
End: 2024-09-03
Payer: MEDICAID

## 2024-09-03 ENCOUNTER — OFFICE VISIT (OUTPATIENT)
Age: 49
End: 2024-09-03
Payer: MEDICAID

## 2024-09-03 VITALS
HEIGHT: 68 IN | HEART RATE: 75 BPM | RESPIRATION RATE: 17 BRPM | BODY MASS INDEX: 44.41 KG/M2 | OXYGEN SATURATION: 95 % | SYSTOLIC BLOOD PRESSURE: 150 MMHG | DIASTOLIC BLOOD PRESSURE: 88 MMHG | TEMPERATURE: 96.4 F | WEIGHT: 293 LBS

## 2024-09-03 DIAGNOSIS — E66.01 MORBID OBESITY (HCC): Primary | ICD-10-CM

## 2024-09-03 DIAGNOSIS — E66.01 MORBID OBESITY (HCC): ICD-10-CM

## 2024-09-03 DIAGNOSIS — F17.211 CIGARETTE NICOTINE DEPENDENCE IN REMISSION: ICD-10-CM

## 2024-09-03 PROCEDURE — 3077F SYST BP >= 140 MM HG: CPT | Performed by: STUDENT IN AN ORGANIZED HEALTH CARE EDUCATION/TRAINING PROGRAM

## 2024-09-03 PROCEDURE — 3078F DIAST BP <80 MM HG: CPT | Performed by: STUDENT IN AN ORGANIZED HEALTH CARE EDUCATION/TRAINING PROGRAM

## 2024-09-03 PROCEDURE — 99407 BEHAV CHNG SMOKING > 10 MIN: CPT | Performed by: STUDENT IN AN ORGANIZED HEALTH CARE EDUCATION/TRAINING PROGRAM

## 2024-09-03 PROCEDURE — 99204 OFFICE O/P NEW MOD 45 MIN: CPT | Performed by: STUDENT IN AN ORGANIZED HEALTH CARE EDUCATION/TRAINING PROGRAM

## 2024-09-03 RX ORDER — OXYCODONE HYDROCHLORIDE 5 MG/1
TABLET ORAL
COMMUNITY
Start: 2024-08-29

## 2024-09-03 NOTE — PROGRESS NOTES
Bariatric Surgery Initial Consult    Patient: eRnea Artis MRN: [id number]  SSN: [id number]    YOB: 1975  Age: 49 y.o.  Sex: female      Subjective:      CC: Morbid Obesity    Current Weight: 332  Body mass index is 50.48 kg/m².  Ideal body weight: 63.9 kg (140 lb 14 oz)  Adjusted ideal body weight: 98.6 kg (217 lb 5.2 oz)  Excess Body Weight: 189    History of Present Illness  The patient presents for evaluation of weight loss surgery.    She has been considering weight loss surgery for approximately a year, motivated by her girlfriend's successful experience with the procedure a decade ago. Initially resistant, she now feels the need to lose weight due to worsening joint pain and an increasing risk of diabetes. She has attempted various diets, including the Walter diet, which involves fasting and a vegan diet. This approach resulted in a weight loss of 10 pounds per week, but she would regain half of it afterwards. 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  She recently had surgery with Dr. Izquierdo about a month ago.  She identifies starches as a problem area in her diet and admits to skipping breakfast. She used to consume soda frequently but switched to water five years ago. However, she recently resumed drinking soda.    She has tried Victoza, which helped her lose 10 to 15 pounds, but it also increased her craving for sweets. She was previously on Wellbutrin and naltrexone but discontinued the latter due to its ineffectiveness and her concurrent use of oxycodone. She quit smoking three months ago and stopped drinking alcohol before Christmas. She used Chantix to aid in smoking cessation but had to stop it prior to her back surgery. She reports no exposure to secondhand smoke and no use of vaping, marijuana, or other substances.    She has a history of high blood pressure

## 2024-09-03 NOTE — PROGRESS NOTES
Chief Complaint   Patient presents with    Follow-up     Consult Gastric Bypass   1. Have you been to the ER, urgent care clinic since your last visit?  Hospitalized since your last visit?Yes Recent back surgery 8/5/24    2. Have you seen or consulted any other health care providers outside of the [facility] since your last visit?  Include any pap smears or colon screening. No Pt ID confirmed        9/3/2024    10:57 AM 8/15/2024    10:56 AM 8/12/2024     1:36 PM 8/9/2024     2:35 PM 8/7/2024    11:26 AM 8/7/2024    11:01 AM 8/6/2024    12:16 PM   Ambulatory Bariatric Summary   Systolic  129 119 126 138 138 154   Diastolic  73 69 67 86 86 82   Pulse  80 71 79 74 74 77   Temp  97.8 °F (36.6 °C) 97.6 °F (36.4 °C) 97.5 °F (36.4 °C) 97.8 °F (36.6 °C) 97.8 °F (36.6 °C) 98.2 °F (36.8 °C)   Respirations  18 18 18 18 18 14   Weight - Scale 332         Height 1.727 m (5' 8\")         BMI 50.6 kg/m2         Weight - Scale 150.6 kg (332 lb)         BMI (Calculated) 50.6             Body mass index is 50.48 kg/m².    Renea Artis has been given the following recommendations today due to her elevated BP reading  patient states in pain due to recent back surgery. Took Losartan at bedtime 9/2/24

## 2024-09-12 ENCOUNTER — HOSPITAL ENCOUNTER (OUTPATIENT)
Facility: HOSPITAL | Age: 49
Discharge: HOME | End: 2024-09-15

## 2024-09-12 ENCOUNTER — CLINICAL DOCUMENTATION (OUTPATIENT)
Facility: HOSPITAL | Age: 49
End: 2024-09-12

## 2024-09-20 ENCOUNTER — HOSPITAL ENCOUNTER (OUTPATIENT)
Facility: HOSPITAL | Age: 49
Discharge: HOME | End: 2024-09-23
Attending: STUDENT IN AN ORGANIZED HEALTH CARE EDUCATION/TRAINING PROGRAM
Payer: MEDICAID

## 2024-09-20 DIAGNOSIS — E66.01 MORBID OBESITY: ICD-10-CM

## 2024-09-20 PROCEDURE — 6370000000 HC RX 637 (ALT 250 FOR IP): Performed by: STUDENT IN AN ORGANIZED HEALTH CARE EDUCATION/TRAINING PROGRAM

## 2024-09-20 PROCEDURE — 2500000003 HC RX 250 WO HCPCS: Performed by: STUDENT IN AN ORGANIZED HEALTH CARE EDUCATION/TRAINING PROGRAM

## 2024-09-20 PROCEDURE — 74246 X-RAY XM UPR GI TRC 2CNTRST: CPT

## 2024-09-20 RX ADMIN — ANTACID/ANTIFLATULENT 2 EACH: 380; 550; 10; 10 GRANULE, EFFERVESCENT ORAL at 09:47

## 2024-09-20 RX ADMIN — BARIUM SULFATE 140 ML: 980 POWDER, FOR SUSPENSION ORAL at 09:47

## 2024-09-24 ENCOUNTER — LAB (OUTPATIENT)
Facility: CLINIC | Age: 49
End: 2024-09-24

## 2024-09-24 ENCOUNTER — HOSPITAL ENCOUNTER (OUTPATIENT)
Facility: HOSPITAL | Age: 49
Setting detail: SPECIMEN
Discharge: HOME | End: 2024-09-27

## 2024-09-24 DIAGNOSIS — R73.03 PREDIABETES: ICD-10-CM

## 2024-09-24 DIAGNOSIS — E55.9 VITAMIN D DEFICIENCY: ICD-10-CM

## 2024-09-24 DIAGNOSIS — Z82.69 FAMILY HISTORY OF SYSTEMIC LUPUS ERYTHEMATOSUS: ICD-10-CM

## 2024-09-24 DIAGNOSIS — E78.2 MIXED HYPERLIPIDEMIA: ICD-10-CM

## 2024-09-24 LAB — SENTARA SPECIMEN COLLECTION: NORMAL

## 2024-09-24 PROCEDURE — 99001 SPECIMEN HANDLING PT-LAB: CPT

## 2024-09-25 LAB
ANTI-DNA (DS) AB QN: 4 IU/ML
CENTROMERE B AB: <0.2 AI
CHOLESTEROL, TOTAL: 120 MG/DL (ref 110–200)
CHOLESTEROL/HDL RATIO: 3.2 (ref 0–5)
CHROMATIN ANTIBODY: <0.2 AI
ESTIMATED AVERAGE GLUCOSE: 126 MG/DL (ref 91–123)
HBA1C MFR BLD: 6 % (ref 4.8–5.6)
HDLC SERPL-MCNC: 37 MG/DL
JO-1 ANTIBODY: <0.2 AI
LDL CHOLESTEROL: 56 MG/DL (ref 50–99)
LDL/HDL RATIO: 1.5
NON-HDL CHOLESTEROL: 83 MG/DL
RNP ANTIBODY: <0.2 AI
SCLERODERMA (SCL-70) AB: <0.2 AI
SJOGREN'S ANTI-SS-A: <0.2 AI
SJOGREN'S ANTI-SS-B: 0.8 AI
SMITH ABS: <0.2 AI
TRIGL SERPL-MCNC: 131 MG/DL (ref 40–149)
VITAMIN D 25-HYDROXY: 28.8 NG/ML (ref 32–100)
VLDLC SERPL CALC-MCNC: 26 MG/DL (ref 8–30)

## 2024-09-26 ENCOUNTER — OFFICE VISIT (OUTPATIENT)
Age: 49
End: 2024-09-26
Payer: MEDICAID

## 2024-09-26 ENCOUNTER — HOSPITAL ENCOUNTER (OUTPATIENT)
Dept: WOMENS IMAGING | Facility: HOSPITAL | Age: 49
Discharge: HOME | End: 2024-09-29
Payer: MEDICAID

## 2024-09-26 VITALS — HEIGHT: 68 IN | BODY MASS INDEX: 44.41 KG/M2 | WEIGHT: 293 LBS

## 2024-09-26 DIAGNOSIS — Z98.1 S/P LUMBAR FUSION: Primary | ICD-10-CM

## 2024-09-26 DIAGNOSIS — Z12.31 SCREENING MAMMOGRAM FOR BREAST CANCER: ICD-10-CM

## 2024-09-26 DIAGNOSIS — M48.062 SPINAL STENOSIS, LUMBAR REGION WITH NEUROGENIC CLAUDICATION: ICD-10-CM

## 2024-09-26 PROCEDURE — 99214 OFFICE O/P EST MOD 30 MIN: CPT | Performed by: PHYSICAL MEDICINE & REHABILITATION

## 2024-09-26 PROCEDURE — 77063 BREAST TOMOSYNTHESIS BI: CPT

## 2024-09-26 RX ORDER — GABAPENTIN 800 MG/1
800 TABLET ORAL 3 TIMES DAILY
Qty: 270 TABLET | Refills: 0 | Status: SHIPPED | OUTPATIENT
Start: 2024-09-26 | End: 2024-12-25

## 2024-09-26 RX ORDER — CYCLOBENZAPRINE HCL 10 MG
10 TABLET ORAL
Qty: 30 TABLET | Refills: 1 | Status: SHIPPED | OUTPATIENT
Start: 2024-09-26

## 2024-09-27 ENCOUNTER — HOSPITAL ENCOUNTER (OUTPATIENT)
Facility: HOSPITAL | Age: 49
Discharge: HOME | End: 2024-09-30
Payer: MEDICAID

## 2024-09-27 DIAGNOSIS — Z98.1 STATUS POST LUMBAR SPINAL FUSION: ICD-10-CM

## 2024-09-27 PROCEDURE — 72110 X-RAY EXAM L-2 SPINE 4/>VWS: CPT

## 2024-09-29 LAB
A/G RATIO: 1.8 RATIO (ref 1.1–2.6)
ALBUMIN: 4.4 G/DL (ref 3.5–5)
ALP BLD-CCNC: 141 U/L (ref 25–115)
ALT SERPL-CCNC: 16 U/L (ref 5–40)
ANION GAP SERPL CALCULATED.3IONS-SCNC: 12 MMOL/L (ref 3–15)
AST SERPL-CCNC: 18 U/L (ref 10–37)
BASOPHILS ABSOLUTE: 0 K/UL (ref 0–0.2)
BASOPHILS RELATIVE PERCENT: 1 % (ref 0–2)
BILIRUB SERPL-MCNC: 0.2 MG/DL (ref 0.2–1.2)
BUN BLDV-MCNC: 8 MG/DL (ref 6–22)
CALCIUM SERPL-MCNC: 9.3 MG/DL (ref 8.4–10.5)
CHLORIDE BLD-SCNC: 99 MMOL/L (ref 98–110)
CO2: 28 MMOL/L (ref 20–32)
COTININE: <2 NG/ML
CREAT SERPL-MCNC: 0.8 MG/DL (ref 0.5–1.2)
EOSINOPHIL # BLD: 4 % (ref 0–6)
EOSINOPHILS ABSOLUTE: 0.2 K/UL (ref 0–0.5)
GFR, ESTIMATED: >60 ML/MIN/1.73 SQ.M.
GLOBULIN: 2.4 G/DL (ref 2–4)
GLUCOSE: 126 MG/DL (ref 70–99)
HCT VFR BLD CALC: 37.6 % (ref 35.1–48)
HEMOGLOBIN: 11 G/DL (ref 11.7–16)
IRON: 45 MCG/DL (ref 30–160)
LYMPHOCYTES # BLD: 17 % (ref 20–45)
LYMPHOCYTES ABSOLUTE: 1 K/UL (ref 1–4.8)
MCH RBC QN AUTO: 29 PG (ref 26–34)
MCHC RBC AUTO-ENTMCNC: 29 G/DL (ref 31–36)
MCV RBC AUTO: 98 FL (ref 80–99)
MONOCYTES ABSOLUTE: 0.5 K/UL (ref 0.1–1)
MONOCYTES: 9 % (ref 3–12)
NEUTROPHILS ABSOLUTE: 4 K/UL (ref 1.8–7.7)
NEUTROPHILS: 69 % (ref 40–75)
NICOTINE: <2 NG/ML
PDW BLD-RTO: 13.6 % (ref 10–15.5)
PLATELET # BLD: 300 K/UL (ref 140–440)
PMV BLD AUTO: 10.5 FL (ref 9–13)
POTASSIUM SERPL-SCNC: 4.3 MMOL/L (ref 3.5–5.5)
RBC # BLD: 3.85 M/UL (ref 3.8–5.2)
SODIUM BLD-SCNC: 139 MMOL/L (ref 133–145)
THIAMINE BLOOD: 145 NMOL/L (ref 78–185)
TOTAL PROTEIN: 6.8 G/DL (ref 6.4–8.3)
WBC # BLD: 5.8 K/UL (ref 4–11)

## 2024-09-30 ENCOUNTER — OFFICE VISIT (OUTPATIENT)
Facility: CLINIC | Age: 49
End: 2024-09-30
Payer: MEDICAID

## 2024-09-30 VITALS
HEART RATE: 85 BPM | OXYGEN SATURATION: 96 % | HEIGHT: 68 IN | BODY MASS INDEX: 44.41 KG/M2 | SYSTOLIC BLOOD PRESSURE: 138 MMHG | DIASTOLIC BLOOD PRESSURE: 82 MMHG | TEMPERATURE: 97.7 F | RESPIRATION RATE: 13 BRPM | WEIGHT: 293 LBS

## 2024-09-30 DIAGNOSIS — Z23 NEED FOR VACCINATION: ICD-10-CM

## 2024-09-30 DIAGNOSIS — E78.2 MIXED HYPERLIPIDEMIA: ICD-10-CM

## 2024-09-30 DIAGNOSIS — E55.9 VITAMIN D DEFICIENCY: ICD-10-CM

## 2024-09-30 DIAGNOSIS — R73.03 PREDIABETES: ICD-10-CM

## 2024-09-30 DIAGNOSIS — M25.531 RIGHT WRIST PAIN: ICD-10-CM

## 2024-09-30 DIAGNOSIS — I10 PRIMARY HYPERTENSION: Primary | ICD-10-CM

## 2024-09-30 PROCEDURE — 3079F DIAST BP 80-89 MM HG: CPT | Performed by: STUDENT IN AN ORGANIZED HEALTH CARE EDUCATION/TRAINING PROGRAM

## 2024-09-30 PROCEDURE — 90661 CCIIV3 VAC ABX FR 0.5 ML IM: CPT | Performed by: STUDENT IN AN ORGANIZED HEALTH CARE EDUCATION/TRAINING PROGRAM

## 2024-09-30 PROCEDURE — 99214 OFFICE O/P EST MOD 30 MIN: CPT | Performed by: STUDENT IN AN ORGANIZED HEALTH CARE EDUCATION/TRAINING PROGRAM

## 2024-09-30 PROCEDURE — 90471 IMMUNIZATION ADMIN: CPT | Performed by: STUDENT IN AN ORGANIZED HEALTH CARE EDUCATION/TRAINING PROGRAM

## 2024-09-30 PROCEDURE — 3075F SYST BP GE 130 - 139MM HG: CPT | Performed by: STUDENT IN AN ORGANIZED HEALTH CARE EDUCATION/TRAINING PROGRAM

## 2024-09-30 SDOH — ECONOMIC STABILITY: FOOD INSECURITY: WITHIN THE PAST 12 MONTHS, YOU WORRIED THAT YOUR FOOD WOULD RUN OUT BEFORE YOU GOT MONEY TO BUY MORE.: NEVER TRUE

## 2024-09-30 SDOH — ECONOMIC STABILITY: FOOD INSECURITY: WITHIN THE PAST 12 MONTHS, THE FOOD YOU BOUGHT JUST DIDN'T LAST AND YOU DIDN'T HAVE MONEY TO GET MORE.: NEVER TRUE

## 2024-09-30 SDOH — ECONOMIC STABILITY: INCOME INSECURITY: HOW HARD IS IT FOR YOU TO PAY FOR THE VERY BASICS LIKE FOOD, HOUSING, MEDICAL CARE, AND HEATING?: NOT HARD AT ALL

## 2024-09-30 ASSESSMENT — PATIENT HEALTH QUESTIONNAIRE - PHQ9
4. FEELING TIRED OR HAVING LITTLE ENERGY: NOT AT ALL
2. FEELING DOWN, DEPRESSED OR HOPELESS: NOT AT ALL
8. MOVING OR SPEAKING SO SLOWLY THAT OTHER PEOPLE COULD HAVE NOTICED. OR THE OPPOSITE, BEING SO FIGETY OR RESTLESS THAT YOU HAVE BEEN MOVING AROUND A LOT MORE THAN USUAL: NOT AT ALL
SUM OF ALL RESPONSES TO PHQ QUESTIONS 1-9: 0
1. LITTLE INTEREST OR PLEASURE IN DOING THINGS: NOT AT ALL
6. FEELING BAD ABOUT YOURSELF - OR THAT YOU ARE A FAILURE OR HAVE LET YOURSELF OR YOUR FAMILY DOWN: NOT AT ALL
10. IF YOU CHECKED OFF ANY PROBLEMS, HOW DIFFICULT HAVE THESE PROBLEMS MADE IT FOR YOU TO DO YOUR WORK, TAKE CARE OF THINGS AT HOME, OR GET ALONG WITH OTHER PEOPLE: NOT DIFFICULT AT ALL
SUM OF ALL RESPONSES TO PHQ QUESTIONS 1-9: 0
9. THOUGHTS THAT YOU WOULD BE BETTER OFF DEAD, OR OF HURTING YOURSELF: NOT AT ALL
SUM OF ALL RESPONSES TO PHQ QUESTIONS 1-9: 0
7. TROUBLE CONCENTRATING ON THINGS, SUCH AS READING THE NEWSPAPER OR WATCHING TELEVISION: NOT AT ALL
5. POOR APPETITE OR OVEREATING: NOT AT ALL
SUM OF ALL RESPONSES TO PHQ QUESTIONS 1-9: 0
SUM OF ALL RESPONSES TO PHQ9 QUESTIONS 1 & 2: 0

## 2024-09-30 ASSESSMENT — ENCOUNTER SYMPTOMS
BACK PAIN: 1
SHORTNESS OF BREATH: 0
ABDOMINAL PAIN: 0

## 2024-09-30 NOTE — PROGRESS NOTES
Renea Artis is a 49 y.o. year old female who presents today for   Chief Complaint   Patient presents with    2 month follow up        Is someone accompanying this pt? no    Is the patient using any DME equipment during OV? no    Depression Screenin/30/2024     9:17 AM 2024     9:23 AM 2024     9:40 AM 2024     8:19 AM 2024     2:48 PM 2023     8:30 AM 2023     8:50 AM   PHQ-9 Questionaire   Little interest or pleasure in doing things 0 0 0 0 0 0 0   Feeling down, depressed, or hopeless 0 0 0 0 0 0 0   Trouble falling or staying asleep, or sleeping too much 0 0 0       Feeling tired or having little energy 0 0 0       Poor appetite or overeating 0 0 0       Feeling bad about yourself - or that you are a failure or have let yourself or your family down 0 0 0       Trouble concentrating on things, such as reading the newspaper or watching television 0 0 0       Moving or speaking so slowly that other people could have noticed. Or the opposite - being so fidgety or restless that you have been moving around a lot more than usual 0 0 0       Thoughts that you would be better off dead, or of hurting yourself in some way 0 0 0       PHQ-9 Total Score 0 0 0 0 0 0 0   If you checked off any problems, how difficult have these problems made it for you to do your work, take care of things at home, or get along with other people? 0 0            Abuse Screening:       No data to display                Learning Assessment:  No question data found.    Fall Risk:      2024     8:42 AM   Fall Risk   Do you feel unsteady or are you worried about falling?  no   2 or more falls in past year? no   Fall with injury in past year? no           Coordination of Care:   1. \"Have you been to the ER, urgent care clinic since your last visit?  Hospitalized since your last visit?\" no    2. \"Have you seen or consulted any other health care providers outside of the [facility] since your last 
D (CHOLECALCIFEROL) 25 MCG (1000 UT) TABS tablet Take 1 tablet by mouth daily 90 tablet 1    vitamin B-12 (CYANOCOBALAMIN) 1000 MCG tablet Take 1 tablet by mouth daily 90 tablet 1    diclofenac sodium (VOLTAREN) 1 % GEL 4 times daily as needed for Pain      losartan (COZAAR) 50 MG tablet Take 1 tablet by mouth daily (Patient taking differently: Take 1 tablet by mouth nightly) 30 tablet 3    SUMAtriptan (IMITREX) 50 MG tablet Take 1 tablet by mouth daily as needed for Migraine 9 tablet 0    rosuvastatin (CRESTOR) 20 MG tablet Take 1 tablet by mouth nightly 90 tablet 2    naltrexone (DEPADE) 50 MG tablet Take 1/2 tablet daily for one week, then take 1/2 tablet twice daily 60 tablet 1    acetaminophen (TYLENOL) 325 MG tablet Take 2 tablets by mouth every 6 hours as needed for Pain mild pain      buPROPion (WELLBUTRIN SR) 200 MG extended release tablet Take 1 tablet by mouth 2 times daily      DULoxetine (CYMBALTA) 60 MG extended release capsule Take 1 capsule by mouth 2 times daily      hydrOXYzine HCl (ATARAX) 25 MG tablet Take 1 tablet by mouth 3 times daily as needed for Anxiety      lamoTRIgine (LAMICTAL) 100 MG tablet Take 1 tablet by mouth daily      lamoTRIgine (LAMICTAL) 200 MG tablet Take 1 tablet by mouth every evening      traZODone (DESYREL) 100 MG tablet Take 1 tablet by mouth as needed for Depression or Sleep 1-2 tabs      cyclobenzaprine (FLEXERIL) 10 MG tablet Take 1 tablet by mouth nightly as needed for Muscle spasms (Patient not taking: Reported on 9/30/2024) 30 tablet 1     No current facility-administered medications for this visit.        ROS   Review of Systems   Constitutional:  Negative for chills and fever.   Respiratory:  Negative for shortness of breath.    Cardiovascular:  Negative for chest pain.   Gastrointestinal:  Negative for abdominal pain.   Musculoskeletal:  Positive for back pain.   Neurological:  Negative for dizziness, light-headedness and headaches.   Psychiatric/Behavioral:

## 2024-10-07 NOTE — PROGRESS NOTES
[facility]   Sleep Medicine     Office Progress Note - Initial Evaluation        [address]  [address]  [facility] [address] [phone] Fax    Reason for visit/referral: Evaluation for possible obstructive sleep apnea/sleep disordered breathing  Assessment:      1. Suspected sleep apnea  -     PAT - Home Sleep Test; Future  2. Nocturia  3. Bruxism, sleep-related  4. Snoring  -     PAT - Home Sleep Test; Future  5. Obesity, Class III, BMI 40-49.9 (morbid obesity)  6. Excessive caffeine intake  7. Moderate episode of recurrent major depressive disorder (HCC)  Assessment & Plan:   Monitored by specialist- no acute findings meriting change in the plan  8. Primary hypertension  Assessment & Plan:   Asymptomatic, elevated to 158/78 today.  Managed by PCP, takes Cozaar at bedtime.  9. Bipolar affective disorder, currently manic, moderate (HCC)  Assessment & Plan:   Monitored by specialist- no acute findings meriting change in the plan, taking Lamictal, Cymbalta, Wellbutrin, Atarax as needed.  Has psychiatrist managing  10. Insomnia w/ sleep apnea  Assessment & Plan:   This might be a relative issue as she is historically nap for an hour or 2 during the day which I discussed with her today and really would be contraindicated for patients complaining of difficulty falling asleep.  Additionally, I discussed with her the fact that undiagnosed/untreated moderate to severe obstructive sleep apnea could certainly contribute significantly to her \"insomnia\".  The patient also drinks a substantial amount of caffeine a day -currently a 2 L of Pepsi.  11. Inadequate sleep hygiene  Comments:  Watching TV in bed, napping during the day, etc.     I have discussed the nature and definition of obstructive sleep apnea and why it would be important to evaluate for this.  We did discuss how undiagnosed/untreated obstructive sleep apnea can affect other medical conditions/disorders, and in

## 2024-10-08 ENCOUNTER — OFFICE VISIT (OUTPATIENT)
Age: 49
End: 2024-10-08
Payer: MEDICAID

## 2024-10-08 VITALS
OXYGEN SATURATION: 95 % | WEIGHT: 293 LBS | HEIGHT: 68 IN | BODY MASS INDEX: 44.41 KG/M2 | SYSTOLIC BLOOD PRESSURE: 158 MMHG | RESPIRATION RATE: 18 BRPM | HEART RATE: 88 BPM | DIASTOLIC BLOOD PRESSURE: 78 MMHG | TEMPERATURE: 97.8 F

## 2024-10-08 DIAGNOSIS — R35.1 NOCTURIA: ICD-10-CM

## 2024-10-08 DIAGNOSIS — R06.83 SNORING: ICD-10-CM

## 2024-10-08 DIAGNOSIS — F33.1 MODERATE EPISODE OF RECURRENT MAJOR DEPRESSIVE DISORDER (HCC): ICD-10-CM

## 2024-10-08 DIAGNOSIS — Z72.821 INADEQUATE SLEEP HYGIENE: ICD-10-CM

## 2024-10-08 DIAGNOSIS — G47.63 BRUXISM, SLEEP-RELATED: ICD-10-CM

## 2024-10-08 DIAGNOSIS — R29.818 SUSPECTED SLEEP APNEA: Primary | ICD-10-CM

## 2024-10-08 DIAGNOSIS — Z78.9 EXCESSIVE CAFFEINE INTAKE: ICD-10-CM

## 2024-10-08 DIAGNOSIS — G47.00 INSOMNIA W/ SLEEP APNEA: ICD-10-CM

## 2024-10-08 DIAGNOSIS — E66.01 OBESITY, CLASS III, BMI 40-49.9 (MORBID OBESITY): ICD-10-CM

## 2024-10-08 DIAGNOSIS — G47.30 INSOMNIA W/ SLEEP APNEA: ICD-10-CM

## 2024-10-08 DIAGNOSIS — I10 PRIMARY HYPERTENSION: ICD-10-CM

## 2024-10-08 DIAGNOSIS — F31.12 BIPOLAR AFFECTIVE DISORDER, CURRENTLY MANIC, MODERATE (HCC): ICD-10-CM

## 2024-10-08 PROCEDURE — 99204 OFFICE O/P NEW MOD 45 MIN: CPT | Performed by: OTOLARYNGOLOGY

## 2024-10-08 PROCEDURE — 3077F SYST BP >= 140 MM HG: CPT | Performed by: OTOLARYNGOLOGY

## 2024-10-08 PROCEDURE — 3078F DIAST BP <80 MM HG: CPT | Performed by: OTOLARYNGOLOGY

## 2024-10-08 ASSESSMENT — PATIENT HEALTH QUESTIONNAIRE - PHQ9
1. LITTLE INTEREST OR PLEASURE IN DOING THINGS: NOT AT ALL
SUM OF ALL RESPONSES TO PHQ QUESTIONS 1-9: 1
SUM OF ALL RESPONSES TO PHQ9 QUESTIONS 1 & 2: 1
2. FEELING DOWN, DEPRESSED OR HOPELESS: SEVERAL DAYS
SUM OF ALL RESPONSES TO PHQ QUESTIONS 1-9: 1

## 2024-10-08 ASSESSMENT — SLEEP AND FATIGUE QUESTIONNAIRES
HOW LIKELY ARE YOU TO NOD OFF OR FALL ASLEEP WHILE SITTING QUIETLY AFTER LUNCH WITHOUT ALCOHOL: SLIGHT CHANCE OF DOZING
HOW LIKELY ARE YOU TO NOD OFF OR FALL ASLEEP WHILE SITTING AND READING: SLIGHT CHANCE OF DOZING
HOW LIKELY ARE YOU TO NOD OFF OR FALL ASLEEP IN A CAR, WHILE STOPPED FOR A FEW MINUTES IN TRAFFIC: WOULD NEVER DOZE
HOW LIKELY ARE YOU TO NOD OFF OR FALL ASLEEP WHILE SITTING AND TALKING TO SOMEONE: WOULD NEVER DOZE
HOW LIKELY ARE YOU TO NOD OFF OR FALL ASLEEP WHILE SITTING INACTIVE IN A PUBLIC PLACE: WOULD NEVER DOZE
ESS TOTAL SCORE: 7
HOW LIKELY ARE YOU TO NOD OFF OR FALL ASLEEP WHEN YOU ARE A PASSENGER IN A CAR FOR AN HOUR WITHOUT A BREAK: WOULD NEVER DOZE
HOW LIKELY ARE YOU TO NOD OFF OR FALL ASLEEP WHILE WATCHING TV: MODERATE CHANCE OF DOZING
HOW LIKELY ARE YOU TO NOD OFF OR FALL ASLEEP WHILE LYING DOWN TO REST IN THE AFTERNOON WHEN CIRCUMSTANCES PERMIT: HIGH CHANCE OF DOZING

## 2024-10-08 NOTE — PATIENT INSTRUCTIONS
device     Safety is strongly encouraged.  You should not drive if sleepy, tired, distracted and/or fatigued.      Chest Xrays and blood work do not require appointments.  They are considered \"Walk-In\" services and can be obtained at either [facility] or [facility].  Blood work is performed at the Laboratory (Lab) from 08:00am - 04:00 pm (if applicable to your visit)

## 2024-10-08 NOTE — PROGRESS NOTES
Renea Artis presents today for   Chief Complaint   Patient presents with    Pre-op Exam     Sleep evaluation for bariatric surgery     Sleep Problem       Is someone accompanying this pt? no    Is the patient using any DME equipment during OV? no    -DME Company: N/A    Have you ever had a sleep study done before? no    Depression Screening:      10/8/2024     9:57 AM   PHQ-9    Little interest or pleasure in doing things 0   Feeling down, depressed, or hopeless 1   PHQ-2 Score 1   PHQ-9 Total Score 1        Garner Sleepiness Scale:      10/8/2024    10:00 AM   Sleep Medicine   Sitting and reading 1   Watching TV 2   Sitting, inactive in a public place (e.g. a theatre or a meeting) 0   As a passenger in a car for an hour without a break 0   Lying down to rest in the afternoon when circumstances permit 3   Sitting and talking to someone 0   Sitting quietly after a lunch without alcohol 1   In a car, while stopped for a few minutes in traffic 0   Garner Sleepiness Score 7   Neck (Inches) 19       Stop-Bang:      10/8/2024    10:00 AM   STOP-BANG QUESTIONNAIRE   Are you a loud and/or regular snorer? 0   Do you often feel tired or groggy upon awakening or do you awaken with a headache? 1   Have you been observed to gasp or stop breathing during sleep? 0   Are you often tired or fatigued during wake time hours?  0   Do you fall asleep sitting, reading, watching TV or driving? 0   Do you often have problems with memory or concentration? 0   Do you have or are you being treated for high blood pressure? 1   Recent BMI (Calculated) 51.5   Is BMI greater than 35 kg/m2? 1=Yes   Age older than 50 years old? 1=Yes   Is your neck circumference greater than 17 inches (Male) or 16 inches (Female)? 1   Gender - Male 0=No   STOP-Bang Total Score 56.5         Coordination of Care:  1. Have you been to the ER, urgent care clinic since your last visit? Hospitalized since your last visit? no    2. Have you seen or consulted any other

## 2024-10-08 NOTE — ASSESSMENT & PLAN NOTE
Monitored by specialist- no acute findings meriting change in the plan, taking Lamictal, Cymbalta, Wellbutrin, Atarax as needed.  Has psychiatrist managing

## 2024-10-08 NOTE — ASSESSMENT & PLAN NOTE
This might be a relative issue as she is historically nap for an hour or 2 during the day which I discussed with her today and really would be contraindicated for patients complaining of difficulty falling asleep.  Additionally, I discussed with her the fact that undiagnosed/untreated moderate to severe obstructive sleep apnea could certainly contribute significantly to her \"insomnia\".  The patient also drinks a substantial amount of caffeine a day -currently a 2 L of Pepsi.   Prescription approved per [facility].

## 2024-10-09 ENCOUNTER — CLINICAL DOCUMENTATION (OUTPATIENT)
Facility: HOSPITAL | Age: 49
End: 2024-10-09

## 2024-10-09 ENCOUNTER — HOSPITAL ENCOUNTER (OUTPATIENT)
Facility: HOSPITAL | Age: 49
Discharge: HOME | End: 2024-10-12

## 2024-10-09 NOTE — PROGRESS NOTES
This is being done: 2 times a day.  I have talked to patient about some lower carbohydrate snack choices that focused more protein.    Patient's sweet intake is: 2 times a day.    We talked about label reading and cutting out simple sugar.   Fluid intake is make up of: 1 liter of soda a day.     I have encouraged patient to cut out liquid calories and focus on non-caloric, non-carbonated drinks.        Physical Activity/Exercise    Comments:   We talked about the importance of establishing a work out routine.  Patient is currently physical therapy for activity.  Goals have been set.  We talked about activities that can be incorporated for patients that have less mobility including chair exercises.     Behavior Modification       Comments:   Some of the behavior tips that were included in the power point, include being choosy about night time snacking.  Patient was encouraged to make the TV a no eating zone and not eat after 7 pm.  Patient is also encouraged to keep a food journal.      One of the other things we talked about during class is whether or not patient has a support system.        Based on diet history, I have provided patient with a list of changes to start making:   These changes include:  Eat 3 meals a day.  Aim for eating within 1 hour within waking up.  Begin cutting out bread, rice, pasta, crackers, chips, pretzels, popcorn, etc.  These foods will not be part of your diet after surgery, so it's important to start getting in the routine of eliminating them now.  When you are further post op, we will add some complex carbohydrates to your routine, but we encourage you to get away from the simple or refined/white carbohydrates now.  Eliminate soda/sweet tea/fruit juices  Attend a support group meeting, which are the 2nd Thursday of the month at 6 pm.  Even months are in person.  Odd months are online (link will be sent morning of meeting).      Keeley Last,

## 2024-10-10 ENCOUNTER — HOSPITAL ENCOUNTER (OUTPATIENT)
Facility: HOSPITAL | Age: 49
Setting detail: RECURRING SERIES
Discharge: HOME | End: 2024-10-13
Payer: MEDICAID

## 2024-10-10 PROCEDURE — 97530 THERAPEUTIC ACTIVITIES: CPT

## 2024-10-10 PROCEDURE — 97161 PT EVAL LOW COMPLEX 20 MIN: CPT

## 2024-10-10 PROCEDURE — 97110 THERAPEUTIC EXERCISES: CPT

## 2024-10-10 NOTE — PROGRESS NOTES
[facility]AGGON[facility] PHYSICAL THERAPY  [address] [address] Phone: [phone] Fax   Plan of Care / Statement of Necessity for Physical Therapy Services     Patient Name: Renea Artis : 1975   Medical   Diagnosis: Other low back pain [M54.59]  Left knee pain [M25.562] Treatment Diagnosis: M25.562  LEFT KNEE PAIN  and M54.59  OTHER LOWER BACK PAIN     Onset Date: 2024 (lumbar fusion) Payor Payor: [facility] MEDICAID / Plan: "[employer] [facility] CARDINAL CARE / Product Type: *No Product type* /    Referral Source: Kathy Elizalde PA-C Start of Care (SOC): 10/10/2024   Prior Hospitalization: See medical history Provider #: [id number]   Prior Level of Function: Functionally independent   Comorbidities: upcoming gastric bypass early next year, HTN, HLD, depression, alcohol/tobacco use  Social determinants of health: Tobacco, Alcohol, Stress, Social connections, and Depression     Assessment / key information:    Pt is a pleasant 49 y.o. female who presents with c/o low back pain and left knee pain. The patient reports an unremarkable recovery following lumbar fusion on 24 with continued precautions regarding restricting bending, twisting, and lifting > 15 lbs. She reports that her left LE weakness as a result of her lower back issues has exacerbated previous left knee following left TKA in . She does endorse increased right sided low back pain limiting standing tolerance to no more than 15 minutes. Functional deficits include: impaired trunk mobility, impaired LE strength, impaired ease with transfers, impaired standing balance, impaired standing tolerance.  Rehab potential is good due to desire to attain PLOF. Pt would benefit from skilled PT to address above deficits to improve Pt's function and ability to return to PLOF with decreased pain and improved functional mobility.      Evaluation Complexity:  History:  HIGH Complexity :3+ 
in Function: see POC    Patient will continue to benefit from skilled PT services to modify and progress therapeutic interventions, analyze and address functional mobility deficits, analyze and address ROM deficits, analyze and address strength deficits, analyze and address soft tissue restrictions, analyze and cue for proper movement patterns, analyze and modify for postural abnormalities, analyze and address imbalance/dizziness, and instruct in home and community integration to address functional deficits and attain remaining goals.    [x]  See Plan of Care for goals and assessment     PLAN  [x]  Upgrade activities as tolerated     [x]  Continue plan of care  []  Update interventions per flow sheet       []  Other:_      Shane Walton, PT 10/10/2024  7:05 AM

## 2024-10-15 SDOH — HEALTH STABILITY: PHYSICAL HEALTH
ON AVERAGE, HOW MANY DAYS PER WEEK DO YOU ENGAGE IN MODERATE TO STRENUOUS EXERCISE (LIKE A BRISK WALK)?: PATIENT DECLINED

## 2024-10-18 ENCOUNTER — OFFICE VISIT (OUTPATIENT)
Age: 49
End: 2024-10-18

## 2024-10-18 VITALS — HEIGHT: 68 IN | BODY MASS INDEX: 44.41 KG/M2 | WEIGHT: 293 LBS

## 2024-10-18 DIAGNOSIS — M65.931 EXTENSOR TENOSYNOVITIS OF RIGHT WRIST: Primary | ICD-10-CM

## 2024-10-18 DIAGNOSIS — M19.031 PRIMARY OSTEOARTHRITIS OF RIGHT WRIST: ICD-10-CM

## 2024-10-18 RX ORDER — LIDOCAINE HYDROCHLORIDE 10 MG/ML
0.5 INJECTION, SOLUTION INFILTRATION; PERINEURAL ONCE
Status: COMPLETED | OUTPATIENT
Start: 2024-10-18 | End: 2024-10-18

## 2024-10-18 RX ADMIN — LIDOCAINE HYDROCHLORIDE 0.5 ML: 10 INJECTION, SOLUTION INFILTRATION; PERINEURAL at 09:54

## 2024-10-18 NOTE — PROGRESS NOTES
Renea Artis is a 49 y.o. female right handed.  Worker's Compensation and legal considerations: none    Chief Complaint   Patient presents with    Wrist Pain     Right wrist ganglion cyst      Pain Score:   8    Subjective:     Initial HPI: Patient presents here with complaints of right wrist pain and swelling over the back of the wrist.  She attributes it to ever since doing postoperative rehab after a cervical spine surgery when having to get up and blood pressure through her right wrist.    Date of onset: September 2024  Injury: No  Prior Treatment:  No  Contributory history: None    ROS: Review of Systems - General ROS: negative except HPI    Past Medical History:   Diagnosis Date    Bipolar affective disorder, currently manic, moderate (HCC) 4/27/2016    Chronic midline low back pain with sciatica 11/1/2016    Fibromyalgia 9/1/2015    HLD (hyperlipidemia) 2/4/2016    Hypertension     Hypertriglyceridemia 5/15/2018    Irritable bowel syndrome     Moderate episode of recurrent major depressive disorder (HCC) 4/27/2016    Obesity, Class III, BMI 40-49.9 (morbid obesity) 9/1/2015    Onychomycosis 4/27/2016    Osteoarthritis 2017    PTSD (post-traumatic stress disorder)     Substance abuse ([facility])        Past Surgical History:   Procedure Laterality Date    ANTERIOR CRUCIATE LIGAMENT REPAIR      APPENDECTOMY      BACK SURGERY      CERVICAL DISCECTOMY  02/07/2019    with fusion C5/6 C6/7    CERVICAL FUSION  0513/19    L4-L5 bilateral hemilaminectomy; medial facetectomy; foraminotomy; L5-S1 right hemilaminectomy; medial facetectomy revision; discectomy revision; L4, L5, S1 posterolateral fusion    CERVICAL LAMINECTOMY  03/19/2019    with fusion    KNEE ARTHROPLASTY Left 05/17/2023    LEFT TOTAL KNEE ARTHROPLASTY; TAYLORTATIANA FLEMING AND HARDWARE REMOVAL performed by Chung Reyna DO at [facility] MAIN OR    LUMBAR FUSION N/A 8/5/2024    Lumbar two/three/four/five/sacral one laminectomy fusion transforaminal lumbar interbody

## 2024-10-19 DIAGNOSIS — I10 PRIMARY HYPERTENSION: ICD-10-CM

## 2024-10-21 RX ORDER — LOSARTAN POTASSIUM 50 MG/1
50 TABLET ORAL DAILY
Qty: 30 TABLET | Refills: 5 | Status: SHIPPED | OUTPATIENT
Start: 2024-10-21

## 2024-10-22 ENCOUNTER — HOSPITAL ENCOUNTER (OUTPATIENT)
Facility: HOSPITAL | Age: 49
Setting detail: RECURRING SERIES
Discharge: HOME | End: 2024-10-25
Payer: MEDICAID

## 2024-10-22 PROCEDURE — 97530 THERAPEUTIC ACTIVITIES: CPT

## 2024-10-22 PROCEDURE — 97110 THERAPEUTIC EXERCISES: CPT

## 2024-10-22 PROCEDURE — 97112 NEUROMUSCULAR REEDUCATION: CPT

## 2024-10-22 NOTE — PROGRESS NOTES
strength.  Status at last note/certification: 23 seconds with B UE assist  - Goal: Pt to report < 5/10 pain at worst following 30 minutes of standing to increase ease with ADLs.  Status at last note/certification: 9/10 pain at worst, 15 min standing tolerance  - Goal: Pt to demonstrate 5/5 left hip abduction MMT to increase ease of prolonged standing/gait.  Status at last note/certification: 4-/5  - Goal: Pt to report LEFS score of at least 22 pts to demonstrate improved function and quality of life.  Status at last note/certification: 13 pts     PN due 11/10/24  Auth due 15th visit    PLAN  [x] Continue plan of care  [x]  Upgrade activities as tolerated  []  Discharge due to :  []  Other:    Shane Walton PT    10/22/2024    11:16 AM    If an interpreting service was utilized for treatment of this patient, the contents of this document represent the material reviewed with the patient via the .     Future Appointments   Date Time Provider Department Center   10/22/2024  3:00 PM Shane Walton PT [facility]   10/25/2024  1:00 PM Chantelle Harper PT [facility]   10/28/2024 10:00 AM [facility]   10/29/2024  3:00 PM Shane Walton PT [facility]   11/1/2024  9:00 AM Shane Walton PT [facility]   11/5/2024 11:00 AM Shane Walton PT [facility]   11/8/2024  1:00 PM Mohsen Martinez PTA [facility]   11/12/2024 11:00 AM Chantelle Harper PT [facility]   11/13/2024  9:15 AM [facility]   11/15/2024 11:00 AM Ni Watkins PT [facility]   11/19/2024 11:00 AM Shane Walton PT [facility]   11/22/2024 11:00 AM Ni Watkins PT [facility]   11/26/2024 11:00 AM Shane Walton PT [facility]   12/3/2024 11:00 AM [facility]   12/4/2024 10:10 AM Sabina Regan, APRN - NP [facility]   12/6/2024 11:00 AM Ni Watkins, PT [facility]   12/10/2024 11:00 AM Chantelle Harper PT [facility]   12/13/2024 11:00 AM

## 2024-10-25 ENCOUNTER — HOSPITAL ENCOUNTER (OUTPATIENT)
Facility: HOSPITAL | Age: 49
Setting detail: RECURRING SERIES
Discharge: HOME | End: 2024-10-28
Payer: MEDICAID

## 2024-10-25 PROCEDURE — 97530 THERAPEUTIC ACTIVITIES: CPT

## 2024-10-25 PROCEDURE — 97112 NEUROMUSCULAR REEDUCATION: CPT

## 2024-10-25 PROCEDURE — 97110 THERAPEUTIC EXERCISES: CPT

## 2024-10-25 NOTE — PROGRESS NOTES
PHYSICAL / OCCUPATIONAL THERAPY - DAILY TREATMENT NOTE (updated )    Patient Name: Renea Artis    Date: 10/25/2024    : 1975  Insurance: [facility] MEDICA[facility] / Plan: [facility] CARDINAL CARE / Product Type: *No Product type* /      Patient  verified yes     Visit #   Current / Total 3 10 Total Time   Time   In / Out 1:01 1:43 42   Pain   In / Out 3-4 0    Subjective Functional Status/Changes: \"Pain is manageable since the shower I took\"       TREATMENT AREA =  Other low back pain [M54.59]  Left knee pain [M25.562]    OBJECTIVE    Therapeutic Procedures:  43  Total   BC Totals Reminder: bill using total billable min of TIMED therapeutic procedures (example: do not include dry needle or estim unattended, both untimed codes, in totals to left)  8-22 min = 1 unit; 23-37 min = 2 units; 38-52 min = 3 units; 53-67 min = 4 units; 68-82 min = 5 units   Tx Min  Procedure, Rationale, Specifics   10  19898 Therapeutic Exercise (timed):  increase ROM, strength, coordination, balance, and proprioception to improve patient's ability to progress to PLOF and address remaining functional goals. (see flow sheet as applicable)   Details if applicable:       17  56695 Therapeutic Activity (timed):  use of dynamic activities replicating functional movements to increase ROM, strength, coordination, balance, and proprioception in order to improve patient's ability to progress to PLOF and address remaining functional goals.  (see flow sheet as applicable)   Details if applicable:       16  17896 Neuromuscular Re-Education (timed):  improve balance, coordination, kinesthetic sense, posture, core stability and proprioception to improve patient's ability to develop conscious control of individual muscles and awareness of position of extremities in order to progress to PLOF and address remaining functional goals. (see flow sheet as applicable)     Details if applicable:    Added balance interventions to address pt

## 2024-10-28 ENCOUNTER — HOSPITAL ENCOUNTER (OUTPATIENT)
Dept: SLEEP MEDICINE | Facility: HOSPITAL | Age: 49
Discharge: HOME | End: 2024-10-31
Attending: OTOLARYNGOLOGY
Payer: MEDICAID

## 2024-10-28 DIAGNOSIS — R29.818 SUSPECTED SLEEP APNEA: ICD-10-CM

## 2024-10-28 DIAGNOSIS — R06.83 SNORING: ICD-10-CM

## 2024-10-28 PROCEDURE — 95800 SLP STDY UNATTENDED: CPT

## 2024-10-29 ENCOUNTER — HOSPITAL ENCOUNTER (OUTPATIENT)
Facility: HOSPITAL | Age: 49
Setting detail: RECURRING SERIES
Discharge: HOME | End: 2024-11-01
Payer: MEDICAID

## 2024-10-29 DIAGNOSIS — G47.33 OSA (OBSTRUCTIVE SLEEP APNEA): Primary | ICD-10-CM

## 2024-10-29 PROCEDURE — 97110 THERAPEUTIC EXERCISES: CPT

## 2024-10-29 PROCEDURE — 97112 NEUROMUSCULAR REEDUCATION: CPT

## 2024-10-29 PROCEDURE — 97530 THERAPEUTIC ACTIVITIES: CPT

## 2024-10-29 NOTE — PROGRESS NOTES
11:00 AM Ni Watkins, PT [facility]   11/26/2024 11:00 AM Shane Walton, PT [facility]   12/3/2024 11:00 AM Shane Walton, PT [facility]   12/4/2024 10:10 AM Sabina Regan, APRN - NP [facility]   12/6/2024 11:00 AM Ni Watkins, PT [facility]   12/10/2024 11:00 AM Chantelle Harper, PT [facility]   12/13/2024 11:00 AM Ni Watkins, PT [facility]   1/7/2025  9:15 AM Katharina Yi MD [facility]   3/26/2025  9:00 AM Earline Broussard, APRN - NP [facility]

## 2024-10-30 ENCOUNTER — CLINICAL DOCUMENTATION (OUTPATIENT)
Age: 49
End: 2024-10-30

## 2024-11-01 ENCOUNTER — HOSPITAL ENCOUNTER (OUTPATIENT)
Facility: HOSPITAL | Age: 49
Setting detail: RECURRING SERIES
Discharge: HOME | End: 2024-11-04
Payer: MEDICAID

## 2024-11-01 PROCEDURE — 97110 THERAPEUTIC EXERCISES: CPT

## 2024-11-01 PROCEDURE — 97530 THERAPEUTIC ACTIVITIES: CPT

## 2024-11-01 PROCEDURE — 97112 NEUROMUSCULAR REEDUCATION: CPT

## 2024-11-01 NOTE — PROGRESS NOTES
PHYSICAL / OCCUPATIONAL THERAPY - DAILY TREATMENT NOTE (updated )    Patient Name: Renea Artis    Date: 2024    : 1975  Insurance: Payor: [facility] MEDICAID / Plan: [facility] CARDINAL CARE / Product Type: *No Product type* /      Patient  verified yes     Visit #   Current / Total 5 10 Total Time   Time   In / Out 9:00 9:50 50   Pain   In / Out 5 0    Subjective Functional Status/Changes: My back is hurting more from standing more at home doing laundry.     TREATMENT AREA =  Other low back pain [M54.59]  Left knee pain [M25.562]    OBJECTIVE    Modality rationale: decrease pain to improve the patient’s ability to perform ADLs following therapy session without increased pain.   Min Type Additional Details   10 [x]  Ice     []  heat   Position: supine  Location: low back   [x] Skin assessment post-treatment:  [x]intact []redness- no adverse reaction    []redness - adverse reaction:     Therapeutic Procedures:  40  Total  [facility] Totals Reminder: bill using total billable min of TIMED therapeutic procedures (example: do not include dry needle or estim unattended, both untimed codes, in totals to left)  8-22 min = 1 unit; 23-37 min = 2 units; 38-52 min = 3 units; 53-67 min = 4 units; 68-82 min = 5 units   Tx Min  Procedure, Rationale, Specifics   15  33497 Therapeutic Exercise (timed):  increase ROM, strength, coordination, balance, and proprioception to improve patient's ability to progress to PLOF and address remaining functional goals. (see flow sheet as applicable)   Details if applicable:       10  61973 Therapeutic Activity (timed):  use of dynamic activities replicating functional movements to increase ROM, strength, coordination, balance, and proprioception in order to improve patient's ability to progress to PLOF and address remaining functional goals.  (see flow sheet as applicable)   Details if applicable:       15  70041 Neuromuscular Re-Education (timed):  improve balance,

## 2024-11-05 ENCOUNTER — HOSPITAL ENCOUNTER (OUTPATIENT)
Facility: HOSPITAL | Age: 49
Setting detail: RECURRING SERIES
Discharge: HOME | End: 2024-11-08
Payer: MEDICAID

## 2024-11-05 PROCEDURE — 97110 THERAPEUTIC EXERCISES: CPT

## 2024-11-05 PROCEDURE — 97530 THERAPEUTIC ACTIVITIES: CPT

## 2024-11-05 PROCEDURE — 97112 NEUROMUSCULAR REEDUCATION: CPT

## 2024-11-05 SDOH — HEALTH STABILITY: PHYSICAL HEALTH: ON AVERAGE, HOW MANY DAYS PER WEEK DO YOU ENGAGE IN MODERATE TO STRENUOUS EXERCISE (LIKE A BRISK WALK)?: 0 DAYS

## 2024-11-05 SDOH — HEALTH STABILITY: PHYSICAL HEALTH: ON AVERAGE, HOW MANY MINUTES DO YOU ENGAGE IN EXERCISE AT THIS LEVEL?: 10 MIN

## 2024-11-05 NOTE — PROGRESS NOTES
or less without AD to demo improved dynamic balance within the home and demo decreased risk for falls.  Status at last note/certification: 17 seconds without AD  Current: addressing with stand march (10/29/24)  - Goal: Pt will perform 5x sit<>stand test in 12 seconds or less with B UE assist to improve ease of transfers and demo improved functional LE strength.  Status at last note/certification: 23 seconds with B UE assist  Current: added TRX squats to progress strength and depth of squats for functional car t/f  (10/25/24)  - Goal: Pt to report < 5/10 pain at worst following 30 minutes of standing to increase ease with ADLs.  Status at last note/certification: 9/10 pain at worst, 15 min standing tolerance  Current:  progressing with pain reduction with hot shower and good tolerance to PT; taking Tylenol only for pain (10/25/24)  - Goal: Pt to demonstrate 5/5 left hip abduction MMT to increase ease of prolonged standing/gait.  Status at last note/certification: 4-/5  Current: addressing with SH ROM and HL abduction / BKFO  (10/25/24)  - Goal: Pt to report LEFS score of at least 22 pts to demonstrate improved function and quality of life.  Status at last note/certification: 13 pts      PN due 11/10/24  Auth due 15th visit    PLAN  [x] Continue plan of care  [x]  Upgrade activities as tolerated  []  Discharge due to :  []  Other:    Shane Walton, PT    11/5/2024    10:39 AM    If an interpreting service was utilized for treatment of this patient, the contents of this document represent the material reviewed with the patient via the .     Future Appointments   Date Time Provider Department Center   11/5/2024 11:00 AM [facility]   11/8/2024  1:00 PM Mohsen Martinez, AKIN [facility]   11/12/2024 11:00 AM Chantelle Harper, PT [facility]   11/13/2024  9:15 AM [facility]   11/15/2024 11:00 AM Ni Watkins, PT [facility]   11/19/2024 11:00 AM Anton  never used

## 2024-11-06 ENCOUNTER — OFFICE VISIT (OUTPATIENT)
Age: 49
End: 2024-11-06

## 2024-11-06 VITALS — WEIGHT: 293 LBS | BODY MASS INDEX: 44.41 KG/M2 | HEIGHT: 68 IN

## 2024-11-06 DIAGNOSIS — M75.101 ROTATOR CUFF SYNDROME, RIGHT: Primary | ICD-10-CM

## 2024-11-06 DIAGNOSIS — G89.29 CHRONIC RIGHT SHOULDER PAIN: ICD-10-CM

## 2024-11-06 DIAGNOSIS — M25.511 CHRONIC RIGHT SHOULDER PAIN: ICD-10-CM

## 2024-11-06 RX ORDER — LIDOCAINE HYDROCHLORIDE 10 MG/ML
3 INJECTION, SOLUTION INFILTRATION; PERINEURAL ONCE
Status: COMPLETED | OUTPATIENT
Start: 2024-11-06 | End: 2024-11-06

## 2024-11-06 RX ORDER — TRIAMCINOLONE ACETONIDE 40 MG/ML
40 INJECTION, SUSPENSION INTRA-ARTICULAR; INTRAMUSCULAR ONCE
Status: COMPLETED | OUTPATIENT
Start: 2024-11-06 | End: 2024-11-06

## 2024-11-06 RX ADMIN — LIDOCAINE HYDROCHLORIDE 3 ML: 10 INJECTION, SOLUTION INFILTRATION; PERINEURAL at 13:27

## 2024-11-06 RX ADMIN — TRIAMCINOLONE ACETONIDE 40 MG: 40 INJECTION, SUSPENSION INTRA-ARTICULAR; INTRAMUSCULAR at 13:28

## 2024-11-06 NOTE — PROGRESS NOTES
[facility] AMBULATORY OFFICE NOTE      Patient: Renea Artis                MRN: [id number]       SSN: [id number]  YOB: 1975        AGE: 49 y.o.        SEX: female  Body mass index is 52 kg/m².    PCP: Katharina Yi MD  11/06/24    CHIEF COMPLAINT: Right shoulder pain    HPI: Renea Artis is a 49 y.o. female patient who complains of 2 months of right shoulder pain.  She had back surgery recently and she has been relying on the right arm to help get up and for ambulation purposes.  She describes the pain is deep in the shoulder.  Worse with use.  Worse with overhead use.  No treatment thus far.  She does take Tylenol which helps with the pain.    Past Medical History:   Diagnosis Date    Bipolar affective disorder, currently manic, moderate (HCC) 4/27/2016    Chronic midline low back pain with sciatica 11/1/2016    Fibromyalgia 9/1/2015    HLD (hyperlipidemia) 2/4/2016    Hypertension     Hypertriglyceridemia 5/15/2018    Irritable bowel syndrome     Moderate episode of recurrent major depressive disorder (HCC) 4/27/2016    Obesity, Class III, BMI 40-49.9 (morbid obesity) 9/1/2015    Onychomycosis 4/27/2016    Osteoarthritis 2017    PTSD (post-traumatic stress disorder)     Substance abuse ([facility])        Family History   Problem Relation Age of Onset    Psychiatric Disorder Sister     Arthritis Sister     Depression Sister     Lupus Sister     Obesity Sister     Psychiatric Disorder Brother     Depression Brother     Psychiatric Disorder Sister     Diabetes Sister     Depression Sister     Lupus Sister     Obesity Sister     Psychiatric Disorder Brother     Psychiatric Disorder Brother     Hypertension Mother     Thyroid Disease Mother     Arthritis Mother     High Blood Pressure Mother     Kidney Disease Mother     Obesity Mother     Arthritis Father     Depression Paternal Grandmother        Current Outpatient Medications   Medication Sig Dispense Refill    losartan

## 2024-11-08 ENCOUNTER — APPOINTMENT (OUTPATIENT)
Facility: HOSPITAL | Age: 49
End: 2024-11-08
Payer: MEDICAID

## 2024-11-12 ENCOUNTER — HOSPITAL ENCOUNTER (OUTPATIENT)
Facility: HOSPITAL | Age: 49
Setting detail: RECURRING SERIES
Discharge: HOME | End: 2024-11-15
Payer: MEDICAID

## 2024-11-12 PROCEDURE — 97530 THERAPEUTIC ACTIVITIES: CPT

## 2024-11-12 PROCEDURE — 97112 NEUROMUSCULAR REEDUCATION: CPT

## 2024-11-12 PROCEDURE — 97110 THERAPEUTIC EXERCISES: CPT

## 2024-11-12 NOTE — PROGRESS NOTES
[facility] [place]   [address] [place] [address]  Phone: [phone] Fax: [phone]  PROGRESS NOTE  Patient Name: Renea Artis : 1975   Treatment/Medical Diagnosis: Other low back pain [M54.59]  Left knee pain [M25.562]   Referral Source:  Payor Kathy Burger PA-C  Payor: [facility] MEDICAID / Plan: [facility] PLAN CARDINAL CARE / Product Type: *No Product type* /      Date of Initial Visit: 10/10/24 Attended Visits: 7 Missed Visits: 0     SUMMARY OF TREATMENT  Pt is a pleasant 49 y.o. female who presents with c/o low back pain and left knee pain. The patient reports an unremarkable recovery following lumbar fusion on 24 with continued precautions regarding restricting bending, twisting, and lifting > 15 lbs. She reports that her left LE weakness as a result of her lower back issues has exacerbated previous left knee following left TKA in .  Treatment has consisted of TE, TA, NMREd, gait training, HEP, pt education, postural education, modalities prn for pain-management     CURRENT STATUS  Pt is making progress for her c/o LBP, L knee pain and L LE weakness. She has improved L LE strength and balance. Improved activity tolerance and pain management. She con't to have fear avoidance which limits outside ambulation, stair negotiation but she is progressing well with PT interventions and will continue to benefit from progression.     Pain ranges: 6/10 ave; L knee 3 to 9/10; LBP 3-9/10   Improvements: walked on TM at the gym for 10 min two sessions;   Deficits: feels unstable; avoiding stairs due to L knee pain; ascending stairs non-reciprocal ; standing > 10-15 min; fear of Lbp and back giving way limits outside walking endurance   R shoulder pain: new onset ; received cortisone injection last week with 2 days of resulting pain (and missed PT appts therefore) ; worse with all movement, better with rest   50% improvement  LEFS 
to improve ease of transfers and demo improved functional LE strength.  Status at last note/certification: 23 seconds with B UE assist  Current: goal progressing well at 18\" without UE assist (11/12/24)  - Goal: Pt to report < 5/10 pain at worst following 30 minutes of standing to increase ease with ADLs.  Status at last note/certification: 9/10 pain at worst, 15 min standing tolerance  Current:  intermittent progress; pain ranges 3-9/10, but ave 6/10; 15 min standing (11/12/24)  - Goal: Pt to demonstrate 5/5 left hip abduction MMT to increase ease of prolonged standing/gait.  Status at last note/certification: 4-/5  Current: R 4+/5 with LBP, L 4/5 (11/12/24)  - Goal: Pt to report LEFS score of at least 22 pts to demonstrate improved function and quality of life.  Status at last note/certification: 13 pts   Current: progress at 21/80 (11/12/24)     PN due 11/10/24  Auth due 15th visit    PLAN  [x] Continue plan of care  [x]  Upgrade activities as tolerated  []  Discharge due to :  []  Other:    Chantelle Harper, PT    11/12/2024    8:37 AM    If an interpreting service was utilized for treatment of this patient, the contents of this document represent the material reviewed with the patient via the .     Future Appointments   Date Time Provider Department Center   11/12/2024 11:00 AM Chantelle Harper PT [facility]   11/13/2024  9:15 AM [facility]   11/15/2024 11:00 AM Ni Watkins PT [facility]   11/19/2024 11:00 AM Shane Walton PT [facility]   11/22/2024 11:00 AM Ni Watkins PT [facility]   11/26/2024 11:00 AM Shane Walton PT [facility]   12/3/2024 11:00 AM Shane Walton PT [facility]   12/4/2024 10:10 AM Sabina Regan, [place]N [place] [facility]   12/6/2024 11:00 AM Ni Watkins PT [facility]   12/10/2024 11:00 AM Chantelle Harper, PT [facility]   12/13/2024 11:00 AM Ni Watkins, PT [facility]   1/7/2025  9:15 AM Katharina Yi

## 2024-11-13 ENCOUNTER — HOSPITAL ENCOUNTER (OUTPATIENT)
Facility: HOSPITAL | Age: 49
Discharge: HOME | End: 2024-11-16

## 2024-11-13 ENCOUNTER — CLINICAL DOCUMENTATION (OUTPATIENT)
Facility: HOSPITAL | Age: 49
End: 2024-11-13

## 2024-11-13 NOTE — PROGRESS NOTES
[facility]  [address], [address]    Patient's Name: Renea Artis     Age: 49 y.o.  YOB: 1975     Sex: female           Session 3 of 6   Surgeon:  Dr. Coleman    Height: 5 f 8   Weight:    342      Lbs.     BMI:    Pounds Lost since last month: 0                 Pounds Gained since last month: 3    Starting Weight: 332     Previous Month’s Weight: 339  Overall Pounds Lost: 0   Overall Pounds Gained: 10      Class Guidelines    Guidelines are reviewed with patient at the start of every class.    1. Patient understands that weight loss trial classes must be consecutive.  Patient understands if they miss a class, it is their responsibility to contact me to reschedule class.  I will reach out to patient after their first no show.  2.  Patient understands the expectations that weight maintenance/weight loss is expected during the classes.  Failure to demonstrate changes may result in one extra month of weight loss trial, followed by going back to see the surgeon.    3. Patient is also instructed to be doing their labs, blood work, psych visit, support group and any other test that the surgeon has used while they are working on their weight loss trial.    Other Pertinent Information:     Class Guidelines    Office Use only: v    Registered Dietitian Initial Class Notes:  n/a    Patient is seeking a revision procedure: n/a.      Patient has not been to a support group meeting.      Do you smoke?  None    Alcohol intake:  Number of drinks at a time:  None  Number of times a week: None    Diet Changes Made Since Last Class: Drinking less soda    Patient feels they are struggling with the following:  Soda, eating every 3-4 hours, and not exercising, but states she is doing physical therapy from back surgery in August.    Dietary Instruction    During today's class we continued to focus on the key diet principles.  Patient was

## 2024-11-13 NOTE — TELEPHONE ENCOUNTER
Medication(s) requesting:   Requested Prescriptions     Pending Prescriptions Disp Refills    vitamin D3 (CHOLECALCIFEROL) 25 MCG (1000 UT) TABS tablet [Pharmacy Med Name: VITAMIN D3 1,000 UNIT TABLETS] 90 tablet 1     Sig: TAKE 1 TABLET BY MOUTH DAILY       Last office visit:  09/30/2024  Next office visit DMA: 1/7/2025

## 2024-11-14 RX ORDER — CHOLECALCIFEROL (VITAMIN D3) 25 MCG
1 TABLET ORAL DAILY
Qty: 90 TABLET | Refills: 1 | Status: SHIPPED | OUTPATIENT
Start: 2024-11-14

## 2024-11-15 ENCOUNTER — HOSPITAL ENCOUNTER (OUTPATIENT)
Facility: HOSPITAL | Age: 49
Setting detail: RECURRING SERIES
Discharge: HOME | End: 2024-11-18
Payer: MEDICAID

## 2024-11-15 PROCEDURE — 97530 THERAPEUTIC ACTIVITIES: CPT

## 2024-11-15 PROCEDURE — 97110 THERAPEUTIC EXERCISES: CPT

## 2024-11-15 NOTE — PROGRESS NOTES
applicable)    Details if applicable:  functional movement training      16 0 (NB)  87958 Neuromuscular Re-Education (timed):  improve balance, coordination, kinesthetic sense, posture, core stability and proprioception to improve patient's ability to develop conscious control of individual muscles and awareness of position of extremities in order to progress to PLOF and address remaining functional goals. (see flow sheet as applicable)     Details if applicable: stability, glute/quad re ed            [x]  Patient Education billed concurrently with other procedures   [x] Review HEP    [] Progressed/Changed HEP, detail:    [] Other detail:       Objective Information/Functional Measures/Assessment  Challenged with addition of forward step downs to improve stair navigation due to subjective deficit- though only able to perform with BUE and low repetitions. Will continue to build tolerance as able.  Improved posterior weight shifting noted with lateral step ups  Required UUE assist for stability with standing marching     Patient will continue to benefit from skilled PT / OT services to modify and progress therapeutic interventions, analyze and address functional mobility deficits, analyze and address ROM deficits, analyze and address strength deficits, analyze and address soft tissue restrictions, analyze and cue for proper movement patterns, analyze and modify for postural abnormalities, analyze and address imbalance/dizziness, and instruct in home and community integration to address functional deficits and attain remaining goals.    Progress toward goals / Updated goals:  []  See Progress Note/Recertification  - Goal: Pt to be compliant with initial HEP to improve ease with prolonged standing.  Status at last note/certification:  initiating 10 min TM walking several times/day; doing HEP for shoulder and core TE from eval PT  - Goal: Pt will perform TUG Test in 12 seconds or less without AD to demo improved dynamic

## 2024-11-19 ENCOUNTER — HOSPITAL ENCOUNTER (OUTPATIENT)
Facility: HOSPITAL | Age: 49
Setting detail: RECURRING SERIES
Discharge: HOME | End: 2024-11-22
Payer: MEDICAID

## 2024-11-19 PROCEDURE — 97112 NEUROMUSCULAR REEDUCATION: CPT

## 2024-11-19 PROCEDURE — 97110 THERAPEUTIC EXERCISES: CPT

## 2024-11-19 PROCEDURE — 97530 THERAPEUTIC ACTIVITIES: CPT

## 2024-11-19 NOTE — PROGRESS NOTES
PHYSICAL / OCCUPATIONAL THERAPY - DAILY TREATMENT NOTE (updated )    Patient Name: Renea Artis    Date: 2024    : 1975  Insurance: Payor: [facility] MEDICAID / Plan: [facility] CARDINAL CARE / Product Type: *No Product type* /      Patient  verified yes     Visit #   Current / Total 2 10 Total Time   Time   In / Out 11:00 11:54 54   Pain   In / Out 6 5    Subjective Functional Status/Changes: The pain is a bit worse on the left side of my lower back       TREATMENT AREA =  Other low back pain [M54.59]  Left knee pain [M25.562]    OBJECTIVE    Modality rationale: decrease pain to improve the patient’s ability to perform ADLs following therapy session without increased pain.   Min Type Additional Details   10 [x]  Ice     []  heat   Position: reclined  Location: low back   [x] Skin assessment post-treatment:  [x]intact []redness- no adverse reaction    []redness - adverse reaction:     Therapeutic Procedures:  44  Total  [facility] Totals Reminder: bill using total billable min of TIMED therapeutic procedures (example: do not include dry needle or estim unattended, both untimed codes, in totals to left)  8-22 min = 1 unit; 23-37 min = 2 units; 38-52 min = 3 units; 53-67 min = 4 units; 68-82 min = 5 units   Tx Min  Procedure, Rationale, Specifics   15  52994 Therapeutic Exercise (timed):  increase ROM, strength, coordination, balance, and proprioception to improve patient's ability to progress to PLOF and address remaining functional goals. (see flow sheet as applicable)   Details if applicable:       16  98533 Therapeutic Activity (timed):  use of dynamic activities replicating functional movements to increase ROM, strength, coordination, balance, and proprioception in order to improve patient's ability to progress to PLOF and address remaining functional goals.  (see flow sheet as applicable)   Details if applicable:       13  40951 Neuromuscular Re-Education (timed):  improve balance,

## 2024-11-22 ENCOUNTER — HOSPITAL ENCOUNTER (OUTPATIENT)
Facility: HOSPITAL | Age: 49
Setting detail: RECURRING SERIES
Discharge: HOME | End: 2024-11-25
Payer: MEDICAID

## 2024-11-22 PROCEDURE — 97530 THERAPEUTIC ACTIVITIES: CPT

## 2024-11-22 PROCEDURE — 97110 THERAPEUTIC EXERCISES: CPT

## 2024-11-22 PROCEDURE — 97112 NEUROMUSCULAR REEDUCATION: CPT

## 2024-11-22 NOTE — PROGRESS NOTES
PHYSICAL / OCCUPATIONAL THERAPY - DAILY TREATMENT NOTE (updated )    Patient Name: Renea Artis    Date: 2024    : 1975  Insurance: Payor: [facility] MEDICAID / Plan: [facility] CARDINAL CARE / Product Type: *No Product type* /      Patient  verified yes     Visit #   Current / Total 3 10 Total Time   Time   In / Out 11:01 AM 11:55 AM 54   Pain   In / Out 4/10 3/10    Subjective Functional Status/Changes: \"My legs are still getting numb, the nurse practitioner I saw the other day said I can start gently twisting, bending, and lifting but to stay less than 10 pounds and build up.\"        TREATMENT AREA =  Other low back pain [M54.59]  Left knee pain [M25.562]    OBJECTIVE    Modality rationale: decrease pain to improve the patient’s ability to perform ADLs following therapy session without increased pain.   Min Type Additional Details   10 [x]  Ice     []  heat   Position: reclined  Location: low back   [x] Skin assessment post-treatment:  [x]intact []redness- no adverse reaction    []redness - adverse reaction:     Therapeutic Procedures:    Total  44 Billable One on One Time  40 [facility] Totals Reminder: bill using total billable min of TIMED therapeutic procedures (example: do not include dry needle or estim unattended, both untimed codes, in totals to left)  8-22 min = 1 unit; 23-37 min = 2 units; 38-52 min = 3 units; 53-67 min = 4 units; 68-82 min = 5 units   Tx Min  Procedure, Rationale, Specifics   16 12 21399 Therapeutic Exercise (timed):  increase ROM, strength, coordination, balance, and proprioception to improve patient's ability to progress to PLOF and address remaining functional goals. (see flow sheet as applicable)     Details if applicable:  nu step, stretches, strengthening      16 16 90253 Therapeutic Activity (timed):  use of dynamic activities replicating functional movements to increase ROM, strength, coordination, balance, and proprioception in order to improve patient's

## 2024-11-26 ENCOUNTER — HOSPITAL ENCOUNTER (OUTPATIENT)
Facility: HOSPITAL | Age: 49
Setting detail: RECURRING SERIES
Discharge: HOME | End: 2024-11-29
Payer: MEDICAID

## 2024-11-26 PROCEDURE — 97112 NEUROMUSCULAR REEDUCATION: CPT

## 2024-11-26 PROCEDURE — 97110 THERAPEUTIC EXERCISES: CPT

## 2024-11-26 PROCEDURE — 97530 THERAPEUTIC ACTIVITIES: CPT

## 2024-11-26 NOTE — PROGRESS NOTES
PHYSICAL / OCCUPATIONAL THERAPY - DAILY TREATMENT NOTE (updated )    Patient Name: Renea Artis    Date: 2024    : 1975  Insurance: Payor: [facility] MEDICAID / Plan: [facility] CARDINAL CARE / Product Type: *No Product type* /      Patient  verified yes     Visit #   Current / Total 4 10 Total Time   Time   In / Out 11:00 11:50 50   Pain   In / Out 4 4    Subjective Functional Status/Changes: The front of my knees have felt numb this morning.       TREATMENT AREA =  Other low back pain [M54.59]  Left knee pain [M25.562]    OBJECTIVE    Modality rationale: decrease pain to improve the patient’s ability to perform ADLs following therapy session without increased pain.   Min Type Additional Details   10 []  Ice     []  heat   Position: reclined  Location: low back   [x] Skin assessment post-treatment:  [x]intact []redness- no adverse reaction    []redness - adverse reaction:     Therapeutic Procedures:  40  Total  [facility] Totals Reminder: bill using total billable min of TIMED therapeutic procedures (example: do not include dry needle or estim unattended, both untimed codes, in totals to left)  8-22 min = 1 unit; 23-37 min = 2 units; 38-52 min = 3 units; 53-67 min = 4 units; 68-82 min = 5 units   Tx Min  Procedure, Rationale, Specifics   15  34166 Therapeutic Exercise (timed):  increase ROM, strength, coordination, balance, and proprioception to improve patient's ability to progress to PLOF and address remaining functional goals. (see flow sheet as applicable)   Details if applicable:       13  07944 Therapeutic Activity (timed):  use of dynamic activities replicating functional movements to increase ROM, strength, coordination, balance, and proprioception in order to improve patient's ability to progress to PLOF and address remaining functional goals.  (see flow sheet as applicable)   Details if applicable:       12  46121 Neuromuscular Re-Education (timed):  improve balance, coordination,

## 2024-11-27 NOTE — TELEPHONE ENCOUNTER
Medication(s) requesting:   Requested Prescriptions     Pending Prescriptions Disp Refills    vitamin B-12 (CYANOCOBALAMIN) 1000 MCG tablet [Pharmacy Med Name: VITAMIN B-12 1000MCG TABLETS] 90 tablet 1     Sig: TAKE 1 TABLET BY MOUTH DAILY       Last office visit:  09/30/24  Next office visit DMA: 1/7/2025

## 2024-11-30 RX ORDER — LANOLIN ALCOHOL/MO/W.PET/CERES
1000 CREAM (GRAM) TOPICAL DAILY
Qty: 90 TABLET | Refills: 1 | Status: SHIPPED | OUTPATIENT
Start: 2024-11-30

## 2024-12-03 ENCOUNTER — HOSPITAL ENCOUNTER (OUTPATIENT)
Facility: HOSPITAL | Age: 49
Setting detail: RECURRING SERIES
Discharge: HOME | End: 2024-12-06
Payer: MEDICAID

## 2024-12-03 PROCEDURE — 97530 THERAPEUTIC ACTIVITIES: CPT

## 2024-12-03 PROCEDURE — 97112 NEUROMUSCULAR REEDUCATION: CPT

## 2024-12-03 PROCEDURE — 97110 THERAPEUTIC EXERCISES: CPT

## 2024-12-04 ENCOUNTER — OFFICE VISIT (OUTPATIENT)
Age: 49
End: 2024-12-04
Payer: MEDICAID

## 2024-12-04 VITALS
WEIGHT: 293 LBS | SYSTOLIC BLOOD PRESSURE: 152 MMHG | BODY MASS INDEX: 44.41 KG/M2 | RESPIRATION RATE: 18 BRPM | HEIGHT: 68 IN | OXYGEN SATURATION: 98 % | DIASTOLIC BLOOD PRESSURE: 78 MMHG | TEMPERATURE: 97.6 F | HEART RATE: 87 BPM

## 2024-12-04 DIAGNOSIS — E66.01 MORBID OBESITY: Primary | ICD-10-CM

## 2024-12-04 DIAGNOSIS — E66.01 MORBID OBESITY: ICD-10-CM

## 2024-12-04 DIAGNOSIS — Z71.89 ENCOUNTER FOR PRE-BARIATRIC SURGERY COUNSELING AND EDUCATION: ICD-10-CM

## 2024-12-04 DIAGNOSIS — Z01.818 PRE-OP TESTING: ICD-10-CM

## 2024-12-04 PROCEDURE — 3077F SYST BP >= 140 MM HG: CPT | Performed by: NURSE PRACTITIONER

## 2024-12-04 PROCEDURE — 99213 OFFICE O/P EST LOW 20 MIN: CPT | Performed by: NURSE PRACTITIONER

## 2024-12-04 PROCEDURE — 3078F DIAST BP <80 MM HG: CPT | Performed by: NURSE PRACTITIONER

## 2024-12-04 NOTE — PROGRESS NOTES
Bariatric Preoperative Progress Note    Subjective:     Renea Artis is a 49 y.o. female who presents today for followup of their candidacy for bariatric surgery.  Since last seen, Renea Artis has been working through our bariatric program towards gastric bypass with Dr. Coleman  Consult Weight: 332lb  Today's Weight: 340lb    Past Medical History:   Diagnosis Date    Bipolar affective disorder, currently manic, moderate (HCC) 4/27/2016    Chronic midline low back pain with sciatica 11/1/2016    Fibromyalgia 9/1/2015    HLD (hyperlipidemia) 2/4/2016    Hypertension     Hypertriglyceridemia 5/15/2018    Irritable bowel syndrome     Moderate episode of recurrent major depressive disorder (HCC) 4/27/2016    Obesity, Class III, BMI 40-49.9 (morbid obesity) 9/1/2015    Onychomycosis 4/27/2016    Osteoarthritis 2017    PTSD (post-traumatic stress disorder)     Substance abuse ([facility])        Past Surgical History:   Procedure Laterality Date    ANTERIOR CRUCIATE LIGAMENT REPAIR      APPENDECTOMY      BACK SURGERY      CERVICAL DISCECTOMY  02/07/2019    with fusion C5/6 C6/7    CERVICAL FUSION  0513/19    L4-L5 bilateral hemilaminectomy; medial facetectomy; foraminotomy; L5-S1 right hemilaminectomy; medial facetectomy revision; discectomy revision; L4, L5, S1 posterolateral fusion    CERVICAL LAMINECTOMY  03/19/2019    with fusion    KNEE ARTHROPLASTY Left 05/17/2023    LEFT TOTAL KNEE ARTHROPLASTY; TAYLOR LONNIE AND HARDWARE REMOVAL performed by Chung Reyna DO at [facility] MAIN OR    LUMBAR FUSION N/A 8/5/2024    Lumbar two/three/four/five/sacral one laminectomy fusion transforaminal lumbar interbody fusion lumbar two/three, lumbar three/four performed by Pablo Izquierdo MD at [facility] MAIN OR    LUMBAR LAMINECTOMY Bilateral 05/13/2019    L4-L5 bilateral hemilaminectomy; medial facetectomy; foraminotomy; L5-S1 right hemilaminectomy; medial facetectomy revision; discectomy revision; L4, L5, S1 posterolateral fusion    TUBAL

## 2024-12-05 ASSESSMENT — ENCOUNTER SYMPTOMS
COUGH: 0
CONSTIPATION: 0
VOMITING: 0
NAUSEA: 0
DIARRHEA: 0
ABDOMINAL PAIN: 0
SHORTNESS OF BREATH: 0
BACK PAIN: 1

## 2024-12-06 ENCOUNTER — HOSPITAL ENCOUNTER (OUTPATIENT)
Facility: HOSPITAL | Age: 49
Setting detail: RECURRING SERIES
Discharge: HOME | End: 2024-12-09
Payer: MEDICAID

## 2024-12-06 PROCEDURE — 97530 THERAPEUTIC ACTIVITIES: CPT

## 2024-12-06 PROCEDURE — 97112 NEUROMUSCULAR REEDUCATION: CPT

## 2024-12-06 PROCEDURE — 97110 THERAPEUTIC EXERCISES: CPT

## 2024-12-06 NOTE — PROGRESS NOTES
PHYSICAL / OCCUPATIONAL THERAPY - DAILY TREATMENT NOTE (updated )    Patient Name: Renea Artis    Date: 2024    : 1975  Insurance: Payor: [facility] MEDICAID / Plan: [facility] CARDINAL CARE / Product Type: *No Product type* /      Patient  verified yes     Visit #   Current / Total 6 10 Total Time   Time   In / Out 11:00 AM 11:52 AM 52   Pain   In / Out 6/10 6/10    Subjective Functional Status/Changes: \"I am hurting today, maybe a fibromyalgia flare up. I did fall on the stairs today because my knee gave out.\"      TREATMENT AREA =  Other low back pain [M54.59]  Left knee pain [M25.562]    OBJECTIVE    Modality rationale: decrease pain to improve the patient’s ability to perform ADLs following therapy session without increased pain.   Min Type Additional Details   10 [x]  Ice     []  heat   Position: supine w/ wedge  Location: low back   [x] Skin assessment post-treatment:  [x]intact []redness- no adverse reaction    []redness - adverse reaction:     Therapeutic Procedures:    Total  42 Billable one on one time  38 [facility] Totals Reminder: bill using total billable min of TIMED therapeutic procedures (example: do not include dry needle or estim unattended, both untimed codes, in totals to left)  8-22 min = 1 unit; 23-37 min = 2 units; 38-52 min = 3 units; 53-67 min = 4 units; 68-82 min = 5 units   Tx Min  Procedure, Rationale, Specifics   24 20 28786 Therapeutic Exercise (timed):  increase ROM, strength, coordination, balance, and proprioception to improve patient's ability to progress to PLOF and address remaining functional goals. (see flow sheet as applicable)     Details if applicable:    Bridges: 2 x 12  Clamshells in hooklying: blue TB 3 x 10   SLR (flexion) 2 x 10 each   LTR 1 x 10 ea (5 seconds)   Incline stretch 2 x 30 seconds each    9 9 43093 Therapeutic Activity (timed):  use of dynamic activities replicating functional movements to increase ROM, strength, coordination,

## 2024-12-10 ENCOUNTER — HOSPITAL ENCOUNTER (OUTPATIENT)
Facility: HOSPITAL | Age: 49
Setting detail: RECURRING SERIES
Discharge: HOME | End: 2024-12-13
Payer: MEDICAID

## 2024-12-10 ENCOUNTER — HOSPITAL ENCOUNTER (OUTPATIENT)
Facility: HOSPITAL | Age: 49
Discharge: HOME | End: 2024-12-13

## 2024-12-10 ENCOUNTER — CLINICAL DOCUMENTATION (OUTPATIENT)
Facility: HOSPITAL | Age: 49
End: 2024-12-10

## 2024-12-10 PROCEDURE — 97112 NEUROMUSCULAR REEDUCATION: CPT

## 2024-12-10 PROCEDURE — 97110 THERAPEUTIC EXERCISES: CPT

## 2024-12-10 PROCEDURE — 97530 THERAPEUTIC ACTIVITIES: CPT

## 2024-12-10 NOTE — PROGRESS NOTES
[facility] [place]   [address] [place] [address]  Phone: [phone] Fax: [phone]  PROGRESS NOTE  Patient Name: Renea Artis : 1975   Treatment/Medical Diagnosis: Other low back pain [M54.59]  Left knee pain [M25.562]   Referral Source:  Payor Kathy Burger PA-C  Payor: [facility] MEDICAID / Plan: [facility] PLAN CARDINAL CARE / Product Type: *No Product type* /      Date of Initial Visit: 10/10/24 Attended Visits: 14 Missed Visits: 0     SUMMARY OF TREATMENT  Pt is a pleasant 49 y.o. female who presents with c/o low back pain and left knee pain. The patient reports an unremarkable recovery following lumbar fusion on 24 with continued precautions regarding restricting bending, twisting, and lifting > 15 lbs. She reports that her left LE weakness as a result of her lower back issues has exacerbated previous left knee following left TKA in .  Treatment has consisted of TE, TA, NMREd, gait training, HEP, pt education, postural education, modalities prn for pain-management     CURRENT STATUS  Pt is making progress for her c/o LBP, L knee pain and L LE weakness. Her main limiters at this time are severe R knee pain and R shoulder pain. She is pending bariatric surgery, which ideally will reduce the load on her joints and allow for less pain and increased ease of mobility. She did not make great progress this past month of treatment, limited by pain, illness and fibromyalgia. Discussed need for increased progress to continue to justify PT interventions.     Pain ranges: LBP 4-5/10 ave; LBP ranges 2 to 8/10; R knee 0-9/10; L knee 2/10 ave; denies lumbar stabbing pain unless she stands > 15 min   Improvements: walked on TM at the gym for 10 min two sessions; back improvements - less pain, forward reaching tolerance (bending);   Deficits: feels unstable; avoiding stairs due to R knee pain; ascending stairs non-reciprocal; standing 
[facility]   12/10/2024  3:15 PM [facility] BARIATRIC DIETITIAN [facility] [place]   12/13/2024 11:00 AM [facility] PT [facility]   12/17/2024  9:40 AM [facility] PT [facility]   12/20/2024 11:00 AM [facility] PT [facility] 3 [facility]   12/24/2024 10:20 AM Chantelle Harper, PT [facility]   12/31/2024 11:40 AM Mohsen Martinez, PTA [facility]   1/3/2025 11:00 AM [facility] PT [facility] 2 [facility]   1/7/2025  9:15 AM Katharina Yi MD [facility] BS [facility] DEP   1/14/2025  3:15 PM [facility] BARIATRIC DIETITIAN [facility] [place]   3/26/2025  9:00 AM Earline Broussard APRN - NP [facility]B

## 2024-12-10 NOTE — PROGRESS NOTES
[facility]  [address], [address]    Patient's Name: Renea Artis     Age: 49 y.o.  YOB: 1975     Sex: female           Session 4 of 6   Surgeon:  Dr. Coleman    Height: 5  f8   Weight:    339      Lbs.     BMI:    Pounds Lost since last month: 3                 Pounds Gained since last month: 0    Starting Weight: 332     Previous Month’s Weight: 342  Overall Pounds Lost: 0   Overall Pounds Gained: 7    Office Use only: IP  Class Guidelines  Guidelines are reviewed with patient at the start of every class.  1. Patient understands that weight loss trial classes must be consecutive.  Patient understands if they miss a class, it is their responsibility to contact me to reschedule class.  I will reach out to patient after their first no show.  2.  Patient understands the expectations that weight maintenance/weight loss is expected during the classes.  Failure to demonstrate changes may result in one extra month of weight loss trial, followed by going back to see the surgeon.    3. Patient is also instructed to be doing their labs, blood work, psych visit, support group and any other test that the surgeon has used while they are working on their weight loss trial.    Other Pertinent Information:     Weight Loss Attempts:    Patient is seeking a revision procedure: None.      Other:  Patient has not been to a support group yet?    Lifestyle Habits:  Does patient smoke?  None    Alcohol intake:  Number of drinks at a time:  None  Number of times a week: None    Diet Changes Made Since Last Class: Les soda and doing a protein drink    Patient feels they are struggling with the following:     Skipping meals   Grazing (not eating meals, but snacking all day)   Not exercising   Drinking too many liquid calories, such as soda, sweet tea, fruit juices    Eating Habits and Behaviors      Today we reviewed key diet principles.  We

## 2024-12-13 ENCOUNTER — HOSPITAL ENCOUNTER (OUTPATIENT)
Facility: HOSPITAL | Age: 49
Setting detail: RECURRING SERIES
Discharge: HOME | End: 2024-12-16
Payer: MEDICAID

## 2024-12-13 PROCEDURE — 97112 NEUROMUSCULAR REEDUCATION: CPT | Performed by: PHYSICAL THERAPIST

## 2024-12-13 PROCEDURE — 97530 THERAPEUTIC ACTIVITIES: CPT | Performed by: PHYSICAL THERAPIST

## 2024-12-13 PROCEDURE — 97110 THERAPEUTIC EXERCISES: CPT | Performed by: PHYSICAL THERAPIST

## 2024-12-13 NOTE — PROGRESS NOTES
PHYSICAL / OCCUPATIONAL THERAPY - DAILY TREATMENT NOTE (updated )    Patient Name: Renea Artis    Date: 2024    : 1975  Insurance: Payor: [facility] MEDICAID / Plan: [facility] CARDINAL CARE / Product Type: *No Product type* /      Patient  verified yes     Visit #   Current / Total 1 10 Total Time   Time   In / Out 1100 am 1159 am 59   Pain   In / Out 3 6    Subjective Functional Status/Changes: \"My back is bothering me today.\"      TREATMENT AREA =  Other low back pain [M54.59]  Left knee pain [M25.562]    OBJECTIVE    Modality rationale: decrease pain to improve the patient’s ability to perform ADLs following therapy session without increased pain.   Min Type Additional Details   10 [x]  Ice     []  heat   Position: supine w/ wedge  Location: low back and (B) anterior knees   [x] Skin assessment post-treatment:  [x]intact []redness- no adverse reaction    []redness - adverse reaction:     Therapeutic Procedures:    Total  49 Billable one on one time   [facility] Totals Reminder: bill using total billable min of TIMED therapeutic procedures (example: do not include dry needle or estim unattended, both untimed codes, in totals to left)  8-22 min = 1 unit; 23-37 min = 2 units; 38-52 min = 3 units; 53-67 min = 4 units; 68-82 min = 5 units   Tx Min  Procedure, Rationale, Specifics   21  63942 Therapeutic Exercise (timed):  increase ROM, strength, coordination, balance, and proprioception to improve patient's ability to progress to PLOF and address remaining functional goals. (see flow sheet as applicable)     Details if applicable:    Assessment for PN - ROM, strength     20   89499 Therapeutic Activity (timed):  use of dynamic activities replicating functional movements to increase ROM, strength, coordination, balance, and proprioception in order to improve patient's ability to progress to PLOF and address remaining functional goals.  (see flow sheet as applicable)     Details if applicable:

## 2024-12-17 ENCOUNTER — HOSPITAL ENCOUNTER (OUTPATIENT)
Facility: HOSPITAL | Age: 49
Setting detail: RECURRING SERIES
Discharge: HOME | End: 2024-12-20
Payer: MEDICAID

## 2024-12-17 PROCEDURE — 97116 GAIT TRAINING THERAPY: CPT

## 2024-12-17 PROCEDURE — 97530 THERAPEUTIC ACTIVITIES: CPT

## 2024-12-17 PROCEDURE — 97112 NEUROMUSCULAR REEDUCATION: CPT

## 2024-12-17 PROCEDURE — 97110 THERAPEUTIC EXERCISES: CPT

## 2024-12-17 NOTE — PROGRESS NOTES
PHYSICAL / OCCUPATIONAL THERAPY - DAILY TREATMENT NOTE (updated )    Patient Name: Renea Artis    Date: 2024    : 1975  Insurance: Payor: [facility] MEDICAID / Plan: [facility] CARDINAL CARE / Product Type: *No Product type* /      Patient  verified yes     Visit #   Current / Total 2 10 Total Time   Time   In / Out 9:40 10:40 60   Pain   In / Out 3 5        Subjective Functional Status/Changes: No changes reported.       TREATMENT AREA =  Other low back pain [M54.59]  Left knee pain [M25.562]    OBJECTIVE    Modality rationale: decrease pain to improve the patient’s ability to perform ADLs following therapy session without increased pain.   Min Type Additional Details   10 [x]  Ice     []  heat   Position: supine  Location: low back   [x] Skin assessment post-treatment:  [x]intact []redness- no adverse reaction    []redness - adverse reaction:     Therapeutic Procedures:  50  Total  [facility] Totals Reminder: bill using total billable min of TIMED therapeutic procedures (example: do not include dry needle or estim unattended, both untimed codes, in totals to left)  8-22 min = 1 unit; 23-37 min = 2 units; 38-52 min = 3 units; 53-67 min = 4 units; 68-82 min = 5 units   Tx Min  Procedure, Rationale, Specifics   10  93003 Therapeutic Exercise (timed):  increase ROM, strength, coordination, balance, and proprioception to improve patient's ability to progress to PLOF and address remaining functional goals. (see flow sheet as applicable)   Details if applicable:       15  27483 Therapeutic Activity (timed):  use of dynamic activities replicating functional movements to increase ROM, strength, coordination, balance, and proprioception in order to improve patient's ability to progress to PLOF and address remaining functional goals.  (see flow sheet as applicable)   Details if applicable:       15  30196 Neuromuscular Re-Education (timed):  improve balance, coordination, kinesthetic sense, posture,

## 2024-12-20 ENCOUNTER — HOSPITAL ENCOUNTER (OUTPATIENT)
Facility: HOSPITAL | Age: 49
Setting detail: RECURRING SERIES
Discharge: HOME | End: 2024-12-23
Payer: MEDICAID

## 2024-12-20 PROCEDURE — 97530 THERAPEUTIC ACTIVITIES: CPT

## 2024-12-20 PROCEDURE — 97110 THERAPEUTIC EXERCISES: CPT

## 2024-12-20 PROCEDURE — 97112 NEUROMUSCULAR REEDUCATION: CPT

## 2024-12-20 NOTE — PROGRESS NOTES
PHYSICAL / OCCUPATIONAL THERAPY - DAILY TREATMENT NOTE (updated )    Patient Name: Renea Artis    Date: 2024    : 1975  Insurance: Payor: [facility] MEDICAID / Plan: [facility] CARDINAL CARE / Product Type: *No Product type* /      Patient  verified yes     Visit #   Current / Total 3 10 Total Time   Time   In / Out 11 1150    Pain   In / Out 4 2        Subjective Functional Status/Changes: My back on the low Right side hurts more today        TREATMENT AREA =  Other low back pain [M54.59]  Left knee pain [M25.562]    OBJECTIVE    Modality rationale: decrease pain to improve the patient’s ability to perform ADLs following therapy session without increased pain.   Min Type Additional Details   10 [x]  Ice     []  heat   Position: supine  Location: low back   [x] Skin assessment post-treatment:  [x]intact []redness- no adverse reaction    []redness - adverse reaction:     Therapeutic Procedures:  50  Total  [facility] Totals Reminder: bill using total billable min of TIMED therapeutic procedures (example: do not include dry needle or estim unattended, both untimed codes, in totals to left)  8-22 min = 1 unit; 23-37 min = 2 units; 38-52 min = 3 units; 53-67 min = 4 units; 68-82 min = 5 units   Tx Min  Procedure, Rationale, Specifics   10  31545 Therapeutic Exercise (timed):  increase ROM, strength, coordination, balance, and proprioception to improve patient's ability to progress to PLOF and address remaining functional goals. (see flow sheet as applicable)   Details if applicable:       10  77396 Therapeutic Activity (timed):  use of dynamic activities replicating functional movements to increase ROM, strength, coordination, balance, and proprioception in order to improve patient's ability to progress to PLOF and address remaining functional goals.  (see flow sheet as applicable)   Details if applicable:       15  70014 Neuromuscular Re-Education (timed):  improve balance, coordination,

## 2024-12-24 ENCOUNTER — APPOINTMENT (OUTPATIENT)
Facility: HOSPITAL | Age: 49
End: 2024-12-24
Payer: MEDICAID

## 2024-12-29 LAB — HELICOBACTER PYLORI, UREA BREATH TEST: NOT DETECTED

## 2024-12-31 ENCOUNTER — HOSPITAL ENCOUNTER (OUTPATIENT)
Facility: HOSPITAL | Age: 49
Setting detail: RECURRING SERIES
Discharge: HOME | End: 2025-01-03
Payer: MEDICAID

## 2024-12-31 PROCEDURE — 97530 THERAPEUTIC ACTIVITIES: CPT

## 2024-12-31 PROCEDURE — 97110 THERAPEUTIC EXERCISES: CPT

## 2024-12-31 PROCEDURE — 97112 NEUROMUSCULAR REEDUCATION: CPT

## 2024-12-31 PROCEDURE — 97116 GAIT TRAINING THERAPY: CPT

## 2024-12-31 NOTE — PROGRESS NOTES
PHYSICAL / OCCUPATIONAL THERAPY - DAILY TREATMENT NOTE (updated )    Patient Name: Renea Artis    Date: 2024    : 1975  Insurance: Payor: [facility] MEDICAID / Plan: [facility] CARDINAL CARE / Product Type: *No Product type* /      Patient  verified yes     Visit #   Current / Total 4 10 Total Time   Time   In / Out 11:40 am 12:40 pm 60   Pain   In / Out 5/10 4/10    Subjective Functional Status/Changes: \"I feel like I am better.\"       TREATMENT AREA =  Other low back pain [M54.59]  Left knee pain [M25.562]    OBJECTIVE    Modality rationale: decrease pain to improve the patient’s ability to perform ADLs following therapy session without increased pain.   Min Type Additional Details   10 [x]  Ice    Position: supine  Location: low back   [x] Skin assessment post-treatment:  [x]intact []redness- no adverse reaction    []redness - adverse reaction:     Therapeutic Procedures:  50  Total [facility] Totals Reminder: bill using total billable min of TIMED therapeutic procedures (example: do not include dry needle or estim unattended, both untimed codes, in totals to left)  8-22 min = 1 unit; 23-37 min = 2 units; 38-52 min = 3 units; 53-67 min = 4 units; 68-82 min = 5 units   Tx Min Procedure, Rationale, Specifics   16 97141 Therapeutic Exercise (timed):  increase ROM, strength, coordination, balance, and proprioception to improve patient's ability to progress to PLOF and address remaining functional goals. (see flow sheet as applicable)     Details if applicable:       15 24768 Therapeutic Activity (timed):  use of dynamic activities replicating functional movements to increase ROM, strength, coordination, balance, and proprioception in order to improve patient's ability to progress to PLOF and address remaining functional goals.  (see flow sheet as applicable)     Details if applicable:  TUG test, 30 second sit to stand     8 38655 Neuromuscular Re-Education (timed):  improve balance,

## 2025-01-03 ENCOUNTER — HOSPITAL ENCOUNTER (OUTPATIENT)
Facility: HOSPITAL | Age: 50
Setting detail: RECURRING SERIES
Discharge: HOME | End: 2025-01-06
Payer: MEDICAID

## 2025-01-03 PROCEDURE — 97116 GAIT TRAINING THERAPY: CPT

## 2025-01-03 PROCEDURE — 97110 THERAPEUTIC EXERCISES: CPT

## 2025-01-03 PROCEDURE — 97112 NEUROMUSCULAR REEDUCATION: CPT

## 2025-01-03 PROCEDURE — 97530 THERAPEUTIC ACTIVITIES: CPT

## 2025-01-03 NOTE — PROGRESS NOTES
PHYSICAL / OCCUPATIONAL THERAPY - DAILY TREATMENT NOTE    Patient Name: Renea Artis    Date: 1/3/2025    : 1975  Insurance: Payor: [facility] MEDICAID / Plan: [facility] [employer] CARDINAL CARE / Product Type: *No Product type* /      Patient  verified Yes     Visit #   Current / Total 5 10   Time   In / Out 11:04 am 12:10 pm   Pain   In / Out 5/10 4/10   Subjective Functional Status/Changes: \"It's better.\"     TREATMENT AREA =  Other low back pain [M54.59]  Left knee pain [M25.562]     OBJECTIVE  Modalities Rationale:     decrease pain to improve patient's ability to progress to PLOF and address remaining functional goals.    10 min  unbill [x]  Ice        location/position: Semi-reclined with legs elevated on wedge; low back and left knee   Skin assessment post-treatment:   Intact        Therapeutic Procedures:  Tx Min Procedure, Rationale, Specifics   24 70106 Therapeutic Exercise (timed):  increase ROM, strength, coordination, balance, and proprioception to improve patient's ability to progress to PLOF and address remaining functional goals. (see flow sheet as applicable)     Details if applicable:  reassessment     16 84182 Therapeutic Activity (timed):  use of dynamic activities replicating functional movements to increase ROM, strength, coordination, balance, and proprioception in order to improve patient's ability to progress to PLOF and address remaining functional goals.  (see flow sheet as applicable)     Details if applicable:       8 11661 Neuromuscular Re-Education (timed):  improve balance, coordination, kinesthetic sense, posture, core stability and proprioception to improve patient's ability to develop conscious control of individual muscles and awareness of position of extremities in order to progress to PLOF and address remaining functional goals. (see flow sheet as applicable)     Details if applicable:       8 25762 Gait Training (timed):   40 feet x 2 of following:     [x]

## 2025-01-03 NOTE — PROGRESS NOTES
Physical Therapy Discharge Instructions      [facility] - [facility]   [address] [address] [phone] fax      Patient: Renea Artis  : 1975      Continue Home Exercise Program 1 times per day for, 3-4 times per week:      Continue with    [x] Ice  as needed           Follow up with MD:      [x] As needed      Recommendations:     [x]   Return to activity with home program        Additional Comments: Renea, thank you for your hard work during your therapy sessions and congratulations on your progress! It has been a pleasure working with you. Please reach out in the future if you have any questions.          Mohsen Martinez, PTA   1/3/2025   11:53 AM

## 2025-01-07 ENCOUNTER — OFFICE VISIT (OUTPATIENT)
Facility: CLINIC | Age: 50
End: 2025-01-07
Payer: MEDICAID

## 2025-01-07 VITALS
SYSTOLIC BLOOD PRESSURE: 129 MMHG | RESPIRATION RATE: 15 BRPM | BODY MASS INDEX: 44.41 KG/M2 | WEIGHT: 293 LBS | TEMPERATURE: 98 F | HEART RATE: 90 BPM | HEIGHT: 68 IN | OXYGEN SATURATION: 98 % | DIASTOLIC BLOOD PRESSURE: 76 MMHG

## 2025-01-07 DIAGNOSIS — R73.03 PREDIABETES: ICD-10-CM

## 2025-01-07 DIAGNOSIS — Z23 NEED FOR VACCINATION: ICD-10-CM

## 2025-01-07 DIAGNOSIS — G43.109 MIGRAINE WITH AURA AND WITHOUT STATUS MIGRAINOSUS, NOT INTRACTABLE: ICD-10-CM

## 2025-01-07 DIAGNOSIS — I10 PRIMARY HYPERTENSION: Primary | ICD-10-CM

## 2025-01-07 LAB — HBA1C MFR BLD: 6.4 %

## 2025-01-07 PROCEDURE — 83036 HEMOGLOBIN GLYCOSYLATED A1C: CPT | Performed by: STUDENT IN AN ORGANIZED HEALTH CARE EDUCATION/TRAINING PROGRAM

## 2025-01-07 PROCEDURE — 99214 OFFICE O/P EST MOD 30 MIN: CPT | Performed by: STUDENT IN AN ORGANIZED HEALTH CARE EDUCATION/TRAINING PROGRAM

## 2025-01-07 PROCEDURE — 90471 IMMUNIZATION ADMIN: CPT | Performed by: STUDENT IN AN ORGANIZED HEALTH CARE EDUCATION/TRAINING PROGRAM

## 2025-01-07 PROCEDURE — 3074F SYST BP LT 130 MM HG: CPT | Performed by: STUDENT IN AN ORGANIZED HEALTH CARE EDUCATION/TRAINING PROGRAM

## 2025-01-07 PROCEDURE — 90750 HZV VACC RECOMBINANT IM: CPT | Performed by: STUDENT IN AN ORGANIZED HEALTH CARE EDUCATION/TRAINING PROGRAM

## 2025-01-07 PROCEDURE — 3078F DIAST BP <80 MM HG: CPT | Performed by: STUDENT IN AN ORGANIZED HEALTH CARE EDUCATION/TRAINING PROGRAM

## 2025-01-07 SDOH — ECONOMIC STABILITY: FOOD INSECURITY: WITHIN THE PAST 12 MONTHS, THE FOOD YOU BOUGHT JUST DIDN'T LAST AND YOU DIDN'T HAVE MONEY TO GET MORE.: NEVER TRUE

## 2025-01-07 SDOH — ECONOMIC STABILITY: FOOD INSECURITY: WITHIN THE PAST 12 MONTHS, YOU WORRIED THAT YOUR FOOD WOULD RUN OUT BEFORE YOU GOT MONEY TO BUY MORE.: NEVER TRUE

## 2025-01-07 SDOH — ECONOMIC STABILITY: INCOME INSECURITY: HOW HARD IS IT FOR YOU TO PAY FOR THE VERY BASICS LIKE FOOD, HOUSING, MEDICAL CARE, AND HEATING?: NOT HARD AT ALL

## 2025-01-07 ASSESSMENT — PATIENT HEALTH QUESTIONNAIRE - PHQ9
SUM OF ALL RESPONSES TO PHQ QUESTIONS 1-9: 1
SUM OF ALL RESPONSES TO PHQ9 QUESTIONS 1 & 2: 1
2. FEELING DOWN, DEPRESSED OR HOPELESS: SEVERAL DAYS
SUM OF ALL RESPONSES TO PHQ QUESTIONS 1-9: 1
10. IF YOU CHECKED OFF ANY PROBLEMS, HOW DIFFICULT HAVE THESE PROBLEMS MADE IT FOR YOU TO DO YOUR WORK, TAKE CARE OF THINGS AT HOME, OR GET ALONG WITH OTHER PEOPLE: NOT DIFFICULT AT ALL
3. TROUBLE FALLING OR STAYING ASLEEP: NOT AT ALL
6. FEELING BAD ABOUT YOURSELF - OR THAT YOU ARE A FAILURE OR HAVE LET YOURSELF OR YOUR FAMILY DOWN: NOT AT ALL
SUM OF ALL RESPONSES TO PHQ QUESTIONS 1-9: 1
7. TROUBLE CONCENTRATING ON THINGS, SUCH AS READING THE NEWSPAPER OR WATCHING TELEVISION: NOT AT ALL
8. MOVING OR SPEAKING SO SLOWLY THAT OTHER PEOPLE COULD HAVE NOTICED. OR THE OPPOSITE, BEING SO FIGETY OR RESTLESS THAT YOU HAVE BEEN MOVING AROUND A LOT MORE THAN USUAL: NOT AT ALL
9. THOUGHTS THAT YOU WOULD BE BETTER OFF DEAD, OR OF HURTING YOURSELF: NOT AT ALL
5. POOR APPETITE OR OVEREATING: NOT AT ALL
SUM OF ALL RESPONSES TO PHQ QUESTIONS 1-9: 1
4. FEELING TIRED OR HAVING LITTLE ENERGY: NOT AT ALL
1. LITTLE INTEREST OR PLEASURE IN DOING THINGS: NOT AT ALL

## 2025-01-07 ASSESSMENT — ENCOUNTER SYMPTOMS
SHORTNESS OF BREATH: 0
ABDOMINAL PAIN: 0

## 2025-01-07 NOTE — PROGRESS NOTES
History of Present Illness  Renea Artis is a 49 y.o. female who presents today for management of    Chief Complaint   Patient presents with    Hypertension       -Patient overall stable since last visit  -Reports compliance to BP medication  -Denies headache, dizziness, lightheadedness, or vision changes  -Patient currently in bariatric program, has future classes with nutritionist  -Patient has been working on healthy eating habits over the holidays  -Patient reports she has been tobacco free since May of last year!  -Reports bothersome migraine today, previously occurring every month also however she has noticed increased frequency recently (1-2 per week for past 2 weeks)  -Imitrex helps somewhat      Patient Active Problem List   Diagnosis    Obesity, Class III, BMI 40-49.9 (morbid obesity)    Bipolar affective disorder, currently manic, moderate (HCC)    HTN (hypertension)    Cervical myelopathy (HCC)    Hypertriglyceridemia    Cervical stenosis of spine    Fibromyalgia    Chronic midline low back pain with sciatica    Onychomycosis    Prediabetes    Irritable bowel syndrome without diarrhea    Moderate episode of recurrent major depressive disorder (HCC)    Spinal stenosis, lumbar region with neurogenic claudication    Tobacco use disorder    Status post left knee replacement    Superficial incisional surgical site infection    Left ankle instability    Cervicalgia    Mixed incontinence    Perimenopause    Excoriated acne    Lumbar pseudoarthrosis    Insomnia w/ sleep apnea    BOBBI (obstructive sleep apnea)      Past Medical History:   Diagnosis Date    Bipolar affective disorder, currently manic, moderate (HCC) 4/27/2016    Chronic midline low back pain with sciatica 11/1/2016    Fibromyalgia 9/1/2015    HLD (hyperlipidemia) 2/4/2016    Hypertension     Hypertriglyceridemia 5/15/2018    Irritable bowel syndrome     Moderate episode of recurrent major depressive disorder (HCC) 4/27/2016    Obesity, Class III, BMI

## 2025-01-07 NOTE — PROGRESS NOTES
Renea Artis is a 49 y.o. year old female who presents today for   Chief Complaint   Patient presents with    Hypertension       \"Have you been to the ER, urgent care clinic since your last visit?  Hospitalized since your last visit?\"    no    “Have you seen or consulted any other health care providers outside our system since your last visit?”    no          Click Here for Release of Records Request    - OLIVIA Medrano  [facility]  Phone: [phone]  Fax: [phone]

## 2025-01-13 DIAGNOSIS — M48.062 SPINAL STENOSIS, LUMBAR REGION WITH NEUROGENIC CLAUDICATION: ICD-10-CM

## 2025-01-13 RX ORDER — GABAPENTIN 800 MG/1
800 TABLET ORAL 3 TIMES DAILY
Qty: 15 TABLET | Refills: 0 | OUTPATIENT
Start: 2025-01-13 | End: 2025-01-18

## 2025-01-13 RX ORDER — GABAPENTIN 800 MG/1
800 TABLET ORAL 3 TIMES DAILY
Qty: 270 TABLET | Refills: 0 | Status: SHIPPED | OUTPATIENT
Start: 2025-01-13 | End: 2025-01-17 | Stop reason: SDUPTHER

## 2025-01-13 NOTE — TELEPHONE ENCOUNTER
Called patient [phone] and verified her name and date of birth. I informed her that she would need to be seen by WILL Patten for any additional refills. Patient stated she was a former patient of [facility] before she started seeing Dr. Collins because she lives in [place]. I offered her an in office visit for this week and she stated she lives in [place]. I then offered her a virtual visit with WILL Patten on Friday, 1/17/2025. Patient accepted and then inquired if the provider could send in a refill because she is out of the medication since last week. I informed her that I will send the message to the provider.Patient verbalized understanding. Please advise. I have pended a 5 day supply Gabapentin 800 mg #15 Take 1 po TID with no refills.

## 2025-01-13 NOTE — TELEPHONE ENCOUNTER
Patient is requesting a refill on           gabapentin (NEURONTIN) 800 MG tablet         [id number] [address] [phone] - F [phone] [[id number]]

## 2025-01-13 NOTE — TELEPHONE ENCOUNTER
Patient last seen 9/26/2024 by Dr. Collins and received Gabapentin 800 mg #270 TID with no refills. [facility] to [facility]  pat [facility] filled it 11/30/2024.

## 2025-01-14 ENCOUNTER — HOSPITAL ENCOUNTER (OUTPATIENT)
Facility: HOSPITAL | Age: 50
Discharge: HOME | End: 2025-01-17

## 2025-01-14 ENCOUNTER — CLINICAL DOCUMENTATION (OUTPATIENT)
Facility: HOSPITAL | Age: 50
End: 2025-01-14

## 2025-01-14 NOTE — PROGRESS NOTES
[facility]  [address], [address]    Patient's Name: Renea Artis     Age: 49 y.o.  YOB: 1975     Sex: female           Session 5 of 6   Surgeon:  Dr. Coleman    Height: 5 f 8   Current Weight:    341      Lbs.     BMI:    Pounds Lost since last month: 0                 Pounds Gained since last month: 2    Starting Weight: 332     Previous Month’s Weight: 339  Overall Pounds Lost: 0   Overall Pounds Gained: 9    Office Use only: v  Class Guidelines  Guidelines are reviewed with patient at the start of every class.  1. Patient understands that weight loss trial classes must be consecutive.  Patient understands if they miss a class, it is their responsibility to contact me to reschedule class.  I will reach out to patient after their first no show.  2.  Patient understands the expectations that weight maintenance/weight loss is expected during the classes.  Failure to demonstrate changes may result in one extra month of weight loss trial, followed by going back to see the surgeon.    3. Patient is also instructed to be doing their labs, blood work, psych visit, support group and any other test that the surgeon has used while they are working on their weight loss trial.    Other Pertinent Information: None    Weight Loss Attempts:    Patient is seeking a revision procedure: n/a.      Other:  Patient has been to a support group yet?    Lifestyle Habits:  Does patient smoke?  none    Alcohol intake:  Number of drinks at a time:  None  Number of times a week: None    Diet Changes Made Since Last Class: Eating more protein based foods    Eating Habits and Behaviors    Today we reviewed key diet principles.   We talked about snack ideas that would focus more on protein.  We also talked about the benefits of filling up on protein first and keeping the daily carbohydrate intake to less than 75 grams per day.  Patient was instructed to

## 2025-01-17 ENCOUNTER — SCHEDULED TELEPHONE ENCOUNTER (OUTPATIENT)
Age: 50
End: 2025-01-17

## 2025-01-17 DIAGNOSIS — Z98.1 S/P LUMBAR FUSION: Primary | ICD-10-CM

## 2025-01-17 DIAGNOSIS — M48.062 SPINAL STENOSIS, LUMBAR REGION WITH NEUROGENIC CLAUDICATION: ICD-10-CM

## 2025-01-17 RX ORDER — METHOCARBAMOL 500 MG/1
TABLET, FILM COATED ORAL
COMMUNITY
Start: 2024-10-11

## 2025-01-17 RX ORDER — GABAPENTIN 800 MG/1
800 TABLET ORAL 3 TIMES DAILY
Qty: 90 TABLET | Refills: 5 | Status: SHIPPED | OUTPATIENT
Start: 2025-02-12 | End: 2025-08-11

## 2025-01-17 NOTE — PROGRESS NOTES
Renea Artis presents today for   Chief Complaint   Patient presents with    Back Problem    Pain    Back Pain       Is someone accompanying this pt? no    Is the patient using any DME equipment during OV? no    Depression Screening:       No data to display                Learning Assessment:  Failed to redirect to the Timeline version of the [employer] SmartLink.    Abuse Screening:       No data to display                Fall Risk  Failed to redirect to the Timeline version of the [employer] SmartLink.    OPIOID RISK TOOL  Failed to redirect to the Timeline version of the [employer] SmartLink.    Coordination of Care:  1. Have you been to the ER, urgent care clinic since your last visit? no  Hospitalized since your last visit? no    2. Have you seen or consulted any other health care providers outside of the [facility] since your last visit? no Include any pap smears or colon screening. no

## 2025-01-17 NOTE — PROGRESS NOTES
Renea Artis is a 49 y.o. female evaluated via telephone on 1/17/2025 for Back Problem, Pain, and Back Pain  .      History of Present Illness: Patient is a 49-year-old female who has back pain.  She just underwent L2-S1 fusion by Dr. Izquierdo August 5, 2024.  She states she is still having significant pain and that she will be going back to see them in March.  Looks like they did do an x-ray in October that showed her instrumentation was intact but she did have some degenerative disc disease above it.  She is taking gabapentin 800 mg 3 times a day to help control her pain.  She states it does help but lower doses of it do not.  She denies fever bowel bladder dysfunction.    Physical Exam: Patient is a 49-year-old female who is alert and oriented.  Spoke fluency.  She did not appear to be in distress      Assessment/Plan:.  This patient who underwent a lumbar fusion this past August who states she is still having issues and will follow-up with Dr. Izquierdo's office for that.  In the meantime I will refill her gabapentin which does seem to help.  We will see her back in 6 months sooner if needed.  Hey can you call Renea Artis that I just did a virtual with she has an appointment in March mood that out to July 6 months from now thank you [email or web address]    Renea was seen today for back problem, pain and back pain.    Diagnoses and all orders for this visit:    S/P lumbar fusion  -     gabapentin (NEURONTIN) 800 MG tablet; Take 1 tablet by mouth 3 times daily for 180 days. Max Daily Amount: 2,400 mg    Spinal stenosis, lumbar region with neurogenic claudication  -     gabapentin (NEURONTIN) 800 MG tablet; Take 1 tablet by mouth 3 times daily for 180 days. Max Daily Amount: 2,400 mg          Documentation:  I communicated with the patient and/or health care decision maker about back pain.   Details of this discussion including any medical advice provided: Yes    Total Time: 12:21 PM to 12:32 PM 11 minutes    Renea Artis was evaluated

## 2025-01-17 NOTE — ADDENDUM NOTE
Addended by: MARIA ISABEL PLASCENCIA on: 1/17/2025 12:37 PM     Modules accepted: Level of Service

## 2025-01-28 ASSESSMENT — SLEEP AND FATIGUE QUESTIONNAIRES
HOW LIKELY ARE YOU TO NOD OFF OR FALL ASLEEP WHILE WATCHING TV: SLIGHT CHANCE OF DOZING
HOW LIKELY ARE YOU TO NOD OFF OR FALL ASLEEP WHILE SITTING AND TALKING TO SOMEONE: WOULD NEVER DOZE
HOW LIKELY ARE YOU TO NOD OFF OR FALL ASLEEP WHEN YOU ARE A PASSENGER IN A CAR FOR AN HOUR WITHOUT A BREAK: SLIGHT CHANCE OF DOZING
HOW LIKELY ARE YOU TO NOD OFF OR FALL ASLEEP WHILE WATCHING TV: SLIGHT CHANCE OF DOZING
HOW LIKELY ARE YOU TO NOD OFF OR FALL ASLEEP WHILE SITTING QUIETLY AFTER LUNCH WITHOUT ALCOHOL: MODERATE CHANCE OF DOZING
HOW LIKELY ARE YOU TO NOD OFF OR FALL ASLEEP WHILE LYING DOWN TO REST IN THE AFTERNOON WHEN CIRCUMSTANCES PERMIT: HIGH CHANCE OF DOZING
ESS TOTAL SCORE: 9
HOW LIKELY ARE YOU TO NOD OFF OR FALL ASLEEP WHILE LYING DOWN TO REST IN THE AFTERNOON WHEN CIRCUMSTANCES PERMIT: HIGH CHANCE OF DOZING
HOW LIKELY ARE YOU TO NOD OFF OR FALL ASLEEP WHILE SITTING AND READING: SLIGHT CHANCE OF DOZING
HOW LIKELY ARE YOU TO NOD OFF OR FALL ASLEEP WHEN YOU ARE A PASSENGER IN A CAR FOR AN HOUR WITHOUT A BREAK: SLIGHT CHANCE OF DOZING
HOW LIKELY ARE YOU TO NOD OFF OR FALL ASLEEP WHILE SITTING INACTIVE IN A PUBLIC PLACE: SLIGHT CHANCE OF DOZING
HOW LIKELY ARE YOU TO NOD OFF OR FALL ASLEEP IN A CAR, WHILE STOPPED FOR A FEW MINUTES IN TRAFFIC: WOULD NEVER DOZE
HOW LIKELY ARE YOU TO NOD OFF OR FALL ASLEEP WHILE SITTING QUIETLY AFTER LUNCH WITHOUT ALCOHOL: MODERATE CHANCE OF DOZING
HOW LIKELY ARE YOU TO NOD OFF OR FALL ASLEEP WHILE SITTING INACTIVE IN A PUBLIC PLACE: SLIGHT CHANCE OF DOZING
HOW LIKELY ARE YOU TO NOD OFF OR FALL ASLEEP WHILE SITTING AND TALKING TO SOMEONE: WOULD NEVER DOZE
HOW LIKELY ARE YOU TO NOD OFF OR FALL ASLEEP IN A CAR, WHILE STOPPED FOR A FEW MINUTES IN TRAFFIC: WOULD NEVER DOZE
HOW LIKELY ARE YOU TO NOD OFF OR FALL ASLEEP WHILE SITTING AND READING: SLIGHT CHANCE OF DOZING

## 2025-01-31 ENCOUNTER — OFFICE VISIT (OUTPATIENT)
Age: 50
End: 2025-01-31

## 2025-01-31 VITALS
BODY MASS INDEX: 44.41 KG/M2 | WEIGHT: 293 LBS | HEART RATE: 90 BPM | RESPIRATION RATE: 18 BRPM | HEIGHT: 68 IN | TEMPERATURE: 97.3 F | OXYGEN SATURATION: 95 % | DIASTOLIC BLOOD PRESSURE: 56 MMHG | SYSTOLIC BLOOD PRESSURE: 101 MMHG

## 2025-01-31 DIAGNOSIS — I10 PRIMARY HYPERTENSION: ICD-10-CM

## 2025-01-31 DIAGNOSIS — Z78.9 EXCESSIVE CAFFEINE INTAKE: ICD-10-CM

## 2025-01-31 DIAGNOSIS — G47.33 OSA (OBSTRUCTIVE SLEEP APNEA): Primary | ICD-10-CM

## 2025-01-31 DIAGNOSIS — G47.63 BRUXISM, SLEEP-RELATED: ICD-10-CM

## 2025-01-31 DIAGNOSIS — E66.01 OBESITY, CLASS III, BMI 40-49.9 (MORBID OBESITY): ICD-10-CM

## 2025-01-31 DIAGNOSIS — F31.12 BIPOLAR AFFECTIVE DISORDER, CURRENTLY MANIC, MODERATE (HCC): ICD-10-CM

## 2025-01-31 DIAGNOSIS — F33.1 MODERATE EPISODE OF RECURRENT MAJOR DEPRESSIVE DISORDER (HCC): ICD-10-CM

## 2025-01-31 DIAGNOSIS — R35.1 NOCTURIA: ICD-10-CM

## 2025-01-31 ASSESSMENT — PATIENT HEALTH QUESTIONNAIRE - PHQ9
2. FEELING DOWN, DEPRESSED OR HOPELESS: NOT AT ALL
SUM OF ALL RESPONSES TO PHQ9 QUESTIONS 1 & 2: 0
SUM OF ALL RESPONSES TO PHQ QUESTIONS 1-9: 0
1. LITTLE INTEREST OR PLEASURE IN DOING THINGS: NOT AT ALL
SUM OF ALL RESPONSES TO PHQ QUESTIONS 1-9: 0

## 2025-01-31 NOTE — PROGRESS NOTES
[facility]   Sleep Medicine     Office Progress Note         [address]  [address]  [facility] [address] [phone] Fax    Reason for visit/referral: f/u Obstructive Sleep Apnea on CPAP  Assessment:      1. BOBBI (obstructive sleep apnea)  Assessment & Plan:   Chronic, at goal (stable), continue current treatment plan, the patient stated today that the quality of her sleep is better and she feels a lot less sleepy during the day.  She no longer needs to nap during the day which she is very happy about.  She has met compliance with CPAP use as well.  Orders:  -     DME - DURABLE MEDICAL EQUIPMENT  2. Nocturia  Comments:  Still getting up at night a few times but admits to drinking water \"right until she goes to sleep\".  Overall she thinks this has improved though  3. Bruxism, sleep-related  Comments:  No complaints about this today  4. Excessive caffeine intake  Comments:  Has stopped drinking soda completely  5. Primary hypertension  Assessment & Plan:   BP actually low today - 101/56. Asymptomatic.   6. Moderate episode of recurrent major depressive disorder (HCC)  Assessment & Plan:   Monitored by specialist- no acute findings meriting change in the plan  7. Obesity, Class III, BMI 40-49.9 (morbid obesity)  8. Bipolar affective disorder, currently manic, moderate (HCC)  Assessment & Plan:    Monitored by specialist- no acute findings meriting change in the plan, taking Lamictal, Cymbalta, Wellbutrin, Atarax as needed.  Has psychiatrist managing        Last seen 10/8/2024      Patient reports she is doing well.    No complaints today.    She continues to due very well with PAP therapy and reports on going clinical benefit.    No aerophagia, or bloating    Mask- No skin irritation    She is using a nasal mask    She is able to get her supplies without issue    PAP Hygiene: Cleans at least once weekly with soap and water   Was able to adjust the humidity on her 
screening. no    Medication list has been updated according to patient.

## 2025-01-31 NOTE — ASSESSMENT & PLAN NOTE
Chronic, at goal (stable), continue current treatment plan, the patient stated today that the quality of her sleep is better and she feels a lot less sleepy during the day.  She no longer needs to nap during the day which she is very happy about.  She has met compliance with CPAP use as well.

## 2025-02-07 ENCOUNTER — OFFICE VISIT (OUTPATIENT)
Age: 50
End: 2025-02-07

## 2025-02-07 VITALS — WEIGHT: 293 LBS | HEIGHT: 68 IN | BODY MASS INDEX: 44.41 KG/M2

## 2025-02-07 DIAGNOSIS — M65.931 EXTENSOR TENOSYNOVITIS OF RIGHT WRIST: ICD-10-CM

## 2025-02-07 DIAGNOSIS — M67.431 GANGLION CYST OF DORSUM OF RIGHT WRIST: Primary | ICD-10-CM

## 2025-02-07 NOTE — PROGRESS NOTES
Renea Artis is a 49 y.o. female right handed.  Worker's Compensation and legal considerations: none    Chief Complaint   Patient presents with    Wrist Pain     Right     Pain Score:   8 (when using wrist)    Subjective:     2/7/2025 HPI: Patient presents today for follow-up due to recurrence of mass and pain over the back of the right wrist.  She reports previous injection did help until the last couple months.  She also reports she will likely be scheduled for gastric bypass procedure in March.    Initial HPI: Patient presents here with complaints of right wrist pain and swelling over the back of the wrist.  She attributes it to ever since doing postoperative rehab after a cervical spine surgery when having to get up and blood pressure through her right wrist.    Date of onset: September 2024  Injury: No  Prior Treatment:  No  Contributory history: None    ROS: Review of Systems - General ROS: negative except HPI    Past Medical History:   Diagnosis Date    Bipolar affective disorder, currently manic, moderate (HCC) 4/27/2016    Chronic midline low back pain with sciatica 11/1/2016    Fibromyalgia 9/1/2015    HLD (hyperlipidemia) 2/4/2016    Hypertension     Hypertriglyceridemia 5/15/2018    Irritable bowel syndrome     Moderate episode of recurrent major depressive disorder (HCC) 4/27/2016    Obesity, Class III, BMI 40-49.9 (morbid obesity) 9/1/2015    Onychomycosis 4/27/2016    Osteoarthritis 2017    PTSD (post-traumatic stress disorder)     Substance abuse ([facility])        Past Surgical History:   Procedure Laterality Date    ANTERIOR CRUCIATE LIGAMENT REPAIR      APPENDECTOMY      BACK SURGERY      CERVICAL DISCECTOMY  02/07/2019    with fusion C5/6 C6/7    CERVICAL FUSION  0513/19    L4-L5 bilateral hemilaminectomy; medial facetectomy; foraminotomy; L5-S1 right hemilaminectomy; medial facetectomy revision; discectomy revision; L4, L5, S1 posterolateral fusion    CERVICAL LAMINECTOMY  03/19/2019    with fusion

## 2025-02-12 ENCOUNTER — CLINICAL DOCUMENTATION (OUTPATIENT)
Facility: HOSPITAL | Age: 50
End: 2025-02-12

## 2025-02-12 ENCOUNTER — HOSPITAL ENCOUNTER (OUTPATIENT)
Facility: HOSPITAL | Age: 50
Discharge: HOME | End: 2025-02-15

## 2025-02-12 NOTE — PROGRESS NOTES
[facility]  [address], [address]      Patient's Name: Renea Artis     Age: 49 y.o.  YOB: 1975     Sex: female           Session 6 of 6   Surgeon:  Dr. Coleman    Height: 5  f8   Current Weight:    337      Lbs.     BMI:    Pounds Lost since last month: 4                 Pounds Gained since last month: 0    Starting Weight: 332     Previous Month’s Weight: 341  Overall Pounds Lost: 0   Overall Pounds Gained: 5    Office Use only: IP  Class Guidelines  Guidelines are reviewed with patient at the start of every class.  1. Patient understands that weight loss trial classes must be consecutive.  Patient understands if they miss a class, it is their responsibility to contact me to reschedule class.  I will reach out to patient after their first no show.  2.  Patient understands the expectations that weight maintenance/weight loss is expected during the classes.  Failure to demonstrate changes may result in one extra month of weight loss trial, followed by going back to see the surgeon.    3. Patient is also instructed to be doing their labs, blood work, psych visit, support group and any other test that the surgeon has used while they are working on their weight loss trial.    Other Pertinent Information:     Weight Loss Attempts:    Patient is seeking a revision procedure: n/a.      Other:  Patient has been to a support group yet?    Lifestyle Habits:  Does patient smoke?   None    Alcohol intake:  Number of drinks at a time:  0  Number of times a week: 0    Diet Changes Made Since Last Class: Less soda and eating different foods    Eating Habits and Behaviors      Today we started off class talking about the Key Diet Principles.  Patient was encouraged to start drinking 64 ounces of fluid per day.  Patient was encouraged to start cutting out soda, caffeine, carbonation, sweet tea, fruit juice, and fruit smoothies. Patient was  [address] [place] [address] [facility]  Pulmonary, Critical Care, and Sleep Medicine    Name: Dulce Melvin MRN: [id number]   : 1942 Hospital: [facility]   Date: 2017        Pulmonary Critical Care Initial Patient Consult                                              Reason for CC Consult: Acute respiratory distress in the setting of metastatic breast cancer and Pulmonary embolism     IMPRESSION:   · Acute respiratory distress requiring Noninvasive ventilation support   · Breast cancer with metastasis to multiple organs such as bone, Spine, Lung, adrenal gland  · Pulmonary embolism   · Osteolytic lesion involving C2- C4 spine with pathologic fracture of C3 vertebra   · Liver function abnormality   · History of Pulmonary embolism   · Leukocytosis due to steroid? · Moderate Protein caloric malnutrition    RECOMMENDATIONS:   Continue BiPAP with current settings ( IPAP 10/ EPAP 05) , adjust settings per ABG or for goal SPO2> 88%. Interval improvement in respiratory distress. Aspiration precaution, keep head of the bed at 30' all times  Sputum culture and gram stain   Continue bronchodilators, pulmonary hygiene care  Steroids: Dexamethasone 4 mg IV Q6H  Sepsis bundle per hospital protocol  Antibiotic choice: zosyn and vancomycin   Cultures drawn and will be followed. Start Heparin drip  As per PE prptocol   Check venous duplex of both lower extremity ( 17- Venous duplex negative for DVT)  Stress ulcer prophylaxis  DVT prophylaxis: heparin Drip   Follow Serial H&H   Diet:NPO  Consider Palliative care consult- Poor prognosis   Will defer respective systems problem management to primary and other consultant and follow patient in ICU with primary and other medical team  Further recommendations will be based on the patient's response to recommended treatment and results of the investigation ordered.      Subjective/History:   Ms Nixon Melvin has been seen and evaluated at the request of  Wanda Merino  for Acute respiratory distress in the setting ofmetastatic breast cancer and Pulmonary embolism . Patient is a 66 y.o. female  Who recently presented with neck pain and muscle weakness. Subsequently she was diagnosed with left breast mass with extensive metastatic burden involving Spine/bone, lungs, and adrenal gland. Since this morning there has been interval increase in respiratory distress led to CTA chest that demonstrated Pulmonary embolism with small clot burden. Lung window also demonstrated small pleural effusion and bibasilar atelectasis, and metastatic lesions involving Lung, bones, and adrenal glands. This evening patient's work of breathing worsened to a point requiring Noninvasive ventilation. Patient is on BIPAP for past 30 to 45 minutes with interval improvement in respiratory distress. No other detail history is available because of patient is wearing full face mask. The patient is critically ill and can not provide additional history due to BIPAP. Review of Systems:  Review of systems not obtained due to patient factors. []The patient is unable to give any meaningful history or review of systems because the patient is:  []Intubated []Sedated   []Unresponsive [x]BIPAP     []The patient is critically ill on      []Mechanical ventilation []Pressors   [x]BiPAP []               Past Medical History:  No past medical history on file. Past Surgical History:  No past surgical history on file. Medications:  Prior to Admission medications    Medication Sig Start Date End Date Taking? Authorizing Provider   ibuprofen (MOTRIN) 800 mg tablet Take 1 Tab by mouth every eight (8) hours for 5 days. 1/16/17 1/21/17 Yes Chadd Cornelius MD   cyclobenzaprine (FLEXERIL) 5 mg tablet Take 2 Tabs by mouth three (3) times daily.  1/16/17  Yes Chadd Cornelius MD       Current Facility-Administered Medications   Medication Dose Route Frequency    heparin 25,000 units in D5W 250 ml infusion  18-36 Units/kg/hr IntraVENous TITRATE    0.9% sodium chloride infusion  75 mL/hr IntraVENous CONTINUOUS    albuterol-ipratropium (DUO-NEB) 2.5 MG-0.5 MG/3 ML  3 mL Nebulization Q4H RT    dexamethasone (DECADRON) 4 mg/mL injection 4 mg  4 mg IntraVENous Q6H    calcium acetate-aluminum sulf (DOMEBORO) 5 Packet, sodium chloride irrigation 0.9 % 500 mL   Topical BID    influenza vaccine - (36mos+)(PF) (FLUZONE/FLUARIX/FLULAVAL QUAD) injection 0.5 mL  0.5 mL IntraMUSCular PRIOR TO DISCHARGE    famotidine (PEPCID) tablet 20 mg  20 mg Oral BID    docusate sodium (COLACE) capsule 100 mg  100 mg Oral BID    multivitamin, tx-iron-ca-min (THERA-M w/ IRON) tablet 1 Tab  1 Tab Oral DAILY    piperacillin-tazobactam (ZOSYN) 3.375 g in 0.9% sodium chloride (MBP/ADV) 100 mL MBP  3.375 g IntraVENous Q6H       Allergy:  Allergies   Allergen Reactions    Morphine Nausea and Vomiting        Social History:  Social History   Substance Use Topics    Smoking status: Not on file    Smokeless tobacco: Not on file    Alcohol use Not on file        Family History:  No family history on file. Objective:   Vital Signs:    Blood pressure 178/86, pulse (!) 125, temperature 98.9 °F (37.2 °C), resp. rate 23, height 5' 3\" (1.6 m), weight 90.7 kg (200 lb), SpO2 99 %, not currently breastfeeding. Body mass index is 35.43 kg/(m^2).    O2 Device: BIPAP   O2 Flow Rate (L/min): 5 l/min   Temp (24hrs), Av.5 °F (36.9 °C), Min:97.8 °F (36.6 °C), Max:98.9 °F (37.2 °C)     Patient Vitals for the past 8 hrs:   Temp Pulse Resp BP SpO2   17 1808 - - - - 99 %   17 1752 - - - - 98 %   17 1725 98.9 °F (37.2 °C) (!) 125 23 178/86 96 %   17 1629 97.8 °F (36.6 °C) 98 17 151/79 96 %   17 1235 98.3 °F (36.8 °C) (!) 111 16 144/71 96 %     Intake/Output:   Last shift:         Last 3 shifts:  07 -  1900  In: 760 [P.O.:760]  Out: 200 [Urine:200]    Intake/Output Summary (Last 24 hours) at 17 735 [facility] filed at 01/22/17 0013   Gross per 24 hour   Intake              120 ml   Output                0 ml   Net              120 ml       Ventilator Settings:  Mode Rate Tidal Volume Pressure FiO2 PEEP    BIPAP       10 IPAP/)5 EPAP 45 %       Peak airway pressure:      Minute ventilation: 11.6 l/min      ARDS network Guidelines: Lung protective strategy, Pl pressure goals less than or equal to 30. Physical Exam:  General: Alert and oriented to all spheres, in no apparent distress, alert, cooperative, appears stated age  HEENT: Normal, PERRLA, EOMI,  Neck: No abnormally enlarged lymph nodes.  or thyroid, supple  Lungs: reduced air entry at bases, No wheezing or rhonchi   Heart: Regular rate and rhythm or S1S2 present  Abdomen: obese, Soft, norigidity, no  rebound  Extremity: Trace edema  Capillary refill: normal  Neuro: alert, Moves all extremity   Skin: Skin color, texture, turgor normal. No rashes or lesions or Skin color, texture, turgor     Data:     Recent Results (from the past 24 hour(s))   VANCOMYCIN, TROUGH    Collection Time: 01/21/17  9:55 PM   Result Value Ref Range    Vancomycin,trough 4.7 (L) 10.0 - 20.0 ug/mL    Reported dose date: 20170121      Reported dose time: 1000      Reported dose: 1000 MG UNITS   METABOLIC PANEL, BASIC    Collection Time: 01/22/17  2:56 AM   Result Value Ref Range    Sodium 138 136 - 145 mmol/L    Potassium 4.1 3.5 - 5.5 mmol/L    Chloride 101 100 - 108 mmol/L    CO2 30 21 - 32 mmol/L    Anion gap 7 3.0 - 18 mmol/L    Glucose 142 (H) 74 - 99 mg/dL    BUN 21 (H) 7.0 - 18 MG/DL    Creatinine 0.80 0.6 - 1.3 MG/DL    BUN/Creatinine ratio 26 (H) 12 - 20      GFR est AA >60 >60 ml/min/1.73m2    GFR est non-AA >60 >60 ml/min/1.73m2    Calcium 9.4 8.5 - 10.1 MG/DL   PROTHROMBIN TIME + INR    Collection Time: 01/22/17  2:56 AM   Result Value Ref Range    Prothrombin time 14.9 11.5 - 15.2 sec    INR 1.2 0.8 - 1.2     CBC WITH AUTOMATED DIFF    Collection Time: 01/22/17 2:56 AM   Result Value Ref Range    WBC 12.7 4.6 - 13.2 K/uL    RBC 3.31 (L) 4.20 - 5.30 M/uL    HGB 8.9 (L) 12.0 - 16.0 g/dL    HCT 27.2 (L) 35.0 - 45.0 %    MCV 82.2 74.0 - 97.0 FL    MCH 26.9 24.0 - 34.0 PG    MCHC 32.7 31.0 - 37.0 g/dL    RDW 13.7 11.6 - 14.5 %    PLATELET 722 073 - 257 K/uL    MPV 8.9 (L) 9.2 - 11.8 FL    NEUTROPHILS 82 (H) 40 - 73 %    LYMPHOCYTES 6 (L) 21 - 52 %    MONOCYTES 12 (H) 3 - 10 %    EOSINOPHILS 0 0 - 5 %    BASOPHILS 0 0 - 2 %    ABS. NEUTROPHILS 10.4 (H) 1.8 - 8.0 K/UL    ABS. LYMPHOCYTES 0.8 (L) 0.9 - 3.6 K/UL    ABS. MONOCYTES 1.5 (H) 0.05 - 1.2 K/UL    ABS. EOSINOPHILS 0.0 0.0 - 0.4 K/UL    ABS. BASOPHILS 0.0 0.0 - 0.1 K/UL    DF AUTOMATED     POC G3    Collection Time: 01/22/17  5:40 PM   Result Value Ref Range    Device: NASAL CANNULA      Flow rate (POC) 3.0 L/M    FIO2 (POC) 36 %    pH (POC) 7.398 7.35 - 7.45      pCO2 (POC) 49.2 (H) 35.0 - 45.0 MMHG    pO2 (POC) 80 80 - 100 MMHG    HCO3 (POC) 30.3 (H) 22 - 26 MMOL/L    sO2 (POC) 96 92 - 97 %    Base excess (POC) 5 mmol/L    Allens test (POC) N/A      Total resp. rate 28      Site RIGHT RADIAL      Patient temp.  37.0      Specimen type (POC) ARTERIAL      Performed by Forrest Ramey    GLUCOSE, POC    Collection Time: 01/22/17  5:45 PM   Result Value Ref Range    Glucose (POC) 135 (H) 70 - 110 mg/dL   CARDIAC PANEL,(CK, CKMB & TROPONIN)    Collection Time: 01/22/17  5:45 PM   Result Value Ref Range    CK 48 26 - 192 U/L    CK - MB 1.9 0.5 - 3.6 ng/ml    CK-MB Index 4.0 0.0 - 4.0 %    Troponin-I, Qt. 0.03 0.0 - 0.045 NG/ML   PTT    Collection Time: 01/22/17  5:45 PM   Result Value Ref Range    aPTT 26.4 23.0 - 36.4 SEC   PROTHROMBIN TIME + INR    Collection Time: 01/22/17  5:45 PM   Result Value Ref Range    Prothrombin time 14.0 11.5 - 15.2 sec    INR 1.1 0.8 - 1.2             Recent Labs      01/22/17   1740   FIO2I  36   HCO3I  30.3*   PCO2I  49.2*   PHI  7.398   PO2I  80       All Micro Results     Procedure Component Value Units Date/Time    CULTURE, BLOOD [[id number]] Collected:  01/18/17 1913    Order Status:  Completed Specimen:  Whole Blood from Blood Updated:  01/22/17 0615     Special Requests: NO SPECIAL REQUESTS        Culture result: NO GROWTH 4 DAYS       CULTURE, BLOOD [[id number]] Collected:  01/18/17 1919    Order Status:  Completed Specimen:  Whole Blood from Blood Updated:  01/22/17 0615     Special Requests: NO SPECIAL REQUESTS        Culture result: NO GROWTH 4 DAYS       CULTURE, WOUND Brittny Dayhoff STAIN [[id number]]     Order Status:  Sent Specimen:  Breast     MRSA SCREEN - PCR (NASAL) [[id number]] Collected:  01/18/17 0700    Order Status:  Completed Specimen:  Nasal from Nares Updated:  01/18/17 0833     Special Requests: NO SPECIAL REQUESTS        Culture result:         MRSA target DNA is not detected (presumptive not colonized with MRSA)          Telemetry: normal sinus rhythm          Imaging:  [x]I have personally reviewed the patients chest radiographs images and report       Results from Hospital Encounter encounter on 06/28/12   XR CHEST PA LAT   Narrative Ordering MD: Luma Flynn MD  Signed By: Lexus Melgoza MD  ** FINAL **  ---------------------------------------------------------------------  Procedure Date:  06/28/2012   Accession Number:  [id number]          Order No:   [id number]        Procedure:   RDV - CHEST  2 VIEWS       CPT Code:   92964     Admit Diag:   PLUMONARY EDEMA             Reason:   SHORTNESS OF BREATH  INTERPRETATION:  PA And Lateral Chest 2 Views  CPT CODE: 76007  HISTORY: As above. COMPARISON: April 27, 2007. FINDINGS:  The lungs are clear. The heart and mediastinum are unremarkable. No evidence of pleural effusion. Mild thoracic spondylosis. IMPRESSION:  No evidence of acute cardiopulmonary process. .          Results from [place] encounter on 01/17/17   CT HEAD WO CONT   Narrative CT Of The Head Without Contrast    CPT CODE:  05728    CLINICAL HISTORY: Confusion. Refusing care. .    TECHNIQUE: 5 mm helical scan obtained of the head. All CT scans at this  facility are performed using dose optimization techniques as appropriate to a  performed exam, to include automated exposure control, adjustment of the mA  and/or kV according to patient's size (including appropriate matching for site  specific examinations), or use of iterative reconstruction technique. COMPARISON: 1/18/2017. FINDINGS: There is patient motion that mildly diminishes the sensitivity of this  test for detection of abnormalities. No midline shift, mass effect or abnormal intra-axial or extraaxial fluid  collection or hydrocephalus is seen. No hemorrhage identified. No mass lesion identified. No acute infarction identified. There are heavy arterial calcifications of the carotid arteries. Impression IMPRESSION:     No acute abnormalities. [x]See my orders for details    My assessment, plan of care, findings, medications, side effects etc were discussed with:  [x]nursing []PT/OT    [x]respiratory therapy [x]Dr. Claude Palacios MD   []family []Patient     [x]Total critical care time exclusive of procedures 40 minutes with complex decision making performed and > 50% time spent in face to face evaluation.     Michelle Mulligan MD

## 2025-02-12 NOTE — PROGRESS NOTES
2/12/25:  Patient has completed her 6 month weight loss trial.  She is currently 5 pounds over her starting weight.  She did lose 4 pounds since last month.  She seems to be making positive changes.  She has been scheduled for pre op with Dr. Coleman on 2/18 and was instructed to follow the ketogenic diet for a few days followed by the liquid diet for a few days.  My final clearance will be submitted once she is at or below her starting weight.    Keeley Last MS RD

## 2025-02-18 ENCOUNTER — CLINICAL DOCUMENTATION (OUTPATIENT)
Facility: HOSPITAL | Age: 50
End: 2025-02-18

## 2025-02-18 ENCOUNTER — PREP FOR PROCEDURE (OUTPATIENT)
Age: 50
End: 2025-02-18

## 2025-02-18 ENCOUNTER — OFFICE VISIT (OUTPATIENT)
Age: 50
End: 2025-02-18
Payer: MEDICAID

## 2025-02-18 ENCOUNTER — HOSPITAL ENCOUNTER (OUTPATIENT)
Facility: HOSPITAL | Age: 50
Discharge: HOME | End: 2025-02-21

## 2025-02-18 VITALS
BODY MASS INDEX: 44.41 KG/M2 | DIASTOLIC BLOOD PRESSURE: 67 MMHG | WEIGHT: 293 LBS | TEMPERATURE: 96.9 F | OXYGEN SATURATION: 99 % | HEART RATE: 75 BPM | HEIGHT: 68 IN | SYSTOLIC BLOOD PRESSURE: 135 MMHG

## 2025-02-18 DIAGNOSIS — I10 HYPERTENSION, ESSENTIAL: ICD-10-CM

## 2025-02-18 DIAGNOSIS — E66.01 MORBID OBESITY: ICD-10-CM

## 2025-02-18 DIAGNOSIS — E66.01 MORBID OBESITY: Primary | ICD-10-CM

## 2025-02-18 PROCEDURE — 3075F SYST BP GE 130 - 139MM HG: CPT | Performed by: STUDENT IN AN ORGANIZED HEALTH CARE EDUCATION/TRAINING PROGRAM

## 2025-02-18 PROCEDURE — 99214 OFFICE O/P EST MOD 30 MIN: CPT | Performed by: STUDENT IN AN ORGANIZED HEALTH CARE EDUCATION/TRAINING PROGRAM

## 2025-02-18 PROCEDURE — 3078F DIAST BP <80 MM HG: CPT | Performed by: STUDENT IN AN ORGANIZED HEALTH CARE EDUCATION/TRAINING PROGRAM

## 2025-02-18 NOTE — PROGRESS NOTES
1. Have you been to the ER, urgent care clinic since your last visit?  Hospitalized since your last visit?Yes, back pain after getting out of car    2. Have you seen or consulted any other health care providers outside of the [facility] since your last visit?  Include any pap smears or colon screening. No   
Chief Complaint   Patient presents with    Pre-op Exam     Interested in Gastric Bypass     
kg (328 lb)   Height: 1.727 m (5' 8\")        General: no acute distress, nontoxic in appearance.  Head: Normocephalic, atraumatic  Resp: Unlabored on room air  Cardio: Regular rate and rhythm  Abdomen: soft, nontender, nondistended,   Extremities: Warm, well perfused, no tenderness or swelling  Neuro: Sensation and strength grossly intact and symmetrical.  Psych: Alert and oriented to person, place, and time.    Labs:     Lab Results   Component Value Date/Time    WBC 5.8 09/24/2024 07:53 AM    HGB 11.0 09/24/2024 07:53 AM    HCT 37.6 09/24/2024 07:53 AM     09/24/2024 07:53 AM    MCV 98 09/24/2024 07:53 AM     Lab Results   Component Value Date/Time     09/24/2024 07:53 AM    K 4.3 09/24/2024 07:53 AM    CL 99 09/24/2024 07:53 AM    CO2 28 09/24/2024 07:53 AM    BUN 8 09/24/2024 07:53 AM    GFRAA >60 05/15/2020 10:25 AM    GLOB 2.4 09/24/2024 07:53 AM    ALT 16 09/24/2024 07:53 AM     Lab Results   Component Value Date/Time    IRON 45 09/24/2024 07:53 AM     No results found for: \"RBCF\"  No results found for: \"VITD3\"          Assessment:     This patient is a 49 y.o. obese female with a current Body mass index is 49.87 kg/m². who is considering bariatric surgery, has completed all preoperative requirements, and is ready to be scheduled for surgery. The patient has elected for Laparoscopic Gastric Bypass for surgical weight loss due to their ineffective progress with medical forms of weight loss and the urging of their physicians who care for their primary medical issues. The patient  now presents  for consideration for weight loss surgery understanding the benefits of this over a medical approach of weight loss as was discussed in our seminar on weight loss surgery. They have discussed their plans both with their family and primary care physician who is in support of their pursuit of such.    The possible short and long term  complications of the gastric bypass were also discussed, to include but not

## 2025-02-18 NOTE — H&P (VIEW-ONLY)
Bariatric Surgery Preoperative Visit    Patient: Renea Artis MRN: [id number]  SSN: [id number]    YOB: 1975  Age: 49 y.o.  Sex: female      Subjective:      CC: Bariatric Surgery Preop Visit    Current Weight: 328  Program Entry Weight 332  Body mass index is 49.87 kg/m².  Ideal body weight: 63.9 kg (140 lb 14 oz)  Adjusted ideal body weight: 97.9 kg (215 lb 11.6 oz)  Excess Body Weight: 185    Renea Artis is a 49 y.o. female who is being seen for a preop bariatric consultation. She has completed the preoperative bariatric surgery requirements and presents today to discuss surgical scheduling.     PREOP:  Nutrition: Approved February 2025  Psych Clearance: Approved October 2024  Labs reviewed; hemoglobin A1c 6%.  Nicotine screen was negative.  Vitamin D mildly low at 28.8.  H. pylori breath test was negative.  Otherwise unremarkable  Mammogram BI-RADS 1  Sleep study: AHI 22, moderate to severe sleep apnea.  Patient has been using her CPAP with good compliance.  Upper GI notable for radiographic reflux to the level of the thoracic inlet, otherwise unremarkable    Past Medical History:   Diagnosis Date    Bipolar affective disorder, currently manic, moderate (HCC) 4/27/2016    Chronic midline low back pain with sciatica 11/1/2016    Fibromyalgia 9/1/2015    HLD (hyperlipidemia) 2/4/2016    Hypertension     Hypertriglyceridemia 5/15/2018    Irritable bowel syndrome     Moderate episode of recurrent major depressive disorder (HCC) 4/27/2016    Obesity, Class III, BMI 40-49.9 (morbid obesity) 9/1/2015    Onychomycosis 4/27/2016    Osteoarthritis 2017    PTSD (post-traumatic stress disorder)     Substance abuse ([facility])      Past Surgical History:   Procedure Laterality Date    ANTERIOR CRUCIATE LIGAMENT REPAIR      APPENDECTOMY      BACK SURGERY      CERVICAL DISCECTOMY  02/07/2019    with fusion C5/6 C6/7    CERVICAL FUSION  0513/19    L4-L5 bilateral hemilaminectomy; medial facetectomy; foraminotomy; L5-S1 right

## 2025-02-18 NOTE — PROGRESS NOTES
CLINICAL NUTRITION PRE-OPERATIVE EDUCATION    Patient's Name: Renea Artis   Age: 49 y.o.  YOB: 1975   Sex: female    Education & Materials Provided:   - Soft Protein Diet Shopping List  -  Supplemental Resource Guide: MVI, B12, Calcium Citrate, Vitamin D, Vitamin B1,   and iron recommendations  - Protein Supplement Information  - Fluid Requirements/ No Straws  - No Caffeine or Carbonation   - No alcohol              - No Snacks or No Concentrated Sweets     - Exercising   - Support System at Home/ List of Support Group meetings.   Support System after surgery includes: x     - Key Diet Principles            - Addressed Current Habits/Changes to Make   - Patient has been educated on the liquid diet to begin 1 week prior to surgery.     Patient understands the transition of the diet.       Attendance of support group: Yes    Summary:  Patient has completed the required amount of visits with the Registered Dietitian.   During these nutrition visits, we focused on dietary changes, behavior changes, and the importance of establishing an exercise routine.  The patient understands about the 10 day pre op liquid diet.      At today's session, patient was educated on the post-op diet protocol.  Patient understands the importance of keeping total fat and sugar less than 3 grams per serving.  Patient is aware of the transition of the diet stages and is aware that they will be on clear liquids for 7days, followed by soft protein for 5 weeks.  Patient understands the body needs ~ 60-70 grams of protein per day.  Patient understands that they will need to do 2 protein shakes a day or equivalent to 60-70 grams of protein from supplements to meet their protein needs.    We spent a lot of time talking about the vitamins.  Patient understands the importance of being compliant with the diet protocol and the complications and risks that can occur if they are non-compliant with the nutritional protocol and non-compliant

## 2025-02-28 NOTE — PERIOP NOTE
Instructions for your surgery at [facility]      Today's Date:  2/28/2025      Patient's Name:  Renea Artis           Surgery Date:  3/10              Please enter the main entrance of the hospital and check-in at the front security desk located in the lobby. They will direct you to the area to report for your surgery.     Do NOT eat or drink anything, including candy, gum, or ice chips after midnight prior to your surgery, unless you have specific instructions from your surgeon or anesthesia provider to do so.  Brush your teeth before coming to the hospital. You may swish with water, but do not swallow.  No smoking/Vaping/E-Cigarettes 24 hours prior to the day of surgery.  No alcohol 24 hours prior to the day of surgery.  No recreational drugs for one week prior to the day of surgery.  Bring Photo ID, Insurance information, and Co-pay if required on day of surgery.  Bring in pertinent legal documents, such as, Medical Power of , DNR, Advance Directive, etc.  Leave all valuables, including money/purse, weapons at home.  Remove all jewelry, including ALL body piercings, nail polish, acrylic nails, and makeup (including mascara); no lotions, powders, deodorant, or perfume/cologne/after shave on the skin.  Follow instruction for Hibiclens washes and CHG wipes from surgeon's office.   Glasses and dentures may be worn to the hospital. They must be removed prior to surgery. Please bring case/container for glasses or dentures.   Contact lenses should not be worn on day of surgery.   Call your doctor's office if symptoms of a cold or illness develop within 24-48 hours prior to your surgery.  Call your doctor's office if you have any questions concerning insurance or co-pays.  15. AN ADULT (relative or friend 18 years or older) MUST DRIVE YOU HOME AFTER YOUR SURGERY.  16. Please make arrangements for a responsible adult (18 years or older) to be with you for 24 hours after your surgery.   17.

## 2025-03-03 DIAGNOSIS — Z01.818 PRE-OP TESTING: ICD-10-CM

## 2025-03-03 DIAGNOSIS — Z71.89 ENCOUNTER FOR PRE-BARIATRIC SURGERY COUNSELING AND EDUCATION: ICD-10-CM

## 2025-03-03 DIAGNOSIS — Z01.818 PRE-OP TESTING: Primary | ICD-10-CM

## 2025-03-06 ENCOUNTER — HOSPITAL ENCOUNTER (OUTPATIENT)
Facility: HOSPITAL | Age: 50
Discharge: HOME | End: 2025-03-09
Payer: MEDICAID

## 2025-03-06 LAB
ABO + RH BLD: NORMAL
BLOOD GROUP ANTIBODIES SERPL: NORMAL
SPECIMEN EXP DATE BLD: NORMAL

## 2025-03-06 PROCEDURE — 36415 COLL VENOUS BLD VENIPUNCTURE: CPT

## 2025-03-06 PROCEDURE — 86900 BLOOD TYPING SEROLOGIC ABO: CPT

## 2025-03-06 PROCEDURE — 86901 BLOOD TYPING SEROLOGIC RH(D): CPT

## 2025-03-06 PROCEDURE — 86850 RBC ANTIBODY SCREEN: CPT

## 2025-03-06 RX ORDER — SODIUM CHLORIDE 0.9 % (FLUSH) 0.9 %
5-40 SYRINGE (ML) INJECTION PRN
Status: CANCELLED | OUTPATIENT
Start: 2025-03-10

## 2025-03-06 RX ORDER — SODIUM CHLORIDE 0.9 % (FLUSH) 0.9 %
5-40 SYRINGE (ML) INJECTION EVERY 12 HOURS SCHEDULED
Status: CANCELLED | OUTPATIENT
Start: 2025-03-10

## 2025-03-06 RX ORDER — SCOPOLAMINE 1 MG/3D
1 PATCH, EXTENDED RELEASE TRANSDERMAL
Status: CANCELLED | OUTPATIENT
Start: 2025-03-10 | End: 2025-03-13

## 2025-03-06 RX ORDER — PALONOSETRON 0.05 MG/ML
0.07 INJECTION, SOLUTION INTRAVENOUS ONCE
Status: CANCELLED | OUTPATIENT
Start: 2025-03-10 | End: 2025-03-06

## 2025-03-06 RX ORDER — ACETAMINOPHEN 650 MG/1
650 SUPPOSITORY RECTAL ONCE
Status: CANCELLED | OUTPATIENT
Start: 2025-03-10 | End: 2025-03-06

## 2025-03-06 RX ORDER — ENOXAPARIN SODIUM 100 MG/ML
40 INJECTION SUBCUTANEOUS ONCE
Status: CANCELLED | OUTPATIENT
Start: 2025-03-10 | End: 2025-03-06

## 2025-03-06 RX ORDER — SODIUM CHLORIDE 9 MG/ML
INJECTION, SOLUTION INTRAVENOUS PRN
Status: CANCELLED | OUTPATIENT
Start: 2025-03-10

## 2025-03-06 RX ORDER — SODIUM CHLORIDE, SODIUM LACTATE, POTASSIUM CHLORIDE, CALCIUM CHLORIDE 600; 310; 30; 20 MG/100ML; MG/100ML; MG/100ML; MG/100ML
INJECTION, SOLUTION INTRAVENOUS CONTINUOUS
Status: CANCELLED | OUTPATIENT
Start: 2025-03-10

## 2025-03-08 DIAGNOSIS — E78.2 MIXED HYPERLIPIDEMIA: ICD-10-CM

## 2025-03-08 DIAGNOSIS — I10 PRIMARY HYPERTENSION: ICD-10-CM

## 2025-03-09 ENCOUNTER — ANESTHESIA EVENT (OUTPATIENT)
Facility: HOSPITAL | Age: 50
End: 2025-03-09
Payer: MEDICAID

## 2025-03-10 ENCOUNTER — ANESTHESIA (OUTPATIENT)
Facility: HOSPITAL | Age: 50
End: 2025-03-10
Payer: MEDICAID

## 2025-03-10 ENCOUNTER — HOSPITAL ENCOUNTER (OUTPATIENT)
Facility: HOSPITAL | Age: 50
Setting detail: OBSERVATION
LOS: 1 days | Discharge: HOME | End: 2025-03-11
Attending: STUDENT IN AN ORGANIZED HEALTH CARE EDUCATION/TRAINING PROGRAM | Admitting: STUDENT IN AN ORGANIZED HEALTH CARE EDUCATION/TRAINING PROGRAM
Payer: MEDICAID

## 2025-03-10 DIAGNOSIS — G89.18 ACUTE POST-OPERATIVE PAIN: Primary | ICD-10-CM

## 2025-03-10 LAB
AMPHET UR QL SCN: NEGATIVE
BARBITURATES UR QL SCN: NEGATIVE
BENZODIAZ UR QL: NEGATIVE
CANNABINOIDS UR QL SCN: NEGATIVE
COCAINE UR QL SCN: NEGATIVE
HCG UR QL: NEGATIVE
Lab: NORMAL
METHADONE UR QL: NEGATIVE
OPIATES UR QL: NEGATIVE
PCP UR QL: NEGATIVE

## 2025-03-10 PROCEDURE — 6360000002 HC RX W HCPCS: Performed by: ANESTHESIOLOGY

## 2025-03-10 PROCEDURE — 47379 UNLISTED LAPS PX LIVER: CPT | Performed by: STUDENT IN AN ORGANIZED HEALTH CARE EDUCATION/TRAINING PROGRAM

## 2025-03-10 PROCEDURE — 2580000003 HC RX 258: Performed by: STUDENT IN AN ORGANIZED HEALTH CARE EDUCATION/TRAINING PROGRAM

## 2025-03-10 PROCEDURE — 80307 DRUG TEST PRSMV CHEM ANLYZR: CPT

## 2025-03-10 PROCEDURE — 3600000019 HC SURGERY ROBOT ADDTL 15MIN: Performed by: STUDENT IN AN ORGANIZED HEALTH CARE EDUCATION/TRAINING PROGRAM

## 2025-03-10 PROCEDURE — 47100 WEDGE BIOPSY OF LIVER: CPT | Performed by: STUDENT IN AN ORGANIZED HEALTH CARE EDUCATION/TRAINING PROGRAM

## 2025-03-10 PROCEDURE — 6360000002 HC RX W HCPCS: Performed by: NURSE ANESTHETIST, CERTIFIED REGISTERED

## 2025-03-10 PROCEDURE — 2500000003 HC RX 250 WO HCPCS: Performed by: NURSE ANESTHETIST, CERTIFIED REGISTERED

## 2025-03-10 PROCEDURE — 6360000002 HC RX W HCPCS: Performed by: STUDENT IN AN ORGANIZED HEALTH CARE EDUCATION/TRAINING PROGRAM

## 2025-03-10 PROCEDURE — 2580000003 HC RX 258: Performed by: NURSE ANESTHETIST, CERTIFIED REGISTERED

## 2025-03-10 PROCEDURE — 43644 LAP GASTRIC BYPASS/ROUX-EN-Y: CPT | Performed by: STUDENT IN AN ORGANIZED HEALTH CARE EDUCATION/TRAINING PROGRAM

## 2025-03-10 PROCEDURE — 3700000001 HC ADD 15 MINUTES (ANESTHESIA): Performed by: STUDENT IN AN ORGANIZED HEALTH CARE EDUCATION/TRAINING PROGRAM

## 2025-03-10 PROCEDURE — 6370000000 HC RX 637 (ALT 250 FOR IP): Performed by: STUDENT IN AN ORGANIZED HEALTH CARE EDUCATION/TRAINING PROGRAM

## 2025-03-10 PROCEDURE — 7100000001 HC PACU RECOVERY - ADDTL 15 MIN: Performed by: STUDENT IN AN ORGANIZED HEALTH CARE EDUCATION/TRAINING PROGRAM

## 2025-03-10 PROCEDURE — 2720000010 HC SURG SUPPLY STERILE: Performed by: STUDENT IN AN ORGANIZED HEALTH CARE EDUCATION/TRAINING PROGRAM

## 2025-03-10 PROCEDURE — 3700000000 HC ANESTHESIA ATTENDED CARE: Performed by: STUDENT IN AN ORGANIZED HEALTH CARE EDUCATION/TRAINING PROGRAM

## 2025-03-10 PROCEDURE — 88313 SPECIAL STAINS GROUP 2: CPT

## 2025-03-10 PROCEDURE — 3600000009 HC SURGERY ROBOT BASE: Performed by: STUDENT IN AN ORGANIZED HEALTH CARE EDUCATION/TRAINING PROGRAM

## 2025-03-10 PROCEDURE — 2500000003 HC RX 250 WO HCPCS: Performed by: STUDENT IN AN ORGANIZED HEALTH CARE EDUCATION/TRAINING PROGRAM

## 2025-03-10 PROCEDURE — 7100000000 HC PACU RECOVERY - FIRST 15 MIN: Performed by: STUDENT IN AN ORGANIZED HEALTH CARE EDUCATION/TRAINING PROGRAM

## 2025-03-10 PROCEDURE — S2900 ROBOTIC SURGICAL SYSTEM: HCPCS | Performed by: STUDENT IN AN ORGANIZED HEALTH CARE EDUCATION/TRAINING PROGRAM

## 2025-03-10 PROCEDURE — 1100000000 HC RM PRIVATE

## 2025-03-10 PROCEDURE — 88307 TISSUE EXAM BY PATHOLOGIST: CPT

## 2025-03-10 PROCEDURE — 81025 URINE PREGNANCY TEST: CPT

## 2025-03-10 PROCEDURE — 2709999900 HC NON-CHARGEABLE SUPPLY: Performed by: STUDENT IN AN ORGANIZED HEALTH CARE EDUCATION/TRAINING PROGRAM

## 2025-03-10 RX ORDER — ACETAMINOPHEN 160 MG/5ML
650 LIQUID ORAL EVERY 6 HOURS
Status: DISCONTINUED | OUTPATIENT
Start: 2025-03-10 | End: 2025-03-11 | Stop reason: HOSPADM

## 2025-03-10 RX ORDER — SODIUM CHLORIDE 0.9 % (FLUSH) 0.9 %
5-40 SYRINGE (ML) INJECTION PRN
Status: DISCONTINUED | OUTPATIENT
Start: 2025-03-10 | End: 2025-03-11 | Stop reason: HOSPADM

## 2025-03-10 RX ORDER — SODIUM CHLORIDE 0.9 % (FLUSH) 0.9 %
5-40 SYRINGE (ML) INJECTION EVERY 12 HOURS SCHEDULED
Status: DISCONTINUED | OUTPATIENT
Start: 2025-03-10 | End: 2025-03-10 | Stop reason: HOSPADM

## 2025-03-10 RX ORDER — ACETAMINOPHEN 650 MG/1
650 SUPPOSITORY RECTAL ONCE
Status: DISCONTINUED | OUTPATIENT
Start: 2025-03-10 | End: 2025-03-10 | Stop reason: HOSPADM

## 2025-03-10 RX ORDER — SCOPOLAMINE 1 MG/3D
1 PATCH, EXTENDED RELEASE TRANSDERMAL
Status: DISCONTINUED | OUTPATIENT
Start: 2025-03-10 | End: 2025-03-10

## 2025-03-10 RX ORDER — MEPERIDINE HYDROCHLORIDE 25 MG/ML
12.5 INJECTION INTRAMUSCULAR; INTRAVENOUS; SUBCUTANEOUS EVERY 5 MIN PRN
Status: DISCONTINUED | OUTPATIENT
Start: 2025-03-10 | End: 2025-03-10 | Stop reason: HOSPADM

## 2025-03-10 RX ORDER — ONDANSETRON 2 MG/ML
4 INJECTION INTRAMUSCULAR; INTRAVENOUS EVERY 6 HOURS PRN
Status: DISCONTINUED | OUTPATIENT
Start: 2025-03-10 | End: 2025-03-11 | Stop reason: HOSPADM

## 2025-03-10 RX ORDER — SODIUM CHLORIDE, SODIUM LACTATE, POTASSIUM CHLORIDE, CALCIUM CHLORIDE 600; 310; 30; 20 MG/100ML; MG/100ML; MG/100ML; MG/100ML
INJECTION, SOLUTION INTRAVENOUS CONTINUOUS
Status: DISCONTINUED | OUTPATIENT
Start: 2025-03-10 | End: 2025-03-10 | Stop reason: HOSPADM

## 2025-03-10 RX ORDER — DEXAMETHASONE SODIUM PHOSPHATE 4 MG/ML
INJECTION, SOLUTION INTRA-ARTICULAR; INTRALESIONAL; INTRAMUSCULAR; INTRAVENOUS; SOFT TISSUE
Status: DISCONTINUED | OUTPATIENT
Start: 2025-03-10 | End: 2025-03-10 | Stop reason: SDUPTHER

## 2025-03-10 RX ORDER — METOCLOPRAMIDE HYDROCHLORIDE 5 MG/ML
10 INJECTION INTRAMUSCULAR; INTRAVENOUS EVERY 6 HOURS PRN
Status: DISCONTINUED | OUTPATIENT
Start: 2025-03-10 | End: 2025-03-11 | Stop reason: HOSPADM

## 2025-03-10 RX ORDER — OXYCODONE HYDROCHLORIDE 5 MG/1
5 TABLET ORAL
Status: DISCONTINUED | OUTPATIENT
Start: 2025-03-10 | End: 2025-03-10 | Stop reason: HOSPADM

## 2025-03-10 RX ORDER — ROCURONIUM BROMIDE 10 MG/ML
INJECTION, SOLUTION INTRAVENOUS
Status: DISCONTINUED | OUTPATIENT
Start: 2025-03-10 | End: 2025-03-10 | Stop reason: SDUPTHER

## 2025-03-10 RX ORDER — ONDANSETRON 2 MG/ML
4 INJECTION INTRAMUSCULAR; INTRAVENOUS
Status: DISCONTINUED | OUTPATIENT
Start: 2025-03-10 | End: 2025-03-10 | Stop reason: HOSPADM

## 2025-03-10 RX ORDER — BUPROPION HYDROCHLORIDE 100 MG/1
200 TABLET, EXTENDED RELEASE ORAL 2 TIMES DAILY
Status: DISCONTINUED | OUTPATIENT
Start: 2025-03-10 | End: 2025-03-10

## 2025-03-10 RX ORDER — DROPERIDOL 2.5 MG/ML
0.62 INJECTION, SOLUTION INTRAMUSCULAR; INTRAVENOUS
Status: COMPLETED | OUTPATIENT
Start: 2025-03-10 | End: 2025-03-10

## 2025-03-10 RX ORDER — PROPOFOL 10 MG/ML
INJECTION, EMULSION INTRAVENOUS
Status: DISCONTINUED | OUTPATIENT
Start: 2025-03-10 | End: 2025-03-10 | Stop reason: SDUPTHER

## 2025-03-10 RX ORDER — ROSUVASTATIN CALCIUM 20 MG/1
20 TABLET, COATED ORAL NIGHTLY
Qty: 90 TABLET | Refills: 2 | Status: SHIPPED | OUTPATIENT
Start: 2025-03-10

## 2025-03-10 RX ORDER — LOSARTAN POTASSIUM 50 MG/1
50 TABLET ORAL DAILY
Qty: 90 TABLET | Refills: 2 | Status: SHIPPED | OUTPATIENT
Start: 2025-03-10 | End: 2025-03-11 | Stop reason: HOSPADM

## 2025-03-10 RX ORDER — FENTANYL CITRATE 50 UG/ML
25 INJECTION, SOLUTION INTRAMUSCULAR; INTRAVENOUS EVERY 5 MIN PRN
Status: DISCONTINUED | OUTPATIENT
Start: 2025-03-10 | End: 2025-03-10 | Stop reason: HOSPADM

## 2025-03-10 RX ORDER — SODIUM CHLORIDE 0.9 % (FLUSH) 0.9 %
5-40 SYRINGE (ML) INJECTION PRN
Status: DISCONTINUED | OUTPATIENT
Start: 2025-03-10 | End: 2025-03-10 | Stop reason: HOSPADM

## 2025-03-10 RX ORDER — SODIUM CHLORIDE 9 MG/ML
INJECTION, SOLUTION INTRAVENOUS PRN
Status: DISCONTINUED | OUTPATIENT
Start: 2025-03-10 | End: 2025-03-10 | Stop reason: HOSPADM

## 2025-03-10 RX ORDER — MIDAZOLAM HYDROCHLORIDE 1 MG/ML
INJECTION, SOLUTION INTRAMUSCULAR; INTRAVENOUS
Status: DISCONTINUED | OUTPATIENT
Start: 2025-03-10 | End: 2025-03-10 | Stop reason: SDUPTHER

## 2025-03-10 RX ORDER — HYOSCYAMINE SULFATE 0.12 MG/1
0.12 TABLET SUBLINGUAL EVERY 4 HOURS PRN
Status: DISCONTINUED | OUTPATIENT
Start: 2025-03-10 | End: 2025-03-11 | Stop reason: HOSPADM

## 2025-03-10 RX ORDER — ONDANSETRON 4 MG/1
4 TABLET, ORALLY DISINTEGRATING ORAL EVERY 8 HOURS PRN
Status: DISCONTINUED | OUTPATIENT
Start: 2025-03-10 | End: 2025-03-11 | Stop reason: HOSPADM

## 2025-03-10 RX ORDER — SODIUM CHLORIDE 0.9 % (FLUSH) 0.9 %
5-40 SYRINGE (ML) INJECTION EVERY 12 HOURS SCHEDULED
Status: DISCONTINUED | OUTPATIENT
Start: 2025-03-10 | End: 2025-03-11 | Stop reason: HOSPADM

## 2025-03-10 RX ORDER — SODIUM CHLORIDE, SODIUM LACTATE, POTASSIUM CHLORIDE, CALCIUM CHLORIDE 600; 310; 30; 20 MG/100ML; MG/100ML; MG/100ML; MG/100ML
INJECTION, SOLUTION INTRAVENOUS CONTINUOUS
Status: DISCONTINUED | OUTPATIENT
Start: 2025-03-10 | End: 2025-03-11 | Stop reason: HOSPADM

## 2025-03-10 RX ORDER — LOSARTAN POTASSIUM 50 MG/1
50 TABLET ORAL DAILY
Status: DISCONTINUED | OUTPATIENT
Start: 2025-03-11 | End: 2025-03-11 | Stop reason: HOSPADM

## 2025-03-10 RX ORDER — PALONOSETRON 0.05 MG/ML
0.07 INJECTION, SOLUTION INTRAVENOUS ONCE
Status: COMPLETED | OUTPATIENT
Start: 2025-03-10 | End: 2025-03-10

## 2025-03-10 RX ORDER — ENOXAPARIN SODIUM 100 MG/ML
40 INJECTION SUBCUTANEOUS ONCE
Status: COMPLETED | OUTPATIENT
Start: 2025-03-10 | End: 2025-03-10

## 2025-03-10 RX ORDER — ACETAMINOPHEN 120 MG/1
SUPPOSITORY RECTAL PRN
Status: DISCONTINUED | OUTPATIENT
Start: 2025-03-10 | End: 2025-03-10 | Stop reason: ALTCHOICE

## 2025-03-10 RX ORDER — GABAPENTIN 400 MG/1
800 CAPSULE ORAL EVERY 8 HOURS SCHEDULED
Status: DISCONTINUED | OUTPATIENT
Start: 2025-03-10 | End: 2025-03-11 | Stop reason: HOSPADM

## 2025-03-10 RX ORDER — NALOXONE HYDROCHLORIDE 0.4 MG/ML
INJECTION, SOLUTION INTRAMUSCULAR; INTRAVENOUS; SUBCUTANEOUS PRN
Status: DISCONTINUED | OUTPATIENT
Start: 2025-03-10 | End: 2025-03-10 | Stop reason: HOSPADM

## 2025-03-10 RX ORDER — FENTANYL CITRATE 50 UG/ML
INJECTION, SOLUTION INTRAMUSCULAR; INTRAVENOUS
Status: DISCONTINUED | OUTPATIENT
Start: 2025-03-10 | End: 2025-03-10 | Stop reason: SDUPTHER

## 2025-03-10 RX ORDER — ACETAMINOPHEN 325 MG/1
650 TABLET ORAL
Status: DISCONTINUED | OUTPATIENT
Start: 2025-03-10 | End: 2025-03-10 | Stop reason: HOSPADM

## 2025-03-10 RX ORDER — ONDANSETRON 2 MG/ML
INJECTION INTRAMUSCULAR; INTRAVENOUS
Status: DISCONTINUED | OUTPATIENT
Start: 2025-03-10 | End: 2025-03-10 | Stop reason: SDUPTHER

## 2025-03-10 RX ORDER — LAMOTRIGINE 100 MG/1
200 TABLET ORAL EVERY EVENING
Status: DISCONTINUED | OUTPATIENT
Start: 2025-03-10 | End: 2025-03-11 | Stop reason: HOSPADM

## 2025-03-10 RX ORDER — SIMETHICONE 80 MG
80 TABLET,CHEWABLE ORAL EVERY 6 HOURS PRN
Status: DISCONTINUED | OUTPATIENT
Start: 2025-03-10 | End: 2025-03-11 | Stop reason: HOSPADM

## 2025-03-10 RX ORDER — DEXMEDETOMIDINE HYDROCHLORIDE 100 UG/ML
INJECTION, SOLUTION INTRAVENOUS
Status: DISCONTINUED | OUTPATIENT
Start: 2025-03-10 | End: 2025-03-10 | Stop reason: SDUPTHER

## 2025-03-10 RX ORDER — LIDOCAINE HYDROCHLORIDE 20 MG/ML
INJECTION, SOLUTION EPIDURAL; INFILTRATION; INTRACAUDAL; PERINEURAL
Status: DISCONTINUED | OUTPATIENT
Start: 2025-03-10 | End: 2025-03-10 | Stop reason: SDUPTHER

## 2025-03-10 RX ORDER — HYDRALAZINE HYDROCHLORIDE 20 MG/ML
5 INJECTION INTRAMUSCULAR; INTRAVENOUS ONCE
Status: COMPLETED | OUTPATIENT
Start: 2025-03-10 | End: 2025-03-10

## 2025-03-10 RX ORDER — BUPROPION HYDROCHLORIDE 100 MG/1
100 TABLET ORAL 4 TIMES DAILY
Status: DISCONTINUED | OUTPATIENT
Start: 2025-03-10 | End: 2025-03-11 | Stop reason: HOSPADM

## 2025-03-10 RX ORDER — OXYCODONE HYDROCHLORIDE 5 MG/1
5 TABLET ORAL EVERY 4 HOURS PRN
Status: DISCONTINUED | OUTPATIENT
Start: 2025-03-10 | End: 2025-03-11 | Stop reason: HOSPADM

## 2025-03-10 RX ORDER — KETOROLAC TROMETHAMINE 15 MG/ML
15 INJECTION, SOLUTION INTRAMUSCULAR; INTRAVENOUS EVERY 6 HOURS PRN
Status: DISCONTINUED | OUTPATIENT
Start: 2025-03-10 | End: 2025-03-11 | Stop reason: HOSPADM

## 2025-03-10 RX ORDER — CYCLOBENZAPRINE HCL 10 MG
10 TABLET ORAL
Status: DISCONTINUED | OUTPATIENT
Start: 2025-03-10 | End: 2025-03-11 | Stop reason: HOSPADM

## 2025-03-10 RX ORDER — ENOXAPARIN SODIUM 100 MG/ML
30 INJECTION SUBCUTANEOUS EVERY 12 HOURS SCHEDULED
Status: DISCONTINUED | OUTPATIENT
Start: 2025-03-11 | End: 2025-03-11 | Stop reason: HOSPADM

## 2025-03-10 RX ORDER — SCOPOLAMINE 1 MG/3D
1 PATCH, EXTENDED RELEASE TRANSDERMAL
Status: DISCONTINUED | OUTPATIENT
Start: 2025-03-10 | End: 2025-03-11 | Stop reason: HOSPADM

## 2025-03-10 RX ORDER — LAMOTRIGINE 100 MG/1
100 TABLET ORAL DAILY
Status: DISCONTINUED | OUTPATIENT
Start: 2025-03-10 | End: 2025-03-11 | Stop reason: HOSPADM

## 2025-03-10 RX ORDER — HYDRALAZINE HYDROCHLORIDE 20 MG/ML
10 INJECTION INTRAMUSCULAR; INTRAVENOUS EVERY 6 HOURS PRN
Status: DISCONTINUED | OUTPATIENT
Start: 2025-03-10 | End: 2025-03-11 | Stop reason: HOSPADM

## 2025-03-10 RX ORDER — SODIUM CHLORIDE 9 MG/ML
INJECTION, SOLUTION INTRAVENOUS PRN
Status: DISCONTINUED | OUTPATIENT
Start: 2025-03-10 | End: 2025-03-11 | Stop reason: HOSPADM

## 2025-03-10 RX ADMIN — ENOXAPARIN SODIUM 40 MG: 100 INJECTION SUBCUTANEOUS at 06:36

## 2025-03-10 RX ADMIN — GABAPENTIN 800 MG: 400 CAPSULE ORAL at 20:58

## 2025-03-10 RX ADMIN — DEXAMETHASONE SODIUM PHOSPHATE 8 MG: 4 INJECTION INTRA-ARTICULAR; INTRALESIONAL; INTRAMUSCULAR; INTRAVENOUS; SOFT TISSUE at 07:40

## 2025-03-10 RX ADMIN — ONDANSETRON 4 MG: 2 INJECTION INTRAMUSCULAR; INTRAVENOUS at 09:36

## 2025-03-10 RX ADMIN — OXYCODONE HYDROCHLORIDE 5 MG: 5 TABLET ORAL at 17:58

## 2025-03-10 RX ADMIN — BUPROPION HYDROCHLORIDE 100 MG: 100 TABLET, FILM COATED ORAL at 20:56

## 2025-03-10 RX ADMIN — PROPOFOL 200 MG: 10 INJECTION, EMULSION INTRAVENOUS at 07:36

## 2025-03-10 RX ADMIN — BUPROPION HYDROCHLORIDE 100 MG: 100 TABLET, FILM COATED ORAL at 12:49

## 2025-03-10 RX ADMIN — DEXMEDETOMIDINE 10 MCG: 200 INJECTION, SOLUTION INTRAVENOUS at 07:56

## 2025-03-10 RX ADMIN — TRANEXAMIC ACID 1000 MG: 100 INJECTION, SOLUTION INTRAVENOUS at 20:53

## 2025-03-10 RX ADMIN — LAMOTRIGINE 200 MG: 100 TABLET ORAL at 17:58

## 2025-03-10 RX ADMIN — OXYCODONE HYDROCHLORIDE 5 MG: 5 TABLET ORAL at 22:32

## 2025-03-10 RX ADMIN — GABAPENTIN 800 MG: 400 CAPSULE ORAL at 15:24

## 2025-03-10 RX ADMIN — SODIUM CHLORIDE, SODIUM LACTATE, POTASSIUM CHLORIDE, AND CALCIUM CHLORIDE: 600; 310; 30; 20 INJECTION, SOLUTION INTRAVENOUS at 08:56

## 2025-03-10 RX ADMIN — HYDROMORPHONE HYDROCHLORIDE 0.5 MG: 1 INJECTION, SOLUTION INTRAMUSCULAR; INTRAVENOUS; SUBCUTANEOUS at 11:01

## 2025-03-10 RX ADMIN — KETOROLAC TROMETHAMINE 15 MG: 15 INJECTION, SOLUTION INTRAMUSCULAR; INTRAVENOUS at 20:12

## 2025-03-10 RX ADMIN — FENTANYL CITRATE 75 MCG: 50 INJECTION INTRAMUSCULAR; INTRAVENOUS at 09:36

## 2025-03-10 RX ADMIN — SODIUM CHLORIDE, PRESERVATIVE FREE 10 ML: 5 INJECTION INTRAVENOUS at 20:13

## 2025-03-10 RX ADMIN — DEXMEDETOMIDINE 10 MCG: 200 INJECTION, SOLUTION INTRAVENOUS at 07:51

## 2025-03-10 RX ADMIN — SODIUM CHLORIDE, POTASSIUM CHLORIDE, SODIUM LACTATE AND CALCIUM CHLORIDE: 600; 310; 30; 20 INJECTION, SOLUTION INTRAVENOUS at 20:07

## 2025-03-10 RX ADMIN — HYDRALAZINE HYDROCHLORIDE 5 MG: 20 INJECTION INTRAMUSCULAR; INTRAVENOUS at 11:36

## 2025-03-10 RX ADMIN — SUGAMMADEX 200 MG: 100 INJECTION, SOLUTION INTRAVENOUS at 09:36

## 2025-03-10 RX ADMIN — LAMOTRIGINE 100 MG: 100 TABLET ORAL at 12:49

## 2025-03-10 RX ADMIN — TRANEXAMIC ACID 1000 MG: 100 INJECTION INTRAVENOUS at 07:41

## 2025-03-10 RX ADMIN — ROCURONIUM BROMIDE 20 MG: 50 INJECTION INTRAVENOUS at 08:55

## 2025-03-10 RX ADMIN — MIDAZOLAM 2 MG: 1 INJECTION, SOLUTION INTRAMUSCULAR; INTRAVENOUS at 07:30

## 2025-03-10 RX ADMIN — HYOSCYAMINE SULFATE 0.12 MG: 0.12 TABLET SUBLINGUAL at 20:11

## 2025-03-10 RX ADMIN — WATER 3000 MG: 1 INJECTION INTRAMUSCULAR; INTRAVENOUS; SUBCUTANEOUS at 07:40

## 2025-03-10 RX ADMIN — DROPERIDOL 0.62 MG: 2.5 INJECTION, SOLUTION INTRAMUSCULAR; INTRAVENOUS at 10:18

## 2025-03-10 RX ADMIN — ACETAMINOPHEN 650 MG: 650 SOLUTION ORAL at 12:49

## 2025-03-10 RX ADMIN — SIMETHICONE 80 MG: 80 TABLET, CHEWABLE ORAL at 12:49

## 2025-03-10 RX ADMIN — SODIUM CHLORIDE, SODIUM LACTATE, POTASSIUM CHLORIDE, AND CALCIUM CHLORIDE: 600; 310; 30; 20 INJECTION, SOLUTION INTRAVENOUS at 06:38

## 2025-03-10 RX ADMIN — BUPROPION HYDROCHLORIDE 100 MG: 100 TABLET, FILM COATED ORAL at 15:24

## 2025-03-10 RX ADMIN — FENTANYL CITRATE 25 MCG: 0.05 INJECTION, SOLUTION INTRAMUSCULAR; INTRAVENOUS at 10:15

## 2025-03-10 RX ADMIN — KETOROLAC TROMETHAMINE 15 MG: 15 INJECTION, SOLUTION INTRAMUSCULAR; INTRAVENOUS at 12:49

## 2025-03-10 RX ADMIN — ROCURONIUM BROMIDE 50 MG: 50 INJECTION INTRAVENOUS at 07:49

## 2025-03-10 RX ADMIN — FENTANYL CITRATE 25 MCG: 50 INJECTION INTRAMUSCULAR; INTRAVENOUS at 08:45

## 2025-03-10 RX ADMIN — LIDOCAINE HYDROCHLORIDE 100 MG: 20 INJECTION, SOLUTION EPIDURAL; INFILTRATION; INTRACAUDAL; PERINEURAL at 07:36

## 2025-03-10 RX ADMIN — FAMOTIDINE 20 MG: 10 INJECTION, SOLUTION INTRAVENOUS at 06:32

## 2025-03-10 RX ADMIN — DEXMEDETOMIDINE 10 MCG: 200 INJECTION, SOLUTION INTRAVENOUS at 08:46

## 2025-03-10 RX ADMIN — ONDANSETRON 4 MG: 2 INJECTION, SOLUTION INTRAMUSCULAR; INTRAVENOUS at 17:58

## 2025-03-10 RX ADMIN — PALONOSETRON 0.07 MG: 0.05 INJECTION, SOLUTION INTRAVENOUS at 06:34

## 2025-03-10 RX ADMIN — SODIUM CHLORIDE, POTASSIUM CHLORIDE, SODIUM LACTATE AND CALCIUM CHLORIDE: 600; 310; 30; 20 INJECTION, SOLUTION INTRAVENOUS at 11:12

## 2025-03-10 RX ADMIN — ACETAMINOPHEN 650 MG: 650 SOLUTION ORAL at 17:58

## 2025-03-10 RX ADMIN — HYDROMORPHONE HYDROCHLORIDE 0.5 MG: 1 INJECTION, SOLUTION INTRAMUSCULAR; INTRAVENOUS; SUBCUTANEOUS at 10:19

## 2025-03-10 ASSESSMENT — PAIN DESCRIPTION - DESCRIPTORS
DESCRIPTORS: ACHING;DISCOMFORT
DESCRIPTORS: ACHING
DESCRIPTORS: ACHING;DISCOMFORT
DESCRIPTORS: THROBBING
DESCRIPTORS: ACHING

## 2025-03-10 ASSESSMENT — PAIN SCALES - WONG BAKER
WONGBAKER_NUMERICALRESPONSE: NO HURT

## 2025-03-10 ASSESSMENT — PAIN SCALES - GENERAL
PAINLEVEL_OUTOF10: 0
PAINLEVEL_OUTOF10: 4
PAINLEVEL_OUTOF10: 5
PAINLEVEL_OUTOF10: 4
PAINLEVEL_OUTOF10: 4
PAINLEVEL_OUTOF10: 6
PAINLEVEL_OUTOF10: 0
PAINLEVEL_OUTOF10: 4
PAINLEVEL_OUTOF10: 8
PAINLEVEL_OUTOF10: 6
PAINLEVEL_OUTOF10: 4
PAINLEVEL_OUTOF10: 9
PAINLEVEL_OUTOF10: 0

## 2025-03-10 ASSESSMENT — PAIN DESCRIPTION - FREQUENCY
FREQUENCY: INTERMITTENT

## 2025-03-10 ASSESSMENT — PAIN DESCRIPTION - LOCATION
LOCATION: ABDOMEN

## 2025-03-10 ASSESSMENT — PAIN DESCRIPTION - ORIENTATION
ORIENTATION: ANTERIOR
ORIENTATION: ANTERIOR;MID
ORIENTATION: ANTERIOR

## 2025-03-10 ASSESSMENT — PAIN DESCRIPTION - ONSET
ONSET: ON-GOING

## 2025-03-10 ASSESSMENT — PAIN DESCRIPTION - PAIN TYPE
TYPE: SURGICAL PAIN
TYPE: SURGICAL PAIN
TYPE: ACUTE PAIN;SURGICAL PAIN
TYPE: SURGICAL PAIN
TYPE: SURGICAL PAIN
TYPE: ACUTE PAIN;SURGICAL PAIN
TYPE: SURGICAL PAIN
TYPE: ACUTE PAIN;SURGICAL PAIN
TYPE: SURGICAL PAIN
TYPE: SURGICAL PAIN

## 2025-03-10 NOTE — PERIOP NOTE
Patient /Family /Designee has been informed that [facility] is not responsible for patient belongings per policy and the signed [facility] Patient Agreement document.  Personal items should be sent home or checked in with security.  Patient /Family /Designee selected the following action:                            []  Send personal items home with a family member or friend                                                 []  Check in personal items with security, excluding clothing                            [x]  Maintain personal items at the bedside, against recommendation                                 by Moi Inman [facility]                                   ** If patient /family /designee chooses to maintain personal items at the bedside,                                      Complete the patient belongings inventory in the EMR.

## 2025-03-10 NOTE — PLAN OF CARE
Problem: Safety - Adult  Goal: Free from fall injury  Outcome: Progressing  Flowsheets (Taken 3/10/2025 1307)  Free From Fall Injury: Instruct family/caregiver on patient safety  Note: Patient is admitted for gastric bypass. They will remain free from falls or injuries. Instruct family/caregiver on patient safety.      Problem: Pain  Goal: Verbalizes/displays adequate comfort level or baseline comfort level  Outcome: Progressing  Flowsheets (Taken 3/10/2025 1307)  Verbalizes/displays adequate comfort level or baseline comfort level: Encourage patient to monitor pain and request assistance  Note: Pain will be managed and controlled with a pain level of 4 /10 or less. Patient educated on notifying nurse if pain increases above 4.      Problem: Discharge Planning  Goal: Discharge to home or other facility with appropriate resources  Outcome: Progressing  Flowsheets (Taken 3/10/2025 1307)  Discharge to home or other facility with appropriate resources: Identify barriers to discharge with patient and caregiver  Note: Discharge planning will begin or continue to meet patient goals. Patient will be discharged from [facility] to home or residence of choice in stable condition.      Problem: Respiratory - Adult  Goal: Achieves optimal ventilation and oxygenation  Outcome: Progressing  Flowsheets (Taken 3/10/2025 1307)  Achieves optimal ventilation and oxygenation:   Assess for changes in respiratory status   Position to facilitate oxygenation and minimize respiratory effort   Respiratory therapy support as indicated   Assess for changes in mentation and behavior   Encourage broncho-pulmonary hygiene including cough, deep breathe, incentive spirometry  Note: Patient using ICS post-operatively 5-10 per shift or as otherwise directed to reduce possible complications.      Problem: Skin/Tissue Integrity - Adult  Goal: Incisions, wounds, or drain sites healing without S/S of infection  Outcome: Progressing  Flowsheets (Taken 3/10/2025

## 2025-03-10 NOTE — PROGRESS NOTES
Arrival note:    Patient was received from the PACU at 1215 for routine post-op care to [facility] [address]      Report received from nurse TRANG Acharya with Situation, Background, Assessment and Recommendations (SBAR).      Patient oriented to room 516, use of call bell, safety measures, and was assisted to the bathroom with walker use and one person assist.  Alert and oriented times 4, belongings at bedside. Patient assisted into chair after restroom use.     Care on-going.

## 2025-03-10 NOTE — CARE COORDINATION
03/10/25 1329   Service Assessment   Patient Orientation Alert and Oriented;Person;Place;Situation;Self   Cognition Alert   History Provided By Patient   Primary Caregiver Self   Accompanied By/Relationship Self   Support Systems Family Members   Patient's Healthcare Decision Maker is: Legal Next of Kin   PCP Verified by CM Yes   Last Visit to PCP Within last 3 months   Prior Functional Level Independent in ADLs/IADLs   Current Functional Level Independent in ADLs/IADLs   Can patient return to prior living arrangement Yes   Ability to make needs known: Good   Family able to assist with home care needs: Yes   Financial Resources Medicaid   Community Resources Transportation  ([employer])   CM/SW Referral ADLs/IADLs;Other (see comment)  (discharge planning)   Social/Functional History   Lives With Family   Type of Home House   Home Layout Two level   Bathroom Shower/Tub Tub/Shower unit;Walk-in shower   Bathroom Equipment Shower chair;Grab bars in shower   Home Equipment Cane;Rollator   Receives Help From Family   Prior Level of Assist for ADLs Independent   Prior Level of Assist for Homemaking Independent   Ambulation Assistance Independent   Prior Level of Assist for Transfers Independent   Active  No   Patient's  Info Family   Mode of Transportation Family;Other  ([employer])   Education n/a   Occupation On disability   Type of Occupation n/a   Discharge Planning   Type of Residence House   Living Arrangements Family Members   Current Services Prior To Admission Durable Medical Equipment;C-pap   Current DME Prior to Arrival Other (Comment);Cane  ([employer]itor)   Potential Assistance Needed N/A   Type of Home Care Services OT;PT   Patient expects to be discharged to: House   Follow Up Appointment: Best Day/Time    (TBD)   One/Two Story Residence Two story   # of Interior Steps 15   Interior Rails Left   Lift Chair Available No   History of falls? 0   Services At/After Discharge   Transition of Care Consult (CM

## 2025-03-10 NOTE — TELEPHONE ENCOUNTER
Medication(s) requesting:   Requested Prescriptions     Pending Prescriptions Disp Refills    rosuvastatin (CRESTOR) 20 MG tablet [Pharmacy Med Name: ROSUVASTATIN 20MG TABLETS] 90 tablet 2     Sig: TAKE 1 TABLET BY MOUTH EVERY NIGHT    losartan (COZAAR) 50 MG tablet [Pharmacy Med Name: LOSARTAN 50MG TABLETS] 30 tablet 5     Sig: TAKE 1 TABLET BY MOUTH DAILY       Last office visit:  01/07/25  Next office visit DMA: 4/7/2025

## 2025-03-10 NOTE — ANESTHESIA PRE PROCEDURE
Department of Anesthesiology  Preprocedure Note       Name:  Renea Artis   Age:  49 y.o.  :  1975                                          MRN:  [id number]         Date:  3/10/2025      Surgeon: Surgeon(s):  Levon Coleman MD    Procedure: Procedure(s):  ROBOTIC LAPAROSCOPIC GASTRIC BYPASS NÉSTOR-EN-Y WITH LIVER WEDGE BIOPSY, POSSIBLE HIATAL HERNIA REPAIR    Medications prior to admission:   Prior to Admission medications    Medication Sig Start Date End Date Taking? Authorizing Provider   gabapentin (NEURONTIN) 800 MG tablet Take 1 tablet by mouth 3 times daily for 180 days. Max Daily Amount: 2,400 mg 25 Yes Earline Broussard APRN - NP   vitamin B-12 (CYANOCOBALAMIN) 1000 MCG tablet TAKE 1 TABLET BY MOUTH DAILY 24  Yes Alana Elanie MD   vitamin D3 (CHOLECALCIFEROL) 25 MCG (1000 UT) TABS tablet TAKE 1 TABLET BY MOUTH DAILY 24  Yes Katharina Yi MD   losartan (COZAAR) 50 MG tablet TAKE 1 TABLET BY MOUTH DAILY 10/21/24  Yes Katharina Yi MD   cyclobenzaprine (FLEXERIL) 10 MG tablet Take 1 tablet by mouth nightly as needed for Muscle spasms 24  Yes Chelsey Collins MD   buPROPion (WELLBUTRIN SR) 200 MG extended release tablet Take 1 tablet by mouth 2 times daily   Yes ProviderRadha MD   diclofenac sodium (VOLTAREN) 1 % GEL 4 times daily as needed for Pain 24  Yes ProviderRadha MD   SUMAtriptan (IMITREX) 50 MG tablet Take 1 tablet by mouth daily as needed for Migraine 3/25/24  Yes Katharina Yi MD   rosuvastatin (CRESTOR) 20 MG tablet Take 1 tablet by mouth nightly 3/25/24  Yes Katharina Yi MD   DULoxetine (CYMBALTA) 60 MG extended release capsule Take 1 capsule by mouth 2 times daily   Yes Automatic Reconciliation, Ar   hydrOXYzine HCl (ATARAX) 25 MG tablet Take 1 tablet by mouth 3 times daily as needed for Anxiety   Yes Automatic Reconciliation, Ar   lamoTRIgine (LAMICTAL) 100 MG tablet Take 1 tablet by mouth daily   Yes

## 2025-03-10 NOTE — PROGRESS NOTES
4 Eyes Skin Assessment     NAME:  Renea Artis  YOB: 1975  MEDICAL RECORD NUMBER:  [id number]    The patient is being assessed for  Shift Handoff    I agree that at least one RN has performed a thorough Head to Toe Skin Assessment on the patient. ALL assessment sites listed below have been assessed.      Areas assessed by both nurses:    Head, Face, Ears, Shoulders, Back, Chest, Arms, Elbows, Hands, Sacrum. Buttock, Coccyx, Ischium, Legs. Feet and Heels, and Under Medical Devices         Does the Patient have a Wound? No noted wound(s)       Prosper Prevention initiated by RN: No  Wound Care Orders initiated by RN: No    Pressure Injury (Stage 3,4, Unstageable, DTI, NWPT, and Complex wounds) if present, place Wound referral order by RN under : No    New Ostomies, if present place, Ostomy referral order under : No     Nurse 1 eSignature: Electronically signed by Samreen Busby RN on 3/10/25 at 6:48 PM EDT    **SHARE this note so that the co-signing nurse can place an eSignature**    Nurse 2 eSignature: {Esignature:[id number]}

## 2025-03-10 NOTE — PROGRESS NOTES
Spiritual Health History and Assessment/Progress Note  [facility]    Spiritual/Emotional Needs,  ,  ,      Name: Renea Artis MRN: [id number]    Age: 49 y.o.     Sex: female   Language: English   Adventism: Non-Worship   Morbid obesity     Date: 3/10/2025            Total Time Calculated: 8 min              Spiritual Assessment began in [facility] SURGICAL        Referral/Consult From: Rounding   Encounter Overview/Reason: Spiritual/Emotional Needs  Service Provided For: Patient    Zabrina, Belief, Meaning:   Patient has beliefs or practices that help with coping during difficult times  Family/Friends No family/friends present      Importance and Influence:  Patient has spiritual/personal beliefs that influence decisions regarding their health  Family/Friends No family/friends present    Community:  Patient feels well-supported. Support system includes: Extended family  Family/Friends No family/friends present    Assessment and Plan of Care:     Patient Interventions include: Facilitated expression of thoughts and feelings, Affirmed coping skills/support systems, and Provided sacramental/Hoahaoism ritual  Family/Friends Interventions include: No family/friends present    Patient Plan of Care: Spiritual Care available upon further referral  Family/Friends Plan of Care: No family/friends present    Electronically signed by Chaplain Laura on 3/10/2025 at 1:51 PM

## 2025-03-10 NOTE — OP NOTE
OPERATIVE REPORT     Patient:Renea Artis   : 1975  Medical Record Number:[id number]    Pre-operative Diagnosis:  Morbid obesity [E66.01]  Hypertension, essential [I10]                  Post-operative Diagnosis: Morbid obesity [E66.01]  Hypertension, essential [I10]                 Procedure:   Robotic-assisted Laparoscopic Gastric Bypass, 150 cm Aaron limb  Liver wedge biopsy  Bilateral TAP blocks    Location: [facility]                  Surgeon: Levon Coleman MD                  Assistant: GLENNA Morin   (No qualified resident was available for assistance; assistant was involved in port placement, camera operation, manipulation and retraction of tissue, removing the excised specimen, and skin closure)    Anesthesia: General     Specimen:  Liver wedge biopsy    EBL: less than 10 cc    Findings:  Infection Present At Time Of Surgery (PATOS) (choose all levels that have infection present):  No infection present  Other Findings: N/A    Complications: none      STATEMENT OF MEDICAL NECESSITY: The patient is a 49 y.o.-year-old  female who has had a long-standing history of obesity. she has a preoperative Body mass index is 50.48 kg/m². and obesity related comorbidities, including Hypertension, Hyperlipidemia, and Obstructive Sleep Apnea. She has undergone preoperative evaluation and was felt to be a reasonable candidate for surgery, and has elected for Aaron-en-y Gastric Bypass. I explained to the patient the risks associated with this procedure and she understood all this very clearly and did wish to proceed with operative intervention at this time period.      OPERATIVE PROCEDURE: The patient was brought to the operating room, placed on the table in the supine position at which time period general anesthesia was administered without any difficulty. The abdomen was then prepped and draped in the usual sterile fashion. A 1 cm incision was made in the right lower quadrant.  Access to the abdominal

## 2025-03-10 NOTE — PROGRESS NOTES
Therapeutic Substitution per the P&T Committee approved Therapeutic Interchanges Policy     Nonformulary Medication Formulary Interchange   Bupropion (Wellbutrin SR) 200 mg BID Bupropion  mg QID     Signed,  Juan Ramon Gardner [facility]

## 2025-03-10 NOTE — PROGRESS NOTES
Ambulation note:     Nurse assisted patient out of bed for restroom use and ambulation in the hallway around unit. She is ambulatory with a walker. She was assisted back into bed and positioned for comfort.      She tolerated ambulation well with no increased c/o pain or discomfort. Non-Skid socks in place.      Call bell, ice chips, and bedside table within reach.      Will continue care.

## 2025-03-10 NOTE — INTERVAL H&P NOTE
Update History & Physical    The patient's History and Physical of February 18, 2025 was reviewed with the patient and I examined the patient. There was no change. The surgical site was confirmed by the patient and me.     Plan: The risks, benefits, expected outcome, and alternative to the recommended procedure have been discussed with the patient. Patient understands and wants to proceed with the procedure.     Electronically signed by Levon Coleman MD on 3/10/2025 at 7:19 AM

## 2025-03-10 NOTE — ANESTHESIA POSTPROCEDURE EVALUATION
Department of Anesthesiology  Postprocedure Note    Patient: Renea Artis  MRN: [id number]  YOB: 1975  Date of evaluation: 3/10/2025    Procedure Summary       Date: 03/10/25 Room / Location: [facility] MAIN 04 / [facility] MAIN OR    Anesthesia Start: 0730 Anesthesia Stop: 1003    Procedure: ROBOTIC LAPAROSCOPIC GASTRIC BYPASS NÉSTOR-EN-Y WITH LIVER WEDGE BIOPSY (Abdomen) Diagnosis:       Morbid obesity      Hypertension, essential      (Morbid obesity [E66.01])      (Hypertension, essential [I10])    Surgeons: Levon Coleman MD Responsible Provider: Terry Reyes Jr., MD    Anesthesia Type: General ASA Status: 3            Anesthesia Type: General    Bobby Phase I: Bobby Score: 9    Bobby Phase II:      Anesthesia Post Evaluation    Patient location during evaluation: bedside  Airway patency: patent  Cardiovascular status: hemodynamically stable  Respiratory status: acceptable  Hydration status: stable  Pain management: adequate    No notable events documented.

## 2025-03-10 NOTE — PROGRESS NOTES
Pharmacist Review and Automatic Dose Adjustment of Prophylactic Enoxaparin    *Review reason for admission/hospital problem list*    The reviewing pharmacist has made an adjustment to the ordered enoxaparin dose or converted to UFH per the approved [facility] protocol and table as identified below.        Renea Artis is a 49 y.o. female.     No results for input(s): \"CREATININE\" in the last 72 hours.    CrCl cannot be calculated (Patient's most recent lab result is older than the maximum 30 days allowed.).    Height:   Ht Readings from Last 1 Encounters:   03/10/25 1.727 m (5' 7.99\")     Weight:  Wt Readings from Last 1 Encounters:   03/10/25 (!) 150.6 kg (332 lb)           Plan: Based upon the patient's weight and renal function, the ordered enoxaparin dose of 40 mg BID has been changed/converted to 30 mg BID.       Thank you,    DOLORES DONALDSON [facility]

## 2025-03-11 VITALS
OXYGEN SATURATION: 95 % | WEIGHT: 293 LBS | BODY MASS INDEX: 44.41 KG/M2 | HEART RATE: 82 BPM | HEIGHT: 68 IN | TEMPERATURE: 98.2 F | DIASTOLIC BLOOD PRESSURE: 82 MMHG | RESPIRATION RATE: 16 BRPM | SYSTOLIC BLOOD PRESSURE: 134 MMHG

## 2025-03-11 LAB
ALBUMIN SERPL-MCNC: 3.1 G/DL (ref 3.4–5)
ALBUMIN/GLOB SERPL: 0.8 (ref 0.8–1.7)
ALP SERPL-CCNC: 98 U/L (ref 45–117)
ALT SERPL-CCNC: 72 U/L (ref 13–56)
ANION GAP SERPL CALC-SCNC: 7 MMOL/L (ref 3–18)
AST SERPL-CCNC: 87 U/L (ref 10–38)
BASOPHILS # BLD: 0.02 K/UL (ref 0–0.1)
BASOPHILS NFR BLD: 0.3 % (ref 0–2)
BILIRUB SERPL-MCNC: 0.4 MG/DL (ref 0.2–1)
BUN SERPL-MCNC: 6 MG/DL (ref 7–18)
BUN/CREAT SERPL: 9 (ref 12–20)
CALCIUM SERPL-MCNC: 8.8 MG/DL (ref 8.5–10.1)
CHLORIDE SERPL-SCNC: 104 MMOL/L (ref 100–111)
CO2 SERPL-SCNC: 26 MMOL/L (ref 21–32)
CREAT SERPL-MCNC: 0.7 MG/DL (ref 0.6–1.3)
DIFFERENTIAL METHOD BLD: ABNORMAL
EOSINOPHIL # BLD: 0.06 K/UL (ref 0–0.4)
EOSINOPHIL NFR BLD: 0.8 % (ref 0–5)
ERYTHROCYTE [DISTWIDTH] IN BLOOD BY AUTOMATED COUNT: 14.2 % (ref 11.6–14.5)
GLOBULIN SER CALC-MCNC: 4 G/DL (ref 2–4)
GLUCOSE SERPL-MCNC: 82 MG/DL (ref 74–99)
HCT VFR BLD AUTO: 36 % (ref 35–45)
HGB BLD-MCNC: 11.4 G/DL (ref 12–16)
IMM GRANULOCYTES # BLD AUTO: 0.07 K/UL (ref 0–0.04)
IMM GRANULOCYTES NFR BLD AUTO: 0.9 % (ref 0–0.5)
LYMPHOCYTES # BLD: 1.13 K/UL (ref 0.9–3.6)
LYMPHOCYTES NFR BLD: 14.4 % (ref 21–52)
MAGNESIUM SERPL-MCNC: 1.9 MG/DL (ref 1.6–2.6)
MCH RBC QN AUTO: 29.6 PG (ref 24–34)
MCHC RBC AUTO-ENTMCNC: 31.7 G/DL (ref 31–37)
MCV RBC AUTO: 93.5 FL (ref 78–100)
MONOCYTES # BLD: 0.59 K/UL (ref 0.05–1.2)
MONOCYTES NFR BLD: 7.5 % (ref 3–10)
NEUTS SEG # BLD: 5.96 K/UL (ref 1.8–8)
NEUTS SEG NFR BLD: 76.1 % (ref 40–73)
NRBC # BLD: 0 K/UL (ref 0–0.01)
NRBC BLD-RTO: 0 PER 100 WBC
PLATELET # BLD AUTO: 275 K/UL (ref 135–420)
PMV BLD AUTO: 10.3 FL (ref 9.2–11.8)
POTASSIUM SERPL-SCNC: 3.9 MMOL/L (ref 3.5–5.5)
PROT SERPL-MCNC: 7.1 G/DL (ref 6.4–8.2)
RBC # BLD AUTO: 3.85 M/UL (ref 4.2–5.3)
SODIUM SERPL-SCNC: 137 MMOL/L (ref 136–145)
WBC # BLD AUTO: 7.8 K/UL (ref 4.6–13.2)

## 2025-03-11 PROCEDURE — 94761 N-INVAS EAR/PLS OXIMETRY MLT: CPT

## 2025-03-11 PROCEDURE — 83735 ASSAY OF MAGNESIUM: CPT

## 2025-03-11 PROCEDURE — 6360000002 HC RX W HCPCS: Performed by: STUDENT IN AN ORGANIZED HEALTH CARE EDUCATION/TRAINING PROGRAM

## 2025-03-11 PROCEDURE — 2500000003 HC RX 250 WO HCPCS: Performed by: STUDENT IN AN ORGANIZED HEALTH CARE EDUCATION/TRAINING PROGRAM

## 2025-03-11 PROCEDURE — 36415 COLL VENOUS BLD VENIPUNCTURE: CPT

## 2025-03-11 PROCEDURE — 6370000000 HC RX 637 (ALT 250 FOR IP): Performed by: STUDENT IN AN ORGANIZED HEALTH CARE EDUCATION/TRAINING PROGRAM

## 2025-03-11 PROCEDURE — G0378 HOSPITAL OBSERVATION PER HR: HCPCS

## 2025-03-11 PROCEDURE — 2580000003 HC RX 258: Performed by: STUDENT IN AN ORGANIZED HEALTH CARE EDUCATION/TRAINING PROGRAM

## 2025-03-11 PROCEDURE — 85025 COMPLETE CBC W/AUTO DIFF WBC: CPT

## 2025-03-11 PROCEDURE — 80053 COMPREHEN METABOLIC PANEL: CPT

## 2025-03-11 RX ORDER — SIMETHICONE 80 MG
80 TABLET,CHEWABLE ORAL EVERY 6 HOURS PRN
Qty: 12 TABLET | Refills: 0 | Status: SHIPPED | OUTPATIENT
Start: 2025-03-11

## 2025-03-11 RX ORDER — OXYCODONE HYDROCHLORIDE 5 MG/1
5 TABLET ORAL EVERY 6 HOURS PRN
Qty: 10 TABLET | Refills: 0 | Status: SHIPPED | OUTPATIENT
Start: 2025-03-11 | End: 2025-03-14

## 2025-03-11 RX ORDER — ONDANSETRON 4 MG/1
4 TABLET, ORALLY DISINTEGRATING ORAL EVERY 8 HOURS PRN
Qty: 15 TABLET | Refills: 0 | Status: SHIPPED | OUTPATIENT
Start: 2025-03-11

## 2025-03-11 RX ORDER — OMEPRAZOLE 20 MG/1
20 CAPSULE, DELAYED RELEASE ORAL
Qty: 30 CAPSULE | Refills: 5 | Status: SHIPPED | OUTPATIENT
Start: 2025-03-11

## 2025-03-11 RX ADMIN — GABAPENTIN 800 MG: 400 CAPSULE ORAL at 14:40

## 2025-03-11 RX ADMIN — OXYCODONE HYDROCHLORIDE 5 MG: 5 TABLET ORAL at 03:00

## 2025-03-11 RX ADMIN — ENOXAPARIN SODIUM 30 MG: 100 INJECTION SUBCUTANEOUS at 08:40

## 2025-03-11 RX ADMIN — ACETAMINOPHEN 650 MG: 650 SOLUTION ORAL at 14:40

## 2025-03-11 RX ADMIN — BUPROPION HYDROCHLORIDE 100 MG: 100 TABLET, FILM COATED ORAL at 14:40

## 2025-03-11 RX ADMIN — KETOROLAC TROMETHAMINE 15 MG: 15 INJECTION, SOLUTION INTRAMUSCULAR; INTRAVENOUS at 02:58

## 2025-03-11 RX ADMIN — OXYCODONE HYDROCHLORIDE 5 MG: 5 TABLET ORAL at 14:43

## 2025-03-11 RX ADMIN — HYOSCYAMINE SULFATE 0.12 MG: 0.12 TABLET SUBLINGUAL at 02:59

## 2025-03-11 RX ADMIN — BUPROPION HYDROCHLORIDE 100 MG: 100 TABLET, FILM COATED ORAL at 08:39

## 2025-03-11 RX ADMIN — TRANEXAMIC ACID 1000 MG: 100 INJECTION, SOLUTION INTRAVENOUS at 08:42

## 2025-03-11 RX ADMIN — ACETAMINOPHEN 650 MG: 650 SOLUTION ORAL at 08:37

## 2025-03-11 RX ADMIN — SODIUM CHLORIDE, PRESERVATIVE FREE 10 ML: 5 INJECTION INTRAVENOUS at 10:26

## 2025-03-11 RX ADMIN — LAMOTRIGINE 100 MG: 100 TABLET ORAL at 08:39

## 2025-03-11 RX ADMIN — ACETAMINOPHEN 650 MG: 650 SOLUTION ORAL at 01:02

## 2025-03-11 RX ADMIN — LOSARTAN POTASSIUM 50 MG: 50 TABLET, FILM COATED ORAL at 08:39

## 2025-03-11 RX ADMIN — ONDANSETRON 4 MG: 4 TABLET, ORALLY DISINTEGRATING ORAL at 14:46

## 2025-03-11 ASSESSMENT — PAIN SCALES - GENERAL
PAINLEVEL_OUTOF10: 0
PAINLEVEL_OUTOF10: 3
PAINLEVEL_OUTOF10: 0
PAINLEVEL_OUTOF10: 0
PAINLEVEL_OUTOF10: 6
PAINLEVEL_OUTOF10: 0
PAINLEVEL_OUTOF10: 6
PAINLEVEL_OUTOF10: 4

## 2025-03-11 ASSESSMENT — PAIN DESCRIPTION - LOCATION
LOCATION: ABDOMEN

## 2025-03-11 ASSESSMENT — PAIN DESCRIPTION - ORIENTATION
ORIENTATION: ANTERIOR
ORIENTATION: ANTERIOR
ORIENTATION: RIGHT;LEFT

## 2025-03-11 ASSESSMENT — PAIN DESCRIPTION - DESCRIPTORS
DESCRIPTORS: ACHING
DESCRIPTORS: ACHING
DESCRIPTORS: ACHING;DISCOMFORT

## 2025-03-11 ASSESSMENT — PAIN - FUNCTIONAL ASSESSMENT
PAIN_FUNCTIONAL_ASSESSMENT: ACTIVITIES ARE NOT PREVENTED

## 2025-03-11 ASSESSMENT — PAIN DESCRIPTION - PAIN TYPE
TYPE: SURGICAL PAIN

## 2025-03-11 ASSESSMENT — PAIN SCALES - WONG BAKER
WONGBAKER_NUMERICALRESPONSE: NO HURT

## 2025-03-11 ASSESSMENT — PAIN DESCRIPTION - FREQUENCY
FREQUENCY: INTERMITTENT

## 2025-03-11 ASSESSMENT — PAIN DESCRIPTION - ONSET
ONSET: GRADUAL

## 2025-03-11 NOTE — CARE COORDINATION
03/11/25 1603   IMM Letter   Observation Status Letter date given: 03/11/25   Observation Status Letter time given: 1550   Observation Status Letter given to Patient/Family/Significant other/Guardian/POA/by: Samanta Tom

## 2025-03-11 NOTE — PLAN OF CARE
Problem: Safety - Adult  Goal: Free from fall injury  3/11/2025 0215 by Marissa Schultz RN  Outcome: Progressing  3/10/2025 1307 by Samreen Busby RN  Outcome: Progressing  Note: Patient is admitted for gastric bypass. They will remain free from falls or injuries. Instruct family/caregiver on patient safety.      Problem: Pain  Goal: Verbalizes/displays adequate comfort level or baseline comfort level  3/11/2025 0215 by Marissa Schultz RN  Outcome: Progressing  Note: Pain will be managed and controlled with a pain level of 3 /10 or less. Patient educated on notifying nurse if pain increases above 4.   3/10/2025 1307 by Samreen Busby RN  Outcome: Progressing  Note: Pain will be managed and controlled with a pain level of 4 /10 or less. Patient educated on notifying nurse if pain increases above 4.      Problem: Respiratory - Adult  Goal: Achieves optimal ventilation and oxygenation  3/11/2025 0215 by Marissa Schultz RN  Outcome: Progressing  Note: Patient using ICS post-operatively 5-10 per shift or as otherwise directed to reduce possible complications.   3/10/2025 1307 by Samreen Busby RN  Outcome: Progressing  Note: Patient using ICS post-operatively 5-10 per shift or as otherwise directed to reduce possible complications.      Problem: Skin/Tissue Integrity - Adult  Goal: Incisions, wounds, or drain sites healing without S/S of infection  3/11/2025 0215 by Marissa Schultz RN  Outcome: Progressing  Note: 5 lapsites are intact and dry  3/10/2025 1307 by Samreen Busby RN  Outcome: Progressing  Note: Patient is status post Aaron-En-Y bypass and has 5 lap sites open to air. Lap sites will remain clean, dry, and intact for duration of stay.      Problem: Gastrointestinal - Adult  Goal: Maintains adequate nutritional intake  3/11/2025 0215 by Marissa Schultz RN  Outcome: Progressing  Note: Will start challenge today  3/10/2025 1307 by Samreen Busby RN  Outcome: Progressing  Note: Patient may be

## 2025-03-11 NOTE — PROGRESS NOTES
Surgery Progress Note    3/11/2025    Admit Date: 3/10/2025    Subjective:     Patient reports pain is somewhat managed with current plan. Denies n/v and is tolerating PO well. She has been ambulating in the halls.     Objective:     Blood pressure 134/86, pulse 71, temperature 97.5 °F (36.4 °C), temperature source Oral, resp. rate 17, height 1.727 m (5' 7.99\"), weight (!) 150.6 kg (332 lb), SpO2 94%.    No intake/output data recorded.    03/09 1901 - 03/11 0700  In: 3404.4 [P.O.:540; I.V.:2644.4]  Out: 3000 [Urine:3000]    EXAM: GENERAL: alert, pleasant, no distress   HEART: regular rate and rhythm   LUNGS: clear to auscultation   ABDOMEN:  Soft, obese, appropriate incisional tenderness, +BS, non-distended, surgical incisions clean, dry, no erythema or drainage   EXTREMITIES: warm, well perfused    Data Review    Recent Results (from the past 24 hours)   Comprehensive Metabolic Panel    Collection Time: 03/11/25  4:52 AM   Result Value Ref Range    Sodium 137 136 - 145 mmol/L    Potassium 3.9 3.5 - 5.5 mmol/L    Chloride 104 100 - 111 mmol/L    CO2 26 21 - 32 mmol/L    Anion Gap 7 3.0 - 18 mmol/L    Glucose 82 74 - 99 mg/dL    BUN 6 (L) 7.0 - 18 MG/DL    Creatinine 0.70 0.6 - 1.3 MG/DL    BUN/Creatinine Ratio 9 (L) 12 - 20      Est, Glom Filt Rate >90 >60 ml/min/1.73m2    Calcium 8.8 8.5 - 10.1 MG/DL    Total Bilirubin 0.4 0.2 - 1.0 MG/DL    ALT 72 (H) 13 - 56 U/L    AST 87 (H) 10 - 38 U/L    Alk Phosphatase 98 45 - 117 U/L    Total Protein 7.1 6.4 - 8.2 g/dL    Albumin 3.1 (L) 3.4 - 5.0 g/dL    Globulin 4.0 2.0 - 4.0 g/dL    Albumin/Globulin Ratio 0.8 0.8 - 1.7     Magnesium    Collection Time: 03/11/25  4:52 AM   Result Value Ref Range    Magnesium 1.9 1.6 - 2.6 mg/dL   CBC with Auto Differential    Collection Time: 03/11/25  4:52 AM   Result Value Ref Range    WBC 7.8 4.6 - 13.2 K/uL    RBC 3.85 (L) 4.20 - 5.30 M/uL    Hemoglobin 11.4 (L) 12.0 - 16.0 g/dL    Hematocrit 36.0 35.0 - 45.0 %    MCV 93.5 78.0 - 100.0

## 2025-03-11 NOTE — PLAN OF CARE
Problem: Safety - Adult  Goal: Free from fall injury  3/11/2025 0918 by Mattie Shelley GN  Outcome: Progressing  Flowsheets (Taken 3/11/2025 0917)  Free From Fall Injury: Instruct family/caregiver on patient safety  Note: Patient is admitted for bariatric surgery. They will remain free from falls or injuries. Instruct family/caregiver on patient safety.   3/11/2025 0215 by Marissa Schultz RN  Outcome: Progressing  Flowsheets (Taken 3/11/2025 0214)  Free From Fall Injury: Instruct family/caregiver on patient safety     Problem: Pain  Goal: Verbalizes/displays adequate comfort level or baseline comfort level  3/11/2025 0918 by Mattie Shelley GN  Outcome: Progressing  Flowsheets (Taken 3/11/2025 0918)  Verbalizes/displays adequate comfort level or baseline comfort level:   Encourage patient to monitor pain and request assistance   Assess pain using appropriate pain scale  Note: Pain will be managed and controlled with a pain level of 4 /10 or less. Patient educated on notifying nurse if pain increases above 3.   3/11/2025 0215 by Marissa Schultz RN  Outcome: Progressing  Flowsheets (Taken 3/10/2025 1307 by Samreen Busby RN)  Verbalizes/displays adequate comfort level or baseline comfort level: Encourage patient to monitor pain and request assistance  Note: Pain will be managed and controlled with a pain level of 3 /10 or less. Patient educated on notifying nurse if pain increases above 4.      Problem: Discharge Planning  Goal: Discharge to home or other facility with appropriate resources  3/11/2025 0918 by Mattie Shelley GN  Outcome: Progressing  Flowsheets  Taken 3/11/2025 0918  Discharge to home or other facility with appropriate resources: Identify barriers to discharge with patient and caregiver  Taken 3/11/2025 0735  Discharge to home or other facility with appropriate resources:   Identify barriers to discharge with patient and caregiver   Identify discharge learning needs (meds, wound

## 2025-03-11 NOTE — CARE COORDINATION
Discharge order in.       CM spoke with patient at the bedside.   No CM needs at this time.   Patient's family to transport patient home today for discharge.           Samanta Tom RN Case Manager  [phone]

## 2025-03-11 NOTE — DISCHARGE SUMMARY
Bariatric Surgery Discharge Progress Note    Admission Date: 3/10/2025    Discharge Date: 3/11/2025    Pre-operative Diagnosis:  Morbid obesity [E66.01]  Hypertension, essential [I10]                  Post-operative Diagnosis: Morbid obesity [E66.01]  Hypertension, essential [I10]                 Procedure:   Robotic-assisted Laparoscopic Gastric Bypass, 150 cm Aaron limb  Liver wedge biopsy  Bilateral TAP blocks    Postop Complications: none    Hospital Course:  Patient was admitted on 3/10/2025 for scheduled bariatric surgery.  Operation was without significant complication.  Patient admitted to the floor postoperatively, monitored as per protocol.  Diet sequentially advanced beginning POD 1, pain medications transitioned to oral during the hospital course. Currently the patient is afebrile, vital signs stable, tolerating a clear liquid diet with protein supplementation, voiding spontaneously, ambulatory with adequate pain control with oral medications and clear surgical sites without evidence of infection.    Discharge Diet:  Clear Liquid Bariatric Diet for 7 days, then soft moist protein diet for 5 weeks    Discharge Medications:     Medication List        PAUSE taking these medications      buPROPion 200 MG extended release tablet  Wait to take this until your doctor or other care provider tells you to start again.  Transition to immediate release tablet as discussed  Commonly known as: WELLBUTRIN SR            START taking these medications      omeprazole 20 MG delayed release capsule  Commonly known as: PRILOSEC  Take 1 capsule by mouth every morning (before breakfast) Must open capsule for first 30 days after surgery.     ondansetron 4 MG disintegrating tablet  Commonly known as: ZOFRAN-ODT  Take 1 tablet by mouth every 8 hours as needed for Nausea or Vomiting     oxyCODONE 5 MG immediate release tablet  Commonly known as: Roxicodone  Take 1 tablet by mouth every 6 hours as needed for Pain for up to 3 days.

## 2025-03-11 NOTE — CARE COORDINATION
D/C order noted for today. Orders reviewed. No needs identified at this time. Patient's family to transport patient home today at discharge. CM remains available if needed.           Samanta Tom, [phone]

## 2025-03-11 NOTE — PROGRESS NOTES
Patient was educated on all discharge instructions below. He/she understood and was provided a copy. He/she knows who to call for any issues post discharge.   Hydration  Hydration is your NUMBER ONE priority.  Dehydration is the most common reason for readmission to the hospital. Dehydration occurs when  your body does not get enough fluid to keep it functioning at its best. Your body also requires fluid  to burn its stored fat calories for energy.  Carry a bottle of water with you all day, even when you are away from home; remind yourself to  drink even if you don’t feel thirsty. Drinking 64 ounces of fluid is your daily goal. You can tell if  you’re getting enough fluid if you’re making clear, light-colored urine five to 10 times per day.  Signs of dehydration can be thirst, headache, hard stools or dizziness upon sitting or standing up.  You should contact your surgeon’s office if you are unable to drink enough fluid to stay hydrated.  --   [facility]  General Care after Surgery   No lifting over 15 pounds for four weeks.   No driving while taking the pain medication (about seven to 10 days).   No tub baths, swimming or hot tubs until incisions are healed (about two weeks).   You may shower. Clean incisions daily /gently with soap and check incisions for signs of infection:  -- Redness around incision.  -- Swelling at site.  -- Drainage with an foul odor (pus).  -- Increase tenderness around incision.   Take your temperature and resting pulse in the morning and evening. Record on tracking form  given to you. Call if your temperature is greater than 101 or your pulse rate is greater than 115.   Please contact your surgeon if you are having excessive abdominal pain (that lasts longer than  four hours and does not improve with prescribed pain medication), vomiting or shortness of breath.   Get up and move -- do not sit in one place for more than an hour.   You need to WALK (EXERCISE) for 30

## 2025-03-12 ENCOUNTER — TELEPHONE (OUTPATIENT)
Facility: CLINIC | Age: 50
End: 2025-03-12

## 2025-03-12 NOTE — TELEPHONE ENCOUNTER
Care Transitions Initial Follow Up Call    Outreach made within 2 business days of discharge: Yes    Patient: Renea Artis Patient : 1975   MRN: [id number]  Reason for Admission: Gastric Bypass  Discharge Date: 3/11/25       Spoke with: Renea    Discharge department/facility: [facility]    TCM Interactive Patient Contact:  Was patient able to fill all prescriptions: Yes  Was patient instructed to bring all medications to the follow-up visit: Yes  Is patient taking all medications as directed in the discharge summary? Yes  Does patient understand their discharge instructions: Yes  Does patient have questions or concerns that need addressed prior to 7-14 day follow up office visit: no    Additional needs identified to be addressed with provider  No needs identified             Scheduled appointment with PCP within 7-14 days    Follow Up  Future Appointments   Date Time Provider Department Center   3/28/2025  9:30 AM [facility]   2025  9:15 AM Magen Gonzalez DO VSGS [facility]   2025  9:45 AM Katharina Yi MD [facility]   2025  1:15 PM HBV BARIATRIC DIETITIAN [facility]   2025 10:00 AM Earline Broussard, APRN - NP VSMO [facility]       Maida Curtis LPN

## 2025-03-17 ENCOUNTER — FOLLOWUP TELEPHONE ENCOUNTER (OUTPATIENT)
Facility: HOSPITAL | Age: 50
End: 2025-03-17

## 2025-03-17 NOTE — TELEPHONE ENCOUNTER
Pt is getting in 72 oz of fluid.  Pt is up ambulating.  Pt is still having some pain to her right shoulder.  Pt instructed to call the office if its still hurting by Wednesday.  Pt had a bowel movement.

## 2025-03-28 ENCOUNTER — OFFICE VISIT (OUTPATIENT)
Age: 50
End: 2025-03-28

## 2025-03-28 VITALS
SYSTOLIC BLOOD PRESSURE: 113 MMHG | BODY MASS INDEX: 44.41 KG/M2 | HEIGHT: 68 IN | HEART RATE: 75 BPM | OXYGEN SATURATION: 98 % | WEIGHT: 293 LBS | TEMPERATURE: 96.8 F | RESPIRATION RATE: 18 BRPM | DIASTOLIC BLOOD PRESSURE: 72 MMHG

## 2025-03-28 DIAGNOSIS — Z09 POSTOPERATIVE EXAMINATION: Primary | ICD-10-CM

## 2025-03-28 DIAGNOSIS — Z09 POSTOPERATIVE EXAMINATION: ICD-10-CM

## 2025-03-28 PROCEDURE — 99024 POSTOP FOLLOW-UP VISIT: CPT | Performed by: STUDENT IN AN ORGANIZED HEALTH CARE EDUCATION/TRAINING PROGRAM

## 2025-03-28 RX ORDER — URSODIOL 200 MG/1
CAPSULE ORAL
Qty: 60 CAPSULE | Refills: 5 | Status: SHIPPED | OUTPATIENT
Start: 2025-03-28

## 2025-03-28 NOTE — PROGRESS NOTES
Bariatric Surgery Postop Visit    Patient: Renea Artis MRN: [id number]  SSN: [id number]    YOB: 1975  Age: 50 y.o.  Sex: female      Subjective:      Renea Artis is a 50 y.o. female who is being seen 2 weeks following Robotic Gastric Bypass. She has lost a total of 22 pounds since surgery.    Last Surgical Weight Loss:      3/28/2025     7:47 AM   Surgical Weight Loss Tracker   Date 3/10/2025   Height 5' 8\"   Initial Weight 328 lb   Initial BMI 49.9   Ideal Body Weight 143 lb   Initial Excess Body Weight (EBW) 185 lb   Surgery Date 3/10/2025   Pre-Surgical Weight 328 lb   Pre Surgery BMI 49.9   Preop Weight Change 0 lb   Preop % Weight Change 0%   Pre-Surgical EBW 11 lb 9 oz   Date 3/28/2025   Weight 306 lb   BMI 46.5   Wt Change Since Last Visit 0 lb   Total Wt Change Since Surgery 0 lb   % EBWL <UNK>   % Total Body Wt Loss 0%        Renea reports drinking 64+ oz of liquids daily and 60+ g of protein daily. She has been tolerating a  Stage 2 (full liquid) Bariatric Diet without issue.  She has reported tolerating some soft foods such as eggs, vegetarian refried beans without any difficulty.  She denies any nausea or vomiting.  She reports that she did recently begin menstruating which she has not done in many years.  She does report that her back pain has improved somewhat since she is ambulating around the house a bit better.  She is taking her multivitamin as directed.      Patient Active Problem List   Diagnosis    Obesity, Class III, BMI 40-49.9 (morbid obesity)    Bipolar affective disorder, currently manic, moderate (HCC)    HTN (hypertension)    Cervical myelopathy (HCC)    Hypertriglyceridemia    Cervical stenosis of spine    Fibromyalgia    Chronic midline low back pain with sciatica    Onychomycosis    Prediabetes    Irritable bowel syndrome without diarrhea    Moderate episode of recurrent major depressive disorder (HCC)    Spinal stenosis, lumbar region with neurogenic claudication    Tobacco

## 2025-03-28 NOTE — PROGRESS NOTES
Chief Complaint   Patient presents with    Post-Op Check     Gastric Bypass--3.10.2025    1. Have you been to the ER, urgent care clinic since your last visit?  Hospitalized since your last visit?No    2. Have you seen or consulted any other health care providers outside of the [facility] since your last visit?  Include any pap smears or colon screening. No      Bariatric Postoperative Nurse Note      Renea Artis is a 50 y.o. female status post laparoscopic gastric bypass surgery performed on 3.10.2025.    Two Week Post-Op only  -Fevers/chills? No  -Chest pain? No  -Shortness of breath? No  -Peripheral swelling (arms/legs)? No  -# of total opioid doses taken postop? 3  -ER visits/readmission? No    All Post-Ops (including two weeks)  -# of grams of protein daily? 80-90  -sources of protein? Shakes, eggs, beans  -# of oz of sugar free fluids from all sources daily?  70+  -Nausea? Yes  -Vomiting? Yes  -Difficulty swallow/food sticking? No  -Heartburn/regurgitation? No  -Character of bowel movements (diarrhea/constipation/bloody stools?) constipation  -Which multivitamin product are you taking? Procare   -What dose and how frequently are you taking calcium citrate? 3 times a day   - from any iron-containing multivitamin by 2 hours?  aware  -Ulcer risk exposures:   NSAID No  Tobacco No  Alcohol No  Steroids No  -Minutes of physical activity and what type? walking

## 2025-04-04 ENCOUNTER — OFFICE VISIT (OUTPATIENT)
Age: 50
End: 2025-04-04

## 2025-04-04 VITALS — BODY MASS INDEX: 44.41 KG/M2 | WEIGHT: 293 LBS | HEIGHT: 68 IN

## 2025-04-04 DIAGNOSIS — M67.431 GANGLION CYST OF DORSUM OF RIGHT WRIST: ICD-10-CM

## 2025-04-04 DIAGNOSIS — M65.931 EXTENSOR TENOSYNOVITIS OF RIGHT WRIST: Primary | ICD-10-CM

## 2025-04-04 RX ORDER — LIDOCAINE HYDROCHLORIDE 10 MG/ML
0.5 INJECTION, SOLUTION INFILTRATION; PERINEURAL ONCE
Status: COMPLETED | OUTPATIENT
Start: 2025-04-04 | End: 2025-04-04

## 2025-04-04 RX ADMIN — LIDOCAINE HYDROCHLORIDE 0.5 ML: 10 INJECTION, SOLUTION INFILTRATION; PERINEURAL at 10:58

## 2025-04-04 NOTE — PROGRESS NOTES
Renea Artis is a 50 y.o. female right handed.  Worker's Compensation and legal considerations: none    Chief Complaint   Patient presents with    Follow-up     Right wrist      Pain Score:   6    Subjective:     4/4/2025 HPI: Patient presents today for follow-up due to recurrence of swelling over the back of right wrist.  We have previously discussed excising possible ganglion cyst and partial tenosynovectomy of the wrist.  However she has just had gastric bypass surgery and would like an injection today if possible.    2/7/2025 HPI: Patient presents today for follow-up due to recurrence of mass and pain over the back of the right wrist.  She reports previous injection did help until the last couple months.  She also reports she will likely be scheduled for gastric bypass procedure in March.    Initial HPI: Patient presents here with complaints of right wrist pain and swelling over the back of the wrist.  She attributes it to ever since doing postoperative rehab after a cervical spine surgery when having to get up and blood pressure through her right wrist.    Date of onset: September 2024  Injury: No  Prior Treatment:  No  Contributory history: None    ROS: Review of Systems - General ROS: negative except HPI    Past Medical History:   Diagnosis Date    Bipolar affective disorder, currently manic, moderate (HCC) 4/27/2016    Chronic midline low back pain with sciatica 11/1/2016    Fibromyalgia 9/1/2015    HLD (hyperlipidemia) 2/4/2016    Hypertension     Hypertriglyceridemia 5/15/2018    Irritable bowel syndrome     Moderate episode of recurrent major depressive disorder (HCC) 4/27/2016    Obesity, Class III, BMI 40-49.9 (morbid obesity) 9/1/2015    Onychomycosis 4/27/2016    BOBBI on CPAP     Osteoarthritis 2017    PTSD (post-traumatic stress disorder)     Substance abuse        Past Surgical History:   Procedure Laterality Date    ANTERIOR CRUCIATE LIGAMENT REPAIR      APPENDECTOMY      BACK SURGERY      CERVICAL

## 2025-04-07 ENCOUNTER — OFFICE VISIT (OUTPATIENT)
Facility: CLINIC | Age: 50
End: 2025-04-07
Payer: MEDICAID

## 2025-04-07 VITALS
OXYGEN SATURATION: 94 % | WEIGHT: 293 LBS | SYSTOLIC BLOOD PRESSURE: 120 MMHG | DIASTOLIC BLOOD PRESSURE: 74 MMHG | HEART RATE: 76 BPM | TEMPERATURE: 97 F | HEIGHT: 68 IN | RESPIRATION RATE: 13 BRPM | BODY MASS INDEX: 44.41 KG/M2

## 2025-04-07 DIAGNOSIS — E78.2 MIXED HYPERLIPIDEMIA: ICD-10-CM

## 2025-04-07 DIAGNOSIS — R73.03 PREDIABETES: ICD-10-CM

## 2025-04-07 DIAGNOSIS — Z98.84 S/P BARIATRIC SURGERY: ICD-10-CM

## 2025-04-07 DIAGNOSIS — I10 PRIMARY HYPERTENSION: Primary | ICD-10-CM

## 2025-04-07 DIAGNOSIS — K59.00 CONSTIPATION, UNSPECIFIED CONSTIPATION TYPE: ICD-10-CM

## 2025-04-07 DIAGNOSIS — J06.9 VIRAL URI: ICD-10-CM

## 2025-04-07 LAB — HBA1C MFR BLD: 5.2 %

## 2025-04-07 PROCEDURE — 83036 HEMOGLOBIN GLYCOSYLATED A1C: CPT | Performed by: STUDENT IN AN ORGANIZED HEALTH CARE EDUCATION/TRAINING PROGRAM

## 2025-04-07 PROCEDURE — 99214 OFFICE O/P EST MOD 30 MIN: CPT | Performed by: STUDENT IN AN ORGANIZED HEALTH CARE EDUCATION/TRAINING PROGRAM

## 2025-04-07 PROCEDURE — 3078F DIAST BP <80 MM HG: CPT | Performed by: STUDENT IN AN ORGANIZED HEALTH CARE EDUCATION/TRAINING PROGRAM

## 2025-04-07 PROCEDURE — 3074F SYST BP LT 130 MM HG: CPT | Performed by: STUDENT IN AN ORGANIZED HEALTH CARE EDUCATION/TRAINING PROGRAM

## 2025-04-07 RX ORDER — AZITHROMYCIN 250 MG/1
TABLET, FILM COATED ORAL
Qty: 6 TABLET | Refills: 0 | Status: SHIPPED | OUTPATIENT
Start: 2025-04-07 | End: 2025-04-17

## 2025-04-07 RX ORDER — LACTULOSE 10 G/15ML
10 SOLUTION ORAL EVERY EVENING
Qty: 237 ML | Refills: 1 | Status: SHIPPED | OUTPATIENT
Start: 2025-04-07

## 2025-04-07 SDOH — ECONOMIC STABILITY: FOOD INSECURITY: WITHIN THE PAST 12 MONTHS, THE FOOD YOU BOUGHT JUST DIDN'T LAST AND YOU DIDN'T HAVE MONEY TO GET MORE.: NEVER TRUE

## 2025-04-07 SDOH — ECONOMIC STABILITY: FOOD INSECURITY: WITHIN THE PAST 12 MONTHS, YOU WORRIED THAT YOUR FOOD WOULD RUN OUT BEFORE YOU GOT MONEY TO BUY MORE.: NEVER TRUE

## 2025-04-07 ASSESSMENT — PATIENT HEALTH QUESTIONNAIRE - PHQ9
3. TROUBLE FALLING OR STAYING ASLEEP: NOT AT ALL
2. FEELING DOWN, DEPRESSED OR HOPELESS: NOT AT ALL
SUM OF ALL RESPONSES TO PHQ QUESTIONS 1-9: 0
8. MOVING OR SPEAKING SO SLOWLY THAT OTHER PEOPLE COULD HAVE NOTICED. OR THE OPPOSITE, BEING SO FIGETY OR RESTLESS THAT YOU HAVE BEEN MOVING AROUND A LOT MORE THAN USUAL: NOT AT ALL
4. FEELING TIRED OR HAVING LITTLE ENERGY: NOT AT ALL
10. IF YOU CHECKED OFF ANY PROBLEMS, HOW DIFFICULT HAVE THESE PROBLEMS MADE IT FOR YOU TO DO YOUR WORK, TAKE CARE OF THINGS AT HOME, OR GET ALONG WITH OTHER PEOPLE: NOT DIFFICULT AT ALL
9. THOUGHTS THAT YOU WOULD BE BETTER OFF DEAD, OR OF HURTING YOURSELF: NOT AT ALL
7. TROUBLE CONCENTRATING ON THINGS, SUCH AS READING THE NEWSPAPER OR WATCHING TELEVISION: NOT AT ALL
1. LITTLE INTEREST OR PLEASURE IN DOING THINGS: NOT AT ALL
5. POOR APPETITE OR OVEREATING: NOT AT ALL
SUM OF ALL RESPONSES TO PHQ QUESTIONS 1-9: 0
6. FEELING BAD ABOUT YOURSELF - OR THAT YOU ARE A FAILURE OR HAVE LET YOURSELF OR YOUR FAMILY DOWN: NOT AT ALL

## 2025-04-07 ASSESSMENT — ENCOUNTER SYMPTOMS
RHINORRHEA: 1
COUGH: 1
SHORTNESS OF BREATH: 0

## 2025-04-07 NOTE — PROGRESS NOTES
Renea Artis is a 50 y.o. year old female who presents today for   Chief Complaint   Patient presents with    Hypertension    3 Month Follow-Up        \"Have you been to the ER, urgent care clinic since your last visit?  Hospitalized since your last visit?\"   NO     “Have you seen or consulted any other health care providers outside our system since your last visit?”   NO             - Mohini Ellis/PATRICK Aguilar  [facility]  Phone: [phone]  Fax: [phone]

## 2025-04-08 RX ORDER — NYSTATIN 100000 [USP'U]/G
POWDER TOPICAL
Qty: 60 G | Refills: 2 | Status: SHIPPED | OUTPATIENT
Start: 2025-04-08

## 2025-04-08 RX ORDER — CLOTRIMAZOLE AND BETAMETHASONE DIPROPIONATE 10; .64 MG/G; MG/G
CREAM TOPICAL
Qty: 45 G | Refills: 2 | Status: SHIPPED | OUTPATIENT
Start: 2025-04-08

## 2025-04-21 ENCOUNTER — CLINICAL DOCUMENTATION (OUTPATIENT)
Facility: HOSPITAL | Age: 50
End: 2025-04-21

## 2025-04-21 ENCOUNTER — HOSPITAL ENCOUNTER (OUTPATIENT)
Facility: HOSPITAL | Age: 50
Discharge: HOME | End: 2025-04-24

## 2025-04-21 NOTE — PROGRESS NOTES
[facility]  POST-OP NUTRITION FOLLOW UP    Patient's Name: Renea Artis  YOB: 1975  Surgery Date: 3/10/25      Procedure: Gastric Bypass    Surgeon: Dr. Coleman  For Office Use Only:  v    Height: 5 f 10     Pre-Op Weight: 328     Current Weight: 282  Weight Lost: 46    BMI:  40.5    Attendance of support group:   When:   Why not:     Complications  Readmittance: None  Reoperations: None  Complications: None  IV Fluids: None  ER Trips: None    Problem Areas:   Nausea: Just with certain foods, but not ongoing   Vomiting: None   Dumping Syndrome: None  Inadequate Protein: None, patient is getting 2 protein shakes per day.  Inadequate Fluids: None, patient states she is getting 70 ounces of fluid per day.  Food Intolerance: None  Hunger: None  Constipation: Yes.  Her PCP has prescribed her something to help. She states even before surgery, it was just 3 x a week.  Less than 3 x a week.  She states she takes a stool softener.      Eating 3 Meals/Day: [place] for 3 x, but states sometimes she misses lunch.  She states if she's at [employer], she may not get it in.   Portion Size at Meals: 1-2 ounce     Protein from Food:     Foods being consumed:  Breakfast: Time: Boiled egg.     Lunch: Time: She state she will do 2 ounces of refried beans or chicken salad.     Dinner:  Time: Dinner Beans with shredded cheese.  States that chicken doesn't feel comfortable in her chest.     In-between eating: None    Length of time for meals: 15 minutes    food/fluids: 30/30    Fluids: 70 oz/day   Types of Fluids: water, protein shakes mixed with skim milk.      MVI: Pro Care    Number/Day: 1 x a day   Taken Separately:   Frequency of taking this:  7 out 7 days in a week.    Vitamin B1: included in MVI  Dosin mg    Calcium: Calcium citrate    Calcium Dosin mg - 1500 mg    Taken Separately: yes    Vitamin B12: included in MVI   Vitamin B12 Dosin mcg    Vitamin D: included in MVI

## 2025-04-24 RX ORDER — BISACODYL 10 MG
10 SUPPOSITORY, RECTAL RECTAL DAILY PRN
Qty: 30 SUPPOSITORY | Refills: 2 | Status: SHIPPED | OUTPATIENT
Start: 2025-04-24

## 2025-04-24 RX ORDER — DOCUSATE SODIUM 100 MG/1
100 CAPSULE, LIQUID FILLED ORAL 2 TIMES DAILY
Qty: 60 CAPSULE | Refills: 2 | Status: SHIPPED | OUTPATIENT
Start: 2025-04-24

## 2025-06-24 ENCOUNTER — LAB (OUTPATIENT)
Facility: CLINIC | Age: 50
End: 2025-06-24

## 2025-06-24 ENCOUNTER — OFFICE VISIT (OUTPATIENT)
Facility: CLINIC | Age: 50
End: 2025-06-24
Payer: MEDICAID

## 2025-06-24 ENCOUNTER — HOSPITAL ENCOUNTER (OUTPATIENT)
Facility: HOSPITAL | Age: 50
Setting detail: SPECIMEN
Discharge: HOME | End: 2025-06-27

## 2025-06-24 VITALS
DIASTOLIC BLOOD PRESSURE: 64 MMHG | TEMPERATURE: 97.4 F | SYSTOLIC BLOOD PRESSURE: 99 MMHG | BODY MASS INDEX: 39.16 KG/M2 | HEART RATE: 68 BPM | HEIGHT: 68 IN | OXYGEN SATURATION: 93 % | WEIGHT: 258.4 LBS | RESPIRATION RATE: 15 BRPM

## 2025-06-24 DIAGNOSIS — E78.2 MIXED HYPERLIPIDEMIA: ICD-10-CM

## 2025-06-24 DIAGNOSIS — Z98.84 S/P BARIATRIC SURGERY: ICD-10-CM

## 2025-06-24 DIAGNOSIS — R53.83 FATIGUE, UNSPECIFIED TYPE: ICD-10-CM

## 2025-06-24 DIAGNOSIS — I10 PRIMARY HYPERTENSION: ICD-10-CM
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
## 2025-06-24 DIAGNOSIS — R73.03 PREDIABETES: ICD-10-CM

## 2025-06-24 DIAGNOSIS — Z09 POSTOPERATIVE EXAMINATION: ICD-10-CM

## 2025-06-24 DIAGNOSIS — Z98.84 S/P BARIATRIC SURGERY: Primary | ICD-10-CM

## 2025-06-24 PROCEDURE — 99001 SPECIMEN HANDLING PT-LAB: CPT

## 2025-06-24 PROCEDURE — 3074F SYST BP LT 130 MM HG: CPT | Performed by: STUDENT IN AN ORGANIZED HEALTH CARE EDUCATION/TRAINING PROGRAM

## 2025-06-24 PROCEDURE — 99214 OFFICE O/P EST MOD 30 MIN: CPT | Performed by: STUDENT IN AN ORGANIZED HEALTH CARE EDUCATION/TRAINING PROGRAM

## 2025-06-24 PROCEDURE — 3078F DIAST BP <80 MM HG: CPT | Performed by: STUDENT IN AN ORGANIZED HEALTH CARE EDUCATION/TRAINING PROGRAM

## 2025-06-24 RX ORDER — TRIAMCINOLONE ACETONIDE 1 MG/G
OINTMENT TOPICAL 2 TIMES DAILY
Qty: 30 G | Refills: 2 | Status: SHIPPED | OUTPATIENT
Start: 2025-06-24 | End: 2025-07-08

## 2025-06-24 SDOH — ECONOMIC STABILITY: FOOD INSECURITY: WITHIN THE PAST 12 MONTHS, THE FOOD YOU BOUGHT JUST DIDN'T LAST AND YOU DIDN'T HAVE MONEY TO GET MORE.: NEVER TRUE

## 2025-06-24 SDOH — ECONOMIC STABILITY: FOOD INSECURITY: WITHIN THE PAST 12 MONTHS, YOU WORRIED THAT YOUR FOOD WOULD RUN OUT BEFORE YOU GOT MONEY TO BUY MORE.: NEVER TRUE

## 2025-06-24 ASSESSMENT — PATIENT HEALTH QUESTIONNAIRE - PHQ9
5. POOR APPETITE OR OVEREATING: NOT AT ALL
6. FEELING BAD ABOUT YOURSELF - OR THAT YOU ARE A FAILURE OR HAVE LET YOURSELF OR YOUR FAMILY DOWN: NOT AT ALL
8. MOVING OR SPEAKING SO SLOWLY THAT OTHER PEOPLE COULD HAVE NOTICED. OR THE OPPOSITE, BEING SO FIGETY OR RESTLESS THAT YOU HAVE BEEN MOVING AROUND A LOT MORE THAN USUAL: NOT AT ALL
10. IF YOU CHECKED OFF ANY PROBLEMS, HOW DIFFICULT HAVE THESE PROBLEMS MADE IT FOR YOU TO DO YOUR WORK, TAKE CARE OF THINGS AT HOME, OR GET ALONG WITH OTHER PEOPLE: NOT DIFFICULT AT ALL
SUM OF ALL RESPONSES TO PHQ QUESTIONS 1-9: 0
7. TROUBLE CONCENTRATING ON THINGS, SUCH AS READING THE NEWSPAPER OR WATCHING TELEVISION: NOT AT ALL
SUM OF ALL RESPONSES TO PHQ QUESTIONS 1-9: 0
1. LITTLE INTEREST OR PLEASURE IN DOING THINGS: NOT AT ALL
4. FEELING TIRED OR HAVING LITTLE ENERGY: NOT AT ALL
9. THOUGHTS THAT YOU WOULD BE BETTER OFF DEAD, OR OF HURTING YOURSELF: NOT AT ALL
2. FEELING DOWN, DEPRESSED OR HOPELESS: NOT AT ALL
SUM OF ALL RESPONSES TO PHQ QUESTIONS 1-9: 0
SUM OF ALL RESPONSES TO PHQ QUESTIONS 1-9: 0
3. TROUBLE FALLING OR STAYING ASLEEP: NOT AT ALL

## 2025-06-24 ASSESSMENT — ENCOUNTER SYMPTOMS
SHORTNESS OF BREATH: 0
ABDOMINAL PAIN: 0

## 2025-06-24 NOTE — PROGRESS NOTES
History of Present Illness  Renea Artis is a 50 y.o. female who presents today for management of    Chief Complaint   Patient presents with    Joint Pain         - Patient overall stable since last visit   - She is tolerating diet advancement status post bariatric surgery 3/10/25  - Has follow-up with nutritionist in 2 weeks   - Ongoing chronic back pain, improved after surgery   - Follows with Ortho, she was given a back brace which has been helpful for her, uses that as needed  -Still feels like she has balance issues sometimes  -intermittent lightheadedness/dizziness when she walks  - Antihypertensives have already been discontinued      Patient Active Problem List   Diagnosis    Obesity, Class III, BMI 40-49.9 (morbid obesity) (HCC)    Bipolar affective disorder, currently manic, moderate (HCC)    HTN (hypertension)    Cervical myelopathy (HCC)    Hypertriglyceridemia    Cervical stenosis of spine    Fibromyalgia    Chronic midline low back pain with sciatica    Onychomycosis    Prediabetes    Irritable bowel syndrome without diarrhea    Moderate episode of recurrent major depressive disorder (HCC)    Spinal stenosis, lumbar region with neurogenic claudication    Tobacco use disorder    Status post left knee replacement    Superficial incisional surgical site infection    Left ankle instability    Cervicalgia    Mixed incontinence    Perimenopause    Excoriated acne    Lumbar pseudoarthrosis    Insomnia w/ sleep apnea    BOBBI (obstructive sleep apnea)    Morbid obesity (HCC)    Hypertension, essential      Past Medical History:   Diagnosis Date    Bipolar affective disorder, currently manic, moderate (HCC) 4/27/2016    Chronic midline low back pain with sciatica 11/1/2016    Fibromyalgia 9/1/2015    Fractures 4/2010    Elbow    HLD (hyperlipidemia) 2/4/2016    Hypertension     Hypertriglyceridemia 5/15/2018    Irritable bowel syndrome     Moderate episode of recurrent major depressive disorder (HCC) 4/27/2016

## 2025-06-24 NOTE — PROGRESS NOTES
Renea Artis is a 50 y.o. year old female who presents today for   Chief Complaint   Patient presents with    Joint Pain        \"Have you been to the ER, urgent care clinic since your last visit?  Hospitalized since your last visit?\"   NO     “Have you seen or consulted any other health care providers outside our system since your last visit?”   NO             - Mohini Ellis/PATRICK Aguilar  [facility]  Phone: [phone]  Fax: [phone]

## 2025-06-25 LAB
A/G RATIO: 1.8 RATIO (ref 1.1–2.6)
ALBUMIN: 4.3 G/DL (ref 3.5–5)
ALP BLD-CCNC: 124 U/L (ref 25–115)
ALT SERPL-CCNC: 12 U/L (ref 5–40)
ANION GAP SERPL CALCULATED.3IONS-SCNC: 14 MMOL/L (ref 3–15)
AST SERPL-CCNC: 14 U/L (ref 10–37)
BASOPHILS # BLD: 1 % (ref 0–2)
BASOPHILS ABSOLUTE: 0 K/UL (ref 0–0.2)
BILIRUB SERPL-MCNC: 0.4 MG/DL (ref 0.2–1.2)
BUN BLDV-MCNC: 13 MG/DL (ref 6–22)
CALCIUM SERPL-MCNC: 9.5 MG/DL (ref 8.4–10.5)
CHLORIDE BLD-SCNC: 99 MMOL/L (ref 98–110)
CHOLESTEROL, TOTAL: 172 MG/DL (ref 110–200)
CHOLESTEROL/HDL RATIO: 5.2 (ref 0–5)
CO2: 24 MMOL/L (ref 20–32)
CREAT SERPL-MCNC: 0.6 MG/DL (ref 0.5–1.2)
EOSINOPHIL # BLD: 8 % (ref 0–6)
EOSINOPHILS ABSOLUTE: 0.4 K/UL (ref 0–0.5)
ESTIMATED AVERAGE GLUCOSE: 110 MG/DL (ref 91–123)
FERRITIN: 48 NG/ML (ref 10–291)
FOLATE: 8.7 NG/ML
GFR, ESTIMATED: >90 ML/MIN/1.73 SQ.M.
GLOBULIN: 2.4 G/DL (ref 2–4)
GLUCOSE: 97 MG/DL (ref 70–99)
HBA1C MFR BLD: 5.5 % (ref 4.8–5.6)
HCT VFR BLD CALC: 40.3 % (ref 35.1–48)
HDLC SERPL-MCNC: 33 MG/DL
HEMOGLOBIN: 12.4 G/DL (ref 11.7–16)
IRON % SATURATION: 23 % (ref 20–50)
IRON: 60 MCG/DL (ref 30–160)
LDL CHOLESTEROL: 120 MG/DL (ref 50–99)
LDL/HDL RATIO: 3.6
LYMPHOCYTES # BLD: 20 % (ref 20–45)
LYMPHOCYTES ABSOLUTE: 1 K/UL (ref 1–4.8)
MCH RBC QN AUTO: 28 PG (ref 26–34)
MCHC RBC AUTO-ENTMCNC: 31 G/DL (ref 31–36)
MCV RBC AUTO: 92 FL (ref 80–99)
MONOCYTES ABSOLUTE: 0.4 K/UL (ref 0.1–1)
MONOCYTES: 8 % (ref 3–12)
NEUTROPHILS ABSOLUTE: 3.1 K/UL (ref 1.8–7.7)
NEUTROPHILS SEGMENTED: 63 % (ref 40–75)
NON-HDL CHOLESTEROL: 139 MG/DL
PDW BLD-RTO: 15.4 % (ref 10–15.5)
PLATELET # BLD: 225 K/UL (ref 140–440)
PMV BLD AUTO: 12.5 FL (ref 9–13)
POTASSIUM SERPL-SCNC: 3.8 MMOL/L (ref 3.5–5.5)
RBC # BLD: 4.36 M/UL (ref 3.8–5.2)
SODIUM BLD-SCNC: 137 MMOL/L (ref 133–145)
T4 FREE: 1.2 NG/DL (ref 0.9–1.8)
TOTAL IRON BINDING CAPACITY: 264 MCG/DL (ref 228–428)
TOTAL PROTEIN: 6.7 G/DL (ref 6.4–8.3)
TRIGL SERPL-MCNC: 95 MG/DL (ref 40–149)
TSH SERPL DL<=0.05 MIU/L-ACNC: 0.09 MCU/ML (ref 0.27–4.2)
UNSATURATED IRON BINDING CAPACITY: 204 MCG/DL (ref 110–370)
VITAMIN B-12: >2000 PG/ML (ref 211–911)
VITAMIN D 25-HYDROXY: 37.7 NG/ML (ref 32–100)
VLDLC SERPL CALC-MCNC: 19 MG/DL (ref 8–30)
WBC # BLD: 4.9 K/UL (ref 4–11)

## 2025-06-27 LAB — SENTARA SPECIMEN COLLECTION: NORMAL

## 2025-07-08 ENCOUNTER — OFFICE VISIT (OUTPATIENT)
Age: 50
End: 2025-07-08
Payer: MEDICAID

## 2025-07-08 VITALS
TEMPERATURE: 97.1 F | SYSTOLIC BLOOD PRESSURE: 112 MMHG | RESPIRATION RATE: 16 BRPM | HEART RATE: 68 BPM | OXYGEN SATURATION: 97 % | WEIGHT: 256 LBS | BODY MASS INDEX: 38.8 KG/M2 | HEIGHT: 68 IN | DIASTOLIC BLOOD PRESSURE: 72 MMHG

## 2025-07-08 DIAGNOSIS — R79.89 LOW TSH LEVEL: ICD-10-CM

## 2025-07-08 DIAGNOSIS — Z98.84 S/P GASTRIC BYPASS: ICD-10-CM

## 2025-07-08 DIAGNOSIS — E66.01 CLASS 2 SEVERE OBESITY WITH SERIOUS COMORBIDITY AND BODY MASS INDEX (BMI) OF 38.0 TO 38.9 IN ADULT, UNSPECIFIED OBESITY TYPE (HCC): ICD-10-CM

## 2025-07-08 DIAGNOSIS — K91.2 POSTOPERATIVE INTESTINAL MALABSORPTION: Primary | ICD-10-CM

## 2025-07-08 DIAGNOSIS — E66.812 CLASS 2 SEVERE OBESITY WITH SERIOUS COMORBIDITY AND BODY MASS INDEX (BMI) OF 38.0 TO 38.9 IN ADULT, UNSPECIFIED OBESITY TYPE (HCC): ICD-10-CM

## 2025-07-08 PROCEDURE — 3074F SYST BP LT 130 MM HG: CPT | Performed by: NURSE PRACTITIONER

## 2025-07-08 PROCEDURE — 99214 OFFICE O/P EST MOD 30 MIN: CPT | Performed by: NURSE PRACTITIONER

## 2025-07-08 PROCEDURE — 3078F DIAST BP <80 MM HG: CPT | Performed by: NURSE PRACTITIONER

## 2025-07-09 NOTE — PROGRESS NOTES
Bariatric Postoperative Nurse Note      Renea Artis is a 50 y.o. female status post laparoscopic gastric bypass surgery performed on 3/10/.      All Post-Ops (including two weeks)  -# of grams of protein daily? 70 grams  -sources of protein? Beans, eggs, beans, tuna and chicken  -# of oz of sugar free fluids from all sources daily?  70 plus oz  -Nausea? No  -Vomiting? No  -Difficulty swallow/food sticking? Yes  -Heartburn/regurgitation? No  -Character of bowel movements (diarrhea/constipation/bloody stools?) constipation  -Which multivitamin product are you taking? Procare   -What dose and how frequently are you taking calcium citrate? 3 times daily  - from any iron-containing multivitamin by 2 hours? Yes  -Ulcer risk exposures:   NSAID No  Tobacco No  Alcohol No  Steroids No  -Minutes of physical activity and what type? walking        
(VOLTAREN) 1 % GEL 4 times daily as needed for Pain      SUMAtriptan (IMITREX) 50 MG tablet Take 1 tablet by mouth daily as needed for Migraine 9 tablet 0    acetaminophen (TYLENOL) 325 MG tablet Take 2 tablets by mouth every 6 hours as needed for Pain mild pain      DULoxetine (CYMBALTA) 60 MG extended release capsule Take 1 capsule by mouth 2 times daily Indications: must open capsules for first 30 days after surgery      hydrOXYzine HCl (ATARAX) 25 MG tablet Take 1 tablet by mouth 3 times daily as needed for Anxiety      lamoTRIgine (LAMICTAL) 100 MG tablet Take 1 tablet by mouth daily      lamoTRIgine (LAMICTAL) 200 MG tablet Take 1 tablet by mouth every evening      traZODone (DESYREL) 100 MG tablet Take 1 tablet by mouth as needed for Depression or Sleep 1-2 tabs      bisacodyl (DULCOLAX) 10 MG suppository Place 1 suppository rectally daily as needed for Constipation (Patient not taking: Reported on 7/8/2025) 30 suppository 2    docusate sodium (COLACE) 100 MG capsule Take 1 capsule by mouth 2 times daily (Patient not taking: Reported on 7/8/2025) 60 capsule 2    Ursodiol 200 MG CAPS Take 200 mg by mouth every morning  mg every evening. (Patient not taking: Reported on 7/8/2025) 60 capsule 5    omeprazole (PRILOSEC) 20 MG delayed release capsule Take 1 capsule by mouth every morning (before breakfast) Must open capsule for first 30 days after surgery. (Patient not taking: Reported on 7/8/2025) 30 capsule 5    ondansetron (ZOFRAN-ODT) 4 MG disintegrating tablet Take 1 tablet by mouth every 8 hours as needed for Nausea or Vomiting (Patient not taking: Reported on 7/8/2025) 15 tablet 0    rosuvastatin (CRESTOR) 20 MG tablet TAKE 1 TABLET BY MOUTH EVERY NIGHT (Patient not taking: Reported on 7/8/2025) 90 tablet 2    vitamin B-12 (CYANOCOBALAMIN) 1000 MCG tablet TAKE 1 TABLET BY MOUTH DAILY (Patient not taking: Reported on 7/8/2025) 90 tablet 1    vitamin D3 (CHOLECALCIFEROL) 25 MCG (1000 UT) TABS tablet

## 2025-07-10 ENCOUNTER — CLINICAL DOCUMENTATION (OUTPATIENT)
Facility: HOSPITAL | Age: 50
End: 2025-07-10

## 2025-07-10 DIAGNOSIS — K91.2 POSTOPERATIVE INTESTINAL MALABSORPTION: ICD-10-CM

## 2025-07-10 DIAGNOSIS — E66.01 CLASS 2 SEVERE OBESITY WITH SERIOUS COMORBIDITY AND BODY MASS INDEX (BMI) OF 38.0 TO 38.9 IN ADULT, UNSPECIFIED OBESITY TYPE (HCC): ICD-10-CM

## 2025-07-10 DIAGNOSIS — Z98.84 S/P GASTRIC BYPASS: ICD-10-CM

## 2025-07-10 DIAGNOSIS — R79.89 LOW TSH LEVEL: ICD-10-CM

## 2025-07-10 DIAGNOSIS — E66.812 CLASS 2 SEVERE OBESITY WITH SERIOUS COMORBIDITY AND BODY MASS INDEX (BMI) OF 38.0 TO 38.9 IN ADULT, UNSPECIFIED OBESITY TYPE (HCC): ICD-10-CM

## 2025-07-10 ASSESSMENT — ENCOUNTER SYMPTOMS
SHORTNESS OF BREATH: 0
VOMITING: 1
ABDOMINAL DISTENTION: 0
DIARRHEA: 0
COUGH: 0
ABDOMINAL PAIN: 0
ROS SKIN COMMENTS: HAIR LOSS
WHEEZING: 0
CONSTIPATION: 0
NAUSEA: 0
EYES NEGATIVE: 1
BLOOD IN STOOL: 0

## 2025-07-10 NOTE — PROGRESS NOTES
7/10/25:  Patient is 4 months post op.  She is doing 2 shakes a day because she doesn't feel like she is getting enough protein in with food.    She is eating 2 eggs for breakfast (12 grams of protein).  She is doing a Premier shake (30 grams ) for lunch and another one before dinner (30 grams) and beans with cheese for dinner.      Patient and I worked together  to come up with some meal ideas for lunch.  She is often on the go during lunch, but we came up with some meal ideas that are more portable.  We also talked about using her crock pot to make chicken that she can shred or chili.  She liked these ideas and was going to try these out, so that she is able to decrease to one shake a day.    Patient will reach out if she struggles with getting PO protein in.  Will monitor as needed.    Keeley Last MS RD

## 2025-08-22 ENCOUNTER — OFFICE VISIT (OUTPATIENT)
Age: 50
End: 2025-08-22
Payer: MEDICAID

## 2025-08-22 VITALS
BODY MASS INDEX: 36.83 KG/M2 | OXYGEN SATURATION: 95 % | HEIGHT: 68 IN | TEMPERATURE: 97 F | DIASTOLIC BLOOD PRESSURE: 67 MMHG | HEART RATE: 64 BPM | SYSTOLIC BLOOD PRESSURE: 108 MMHG | WEIGHT: 243 LBS

## 2025-08-22 DIAGNOSIS — Z98.1 S/P LUMBAR FUSION: ICD-10-CM

## 2025-08-22 DIAGNOSIS — M48.062 SPINAL STENOSIS, LUMBAR REGION WITH NEUROGENIC CLAUDICATION: ICD-10-CM

## 2025-08-22 PROCEDURE — 3074F SYST BP LT 130 MM HG: CPT | Performed by: NURSE PRACTITIONER

## 2025-08-22 PROCEDURE — 99214 OFFICE O/P EST MOD 30 MIN: CPT | Performed by: NURSE PRACTITIONER

## 2025-08-22 PROCEDURE — 3078F DIAST BP <80 MM HG: CPT | Performed by: NURSE PRACTITIONER

## 2025-08-22 RX ORDER — TRIAMCINOLONE ACETONIDE 1 MG/G
OINTMENT TOPICAL
COMMUNITY
Start: 2025-08-08

## 2025-08-22 RX ORDER — GABAPENTIN 800 MG/1
800 TABLET ORAL 3 TIMES DAILY
Qty: 90 TABLET | Refills: 5 | Status: SHIPPED | OUTPATIENT
Start: 2025-08-22 | End: 2026-02-18

## (undated) DEVICE — HANDPIECE SET WITH HIGH FLOW TIP AND SUCTION TUBE: Brand: INTERPULSE

## (undated) DEVICE — SYRINGE MED 30ML STD CLR PLAS LUERLOCK TIP N CTRL DISP

## (undated) DEVICE — 3M™ TEGADERM™ HP TRANSPARENT FILM DRESSING FRAME STYLE, 9534HP, 2-3/8 X 2-3/4 IN (6 CM X 7 CM), 100/CT 4CT/CASE: Brand: 3M™ TEGADERM™

## (undated) DEVICE — DRESSING FOAM DISK DIA1IN H 7MM HYDRPHLC CHG IMPREG IN SL

## (undated) DEVICE — PACKING 8004000 NEURAY 200PK 13X13MM: Brand: NEURAY ®

## (undated) DEVICE — SOLUTION ANTIFOG VIS SYS CLEARIFY LAPSCP

## (undated) DEVICE — SUTURE PDS II SZ 0 L36IN ABSRB VLT L36MM CT-1 1/2 CIR Z346H

## (undated) DEVICE — 3M™ BAIR PAWS FLEX™ WARMING GOWN, STANDARD, 20 PER CASE 81003: Brand: BAIR PAWS™

## (undated) DEVICE — SYRINGE CTRL SPNL STPCOCK 3 W

## (undated) DEVICE — SCREW EXT FIX L14MM FOR DISTRCTN

## (undated) DEVICE — BNDG,ELSTC,MATRIX,STRL,6"X5YD,LF,HOOK&LP: Brand: MEDLINE

## (undated) DEVICE — COLLAR CERV ADJ XL 20X3 IN COTTON MED DENS PREMIERPRO

## (undated) DEVICE — SYRINGE MED 3ML NDL 22GA L1 1/2IN REG BVL SFGLDE

## (undated) DEVICE — SOLUTION IV 1000ML 0.9% SOD CHL

## (undated) DEVICE — SOFT SILICONE HYDROCELLULAR SACRUM DRESSING WITH LOCK AWAY LAYER: Brand: ALLEVYN LIFE SACRUM (LARGE) PACK OF 10

## (undated) DEVICE — VESSEL SEALER EXTEND: Brand: ENDOWRIST

## (undated) DEVICE — 10FR FRAZIER SUCTION HANDLE: Brand: CARDINAL HEALTH

## (undated) DEVICE — 1010 S-DRAPE TOWEL DRAPE 10/BX: Brand: STERI-DRAPE™

## (undated) DEVICE — SUTURE STRATAFIX SYMMETRIC PDS + SZ 1 L18IN ABSRB VLT L48MM SXPP1A400

## (undated) DEVICE — SET EXTN 26ML L86IN TBNG DIA0054IN MINIBOR BACKCHECK VLV

## (undated) DEVICE — ELECTRODE PT RET AD L9FT HI MOIST COND ADH HYDRGEL CORDED

## (undated) DEVICE — SLEEVE TRCR L100MM DIA5MM UNIV STBL FOR BLDELSS DIL TIP

## (undated) DEVICE — INTENDED FOR TISSUE SEPARATION, AND OTHER PROCEDURES THAT REQUIRE A SHARP SURGICAL BLADE TO PUNCTURE OR CUT.: Brand: BARD-PARKER ® CARBON RIB-BACK BLADES

## (undated) DEVICE — BRUSH POWER TISSUE EXTRACTOR BENT

## (undated) DEVICE — SUTURE 2 0 STRATAFIX SYMMETRIC PDS + 60CM CT 1 SXPP1A439

## (undated) DEVICE — HOOD WITH PEEL AWAY FACE SHIELD: Brand: T7PLUS

## (undated) DEVICE — SUTURE STRATAFIX SYMMETRIC PDS + SZ 0 L18IN ABSRB L36MM SXPP1A401

## (undated) DEVICE — SYRINGE MED 50ML LUERLOCK TIP

## (undated) DEVICE — ADHESIVE SKIN CLOSURE 4X22 CM PREMIERPRO EXOFINFUSION DISP

## (undated) DEVICE — COLUMN DRAPE

## (undated) DEVICE — GOWN,REINFORCED,POLY,AURORA,XLARGE,STRL: Brand: MEDLINE

## (undated) DEVICE — KIT INT FIX FEM TIB CKPT MAKOPLASTY

## (undated) DEVICE — PREP CHLORAPREP 10.5 ML ORG --

## (undated) DEVICE — Device

## (undated) DEVICE — SOLUTION IRRIG 1000ML 0.9% SOD CHL USP POUR PLAS BTL

## (undated) DEVICE — NEEDLE SPNL 20GA L3.5IN YEL HUB S STL REG WALL FIT STYL

## (undated) DEVICE — APPLICATOR MEDICATED 26 CC SOLUTION HI LT ORNG CHLORAPREP

## (undated) DEVICE — NEEDLE SPNL 18GA L3.5IN W/ QNCKE SHARPER BVL DURA CLICK

## (undated) DEVICE — CLEAN UP KIT: Brand: MEDLINE INDUSTRIES, INC.

## (undated) DEVICE — ZIPPERED TOGA, X-LARGE: Brand: FLYTE

## (undated) DEVICE — BOOT POS LEG DEMAYO

## (undated) DEVICE — INTENDED FOR TISSUE SEPARATION, AND OTHER PROCEDURES THAT REQUIRE A SHARP SURGICAL BLADE TO PUNCTURE OR CUT.: Brand: BARD-PARKER SAFETY BLADES SIZE 20, STERILE

## (undated) DEVICE — INTENDED FOR TISSUE SEPARATION, AND OTHER PROCEDURES THAT REQUIRE A SHARP SURGICAL BLADE TO PUNCTURE OR CUT.: Brand: BARD-PARKER SAFETY BLADES SIZE 10, STERILE

## (undated) DEVICE — TROCAR LAP L100MM DIA5MM BLDELSS W/ STBL SL ENDOPATH XCEL

## (undated) DEVICE — 4-PORT MANIFOLD: Brand: NEPTUNE 2

## (undated) DEVICE — STERILE LATEX POWDER-FREE SURGICAL GLOVESWITH NITRILE COATING: Brand: PROTEXIS

## (undated) DEVICE — STAPLER 60 RELOAD WHITE: Brand: SUREFORM

## (undated) DEVICE — SUTURE MCRYL SZ 3-0 L27IN ABSRB UD L24MM PS-1 3/8 CIR PRIM Y936H

## (undated) DEVICE — (D)PREP SKN CHLRAPRP APPL 26ML -- CONVERT TO ITEM 371833

## (undated) DEVICE — BLADE SURG SAW STD S STL OSC W/ SERR EDGE DISP

## (undated) DEVICE — 3M™ TEGADERM™ TRANSPARENT FILM DRESSING FRAME STYLE, 1626W, 4 IN X 4-3/4 IN (10 CM X 12 CM), 50/CT 4CT/CASE: Brand: 3M™ TEGADERM™

## (undated) DEVICE — SPONGE DISSECT PNUT SM 3/8IN -- 5/PK

## (undated) DEVICE — STAPLER 60 RELOAD BLUE: Brand: SUREFORM

## (undated) DEVICE — SUTURE STRATAFIX SYMMETRIC PDS + SZ 2-0 L18IN ABSRB VLT SXPP1A403

## (undated) DEVICE — INTENDED FOR TISSUE SEPARATION, AND OTHER PROCEDURES THAT REQUIRE A SHARP SURGICAL BLADE TO PUNCTURE OR CUT.: Brand: BARD-PARKER SAFETY BLADES SIZE 15, STERILE

## (undated) DEVICE — DRAIN SURG W7MMXL20CM SIL FULL PERF HUBLESS FLAT RADPQ STRP

## (undated) DEVICE — REM POLYHESIVE ADULT PATIENT RETURN ELECTRODE: Brand: VALLEYLAB

## (undated) DEVICE — ARM DRAPE

## (undated) DEVICE — STOCKING ANTIEMB KNEE LG REG --

## (undated) DEVICE — SUTURE PERMA-HAND SZ 2-0 L30IN NONABSORBABLE BLK L26MM SH K833H

## (undated) DEVICE — PROBE NEUROMONITORING 4MM STERILE

## (undated) DEVICE — ELECTRODE BLDE L4IN NONINSULATED EDGE

## (undated) DEVICE — KENDALL SCD EXPRESS SLEEVES, KNEE LENGTH, MEDIUM: Brand: KENDALL SCD

## (undated) DEVICE — OPTIFOAM GENTLE SA, POSTOP, 4X8: Brand: MEDLINE

## (undated) DEVICE — SUTURE V-LOC 90 3-0 L9IN ABSRB VLT L26MM V-20 1/2 CIR TAPR VLOCM0644

## (undated) DEVICE — SEAL

## (undated) DEVICE — INSULATED BLADE ELECTRODE: Brand: EDGE

## (undated) DEVICE — SYRINGE,TOOMEY,IRRIGATION,70CC,STERILE: Brand: MEDLINE

## (undated) DEVICE — GLOVE SURG SZ 65 L12IN FNGR THK79MIL GRN LTX FREE

## (undated) DEVICE — SUTURE PDS II SZ 0 L27IN ABSRB VLT L36MM CT-1 1/2 CIR Z340H

## (undated) DEVICE — DRESSING GERM DIA1IN CNTR H DIA7MM BLU CHG ANTIMIC PROTCT

## (undated) DEVICE — STAPLER 60: Brand: SUREFORM

## (undated) DEVICE — SPIROMETER INCENT 2500ML W ONE W VLV

## (undated) DEVICE — ARGYLE FRAZIER SURGICAL SUCTION INSTRUMENT 10 FR/CH (3.3 MM): Brand: ARGYLE

## (undated) DEVICE — THE MILL DISPOSABLE - MEDIUM

## (undated) DEVICE — SUTURE VCRL SZ 2-0 L18IN ABSRB VLT CT-1 L36MM 1/2 CIR J739D

## (undated) DEVICE — BIT DRL DIA2.4MM DISP FOR VUEPOINT II SYS

## (undated) DEVICE — BLADE ASSEMB CLP HAIR FINE --

## (undated) DEVICE — KIT CLN UP BON SECOURS MARYV

## (undated) DEVICE — DRSG MEPILEX AG 4X4IN -- CONVERT TO ITEM 365990

## (undated) DEVICE — WAX SURG 2.5GM HEMSTAT BNE BEESWAX PARAFFIN ISO PALMITATE

## (undated) DEVICE — 3.0MM PRECISION NEURO (MATCH HEAD)

## (undated) DEVICE — SUTURE MONOCRYL SZ 4-0 L27IN ABSRB UD L24MM PS-1 3/8 CIR PRIM Y935H

## (undated) DEVICE — WATERPROOF, BACTERIA PROOF DRESSING WITH ABSORBENT SEE THROUGH PAD: Brand: OPSITE POST-OP VISIBLE 25X10CM CTN 20

## (undated) DEVICE — STRAP,POSITIONING,KNEE/BODY,FOAM,4X60": Brand: MEDLINE

## (undated) DEVICE — KIT DIL PRB K WIRE DISP FOR EXTRM LUM INTBDY FUS

## (undated) DEVICE — ADHESIVE SKIN CLOSURE WND 8.661X1.5 IN 22 CM LIQUIBAND SECUR

## (undated) DEVICE — SUIT SURG ISOLATN ZIP TOGA 2XL W/ PEELWY LENS T7 +

## (undated) DEVICE — COLLAR CERV L H3.25X23IN M DENS FOAM COT STOCK CVR LO

## (undated) DEVICE — ADHESIVE SKIN CLSR 0.7ML TOP DERMBND ADV

## (undated) DEVICE — KIT ARMOR C DRP COLLAPSIBLE AND SELF EXP TOP CVR FOR FLUOROSCOPIC

## (undated) DEVICE — SUTURE MONOCRYL STRATAFIX SPRL + SZ 4-0 L12IN ABSRB UD PS-2 SXMP1B117

## (undated) DEVICE — DECANTER BAG 9": Brand: MEDLINE INDUSTRIES, INC.

## (undated) DEVICE — APPLICATOR BNDG 1MM ADH PREMIERPRO EXOFIN

## (undated) DEVICE — NEEDLE HYPO 18GA L1.5IN PNK S STL HUB POLYPR SHLD REG BVL

## (undated) DEVICE — GLOVE ORANGE PI 7 1/2   MSG9075

## (undated) DEVICE — PADDING CAST W6INXL4YD ST COT COHESIVE HND TEARABLE SPEC

## (undated) DEVICE — Device: Brand: JELCO

## (undated) DEVICE — PACK SURG BSHR TOT KNEE LF

## (undated) DEVICE — STOCKING ANTIMBLSM KNEE XL REG --

## (undated) DEVICE — SOLUTION IRRIG 3000ML 0.9% SOD CHL FLX CONT 0797208] ICU MEDICAL INC]

## (undated) DEVICE — GLOVE SURG SZ 65 CRM LTX FREE POLYISOPRENE POLYMER BEAD ANTI

## (undated) DEVICE — BASEPLATE TIB SZ 5 AP49MM ML74MM KNEE TRITANIUM 4 CRUCFRM: Type: IMPLANTABLE DEVICE | Site: KNEE | Status: NON-FUNCTIONAL

## (undated) DEVICE — BANDAGE COMPR W6INXL5YD WHT BGE POLY COT M E WRP WV HK AND

## (undated) DEVICE — SUTURE STRATAFIX SYMMETRIC PDS + ETHIGUARD SZ 1 L18IN ABSRB SXPP1A300

## (undated) DEVICE — GLOVE SURG SZ 85 L12IN FNGR THK79MIL GRN LTX FREE

## (undated) DEVICE — HEX-LOCKING BLADE ELECTRODE: Brand: EDGE

## (undated) DEVICE — STOCKING COMPR M L29-31IN REG 19MMHG ANK 8-9IN CALF 12-15IN

## (undated) DEVICE — WATERPROOF, BACTERIA PROOF DRESSING WITH ABSORBENT SEE THROUGH PAD: Brand: OPSITE POST-OP VISIBLE 15X10CM CTN 20

## (undated) DEVICE — ELECTRODE EMG NDL M5 MOD MEP DISP NVM5

## (undated) DEVICE — DILATOR TWO STERILE SINGLE USE ONLY

## (undated) DEVICE — KIT DRP FOR RIO ROBOTIC ARM ASST SYS

## (undated) DEVICE — 3M™ STERI-DRAPE™ U-DRAPE 1015: Brand: STERI-DRAPE™

## (undated) DEVICE — TUBING SUCT 10FR MAL ALUM SHFT FN CAP VENT UNIV CONN W/ OBT

## (undated) DEVICE — TAPE,CLOTH/SILK,CURAD,3"X10YD,LF,40/CS: Brand: CURAD

## (undated) DEVICE — SYR 10ML LUER LOK 1/5ML GRAD --

## (undated) DEVICE — SPONGE LAP 18X18IN STRL -- 5/PK

## (undated) DEVICE — 3M™ MICROFOAM™ SURGICAL TAPE 4 ROLLS/CARTON 6 CARTONS/CASE 1528-3: Brand: 3M™ MICROFOAM™

## (undated) DEVICE — FLEX ADVANTAGE 3000CC: Brand: FLEX ADVANTAGE

## (undated) DEVICE — SUTURE MONOCRYL SZ 3-0 L27IN ABSRB UD L24MM PS-1 3/8 CIR PRIM Y936H

## (undated) DEVICE — TRAY,URINE METER,100% SILICONE,16FR10ML: Brand: MEDLINE

## (undated) DEVICE — GLOVE SURG SZ 8 CRM LTX FREE POLYISOPRENE POLYMER BEAD ANTI

## (undated) DEVICE — PIN BNE FIX L110MM DIA32MM

## (undated) DEVICE — BOWL MED L 32OZ PLAS W/ MOLD GRAD EZ OPN PEEL PCH

## (undated) DEVICE — SUTURE MCRYL SZ 2-0 L36IN ABSRB UD L36MM CT-1 1/2 CIR Y945H

## (undated) DEVICE — SUTURE VCRL SZ 2-0 L36IN ABSRB UD L36MM CT-1 1/2 CIR J945H

## (undated) DEVICE — BLADE SURG SAW S STL NAR OSC W/ SERR EDGE DISP

## (undated) DEVICE — SUTURE ABSORBABLE MONOFILAMENT 3-0 PS-2 27 IN UD MONOCRYL + SXMP1B109

## (undated) DEVICE — KIT OR TURNOVER

## (undated) DEVICE — 2C14 #2 PDO 36 X 36: Brand: 2C14 #2 PDO 36 X 36

## (undated) DEVICE — KIT POS W/ FOAM ARM CRADL SHEARGUARD CHST PD CVR FOR SPNL

## (undated) DEVICE — STRIP,CLOSURE,WOUND,MEDI-STRIP,1/2X4: Brand: MEDLINE

## (undated) DEVICE — APPLIER CLP AUTO MED 9.75 IN TI SURGCLP SUPER INTLOK 20 DISP

## (undated) DEVICE — TUBE SPNL PORTAL E SEQUENTIA

## (undated) DEVICE — SUTURE MCRYL SZ 4-0 L18IN ABSRB UD L19MM PS-2 3/8 CIR PRIM Y496G

## (undated) DEVICE — KIT TRK KNEE PROC VIZADISC

## (undated) DEVICE — TRAY CATH OD16FR SIL URIN M STATLOK STBL DEV SURSTP

## (undated) DEVICE — PREMIUM DRY TRAY LF: Brand: MEDLINE INDUSTRIES, INC.

## (undated) DEVICE — VISIGI 3D®  CALIBRATION SYSTEM  SIZE 40FR BYPASS/SHORT W/BULB: Brand: BOEHRINGER® VISIGI 3D®  CALIBRATION SYSTEM  SIZE 40FR BYPASS/SHORT W/BULB

## (undated) DEVICE — THE DEVICE IS A DISPOSABLE, LIGATURE PASSING, SUTURING APPARATUS AND NEEDLE GUIDE FOR THE ABDOMINAL WALL WHICH IS NON-POWERED, HAND-HELD, AND HAND-MANIPULATED,INTENDED TO BE USED IN VARIOUS GENERAL SURGICAL PROCEDURES. THE DEVICE INCLUDES A LIGATURE CARRIER PATHWAY, NEEDLE GUIDE, TWO NEEDLES, REFERENCE PLANE T-BAR, AND A GUIDEWIRE. THE HANDLE OF THE DEVICE PROVIDES TWO DIAMETRICALLY OPPOSED ENCLOSED GUIDEWAYS FOR THE ADVANCEMENT AND RETRACTION OF THE NEEDLES UNDER MANUAL CONTROL OF A PLUNGER LOCATED AT THE PROXIMAL END OF THE DEVICE.AS PART OF THE M-CLOSE CONVENIENCE KIT, A NERVE BLOCK NEEDLE IS INCLUDED FOR THE ADMINISTRATION OF LOCAL ANESTHETIC AGENTS TO PROVIDE REGIONAL AND LOCAL ANESTHESIA.  A TELFA ANTIMICROBIAL, NON-ADHERENT PAD IS ALSO PROVIDED IN THE KIT FOR USE AS A PRIMARY DRESSING FOR THE SURGICAL INCISION.: Brand: M-CLOSE KIT

## (undated) DEVICE — 2DSM19 2-0 UND MONODERM 30X30: Brand: 2DSM19 2-0 UND MONODERM 30X30

## (undated) DEVICE — BONE VAC

## (undated) DEVICE — SUTURE VCRL SZ 1 L18IN ABSRB VLT CT-1 L36MM 1/2 CIR J741D

## (undated) DEVICE — WATERPROOF, BACTERIA PROOF DRESSING WITH ABSORBENT SEE THROUGH PAD: Brand: OPSITE POST-OP VISIBLE 10X8CM CTN 20

## (undated) DEVICE — SUTURE STRATAFIX SPRL MCRYL + SZ 4-0 L12IN ABSRB UD PS-2 SXMP1B117

## (undated) DEVICE — BOWL AND CEMENT CARTRIDGE WITH BREAKAWAY FEMORAL NOZZLE: Brand: ACM

## (undated) DEVICE — STERILE POLYISOPRENE POWDER-FREE SURGICAL GLOVES: Brand: PROTEXIS

## (undated) DEVICE — BLADE SHAPER STRL DISP

## (undated) DEVICE — NEEDLE HYPO 25GA L1.5IN BVL ORIENTED ECLIPSE

## (undated) DEVICE — SUTURE VCRL SZ 3-0 L27IN ABSRB UD L26MM SH 1/2 CIR J416H

## (undated) DEVICE — GLOVE SURG SZ 8 L12IN FNGR THK79MIL GRN LTX FREE

## (undated) DEVICE — 2108 SERIES SAGITTAL BLADE (28.9 X 0.64 X 58.7MM)

## (undated) DEVICE — BLADELESS OBTURATOR: Brand: WECK VISTA

## (undated) DEVICE — SUTURE V-LOC 90 SZ 3-0 L6IN ABSRB VLT V-20 L26MM 1/2 CIR VLOCM0604

## (undated) DEVICE — INTENDED FOR TISSUE SEPARATION, AND OTHER PROCEDURES THAT REQUIRE A SHARP SURGICAL BLADE TO PUNCTURE OR CUT.: Brand: BARD-PARKER ® STAINLESS STEEL BLADES

## (undated) DEVICE — SSC BONE WAX: Brand: SSC BONE WAX

## (undated) DEVICE — SET DEVICE SPINAL W/CANNULA BLADE ST DISP

## (undated) DEVICE — SYR 5ML 1/5 GRAD LL NSAF LF --

## (undated) DEVICE — SUTURE VCRL SZ 1 L18IN ABSRB VLT CTX L48MM 1/2 CIR J765D

## (undated) DEVICE — Z DISCONTINUED USE 2749457 TUBING SAMP AD W12.5XH8.4IN D9.1IN NSL ORAL SMRT CAPNOLINE

## (undated) DEVICE — JACKSON TABLE POSITIONER KIT: Brand: MEDLINE INDUSTRIES, INC.